# Patient Record
Sex: MALE | Race: OTHER | HISPANIC OR LATINO | URBAN - METROPOLITAN AREA
[De-identification: names, ages, dates, MRNs, and addresses within clinical notes are randomized per-mention and may not be internally consistent; named-entity substitution may affect disease eponyms.]

---

## 2017-05-03 ENCOUNTER — EMERGENCY (EMERGENCY)
Facility: HOSPITAL | Age: 57
LOS: 0 days | Discharge: HOME | End: 2017-05-03
Attending: EMERGENCY MEDICINE | Admitting: EMERGENCY MEDICINE
Payer: MEDICAID

## 2017-05-03 VITALS
OXYGEN SATURATION: 97 % | RESPIRATION RATE: 17 BRPM | HEART RATE: 101 BPM | WEIGHT: 218.92 LBS | SYSTOLIC BLOOD PRESSURE: 135 MMHG | DIASTOLIC BLOOD PRESSURE: 76 MMHG | TEMPERATURE: 99 F | HEIGHT: 69 IN

## 2017-05-03 VITALS — HEART RATE: 96 BPM | SYSTOLIC BLOOD PRESSURE: 149 MMHG | OXYGEN SATURATION: 95 % | DIASTOLIC BLOOD PRESSURE: 95 MMHG

## 2017-05-03 DIAGNOSIS — R07.9 CHEST PAIN, UNSPECIFIED: ICD-10-CM

## 2017-05-03 DIAGNOSIS — E11.9 TYPE 2 DIABETES MELLITUS WITHOUT COMPLICATIONS: ICD-10-CM

## 2017-05-03 DIAGNOSIS — I10 ESSENTIAL (PRIMARY) HYPERTENSION: ICD-10-CM

## 2017-05-03 DIAGNOSIS — Z98.1 ARTHRODESIS STATUS: Chronic | ICD-10-CM

## 2017-05-03 LAB
ALBUMIN SERPL ELPH-MCNC: 3.5 G/DL — SIGNIFICANT CHANGE UP (ref 3.3–5)
ALP SERPL-CCNC: 68 U/L — SIGNIFICANT CHANGE UP (ref 40–120)
ALT FLD-CCNC: 46 U/L — SIGNIFICANT CHANGE UP (ref 12–78)
ANION GAP SERPL CALC-SCNC: 10 MMOL/L — SIGNIFICANT CHANGE UP (ref 5–17)
AST SERPL-CCNC: 18 U/L — SIGNIFICANT CHANGE UP (ref 15–37)
BASOPHILS # BLD AUTO: 0.2 K/UL — SIGNIFICANT CHANGE UP (ref 0–0.2)
BASOPHILS NFR BLD AUTO: 1.4 % — SIGNIFICANT CHANGE UP (ref 0–2)
BILIRUB SERPL-MCNC: 0.4 MG/DL — SIGNIFICANT CHANGE UP (ref 0.2–1.2)
BUN SERPL-MCNC: 8 MG/DL — SIGNIFICANT CHANGE UP (ref 7–23)
CALCIUM SERPL-MCNC: 8.9 MG/DL — SIGNIFICANT CHANGE UP (ref 8.5–10.1)
CHLORIDE SERPL-SCNC: 102 MMOL/L — SIGNIFICANT CHANGE UP (ref 96–108)
CK SERPL-CCNC: 695 U/L — HIGH (ref 26–308)
CO2 SERPL-SCNC: 24 MMOL/L — SIGNIFICANT CHANGE UP (ref 22–31)
CREAT SERPL-MCNC: 0.97 MG/DL — SIGNIFICANT CHANGE UP (ref 0.5–1.3)
D DIMER BLD IA.RAPID-MCNC: <150 NG/ML DDU — SIGNIFICANT CHANGE UP
EOSINOPHIL # BLD AUTO: 0.2 K/UL — SIGNIFICANT CHANGE UP (ref 0–0.5)
EOSINOPHIL NFR BLD AUTO: 1.7 % — SIGNIFICANT CHANGE UP (ref 0–6)
GLUCOSE SERPL-MCNC: 297 MG/DL — HIGH (ref 70–99)
HCT VFR BLD CALC: 43.9 % — SIGNIFICANT CHANGE UP (ref 39–50)
HGB BLD-MCNC: 14.8 G/DL — SIGNIFICANT CHANGE UP (ref 13–17)
LYMPHOCYTES # BLD AUTO: 2.5 K/UL — SIGNIFICANT CHANGE UP (ref 1–3.3)
LYMPHOCYTES # BLD AUTO: 23.2 % — SIGNIFICANT CHANGE UP (ref 13–44)
MCHC RBC-ENTMCNC: 29.6 PG — SIGNIFICANT CHANGE UP (ref 27–34)
MCHC RBC-ENTMCNC: 33.6 GM/DL — SIGNIFICANT CHANGE UP (ref 32–36)
MCV RBC AUTO: 87.9 FL — SIGNIFICANT CHANGE UP (ref 80–100)
MONOCYTES # BLD AUTO: 0.9 K/UL — SIGNIFICANT CHANGE UP (ref 0–0.9)
MONOCYTES NFR BLD AUTO: 7.8 % — SIGNIFICANT CHANGE UP (ref 2–14)
NEUTROPHILS # BLD AUTO: 7.2 K/UL — SIGNIFICANT CHANGE UP (ref 1.8–7.4)
NEUTROPHILS NFR BLD AUTO: 65.9 % — SIGNIFICANT CHANGE UP (ref 43–77)
PLATELET # BLD AUTO: 260 K/UL — SIGNIFICANT CHANGE UP (ref 150–400)
POTASSIUM SERPL-MCNC: 3.7 MMOL/L — SIGNIFICANT CHANGE UP (ref 3.5–5.3)
POTASSIUM SERPL-SCNC: 3.7 MMOL/L — SIGNIFICANT CHANGE UP (ref 3.5–5.3)
PROT SERPL-MCNC: 7.6 GM/DL — SIGNIFICANT CHANGE UP (ref 6–8.3)
RBC # BLD: 5 M/UL — SIGNIFICANT CHANGE UP (ref 4.2–5.8)
RBC # FLD: 12.3 % — SIGNIFICANT CHANGE UP (ref 10.3–14.5)
SODIUM SERPL-SCNC: 136 MMOL/L — SIGNIFICANT CHANGE UP (ref 135–145)
TROPONIN I SERPL-MCNC: <0.015 NG/ML — SIGNIFICANT CHANGE UP (ref 0.01–0.04)
TROPONIN I SERPL-MCNC: <0.015 NG/ML — SIGNIFICANT CHANGE UP (ref 0.01–0.04)
WBC # BLD: 10.9 K/UL — HIGH (ref 3.8–10.5)
WBC # FLD AUTO: 10.9 K/UL — HIGH (ref 3.8–10.5)

## 2017-05-03 PROCEDURE — 93010 ELECTROCARDIOGRAM REPORT: CPT

## 2017-05-03 PROCEDURE — 99284 EMERGENCY DEPT VISIT MOD MDM: CPT

## 2017-05-03 PROCEDURE — 71020: CPT | Mod: 26

## 2017-05-03 RX ORDER — METFORMIN HYDROCHLORIDE 850 MG/1
1 TABLET ORAL
Qty: 60 | Refills: 0
Start: 2017-05-03 | End: 2017-06-02

## 2017-05-03 RX ORDER — SODIUM CHLORIDE 9 MG/ML
3 INJECTION INTRAMUSCULAR; INTRAVENOUS; SUBCUTANEOUS ONCE
Refills: 0 | Status: COMPLETED | OUTPATIENT
Start: 2017-05-03 | End: 2017-05-03

## 2017-05-03 RX ORDER — ASPIRIN/CALCIUM CARB/MAGNESIUM 324 MG
325 TABLET ORAL ONCE
Refills: 0 | Status: COMPLETED | OUTPATIENT
Start: 2017-05-03 | End: 2017-05-03

## 2017-05-03 RX ORDER — ACETAMINOPHEN 500 MG
650 TABLET ORAL ONCE
Refills: 0 | Status: COMPLETED | OUTPATIENT
Start: 2017-05-03 | End: 2017-05-03

## 2017-05-03 RX ORDER — METFORMIN HYDROCHLORIDE 850 MG/1
1000 TABLET ORAL ONCE
Refills: 0 | Status: COMPLETED | OUTPATIENT
Start: 2017-05-03 | End: 2017-05-03

## 2017-05-03 RX ADMIN — Medication 650 MILLIGRAM(S): at 08:18

## 2017-05-03 RX ADMIN — Medication 325 MILLIGRAM(S): at 05:27

## 2017-05-03 RX ADMIN — Medication 100 MILLIGRAM(S): at 05:27

## 2017-05-03 RX ADMIN — METFORMIN HYDROCHLORIDE 1000 MILLIGRAM(S): 850 TABLET ORAL at 10:14

## 2017-05-03 RX ADMIN — SODIUM CHLORIDE 3 MILLILITER(S): 9 INJECTION INTRAMUSCULAR; INTRAVENOUS; SUBCUTANEOUS at 05:27

## 2017-05-03 NOTE — ED PROVIDER NOTE - OBJECTIVE STATEMENT
55 y/o male with PMHx of HTN presents to the ED c/o CP rated as a 5/10. Also back pain and left leg pain. Pt states he was in retirement and had CP then and was seeing the retirement doctor. States he was prescribed medicine but was unable to pick it up from the pharmacy. Does not know the name. Current smoker.

## 2017-05-03 NOTE — ED PROVIDER NOTE - MUSCULOSKELETAL, MLM
Spine appears normal, range of motion is not limited, no muscle or joint tenderness. +reproducible tenderness to anterior chest wall, sternum, and left costochondral margin at rib number 3 and 4.

## 2017-05-03 NOTE — ED PROVIDER NOTE - NS ED MD SCRIBE ATTENDING SCRIBE SECTIONS
HISTORY OF PRESENT ILLNESS/REVIEW OF SYSTEMS/VITAL SIGNS( Pullset)/PHYSICAL EXAM/RESULTS/PROGRESS NOTE/DISPOSITION/PAST MEDICAL/SURGICAL/SOCIAL HISTORY

## 2017-07-29 ENCOUNTER — EMERGENCY (EMERGENCY)
Facility: HOSPITAL | Age: 57
LOS: 0 days | Discharge: HOME | End: 2017-07-29
Admitting: EMERGENCY MEDICINE

## 2017-07-29 VITALS — HEIGHT: 69 IN | WEIGHT: 210.1 LBS

## 2017-07-29 DIAGNOSIS — R69 ILLNESS, UNSPECIFIED: ICD-10-CM

## 2017-07-29 DIAGNOSIS — Z98.1 ARTHRODESIS STATUS: Chronic | ICD-10-CM

## 2017-07-29 NOTE — ED ADULT TRIAGE NOTE - TRIAGE CALLED NO RESPONSE
pt states he was waiting to be decon and left before seen by provider. pt did not inform staff./Patient called and no answer

## 2018-01-15 ENCOUNTER — EMERGENCY (EMERGENCY)
Facility: HOSPITAL | Age: 58
LOS: 0 days | Discharge: HOME | End: 2018-01-15
Attending: EMERGENCY MEDICINE | Admitting: EMERGENCY MEDICINE
Payer: MEDICAID

## 2018-01-15 VITALS
RESPIRATION RATE: 17 BRPM | SYSTOLIC BLOOD PRESSURE: 142 MMHG | OXYGEN SATURATION: 100 % | DIASTOLIC BLOOD PRESSURE: 79 MMHG | HEART RATE: 89 BPM | TEMPERATURE: 98 F

## 2018-01-15 VITALS
SYSTOLIC BLOOD PRESSURE: 151 MMHG | RESPIRATION RATE: 18 BRPM | DIASTOLIC BLOOD PRESSURE: 77 MMHG | WEIGHT: 169.98 LBS | TEMPERATURE: 98 F | OXYGEN SATURATION: 100 % | HEART RATE: 98 BPM | HEIGHT: 66 IN

## 2018-01-15 DIAGNOSIS — R05 COUGH: ICD-10-CM

## 2018-01-15 DIAGNOSIS — Z98.1 ARTHRODESIS STATUS: Chronic | ICD-10-CM

## 2018-01-15 DIAGNOSIS — M79.1 MYALGIA: ICD-10-CM

## 2018-01-15 LAB
ALBUMIN SERPL ELPH-MCNC: 3.1 G/DL — LOW (ref 3.3–5)
ALP SERPL-CCNC: 73 U/L — SIGNIFICANT CHANGE UP (ref 40–120)
ALT FLD-CCNC: 26 U/L — SIGNIFICANT CHANGE UP (ref 12–78)
ANION GAP SERPL CALC-SCNC: 7 MMOL/L — SIGNIFICANT CHANGE UP (ref 5–17)
APTT BLD: 29.2 SEC — SIGNIFICANT CHANGE UP (ref 27.5–37.4)
AST SERPL-CCNC: 9 U/L — LOW (ref 15–37)
BASOPHILS # BLD AUTO: 0.1 K/UL — SIGNIFICANT CHANGE UP (ref 0–0.2)
BASOPHILS NFR BLD AUTO: 0.8 % — SIGNIFICANT CHANGE UP (ref 0–2)
BILIRUB SERPL-MCNC: 0.2 MG/DL — SIGNIFICANT CHANGE UP (ref 0.2–1.2)
BUN SERPL-MCNC: 19 MG/DL — SIGNIFICANT CHANGE UP (ref 7–23)
CALCIUM SERPL-MCNC: 9.4 MG/DL — SIGNIFICANT CHANGE UP (ref 8.5–10.1)
CHLORIDE SERPL-SCNC: 98 MMOL/L — SIGNIFICANT CHANGE UP (ref 96–108)
CK SERPL-CCNC: 61 U/L — SIGNIFICANT CHANGE UP (ref 26–308)
CO2 SERPL-SCNC: 27 MMOL/L — SIGNIFICANT CHANGE UP (ref 22–31)
CREAT SERPL-MCNC: 0.94 MG/DL — SIGNIFICANT CHANGE UP (ref 0.5–1.3)
EOSINOPHIL # BLD AUTO: 0.2 K/UL — SIGNIFICANT CHANGE UP (ref 0–0.5)
EOSINOPHIL NFR BLD AUTO: 1.9 % — SIGNIFICANT CHANGE UP (ref 0–6)
GLUCOSE SERPL-MCNC: 381 MG/DL — HIGH (ref 70–99)
HCT VFR BLD CALC: 46.4 % — SIGNIFICANT CHANGE UP (ref 39–50)
HGB BLD-MCNC: 15.2 G/DL — SIGNIFICANT CHANGE UP (ref 13–17)
INR BLD: 0.94 RATIO — SIGNIFICANT CHANGE UP (ref 0.88–1.16)
LYMPHOCYTES # BLD AUTO: 2 K/UL — SIGNIFICANT CHANGE UP (ref 1–3.3)
LYMPHOCYTES # BLD AUTO: 22.9 % — SIGNIFICANT CHANGE UP (ref 13–44)
MCHC RBC-ENTMCNC: 28.6 PG — SIGNIFICANT CHANGE UP (ref 27–34)
MCHC RBC-ENTMCNC: 32.7 GM/DL — SIGNIFICANT CHANGE UP (ref 32–36)
MCV RBC AUTO: 87.5 FL — SIGNIFICANT CHANGE UP (ref 80–100)
MONOCYTES # BLD AUTO: 0.9 K/UL — SIGNIFICANT CHANGE UP (ref 0–0.9)
MONOCYTES NFR BLD AUTO: 9.8 % — SIGNIFICANT CHANGE UP (ref 2–14)
NEUTROPHILS # BLD AUTO: 5.6 K/UL — SIGNIFICANT CHANGE UP (ref 1.8–7.4)
NEUTROPHILS NFR BLD AUTO: 64.6 % — SIGNIFICANT CHANGE UP (ref 43–77)
PLATELET # BLD AUTO: 261 K/UL — SIGNIFICANT CHANGE UP (ref 150–400)
POTASSIUM SERPL-MCNC: 4.4 MMOL/L — SIGNIFICANT CHANGE UP (ref 3.5–5.3)
POTASSIUM SERPL-SCNC: 4.4 MMOL/L — SIGNIFICANT CHANGE UP (ref 3.5–5.3)
PROT SERPL-MCNC: 7.5 GM/DL — SIGNIFICANT CHANGE UP (ref 6–8.3)
PROTHROM AB SERPL-ACNC: 10.1 SEC — SIGNIFICANT CHANGE UP (ref 9.8–12.7)
RBC # BLD: 5.31 M/UL — SIGNIFICANT CHANGE UP (ref 4.2–5.8)
RBC # FLD: 12.9 % — SIGNIFICANT CHANGE UP (ref 10.3–14.5)
SODIUM SERPL-SCNC: 132 MMOL/L — LOW (ref 135–145)
TROPONIN I SERPL-MCNC: <0.015 NG/ML — SIGNIFICANT CHANGE UP (ref 0.01–0.04)
WBC # BLD: 8.7 K/UL — SIGNIFICANT CHANGE UP (ref 3.8–10.5)
WBC # FLD AUTO: 8.7 K/UL — SIGNIFICANT CHANGE UP (ref 3.8–10.5)

## 2018-01-15 PROCEDURE — 99284 EMERGENCY DEPT VISIT MOD MDM: CPT

## 2018-01-15 PROCEDURE — 71046 X-RAY EXAM CHEST 2 VIEWS: CPT | Mod: 26

## 2018-01-15 PROCEDURE — 93010 ELECTROCARDIOGRAM REPORT: CPT

## 2018-01-15 RX ORDER — IBUPROFEN 200 MG
1 TABLET ORAL
Qty: 40 | Refills: 0
Start: 2018-01-15 | End: 2018-01-24

## 2018-01-15 RX ORDER — KETOROLAC TROMETHAMINE 30 MG/ML
30 SYRINGE (ML) INJECTION ONCE
Refills: 0 | Status: DISCONTINUED | OUTPATIENT
Start: 2018-01-15 | End: 2018-01-15

## 2018-01-15 RX ORDER — SODIUM CHLORIDE 9 MG/ML
3 INJECTION INTRAMUSCULAR; INTRAVENOUS; SUBCUTANEOUS ONCE
Refills: 0 | Status: COMPLETED | OUTPATIENT
Start: 2018-01-15 | End: 2018-01-15

## 2018-01-15 RX ORDER — CYCLOBENZAPRINE HYDROCHLORIDE 10 MG/1
1 TABLET, FILM COATED ORAL
Qty: 15 | Refills: 0
Start: 2018-01-15 | End: 2018-01-19

## 2018-01-15 RX ORDER — INSULIN HUMAN 100 [IU]/ML
6 INJECTION, SOLUTION SUBCUTANEOUS ONCE
Refills: 0 | Status: COMPLETED | OUTPATIENT
Start: 2018-01-15 | End: 2018-01-15

## 2018-01-15 RX ORDER — SODIUM CHLORIDE 9 MG/ML
1000 INJECTION INTRAMUSCULAR; INTRAVENOUS; SUBCUTANEOUS ONCE
Refills: 0 | Status: COMPLETED | OUTPATIENT
Start: 2018-01-15 | End: 2018-01-15

## 2018-01-15 RX ADMIN — Medication 30 MILLIGRAM(S): at 01:47

## 2018-01-15 RX ADMIN — Medication 30 MILLIGRAM(S): at 04:27

## 2018-01-15 RX ADMIN — SODIUM CHLORIDE 1000 MILLILITER(S): 9 INJECTION INTRAMUSCULAR; INTRAVENOUS; SUBCUTANEOUS at 01:47

## 2018-01-15 RX ADMIN — INSULIN HUMAN 6 UNIT(S): 100 INJECTION, SOLUTION SUBCUTANEOUS at 03:02

## 2018-01-15 RX ADMIN — SODIUM CHLORIDE 3 MILLILITER(S): 9 INJECTION INTRAMUSCULAR; INTRAVENOUS; SUBCUTANEOUS at 01:49
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
## 2018-01-15 NOTE — ED ADULT NURSE NOTE - NS ED NURSE DC INFO COMPLEXITY
Simple: Patient demonstrates quick and easy understanding/Straightforward: Basic instructions, no meds, no home treatment/Verbalized Understanding

## 2018-01-15 NOTE — ED ADULT NURSE NOTE - OBJECTIVE STATEMENT
Pt states he has had cough and cold symptoms x 3 days.  Pt states yesterday he had diarrhea, n/v but none today.  States "I felt warm", but never took his temperature.

## 2018-01-15 NOTE — ED PROVIDER NOTE - PROGRESS NOTE DETAILS
glucose noted.  pt with h/o DM.  will treat and reeval pt resting comfortably.  will d/c home with f/u

## 2018-01-15 NOTE — ED PROVIDER NOTE - MEDICAL DECISION MAKING DETAILS
58 y/o male with cough and ocngestion.  will check labs, CXR and reeval 58 y/o male with cough and congestion.  will check labs, CXR and reeval

## 2018-01-24 ENCOUNTER — EMERGENCY (EMERGENCY)
Facility: HOSPITAL | Age: 58
LOS: 0 days | Discharge: HOME | End: 2018-01-24
Attending: EMERGENCY MEDICINE | Admitting: EMERGENCY MEDICINE
Payer: MEDICAID

## 2018-01-24 VITALS
TEMPERATURE: 99 F | HEART RATE: 84 BPM | RESPIRATION RATE: 20 BRPM | DIASTOLIC BLOOD PRESSURE: 77 MMHG | OXYGEN SATURATION: 98 % | SYSTOLIC BLOOD PRESSURE: 129 MMHG

## 2018-01-24 VITALS
TEMPERATURE: 99 F | HEIGHT: 66 IN | OXYGEN SATURATION: 99 % | RESPIRATION RATE: 18 BRPM | DIASTOLIC BLOOD PRESSURE: 93 MMHG | HEART RATE: 104 BPM | WEIGHT: 160.06 LBS | SYSTOLIC BLOOD PRESSURE: 159 MMHG

## 2018-01-24 DIAGNOSIS — R07.9 CHEST PAIN, UNSPECIFIED: ICD-10-CM

## 2018-01-24 DIAGNOSIS — Z98.1 ARTHRODESIS STATUS: Chronic | ICD-10-CM

## 2018-01-24 LAB
ALBUMIN SERPL ELPH-MCNC: 2.9 G/DL — LOW (ref 3.3–5)
ALP SERPL-CCNC: 93 U/L — SIGNIFICANT CHANGE UP (ref 40–120)
ALT FLD-CCNC: 22 U/L — SIGNIFICANT CHANGE UP (ref 12–78)
ANION GAP SERPL CALC-SCNC: 7 MMOL/L — SIGNIFICANT CHANGE UP (ref 5–17)
AST SERPL-CCNC: 10 U/L — LOW (ref 15–37)
BASOPHILS # BLD AUTO: 0.1 K/UL — SIGNIFICANT CHANGE UP (ref 0–0.2)
BASOPHILS NFR BLD AUTO: 0.7 % — SIGNIFICANT CHANGE UP (ref 0–2)
BILIRUB SERPL-MCNC: 0.3 MG/DL — SIGNIFICANT CHANGE UP (ref 0.2–1.2)
BUN SERPL-MCNC: 15 MG/DL — SIGNIFICANT CHANGE UP (ref 7–23)
CALCIUM SERPL-MCNC: 8.9 MG/DL — SIGNIFICANT CHANGE UP (ref 8.5–10.1)
CHLORIDE SERPL-SCNC: 97 MMOL/L — SIGNIFICANT CHANGE UP (ref 96–108)
CK SERPL-CCNC: 158 U/L — SIGNIFICANT CHANGE UP (ref 26–308)
CO2 SERPL-SCNC: 28 MMOL/L — SIGNIFICANT CHANGE UP (ref 22–31)
CREAT SERPL-MCNC: 0.95 MG/DL — SIGNIFICANT CHANGE UP (ref 0.5–1.3)
D DIMER BLD IA.RAPID-MCNC: <150 NG/ML DDU — SIGNIFICANT CHANGE UP
EOSINOPHIL # BLD AUTO: 0.1 K/UL — SIGNIFICANT CHANGE UP (ref 0–0.5)
EOSINOPHIL NFR BLD AUTO: 1.4 % — SIGNIFICANT CHANGE UP (ref 0–6)
GLUCOSE SERPL-MCNC: 447 MG/DL — HIGH (ref 70–99)
HCT VFR BLD CALC: 42.3 % — SIGNIFICANT CHANGE UP (ref 39–50)
HGB BLD-MCNC: 14 G/DL — SIGNIFICANT CHANGE UP (ref 13–17)
INR BLD: 0.95 RATIO — SIGNIFICANT CHANGE UP (ref 0.88–1.16)
LYMPHOCYTES # BLD AUTO: 1.4 K/UL — SIGNIFICANT CHANGE UP (ref 1–3.3)
LYMPHOCYTES # BLD AUTO: 13.7 % — SIGNIFICANT CHANGE UP (ref 13–44)
MCHC RBC-ENTMCNC: 29.1 PG — SIGNIFICANT CHANGE UP (ref 27–34)
MCHC RBC-ENTMCNC: 33 GM/DL — SIGNIFICANT CHANGE UP (ref 32–36)
MCV RBC AUTO: 88.2 FL — SIGNIFICANT CHANGE UP (ref 80–100)
MONOCYTES # BLD AUTO: 1 K/UL — HIGH (ref 0–0.9)
MONOCYTES NFR BLD AUTO: 9.9 % — SIGNIFICANT CHANGE UP (ref 2–14)
NEUTROPHILS # BLD AUTO: 7.5 K/UL — HIGH (ref 1.8–7.4)
NEUTROPHILS NFR BLD AUTO: 74.4 % — SIGNIFICANT CHANGE UP (ref 43–77)
PLATELET # BLD AUTO: 343 K/UL — SIGNIFICANT CHANGE UP (ref 150–400)
POTASSIUM SERPL-MCNC: 4.2 MMOL/L — SIGNIFICANT CHANGE UP (ref 3.5–5.3)
POTASSIUM SERPL-SCNC: 4.2 MMOL/L — SIGNIFICANT CHANGE UP (ref 3.5–5.3)
PROT SERPL-MCNC: 7.7 GM/DL — SIGNIFICANT CHANGE UP (ref 6–8.3)
PROTHROM AB SERPL-ACNC: 10.2 SEC — SIGNIFICANT CHANGE UP (ref 9.8–12.7)
RBC # BLD: 4.8 M/UL — SIGNIFICANT CHANGE UP (ref 4.2–5.8)
RBC # FLD: 12.4 % — SIGNIFICANT CHANGE UP (ref 10.3–14.5)
SODIUM SERPL-SCNC: 132 MMOL/L — LOW (ref 135–145)
TROPONIN I SERPL-MCNC: <0.015 NG/ML — SIGNIFICANT CHANGE UP (ref 0.01–0.04)
TROPONIN I SERPL-MCNC: <0.015 NG/ML — SIGNIFICANT CHANGE UP (ref 0.01–0.04)
WBC # BLD: 10.1 K/UL — SIGNIFICANT CHANGE UP (ref 3.8–10.5)
WBC # FLD AUTO: 10.1 K/UL — SIGNIFICANT CHANGE UP (ref 3.8–10.5)

## 2018-01-24 PROCEDURE — 99284 EMERGENCY DEPT VISIT MOD MDM: CPT

## 2018-01-24 PROCEDURE — 71045 X-RAY EXAM CHEST 1 VIEW: CPT | Mod: 26

## 2018-01-24 PROCEDURE — 93010 ELECTROCARDIOGRAM REPORT: CPT

## 2018-01-24 RX ORDER — METHOCARBAMOL 500 MG/1
2 TABLET, FILM COATED ORAL
Qty: 30 | Refills: 0
Start: 2018-01-24

## 2018-01-24 RX ORDER — IBUPROFEN 200 MG
1 TABLET ORAL
Qty: 20 | Refills: 0
Start: 2018-01-24

## 2018-01-24 RX ORDER — SODIUM CHLORIDE 9 MG/ML
1000 INJECTION INTRAMUSCULAR; INTRAVENOUS; SUBCUTANEOUS ONCE
Refills: 0 | Status: COMPLETED | OUTPATIENT
Start: 2018-01-24 | End: 2018-01-24

## 2018-01-24 RX ORDER — ASPIRIN/CALCIUM CARB/MAGNESIUM 324 MG
325 TABLET ORAL ONCE
Refills: 0 | Status: COMPLETED | OUTPATIENT
Start: 2018-01-24 | End: 2018-01-24

## 2018-01-24 RX ORDER — SODIUM CHLORIDE 9 MG/ML
3 INJECTION INTRAMUSCULAR; INTRAVENOUS; SUBCUTANEOUS ONCE
Refills: 0 | Status: COMPLETED | OUTPATIENT
Start: 2018-01-24 | End: 2018-01-24

## 2018-01-24 RX ORDER — ACETAMINOPHEN 500 MG
1000 TABLET ORAL ONCE
Refills: 0 | Status: COMPLETED | OUTPATIENT
Start: 2018-01-24 | End: 2018-01-24

## 2018-01-24 RX ORDER — KETOROLAC TROMETHAMINE 30 MG/ML
30 SYRINGE (ML) INJECTION ONCE
Refills: 0 | Status: DISCONTINUED | OUTPATIENT
Start: 2018-01-24 | End: 2018-01-24

## 2018-01-24 RX ADMIN — Medication 30 MILLIGRAM(S): at 05:00

## 2018-01-24 RX ADMIN — Medication 30 MILLIGRAM(S): at 04:40

## 2018-01-24 RX ADMIN — Medication 1000 MILLIGRAM(S): at 07:10

## 2018-01-24 RX ADMIN — SODIUM CHLORIDE 3 MILLILITER(S): 9 INJECTION INTRAMUSCULAR; INTRAVENOUS; SUBCUTANEOUS at 04:40

## 2018-01-24 RX ADMIN — SODIUM CHLORIDE 1000 MILLILITER(S): 9 INJECTION INTRAMUSCULAR; INTRAVENOUS; SUBCUTANEOUS at 04:40

## 2018-01-24 NOTE — ED ADULT TRIAGE NOTE - CHIEF COMPLAINT QUOTE
patient presents in ED BIBA for chest pain. patient states " I was walking outside and I started having 10/10 chest pain". patient also states he had blood in his stool two days ago. patient received 324 asa in the field.

## 2018-01-24 NOTE — ED PROVIDER NOTE - PROGRESS NOTE DETAILS
CP: received s/o pending 2nd troponin which is neg. Pt sleeping in room, comfortable. Will dc with otpt fu

## 2018-01-24 NOTE — ED ADULT NURSE NOTE - OBJECTIVE STATEMENT
Pt presented ED c/o chest pain. As per pt, pt's been experiencing chest pain for 1 1/2 years now which worsen today. In addition, pt's has body aches especially BLE which makes him difficult to ambulate. Pt also disclose having a dark stool this morning. Pt was here ED last week for similar symptoms. No other complains noted

## 2018-01-24 NOTE — ED ADULT NURSE NOTE - CHPI ED SYMPTOMS NEG
no dizziness/no fever/no nausea/no numbness/no tingling/no vomiting/no weakness/no chills/no decreased eating/drinking

## 2018-01-24 NOTE — ED PROVIDER NOTE - OBJECTIVE STATEMENT
58 yo male biba from home for chest pain with cmoist productive cough tonight 58 yo male biba from home for chest pain with cmoist productive cough tonight. Pt was here last week for the same,. Pt states he has had chest pain and body aches for the last year

## 2018-08-27 ENCOUNTER — EMERGENCY (EMERGENCY)
Facility: HOSPITAL | Age: 58
LOS: 0 days | Discharge: HOME | End: 2018-08-27
Attending: STUDENT IN AN ORGANIZED HEALTH CARE EDUCATION/TRAINING PROGRAM | Admitting: STUDENT IN AN ORGANIZED HEALTH CARE EDUCATION/TRAINING PROGRAM
Payer: MEDICAID

## 2018-08-27 VITALS
RESPIRATION RATE: 26 BRPM | SYSTOLIC BLOOD PRESSURE: 160 MMHG | DIASTOLIC BLOOD PRESSURE: 72 MMHG | OXYGEN SATURATION: 100 % | TEMPERATURE: 99 F | HEART RATE: 100 BPM

## 2018-08-27 VITALS
WEIGHT: 190.04 LBS | TEMPERATURE: 99 F | RESPIRATION RATE: 18 BRPM | HEART RATE: 103 BPM | HEIGHT: 69 IN | DIASTOLIC BLOOD PRESSURE: 78 MMHG | OXYGEN SATURATION: 99 % | SYSTOLIC BLOOD PRESSURE: 140 MMHG

## 2018-08-27 DIAGNOSIS — Z98.1 ARTHRODESIS STATUS: ICD-10-CM

## 2018-08-27 DIAGNOSIS — E11.9 TYPE 2 DIABETES MELLITUS WITHOUT COMPLICATIONS: ICD-10-CM
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
## 2018-08-27 DIAGNOSIS — I10 ESSENTIAL (PRIMARY) HYPERTENSION: ICD-10-CM

## 2018-08-27 DIAGNOSIS — G47.00 INSOMNIA, UNSPECIFIED: ICD-10-CM

## 2018-08-27 DIAGNOSIS — Z79.899 OTHER LONG TERM (CURRENT) DRUG THERAPY: ICD-10-CM
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
## 2018-08-27 DIAGNOSIS — S01.111A LACERATION WITHOUT FOREIGN BODY OF RIGHT EYELID AND PERIOCULAR AREA, INITIAL ENCOUNTER: ICD-10-CM

## 2018-08-27 DIAGNOSIS — Z98.1 ARTHRODESIS STATUS: Chronic | ICD-10-CM

## 2018-08-27 DIAGNOSIS — M54.2 CERVICALGIA: ICD-10-CM
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
## 2018-08-27 LAB
ALBUMIN SERPL ELPH-MCNC: 3 G/DL — LOW (ref 3.3–5)
ALP SERPL-CCNC: 70 U/L — SIGNIFICANT CHANGE UP (ref 40–120)
ALT FLD-CCNC: 27 U/L — SIGNIFICANT CHANGE UP (ref 12–78)
ANION GAP SERPL CALC-SCNC: 10 MMOL/L — SIGNIFICANT CHANGE UP (ref 5–17)
APTT BLD: 28.2 SEC — SIGNIFICANT CHANGE UP (ref 27.5–37.4)
AST SERPL-CCNC: 23 U/L — SIGNIFICANT CHANGE UP (ref 15–37)
BASOPHILS # BLD AUTO: 0.05 K/UL — SIGNIFICANT CHANGE UP (ref 0–0.2)
BASOPHILS NFR BLD AUTO: 0.4 % — SIGNIFICANT CHANGE UP (ref 0–2)
BILIRUB SERPL-MCNC: 0.2 MG/DL — SIGNIFICANT CHANGE UP (ref 0.2–1.2)
BLD GP AB SCN SERPL QL: SIGNIFICANT CHANGE UP
BUN SERPL-MCNC: 11 MG/DL — SIGNIFICANT CHANGE UP (ref 7–23)
CALCIUM SERPL-MCNC: 8.5 MG/DL — SIGNIFICANT CHANGE UP (ref 8.5–10.1)
CHLORIDE SERPL-SCNC: 102 MMOL/L — SIGNIFICANT CHANGE UP (ref 96–108)
CO2 SERPL-SCNC: 21 MMOL/L — LOW (ref 22–31)
CREAT SERPL-MCNC: 0.83 MG/DL — SIGNIFICANT CHANGE UP (ref 0.5–1.3)
EOSINOPHIL # BLD AUTO: 0.11 K/UL — SIGNIFICANT CHANGE UP (ref 0–0.5)
EOSINOPHIL NFR BLD AUTO: 0.9 % — SIGNIFICANT CHANGE UP (ref 0–6)
ETHANOL SERPL-MCNC: 30 MG/DL — HIGH (ref 0–10)
GLUCOSE SERPL-MCNC: 328 MG/DL — HIGH (ref 70–99)
HCT VFR BLD CALC: 37.8 % — LOW (ref 39–50)
HGB BLD-MCNC: 12.8 G/DL — LOW (ref 13–17)
IMM GRANULOCYTES NFR BLD AUTO: 1 % — SIGNIFICANT CHANGE UP (ref 0–1.5)
INR BLD: 0.98 RATIO — SIGNIFICANT CHANGE UP (ref 0.88–1.16)
LYMPHOCYTES # BLD AUTO: 1.5 K/UL — SIGNIFICANT CHANGE UP (ref 1–3.3)
LYMPHOCYTES # BLD AUTO: 12.2 % — LOW (ref 13–44)
MCHC RBC-ENTMCNC: 29.9 PG — SIGNIFICANT CHANGE UP (ref 27–34)
MCHC RBC-ENTMCNC: 33.9 GM/DL — SIGNIFICANT CHANGE UP (ref 32–36)
MCV RBC AUTO: 88.3 FL — SIGNIFICANT CHANGE UP (ref 80–100)
MONOCYTES # BLD AUTO: 0.69 K/UL — SIGNIFICANT CHANGE UP (ref 0–0.9)
MONOCYTES NFR BLD AUTO: 5.6 % — SIGNIFICANT CHANGE UP (ref 2–14)
NEUTROPHILS # BLD AUTO: 9.85 K/UL — HIGH (ref 1.8–7.4)
NEUTROPHILS NFR BLD AUTO: 79.9 % — HIGH (ref 43–77)
NRBC # BLD: 0 /100 WBCS — SIGNIFICANT CHANGE UP (ref 0–0)
PLATELET # BLD AUTO: 261 K/UL — SIGNIFICANT CHANGE UP (ref 150–400)
POTASSIUM SERPL-MCNC: 3.9 MMOL/L — SIGNIFICANT CHANGE UP (ref 3.5–5.3)
POTASSIUM SERPL-SCNC: 3.9 MMOL/L — SIGNIFICANT CHANGE UP (ref 3.5–5.3)
PROT SERPL-MCNC: 7 GM/DL — SIGNIFICANT CHANGE UP (ref 6–8.3)
PROTHROM AB SERPL-ACNC: 10.6 SEC — SIGNIFICANT CHANGE UP (ref 9.8–12.7)
RBC # BLD: 4.28 M/UL — SIGNIFICANT CHANGE UP (ref 4.2–5.8)
RBC # FLD: 13.1 % — SIGNIFICANT CHANGE UP (ref 10.3–14.5)
SODIUM SERPL-SCNC: 133 MMOL/L — LOW (ref 135–145)
TYPE + AB SCN PNL BLD: SIGNIFICANT CHANGE UP
WBC # BLD: 12.32 K/UL — HIGH (ref 3.8–10.5)
WBC # FLD AUTO: 12.32 K/UL — HIGH (ref 3.8–10.5)

## 2018-08-27 PROCEDURE — 71045 X-RAY EXAM CHEST 1 VIEW: CPT | Mod: 26

## 2018-08-27 PROCEDURE — 99284 EMERGENCY DEPT VISIT MOD MDM: CPT

## 2018-08-27 PROCEDURE — 12011 RPR F/E/E/N/L/M 2.5 CM/<: CPT | Mod: RT

## 2018-08-27 PROCEDURE — 93010 ELECTROCARDIOGRAM REPORT: CPT

## 2018-08-27 PROCEDURE — 72125 CT NECK SPINE W/O DYE: CPT | Mod: 26

## 2018-08-27 PROCEDURE — 70450 CT HEAD/BRAIN W/O DYE: CPT | Mod: 26

## 2018-08-27 RX ORDER — TETANUS TOXOID, REDUCED DIPHTHERIA TOXOID AND ACELLULAR PERTUSSIS VACCINE, ADSORBED 5; 2.5; 8; 8; 2.5 [IU]/.5ML; [IU]/.5ML; UG/.5ML; UG/.5ML; UG/.5ML
0.5 SUSPENSION INTRAMUSCULAR ONCE
Refills: 0 | Status: COMPLETED | OUTPATIENT
Start: 2018-08-27 | End: 2018-08-27

## 2018-08-27 RX ORDER — SODIUM CHLORIDE 9 MG/ML
1000 INJECTION INTRAMUSCULAR; INTRAVENOUS; SUBCUTANEOUS ONCE
Refills: 0 | Status: COMPLETED | OUTPATIENT
Start: 2018-08-27 | End: 2018-08-27

## 2018-08-27 RX ADMIN — SODIUM CHLORIDE 1000 MILLILITER(S): 9 INJECTION INTRAMUSCULAR; INTRAVENOUS; SUBCUTANEOUS at 13:17

## 2018-08-27 RX ADMIN — SODIUM CHLORIDE 1000 MILLILITER(S): 9 INJECTION INTRAMUSCULAR; INTRAVENOUS; SUBCUTANEOUS at 14:17

## 2018-08-27 RX ADMIN — TETANUS TOXOID, REDUCED DIPHTHERIA TOXOID AND ACELLULAR PERTUSSIS VACCINE, ADSORBED 0.5 MILLILITER(S): 5; 2.5; 8; 8; 2.5 SUSPENSION INTRAMUSCULAR at 13:17

## 2018-08-27 NOTE — ED ADULT NURSE NOTE - NSIMPLEMENTINTERV_GEN_ALL_ED
Implemented All Universal Safety Interventions:  [place] to call system. Call bell, personal items and telephone within reach. Instruct patient to call for assistance. Room bathroom lighting operational. Non-slip footwear when patient is off stretcher. Physically safe environment: no spills, clutter or unnecessary equipment. Stretcher in lowest position, wheels locked, appropriate side rails in place.

## 2018-08-27 NOTE — ED PROVIDER NOTE - ATTENDING CONTRIBUTION TO CARE
I, Steffanie Gonsales DO,  performed the initial face to face bedside interview with this patient regarding history of present illness, review of symptoms and relevant past medical, social and family history.  I completed an independent physical examination.  I was the initial provider who evaluated this patient. I have signed out the follow up of any pending tests (i.e. labs, radiological studies) to the resident.  I have communicated the patient’s plan of care and disposition with the resident.  The history, relevant review of systems, past medical and surgical history, medical decision making, and physical examination was documented by the scribe in my presence and I attest to the accuracy of the documentation.

## 2018-08-27 NOTE — SBIRT NOTE. - NSSBIRTSERVICES_GEN_A_ED_FT
Provided SBIRT services: Full screen positive. Brief Intervention Performed. Screening results were reviewed with the patient and patient was provided information about healthy guidelines and potential negative consequences associated with level of risk. Motivation and readiness to reduce or stop use was discussed and goals and activities to make changes were suggested/offered.  Audit Score: 8  DAST Score: 2

## 2018-08-27 NOTE — ED PROVIDER NOTE - PROGRESS NOTE DETAILS
Dru AGUILAR: Hx of DM; found to be hyperglycemic; repeat BS ordered; tylenol for pain; will repair lac; no evidence of DKA; instructed to f/u pmd in 1-2 days without fail; instructions for wound care given. Karl: Signed out pending Lac repair, s/p repair, stable for dc
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
## 2018-08-27 NOTE — ED PROCEDURE NOTE - ATTENDING CONTRIBUTION TO CARE
MD Barajas:  I was present for the critical portions of this procedure and provided direct attending supervision.

## 2018-12-01 ENCOUNTER — OUTPATIENT (OUTPATIENT)
Dept: OUTPATIENT SERVICES | Facility: HOSPITAL | Age: 58
LOS: 1 days | End: 2018-12-01
Payer: MEDICAID

## 2018-12-01 DIAGNOSIS — Z98.1 ARTHRODESIS STATUS: Chronic | ICD-10-CM

## 2018-12-12 ENCOUNTER — EMERGENCY (EMERGENCY)
Facility: HOSPITAL | Age: 58
LOS: 0 days | Discharge: HOME | End: 2018-12-12
Attending: EMERGENCY MEDICINE | Admitting: EMERGENCY MEDICINE
Payer: MEDICAID

## 2018-12-12 VITALS
WEIGHT: 179.9 LBS | TEMPERATURE: 98 F | SYSTOLIC BLOOD PRESSURE: 149 MMHG | DIASTOLIC BLOOD PRESSURE: 58 MMHG | OXYGEN SATURATION: 96 % | RESPIRATION RATE: 18 BRPM | HEART RATE: 88 BPM

## 2018-12-12 VITALS
OXYGEN SATURATION: 99 % | HEART RATE: 82 BPM | RESPIRATION RATE: 17 BRPM | TEMPERATURE: 98 F | DIASTOLIC BLOOD PRESSURE: 78 MMHG | SYSTOLIC BLOOD PRESSURE: 140 MMHG

## 2018-12-12 DIAGNOSIS — Z79.84 LONG TERM (CURRENT) USE OF ORAL HYPOGLYCEMIC DRUGS: ICD-10-CM

## 2018-12-12 DIAGNOSIS — I10 ESSENTIAL (PRIMARY) HYPERTENSION: ICD-10-CM

## 2018-12-12 DIAGNOSIS — F41.9 ANXIETY DISORDER, UNSPECIFIED: ICD-10-CM

## 2018-12-12 DIAGNOSIS — E11.65 TYPE 2 DIABETES MELLITUS WITH HYPERGLYCEMIA: ICD-10-CM

## 2018-12-12 DIAGNOSIS — Z98.1 ARTHRODESIS STATUS: Chronic | ICD-10-CM

## 2018-12-12 DIAGNOSIS — G47.00 INSOMNIA, UNSPECIFIED: ICD-10-CM

## 2018-12-12 DIAGNOSIS — R07.9 CHEST PAIN, UNSPECIFIED: ICD-10-CM

## 2018-12-12 DIAGNOSIS — Z79.899 OTHER LONG TERM (CURRENT) DRUG THERAPY: ICD-10-CM

## 2018-12-12 DIAGNOSIS — F17.210 NICOTINE DEPENDENCE, CIGARETTES, UNCOMPLICATED: ICD-10-CM

## 2018-12-12 DIAGNOSIS — R19.7 DIARRHEA, UNSPECIFIED: ICD-10-CM

## 2018-12-12 DIAGNOSIS — R11.10 VOMITING, UNSPECIFIED: ICD-10-CM

## 2018-12-12 LAB
ALBUMIN SERPL ELPH-MCNC: 2.6 G/DL — LOW (ref 3.3–5)
ALP SERPL-CCNC: 72 U/L — SIGNIFICANT CHANGE UP (ref 40–120)
ALT FLD-CCNC: 20 U/L — SIGNIFICANT CHANGE UP (ref 12–78)
ANION GAP SERPL CALC-SCNC: 8 MMOL/L — SIGNIFICANT CHANGE UP (ref 5–17)
APTT BLD: 29.5 SEC — SIGNIFICANT CHANGE UP (ref 27.5–36.3)
AST SERPL-CCNC: 9 U/L — LOW (ref 15–37)
BILIRUB SERPL-MCNC: 0.1 MG/DL — LOW (ref 0.2–1.2)
BUN SERPL-MCNC: 19 MG/DL — SIGNIFICANT CHANGE UP (ref 7–23)
CALCIUM SERPL-MCNC: 7.3 MG/DL — LOW (ref 8.5–10.1)
CHLORIDE SERPL-SCNC: 110 MMOL/L — HIGH (ref 96–108)
CO2 SERPL-SCNC: 26 MMOL/L — SIGNIFICANT CHANGE UP (ref 22–31)
CREAT SERPL-MCNC: 0.79 MG/DL — SIGNIFICANT CHANGE UP (ref 0.5–1.3)
GLUCOSE BLDC GLUCOMTR-MCNC: 422 MG/DL — HIGH (ref 70–99)
GLUCOSE SERPL-MCNC: 356 MG/DL — HIGH (ref 70–99)
HCT VFR BLD CALC: 39.6 % — SIGNIFICANT CHANGE UP (ref 39–50)
HGB BLD-MCNC: 13.1 G/DL — SIGNIFICANT CHANGE UP (ref 13–17)
INR BLD: 0.96 RATIO — SIGNIFICANT CHANGE UP (ref 0.88–1.16)
LIDOCAIN IGE QN: 109 U/L — SIGNIFICANT CHANGE UP (ref 73–393)
MAGNESIUM SERPL-MCNC: 1.6 MG/DL — SIGNIFICANT CHANGE UP (ref 1.6–2.6)
MCHC RBC-ENTMCNC: 30 PG — SIGNIFICANT CHANGE UP (ref 27–34)
MCHC RBC-ENTMCNC: 33.1 GM/DL — SIGNIFICANT CHANGE UP (ref 32–36)
MCV RBC AUTO: 90.6 FL — SIGNIFICANT CHANGE UP (ref 80–100)
NRBC # BLD: 0 /100 WBCS — SIGNIFICANT CHANGE UP (ref 0–0)
PLATELET # BLD AUTO: 251 K/UL — SIGNIFICANT CHANGE UP (ref 150–400)
POTASSIUM SERPL-MCNC: 3.8 MMOL/L — SIGNIFICANT CHANGE UP (ref 3.5–5.3)
POTASSIUM SERPL-SCNC: 3.8 MMOL/L — SIGNIFICANT CHANGE UP (ref 3.5–5.3)
PROT SERPL-MCNC: 5.7 GM/DL — LOW (ref 6–8.3)
PROTHROM AB SERPL-ACNC: 10.6 SEC — SIGNIFICANT CHANGE UP (ref 10–12.9)
RBC # BLD: 4.37 M/UL — SIGNIFICANT CHANGE UP (ref 4.2–5.8)
RBC # FLD: 13.9 % — SIGNIFICANT CHANGE UP (ref 10.3–14.5)
SODIUM SERPL-SCNC: 144 MMOL/L — SIGNIFICANT CHANGE UP (ref 135–145)
TROPONIN I SERPL-MCNC: <0.015 NG/ML — SIGNIFICANT CHANGE UP (ref 0.01–0.04)
TROPONIN I SERPL-MCNC: <0.015 NG/ML — SIGNIFICANT CHANGE UP (ref 0.01–0.04)
WBC # BLD: 7.54 K/UL — SIGNIFICANT CHANGE UP (ref 3.8–10.5)
WBC # FLD AUTO: 7.54 K/UL — SIGNIFICANT CHANGE UP (ref 3.8–10.5)

## 2018-12-12 PROCEDURE — 71045 X-RAY EXAM CHEST 1 VIEW: CPT | Mod: 26

## 2018-12-12 PROCEDURE — 99285 EMERGENCY DEPT VISIT HI MDM: CPT | Mod: 25

## 2018-12-12 PROCEDURE — 93010 ELECTROCARDIOGRAM REPORT: CPT

## 2018-12-12 PROCEDURE — 70450 CT HEAD/BRAIN W/O DYE: CPT | Mod: 26

## 2018-12-12 RX ORDER — INSULIN LISPRO 100/ML
2 VIAL (ML) SUBCUTANEOUS ONCE
Refills: 0 | Status: COMPLETED | OUTPATIENT
Start: 2018-12-12 | End: 2018-12-12

## 2018-12-12 RX ORDER — TRAMADOL HYDROCHLORIDE 50 MG/1
50 TABLET ORAL ONCE
Refills: 0 | Status: DISCONTINUED | OUTPATIENT
Start: 2018-12-12 | End: 2018-12-12

## 2018-12-12 RX ORDER — ACETAMINOPHEN 500 MG
975 TABLET ORAL ONCE
Refills: 0 | Status: COMPLETED | OUTPATIENT
Start: 2018-12-12 | End: 2018-12-12

## 2018-12-12 RX ADMIN — Medication 975 MILLIGRAM(S): at 12:08

## 2018-12-12 RX ADMIN — Medication 975 MILLIGRAM(S): at 11:38

## 2018-12-12 RX ADMIN — TRAMADOL HYDROCHLORIDE 50 MILLIGRAM(S): 50 TABLET ORAL at 13:50

## 2018-12-12 RX ADMIN — Medication 2 UNIT(S): at 13:55

## 2018-12-12 NOTE — ED PROVIDER NOTE - CONSTITUTIONAL, MLM
normal... Disheveled, awake, alert, oriented to person, place, time/situation and in no apparent distress.

## 2018-12-12 NOTE — ED PROVIDER NOTE - PROGRESS NOTE DETAILS
initial work up no acute findings including cxr, labs and head ct except elevated glucose without dka.. pt asking for food. given a sandwich and insulin for glucose 400.  pt walked to bathroom. await second troponin. MD ARIEL

## 2018-12-12 NOTE — ED PROVIDER NOTE - OBJECTIVE STATEMENT
59 y/o male with a PMHx of Anxiety, DM, HTN, Insomnia, s/p lumbar fusion presents to the ED BIBA c/o chest pain x3 days. Pt has been vomiting, has diarrhea, HA, left hand weakness and pain, and LLE weakness for three days as well. Smoker (4-5 cigarets a day). Pt takes Tylenol when he feels pain, but has not taken any today. No illicit drug use. No EtOH consumption.

## 2018-12-14 DIAGNOSIS — Z71.89 OTHER SPECIFIED COUNSELING: ICD-10-CM

## 2018-12-18 DIAGNOSIS — Z76.89 PERSONS ENCOUNTERING HEALTH SERVICES IN OTHER SPECIFIED CIRCUMSTANCES: ICD-10-CM

## 2019-01-01 NOTE — ED PROVIDER NOTE - GASTROINTESTINAL, MLM
Abdomen soft, non-tender, no guarding. [Follow-Up] : a follow-up visit for [Mother] : mother [FreeTextEntry3] : cyanotic episode

## 2019-02-09 ENCOUNTER — EMERGENCY (EMERGENCY)
Facility: HOSPITAL | Age: 59
LOS: 0 days | Discharge: HOME | End: 2019-02-09
Attending: EMERGENCY MEDICINE | Admitting: EMERGENCY MEDICINE
Payer: MEDICAID

## 2019-02-09 VITALS
DIASTOLIC BLOOD PRESSURE: 71 MMHG | HEART RATE: 96 BPM | SYSTOLIC BLOOD PRESSURE: 153 MMHG | HEIGHT: 69 IN | TEMPERATURE: 98 F | WEIGHT: 190.04 LBS | RESPIRATION RATE: 18 BRPM | OXYGEN SATURATION: 96 %
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
## 2019-02-09 DIAGNOSIS — Z98.1 ARTHRODESIS STATUS: Chronic | ICD-10-CM

## 2019-02-09 DIAGNOSIS — S61.210A LACERATION WITHOUT FOREIGN BODY OF RIGHT INDEX FINGER WITHOUT DAMAGE TO NAIL, INITIAL ENCOUNTER: ICD-10-CM
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
## 2019-02-09 PROCEDURE — 99283 EMERGENCY DEPT VISIT LOW MDM: CPT

## 2019-02-09 PROCEDURE — 73140 X-RAY EXAM OF FINGER(S): CPT | Mod: 26,RT

## 2019-02-09 RX ORDER — CEPHALEXIN 500 MG
500 CAPSULE ORAL ONCE
Refills: 0 | Status: COMPLETED | OUTPATIENT
Start: 2019-02-09 | End: 2019-02-09

## 2019-02-09 RX ORDER — OXYCODONE AND ACETAMINOPHEN 5; 325 MG/1; MG/1
1 TABLET ORAL ONCE
Refills: 0 | Status: DISCONTINUED | OUTPATIENT
Start: 2019-02-09 | End: 2019-02-09

## 2019-02-09 RX ORDER — AZTREONAM 2 G
1 VIAL (EA) INJECTION
Qty: 14 | Refills: 0
Start: 2019-02-09 | End: 2019-02-15

## 2019-02-09 RX ORDER — TETANUS TOXOID, REDUCED DIPHTHERIA TOXOID AND ACELLULAR PERTUSSIS VACCINE, ADSORBED 5; 2.5; 8; 8; 2.5 [IU]/.5ML; [IU]/.5ML; UG/.5ML; UG/.5ML; UG/.5ML
0.5 SUSPENSION INTRAMUSCULAR ONCE
Refills: 0 | Status: COMPLETED | OUTPATIENT
Start: 2019-02-09 | End: 2019-02-09

## 2019-02-09 RX ORDER — CEPHALEXIN 500 MG
1 CAPSULE ORAL
Qty: 28 | Refills: 0
Start: 2019-02-09 | End: 2019-02-15

## 2019-02-09 RX ADMIN — Medication 1 TABLET(S): at 18:30

## 2019-02-09 RX ADMIN — OXYCODONE AND ACETAMINOPHEN 1 TABLET(S): 5; 325 TABLET ORAL at 17:45

## 2019-02-09 RX ADMIN — TETANUS TOXOID, REDUCED DIPHTHERIA TOXOID AND ACELLULAR PERTUSSIS VACCINE, ADSORBED 0.5 MILLILITER(S): 5; 2.5; 8; 8; 2.5 SUSPENSION INTRAMUSCULAR at 17:54

## 2019-02-09 RX ADMIN — Medication 500 MILLIGRAM(S): at 18:30

## 2019-02-09 NOTE — ED STATDOCS - PROGRESS NOTE DETAILS
LORENZO Parker:   Patient has been seen, evaluated and orders have been written by the attending in intake. Patient is stable.  I will follow up the results of orders written and I will continue to evaluate/observe the patient.   Desiree Parker PA-C Pt.'s finger laceration repiared by Ortho.  Will get PO Bactrim and Keflex in ED.  DC home with PO abx and pain meds.  F/U with Dr. trinh in 1 week.  Desiree Parker PA-C

## 2019-02-09 NOTE — ED STATDOCS - OBJECTIVE STATEMENT
59 y/o male with a PMHx of HTN, DM, presents to the ED c/o laceration to right index finger s/p cutting chicken approximately 45 minutes ago. Moderate active bleeding in ED and tenderness.

## 2019-02-09 NOTE — ED STATDOCS - ATTENDING CONTRIBUTION TO CARE
I, Lincoln Lazo MD,  performed the initial face to face bedside interview with this patient regarding history of present illness, review of symptoms and relevant past medical, social and family history.  I completed an independent physical examination.  I was the initial provider who evaluated this patient. I have signed out the follow up of any pending tests (i.e. labs, radiological studies) to the ACP.  I have communicated the patient’s plan of care and disposition with the ACP.

## 2019-02-09 NOTE — ED STATDOCS - MUSCULOSKELETAL, MLM
range of motion is not limited. +TTP right index finger, neurovascularly intact distally weak flexion of finger.

## 2019-02-09 NOTE — ED ADULT NURSE NOTE - NSIMPLEMENTINTERV_GEN_ALL_ED
Implemented All Universal Safety Interventions:  [place] to call system. Call bell, personal items and telephone within reach. Instruct patient to call for assistance. Room bathroom lighting operational. Non-slip footwear when patient is off stretcher. Physically safe environment: no spills, clutter or unnecessary equipment. Stretcher in lowest position, wheels locked, appropriate side rails in place.

## 2019-02-09 NOTE — ED STATDOCS - NS_ ATTENDINGSCRIBEDETAILS _ED_A_ED_FT
I, Lincoln Lazo MD,  performed the initial face to face bedside interview with this patient regarding history of present illness, review of symptoms and relevant past medical, social and family history.  I completed an independent physical examination.    The history, relevant review of systems, past medical and surgical history, medical decision making, and physical examination was documented by the scribe in my presence and I attest to the accuracy of the documentation.

## 2019-02-09 NOTE — ED STATDOCS - CHPI ED SYMPTOM POS
+right index finger pain +right index finger +bleeding +laceration +right index finger pain +right index finger laceration +bleeding

## 2019-02-09 NOTE — CONSULT NOTE ADULT - SUBJECTIVE AND OBJECTIVE BOX
58y Male community ambulatory presents c/o Laceration to the 2nd digit of right hand over the volar aspect of the proximal phalanx. Patient was cutting chicken with a machete at home when he accidently cut his finger. Denies HS/LOC. Denies numbness/tingling. No other pain/injuries. Denies fevers/chills. The patient unaware of last tetanus shot. Received tetanus and ancef in ED.       HEALTH ISSUES - PROBLEM Dx:  diabetes      MEDICATIONS  (STANDING):  diphtheria/tetanus/pertussis (acellular) Vaccine (ADAcel) 0.5 milliLiter(s) IntraMuscular once  oxyCODONE    5 mG/acetaminophen 325 mG 1 Tablet(s) Oral Once    Allergies    No Known Allergies    Vital Signs Last 24 Hrs  T(C): 36.8 (02-09-19 @ 15:36), Max: 36.8 (02-09-19 @ 15:36)  T(F): 98.2 (02-09-19 @ 15:36), Max: 98.2 (02-09-19 @ 15:36)  HR: 96 (02-09-19 @ 15:36) (96 - 96)  BP: 153/71 (02-09-19 @ 15:36) (153/71 - 153/71)  BP(mean): --  RR: 18 (02-09-19 @ 15:36) (18 - 18)  SpO2: 96% (02-09-19 @ 15:36) (96% - 96%)      Imaging: x-ray does not show evidence of fx/dx    Physical Exam  Gen: NAD  RUE: Warm/well perfused, at 2nd digit (index finger), laceration noted to volar aspect of the proximal phalanx, 2 cm in length, controlled (venous) bleeding. Patient sensation intact to the digit, able to actively with flexion/extension digit, FDS/FDP intact.    able to discern  with 2 point discrimination test     Procedure: R hand was prepped and draped in sterile fashion, 10cc of 1% lidocaine was used to perform a metacarpal digit block of the R index finger. The laceration was cleaned and approximated using 5.0 nylon. The tourniquet was let down with subsequent brisk capillary refill at the tip of the digit. The laceration was dressed with xeroform, 4x4, iris and coban. The patient tolerated the procedure well. NVI post-procedure.       A/P: 58y Male with R index finger laceration at the volar/radial aspect over the proximal phalanx   Pain control  Ice/elevate as needed  Keep dressing clean and dry  Antibiotics per ER physician   All question answered  Follow up with Dr. Whittington as outpatient within 1 week, call office for appointment   Ortho stable for discharge

## 2019-05-01 PROCEDURE — G9005: CPT

## 2020-02-27 ENCOUNTER — INPATIENT (INPATIENT)
Facility: HOSPITAL | Age: 60
LOS: 1 days | Discharge: HOME | DRG: 204 | End: 2020-02-29
Attending: INTERNAL MEDICINE | Admitting: INTERNAL MEDICINE
Payer: MEDICAID

## 2020-02-27 VITALS
DIASTOLIC BLOOD PRESSURE: 56 MMHG | WEIGHT: 190.04 LBS | SYSTOLIC BLOOD PRESSURE: 116 MMHG | TEMPERATURE: 99 F | RESPIRATION RATE: 18 BRPM | OXYGEN SATURATION: 92 % | HEIGHT: 69 IN | HEART RATE: 93 BPM

## 2020-02-27 DIAGNOSIS — R55 SYNCOPE AND COLLAPSE: ICD-10-CM

## 2020-02-27 DIAGNOSIS — Z98.1 ARTHRODESIS STATUS: Chronic | ICD-10-CM

## 2020-02-27 DIAGNOSIS — E11.9 TYPE 2 DIABETES MELLITUS WITHOUT COMPLICATIONS: ICD-10-CM

## 2020-02-27 DIAGNOSIS — R07.89 OTHER CHEST PAIN: ICD-10-CM

## 2020-02-27 LAB
ACETONE SERPL-MCNC: NEGATIVE — SIGNIFICANT CHANGE UP
ADD ON TEST-SPECIMEN IN LAB: SIGNIFICANT CHANGE UP
ALBUMIN SERPL ELPH-MCNC: 2.9 G/DL — LOW (ref 3.3–5)
ALP SERPL-CCNC: 68 U/L — SIGNIFICANT CHANGE UP (ref 40–120)
ALT FLD-CCNC: 18 U/L — SIGNIFICANT CHANGE UP (ref 12–78)
ANION GAP SERPL CALC-SCNC: 3 MMOL/L — LOW (ref 5–17)
APPEARANCE UR: CLEAR — SIGNIFICANT CHANGE UP
APTT BLD: 30.3 SEC — SIGNIFICANT CHANGE UP (ref 27.5–36.3)
AST SERPL-CCNC: 10 U/L — LOW (ref 15–37)
BASOPHILS # BLD AUTO: 0.04 K/UL — SIGNIFICANT CHANGE UP (ref 0–0.2)
BASOPHILS NFR BLD AUTO: 0.3 % — SIGNIFICANT CHANGE UP (ref 0–2)
BILIRUB SERPL-MCNC: 0.4 MG/DL — SIGNIFICANT CHANGE UP (ref 0.2–1.2)
BILIRUB UR-MCNC: NEGATIVE — SIGNIFICANT CHANGE UP
BUN SERPL-MCNC: 12 MG/DL — SIGNIFICANT CHANGE UP (ref 7–23)
CALCIUM SERPL-MCNC: 8.5 MG/DL — SIGNIFICANT CHANGE UP (ref 8.5–10.1)
CHLORIDE SERPL-SCNC: 103 MMOL/L — SIGNIFICANT CHANGE UP (ref 96–108)
CO2 SERPL-SCNC: 29 MMOL/L — SIGNIFICANT CHANGE UP (ref 22–31)
COLOR SPEC: YELLOW — SIGNIFICANT CHANGE UP
CREAT SERPL-MCNC: 1.03 MG/DL — SIGNIFICANT CHANGE UP (ref 0.5–1.3)
DIFF PNL FLD: NEGATIVE — SIGNIFICANT CHANGE UP
EOSINOPHIL # BLD AUTO: 0.19 K/UL — SIGNIFICANT CHANGE UP (ref 0–0.5)
EOSINOPHIL NFR BLD AUTO: 1.6 % — SIGNIFICANT CHANGE UP (ref 0–6)
GLUCOSE SERPL-MCNC: 322 MG/DL — HIGH (ref 70–99)
GLUCOSE SERPL-MCNC: 401 MG/DL — HIGH (ref 70–99)
GLUCOSE UR QL: 1000 MG/DL
HCT VFR BLD CALC: 43.6 % — SIGNIFICANT CHANGE UP (ref 39–50)
HGB BLD-MCNC: 14.2 G/DL — SIGNIFICANT CHANGE UP (ref 13–17)
IMM GRANULOCYTES NFR BLD AUTO: 0.6 % — SIGNIFICANT CHANGE UP (ref 0–1.5)
INR BLD: 0.98 RATIO — SIGNIFICANT CHANGE UP (ref 0.88–1.16)
KETONES UR-MCNC: NEGATIVE — SIGNIFICANT CHANGE UP
LEUKOCYTE ESTERASE UR-ACNC: NEGATIVE — SIGNIFICANT CHANGE UP
LYMPHOCYTES # BLD AUTO: 1.93 K/UL — SIGNIFICANT CHANGE UP (ref 1–3.3)
LYMPHOCYTES # BLD AUTO: 16.4 % — SIGNIFICANT CHANGE UP (ref 13–44)
MCHC RBC-ENTMCNC: 28.9 PG — SIGNIFICANT CHANGE UP (ref 27–34)
MCHC RBC-ENTMCNC: 32.6 GM/DL — SIGNIFICANT CHANGE UP (ref 32–36)
MCV RBC AUTO: 88.6 FL — SIGNIFICANT CHANGE UP (ref 80–100)
MONOCYTES # BLD AUTO: 0.61 K/UL — SIGNIFICANT CHANGE UP (ref 0–0.9)
MONOCYTES NFR BLD AUTO: 5.2 % — SIGNIFICANT CHANGE UP (ref 2–14)
NEUTROPHILS # BLD AUTO: 8.91 K/UL — HIGH (ref 1.8–7.4)
NEUTROPHILS NFR BLD AUTO: 75.9 % — SIGNIFICANT CHANGE UP (ref 43–77)
NITRITE UR-MCNC: NEGATIVE — SIGNIFICANT CHANGE UP
PH UR: 7 — SIGNIFICANT CHANGE UP (ref 5–8)
PLATELET # BLD AUTO: 296 K/UL — SIGNIFICANT CHANGE UP (ref 150–400)
POTASSIUM SERPL-MCNC: 4.2 MMOL/L — SIGNIFICANT CHANGE UP (ref 3.5–5.3)
POTASSIUM SERPL-SCNC: 4.2 MMOL/L — SIGNIFICANT CHANGE UP (ref 3.5–5.3)
PROT SERPL-MCNC: 6.5 GM/DL — SIGNIFICANT CHANGE UP (ref 6–8.3)
PROT UR-MCNC: 15 MG/DL
PROTHROM AB SERPL-ACNC: 10.9 SEC — SIGNIFICANT CHANGE UP (ref 10–12.9)
RBC # BLD: 4.92 M/UL — SIGNIFICANT CHANGE UP (ref 4.2–5.8)
RBC # FLD: 13.2 % — SIGNIFICANT CHANGE UP (ref 10.3–14.5)
SODIUM SERPL-SCNC: 135 MMOL/L — SIGNIFICANT CHANGE UP (ref 135–145)
SP GR SPEC: 1 — LOW (ref 1.01–1.02)
TROPONIN I SERPL-MCNC: <0.015 NG/ML — SIGNIFICANT CHANGE UP (ref 0.01–0.04)
UROBILINOGEN FLD QL: NEGATIVE MG/DL — SIGNIFICANT CHANGE UP
WBC # BLD: 11.75 K/UL — HIGH (ref 3.8–10.5)
WBC # FLD AUTO: 11.75 K/UL — HIGH (ref 3.8–10.5)

## 2020-02-27 PROCEDURE — 93306 TTE W/DOPPLER COMPLETE: CPT

## 2020-02-27 PROCEDURE — 73502 X-RAY EXAM HIP UNI 2-3 VIEWS: CPT | Mod: 26,RT

## 2020-02-27 PROCEDURE — 84484 ASSAY OF TROPONIN QUANT: CPT

## 2020-02-27 PROCEDURE — 36415 COLL VENOUS BLD VENIPUNCTURE: CPT

## 2020-02-27 PROCEDURE — 99223 1ST HOSP IP/OBS HIGH 75: CPT

## 2020-02-27 PROCEDURE — 83036 HEMOGLOBIN GLYCOSYLATED A1C: CPT

## 2020-02-27 PROCEDURE — 93010 ELECTROCARDIOGRAM REPORT: CPT

## 2020-02-27 PROCEDURE — 93306 TTE W/DOPPLER COMPLETE: CPT | Mod: 26

## 2020-02-27 PROCEDURE — 72100 X-RAY EXAM L-S SPINE 2/3 VWS: CPT

## 2020-02-27 PROCEDURE — 72148 MRI LUMBAR SPINE W/O DYE: CPT

## 2020-02-27 PROCEDURE — G0378: CPT

## 2020-02-27 PROCEDURE — 82962 GLUCOSE BLOOD TEST: CPT

## 2020-02-27 PROCEDURE — 71045 X-RAY EXAM CHEST 1 VIEW: CPT | Mod: 26

## 2020-02-27 PROCEDURE — 70450 CT HEAD/BRAIN W/O DYE: CPT | Mod: 26

## 2020-02-27 PROCEDURE — 93017 CV STRESS TEST TRACING ONLY: CPT

## 2020-02-27 PROCEDURE — 73502 X-RAY EXAM HIP UNI 2-3 VIEWS: CPT | Mod: RT

## 2020-02-27 PROCEDURE — A9500: CPT

## 2020-02-27 PROCEDURE — 78452 HT MUSCLE IMAGE SPECT MULT: CPT

## 2020-02-27 PROCEDURE — 72100 X-RAY EXAM L-S SPINE 2/3 VWS: CPT | Mod: 26

## 2020-02-27 PROCEDURE — 81001 URINALYSIS AUTO W/SCOPE: CPT

## 2020-02-27 PROCEDURE — 82947 ASSAY GLUCOSE BLOOD QUANT: CPT

## 2020-02-27 PROCEDURE — 87086 URINE CULTURE/COLONY COUNT: CPT

## 2020-02-27 PROCEDURE — 86803 HEPATITIS C AB TEST: CPT

## 2020-02-27 RX ORDER — OXYCODONE HYDROCHLORIDE 5 MG/1
10 TABLET ORAL THREE TIMES A DAY
Refills: 0 | Status: DISCONTINUED | OUTPATIENT
Start: 2020-02-27 | End: 2020-02-29

## 2020-02-27 RX ORDER — DEXTROSE 50 % IN WATER 50 %
12.5 SYRINGE (ML) INTRAVENOUS ONCE
Refills: 0 | Status: DISCONTINUED | OUTPATIENT
Start: 2020-02-27 | End: 2020-02-29

## 2020-02-27 RX ORDER — GLUCAGON INJECTION, SOLUTION 0.5 MG/.1ML
1 INJECTION, SOLUTION SUBCUTANEOUS ONCE
Refills: 0 | Status: DISCONTINUED | OUTPATIENT
Start: 2020-02-27 | End: 2020-02-29

## 2020-02-27 RX ORDER — INSULIN GLARGINE 100 [IU]/ML
10 INJECTION, SOLUTION SUBCUTANEOUS AT BEDTIME
Refills: 0 | Status: DISCONTINUED | OUTPATIENT
Start: 2020-02-27 | End: 2020-02-28

## 2020-02-27 RX ORDER — DEXTROSE 50 % IN WATER 50 %
25 SYRINGE (ML) INTRAVENOUS ONCE
Refills: 0 | Status: DISCONTINUED | OUTPATIENT
Start: 2020-02-27 | End: 2020-02-29

## 2020-02-27 RX ORDER — ACETAMINOPHEN 500 MG
650 TABLET ORAL EVERY 6 HOURS
Refills: 0 | Status: DISCONTINUED | OUTPATIENT
Start: 2020-02-27 | End: 2020-02-29

## 2020-02-27 RX ORDER — TRAMADOL HYDROCHLORIDE 50 MG/1
25 TABLET ORAL ONCE
Refills: 0 | Status: DISCONTINUED | OUTPATIENT
Start: 2020-02-27 | End: 2020-02-27

## 2020-02-27 RX ORDER — LISINOPRIL 2.5 MG/1
5 TABLET ORAL DAILY
Refills: 0 | Status: DISCONTINUED | OUTPATIENT
Start: 2020-02-27 | End: 2020-02-29

## 2020-02-27 RX ORDER — INSULIN LISPRO 100/ML
VIAL (ML) SUBCUTANEOUS AT BEDTIME
Refills: 0 | Status: DISCONTINUED | OUTPATIENT
Start: 2020-02-27 | End: 2020-02-29

## 2020-02-27 RX ORDER — SODIUM CHLORIDE 9 MG/ML
1000 INJECTION, SOLUTION INTRAVENOUS
Refills: 0 | Status: DISCONTINUED | OUTPATIENT
Start: 2020-02-27 | End: 2020-02-29

## 2020-02-27 RX ORDER — INSULIN LISPRO 100/ML
VIAL (ML) SUBCUTANEOUS
Refills: 0 | Status: DISCONTINUED | OUTPATIENT
Start: 2020-02-27 | End: 2020-02-29

## 2020-02-27 RX ORDER — DEXTROSE 50 % IN WATER 50 %
15 SYRINGE (ML) INTRAVENOUS ONCE
Refills: 0 | Status: DISCONTINUED | OUTPATIENT
Start: 2020-02-27 | End: 2020-02-29

## 2020-02-27 RX ORDER — SODIUM CHLORIDE 9 MG/ML
1000 INJECTION INTRAMUSCULAR; INTRAVENOUS; SUBCUTANEOUS ONCE
Refills: 0 | Status: COMPLETED | OUTPATIENT
Start: 2020-02-27 | End: 2020-02-27

## 2020-02-27 RX ORDER — LANOLIN ALCOHOL/MO/W.PET/CERES
5 CREAM (GRAM) TOPICAL AT BEDTIME
Refills: 0 | Status: DISCONTINUED | OUTPATIENT
Start: 2020-02-27 | End: 2020-02-29

## 2020-02-27 RX ADMIN — Medication 650 MILLIGRAM(S): at 13:08

## 2020-02-27 RX ADMIN — Medication 5 MILLIGRAM(S): at 21:38

## 2020-02-27 RX ADMIN — INSULIN GLARGINE 10 UNIT(S): 100 INJECTION, SOLUTION SUBCUTANEOUS at 21:38

## 2020-02-27 RX ADMIN — Medication 650 MILLIGRAM(S): at 14:15

## 2020-02-27 RX ADMIN — TRAMADOL HYDROCHLORIDE 25 MILLIGRAM(S): 50 TABLET ORAL at 21:38

## 2020-02-27 RX ADMIN — OXYCODONE HYDROCHLORIDE 10 MILLIGRAM(S): 5 TABLET ORAL at 15:06

## 2020-02-27 RX ADMIN — SODIUM CHLORIDE 1000 MILLILITER(S): 9 INJECTION INTRAMUSCULAR; INTRAVENOUS; SUBCUTANEOUS at 09:56

## 2020-02-27 RX ADMIN — OXYCODONE HYDROCHLORIDE 10 MILLIGRAM(S): 5 TABLET ORAL at 14:32

## 2020-02-27 RX ADMIN — TRAMADOL HYDROCHLORIDE 25 MILLIGRAM(S): 50 TABLET ORAL at 22:30

## 2020-02-27 RX ADMIN — Medication 12: at 18:05
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
## 2020-02-27 NOTE — ED PROVIDER NOTE - SKIN, MLM
Skin normal color for race, warm, dry and intact. No evidence of rash. +local area of TTP and swelling to L occiput

## 2020-02-27 NOTE — PROVIDER CONTACT NOTE (OTHER) - SITUATION
Called Dr Quinn [phone], [phone] to advise of elevated Blood Sugar,  Was able to reach Dr Isrrael Salazar of blood sugar [phone] 12units given, ordered lab back up

## 2020-02-27 NOTE — ED ADULT NURSE NOTE - NSIMPLEMENTINTERV_GEN_ALL_ED
Implemented All Fall Risk Interventions:  [place] to call system. Call bell, personal items and telephone within reach. Instruct patient to call for assistance. Room bathroom lighting operational. Non-slip footwear when patient is off stretcher. Physically safe environment: no spills, clutter or unnecessary equipment. Stretcher in lowest position, wheels locked, appropriate side rails in place. Provide visual cue, wrist band, yellow gown, etc. Monitor gait and stability. Monitor for mental status changes and reorient to person, place, and time. Review medications for side effects contributing to fall risk. Reinforce activity limits and safety measures with patient and family.

## 2020-02-27 NOTE — ED ADULT NURSE NOTE - OBJECTIVE STATEMENT
Patient comes to ED for chest pain, head pain and elevated blood sugar. Patient reports taking insulin for diabetes but has not been taking for over 1 week because he does not have anymore. Patient reports recent falls, -LOC. Patient reports chest pain that started this morning. Patient denies any SOB. Patient was given 4 baby asa prior to arrival by EMS. Patient denies any other complaints.
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
## 2020-02-27 NOTE — CONSULT NOTE ADULT - PROBLEM SELECTOR RECOMMENDATION 9
Atypical symptoms and normal ECG but many risk factors for heart disease - recommend ischemia evaluation.

## 2020-02-27 NOTE — ED PROVIDER NOTE - PROGRESS NOTE DETAILS
CT neg. Trop #1 neg. Mild hyperglycemia w/o acetone/gap  Given hx of recurrent syncope -- will admit Tele.

## 2020-02-27 NOTE — ED ADULT TRIAGE NOTE - CHIEF COMPLAINT QUOTE
Pt BIBEMS for evaluation of high blood sugar, back pain, chest pain, head pain, hx of recent falls. EMS administered BABY ASA 81 mg x 4 PTA for c/o CP. Pt has no SOB and no obvious injury.

## 2020-02-28 LAB
CULTURE RESULTS: SIGNIFICANT CHANGE UP
HBA1C BLD-MCNC: 12.7 % — HIGH (ref 4–5.6)
HCV AB S/CO SERPL IA: 0.14 S/CO — SIGNIFICANT CHANGE UP (ref 0–0.99)
HCV AB SERPL-IMP: SIGNIFICANT CHANGE UP
SPECIMEN SOURCE: SIGNIFICANT CHANGE UP

## 2020-02-28 PROCEDURE — 99233 SBSQ HOSP IP/OBS HIGH 50: CPT

## 2020-02-28 PROCEDURE — 99232 SBSQ HOSP IP/OBS MODERATE 35: CPT

## 2020-02-28 PROCEDURE — 78452 HT MUSCLE IMAGE SPECT MULT: CPT | Mod: 26

## 2020-02-28 PROCEDURE — 72148 MRI LUMBAR SPINE W/O DYE: CPT | Mod: 26

## 2020-02-28 RX ORDER — INSULIN GLARGINE 100 [IU]/ML
20 INJECTION, SOLUTION SUBCUTANEOUS AT BEDTIME
Refills: 0 | Status: DISCONTINUED | OUTPATIENT
Start: 2020-02-28 | End: 2020-02-29

## 2020-02-28 RX ORDER — REGADENOSON 0.08 MG/ML
0.4 INJECTION, SOLUTION INTRAVENOUS ONCE
Refills: 0 | Status: COMPLETED | OUTPATIENT
Start: 2020-02-28 | End: 2020-02-28

## 2020-02-28 RX ADMIN — Medication 8: at 17:32

## 2020-02-28 RX ADMIN — LISINOPRIL 5 MILLIGRAM(S): 2.5 TABLET ORAL at 06:35

## 2020-02-28 RX ADMIN — OXYCODONE HYDROCHLORIDE 10 MILLIGRAM(S): 5 TABLET ORAL at 14:00

## 2020-02-28 RX ADMIN — OXYCODONE HYDROCHLORIDE 10 MILLIGRAM(S): 5 TABLET ORAL at 13:00

## 2020-02-28 RX ADMIN — INSULIN GLARGINE 20 UNIT(S): 100 INJECTION, SOLUTION SUBCUTANEOUS at 20:53

## 2020-02-28 RX ADMIN — Medication 2: at 20:53

## 2020-02-28 RX ADMIN — Medication 650 MILLIGRAM(S): at 21:17

## 2020-02-28 RX ADMIN — OXYCODONE HYDROCHLORIDE 10 MILLIGRAM(S): 5 TABLET ORAL at 06:43

## 2020-02-28 RX ADMIN — OXYCODONE HYDROCHLORIDE 10 MILLIGRAM(S): 5 TABLET ORAL at 18:51

## 2020-02-28 RX ADMIN — Medication 5 MILLIGRAM(S): at 20:45

## 2020-02-28 RX ADMIN — Medication 4: at 12:51

## 2020-02-28 RX ADMIN — REGADENOSON 0.4 MILLIGRAM(S): 0.08 INJECTION, SOLUTION INTRAVENOUS at 11:35

## 2020-02-28 RX ADMIN — Medication 650 MILLIGRAM(S): at 20:47

## 2020-02-28 RX ADMIN — Medication 8: at 06:44

## 2020-02-28 NOTE — PROGRESS NOTE ADULT - SUBJECTIVE AND OBJECTIVE BOX
59 year old man with a history of longstanding diabetes mellitus and HTN who presented to the ER this AM for the evaluation of high blood sugar, dizziness, chest pain and falls x 2 with LOC; on one occasion he hit his head . He describes several months of chest discomfort -- located in left anterior chest wall, no associated dyspnea; symptoms wax and wane -- sometimes worse with exertion but also with symptoms at rest.  This AM he felt weak and dizzy -- says he collapsed and fell to the ground.  He has no past history of heart disease.   Patient also describes RLE weakness, Rt hip pain and chronic back pain; due to RLE weakness he cannot walk well     2.28: co back pain radiating to RLE    REVIEW OF SYSTEMS:    CONSTITUTIONAL: No weakness, No fevers or chills  ENT: No ear ache, No sorethroat  NECK: No pain, No stiffness  RESPIRATORY: No cough, No wheezing, No hemoptysis; No dyspnea  CARDIOVASCULAR: No chest pain, No palpitations  GASTROINTESTINAL: No abd pain, No nausea, No vomiting, No hematemesis, No diarrhea or constipation. No melena, No hematochezia.  GENITOURINARY: No dysuria, No  hematuria  NEUROLOGICAL: No diplopia, No paresthesia, No motor dysfunction  MUSCULOSKELETAL: No arthralgia, No myalgia  SKIN: No rashes, or lesions   PSYCH: no anxiety, no suicidal ideation    All other review of systems is negative unless indicated above    Vital Signs Last 24 Hrs  T(C): 36.7 (27 Feb 2020 13:31), Max: 37 (27 Feb 2020 09:41)  T(F): 98 (27 Feb 2020 13:31), Max: 98.6 (27 Feb 2020 09:41)  HR: 89 (27 Feb 2020 13:31) (82 - 93)  BP: 149/72 (27 Feb 2020 13:31) (116/56 - 150/66)  BP(mean): 74 (27 Feb 2020 13:07) (74 - 89)  RR: 20 (27 Feb 2020 13:31) (17 - 20)  SpO2: 98% (27 Feb 2020 13:31) (92% - 100%)    PHYSICAL EXAM:    GENERAL: NAD, Well nourished  HEENT:  NC/AT, EOMI, PERRLA, No scleral icterus, Moist mucous membranes  NECK: Supple, No JVD  CNS:  Alert & Oriented X3, Motor Strength 5/5 B/L upper and lower extremities; DTRs 2+ intact   LUNG: Normal Breath sounds, Clear to auscultation bilaterally, No rales, No rhonchi, No wheezing  HEART: RRR; No murmurs, No rubs  ABDOMEN: +BS, ST/ND/NT  GENITOURINARY: Voiding, Bladder not distended  EXTREMITIES:  2+ Peripheral Pulses, No clubbing, No cyanosis, No tibial edema  MUSCULOSKELTAL: Rt hip decreased ROM and TTP  SKIN: no rashes  RECTAL: deferred, not indicated  BREAST: deferred                          14.2   11.75 )-----------( 296      ( 27 Feb 2020 09:52 )             43.6     02-27    135  |  103  |  12  ----------------------------<  401<H>  4.2   |  29  |  1.03    Ca    8.5      27 Feb 2020 09:52    TPro  6.5  /  Alb  2.9<L>  /  TBili  0.4  /  DBili  x   /  AST  10<L>  /  ALT  18  /  AlkPhos  68  02-27      MEDICATIONS  (STANDING):  lisinopril 5 milliGRAM(s) Oral daily    MEDICATIONS  (PRN):  acetaminophen   Tablet .. 650 milliGRAM(s) Oral every 6 hours PRN Mild Pain (1 - 3)  oxyCODONE    IR 10 milliGRAM(s) Oral three times a day PRN Moderate Pain (4 - 6)      all labs reviewed  all imaging reviewed      a/p:    1. Syncope:  unclear etiology:   Telemetry negative  Echo normal   Stress test normal  cardiology evaluation noted     2. Chest pain: atypical   r/o ACS  f/u Trop x 3  for ischemic evaluation per cardiology    3. Uncontrolled DM:  Increase  Lantus   Novolog SS    4. Rt hip pain:  Xray of Rt hip and pelvis noted     5. Back Pain:  abnormal lumbar spine xray  will check MRI spine

## 2020-02-28 NOTE — PROGRESS NOTE ADULT - SUBJECTIVE AND OBJECTIVE BOX
REASON FOR VISIT: Chest pain, syncope    HPI: Mr Edwards is a 59 year old man with a history of longstanding diabetes mellitus and HTN admitted on 2/27/2020 with atypical angina and syncope in the setting of poor glycemic control.    2/28/2020:  No new complaints; eager to return home.    MEDICATIONS  (STANDING):  insulin glargine Injectable (LANTUS) 10 Unit(s) SubCutaneous at bedtime  insulin lispro (HumaLOG) corrective regimen sliding scale   SubCutaneous three times a day before meals  insulin lispro (HumaLOG) corrective regimen sliding scale   SubCutaneous at bedtime  lisinopril 5 milliGRAM(s) Oral daily  melatonin 5 milliGRAM(s) Oral at bedtime    Vital Signs Last 24 Hrs  T(C): 36.4 (28 Feb 2020 06:21), Max: 36.8 (27 Feb 2020 21:19)  T(F): 97.6 (28 Feb 2020 06:21), Max: 98.2 (27 Feb 2020 21:19)  HR: 77 (28 Feb 2020 06:21) (77 - 103)  BP: 127/70 (28 Feb 2020 06:21) (119/58 - 149/72)  RR: 18 (28 Feb 2020 06:21) (18 - 20)  SpO2: 97% (28 Feb 2020 06:21) (96% - 98%)    PHYSICAL EXAM:  Constitutional: Seated at edge of bed  Pulmonary: Non-labored, breath sounds  Cardiovascular: Regular rate and rhythm  Psych:  Mood & affect appropriate    LABS:         CARDIAC MARKERS ( 27 Feb 2020 12:38 ) <0.015 ng/mL / x     / x     / x     / x      CARDIAC MARKERS ( 27 Feb 2020 09:52 ) <0.015 ng/mL / x     / x     / x     / x                          14.2   11.75 )-----------( 296      ( 27 Feb 2020 09:52 )             43.6     x   |  x   |  x   ----------------------------<  322<H>  x    |  x   |  x     Xray Chest 1 View- PORTABLE-Urgent (02.27.20 @ 10:52):  Heart is normal in size. The lung fields and pleural surfaces are unremarkable.    Transthoracic Echocardiogram (02.27.20 @ 13:48):   Mild mitral annular calcification is present. Fibrocalcific changes noted to the mitral valve leaflets with preserved leaflet excursion.   The aortic valve is trileaflet with thin pliable leaflets.   The tricuspid valve leaflets are thin and pliable; valve motion is normal. Trivial tricuspid valve regurgitation is present.   Normal appearing pulmonic valve structure and function.   The left ventricle is normal in size, wall thickness, wall motion and contractility. Estimated left ventricular ejection fraction is 55 %.   Normal appearing right ventricle structure and function.    Nuclear Stress Pharmacologic Multiple (02.28.20 @ 12:20): Normal SPECT Myocardial Perfusion Imaging.  No scan evidence of reversible or fixed perfusion defects. Normal left ventricular contractility with an ejection fraction of 60%    Tele: Sinus rhythm

## 2020-02-28 NOTE — PROGRESS NOTE ADULT - PROBLEM SELECTOR PLAN 1
Normal SPECT perfusion imaging & LV function; normal ECG; likely non-cardiac etiology of chest discomfort.

## 2020-02-29 ENCOUNTER — TRANSCRIPTION ENCOUNTER (OUTPATIENT)
Age: 60
End: 2020-02-29

## 2020-02-29 VITALS
RESPIRATION RATE: 16 BRPM | OXYGEN SATURATION: 100 % | TEMPERATURE: 98 F | SYSTOLIC BLOOD PRESSURE: 116 MMHG | HEART RATE: 81 BPM | DIASTOLIC BLOOD PRESSURE: 65 MMHG

## 2020-02-29 PROCEDURE — 99239 HOSP IP/OBS DSCHRG MGMT >30: CPT

## 2020-02-29 RX ORDER — OXYCODONE HYDROCHLORIDE 5 MG/1
1 TABLET ORAL
Qty: 21 | Refills: 0
Start: 2020-02-29

## 2020-02-29 RX ADMIN — OXYCODONE HYDROCHLORIDE 10 MILLIGRAM(S): 5 TABLET ORAL at 14:45

## 2020-02-29 RX ADMIN — Medication 4: at 11:53

## 2020-02-29 RX ADMIN — LISINOPRIL 5 MILLIGRAM(S): 2.5 TABLET ORAL at 06:35

## 2020-02-29 RX ADMIN — OXYCODONE HYDROCHLORIDE 10 MILLIGRAM(S): 5 TABLET ORAL at 14:01

## 2020-02-29 RX ADMIN — OXYCODONE HYDROCHLORIDE 10 MILLIGRAM(S): 5 TABLET ORAL at 06:35

## 2020-02-29 RX ADMIN — Medication 4: at 08:02

## 2020-02-29 RX ADMIN — OXYCODONE HYDROCHLORIDE 10 MILLIGRAM(S): 5 TABLET ORAL at 07:20

## 2020-02-29 NOTE — DISCHARGE NOTE PROVIDER - HOSPITAL COURSE
59 year old man with a history of longstanding diabetes mellitus and HTN who presented to the ER this AM for the evaluation of high blood sugar, dizziness, chest pain and falls x 2 with LOC; on one occasion he hit his head . He describes several months of chest discomfort -- located in left anterior chest wall, no associated dyspnea; symptoms wax and wane -- sometimes worse with exertion but also with symptoms at rest.  This AM he felt weak and dizzy -- says he collapsed and fell to the ground.  He has no past history of heart disease.     Patient also describes RLE weakness, Rt hip pain and chronic back pain; due to RLE weakness he cannot walk well         2.28: co back pain radiating to RLE    2.29: doing well        REVIEW OF SYSTEMS:        CONSTITUTIONAL: No weakness, No fevers or chills    ENT: No ear ache, No sorethroat    NECK: No pain, No stiffness    RESPIRATORY: No cough, No wheezing, No hemoptysis; No dyspnea    CARDIOVASCULAR: No chest pain, No palpitations    GASTROINTESTINAL: No abd pain, No nausea, No vomiting, No hematemesis, No diarrhea or constipation. No melena, No hematochezia.    GENITOURINARY: No dysuria, No  hematuria    NEUROLOGICAL: No diplopia, No paresthesia, No motor dysfunction    MUSCULOSKELETAL: No arthralgia, No myalgia    SKIN: No rashes, or lesions     PSYCH: no anxiety, no suicidal ideation        All other review of systems is negative unless indicated above        Vital Signs Last 24 Hrs    T(C): 36.7 (27 Feb 2020 13:31), Max: 37 (27 Feb 2020 09:41)    T(F): 98 (27 Feb 2020 13:31), Max: 98.6 (27 Feb 2020 09:41)    HR: 89 (27 Feb 2020 13:31) (82 - 93)    BP: 149/72 (27 Feb 2020 13:31) (116/56 - 150/66)    BP(mean): 74 (27 Feb 2020 13:07) (74 - 89)    RR: 20 (27 Feb 2020 13:31) (17 - 20)    SpO2: 98% (27 Feb 2020 13:31) (92% - 100%)        PHYSICAL EXAM:        GENERAL: NAD, Well nourished    HEENT:  NC/AT, EOMI, PERRLA, No scleral icterus, Moist mucous membranes    NECK: Supple, No JVD    CNS:  Alert & Oriented X3, Motor Strength 5/5 B/L upper and lower extremities; DTRs 2+ intact     LUNG: Normal Breath sounds, Clear to auscultation bilaterally, No rales, No rhonchi, No wheezing    HEART: RRR; No murmurs, No rubs    ABDOMEN: +BS, ST/ND/NT    GENITOURINARY: Voiding, Bladder not distended    EXTREMITIES:  2+ Peripheral Pulses, No clubbing, No cyanosis, No tibial edema    MUSCULOSKELTAL: Rt hip decreased ROM and TTP    SKIN: no rashes    RECTAL: deferred, not indicated    BREAST: deferred        a/p:        1. Syncope:    unclear etiology: likely vaso vagal     Telemetry negative    Echo normal     Stress test normal    cardiology evaluation noted         2. Chest pain: atypical     r/o ACS    f/u Trop x 3    for ischemic evaluation per cardiology        3. Uncontrolled DM:    Increase  Lantus as needed     Novolog SS        4. Rt hip pain:    Xray of Rt hip and pelvis noted         5. Back Pain:    MRI spine : no cord compression, will need outpt PT        dc home, time 39min

## 2020-02-29 NOTE — DISCHARGE NOTE PROVIDER - NSDCCPCAREPLAN_GEN_ALL_CORE_FT
PRINCIPAL DISCHARGE DIAGNOSIS  Diagnosis: Syncope  Assessment and Plan of Treatment: vaso vagal      SECONDARY DISCHARGE DIAGNOSES  Diagnosis: Hyperglycemia  Assessment and Plan of Treatment:

## 2020-02-29 NOTE — DISCHARGE NOTE PROVIDER - NSDCMRMEDTOKEN_GEN_ALL_CORE_FT
Ambien 10 mg oral tablet: 1 tab(s) orally once a day (at bedtime)  lisinopril 5 mg oral tablet: 1 tab(s) orally once a day  oxyCODONE 10 mg oral tablet: 1 tab(s) orally 3 times a day, As Needed -Moderate Pain (4 - 6) - for anxiety - for rash - for moderate pain MDD:3tb  Tresiba FlexTouch 200 units/mL subcutaneous solution: 20 unit(s) subcutaneous once a day

## 2020-02-29 NOTE — DISCHARGE NOTE NURSING/CASE MANAGEMENT/SOCIAL WORK - PATIENT PORTAL LINK FT
You can access the FollowMyHealth Patient Portal offered by [facility] by registering at the following website: [email or web address]. By joining [employer]’s FollowMyHealth portal, you will also be able to view your health information using other applications (apps) compatible with our system.

## 2020-03-04 DIAGNOSIS — Z79.2 LONG TERM (CURRENT) USE OF ANTIBIOTICS: ICD-10-CM

## 2020-03-04 DIAGNOSIS — G89.29 OTHER CHRONIC PAIN: ICD-10-CM

## 2020-03-04 DIAGNOSIS — M25.551 PAIN IN RIGHT HIP: ICD-10-CM

## 2020-03-04 DIAGNOSIS — M54.9 DORSALGIA, UNSPECIFIED: ICD-10-CM

## 2020-03-04 DIAGNOSIS — Z79.4 LONG TERM (CURRENT) USE OF INSULIN: ICD-10-CM

## 2020-03-04 DIAGNOSIS — Z79.891 LONG TERM (CURRENT) USE OF OPIATE ANALGESIC: ICD-10-CM

## 2020-03-04 DIAGNOSIS — I10 ESSENTIAL (PRIMARY) HYPERTENSION: ICD-10-CM

## 2020-03-04 DIAGNOSIS — E11.65 TYPE 2 DIABETES MELLITUS WITH HYPERGLYCEMIA: ICD-10-CM

## 2020-03-04 DIAGNOSIS — Z98.1 ARTHRODESIS STATUS: ICD-10-CM

## 2020-03-04 DIAGNOSIS — F41.9 ANXIETY DISORDER, UNSPECIFIED: ICD-10-CM

## 2020-03-04 DIAGNOSIS — R55 SYNCOPE AND COLLAPSE: ICD-10-CM

## 2020-03-04 DIAGNOSIS — R07.89 OTHER CHEST PAIN: ICD-10-CM

## 2020-03-04 DIAGNOSIS — G47.00 INSOMNIA, UNSPECIFIED: ICD-10-CM

## 2020-03-22 ENCOUNTER — EMERGENCY (EMERGENCY)
Facility: HOSPITAL | Age: 60
LOS: 0 days | Discharge: HOME | End: 2020-03-22
Attending: EMERGENCY MEDICINE
Payer: MEDICAID

## 2020-03-22 VITALS
OXYGEN SATURATION: 100 % | HEART RATE: 80 BPM | HEIGHT: 69 IN | DIASTOLIC BLOOD PRESSURE: 84 MMHG | SYSTOLIC BLOOD PRESSURE: 136 MMHG | TEMPERATURE: 98 F | RESPIRATION RATE: 16 BRPM | WEIGHT: 192.02 LBS

## 2020-03-22 DIAGNOSIS — M25.551 PAIN IN RIGHT HIP: ICD-10-CM

## 2020-03-22 DIAGNOSIS — I10 ESSENTIAL (PRIMARY) HYPERTENSION: ICD-10-CM

## 2020-03-22 DIAGNOSIS — R07.9 CHEST PAIN, UNSPECIFIED: ICD-10-CM

## 2020-03-22 DIAGNOSIS — Z79.899 OTHER LONG TERM (CURRENT) DRUG THERAPY: ICD-10-CM

## 2020-03-22 DIAGNOSIS — Z98.1 ARTHRODESIS STATUS: Chronic | ICD-10-CM

## 2020-03-22 DIAGNOSIS — Z87.828 PERSONAL HISTORY OF OTHER (HEALED) PHYSICAL INJURY AND TRAUMA: ICD-10-CM

## 2020-03-22 DIAGNOSIS — Z76.0 ENCOUNTER FOR ISSUE OF REPEAT PRESCRIPTION: ICD-10-CM

## 2020-03-22 DIAGNOSIS — E11.9 TYPE 2 DIABETES MELLITUS WITHOUT COMPLICATIONS: ICD-10-CM

## 2020-03-22 DIAGNOSIS — Z79.891 LONG TERM (CURRENT) USE OF OPIATE ANALGESIC: ICD-10-CM

## 2020-03-22 LAB
ALBUMIN SERPL ELPH-MCNC: 3.2 G/DL — LOW (ref 3.3–5)
ALP SERPL-CCNC: 76 U/L — SIGNIFICANT CHANGE UP (ref 40–120)
ALT FLD-CCNC: 19 U/L — SIGNIFICANT CHANGE UP (ref 12–78)
ANION GAP SERPL CALC-SCNC: 6 MMOL/L — SIGNIFICANT CHANGE UP (ref 5–17)
AST SERPL-CCNC: 10 U/L — LOW (ref 15–37)
BILIRUB SERPL-MCNC: 0.2 MG/DL — SIGNIFICANT CHANGE UP (ref 0.2–1.2)
BUN SERPL-MCNC: 14 MG/DL — SIGNIFICANT CHANGE UP (ref 7–23)
CALCIUM SERPL-MCNC: 9.2 MG/DL — SIGNIFICANT CHANGE UP (ref 8.5–10.1)
CHLORIDE SERPL-SCNC: 103 MMOL/L — SIGNIFICANT CHANGE UP (ref 96–108)
CO2 SERPL-SCNC: 27 MMOL/L — SIGNIFICANT CHANGE UP (ref 22–31)
CREAT SERPL-MCNC: 0.99 MG/DL — SIGNIFICANT CHANGE UP (ref 0.5–1.3)
GLUCOSE SERPL-MCNC: 315 MG/DL — HIGH (ref 70–99)
MAGNESIUM SERPL-MCNC: 2 MG/DL — SIGNIFICANT CHANGE UP (ref 1.6–2.6)
POTASSIUM SERPL-MCNC: 4.8 MMOL/L — SIGNIFICANT CHANGE UP (ref 3.5–5.3)
POTASSIUM SERPL-SCNC: 4.8 MMOL/L — SIGNIFICANT CHANGE UP (ref 3.5–5.3)
PROT SERPL-MCNC: 7.2 GM/DL — SIGNIFICANT CHANGE UP (ref 6–8.3)
SODIUM SERPL-SCNC: 136 MMOL/L — SIGNIFICANT CHANGE UP (ref 135–145)

## 2020-03-22 PROCEDURE — 36415 COLL VENOUS BLD VENIPUNCTURE: CPT

## 2020-03-22 PROCEDURE — 99283 EMERGENCY DEPT VISIT LOW MDM: CPT | Mod: 25

## 2020-03-22 PROCEDURE — 71045 X-RAY EXAM CHEST 1 VIEW: CPT

## 2020-03-22 PROCEDURE — 99285 EMERGENCY DEPT VISIT HI MDM: CPT

## 2020-03-22 PROCEDURE — 93010 ELECTROCARDIOGRAM REPORT: CPT

## 2020-03-22 PROCEDURE — 93005 ELECTROCARDIOGRAM TRACING: CPT

## 2020-03-22 PROCEDURE — 83735 ASSAY OF MAGNESIUM: CPT

## 2020-03-22 PROCEDURE — 80053 COMPREHEN METABOLIC PANEL: CPT

## 2020-03-22 PROCEDURE — 71045 X-RAY EXAM CHEST 1 VIEW: CPT | Mod: 26

## 2020-03-22 RX ORDER — OXYCODONE AND ACETAMINOPHEN 5; 325 MG/1; MG/1
1 TABLET ORAL ONCE
Refills: 0 | Status: DISCONTINUED | OUTPATIENT
Start: 2020-03-22 | End: 2020-03-22

## 2020-03-22 RX ADMIN — OXYCODONE AND ACETAMINOPHEN 1 TABLET(S): 5; 325 TABLET ORAL at 10:45

## 2020-03-22 NOTE — ED PROVIDER NOTE - PATIENT PORTAL LINK FT
You can access the FollowMyHealth Patient Portal offered by [facility] by registering at the following website: [email or web address]. By joining [employer]’s FollowMyHealth portal, you will also be able to view your health information using other applications (apps) compatible with our system.

## 2020-03-22 NOTE — ED PROVIDER NOTE - CLINICAL SUMMARY MEDICAL DECISION MAKING FREE TEXT BOX
Patient presents to the ED with the same CP that he has had for months, main complaint is that he ran out of oxycodone and has pain in his R hip. Patient has f/u appointment in x2 weeks. Will get EKG, pain medication, reassess.
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
## 2020-03-22 NOTE — ED PROVIDER NOTE - NSFOLLOWUPINSTRUCTIONS_ED_ALL_ED_FT
EnglishSpanish    Joint Pain     Joint pain can be caused by many things. It is likely to go away if you follow instructions from your doctor for taking care of yourself at home. Sometimes, you may need more treatment.  Follow these instructions at home:  Managing pain, stiffness, and swelling        If told, put ice on the painful area.  Put ice in a plastic bag.Place a towel between your skin and the bag.Leave the ice on for 20 minutes, 2–3 times a day.If told, put heat on the painful area. Do this as often as told by your doctor. Use the heat source that your doctor recommends, such as a moist heat pack or a heating pad.  Place a towel between your skin and the heat source.Leave the heat on for 20–30 minutes.Take off the heat if your skin gets bright red. This is especially important if you are unable to feel pain, heat, or cold. You may have a greater risk of getting burned.Move your fingers or toes below the painful joint often. This helps with stiffness and swelling.If possible, raise (elevate) the painful joint above the level of your heart while you are sitting or lying down. To do this, try putting a few pillows under the painful joint.Activity     Rest the painful joint for as long as told. Do not do things that cause pain or make your pain worse.Begin exercising or stretching the affected area, as told by your doctor. Ask your doctor what types of exercise are safe for you.If you have an elastic bandage, sling, or splint:     Wear the device as told by your doctor. Take it off only as told by your doctor.Loosen the device if your fingers or toes below the joint:  Tingle. Lose feeling (get numb). Get cold and blue.Keep the device clean. Ask your doctor if you should take off the device before bathing. You may need to cover it with a watertight covering when you take a bath or a shower.General instructions     Take over-the-counter and prescription medicines only as told by your doctor.Do not use any products that contain nicotine or tobacco. These include cigarettes and e-cigarettes. If you need help quitting, ask your doctor.Keep all follow-up visits as told by your doctor. This is important.Contact a doctor if:  You have pain that gets worse and does not get better with medicine.Your joint pain does not get better in 3 days.You have more bruising or swelling.You have a fever.You lose 10 lb (4.5 kg) or more without trying.Get help right away if:  You cannot move the joint.Your fingers or toes tingle, lose feeling, or get cold and blue.You have a fever along with a joint that is red, warm, and swollen.Summary  Joint pain can be caused by many things. It often goes away if you follow instructions from your doctor for taking care of yourself at home.Rest the painful joint for as long as told. Do not do things that cause pain or make your pain worse.Take over-the-counter and prescription medicines only as told by your doctor.This information is not intended to replace advice given to you by your health care provider. Make sure you discuss any questions you have with your health care provider.    Document Released: 12/06/2010 Document Revised: 10/03/2018 Document Reviewed: 10/03/2018  [facility] Interactive Patient Education © 2020 [employer]

## 2020-03-22 NOTE — ED PROVIDER NOTE - PROGRESS NOTE DETAILS
cxr, ekg no change from 2/27/20. pt with cp on going for more than 3 weeks,. no change. also w/ongoing right hip pain, no new truama. only wants refill of his oxycodone, as his ortho apptmt is not for antohre 2 weeks. MD ARIEL

## 2020-07-24 ENCOUNTER — EMERGENCY (EMERGENCY)
Facility: HOSPITAL | Age: 60
LOS: 1 days | Discharge: HOME | End: 2020-07-24
Attending: STUDENT IN AN ORGANIZED HEALTH CARE EDUCATION/TRAINING PROGRAM
Payer: COMMERCIAL

## 2020-07-24 VITALS
OXYGEN SATURATION: 98 % | RESPIRATION RATE: 16 BRPM | HEART RATE: 110 BPM | HEIGHT: 69 IN | TEMPERATURE: 99 F | DIASTOLIC BLOOD PRESSURE: 74 MMHG | SYSTOLIC BLOOD PRESSURE: 140 MMHG | WEIGHT: 184.97 LBS

## 2020-07-24 DIAGNOSIS — Z98.1 ARTHRODESIS STATUS: Chronic | ICD-10-CM

## 2020-07-24 LAB
ALBUMIN SERPL ELPH-MCNC: 3.4 G/DL — SIGNIFICANT CHANGE UP (ref 3.3–5.2)
ALP SERPL-CCNC: 61 U/L — SIGNIFICANT CHANGE UP (ref 40–120)
ALT FLD-CCNC: 51 U/L — HIGH
ANION GAP SERPL CALC-SCNC: 11 MMOL/L — SIGNIFICANT CHANGE UP (ref 5–17)
APTT BLD: 27.7 SEC — SIGNIFICANT CHANGE UP (ref 27.5–35.5)
AST SERPL-CCNC: 19 U/L — SIGNIFICANT CHANGE UP
BASOPHILS # BLD AUTO: 0 K/UL — SIGNIFICANT CHANGE UP (ref 0–0.2)
BASOPHILS NFR BLD AUTO: 0 % — SIGNIFICANT CHANGE UP (ref 0–2)
BILIRUB SERPL-MCNC: 0.3 MG/DL — LOW (ref 0.4–2)
BUN SERPL-MCNC: 16 MG/DL — SIGNIFICANT CHANGE UP (ref 8–20)
CALCIUM SERPL-MCNC: 9.1 MG/DL — SIGNIFICANT CHANGE UP (ref 8.6–10.2)
CHLORIDE SERPL-SCNC: 95 MMOL/L — LOW (ref 98–107)
CK SERPL-CCNC: 83 U/L — SIGNIFICANT CHANGE UP (ref 30–200)
CO2 SERPL-SCNC: 23 MMOL/L — SIGNIFICANT CHANGE UP (ref 22–29)
CREAT SERPL-MCNC: 0.77 MG/DL — SIGNIFICANT CHANGE UP (ref 0.5–1.3)
EOSINOPHIL # BLD AUTO: 0 K/UL — SIGNIFICANT CHANGE UP (ref 0–0.5)
EOSINOPHIL NFR BLD AUTO: 0 % — SIGNIFICANT CHANGE UP (ref 0–6)
GIANT PLATELETS BLD QL SMEAR: PRESENT — SIGNIFICANT CHANGE UP
GLUCOSE SERPL-MCNC: 368 MG/DL — HIGH (ref 70–99)
HCT VFR BLD CALC: 38.8 % — LOW (ref 39–50)
HGB BLD-MCNC: 13.1 G/DL — SIGNIFICANT CHANGE UP (ref 13–17)
INR BLD: 0.99 RATIO — SIGNIFICANT CHANGE UP (ref 0.88–1.16)
LYMPHOCYTES # BLD AUTO: 0.76 K/UL — LOW (ref 1–3.3)
LYMPHOCYTES # BLD AUTO: 7 % — LOW (ref 13–44)
MANUAL SMEAR VERIFICATION: SIGNIFICANT CHANGE UP
MCHC RBC-ENTMCNC: 30.1 PG — SIGNIFICANT CHANGE UP (ref 27–34)
MCHC RBC-ENTMCNC: 33.8 GM/DL — SIGNIFICANT CHANGE UP (ref 32–36)
MCV RBC AUTO: 89.2 FL — SIGNIFICANT CHANGE UP (ref 80–100)
MONOCYTES # BLD AUTO: 0.85 K/UL — SIGNIFICANT CHANGE UP (ref 0–0.9)
MONOCYTES NFR BLD AUTO: 7.8 % — SIGNIFICANT CHANGE UP (ref 2–14)
NEUTROPHILS # BLD AUTO: 9.27 K/UL — HIGH (ref 1.8–7.4)
NEUTROPHILS NFR BLD AUTO: 79.1 % — HIGH (ref 43–77)
NEUTS BAND # BLD: 6.1 % — SIGNIFICANT CHANGE UP (ref 0–8)
PLAT MORPH BLD: NORMAL — SIGNIFICANT CHANGE UP
PLATELET # BLD AUTO: 415 K/UL — HIGH (ref 150–400)
POTASSIUM SERPL-MCNC: 5 MMOL/L — SIGNIFICANT CHANGE UP (ref 3.5–5.3)
POTASSIUM SERPL-SCNC: 5 MMOL/L — SIGNIFICANT CHANGE UP (ref 3.5–5.3)
PROT SERPL-MCNC: 6 G/DL — LOW (ref 6.6–8.7)
PROTHROM AB SERPL-ACNC: 11.5 SEC — SIGNIFICANT CHANGE UP (ref 10.6–13.6)
RBC # BLD: 4.35 M/UL — SIGNIFICANT CHANGE UP (ref 4.2–5.8)
RBC # FLD: 13 % — SIGNIFICANT CHANGE UP (ref 10.3–14.5)
RBC BLD AUTO: NORMAL — SIGNIFICANT CHANGE UP
SMUDGE CELLS # BLD: PRESENT — SIGNIFICANT CHANGE UP
SODIUM SERPL-SCNC: 129 MMOL/L — LOW (ref 135–145)
TROPONIN T SERPL-MCNC: <0.01 NG/ML — SIGNIFICANT CHANGE UP (ref 0–0.06)
WBC # BLD: 10.88 K/UL — HIGH (ref 3.8–10.5)
WBC # FLD AUTO: 10.88 K/UL — HIGH (ref 3.8–10.5)

## 2020-07-24 PROCEDURE — 99285 EMERGENCY DEPT VISIT HI MDM: CPT

## 2020-07-24 PROCEDURE — 93010 ELECTROCARDIOGRAM REPORT: CPT

## 2020-07-24 RX ORDER — INSULIN HUMAN 100 [IU]/ML
4 INJECTION, SOLUTION SUBCUTANEOUS ONCE
Refills: 0 | Status: COMPLETED | OUTPATIENT
Start: 2020-07-24 | End: 2020-07-24

## 2020-07-24 RX ORDER — KETOROLAC TROMETHAMINE 30 MG/ML
30 SYRINGE (ML) INJECTION ONCE
Refills: 0 | Status: DISCONTINUED | OUTPATIENT
Start: 2020-07-24 | End: 2020-07-24

## 2020-07-24 RX ORDER — SODIUM CHLORIDE 9 MG/ML
3 INJECTION INTRAMUSCULAR; INTRAVENOUS; SUBCUTANEOUS ONCE
Refills: 0 | Status: COMPLETED | OUTPATIENT
Start: 2020-07-24 | End: 2020-07-24

## 2020-07-24 RX ORDER — SODIUM CHLORIDE 9 MG/ML
1000 INJECTION INTRAMUSCULAR; INTRAVENOUS; SUBCUTANEOUS ONCE
Refills: 0 | Status: COMPLETED | OUTPATIENT
Start: 2020-07-24 | End: 2020-07-24

## 2020-07-24 RX ORDER — METFORMIN HYDROCHLORIDE 850 MG/1
1000 TABLET ORAL ONCE
Refills: 0 | Status: COMPLETED | OUTPATIENT
Start: 2020-07-24 | End: 2020-07-24

## 2020-07-24 RX ADMIN — Medication 30 MILLIGRAM(S): at 23:21

## 2020-07-24 RX ADMIN — SODIUM CHLORIDE 3 MILLILITER(S): 9 INJECTION INTRAMUSCULAR; INTRAVENOUS; SUBCUTANEOUS at 23:11

## 2020-07-24 NOTE — ED PROVIDER NOTE - PROGRESS NOTE DETAILS
Pt still c/o L sided chest pain.  1st set Trop neg with non-specific EKG changes.  Will place on OBS for serial CE and repeat EKG and eval by SW and case management as pt is homeless and is unable to obtain his meds
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
## 2020-07-24 NOTE — ED ADULT TRIAGE NOTE - CHIEF COMPLAINT QUOTE
Pt complaining of chest pain, rib pain, pain worsening with inspiration, persistent cough worsening today. Pt recently in [place] x15 days for "PNA."

## 2020-07-24 NOTE — ED PROVIDER NOTE - CARE PLAN
Principal Discharge DX:	Chest pain  Secondary Diagnosis:	Uncontrolled diabetes mellitus  Secondary Diagnosis:	Homelessness

## 2020-07-25 VITALS
SYSTOLIC BLOOD PRESSURE: 119 MMHG | OXYGEN SATURATION: 96 % | HEART RATE: 85 BPM | DIASTOLIC BLOOD PRESSURE: 71 MMHG | TEMPERATURE: 99 F | RESPIRATION RATE: 18 BRPM

## 2020-07-25 LAB
GLUCOSE BLDC GLUCOMTR-MCNC: 293 MG/DL — HIGH (ref 70–99)
SARS-COV-2 RNA SPEC QL NAA+PROBE: SIGNIFICANT CHANGE UP
TROPONIN T SERPL-MCNC: <0.01 NG/ML — SIGNIFICANT CHANGE UP (ref 0–0.06)

## 2020-07-25 PROCEDURE — 36415 COLL VENOUS BLD VENIPUNCTURE: CPT

## 2020-07-25 PROCEDURE — 80053 COMPREHEN METABOLIC PANEL: CPT

## 2020-07-25 PROCEDURE — 93010 ELECTROCARDIOGRAM REPORT: CPT

## 2020-07-25 PROCEDURE — 85610 PROTHROMBIN TIME: CPT

## 2020-07-25 PROCEDURE — 96374 THER/PROPH/DIAG INJ IV PUSH: CPT

## 2020-07-25 PROCEDURE — 99284 EMERGENCY DEPT VISIT MOD MDM: CPT | Mod: 25

## 2020-07-25 PROCEDURE — 84484 ASSAY OF TROPONIN QUANT: CPT

## 2020-07-25 PROCEDURE — G0378: CPT

## 2020-07-25 PROCEDURE — 71045 X-RAY EXAM CHEST 1 VIEW: CPT

## 2020-07-25 PROCEDURE — 71045 X-RAY EXAM CHEST 1 VIEW: CPT | Mod: 26

## 2020-07-25 PROCEDURE — 82962 GLUCOSE BLOOD TEST: CPT

## 2020-07-25 PROCEDURE — U0003: CPT

## 2020-07-25 PROCEDURE — 82550 ASSAY OF CK (CPK): CPT

## 2020-07-25 PROCEDURE — 85730 THROMBOPLASTIN TIME PARTIAL: CPT

## 2020-07-25 PROCEDURE — 99236 HOSP IP/OBS SAME DATE HI 85: CPT

## 2020-07-25 PROCEDURE — 96375 TX/PRO/DX INJ NEW DRUG ADDON: CPT

## 2020-07-25 PROCEDURE — 93005 ELECTROCARDIOGRAM TRACING: CPT

## 2020-07-25 PROCEDURE — 85027 COMPLETE CBC AUTOMATED: CPT

## 2020-07-25 RX ORDER — INSULIN LISPRO 100/ML
VIAL (ML) SUBCUTANEOUS
Refills: 0 | Status: DISCONTINUED | OUTPATIENT
Start: 2020-07-25 | End: 2020-07-29

## 2020-07-25 RX ORDER — KETOROLAC TROMETHAMINE 30 MG/ML
15 SYRINGE (ML) INJECTION EVERY 6 HOURS
Refills: 0 | Status: DISCONTINUED | OUTPATIENT
Start: 2020-07-25 | End: 2020-07-25

## 2020-07-25 RX ORDER — OXYCODONE AND ACETAMINOPHEN 5; 325 MG/1; MG/1
1 TABLET ORAL ONCE
Refills: 0 | Status: DISCONTINUED | OUTPATIENT
Start: 2020-07-25 | End: 2020-07-25

## 2020-07-25 RX ORDER — METFORMIN HYDROCHLORIDE 850 MG/1
1 TABLET ORAL
Qty: 60 | Refills: 0
Start: 2020-07-25 | End: 2020-08-23

## 2020-07-25 RX ORDER — SODIUM CHLORIDE 9 MG/ML
1000 INJECTION, SOLUTION INTRAVENOUS
Refills: 0 | Status: DISCONTINUED | OUTPATIENT
Start: 2020-07-25 | End: 2020-07-29

## 2020-07-25 RX ORDER — DEXTROSE 50 % IN WATER 50 %
25 SYRINGE (ML) INTRAVENOUS ONCE
Refills: 0 | Status: DISCONTINUED | OUTPATIENT
Start: 2020-07-25 | End: 2020-07-29

## 2020-07-25 RX ORDER — INSULIN GLARGINE 100 [IU]/ML
25 INJECTION, SOLUTION SUBCUTANEOUS AT BEDTIME
Refills: 0 | Status: DISCONTINUED | OUTPATIENT
Start: 2020-07-25 | End: 2020-07-29

## 2020-07-25 RX ORDER — LISINOPRIL 2.5 MG/1
5 TABLET ORAL DAILY
Refills: 0 | Status: DISCONTINUED | OUTPATIENT
Start: 2020-07-25 | End: 2020-07-29

## 2020-07-25 RX ORDER — TRAMADOL HYDROCHLORIDE 50 MG/1
50 TABLET ORAL ONCE
Refills: 0 | Status: DISCONTINUED | OUTPATIENT
Start: 2020-07-25 | End: 2020-07-25

## 2020-07-25 RX ORDER — DEXTROSE 50 % IN WATER 50 %
12.5 SYRINGE (ML) INTRAVENOUS ONCE
Refills: 0 | Status: DISCONTINUED | OUTPATIENT
Start: 2020-07-25 | End: 2020-07-29

## 2020-07-25 RX ORDER — GLUCAGON INJECTION, SOLUTION 0.5 MG/.1ML
1 INJECTION, SOLUTION SUBCUTANEOUS ONCE
Refills: 0 | Status: DISCONTINUED | OUTPATIENT
Start: 2020-07-25 | End: 2020-07-29

## 2020-07-25 RX ORDER — DEXTROSE 50 % IN WATER 50 %
15 SYRINGE (ML) INTRAVENOUS ONCE
Refills: 0 | Status: DISCONTINUED | OUTPATIENT
Start: 2020-07-25 | End: 2020-07-29

## 2020-07-25 RX ORDER — METFORMIN HYDROCHLORIDE 850 MG/1
1000 TABLET ORAL DAILY
Refills: 0 | Status: DISCONTINUED | OUTPATIENT
Start: 2020-07-25 | End: 2020-07-29

## 2020-07-25 RX ADMIN — OXYCODONE AND ACETAMINOPHEN 1 TABLET(S): 5; 325 TABLET ORAL at 09:19

## 2020-07-25 RX ADMIN — TRAMADOL HYDROCHLORIDE 50 MILLIGRAM(S): 50 TABLET ORAL at 05:44

## 2020-07-25 RX ADMIN — METFORMIN HYDROCHLORIDE 1000 MILLIGRAM(S): 850 TABLET ORAL at 00:12

## 2020-07-25 RX ADMIN — SODIUM CHLORIDE 1000 MILLILITER(S): 9 INJECTION INTRAMUSCULAR; INTRAVENOUS; SUBCUTANEOUS at 00:12

## 2020-07-25 RX ADMIN — INSULIN HUMAN 4 UNIT(S): 100 INJECTION, SOLUTION SUBCUTANEOUS at 00:12

## 2020-07-25 RX ADMIN — OXYCODONE AND ACETAMINOPHEN 1 TABLET(S): 5; 325 TABLET ORAL at 10:33

## 2020-07-25 RX ADMIN — OXYCODONE AND ACETAMINOPHEN 1 TABLET(S): 5; 325 TABLET ORAL at 01:13

## 2020-07-25 RX ADMIN — LISINOPRIL 5 MILLIGRAM(S): 2.5 TABLET ORAL at 05:44

## 2020-07-25 RX ADMIN — Medication 6: at 08:30

## 2020-07-25 NOTE — ED ADULT NURSE NOTE - OBJECTIVE STATEMENT
pt resting in stretcher, NAD noted, respirations even and nonlabored. Pt presents with c/o chest pain, worsening with inspiration for "a few days." pt recently admitted to [facility] for 15 days with PNA. denies SOB, N/V/D.

## 2020-07-25 NOTE — ED ADULT NURSE REASSESSMENT NOTE - NS ED NURSE REASSESS COMMENT FT1
Pt alert and oriented. sleeping in stretcher, no signs of distress noted. Handoff report given to  Shabana Dunbar RN. pt's safety maintained. RN with no questions or concerns at this time
assumed pt care from previous Rn Melany Davis.  pt transported to CDU-10 for observation. pt  A&Ox4;  resting in stretcher, with continued 9/10 complaints of chest discomfort. denies any SOB. LORENZO Vaughan made aware and per PA okay to give PRN toradol. pt refused. stated " it doesn't work and I do not want it." PA made aware and per PA Clement will place medication order. B/L lungs clear, normal s1&s2 heard on ausculation. PIV patent to left AC. VSS and documented as per flow sheet. plan of care reviewed and pt verbalizes understanding. bed in lowest position, call bell within reach and safety maintained. monitoring ongoing for any changes.
pt refusing rpt trop. educated on importance of Blood work. continues to refuse. LORENZO Vaughan made aware, no new orders at this time.
Assumed care of the patient at 0730. Patient A&Ox4. Patient appears in nad, patient states Right anterior chest wall pain persists, 8/10, requesting pain meds, Toradol offered as per orders, patient refusing toradol, Caleb Perez notifies, awaiting orders for percoset. NSR on CM. VSS. Ambulatory with steady gait. Patient pending SW consult. Patient in understanding of plan of care. Patient with no further questions for the RN. Resting in comfort. Call bell within reach and encouraged to use when assistance needed. Will continue to monitor.

## 2020-07-25 NOTE — ED CDU PROVIDER DISPOSITION NOTE - NSFOLLOWUPINSTRUCTIONS_ED_ALL_ED_FT
Chest Pain    Chest pain can be caused by many different conditions which may or may not be dangerous. Causes include heartburn, lung infections, heart attack, blood clot in lungs, skin infections, strain or damage to muscle, cartilage, or bones, etc. In addition to a history and physical examination, an electrocardiogram (ECG) or other lab tests may have been performed to determine the cause of your chest pain. Follow up with your primary care provider or with a cardiologist as instructed.     SEEK IMMEDIATE MEDICAL CARE IF YOU HAVE ANY OF THE FOLLOWING SYMPTOMS: worsening chest pain, coughing up blood, unexplained back/neck/jaw pain, severe abdominal pain, dizziness or lightheadedness, fainting, shortness of breath, sweaty or clammy skin, vomiting, or racing heart beat. These symptoms may represent a serious problem that is an emergency. Do not wait to see if the symptoms will go away. Get medical help right away. Call 911 and do not drive yourself to the hospital.    Please follow up with your doctor within 48 hours  Please follow up with cardiology within 4 days.

## 2020-07-25 NOTE — ED ADULT NURSE NOTE - CAS ELECT INFOMATION PROVIDED
DC instructions provided to the patient. Patient in understanding of all dc instructions. Patient with no further questions for the RN regarding dc instructions. VSS. Ambulatory./DC instructions

## 2020-07-25 NOTE — ED ADULT NURSE REASSESSMENT NOTE - COMFORT CARE
ambulated to bathroom/repositioned/side rails up/plan of care explained/meal provided/wait time explained
plan of care explained

## 2020-07-25 NOTE — ED CDU PROVIDER DISPOSITION NOTE - ATTENDING CONTRIBUTION TO CARE
60 YOM homeless here with 2 months of chest pain. Troponin x 2 neg and EKG nonischemic. Seen by SW for discharge planning for shelter vs [facility] 60 YOM DM, HTN homeless here with 2 months of chest pain. Troponin x 2 neg and EKG nonischemic. Seen by SW for discharge planning for shelter vs [facility]
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
## 2020-07-25 NOTE — ED CDU PROVIDER DISPOSITION NOTE - NSFOLLOWUPCLINICS_GEN_ALL_ED_FT
[facility]  Cardiology  [address], [address]  [place] [address]  Phone: [phone]  Fax:     [place] [address]  Phone: [phone]  Fax:   Follow Up Time:

## 2020-07-25 NOTE — ED CDU PROVIDER DISPOSITION NOTE - CLINICAL COURSE
59 y/o male placed in observation c/o chest pain. PT reports h/o 2 months of chest pain. recently evaluated at [facility] for pneumonia, No infiltrates on x-ray on cxr. PT was evaluated by social work for placement and was given bus tickets and information for walk in shelters as [facility] is not accepting him at this time. Social work has cleared him for discharge from their standpoint. PT had trop x 2 negative. will d/c with outpatient follow up
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
## 2020-07-25 NOTE — ED CDU PROVIDER INITIAL DAY NOTE - PHYSICAL EXAMINATION
Gen: WD/WN, NAD  Head: NCAT  Eyes: EOMI, PERRL  Cardiac: RRR +S1S2.   Resp: Speaking in full sentences. No wheezes, rales or rhonchi.  Abd: +BS x 4. Soft, non-tender, non-distended. No guarding or rebound.  Back: Spine midline and non-tender. No CVAT.  MSK: FROM extremities x 4, no bony ttp  Skin: Warm, dry  Neuro: Awake, alert & oriented x 3. No focal deficits, ambulatory with steady gait

## 2020-07-25 NOTE — ED CDU PROVIDER INITIAL DAY NOTE - MEDICAL DECISION MAKING DETAILS
Pt with chest pain x many months, has had cardiac workup at [facility] recently. EKG non-ischemic, trop negative. Obs for serial trop/ekg and SW for housing
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
## 2020-11-21 NOTE — ED CDU PROVIDER DISPOSITION NOTE - PATIENT PORTAL LINK FT
You can access the FollowMyHealth Patient Portal offered by [facility] by registering at the following website: [email or web address]. By joining [employer]’s FollowMyHealth portal, you will also be able to view your health information using other applications (apps) compatible with our system.
Fall

## 2020-12-09 ENCOUNTER — EMERGENCY (EMERGENCY)
Facility: HOSPITAL | Age: 60
LOS: 1 days | End: 2020-12-09
Admitting: EMERGENCY MEDICINE
Payer: MEDICAID

## 2020-12-09 DIAGNOSIS — Z98.1 ARTHRODESIS STATUS: Chronic | ICD-10-CM

## 2020-12-09 PROCEDURE — 99284 EMERGENCY DEPT VISIT MOD MDM: CPT

## 2020-12-17 ENCOUNTER — INPATIENT (INPATIENT)
Facility: HOSPITAL | Age: 60
LOS: 3 days | Discharge: HOME | End: 2020-12-21
Payer: MEDICAID

## 2020-12-17 DIAGNOSIS — Z98.1 ARTHRODESIS STATUS: Chronic | ICD-10-CM

## 2020-12-17 PROCEDURE — 73552 X-RAY EXAM OF FEMUR 2/>: CPT | Mod: 26,RT

## 2020-12-17 PROCEDURE — 72125 CT NECK SPINE W/O DYE: CPT | Mod: 26

## 2020-12-17 PROCEDURE — 73590 X-RAY EXAM OF LOWER LEG: CPT | Mod: 26,RT

## 2020-12-17 PROCEDURE — 93010 ELECTROCARDIOGRAM REPORT: CPT

## 2020-12-17 PROCEDURE — 70450 CT HEAD/BRAIN W/O DYE: CPT | Mod: 26

## 2020-12-17 PROCEDURE — 71045 X-RAY EXAM CHEST 1 VIEW: CPT | Mod: 26

## 2020-12-17 PROCEDURE — 71250 CT THORAX DX C-: CPT | Mod: 26

## 2020-12-17 PROCEDURE — 99284 EMERGENCY DEPT VISIT MOD MDM: CPT

## 2020-12-17 PROCEDURE — 74176 CT ABD & PELVIS W/O CONTRAST: CPT | Mod: 26

## 2020-12-17 PROCEDURE — 72141 MRI NECK SPINE W/O DYE: CPT | Mod: 26

## 2020-12-17 PROCEDURE — 73502 X-RAY EXAM HIP UNI 2-3 VIEWS: CPT | Mod: 26,RT

## 2020-12-17 PROCEDURE — 73562 X-RAY EXAM OF KNEE 3: CPT | Mod: 26,RT

## 2021-01-12 ENCOUNTER — OUTPATIENT (OUTPATIENT)
Dept: OUTPATIENT SERVICES | Facility: HOSPITAL | Age: 61
LOS: 1 days | End: 2021-01-12
Payer: MEDICAID

## 2021-01-12 DIAGNOSIS — Z20.828 CONTACT WITH AND (SUSPECTED) EXPOSURE TO OTHER VIRAL COMMUNICABLE DISEASES: ICD-10-CM

## 2021-01-12 DIAGNOSIS — Z98.1 ARTHRODESIS STATUS: Chronic | ICD-10-CM

## 2021-01-12 LAB — SARS-COV-2 RNA SPEC QL NAA+PROBE: SIGNIFICANT CHANGE UP

## 2021-01-12 PROCEDURE — U0003: CPT

## 2021-01-12 PROCEDURE — C9803: CPT

## 2021-01-12 PROCEDURE — U0005: CPT

## 2021-01-13 DIAGNOSIS — Z20.828 CONTACT WITH AND (SUSPECTED) EXPOSURE TO OTHER VIRAL COMMUNICABLE DISEASES: ICD-10-CM

## 2021-01-17 ENCOUNTER — EMERGENCY (EMERGENCY)
Facility: HOSPITAL | Age: 61
LOS: 0 days | Discharge: HOME | End: 2021-01-17
Attending: EMERGENCY MEDICINE
Payer: MEDICAID

## 2021-01-17 VITALS
OXYGEN SATURATION: 100 % | TEMPERATURE: 98 F | RESPIRATION RATE: 20 BRPM | SYSTOLIC BLOOD PRESSURE: 142 MMHG | HEART RATE: 75 BPM | DIASTOLIC BLOOD PRESSURE: 82 MMHG

## 2021-01-17 VITALS — WEIGHT: 115.08 LBS

## 2021-01-17 DIAGNOSIS — E11.9 TYPE 2 DIABETES MELLITUS WITHOUT COMPLICATIONS: ICD-10-CM

## 2021-01-17 DIAGNOSIS — M54.9 DORSALGIA, UNSPECIFIED: ICD-10-CM
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
## 2021-01-17 DIAGNOSIS — G89.29 OTHER CHRONIC PAIN: ICD-10-CM

## 2021-01-17 DIAGNOSIS — Z98.1 ARTHRODESIS STATUS: Chronic | ICD-10-CM
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
## 2021-01-17 PROCEDURE — 99283 EMERGENCY DEPT VISIT LOW MDM: CPT

## 2021-01-17 RX ORDER — OXYCODONE AND ACETAMINOPHEN 5; 325 MG/1; MG/1
1 TABLET ORAL ONCE
Refills: 0 | Status: DISCONTINUED | OUTPATIENT
Start: 2021-01-17 | End: 2021-01-17

## 2021-01-17 RX ORDER — IBUPROFEN 200 MG
600 TABLET ORAL ONCE
Refills: 0 | Status: COMPLETED | OUTPATIENT
Start: 2021-01-17 | End: 2021-01-17

## 2021-01-17 RX ORDER — DIAZEPAM 5 MG
5 TABLET ORAL ONCE
Refills: 0 | Status: DISCONTINUED | OUTPATIENT
Start: 2021-01-17 | End: 2021-01-17

## 2021-01-17 RX ORDER — ACETAMINOPHEN 500 MG
650 TABLET ORAL ONCE
Refills: 0 | Status: COMPLETED | OUTPATIENT
Start: 2021-01-17 | End: 2021-01-17

## 2021-01-17 RX ADMIN — Medication 650 MILLIGRAM(S): at 10:11

## 2021-01-17 RX ADMIN — OXYCODONE AND ACETAMINOPHEN 1 TABLET(S): 5; 325 TABLET ORAL at 13:04

## 2021-01-17 RX ADMIN — Medication 5 MILLIGRAM(S): at 10:11

## 2021-01-17 RX ADMIN — Medication 600 MILLIGRAM(S): at 10:11

## 2021-01-17 NOTE — ED PROVIDER NOTE - CLINICAL SUMMARY MEDICAL DECISION MAKING FREE TEXT BOX
Pt with chronic back pain after fall x3 weeks ago. Pt ran out of oxycodone, requesting pain medication. Will trial Motrin, Tylenol, Valium, and refer to pain management. No acute injuries at this time.

## 2021-01-17 NOTE — ED PROVIDER NOTE - PATIENT PORTAL LINK FT
You can access the FollowMyHealth Patient Portal offered by [facility] by registering at the following website: [email or web address]. By joining [employer] [facility] [employer] [facility]’s FollowMyHealth portal, you will also be able to view your health information using other applications (apps) compatible with our system.

## 2021-01-17 NOTE — ED ADULT NURSE NOTE - OBJECTIVE STATEMENT
Patient states he was being treated  for back pain but had run out of meds and cannot find a pain management doctor so he came here

## 2021-01-17 NOTE — ED PROVIDER NOTE - NSFOLLOWUPINSTRUCTIONS_ED_ALL_ED_FT
Acute Low Back Pain    WHAT YOU NEED TO KNOW:    Acute low back pain is sudden discomfort in your lower back area that lasts for up to 6 weeks. The discomfort makes it difficult to tolerate activity.     DISCHARGE INSTRUCTIONS:    Return to the emergency department if:   •You have severe pain.      •You have sudden stiffness and heaviness on both buttocks down to both legs.      •You have numbness or weakness in one leg, or pain in both legs.      •You have numbness in your genital area or across your lower back.      •You cannot control your urine or bowel movements.      Contact your healthcare provider if:   •You have a fever.      •You have pain at night or when you rest.      •Your pain does not get better with treatment.      •You have pain that worsens when you cough or sneeze.      •You suddenly feel something pop or snap in your back.      •You have questions or concerns about your condition or care.      Medicines: You may need any of the following:   •NSAIDs help decrease swelling and pain. This medicine is available with or without a doctor's order. NSAIDs can cause stomach bleeding or kidney problems in certain people. If you take blood thinner medicine, always ask your healthcare provider if NSAIDs are safe for you. Always read the medicine label and follow directions.      •Acetaminophen decreases pain and fever. It is available without a doctor's order. Ask how much to take and how often to take it. Follow directions. Read the labels of all other medicines you are using to see if they also contain acetaminophen, or ask your doctor or pharmacist. Acetaminophen can cause liver damage if not taken correctly. Do not use more than 4 grams (4,000 milligrams) total of acetaminophen in one day.       •Muscle relaxers decrease pain by relaxing the muscles in your lower spine.      •Prescription pain medicine may be given. Ask your healthcare provider how to take this medicine safely. Some prescription pain medicines contain acetaminophen. Do not take other medicines that contain acetaminophen without talking to your healthcare provider. Too much acetaminophen may cause liver damage. Prescription pain medicine may cause constipation. Ask your healthcare provider how to prevent or treat constipation.       Self-care:   •Stay active as much as you can without causing more pain. Bed rest could make your back pain worse. Start with some light exercises, such as walking. Avoid heavy lifting until your pain is gone. Ask for more information about the activities or exercises that are right for you.       •Apply ice on your back for 15 to 20 minutes every hour or as directed. Use an ice pack, or put crushed ice in a plastic bag. Cover it with a towel before you apply it to your skin. Ice helps prevent tissue damage and decreases swelling and pain.       •Apply heat on your back for 20 to 30 minutes every 2 hours for as many days as directed. Heat helps decrease pain and muscle spasms. Alternate heat and ice.      Prevent acute low back pain:   •Use proper body mechanics. ?Bend at the hips and knees when you  objects. Do not bend from the waist. Use your leg muscles as you lift the load. Do not use your back. Keep the object close to your chest as you lift it. Try not to twist or lift anything above your waist.      ?Change your position often when you stand for long periods of time. Rest one foot on a small box or footrest, and then switch to the other foot often.      ?Try not to sit for long periods of time. When you do, sit in a straight-backed chair with your feet flat on the floor. Never reach, pull, or push while you are sitting.      •Do exercises that strengthen your back muscles. Warm up before you exercise. Ask your healthcare provider the best exercises for you.      •Maintain a healthy weight. Ask your healthcare provider how much you should weigh. Ask him or her to help you create a weight loss plan if you are overweight.      Follow up with your healthcare provider as directed: Return for a follow-up visit if you still have pain after 1 to 3 weeks of treatment. You may need to visit an orthopedist if your back pain lasts longer than 12 weeks. Write down your questions so you remember to ask them during your visits.       © Copyright [employer] 2021           back to top                          © Copyright [employer] 2021

## 2021-01-17 NOTE — ED ADULT TRIAGE NOTE - CHIEF COMPLAINT QUOTE
pt c/o neck back and leg pain for over 3 weeks. pt states he was recently admitted for the pain and ran out of his pain medication. pt deneis fall trauma fever

## 2021-01-17 NOTE — ED PROVIDER NOTE - OBJECTIVE STATEMENT
59 y/o male with PMHx of DM, chrnoc back pain presents to the ED c/o back pain, leg pain x3 weeks. Pt fell down a step at that time, slipped hitting his head and aggravating his back pain. Pt was admitted for the pain and has run out of his pain medication, oxycodone. Last took oxycodone x8-9 days ago. Has not been taking Advil, as he states it does not help him. Denies recent trauma, fever.

## 2021-01-17 NOTE — ED PROVIDER NOTE - MUSCULOSKELETAL, MLM
Spine appears normal, range of motion is not limited +right straight leg, TTP over right gluteus lito

## 2021-01-18 ENCOUNTER — EMERGENCY (EMERGENCY)
Facility: HOSPITAL | Age: 61
LOS: 0 days | Discharge: HOME | End: 2021-01-18
Attending: EMERGENCY MEDICINE
Payer: MEDICAID

## 2021-01-18 VITALS
HEIGHT: 69 IN | HEART RATE: 83 BPM | WEIGHT: 195.11 LBS | SYSTOLIC BLOOD PRESSURE: 138 MMHG | TEMPERATURE: 98 F | OXYGEN SATURATION: 97 % | DIASTOLIC BLOOD PRESSURE: 80 MMHG | RESPIRATION RATE: 19 BRPM
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
## 2021-01-18 DIAGNOSIS — E11.9 TYPE 2 DIABETES MELLITUS WITHOUT COMPLICATIONS: ICD-10-CM

## 2021-01-18 DIAGNOSIS — Z98.1 ARTHRODESIS STATUS: Chronic | ICD-10-CM

## 2021-01-18 DIAGNOSIS — M54.9 DORSALGIA, UNSPECIFIED: ICD-10-CM
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
## 2021-01-18 DIAGNOSIS — M54.5 LOW BACK PAIN: ICD-10-CM

## 2021-01-18 DIAGNOSIS — G89.29 OTHER CHRONIC PAIN: ICD-10-CM

## 2021-01-18 PROCEDURE — 99283 EMERGENCY DEPT VISIT LOW MDM: CPT

## 2021-01-18 RX ORDER — DIAZEPAM 5 MG
1 TABLET ORAL
Qty: 3 | Refills: 0
Start: 2021-01-18 | End: 2021-01-18

## 2021-01-18 RX ORDER — ACETAMINOPHEN 500 MG
975 TABLET ORAL ONCE
Refills: 0 | Status: COMPLETED | OUTPATIENT
Start: 2021-01-18 | End: 2021-01-18

## 2021-01-18 RX ORDER — DIAZEPAM 5 MG
5 TABLET ORAL ONCE
Refills: 0 | Status: DISCONTINUED | OUTPATIENT
Start: 2021-01-18 | End: 2021-01-18

## 2021-01-18 RX ORDER — IBUPROFEN 200 MG
600 TABLET ORAL ONCE
Refills: 0 | Status: COMPLETED | OUTPATIENT
Start: 2021-01-18 | End: 2021-01-18

## 2021-01-18 RX ADMIN — Medication 325 MILLIGRAM(S): at 11:04

## 2021-01-18 RX ADMIN — Medication 600 MILLIGRAM(S): at 11:03

## 2021-01-18 RX ADMIN — Medication 5 MILLIGRAM(S): at 11:03

## 2021-01-18 NOTE — ED STATDOCS - ATTENDING CONTRIBUTION TO CARE
Name band;
I personally saw the patient with the PA, and completed the key components of the history and physical exam. I then discussed the management plan with the PA.

## 2021-01-18 NOTE — ED ADULT TRIAGE NOTE - CHIEF COMPLAINT QUOTE
Pt c/o back pain, b/l leg pain, right arm pain, neck pain s/p fall 12/2020. As per EMS pt here recently for similar symptoms, pt from group home. Pt is A&O x3 pt able to stand able to ambulate to wheelchair.

## 2021-01-18 NOTE — ED STATDOCS - CARE PROVIDER_API CALL
Lance Murray)  Family Medicine  [address] [place] [address]  Phone: [phone]  Fax: [phone]  Follow Up Time:     Rashel Edmonds ()  Orthopaedic Surgery  [address] [facility] [address] [facility], [address] [place] [address]  Phone: [phone]  Fax: [phone]  Follow Up Time:

## 2021-01-18 NOTE — ED STATDOCS - PATIENT PORTAL LINK FT
You can access the FollowMyHealth Patient Portal offered by [facility] by registering at the following website: [email or web address]. By joining [employer]’s FollowMyHealth portal, you will also be able to view your health information using other applications (apps) compatible with our system.

## 2021-01-18 NOTE — ED ADULT NURSE NOTE - NS_ED_NURSE_TEACHING_TOPIC_ED_A_ED
How Severe Is Your Skin Lesion?: moderate Has Your Skin Lesion Been Treated?: not been treated Is This A New Presentation, Or A Follow-Up?: Skin Lesion Orthopedic

## 2021-01-18 NOTE — ED STATDOCS - PROGRESS NOTE DETAILS
60 yr. old male PMH: Type 2 Diabetes, CBP BIBA presents to ED with low back radiating to leg for 3 weeks after falling down steps in December. Requesting pain medication. Reports he ran out of his oxycodone. No incontinence of urine or stool. No caudal anesthesia. Seen and examined by attending in intake. Plan: Valium and Rx. Valium F/U with spine specialist. Will F/U with results and re evaluate. Amberlyngshelly NP

## 2021-01-18 NOTE — ED STATDOCS - CLINICAL SUMMARY MEDICAL DECISION MAKING FREE TEXT BOX
Pt with chronic back pain, requesting oxycodone. Seen in ED by me yesterday, no acute complaints at this time. Will give Tylenol, Motrin, and Valium.

## 2021-01-18 NOTE — ED STATDOCS - OBJECTIVE STATEMENT
61 y/o M with PMHx of DM and chronic back pain presents to the ED BIBEMS c/o back pain, leg pain x3 weeks. Pt fell down a step at that time, slipped hitting his head and aggravating his back pain. Pt was admitted for the pain and has run out of his pain medication, oxycodone. Was seen in ED on 1/17 for the same complaint, was d/c to f/u pain management. No fever. NKDA.

## 2021-01-18 NOTE — ED ADULT NURSE NOTE - OBJECTIVE STATEMENT
pt came to ED for pain medication for back pain. sts he ran out of his medication. denies any other pain or complaints at this time. ambulates steadily in ED.

## 2021-01-18 NOTE — ED STATDOCS - NSFOLLOWUPINSTRUCTIONS_ED_ALL_ED_FT
Back Pain    WHAT YOU NEED TO KNOW:    Back pain is common. It can be caused by many conditions, such as arthritis or the breakdown of spinal discs. Your risk for back pain is increased by injuries, lack of activity, or repeated bending and twisting. You may feel sore or stiff on one or both sides of your back. The pain may spread to your buttocks or thighs.    DISCHARGE INSTRUCTIONS:    Return to the emergency department if:     You have pain, numbness, or weakness in one or both legs.      Your pain becomes so severe that you cannot walk.      You cannot control your urine or bowel movements.      You have severe back pain with chest pain.      You have severe back pain, nausea, and vomiting.      You have severe back pain that spreads to your side or genital area.    Contact your healthcare provider if:     You have back pain that does not get better with rest and pain medicine.      You have a fever.      You have pain that worsens when you are on your back or when you rest.      You have pain that worsens when you cough or sneeze.      You lose weight without trying.      You have questions or concerns about your condition or care.    Medicines:     NSAIDs help decrease swelling and pain. This medicine is available with or without a doctor's order. NSAIDs can cause stomach bleeding or kidney problems in certain people. If you take blood thinner medicine, always ask your healthcare provider if NSAIDs are safe for you. Always read the medicine label and follow directions.      Acetaminophen decreases pain and fever. It is available without a doctor's order. Ask how much to take and how often to take it. Follow directions. Read the labels of all other medicines you are using to see if they also contain acetaminophen, or ask your doctor or pharmacist. Acetaminophen can cause liver damage if not taken correctly. Do not use more than 4 grams (4,000 milligrams) total of acetaminophen in one day.       Muscle relaxers help decrease muscle spasms and back pain.      Prescription pain medicine may be given. Ask your healthcare provider how to take this medicine safely. Some prescription pain medicines contain acetaminophen. Do not take other medicines that contain acetaminophen without talking to your healthcare provider. Too much acetaminophen may cause liver damage. Prescription pain medicine may cause constipation. Ask your healthcare provider how to prevent or treat constipation.       Take your medicine as directed. Contact your healthcare provider if you think your medicine is not helping or if you have side effects. Tell him or her if you are allergic to any medicine. Keep a list of the medicines, vitamins, and herbs you take. Include the amounts, and when and why you take them. Bring the list or the pill bottles to follow-up visits. Carry your medicine list with you in case of an emergency.    How to manage your back pain:     Apply ice on your back for 15 to 20 minutes every hour or as directed. Use an ice pack, or put crushed ice in a plastic bag. Cover it with a towel before you apply it to your skin. Ice helps prevent tissue damage and decreases pain.      Apply heat on your back for 20 to 30 minutes every 2 hours for as many days as directed. Heat helps decrease pain and muscle spasms.      Stay active as much as you can without causing more pain. Bed rest could make your back pain worse. Avoid heavy lifting until your pain is gone.      Go to physical therapy as directed. A physical therapist can teach you exercises to help improve movement and strength, and to decrease pain.    Follow up with your healthcare provider in 2 weeks, or as directed: Write down your questions so you remember to ask them during your visits.    Rest. No lifting or physical exertion.  Take Ibuprofen 600mg. PO every 6 hrs. for pain.  Valium as prescribed.  Follow up with PMD and Spine Specialist.

## 2021-02-05 ENCOUNTER — EMERGENCY (EMERGENCY)
Facility: HOSPITAL | Age: 61
LOS: 0 days | Discharge: HOME | End: 2021-02-05
Attending: EMERGENCY MEDICINE
Payer: MEDICAID

## 2021-02-05 VITALS
TEMPERATURE: 98 F | SYSTOLIC BLOOD PRESSURE: 137 MMHG | RESPIRATION RATE: 18 BRPM | OXYGEN SATURATION: 100 % | DIASTOLIC BLOOD PRESSURE: 89 MMHG | HEART RATE: 80 BPM

## 2021-02-05 VITALS — HEIGHT: 69 IN | WEIGHT: 164.91 LBS

## 2021-02-05 DIAGNOSIS — G89.29 OTHER CHRONIC PAIN: ICD-10-CM

## 2021-02-05 DIAGNOSIS — M54.5 LOW BACK PAIN: ICD-10-CM

## 2021-02-05 DIAGNOSIS — I10 ESSENTIAL (PRIMARY) HYPERTENSION: ICD-10-CM

## 2021-02-05 DIAGNOSIS — M25.561 PAIN IN RIGHT KNEE: ICD-10-CM

## 2021-02-05 DIAGNOSIS — Z98.1 ARTHRODESIS STATUS: Chronic | ICD-10-CM
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
## 2021-02-05 DIAGNOSIS — R51.9 HEADACHE, UNSPECIFIED: ICD-10-CM

## 2021-02-05 DIAGNOSIS — M25.551 PAIN IN RIGHT HIP: ICD-10-CM

## 2021-02-05 DIAGNOSIS — E11.9 TYPE 2 DIABETES MELLITUS WITHOUT COMPLICATIONS: ICD-10-CM
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
## 2021-02-05 DIAGNOSIS — F41.9 ANXIETY DISORDER, UNSPECIFIED: ICD-10-CM

## 2021-02-05 PROCEDURE — 96372 THER/PROPH/DIAG INJ SC/IM: CPT

## 2021-02-05 PROCEDURE — 99283 EMERGENCY DEPT VISIT LOW MDM: CPT

## 2021-02-05 RX ORDER — CYCLOBENZAPRINE HYDROCHLORIDE 10 MG/1
1 TABLET, FILM COATED ORAL
Qty: 15 | Refills: 0
Start: 2021-02-05 | End: 2021-02-09

## 2021-02-05 RX ORDER — LIDOCAINE 4 G/100G
1 CREAM TOPICAL ONCE
Refills: 0 | Status: COMPLETED | OUTPATIENT
Start: 2021-02-05 | End: 2021-02-05

## 2021-02-05 RX ORDER — IBUPROFEN 200 MG
400 TABLET ORAL ONCE
Refills: 0 | Status: DISCONTINUED | OUTPATIENT
Start: 2021-02-05 | End: 2021-02-05

## 2021-02-05 RX ORDER — CYCLOBENZAPRINE HYDROCHLORIDE 10 MG/1
10 TABLET, FILM COATED ORAL ONCE
Refills: 0 | Status: COMPLETED | OUTPATIENT
Start: 2021-02-05 | End: 2021-02-05

## 2021-02-05 RX ORDER — KETOROLAC TROMETHAMINE 30 MG/ML
30 SYRINGE (ML) INJECTION ONCE
Refills: 0 | Status: DISCONTINUED | OUTPATIENT
Start: 2021-02-05 | End: 2021-02-05

## 2021-02-05 RX ADMIN — Medication 30 MILLIGRAM(S): at 11:52

## 2021-02-05 RX ADMIN — LIDOCAINE 1 PATCH: 4 CREAM TOPICAL at 11:53

## 2021-02-05 RX ADMIN — CYCLOBENZAPRINE HYDROCHLORIDE 10 MILLIGRAM(S): 10 TABLET, FILM COATED ORAL at 11:52

## 2021-02-05 NOTE — ED STATDOCS - CROS ED MUSC ALL NEG
Called to inform patient that advised by Marjorie:   When she starts to take caduet, to stop the atorvastatin medication.     FH  
- - -

## 2021-02-05 NOTE — ED STATDOCS - PATIENT PORTAL LINK FT
You can access the FollowMyHealth Patient Portal offered by [facility] by registering at the following website: [email or web address]. By joining [employer]’s FollowMyHealth portal, you will also be able to view your health information using other applications (apps) compatible with our system.

## 2021-02-05 NOTE — ED STATDOCS - ADDITIONAL NOTES AND INSTRUCTIONS:
Please allow the patient to sleep on a cot aas opposed to the floor to help with the diffuse pain that he has.

## 2021-02-05 NOTE — ED STATDOCS - CARE PLAN
Principal Discharge DX:	Chronic bilateral low back pain, unspecified whether sciatica present  Secondary Diagnosis:	Pain in other joint

## 2021-02-05 NOTE — ED ADULT TRIAGE NOTE - CHIEF COMPLAINT QUOTE
pt a/ox3, reports leg and head pain. pt reports going to clinic, pt states "I was not given any pain medications".

## 2021-02-05 NOTE — ED ADULT NURSE NOTE - COVID-19 RESULT DATE/TIME
White blood cell count was elevated with the last laboratory testing  This seems to have been sent to GI  Discussed with him that this may be related to his chronic sinus symptoms but is quite elevated  He also has elevation of his hemoglobin which may be related to the smoking  If this remains elevated will likely refer to Hematology for further investigation  12-Jan-2021 19:22

## 2021-02-05 NOTE — ED STATDOCS - CLINICAL SUMMARY MEDICAL DECISION MAKING FREE TEXT BOX
61 y/o male  male s/p slip and fall in December 2020 with extensive trauma workup at that time including MR cpsine negative for acute traumatic injury ambulatory c/o persistent low back pain, HA, right knee pain. Pt self ambulatory. Pt has clinic appointment next week. Neuro exam normal. Prior imaging reviewed, no acute traumatic pathology. Plan: PO Motrin. No indication for narcotics. Pt to follow up with [facility].
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
## 2021-02-05 NOTE — ED STATDOCS - MUSCULOSKELETAL, MLM
No clinical evidence of facial injury. Normocephalic atraumatic. Diffuse tenderness midline and b/l trapezius muscle. Diffuse lumbar tenderness midline and paralumbar muscles. No gross step off deformity. Neck supple. Chest wall nontender. Pelvis stable nontender. Right hip tenderness lateral aspect. Good SLR. Right knee tenderness. No deformity or effusion.

## 2021-02-05 NOTE — ED STATDOCS - ENMT, MLM
Nasal mucosa clear.  Mouth with moist mucosa  Throat has no vesicles, no oropharyngeal exudates and uvula is midline.
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
## 2021-02-05 NOTE — ED STATDOCS - PROGRESS NOTE DETAILS
Per pt's request asked to speak with his wife. 59 yo male presents with chronic HA, neck pain, back pain, R hip and R knee pain s/p fall 1 month ago. Pt was seen in the ER at [facility] and had Xr, CTs, and MRI which was unremarkable for fractures. -Deion Robbins PA-C Per pt's request asked to speak with his wife. Wife states that pt resides at [facility] and sleeps on the floor and has increasing pain in the neck, back and joints because of his sleeping arrangement. Pt also has been taking percocet that he received from the hospital after the fall. Wife states that he has a f/u appointment with [facility] on tuesday but needs a note for TLC to sleep on a cot as opposed to the floor and something to help control the pain. Wife also states that he has f/u appointment with spine and neuro. Wife's states main reason for coming was for a note and pain control.  Advised wife (who explained to the pt) that we cannot prescribe narcotics for his chronic pain but he should be taking tylenol (which she bought for him) and we could prescribe a muscle relaxer which he could take for the meantime before he sees his doctor. Also mentioned that I could write a note to request him sleeping on a cot due to his pain but does not mean that TLC will adhere to the note. Wife aware and agrees with plan. Will also try to set up a ortho referral from here for his chronic R hip and knee pain (since he only has a spine and neuro appointment). Aliyah went to see pt for f/u appointment. Spoke with wife over the phone who state they have their own f/u and refused our ortho f/u. -Deion Robbins PA-C

## 2021-02-05 NOTE — ED ADULT NURSE NOTE - OBJECTIVE STATEMENT
pt presents to the ED c/o lower leg pain. Pt states in December he slipped on ice and fell. Pt was seen in another ED at that time and had full workup. Pt c/o persistent HA, lower back pain, right knee pain. Pt self ambulatory with cane.

## 2021-11-01 ENCOUNTER — OUTPATIENT (OUTPATIENT)
Dept: OUTPATIENT SERVICES | Facility: HOSPITAL | Age: 61
LOS: 1 days | End: 2021-11-01
Payer: MEDICAID

## 2021-11-01 DIAGNOSIS — Z98.1 ARTHRODESIS STATUS: Chronic | ICD-10-CM

## 2021-11-22 ENCOUNTER — EMERGENCY (EMERGENCY)
Facility: HOSPITAL | Age: 61
LOS: 0 days | Discharge: HOME | End: 2021-11-22
Attending: EMERGENCY MEDICINE
Payer: MEDICAID

## 2021-11-22 VITALS
OXYGEN SATURATION: 100 % | RESPIRATION RATE: 16 BRPM | WEIGHT: 134.92 LBS | HEART RATE: 85 BPM | HEIGHT: 69 IN | SYSTOLIC BLOOD PRESSURE: 136 MMHG | DIASTOLIC BLOOD PRESSURE: 76 MMHG | TEMPERATURE: 98 F

## 2021-11-22 VITALS
OXYGEN SATURATION: 99 % | DIASTOLIC BLOOD PRESSURE: 81 MMHG | HEART RATE: 88 BPM | RESPIRATION RATE: 16 BRPM | SYSTOLIC BLOOD PRESSURE: 112 MMHG | TEMPERATURE: 99 F

## 2021-11-22 DIAGNOSIS — E11.9 TYPE 2 DIABETES MELLITUS WITHOUT COMPLICATIONS: ICD-10-CM

## 2021-11-22 DIAGNOSIS — R53.1 WEAKNESS: ICD-10-CM

## 2021-11-22 DIAGNOSIS — Z21 ASYMPTOMATIC HUMAN IMMUNODEFICIENCY VIRUS [HIV] INFECTION STATUS: ICD-10-CM

## 2021-11-22 DIAGNOSIS — Z79.84 LONG TERM (CURRENT) USE OF ORAL HYPOGLYCEMIC DRUGS: ICD-10-CM

## 2021-11-22 DIAGNOSIS — R07.89 OTHER CHEST PAIN: ICD-10-CM

## 2021-11-22 DIAGNOSIS — R05.1 ACUTE COUGH: ICD-10-CM

## 2021-11-22 DIAGNOSIS — Z20.822 CONTACT WITH AND (SUSPECTED) EXPOSURE TO COVID-19: ICD-10-CM

## 2021-11-22 DIAGNOSIS — Z98.1 ARTHRODESIS STATUS: Chronic | ICD-10-CM

## 2021-11-22 DIAGNOSIS — N39.0 URINARY TRACT INFECTION, SITE NOT SPECIFIED: ICD-10-CM

## 2021-11-22 DIAGNOSIS — M79.18 MYALGIA, OTHER SITE: ICD-10-CM

## 2021-11-22 LAB
ALBUMIN SERPL ELPH-MCNC: 3.1 G/DL — LOW (ref 3.3–5)
ALP SERPL-CCNC: 85 U/L — SIGNIFICANT CHANGE UP (ref 40–120)
ALT FLD-CCNC: 15 U/L — SIGNIFICANT CHANGE UP (ref 12–78)
ANION GAP SERPL CALC-SCNC: 6 MMOL/L — SIGNIFICANT CHANGE UP (ref 5–17)
APPEARANCE UR: ABNORMAL
AST SERPL-CCNC: 10 U/L — LOW (ref 15–37)
BASE EXCESS BLDV CALC-SCNC: 3.1 MMOL/L — SIGNIFICANT CHANGE UP
BASOPHILS # BLD AUTO: 0.06 K/UL — SIGNIFICANT CHANGE UP (ref 0–0.2)
BASOPHILS NFR BLD AUTO: 0.4 % — SIGNIFICANT CHANGE UP (ref 0–2)
BILIRUB SERPL-MCNC: 0.8 MG/DL — SIGNIFICANT CHANGE UP (ref 0.2–1.2)
BILIRUB UR-MCNC: NEGATIVE — SIGNIFICANT CHANGE UP
BUN SERPL-MCNC: 15 MG/DL — SIGNIFICANT CHANGE UP (ref 7–23)
CALCIUM SERPL-MCNC: 9.3 MG/DL — SIGNIFICANT CHANGE UP (ref 8.5–10.1)
CHLORIDE SERPL-SCNC: 97 MMOL/L — SIGNIFICANT CHANGE UP (ref 96–108)
CO2 BLDV-SCNC: 32 MMOL/L — HIGH (ref 22–26)
CO2 SERPL-SCNC: 28 MMOL/L — SIGNIFICANT CHANGE UP (ref 22–31)
COLOR SPEC: YELLOW — SIGNIFICANT CHANGE UP
CREAT SERPL-MCNC: 1.34 MG/DL — HIGH (ref 0.5–1.3)
DIFF PNL FLD: ABNORMAL
EOSINOPHIL # BLD AUTO: 0.02 K/UL — SIGNIFICANT CHANGE UP (ref 0–0.5)
EOSINOPHIL NFR BLD AUTO: 0.1 % — SIGNIFICANT CHANGE UP (ref 0–6)
GAS PNL BLDV: SIGNIFICANT CHANGE UP
GLUCOSE SERPL-MCNC: 302 MG/DL — HIGH (ref 70–99)
GLUCOSE UR QL: 1000 MG/DL
HCO3 BLDV-SCNC: 30 MMOL/L — HIGH (ref 22–29)
HCT VFR BLD CALC: 42.7 % — SIGNIFICANT CHANGE UP (ref 39–50)
HGB BLD-MCNC: 14.1 G/DL — SIGNIFICANT CHANGE UP (ref 13–17)
HIV 1 & 2 AB SERPL IA.RAPID: REACTIVE
IMM GRANULOCYTES NFR BLD AUTO: 0.7 % — SIGNIFICANT CHANGE UP (ref 0–1.5)
KETONES UR-MCNC: ABNORMAL
LACTATE SERPL-SCNC: 1.4 MMOL/L — SIGNIFICANT CHANGE UP (ref 0.7–2)
LEUKOCYTE ESTERASE UR-ACNC: ABNORMAL
LYMPHOCYTES # BLD AUTO: 1.01 K/UL — SIGNIFICANT CHANGE UP (ref 1–3.3)
LYMPHOCYTES # BLD AUTO: 7.4 % — LOW (ref 13–44)
MAGNESIUM SERPL-MCNC: 2.3 MG/DL — SIGNIFICANT CHANGE UP (ref 1.6–2.6)
MCHC RBC-ENTMCNC: 28.8 PG — SIGNIFICANT CHANGE UP (ref 27–34)
MCHC RBC-ENTMCNC: 33 GM/DL — SIGNIFICANT CHANGE UP (ref 32–36)
MCV RBC AUTO: 87.3 FL — SIGNIFICANT CHANGE UP (ref 80–100)
MONOCYTES # BLD AUTO: 0.96 K/UL — HIGH (ref 0–0.9)
MONOCYTES NFR BLD AUTO: 7.1 % — SIGNIFICANT CHANGE UP (ref 2–14)
NEUTROPHILS # BLD AUTO: 11.46 K/UL — HIGH (ref 1.8–7.4)
NEUTROPHILS NFR BLD AUTO: 84.3 % — HIGH (ref 43–77)
NITRITE UR-MCNC: POSITIVE
NT-PROBNP SERPL-SCNC: 138 PG/ML — HIGH (ref 0–125)
PCO2 BLDV: 56 MMHG — HIGH (ref 42–55)
PH BLDV: 7.34 — SIGNIFICANT CHANGE UP (ref 7.32–7.43)
PH UR: 5 — SIGNIFICANT CHANGE UP (ref 5–8)
PLATELET # BLD AUTO: 249 K/UL — SIGNIFICANT CHANGE UP (ref 150–400)
PO2 BLDV: 32 MMHG — SIGNIFICANT CHANGE UP
POTASSIUM SERPL-MCNC: 4 MMOL/L — SIGNIFICANT CHANGE UP (ref 3.5–5.3)
POTASSIUM SERPL-SCNC: 4 MMOL/L — SIGNIFICANT CHANGE UP (ref 3.5–5.3)
PROT SERPL-MCNC: 8.2 GM/DL — SIGNIFICANT CHANGE UP (ref 6–8.3)
PROT UR-MCNC: 500 MG/DL
RAPID RVP RESULT: SIGNIFICANT CHANGE UP
RBC # BLD: 4.89 M/UL — SIGNIFICANT CHANGE UP (ref 4.2–5.8)
RBC # FLD: 13.2 % — SIGNIFICANT CHANGE UP (ref 10.3–14.5)
SAO2 % BLDV: 57.8 % — SIGNIFICANT CHANGE UP
SARS-COV-2 RNA SPEC QL NAA+PROBE: SIGNIFICANT CHANGE UP
SODIUM SERPL-SCNC: 131 MMOL/L — LOW (ref 135–145)
SP GR SPEC: 1.02 — SIGNIFICANT CHANGE UP (ref 1.01–1.02)
TROPONIN I, HIGH SENSITIVITY RESULT: 11.79 NG/L — SIGNIFICANT CHANGE UP
UROBILINOGEN FLD QL: 1 MG/DL
WBC # BLD: 13.61 K/UL — HIGH (ref 3.8–10.5)
WBC # FLD AUTO: 13.61 K/UL — HIGH (ref 3.8–10.5)

## 2021-11-22 PROCEDURE — 81001 URINALYSIS AUTO W/SCOPE: CPT

## 2021-11-22 PROCEDURE — 93010 ELECTROCARDIOGRAM REPORT: CPT

## 2021-11-22 PROCEDURE — 82009 KETONE BODYS QUAL: CPT

## 2021-11-22 PROCEDURE — 87389 HIV-1 AG W/HIV-1&-2 AB AG IA: CPT

## 2021-11-22 PROCEDURE — 82803 BLOOD GASES ANY COMBINATION: CPT

## 2021-11-22 PROCEDURE — 87040 BLOOD CULTURE FOR BACTERIA: CPT

## 2021-11-22 PROCEDURE — 36415 COLL VENOUS BLD VENIPUNCTURE: CPT

## 2021-11-22 PROCEDURE — 99285 EMERGENCY DEPT VISIT HI MDM: CPT

## 2021-11-22 PROCEDURE — 86689 HTLV/HIV CONFIRMJ ANTIBODY: CPT

## 2021-11-22 PROCEDURE — 86703 HIV-1/HIV-2 1 RESULT ANTBDY: CPT

## 2021-11-22 PROCEDURE — 96374 THER/PROPH/DIAG INJ IV PUSH: CPT

## 2021-11-22 PROCEDURE — 0225U NFCT DS DNA&RNA 21 SARSCOV2: CPT

## 2021-11-22 PROCEDURE — 82962 GLUCOSE BLOOD TEST: CPT

## 2021-11-22 PROCEDURE — 84484 ASSAY OF TROPONIN QUANT: CPT

## 2021-11-22 PROCEDURE — 87150 DNA/RNA AMPLIFIED PROBE: CPT

## 2021-11-22 PROCEDURE — 87086 URINE CULTURE/COLONY COUNT: CPT

## 2021-11-22 PROCEDURE — 83735 ASSAY OF MAGNESIUM: CPT

## 2021-11-22 PROCEDURE — 85025 COMPLETE CBC W/AUTO DIFF WBC: CPT

## 2021-11-22 PROCEDURE — 83880 ASSAY OF NATRIURETIC PEPTIDE: CPT

## 2021-11-22 PROCEDURE — 83605 ASSAY OF LACTIC ACID: CPT

## 2021-11-22 PROCEDURE — 80053 COMPREHEN METABOLIC PANEL: CPT

## 2021-11-22 PROCEDURE — 71046 X-RAY EXAM CHEST 2 VIEWS: CPT | Mod: 26

## 2021-11-22 PROCEDURE — 99284 EMERGENCY DEPT VISIT MOD MDM: CPT | Mod: 25

## 2021-11-22 PROCEDURE — 87077 CULTURE AEROBIC IDENTIFY: CPT

## 2021-11-22 PROCEDURE — 93005 ELECTROCARDIOGRAM TRACING: CPT

## 2021-11-22 PROCEDURE — 71046 X-RAY EXAM CHEST 2 VIEWS: CPT

## 2021-11-22 RX ORDER — CEFTRIAXONE 500 MG/1
1000 INJECTION, POWDER, FOR SOLUTION INTRAMUSCULAR; INTRAVENOUS ONCE
Refills: 0 | Status: COMPLETED | OUTPATIENT
Start: 2021-11-22 | End: 2021-11-22

## 2021-11-22 RX ORDER — CEFTRIAXONE 500 MG/1
1000 INJECTION, POWDER, FOR SOLUTION INTRAMUSCULAR; INTRAVENOUS ONCE
Refills: 0 | Status: DISCONTINUED | OUTPATIENT
Start: 2021-11-22 | End: 2021-11-22

## 2021-11-22 RX ORDER — CEPHALEXIN 500 MG
1 CAPSULE ORAL
Qty: 14 | Refills: 0
Start: 2021-11-22 | End: 2021-11-28

## 2021-11-22 RX ORDER — OXYCODONE AND ACETAMINOPHEN 5; 325 MG/1; MG/1
1 TABLET ORAL ONCE
Refills: 0 | Status: DISCONTINUED | OUTPATIENT
Start: 2021-11-22 | End: 2021-11-22

## 2021-11-22 RX ORDER — CIPROFLOXACIN LACTATE 400MG/40ML
1 VIAL (ML) INTRAVENOUS
Qty: 20 | Refills: 0
Start: 2021-11-22 | End: 2021-12-01

## 2021-11-22 RX ORDER — SODIUM CHLORIDE 9 MG/ML
1000 INJECTION INTRAMUSCULAR; INTRAVENOUS; SUBCUTANEOUS ONCE
Refills: 0 | Status: COMPLETED | OUTPATIENT
Start: 2021-11-22 | End: 2021-11-22

## 2021-11-22 RX ORDER — METFORMIN HYDROCHLORIDE 850 MG/1
1 TABLET ORAL
Qty: 30 | Refills: 0
Start: 2021-11-22 | End: 2021-12-06

## 2021-11-22 RX ORDER — OXYCODONE HYDROCHLORIDE 5 MG/1
5 TABLET ORAL ONCE
Refills: 0 | Status: DISCONTINUED | OUTPATIENT
Start: 2021-11-22 | End: 2021-11-22

## 2021-11-22 RX ORDER — IBUPROFEN 200 MG
400 TABLET ORAL ONCE
Refills: 0 | Status: COMPLETED | OUTPATIENT
Start: 2021-11-22 | End: 2021-11-22

## 2021-11-22 RX ADMIN — CEFTRIAXONE 1000 MILLIGRAM(S): 500 INJECTION, POWDER, FOR SOLUTION INTRAMUSCULAR; INTRAVENOUS at 13:00

## 2021-11-22 RX ADMIN — OXYCODONE HYDROCHLORIDE 5 MILLIGRAM(S): 5 TABLET ORAL at 12:59

## 2021-11-22 RX ADMIN — SODIUM CHLORIDE 1000 MILLILITER(S): 9 INJECTION INTRAMUSCULAR; INTRAVENOUS; SUBCUTANEOUS at 08:28

## 2021-11-22 RX ADMIN — OXYCODONE AND ACETAMINOPHEN 1 TABLET(S): 5; 325 TABLET ORAL at 09:42

## 2021-11-22 NOTE — ED PROVIDER NOTE - CLINICAL SUMMARY MEDICAL DECISION MAKING FREE TEXT BOX
pt with fevers, chills, myalgias, chest pain, no access to metformin. benign exam here. VSS,. will check for sepsis,/infection, acs r/o, IVF, check for dka mor hss. if neg workup and pt improved will dc

## 2021-11-22 NOTE — ED PROVIDER NOTE - NSFOLLOWUPINSTRUCTIONS_ED_ALL_ED_FT
You need to follow up with the [facility] within the next week for your positive HIV test to determine further management.     Urinary Tract Infection, Adult  ImageA urinary tract infection (UTI) is an infection of any part of the urinary tract, which includes the kidneys, ureters, bladder, and urethra. These organs make, store, and get rid of urine in the body. UTI can be a bladder infection (cystitis) or kidney infection (pyelonephritis).    What are the causes?  This infection may be caused by fungi, viruses, or bacteria. Bacteria are the most common cause of UTIs. This condition can also be caused by repeated incomplete emptying of the bladder during urination.    What increases the risk?  This condition is more likely to develop if:    You ignore your need to urinate or hold urine for long periods of time.  You do not empty your bladder completely during urination.  You wipe back to front after urinating or having a bowel movement, if you are female.  You are uncircumcised, if you are male.  You are constipated.  You have a urinary catheter that stays in place (indwelling).  You have a weak defense (immune) system.  You have a medical condition that affects your bowels, kidneys, or bladder.  You have diabetes.  You take antibiotic medicines frequently or for long periods of time, and the antibiotics no longer work well against certain types of infections (antibiotic resistance).  You take medicines that irritate your urinary tract.  You are exposed to chemicals that irritate your urinary tract.  You are female.    What are the signs or symptoms?  Symptoms of this condition include:    Fever.  Frequent urination or passing small amounts of urine frequently.  Needing to urinate urgently.  Pain or burning with urination.  Urine that smells bad or unusual.  Cloudy urine.  Pain in the lower abdomen or back.  Trouble urinating.  Blood in the urine.  Vomiting or being less hungry than normal.  Diarrhea or abdominal pain.  Vaginal discharge, if you are female.    How is this diagnosed?  This condition is diagnosed with a medical history and physical exam. You will also need to provide a urine sample to test your urine. Other tests may be done, including:    Blood tests.  Sexually transmitted disease (STD) testing.    If you have had more than one UTI, a cystoscopy or imaging studies may be done to determine the cause of the infections.    How is this treated?  Treatment for this condition often includes a combination of two or more of the following:    Antibiotic medicine.  Other medicines to treat less common causes of UTI.  Over-the-counter medicines to treat pain.  Drinking enough water to stay hydrated.    Follow these instructions at home:  Take over-the-counter and prescription medicines only as told by your health care provider.  If you were prescribed an antibiotic, take it as told by your health care provider. Do not stop taking the antibiotic even if you start to feel better.  Avoid alcohol, caffeine, tea, and carbonated beverages. They can irritate your bladder.  Drink enough fluid to keep your urine clear or pale yellow.  Keep all follow-up visits as told by your health care provider. This is important.  ImageMake sure to:    Empty your bladder often and completely. Do not hold urine for long periods of time.  Empty your bladder before and after sex.  Wipe from front to back after a bowel movement if you are female. Use each tissue one time when you wipe.    Contact a health care provider if:  You have back pain.  You have a fever.  You feel nauseous or vomit.  Your symptoms do not get better after 3 days.  Your symptoms go away and then return.  Get help right away if:  You have severe back pain or lower abdominal pain.  You are vomiting and cannot keep down any medicines or water.  This information is not intended to replace advice given to you by your health care provider. Make sure you discuss any questions you have with your health care provider.
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
## 2021-11-22 NOTE — ED ADULT TRIAGE NOTE - CHIEF COMPLAINT QUOTE
EMS was called for fever and chills, patient took Tylenol at 3am. EMS also states patient is living in a trailer with no heat, has not had access to his Metformin for weeks,  at triage VS stable, no distress noted, denies any complaints.

## 2021-11-22 NOTE — ED PROVIDER NOTE - NSFOLLOWUPCLINICS_GEN_ALL_ED_FT
[facility]  HIV/AIDS Research & Treatment  [address] [place] [address]  Phone: [phone]  Fax:

## 2021-11-22 NOTE — ED PROVIDER NOTE - PROGRESS NOTE DETAILS
AJM: + HIV screening test. pt made aware. he was unaware of diagnosis. spoke with ID dr longoria who reccs dc with [facility] follow up as they have HIV specialist available. AJM: pt feeling improved.  UA. abx given here and rx sent. also refilled metformin for pt. explained the importance of [facility] follow up for hiv test. pt understands importance. All results discussed with the patient, and a copy of results has been provided. Patient is comfortable with dc plan for home. Opportunity for questions was provided and all questions have been addressed. Patient understands when to return to ED if symptoms worsen.

## 2021-11-22 NOTE — ED PROVIDER NOTE - PATIENT PORTAL LINK FT
You can access the FollowMyHealth Patient Portal offered by [facility] by registering at the following website: [email or web address]. By joining [employer]’s FollowMyHealth portal, you will also be able to view your health information using other applications (apps) compatible with our system.

## 2021-11-22 NOTE — ED ADULT NURSE NOTE - OBJECTIVE STATEMENT
PT to ED for c/o fever, weakness, generalized pain, and cough. PT reports elevated Blood sugar and non compliance with medication due to running out. PT a&ox4. Denies chest pain and SOB at this time. VSS. 02 sat 96% on room air.

## 2021-11-22 NOTE — ED PROVIDER NOTE - PSYCHIATRIC, MLM
Alert and oriented to person, place, time/situation. normal mood and affect. no apparent risk to self or others.
[email or web address]

## 2021-11-22 NOTE — ED ADULT TRIAGE NOTE - WEIGHT IN KG
----- Message from Shaylee Ko sent at 10/1/2021  2:01 PM EDT -----  Regarding: Dr. Winston Martins first and last name: Self       Reason for call: Please send the prescription to [facility]      Callback required yes/no and why: Yes please send prescription for Gilenya 0.5 mg cap [[id number]      Best contact number(s): [phone]      Details to clarify the request: Please send the prescription for Gilenya 0.5 mg cap [[id number] to [facility]. Thank you      Shaylee Ko 61.2

## 2021-11-23 ENCOUNTER — EMERGENCY (EMERGENCY)
Facility: HOSPITAL | Age: 61
LOS: 0 days | Discharge: LEFT BEFORE DISCHARGE | End: 2021-11-23
Attending: EMERGENCY MEDICINE
Payer: MEDICAID

## 2021-11-23 VITALS
HEART RATE: 108 BPM | SYSTOLIC BLOOD PRESSURE: 152 MMHG | RESPIRATION RATE: 18 BRPM | DIASTOLIC BLOOD PRESSURE: 74 MMHG | OXYGEN SATURATION: 95 % | TEMPERATURE: 101 F

## 2021-11-23 VITALS — HEIGHT: 69 IN | WEIGHT: 179.9 LBS

## 2021-11-23 DIAGNOSIS — R05.9 COUGH, UNSPECIFIED: ICD-10-CM

## 2021-11-23 DIAGNOSIS — R51.9 HEADACHE, UNSPECIFIED: ICD-10-CM

## 2021-11-23 DIAGNOSIS — M79.10 MYALGIA, UNSPECIFIED SITE: ICD-10-CM

## 2021-11-23 DIAGNOSIS — Z98.1 ARTHRODESIS STATUS: Chronic | ICD-10-CM

## 2021-11-23 DIAGNOSIS — R07.9 CHEST PAIN, UNSPECIFIED: ICD-10-CM

## 2021-11-23 DIAGNOSIS — R10.9 UNSPECIFIED ABDOMINAL PAIN: ICD-10-CM

## 2021-11-23 LAB
-  COAGULASE NEGATIVE STAPHYLOCOCCUS, METHICILLIN RESISTANT: SIGNIFICANT CHANGE UP
GRAM STN FLD: SIGNIFICANT CHANGE UP
METHOD TYPE: SIGNIFICANT CHANGE UP
SPECIMEN SOURCE: SIGNIFICANT CHANGE UP

## 2021-11-23 PROCEDURE — 99283 EMERGENCY DEPT VISIT LOW MDM: CPT

## 2021-11-23 PROCEDURE — 99284 EMERGENCY DEPT VISIT MOD MDM: CPT | Mod: 25

## 2021-11-23 PROCEDURE — 93010 ELECTROCARDIOGRAM REPORT: CPT

## 2021-11-23 RX ORDER — SODIUM CHLORIDE 9 MG/ML
1000 INJECTION INTRAMUSCULAR; INTRAVENOUS; SUBCUTANEOUS ONCE
Refills: 0 | Status: DISCONTINUED | OUTPATIENT
Start: 2021-11-23 | End: 2021-11-23

## 2021-11-23 NOTE — ED POST DISCHARGE NOTE - RESULT SUMMARY
Positive blood cultures ?contaminate.  I spoke with his emergency contact and she states he has no phone and she will try to reach him and have him come back to hospital.  Gonzalo MAURO

## 2021-11-23 NOTE — ED POST DISCHARGE NOTE - DETAILS
and referral to [facility] was given. form filled out to best of ability and family told of need for patient to call ED. ID to continue to follow. Roger Michel M.D., Attending Physician 11/24/21 - approached by infectious disease to fill out HIV+ form for patient for state reporting. Most information unable to complete given I did not see the patient and info not present on chart review. I tried calling patient and number not valid. I called family and told them pt needs to call the ED however they state pt doesn't have a phone and they don't live with him. On chart review pt was told of + HIV results at time of visit , ID was made aware at that time (see next line)

## 2021-11-23 NOTE — ED ADULT TRIAGE NOTE - CHIEF COMPLAINT QUOTE
Pt arrives to ED complaining of weakness, sent in by his MD for abnormal labs. Pt does not know which labs were abdnormal. Hx Dm, HTN.

## 2021-11-23 NOTE — ED STATDOCS - ENMT, MLM
Nasal mucosa clear.  Mouth with normal mucosa  Throat has no vesicles, no oropharyngeal exudates and uvula is midline. Air patent. Face mask under nose.

## 2021-11-23 NOTE — ED STATDOCS - PROGRESS NOTE DETAILS
Daniel VELASQUEZ for Dr. Wallace   Pt seen by attending only. Pt eloped before ACP could see him.  I Tia Heath attest that this documentation has been prepared under the direction and in the presence of Dr. Wallace
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
## 2021-11-23 NOTE — ED STATDOCS - CLINICAL SUMMARY MEDICAL DECISION MAKING FREE TEXT BOX
Pt presents with 5 day hx of body aches, abd pain, cough. Pt had labs yesterday showing signs of infection and told to come to ED. Called home and left message for family to return call. Plan: labs, XR, CT. Pt presents with 5 day hx of body aches, abd pain, cough. Pt had labs yesterday showing signs of infection and told to come to ED. Called home and left message for family to return call. Plan: labs, XR, CT.    ex-wife return call and pt return to ED because of call for + blood culture.   + blood culture likely contaminate. Pt presents with 5 day hx of body aches, abd pain, cough. Pt had labs yesterday showing signs of infection and told to come to ED. Called home and left message for family to return call. Plan: labs, XR, CT.    ex-wife return call and pt return to ED because of call for + blood culture.
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
## 2021-11-23 NOTE — ED STATDOCS - ATTENDING CONTRIBUTION TO CARE
I, Esteban Wallace MD,  performed the initial face to face bedside interview with this patient regarding history of present illness, review of symptoms and relevant past medical, social and family history.  I completed an independent physical examination.  I was the initial provider who evaluated this patient. I have signed out the follow up of any pending tests (i.e. labs, radiological studies) to the ACP.  I have communicated the patient’s plan of care and disposition with the ACP.  The history, relevant review of systems, past medical and surgical history, medical decision making, and physical examination was documented by the scribe in my presence and I attest to the accuracy of the documentation.

## 2021-11-23 NOTE — ED STATDOCS - CONSTITUTIONAL, MLM
normal... well appearing and in no apparent distress. well appearing and in no apparent distress.  poor historian

## 2021-11-23 NOTE — ED STATDOCS - GASTROINTESTINAL, MLM
abdomen soft, non-tender, and non-distended. Bowel sounds present. abdomen soft, and non-distended. Bowel sounds present. TTP LLQ

## 2021-11-23 NOTE — ED ADULT NURSE NOTE - NSICDXFAMILYHX_GEN_ALL_CORE_FT
FAMILY HISTORY:  Mother  Still living? Unknown  Family history of coronary artery disease in mother, Age at diagnosis: Age Unknown

## 2021-11-24 LAB
CULTURE RESULTS: SIGNIFICANT CHANGE UP
ORGANISM # SPEC MICROSCOPIC CNT: SIGNIFICANT CHANGE UP
ORGANISM # SPEC MICROSCOPIC CNT: SIGNIFICANT CHANGE UP
SPECIMEN SOURCE: SIGNIFICANT CHANGE UP

## 2021-11-25 LAB
CULTURE RESULTS: SIGNIFICANT CHANGE UP
SPECIMEN SOURCE: SIGNIFICANT CHANGE UP

## 2021-11-26 ENCOUNTER — INPATIENT (INPATIENT)
Facility: HOSPITAL | Age: 61
LOS: 4 days | Discharge: HOME | DRG: 892 | End: 2021-12-01
Attending: INTERNAL MEDICINE | Admitting: FAMILY MEDICINE
Payer: MEDICAID

## 2021-11-26 VITALS — HEIGHT: 69 IN

## 2021-11-26 DIAGNOSIS — M51.37 OTHER INTERVERTEBRAL DISC DEGENERATION, LUMBOSACRAL REGION: ICD-10-CM

## 2021-11-26 DIAGNOSIS — N39.0 URINARY TRACT INFECTION, SITE NOT SPECIFIED: ICD-10-CM

## 2021-11-26 DIAGNOSIS — R78.81 BACTEREMIA: ICD-10-CM

## 2021-11-26 DIAGNOSIS — F41.9 ANXIETY DISORDER, UNSPECIFIED: ICD-10-CM

## 2021-11-26 DIAGNOSIS — Z56.0 UNEMPLOYMENT, UNSPECIFIED: ICD-10-CM

## 2021-11-26 DIAGNOSIS — Z79.4 LONG TERM (CURRENT) USE OF INSULIN: ICD-10-CM

## 2021-11-26 DIAGNOSIS — J15.6 PNEUMONIA DUE TO OTHER GRAM-NEGATIVE BACTERIA: ICD-10-CM

## 2021-11-26 DIAGNOSIS — F10.10 ALCOHOL ABUSE, UNCOMPLICATED: ICD-10-CM

## 2021-11-26 DIAGNOSIS — Z98.1 ARTHRODESIS STATUS: ICD-10-CM

## 2021-11-26 DIAGNOSIS — E11.65 TYPE 2 DIABETES MELLITUS WITH HYPERGLYCEMIA: ICD-10-CM

## 2021-11-26 DIAGNOSIS — B20 HUMAN IMMUNODEFICIENCY VIRUS [HIV] DISEASE: ICD-10-CM

## 2021-11-26 DIAGNOSIS — Z91.14 PATIENT'S OTHER NONCOMPLIANCE WITH MEDICATION REGIMEN: ICD-10-CM

## 2021-11-26 DIAGNOSIS — G47.00 INSOMNIA, UNSPECIFIED: ICD-10-CM

## 2021-11-26 DIAGNOSIS — Z98.1 ARTHRODESIS STATUS: Chronic | ICD-10-CM

## 2021-11-26 DIAGNOSIS — I10 ESSENTIAL (PRIMARY) HYPERTENSION: ICD-10-CM

## 2021-11-26 DIAGNOSIS — G89.29 OTHER CHRONIC PAIN: ICD-10-CM

## 2021-11-26 DIAGNOSIS — A48.1 LEGIONNAIRES' DISEASE: ICD-10-CM

## 2021-11-26 DIAGNOSIS — E43 UNSPECIFIED SEVERE PROTEIN-CALORIE MALNUTRITION: ICD-10-CM

## 2021-11-26 DIAGNOSIS — F17.210 NICOTINE DEPENDENCE, CIGARETTES, UNCOMPLICATED: ICD-10-CM

## 2021-11-26 DIAGNOSIS — E11.40 TYPE 2 DIABETES MELLITUS WITH DIABETIC NEUROPATHY, UNSPECIFIED: ICD-10-CM

## 2021-11-26 LAB
ALBUMIN SERPL ELPH-MCNC: 2 G/DL — LOW (ref 3.3–5)
ALP SERPL-CCNC: 80 U/L — SIGNIFICANT CHANGE UP (ref 40–120)
ALT FLD-CCNC: 49 U/L — SIGNIFICANT CHANGE UP (ref 12–78)
ANION GAP SERPL CALC-SCNC: 6 MMOL/L — SIGNIFICANT CHANGE UP (ref 5–17)
APPEARANCE UR: CLEAR — SIGNIFICANT CHANGE UP
APTT BLD: 30 SEC — SIGNIFICANT CHANGE UP (ref 27.5–35.5)
AST SERPL-CCNC: 40 U/L — HIGH (ref 15–37)
BASOPHILS # BLD AUTO: 0.03 K/UL — SIGNIFICANT CHANGE UP (ref 0–0.2)
BASOPHILS NFR BLD AUTO: 0.5 % — SIGNIFICANT CHANGE UP (ref 0–2)
BILIRUB SERPL-MCNC: 0.3 MG/DL — SIGNIFICANT CHANGE UP (ref 0.2–1.2)
BILIRUB UR-MCNC: NEGATIVE — SIGNIFICANT CHANGE UP
BUN SERPL-MCNC: 17 MG/DL — SIGNIFICANT CHANGE UP (ref 7–23)
CALCIUM SERPL-MCNC: 8.2 MG/DL — LOW (ref 8.5–10.1)
CHLORIDE SERPL-SCNC: 96 MMOL/L — SIGNIFICANT CHANGE UP (ref 96–108)
CO2 SERPL-SCNC: 29 MMOL/L — SIGNIFICANT CHANGE UP (ref 22–31)
COLOR SPEC: YELLOW — SIGNIFICANT CHANGE UP
CREAT SERPL-MCNC: 1.17 MG/DL — SIGNIFICANT CHANGE UP (ref 0.5–1.3)
DIFF PNL FLD: ABNORMAL
EOSINOPHIL # BLD AUTO: 0.13 K/UL — SIGNIFICANT CHANGE UP (ref 0–0.5)
EOSINOPHIL NFR BLD AUTO: 2.3 % — SIGNIFICANT CHANGE UP (ref 0–6)
GLUCOSE SERPL-MCNC: 422 MG/DL — HIGH (ref 70–99)
GLUCOSE UR QL: 1000 MG/DL
HCT VFR BLD CALC: 34.3 % — LOW (ref 39–50)
HGB BLD-MCNC: 11.8 G/DL — LOW (ref 13–17)
IMM GRANULOCYTES NFR BLD AUTO: 1.4 % — SIGNIFICANT CHANGE UP (ref 0–1.5)
INR BLD: 1 RATIO — SIGNIFICANT CHANGE UP (ref 0.88–1.16)
KETONES UR-MCNC: ABNORMAL
LACTATE SERPL-SCNC: 1.8 MMOL/L — SIGNIFICANT CHANGE UP (ref 0.7–2)
LEUKOCYTE ESTERASE UR-ACNC: NEGATIVE — SIGNIFICANT CHANGE UP
LYMPHOCYTES # BLD AUTO: 1.35 K/UL — SIGNIFICANT CHANGE UP (ref 1–3.3)
LYMPHOCYTES # BLD AUTO: 23.5 % — SIGNIFICANT CHANGE UP (ref 13–44)
MCHC RBC-ENTMCNC: 29.1 PG — SIGNIFICANT CHANGE UP (ref 27–34)
MCHC RBC-ENTMCNC: 34.4 GM/DL — SIGNIFICANT CHANGE UP (ref 32–36)
MCV RBC AUTO: 84.5 FL — SIGNIFICANT CHANGE UP (ref 80–100)
MONOCYTES # BLD AUTO: 0.79 K/UL — SIGNIFICANT CHANGE UP (ref 0–0.9)
MONOCYTES NFR BLD AUTO: 13.8 % — SIGNIFICANT CHANGE UP (ref 2–14)
NEUTROPHILS # BLD AUTO: 3.36 K/UL — SIGNIFICANT CHANGE UP (ref 1.8–7.4)
NEUTROPHILS NFR BLD AUTO: 58.5 % — SIGNIFICANT CHANGE UP (ref 43–77)
NITRITE UR-MCNC: NEGATIVE — SIGNIFICANT CHANGE UP
PH UR: 6 — SIGNIFICANT CHANGE UP (ref 5–8)
PLATELET # BLD AUTO: 263 K/UL — SIGNIFICANT CHANGE UP (ref 150–400)
POTASSIUM SERPL-MCNC: 3.5 MMOL/L — SIGNIFICANT CHANGE UP (ref 3.5–5.3)
POTASSIUM SERPL-SCNC: 3.5 MMOL/L — SIGNIFICANT CHANGE UP (ref 3.5–5.3)
PROT SERPL-MCNC: 6.6 GM/DL — SIGNIFICANT CHANGE UP (ref 6–8.3)
PROT UR-MCNC: 100 MG/DL
PROTHROM AB SERPL-ACNC: 11.7 SEC — SIGNIFICANT CHANGE UP (ref 10.6–13.6)
RBC # BLD: 4.06 M/UL — LOW (ref 4.2–5.8)
RBC # FLD: 13.1 % — SIGNIFICANT CHANGE UP (ref 10.3–14.5)
SODIUM SERPL-SCNC: 131 MMOL/L — LOW (ref 135–145)
SP GR SPEC: 1.01 — SIGNIFICANT CHANGE UP (ref 1.01–1.02)
UROBILINOGEN FLD QL: 4 MG/DL
WBC # BLD: 5.74 K/UL — SIGNIFICANT CHANGE UP (ref 3.8–10.5)
WBC # FLD AUTO: 5.74 K/UL — SIGNIFICANT CHANGE UP (ref 3.8–10.5)

## 2021-11-26 PROCEDURE — 93010 ELECTROCARDIOGRAM REPORT: CPT

## 2021-11-26 PROCEDURE — 86780 TREPONEMA PALLIDUM: CPT

## 2021-11-26 PROCEDURE — 84100 ASSAY OF PHOSPHORUS: CPT

## 2021-11-26 PROCEDURE — 93005 ELECTROCARDIOGRAM TRACING: CPT

## 2021-11-26 PROCEDURE — 87591 N.GONORRHOEAE DNA AMP PROB: CPT

## 2021-11-26 PROCEDURE — 83036 HEMOGLOBIN GLYCOSYLATED A1C: CPT

## 2021-11-26 PROCEDURE — 71045 X-RAY EXAM CHEST 1 VIEW: CPT | Mod: 26

## 2021-11-26 PROCEDURE — 82803 BLOOD GASES ANY COMBINATION: CPT

## 2021-11-26 PROCEDURE — 86359 T CELLS TOTAL COUNT: CPT

## 2021-11-26 PROCEDURE — 82009 KETONE BODYS QUAL: CPT

## 2021-11-26 PROCEDURE — 80048 BASIC METABOLIC PNL TOTAL CA: CPT

## 2021-11-26 PROCEDURE — 80076 HEPATIC FUNCTION PANEL: CPT

## 2021-11-26 PROCEDURE — 87086 URINE CULTURE/COLONY COUNT: CPT

## 2021-11-26 PROCEDURE — 36415 COLL VENOUS BLD VENIPUNCTURE: CPT

## 2021-11-26 PROCEDURE — 83735 ASSAY OF MAGNESIUM: CPT

## 2021-11-26 PROCEDURE — 86769 SARS-COV-2 COVID-19 ANTIBODY: CPT

## 2021-11-26 PROCEDURE — 87536 HIV-1 QUANT&REVRSE TRNSCRPJ: CPT

## 2021-11-26 PROCEDURE — 84145 PROCALCITONIN (PCT): CPT

## 2021-11-26 PROCEDURE — 86704 HEP B CORE ANTIBODY TOTAL: CPT

## 2021-11-26 PROCEDURE — 99285 EMERGENCY DEPT VISIT HI MDM: CPT

## 2021-11-26 PROCEDURE — 87186 SC STD MICRODIL/AGAR DIL: CPT

## 2021-11-26 PROCEDURE — 82962 GLUCOSE BLOOD TEST: CPT

## 2021-11-26 PROCEDURE — 84443 ASSAY THYROID STIM HORMONE: CPT

## 2021-11-26 PROCEDURE — 85027 COMPLETE CBC AUTOMATED: CPT

## 2021-11-26 PROCEDURE — 81001 URINALYSIS AUTO W/SCOPE: CPT

## 2021-11-26 PROCEDURE — 71250 CT THORAX DX C-: CPT

## 2021-11-26 PROCEDURE — 82010 KETONE BODYS QUAN: CPT

## 2021-11-26 PROCEDURE — 83605 ASSAY OF LACTIC ACID: CPT

## 2021-11-26 PROCEDURE — 86360 T CELL ABSOLUTE COUNT/RATIO: CPT

## 2021-11-26 PROCEDURE — 87491 CHLMYD TRACH DNA AMP PROBE: CPT

## 2021-11-26 PROCEDURE — 87340 HEPATITIS B SURFACE AG IA: CPT

## 2021-11-26 PROCEDURE — 87449 NOS EACH ORGANISM AG IA: CPT

## 2021-11-26 PROCEDURE — 86803 HEPATITIS C AB TEST: CPT

## 2021-11-26 PROCEDURE — 86706 HEP B SURFACE ANTIBODY: CPT

## 2021-11-26 PROCEDURE — 87899 AGENT NOS ASSAY W/OPTIC: CPT

## 2021-11-26 RX ORDER — ACETAMINOPHEN 500 MG
650 TABLET ORAL ONCE
Refills: 0 | Status: COMPLETED | OUTPATIENT
Start: 2021-11-26 | End: 2021-11-26

## 2021-11-26 RX ORDER — INSULIN HUMAN 100 [IU]/ML
5 INJECTION, SOLUTION SUBCUTANEOUS ONCE
Refills: 0 | Status: COMPLETED | OUTPATIENT
Start: 2021-11-26 | End: 2021-11-26

## 2021-11-26 RX ORDER — CEFTRIAXONE 500 MG/1
1000 INJECTION, POWDER, FOR SOLUTION INTRAMUSCULAR; INTRAVENOUS ONCE
Refills: 0 | Status: DISCONTINUED | OUTPATIENT
Start: 2021-11-26 | End: 2021-11-26

## 2021-11-26 RX ORDER — CEFTRIAXONE 500 MG/1
1000 INJECTION, POWDER, FOR SOLUTION INTRAMUSCULAR; INTRAVENOUS ONCE
Refills: 0 | Status: COMPLETED | OUTPATIENT
Start: 2021-11-26 | End: 2021-11-26

## 2021-11-26 RX ORDER — DIPHENHYDRAMINE HCL 50 MG
50 CAPSULE ORAL ONCE
Refills: 0 | Status: COMPLETED | OUTPATIENT
Start: 2021-11-26 | End: 2021-11-26

## 2021-11-26 RX ADMIN — Medication 650 MILLIGRAM(S): at 22:51

## 2021-11-26 RX ADMIN — INSULIN HUMAN 5 UNIT(S): 100 INJECTION, SOLUTION SUBCUTANEOUS at 23:36

## 2021-11-26 RX ADMIN — CEFTRIAXONE 1000 MILLIGRAM(S): 500 INJECTION, POWDER, FOR SOLUTION INTRAMUSCULAR; INTRAVENOUS at 23:36

## 2021-11-26 RX ADMIN — Medication 50 MILLIGRAM(S): at 23:36

## 2021-11-26 SDOH — ECONOMIC STABILITY - INCOME SECURITY: UNEMPLOYMENT, UNSPECIFIED: Z56.0
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
## 2021-11-26 NOTE — ED PROVIDER NOTE - CHIEF COMPLAINT

## 2021-11-26 NOTE — ED PROVIDER NOTE - CARE PLAN
1 Principal Discharge DX:	Positive blood culture  Secondary Diagnosis:	Acute UTI  Secondary Diagnosis:	HIV infection

## 2021-11-26 NOTE — ED PROVIDER NOTE - CLINICAL SUMMARY MEDICAL DECISION MAKING FREE TEXT BOX
62 y/o male with newly dx HIV+ presents to the ED for +blood cultures. Recently seen in hospital found to have UTI. Has not been taking abx. Will do septic workup, abx and admit.

## 2021-11-26 NOTE — ED PROVIDER NOTE - NS_ ATTENDINGSCRIBEDETAILS _ED_A_ED_FT
I, Roger Michel MD,  performed the initial face to face bedside interview with this patient regarding history of present illness, review of symptoms and relevant past medical, social and family history.  I completed an independent physical examination.  I was the initial provider who evaluated this patient.  The history, relevant review of systems, past medical and surgical history, medical decision making, and physical examination was documented by the scribe in my presence and I attest to the accuracy of the documentation.

## 2021-11-26 NOTE — ED ADULT TRIAGE NOTE - CHIEF COMPLAINT QUOTE
called back to ED for positive blood culture, per visitor with patient, she was instructed by female in charge of emergency department to meet them on arrival. called back to ED for positive blood cultures.  **patient has multiple charts, instructed by MD to reference other chart via MRN to look up lab results.

## 2021-11-26 NOTE — ED ADULT NURSE NOTE - CHIEF COMPLAINT QUOTE
called back to ED for positive blood cultures.  **patient has multiple charts, instructed by MD to reference other chart via MRN to look up lab results.

## 2021-11-26 NOTE — ED ADULT NURSE NOTE - NSIMPLEMENTINTERV_GEN_ALL_ED
Implemented All Universal Safety Interventions:  [place] to call system. Call bell, personal items and telephone within reach. Instruct patient to call for assistance. Room bathroom lighting operational. Non-slip footwear when patient is off stretcher. Physically safe environment: no spills, clutter or unnecessary equipment. Stretcher in lowest position, wheels locked, appropriate side rails in place.

## 2021-11-26 NOTE — ED PROVIDER NOTE - PHYSICAL EXAMINATION
Constitutional: NAD AAOx3  Eyes: PERRLA EOMI  Head: Normocephalic atraumatic  Mouth: MMM  Cardiac: regular rate   Resp: Lungs CTAB  GI: Abd s/nt/nd  Neuro: CN2-12 intact  Skin: No rashes Constitutional: NAD AAOx3  Eyes: PERRLA EOMI  Head: Normocephalic atraumatic  Mouth: MMM  Cardiac: regular rate   Resp: Lungs CTAB  GI: Abd s/nd. +suprapubic ttp  Neuro: CN2-12 intact  Skin: No rashes Constitutional: NAD AAOx3  Eyes: PERRLA EOMI  Head: Normocephalic atraumatic  Mouth: MMM  Cardiac: regular rate   Resp: Lungs CTAB  GI: Abd s/nd. +suprapubic ttp no cvat  Neuro: CN2-12 intact  Skin: No rashes

## 2021-11-26 NOTE — ED ADULT NURSE NOTE - OBJECTIVE STATEMENT
Pt. is a 61YOM called back to ED for positive blood cultures. Pt. endorses body aches. Denies fevers. pt. HIV+

## 2021-11-26 NOTE — ED PROVIDER NOTE - NS ED ROS FT
Constitutional: No chills, +fever.  Eyes: No visual changes  HEENT: No throat pain  CV: No chest pain  Resp: No SOB no cough  GI: No abd pain, nausea or vomiting  : No dysuria  MSK: +body aches  Skin: No rash  Neuro: No headache Constitutional: No chills, +fever.  Eyes: No visual changes  HEENT: No throat pain  CV: No chest pain  Resp: No SOB no cough  GI: No abd pain, nausea or vomiting  : No dysuria + urinary frequency  MSK: +body aches  Skin: No rash  Neuro: No headache

## 2021-11-26 NOTE — ED PROVIDER NOTE - PROGRESS NOTE DETAILS
pt endorsed to Dr. Sage for + blood cultures/UTI in newly dx HIV pt. Roger Michel M.D., Attending Physician

## 2021-11-27 LAB
A1C WITH ESTIMATED AVERAGE GLUCOSE RESULT: 11 % — HIGH (ref 4–5.6)
ACETONE SERPL-MCNC: NEGATIVE — SIGNIFICANT CHANGE UP
ALBUMIN SERPL ELPH-MCNC: 1.7 G/DL — LOW (ref 3.3–5)
ALP SERPL-CCNC: 58 U/L — SIGNIFICANT CHANGE UP (ref 40–120)
ALT FLD-CCNC: 42 U/L — SIGNIFICANT CHANGE UP (ref 12–78)
ANION GAP SERPL CALC-SCNC: 4 MMOL/L — LOW (ref 5–17)
AST SERPL-CCNC: 27 U/L — SIGNIFICANT CHANGE UP (ref 15–37)
B-OH-BUTYR SERPL-SCNC: 0.1 MMOL/L — SIGNIFICANT CHANGE UP
BASE EXCESS BLDV CALC-SCNC: 4.4 MMOL/L — SIGNIFICANT CHANGE UP
BILIRUB DIRECT SERPL-MCNC: <0.1 MG/DL — SIGNIFICANT CHANGE UP (ref 0–0.3)
BILIRUB INDIRECT FLD-MCNC: >0.1 MG/DL — LOW (ref 0.2–1)
BILIRUB SERPL-MCNC: 0.2 MG/DL — SIGNIFICANT CHANGE UP (ref 0.2–1.2)
BUN SERPL-MCNC: 10 MG/DL — SIGNIFICANT CHANGE UP (ref 7–23)
CALCIUM SERPL-MCNC: 8 MG/DL — LOW (ref 8.5–10.1)
CHLORIDE SERPL-SCNC: 106 MMOL/L — SIGNIFICANT CHANGE UP (ref 96–108)
CO2 BLDV-SCNC: 32 MMOL/L — HIGH (ref 22–26)
CO2 SERPL-SCNC: 30 MMOL/L — SIGNIFICANT CHANGE UP (ref 22–31)
COVID-19 NUCLEOCAPSID GAM AB INTERP: NEGATIVE — SIGNIFICANT CHANGE UP
COVID-19 NUCLEOCAPSID TOTAL GAM ANTIBODY RESULT: 0.74 INDEX — SIGNIFICANT CHANGE UP
COVID-19 SPIKE DOMAIN AB INTERP: POSITIVE
COVID-19 SPIKE DOMAIN ANTIBODY RESULT: >250 U/ML — HIGH
CREAT SERPL-MCNC: 0.82 MG/DL — SIGNIFICANT CHANGE UP (ref 0.5–1.3)
CULTURE RESULTS: SIGNIFICANT CHANGE UP
ESTIMATED AVERAGE GLUCOSE: 269 MG/DL — HIGH (ref 68–114)
GLUCOSE SERPL-MCNC: 194 MG/DL — HIGH (ref 70–99)
HBV CORE AB SER-ACNC: SIGNIFICANT CHANGE UP
HBV SURFACE AB SER-ACNC: REACTIVE
HBV SURFACE AG SER-ACNC: SIGNIFICANT CHANGE UP
HCO3 BLDV-SCNC: 31 MMOL/L — HIGH (ref 22–29)
HCT VFR BLD CALC: 31.5 % — LOW (ref 39–50)
HCV AB S/CO SERPL IA: 0.13 S/CO — SIGNIFICANT CHANGE UP (ref 0–0.99)
HCV AB SERPL-IMP: SIGNIFICANT CHANGE UP
HGB BLD-MCNC: 10.7 G/DL — LOW (ref 13–17)
LACTATE SERPL-SCNC: 1.4 MMOL/L — SIGNIFICANT CHANGE UP (ref 0.7–2)
MCHC RBC-ENTMCNC: 29.2 PG — SIGNIFICANT CHANGE UP (ref 27–34)
MCHC RBC-ENTMCNC: 34 GM/DL — SIGNIFICANT CHANGE UP (ref 32–36)
MCV RBC AUTO: 86.1 FL — SIGNIFICANT CHANGE UP (ref 80–100)
PCO2 BLDV: 52 MMHG — SIGNIFICANT CHANGE UP (ref 42–55)
PH BLDV: 7.38 — SIGNIFICANT CHANGE UP (ref 7.32–7.43)
PLATELET # BLD AUTO: 265 K/UL — SIGNIFICANT CHANGE UP (ref 150–400)
PO2 BLDV: 47 MMHG — SIGNIFICANT CHANGE UP
POTASSIUM SERPL-MCNC: 3.9 MMOL/L — SIGNIFICANT CHANGE UP (ref 3.5–5.3)
POTASSIUM SERPL-SCNC: 3.9 MMOL/L — SIGNIFICANT CHANGE UP (ref 3.5–5.3)
PROCALCITONIN SERPL-MCNC: 0.29 NG/ML — HIGH (ref 0.02–0.1)
PROT SERPL-MCNC: 5.9 GM/DL — LOW (ref 6–8.3)
RBC # BLD: 3.66 M/UL — LOW (ref 4.2–5.8)
RBC # FLD: 13.3 % — SIGNIFICANT CHANGE UP (ref 10.3–14.5)
SAO2 % BLDV: 81.1 % — SIGNIFICANT CHANGE UP
SARS-COV-2 IGG+IGM SERPL QL IA: 0.74 INDEX — SIGNIFICANT CHANGE UP
SARS-COV-2 IGG+IGM SERPL QL IA: >250 U/ML — HIGH
SARS-COV-2 IGG+IGM SERPL QL IA: NEGATIVE — SIGNIFICANT CHANGE UP
SARS-COV-2 IGG+IGM SERPL QL IA: POSITIVE
SARS-COV-2 RNA SPEC QL NAA+PROBE: SIGNIFICANT CHANGE UP
SODIUM SERPL-SCNC: 140 MMOL/L — SIGNIFICANT CHANGE UP (ref 135–145)
SPECIMEN SOURCE: SIGNIFICANT CHANGE UP
T PALLIDUM AB TITR SER: NEGATIVE — SIGNIFICANT CHANGE UP
TSH SERPL-MCNC: 0.98 UU/ML — SIGNIFICANT CHANGE UP (ref 0.34–4.82)
WBC # BLD: 4.84 K/UL — SIGNIFICANT CHANGE UP (ref 3.8–10.5)
WBC # FLD AUTO: 4.84 K/UL — SIGNIFICANT CHANGE UP (ref 3.8–10.5)

## 2021-11-27 PROCEDURE — 93010 ELECTROCARDIOGRAM REPORT: CPT

## 2021-11-27 PROCEDURE — 99223 1ST HOSP IP/OBS HIGH 75: CPT

## 2021-11-27 RX ORDER — GLUCAGON INJECTION, SOLUTION 0.5 MG/.1ML
1 INJECTION, SOLUTION SUBCUTANEOUS ONCE
Refills: 0 | Status: DISCONTINUED | OUTPATIENT
Start: 2021-11-27 | End: 2021-11-27

## 2021-11-27 RX ORDER — DEXTROSE 50 % IN WATER 50 %
15 SYRINGE (ML) INTRAVENOUS ONCE
Refills: 0 | Status: DISCONTINUED | OUTPATIENT
Start: 2021-11-27 | End: 2021-11-28

## 2021-11-27 RX ORDER — INSULIN LISPRO 100/ML
VIAL (ML) SUBCUTANEOUS EVERY 6 HOURS
Refills: 0 | Status: DISCONTINUED | OUTPATIENT
Start: 2021-11-27 | End: 2021-11-27

## 2021-11-27 RX ORDER — LISINOPRIL 2.5 MG/1
5 TABLET ORAL DAILY
Refills: 0 | Status: DISCONTINUED | OUTPATIENT
Start: 2021-11-27 | End: 2021-12-01

## 2021-11-27 RX ORDER — INSULIN LISPRO 100/ML
8 VIAL (ML) SUBCUTANEOUS ONCE
Refills: 0 | Status: COMPLETED | OUTPATIENT
Start: 2021-11-27 | End: 2021-11-27

## 2021-11-27 RX ORDER — DEXTROSE 50 % IN WATER 50 %
15 SYRINGE (ML) INTRAVENOUS ONCE
Refills: 0 | Status: DISCONTINUED | OUTPATIENT
Start: 2021-11-27 | End: 2021-11-27

## 2021-11-27 RX ORDER — NICOTINE POLACRILEX 2 MG
1 GUM BUCCAL DAILY
Refills: 0 | Status: DISCONTINUED | OUTPATIENT
Start: 2021-11-27 | End: 2021-12-01

## 2021-11-27 RX ORDER — GLUCAGON INJECTION, SOLUTION 0.5 MG/.1ML
1 INJECTION, SOLUTION SUBCUTANEOUS ONCE
Refills: 0 | Status: DISCONTINUED | OUTPATIENT
Start: 2021-11-27 | End: 2021-11-28

## 2021-11-27 RX ORDER — OXYCODONE HYDROCHLORIDE 5 MG/1
5 TABLET ORAL EVERY 4 HOURS
Refills: 0 | Status: DISCONTINUED | OUTPATIENT
Start: 2021-11-27 | End: 2021-12-01

## 2021-11-27 RX ORDER — SODIUM CHLORIDE 9 MG/ML
1500 INJECTION INTRAMUSCULAR; INTRAVENOUS; SUBCUTANEOUS ONCE
Refills: 0 | Status: COMPLETED | OUTPATIENT
Start: 2021-11-27 | End: 2021-11-27

## 2021-11-27 RX ORDER — SODIUM CHLORIDE 9 MG/ML
1000 INJECTION, SOLUTION INTRAVENOUS
Refills: 0 | Status: DISCONTINUED | OUTPATIENT
Start: 2021-11-27 | End: 2021-11-27

## 2021-11-27 RX ORDER — INSULIN LISPRO 100/ML
VIAL (ML) SUBCUTANEOUS AT BEDTIME
Refills: 0 | Status: DISCONTINUED | OUTPATIENT
Start: 2021-11-27 | End: 2021-11-28

## 2021-11-27 RX ORDER — DEXTROSE 50 % IN WATER 50 %
25 SYRINGE (ML) INTRAVENOUS ONCE
Refills: 0 | Status: DISCONTINUED | OUTPATIENT
Start: 2021-11-27 | End: 2021-11-28

## 2021-11-27 RX ORDER — DEXTROSE 50 % IN WATER 50 %
12.5 SYRINGE (ML) INTRAVENOUS ONCE
Refills: 0 | Status: DISCONTINUED | OUTPATIENT
Start: 2021-11-27 | End: 2021-11-28

## 2021-11-27 RX ORDER — DEXTROSE 50 % IN WATER 50 %
25 SYRINGE (ML) INTRAVENOUS ONCE
Refills: 0 | Status: DISCONTINUED | OUTPATIENT
Start: 2021-11-27 | End: 2021-11-27

## 2021-11-27 RX ORDER — SODIUM CHLORIDE 9 MG/ML
1000 INJECTION, SOLUTION INTRAVENOUS
Refills: 0 | Status: DISCONTINUED | OUTPATIENT
Start: 2021-11-27 | End: 2021-11-28

## 2021-11-27 RX ORDER — ENOXAPARIN SODIUM 100 MG/ML
40 INJECTION SUBCUTANEOUS DAILY
Refills: 0 | Status: DISCONTINUED | OUTPATIENT
Start: 2021-11-27 | End: 2021-12-01

## 2021-11-27 RX ORDER — POTASSIUM PHOSPHATE, MONOBASIC POTASSIUM PHOSPHATE, DIBASIC 236; 224 MG/ML; MG/ML
15 INJECTION, SOLUTION INTRAVENOUS ONCE
Refills: 0 | Status: COMPLETED | OUTPATIENT
Start: 2021-11-27 | End: 2021-11-27

## 2021-11-27 RX ORDER — INSULIN LISPRO 100/ML
VIAL (ML) SUBCUTANEOUS
Refills: 0 | Status: DISCONTINUED | OUTPATIENT
Start: 2021-11-27 | End: 2021-11-28

## 2021-11-27 RX ORDER — LANOLIN ALCOHOL/MO/W.PET/CERES
5 CREAM (GRAM) TOPICAL AT BEDTIME
Refills: 0 | Status: DISCONTINUED | OUTPATIENT
Start: 2021-11-27 | End: 2021-12-01

## 2021-11-27 RX ORDER — INSULIN GLARGINE 100 [IU]/ML
10 INJECTION, SOLUTION SUBCUTANEOUS ONCE
Refills: 0 | Status: COMPLETED | OUTPATIENT
Start: 2021-11-27 | End: 2021-11-27

## 2021-11-27 RX ORDER — DEXTROSE 50 % IN WATER 50 %
12.5 SYRINGE (ML) INTRAVENOUS ONCE
Refills: 0 | Status: DISCONTINUED | OUTPATIENT
Start: 2021-11-27 | End: 2021-11-27

## 2021-11-27 RX ORDER — INSULIN GLARGINE 100 [IU]/ML
10 INJECTION, SOLUTION SUBCUTANEOUS AT BEDTIME
Refills: 0 | Status: DISCONTINUED | OUTPATIENT
Start: 2021-11-28 | End: 2021-11-28

## 2021-11-27 RX ORDER — ACETAMINOPHEN 500 MG
650 TABLET ORAL EVERY 6 HOURS
Refills: 0 | Status: DISCONTINUED | OUTPATIENT
Start: 2021-11-27 | End: 2021-12-01

## 2021-11-27 RX ORDER — INSULIN GLARGINE 100 [IU]/ML
10 INJECTION, SOLUTION SUBCUTANEOUS AT BEDTIME
Refills: 0 | Status: DISCONTINUED | OUTPATIENT
Start: 2021-11-27 | End: 2021-11-27

## 2021-11-27 RX ADMIN — Medication 1 PATCH: at 19:00

## 2021-11-27 RX ADMIN — Medication 5 MILLIGRAM(S): at 21:21

## 2021-11-27 RX ADMIN — POTASSIUM PHOSPHATE, MONOBASIC POTASSIUM PHOSPHATE, DIBASIC 62.5 MILLIMOLE(S): 236; 224 INJECTION, SOLUTION INTRAVENOUS at 16:17

## 2021-11-27 RX ADMIN — OXYCODONE HYDROCHLORIDE 5 MILLIGRAM(S): 5 TABLET ORAL at 21:21

## 2021-11-27 RX ADMIN — Medication 2: at 16:57

## 2021-11-27 RX ADMIN — OXYCODONE HYDROCHLORIDE 5 MILLIGRAM(S): 5 TABLET ORAL at 12:43

## 2021-11-27 RX ADMIN — Medication 8 UNIT(S): at 08:50

## 2021-11-27 RX ADMIN — INSULIN GLARGINE 10 UNIT(S): 100 INJECTION, SOLUTION SUBCUTANEOUS at 12:40

## 2021-11-27 RX ADMIN — SODIUM CHLORIDE 1500 MILLILITER(S): 9 INJECTION INTRAMUSCULAR; INTRAVENOUS; SUBCUTANEOUS at 08:18

## 2021-11-27 RX ADMIN — Medication 2: at 12:40

## 2021-11-27 RX ADMIN — ENOXAPARIN SODIUM 40 MILLIGRAM(S): 100 INJECTION SUBCUTANEOUS at 12:42

## 2021-11-27 RX ADMIN — LISINOPRIL 5 MILLIGRAM(S): 2.5 TABLET ORAL at 12:41

## 2021-11-27 RX ADMIN — OXYCODONE HYDROCHLORIDE 5 MILLIGRAM(S): 5 TABLET ORAL at 11:30

## 2021-11-27 RX ADMIN — OXYCODONE HYDROCHLORIDE 5 MILLIGRAM(S): 5 TABLET ORAL at 18:02

## 2021-11-27 RX ADMIN — Medication 1 PATCH: at 16:17
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
## 2021-11-27 NOTE — H&P ADULT - REASON FOR ADMISSION
Positive blood culture Uncontrolled DM, abnormal blood test Uncontrolled DM, + HIV test from 11/22 and + blood culture from 11/22 (coag negative Staph)

## 2021-11-27 NOTE — H&P ADULT - NSHPPHYSICALEXAM_GEN_ALL_CORE
Vital Signs Last 24 Hrs  T(F): 97 (27 Nov 2021 04:16), Max: 98.3 (27 Nov 2021 03:40)  HR: 73 (27 Nov 2021 04:16) (73 - 85)  BP: 156/88 (27 Nov 2021 04:16) (115/71 - 156/88)  RR: 18 (27 Nov 2021 04:16) (17 - 18)  SpO2: 98% (27 Nov 2021 04:16) (95% - 98%)    Gen:  HEENT:  Neck:  Chest:  CVS:  Abd:  Ext:  Skin:  Neuro:  Mood: Vital Signs Last 24 Hrs  T(F): 97 (27 Nov 2021 04:16), Max: 98.3 (27 Nov 2021 03:40)  HR: 73 (27 Nov 2021 04:16) (73 - 85)  BP: 156/88 (27 Nov 2021 04:16) (115/71 - 156/88)  RR: 18 (27 Nov 2021 04:16) (17 - 18)  SpO2: 98% (27 Nov 2021 04:16) (95% - 98%)    Gen: Comfortable appearing  HEENT: NCAT PERRL EOMI clear oropharynx  Neck: Supple, no JVD, no thyromegaly  Chest: CTA B/L, normal resp effort at rest  CVS: S1 S2 normal, RRR  Abd: +BS, soft ND NT  Ext: No edema or calf tenderness  Skin: Intact  Neuro: A+Ox3, CN intact, no focal deficits, no asterixis, no tremulousness, no clonus  Mood: Calm

## 2021-11-27 NOTE — PATIENT PROFILE ADULT - LAST TOBACCO USE (DD-MM-[id number])
IV  placed for lab draw access, aseptic  technique performed and maintained. Patient tolerated procedure well. Tubes drawn in rainbow order: red, green, purple.     IV was left in placed for use in infusion as patient has apt.    Karen Lui RN  [facility]        27-Nov-2021
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
## 2021-11-27 NOTE — H&P ADULT - NSHPSOCIALHISTORY_GEN_ALL_CORE
. Estranged from his wife. Lives alone in [place]. Unemployed, on SSI. Smokes tobacco, 1ppd. No drug use. Occasional alcohol use.

## 2021-11-27 NOTE — H&P ADULT - NSICDXPASTMEDICALHX_GEN_ALL_CORE_FT
PAST MEDICAL HISTORY:  Anxiety     Chronic back pain     Diabetes     Head trauma     Hypertension     Insomnia
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
## 2021-11-27 NOTE — H&P ADULT - NSHPLABSRESULTS_GEN_ALL_CORE
LABS:  ------    CBC 11/26                         11.8   5.74  )-----------( 263                    34.3     BMP 11/26    131  |  96  |  17  ---------------------< 422  3.5   |  29  |  1.17    Ca 8.2    LFTs 11/26    TPro  6.6  /  Alb 2.0  /  TBili  0.3  /  DBili  x   /  AST  40  /  ALT  49  /  AlkPhos  80    Coags 11/26    PT: 11.7 sec;   INR: 1.00 ratio;   PTT:30.0 sec    Lactate, Blood: 1.8 mmol/L (11/26)    Urinalysis (11/26)  Gluc: x / Ketone: Trace  / Bili: Negative / Urobili: 4 mg/dL   Blood: x / Protein: 100 mg/dL / Nitrite: Negative   Leuk Esterase: Negative / RBC: 3-5 /HPF / WBC 0-2   Sq Epi: x / Non Sq Epi: Few / Bacteria: Moderate      MICRO:  ---------  Blood culture 11/26: In process  Urine culture 11/26: In process  COVID19 PCR 11/26: Neg      IMAGING:  ------------  CXR obtained 11/26, report pending, per my read, well-exposed, slightly rotated, some lower lung field haziness and increased hilar markings, clear costophrenic angles, normal cardiac silhouette      CARDIAC TESTING:  ----------------------  EKG requested

## 2021-11-28 LAB
ANION GAP SERPL CALC-SCNC: 5 MMOL/L — SIGNIFICANT CHANGE UP (ref 5–17)
BUN SERPL-MCNC: 10 MG/DL — SIGNIFICANT CHANGE UP (ref 7–23)
CALCIUM SERPL-MCNC: 8.6 MG/DL — SIGNIFICANT CHANGE UP (ref 8.5–10.1)
CHLORIDE SERPL-SCNC: 102 MMOL/L — SIGNIFICANT CHANGE UP (ref 96–108)
CO2 SERPL-SCNC: 28 MMOL/L — SIGNIFICANT CHANGE UP (ref 22–31)
CREAT SERPL-MCNC: 0.79 MG/DL — SIGNIFICANT CHANGE UP (ref 0.5–1.3)
GLUCOSE SERPL-MCNC: 265 MG/DL — HIGH (ref 70–99)
MAGNESIUM SERPL-MCNC: 1.9 MG/DL — SIGNIFICANT CHANGE UP (ref 1.6–2.6)
PHOSPHATE SERPL-MCNC: 2.5 MG/DL — SIGNIFICANT CHANGE UP (ref 2.5–4.5)
POTASSIUM SERPL-MCNC: 4.1 MMOL/L — SIGNIFICANT CHANGE UP (ref 3.5–5.3)
POTASSIUM SERPL-SCNC: 4.1 MMOL/L — SIGNIFICANT CHANGE UP (ref 3.5–5.3)
SODIUM SERPL-SCNC: 135 MMOL/L — SIGNIFICANT CHANGE UP (ref 135–145)

## 2021-11-28 PROCEDURE — 99232 SBSQ HOSP IP/OBS MODERATE 35: CPT

## 2021-11-28 RX ORDER — DEXTROSE 50 % IN WATER 50 %
15 SYRINGE (ML) INTRAVENOUS ONCE
Refills: 0 | Status: DISCONTINUED | OUTPATIENT
Start: 2021-11-28 | End: 2021-12-01

## 2021-11-28 RX ORDER — INSULIN LISPRO 100/ML
VIAL (ML) SUBCUTANEOUS AT BEDTIME
Refills: 0 | Status: DISCONTINUED | OUTPATIENT
Start: 2021-11-28 | End: 2021-12-01

## 2021-11-28 RX ORDER — DEXTROSE 50 % IN WATER 50 %
25 SYRINGE (ML) INTRAVENOUS ONCE
Refills: 0 | Status: DISCONTINUED | OUTPATIENT
Start: 2021-11-28 | End: 2021-12-01

## 2021-11-28 RX ORDER — GLUCAGON INJECTION, SOLUTION 0.5 MG/.1ML
1 INJECTION, SOLUTION SUBCUTANEOUS ONCE
Refills: 0 | Status: DISCONTINUED | OUTPATIENT
Start: 2021-11-28 | End: 2021-12-01

## 2021-11-28 RX ORDER — AZITHROMYCIN 500 MG/1
500 TABLET, FILM COATED ORAL DAILY
Refills: 0 | Status: COMPLETED | OUTPATIENT
Start: 2021-11-28 | End: 2021-12-01

## 2021-11-28 RX ORDER — KETOROLAC TROMETHAMINE 30 MG/ML
15 SYRINGE (ML) INJECTION ONCE
Refills: 0 | Status: DISCONTINUED | OUTPATIENT
Start: 2021-11-28 | End: 2021-11-28

## 2021-11-28 RX ORDER — CEFTRIAXONE 500 MG/1
1000 INJECTION, POWDER, FOR SOLUTION INTRAMUSCULAR; INTRAVENOUS EVERY 24 HOURS
Refills: 0 | Status: DISCONTINUED | OUTPATIENT
Start: 2021-11-28 | End: 2021-11-28

## 2021-11-28 RX ORDER — CEFTRIAXONE 500 MG/1
1000 INJECTION, POWDER, FOR SOLUTION INTRAMUSCULAR; INTRAVENOUS EVERY 24 HOURS
Refills: 0 | Status: DISCONTINUED | OUTPATIENT
Start: 2021-11-28 | End: 2021-11-29

## 2021-11-28 RX ORDER — DEXTROSE 50 % IN WATER 50 %
12.5 SYRINGE (ML) INTRAVENOUS ONCE
Refills: 0 | Status: DISCONTINUED | OUTPATIENT
Start: 2021-11-28 | End: 2021-12-01

## 2021-11-28 RX ORDER — INSULIN LISPRO 100/ML
VIAL (ML) SUBCUTANEOUS
Refills: 0 | Status: DISCONTINUED | OUTPATIENT
Start: 2021-11-28 | End: 2021-12-01

## 2021-11-28 RX ORDER — SODIUM CHLORIDE 9 MG/ML
1000 INJECTION, SOLUTION INTRAVENOUS
Refills: 0 | Status: DISCONTINUED | OUTPATIENT
Start: 2021-11-28 | End: 2021-12-01

## 2021-11-28 RX ORDER — GABAPENTIN 400 MG/1
200 CAPSULE ORAL DAILY
Refills: 0 | Status: DISCONTINUED | OUTPATIENT
Start: 2021-11-28 | End: 2021-12-01

## 2021-11-28 RX ORDER — INSULIN GLARGINE 100 [IU]/ML
12 INJECTION, SOLUTION SUBCUTANEOUS AT BEDTIME
Refills: 0 | Status: DISCONTINUED | OUTPATIENT
Start: 2021-11-28 | End: 2021-11-29

## 2021-11-28 RX ORDER — INSULIN LISPRO 100/ML
3 VIAL (ML) SUBCUTANEOUS
Refills: 0 | Status: DISCONTINUED | OUTPATIENT
Start: 2021-11-28 | End: 2021-12-01

## 2021-11-28 RX ORDER — KETOROLAC TROMETHAMINE 30 MG/ML
30 SYRINGE (ML) INJECTION ONCE
Refills: 0 | Status: DISCONTINUED | OUTPATIENT
Start: 2021-11-28 | End: 2021-11-28

## 2021-11-28 RX ADMIN — Medication 4: at 12:55

## 2021-11-28 RX ADMIN — Medication 4: at 08:42

## 2021-11-28 RX ADMIN — Medication 5 MILLIGRAM(S): at 20:58

## 2021-11-28 RX ADMIN — Medication 3 UNIT(S): at 12:55

## 2021-11-28 RX ADMIN — GABAPENTIN 200 MILLIGRAM(S): 400 CAPSULE ORAL at 10:33

## 2021-11-28 RX ADMIN — CEFTRIAXONE 1000 MILLIGRAM(S): 500 INJECTION, POWDER, FOR SOLUTION INTRAMUSCULAR; INTRAVENOUS at 18:18

## 2021-11-28 RX ADMIN — INSULIN GLARGINE 12 UNIT(S): 100 INJECTION, SOLUTION SUBCUTANEOUS at 20:58

## 2021-11-28 RX ADMIN — LISINOPRIL 5 MILLIGRAM(S): 2.5 TABLET ORAL at 09:40

## 2021-11-28 RX ADMIN — OXYCODONE HYDROCHLORIDE 5 MILLIGRAM(S): 5 TABLET ORAL at 16:07

## 2021-11-28 RX ADMIN — Medication 30 MILLIGRAM(S): at 18:18

## 2021-11-28 RX ADMIN — Medication 1 PATCH: at 10:00

## 2021-11-28 RX ADMIN — Medication 0: at 20:58

## 2021-11-28 RX ADMIN — Medication 1 PATCH: at 05:53

## 2021-11-28 RX ADMIN — Medication 15 MILLIGRAM(S): at 10:33

## 2021-11-28 RX ADMIN — Medication 3 UNIT(S): at 17:42

## 2021-11-28 RX ADMIN — OXYCODONE HYDROCHLORIDE 5 MILLIGRAM(S): 5 TABLET ORAL at 03:53

## 2021-11-28 RX ADMIN — ENOXAPARIN SODIUM 40 MILLIGRAM(S): 100 INJECTION SUBCUTANEOUS at 09:40

## 2021-11-28 RX ADMIN — Medication 5 MILLIGRAM(S): at 21:02

## 2021-11-28 RX ADMIN — AZITHROMYCIN 500 MILLIGRAM(S): 500 TABLET, FILM COATED ORAL at 20:58

## 2021-11-28 RX ADMIN — OXYCODONE HYDROCHLORIDE 5 MILLIGRAM(S): 5 TABLET ORAL at 20:59

## 2021-11-28 RX ADMIN — Medication 1 PATCH: at 09:41

## 2021-11-28 NOTE — PROGRESS NOTE ADULT - ASSESSMENT
61 year old man with DM, HTN, hx of head trauma, hx of lumbar fusion, chronic back pain, anxiety, insomnia, and recently diagnosed HIV, presented to ED after being informed of positive blood culture. Patient was in the ED 11/23 with complaints of fever and body aches, hadnt been taking medications in two months, discharged home from ED. Called back due to abnormal blood work. Upon return this AM he reported polyuria, polydipsia, wt loss, malaise. Diminished sensation in hands and feet, trouble with vision. Also rare dry cough. No other complaints. No chest pain or dyspnea. No fevers now. No rigors. No abdominal pain, nausea, vomiting, diarrhea, constipation. No hematuria or dysuria. No rash. No other complaints. In ED, patient afebrile, generally well appearing but a bit unkempt, thin. Labs notable for hyperglycemia, urine ketones, no anion gap. UA slightly abnormal with mod bacteria but N-, LE- very few WBCs, given empiric dose of ceftriaxone and IVFs and insulin for the hyperglycemia and admitted to Medicine.       Subjective fever, body aches, malaise, possible community acquired pneumonia, possible UTI  Currently afebrile. Could be related to his new diagnosis of HIV. Alternatively or in addition, could be genitourinary source given slightly abnormal UA (mod bacteria but otherwise no significant hematuria, pyuria, N-. LE-). Less likely true bacteremia (Bld Cx 11/22 + for S. hominis, rpt Cx this admission no growth to date). Community acquired pneumonia is also possible, especially given his elevated procalcitonin level and findings of R perihilar infiltrate. Started on Levaquin 750mg po daily today to cover above and below diaphragm. Uncontrolled DM and other unmanaged medical issues may also be contributing,.    - F/u in process rpt blood culture, urine culture  - Continue to manage underlying issues    DM, uncontrolled, with hyperglycemia, present upon admission  Had been on Tresiba and metformin though had not been taking medications for the past couple of months. Last A1c was 2/2020 (12.7). Will need to be repeated. Glucose 331 on fingerstick, was 400s on chemistry panel last night. Placed NPO. Given IVF bolus of 1.5L with NS and insulin lispro per moderate scale, with subsequent improvement in glucose to 230.   - Continue IVFs  - Begin weight based basal/prandial insulin regimen  - Check BMP to calc anion gap, check for serum ketones, VBG for pH  - Check A1c  - Check glucose TID AC and HS    Pseudo Hyponatremia  Corrected for hyperglycemia, glucose is 136.   - Correct patient's hyperglycemia and trend Na    Recently diagnosed HIV  - Check CD4 count and VL  - Consulted ID as mentioned above  - For completeness, check viral hepatitis panel, syphilis screen  - Needs to have appointment with [facility] and Dr. Pedro Bruner for continued care and start of HAART    HTN  Had been on lisinopril, not taking meds in past 2 months  - Restart lisinopril  - Check urine protein/Cr ratio    Chronic back pain, hx of lumbar fusion  Reviewed MRI from 2/2020. Severe lumbar DJD L3-L4 through L5-S1, no acute findings then.   - Pain control as needed    Tobacco use disorder  Smokes 1 PPD cigarettes, agreeable for nicotine patch, ordered.  - Continue nicotine patch  - Encouraged cessation of tobacco use      Diet: DM diet  DVT px: LMWH  Code status: Full  Emergency contact: Nelli (spouse) [phone]  Dispo: Anticipate DC to home when clinically improved and inpatient workup is complete   61 year old man with DM, HTN, hx of head trauma, hx of lumbar fusion, chronic back pain, anxiety, insomnia, and recently diagnosed HIV, presented to ED after being informed of positive blood culture. Patient was in the ED 11/23 with complaints of fever and body aches, hadnt been taking medications in two months, discharged home from ED. Called back due to abnormal blood work. Upon return this AM he reported polyuria, polydipsia, wt loss, malaise. Diminished sensation in hands and feet, trouble with vision. Also rare dry cough. No other complaints. No chest pain or dyspnea. No fevers now. No rigors. No abdominal pain, nausea, vomiting, diarrhea, constipation. No hematuria or dysuria. No rash. No other complaints. In ED, patient afebrile, generally well appearing but a bit unkempt, thin. Labs notable for hyperglycemia, urine ketones, no anion gap. UA slightly abnormal with mod bacteria but N-, LE- very few WBCs, given empiric dose of ceftriaxone and IVFs and insulin for the hyperglycemia and admitted to Medicine.       Subjective fever, body aches, malaise, possible community acquired pneumonia, possible UTI  Currently afebrile. Could be related to his new diagnosis of HIV. Alternatively or in addition, could be genitourinary source given slightly abnormal UA (mod bacteria but otherwise no significant hematuria, pyuria, N-. LE-). Less likely true bacteremia (Bld Cx 11/22 + for S. hominis, rpt Cx this admission no growth to date). Community acquired pneumonia is also possible, especially given his rare cough, elevated procalcitonin level and findings of R perihilar infiltrate on CXR. Started on ceftriaxone and azithromycin today. Uncontrolled DM and other unmanaged medical issues may also be contributing,.    - F/u in process rpt blood culture, urine culture  - Requested urine Legionella and S.pneumoniae Ag  - Requested GC/Chlamydia NAAT  - Ceftriaxone and azithromycin, day 1 today  - Continue to manage underlying issues    DM, uncontrolled, with hyperglycemia, present upon admission  Had been on Tresiba and metformin though had not been taking medications for the past couple of months. Last A1c was 2/2020 (12.7). Rpt A1c here 11.0. Glucose 400s on chemistry panel on admission, serum ketone negative, no anion gap. Glycemic control since improved with IVFs and insulin regimen.   - Continue Lantus and Lispro, increased Lantus to 12 units qHS, adjusted Lispro to 3 units with meals plus correctional scale  - Check glucose TID AC and HS  - DM education  - Outpatient Ophthalmology and Podiatry evaluations advised    Pseudo Hyponatremia  Corrected for hyperglycemia, Na was WNL.  - Continue to monitor    Recently diagnosed HIV  Partner aware. CD4 and VL in process. HBV HCV neg. T.pallidum Ab neg. GC/Chlamydia testing requested. Patient advised to follow up outpatient with HIV specialist. Appreciate input from ID.   - F/u in process CD4 count and VL  - Needs to have appointment with Bren[facility] and Dr. Pedro Bruner for continued care and start of HAART    HTN  Had been on lisinopril, not taking meds in past 2 months. Restarted lisinopril with good effect  - Continue lisinopril, monitor BP    Chronic back pain, hx of lumbar fusion  Reviewed MRI from 2/2020. Severe lumbar DJD L3-L4 through L5-S1, no acute findings then.   - Pain control as needed, patient given Toradol today with partial improvement    Suspected DM neuropathy  Involving hands and feet B/L. Started on Gabapentin.  - Titrate up gabapentin as tolerated, assess for improvement  - Outpatient Podiatry evaluation    Tobacco use disorder  Smokes 1 PPD cigarettes, agreeable for nicotine patch, ordered.  - Continue nicotine patch  - Encouraged cessation of tobacco use      Diet: DM diet  DVT px: LMWH  Code status: Full  Emergency contact: Nelli (ex-spouse) [phone]  Dispo: Anticipate DC to shelter when clinically improved and inpatient workup is complete, CM aware.    61 year old man with DM, HTN, hx of head trauma, hx of lumbar fusion, chronic back pain, anxiety, insomnia, presented to ED 11/26 after being called to return due to findings of positive blood culture and positive HIV test from earlier ED visit 11/23 when he had complaints of fever and body aches, hadn't been taking medications in two months, and was discharged home from ED after a brief workup. Upon his return 11/26, he reported rare dry cough, malaise, body aches, polyuria, polydipsia, wt loss. Diminished sensation in hands and feet, and also blurry vision. No chest pain or dyspnea. No fevers now. No rigors. No abdominal pain, nausea, vomiting, diarrhea, constipation. No hematuria or dysuria. No rash. No other complaints. In ED, patient afebrile, generally well appearing but a bit unkempt, thin. Labs notable for hyperglycemia, urine ketones, no anion gap. UA slightly abnormal with mod bacteria but N-, LE- very few WBCs, given empiric dose of ceftriaxone, IVFs and insulin for the hyperglycemia and admitted to Medicine.       Subjective fever, body aches, malaise, possible community acquired pneumonia, possible UTI, present upon admission  Currently afebrile. Sx could be related to his new diagnosis of HIV. Alternatively or in addition, could be genitourinary source given slightly abnormal UA (mod bacteria but otherwise no significant hematuria, pyuria, N-. LE-). Less likely true bacteremia (Bld Cx 11/22 + for S. hominis, rpt Cx this admission no growth to date). Community acquired pneumonia is also possible, especially given his rare cough, elevated procalcitonin level and findings of R perihilar infiltrate on CXR. Continued on ceftriaxone and added azithromycin today. Uncontrolled DM and other unmanaged medical issues may also be contributing.    - F/u in process blood culture, urine culture  - Requested urine Legionella and S.pneumoniae Ag  - Requested GC/Chlamydia NAAT  - Continue ceftriaxone and azithromycin    DM, uncontrolled, with hyperglycemia, present upon admission  Had been on Tresiba and metformin though had not been taking medications for the past couple of months. Last A1c was 2/2020 (12.7). Rpt A1c here 11.0. Glucose 400s on chemistry panel on admission, serum ketone negative, no anion gap. Glycemic control since improved with IVFs and insulin regimen.   - Continue Lantus and Lispro, increased Lantus to 12 units qHS, adjusted Lispro to 3 units with meals plus correctional scale  - Check glucose TID AC and HS  - DM education  - Outpatient Ophthalmology and Podiatry evaluations advised    Pseudo Hyponatremia  Corrected for hyperglycemia, Na was WNL.  - Continue to monitor    Recently diagnosed HIV  Partner aware. CD4 and VL in process. HBV HCV neg. T.pallidum Ab neg. GC/Chlamydia testing requested. Patient advised to follow up outpatient with HIV specialist. Appreciate input from ID.   - F/u in process CD4 count and VL  - F/u GC/Chlamydia NAAT  - Needs to have appointment with [facility] and Dr. Pedro Bruner for continued care and start of HAART    HTN  Had been on lisinopril, not taking meds in past 2 months. Restarted lisinopril with good effect  - Continue lisinopril, monitor BP    Chronic back pain, hx of lumbar fusion  Reviewed MRI from 2/2020. Severe lumbar DJD L3-L4 through L5-S1, no acute findings then.   - Pain control as needed, patient given Toradol today with partial improvement    Suspected DM neuropathy  Involving hands and feet B/L. Started on Gabapentin.  - Titrate up gabapentin as tolerated, assess for improvement  - Outpatient Podiatry evaluation    Tobacco use disorder  Smokes 1 PPD cigarettes, agreeable for nicotine patch, ordered.  - Continue nicotine patch  - Encouraged cessation of tobacco use      Diet: DM diet  DVT px: LMWH  Code status: Full  Emergency contact: Nelli (ex-spouse) [phone]  Dispo: He is estranged from his wife, lives in what he describes as a dilapidated, frigid trailer. He is requesting to be discharged to shelter when medically-cleared. Covering CM aware and will notify 2SW CM/SW team.

## 2021-11-28 NOTE — PROGRESS NOTE ADULT - SUBJECTIVE AND OBJECTIVE BOX
Chief Complaint: Subjective fever, body aches, malaise, generalized weakness, rare dry cough, polyuria, polydipsia, wt loss, informed he had (+) HIV test from 11/22 and (+) blood culture test (coag negative Staph sp.)    Interval History: No acute events overnight. Afebrile. Patient reports feeling somewhat improved with less polyuria and polydipsia, also less malaise. Still with some pains affecting hands and feet that appear neuropathic in character, also some mild discomfort in his back, partially-improved after a dose of Toradol today. Glycemic control has improved compared to admission but still not optimized as of this AM. Insulin regimen further adjusted. CXR reviewed, appears to have a R perihilar infiltrate and procalcitonin is elevated, could have a community acquired pneumonia, especially given is rare cough, new HIV+ status, and uncontrolled DM. No fever, chest pain or dyspnea but does have malaise and had a subjective fever prior to admission as mentioned above. Started on ceftriaxone and azithromycin, day 1 today.  Lastly, patient expressed wishes to be discharged to a shelter from here once medically-cleared. He is estranged from his wife, lives in what he describes as a dilapidated, frigid trailer that is unsuitable. Covering CM aware and will notify 2SW CM/SW team.     ROS: Multi-system review is comprehensively negative x 10 systems except as above    Physical Exam:  T(F): 97.4 (28 Nov 2021 15:22), Max: 97.4 (28 Nov 2021 15:22)  HR: 70 (28 Nov 2021 15:22) (68 - 70)  BP: 125/66 (28 Nov 2021 15:22) (111/58 - 125/66)  RR: 18 (28 Nov 2021 15:22) (18 - 18)  SpO2: 97% (28 Nov 2021 15:22) (96% - 97%) on room air    Bit unkept, comfortable-appearing   NCAT PERRL EOMI moist oral mucosa  Neck Supple No JVD  Chest CTA B/L  CVS: S1 S2 normal RRR  Abd: Soft NT ND +BS  Ext : No edema or calf tenderness  Skin: Intact  Neuro: A+OX3, CN intact, strength full and symmetric, sensation slightly diminished in feet B/L  Mood: Calm and pleasant    Labs:                       10.7   4.84  )-----------( 265                  31.5     135  |  102  |  10  ----------------------<  265  4.1   |   28   |  0.79    Ca 8.6        Phos 2.5       Mg 1.9    TPro  5.9  /  Alb  1.7  /  TBili  0.2  /  DBili  <0.1  /  AST  27  /  ALT  42  /  AlkPhos  58      PT: 11.7 sec;   INR: 1.00 ratio;    PTT:30.0 sec    Procalcitonin elevated, 0.29  Lactate WNL  Serum ketone (-) B-OH-Butyrate (-)  TSH WNL  A1c 11.0    CD4 in process  HIV VL in process  T.pallidum Ab (-)  HBsAb (-) sAg (-) cAb (-)  HCV Ab (-)    Urinalysis  11/26  Gluc: x / Ketone: Trace  / Bili: Negative / Urobili: 4 mg/dL   Blood: x / Protein: 100 mg/dL / Nitrite: Negative   Leuk Esterase: Negative / RBC: 3-5 /HPF / WBC 0-2   Sq Epi: x / Non Sq Epi: Few / Bacteria: Moderate    Micro:  GC/Chlamydia NAAT requested  Urine Legionella Ag requested  Urine S.pneumoniae Ag requested  Urine culture 11/26: In process  Blood culture 11/26: Neg  COVID19 PCR 11/26: Neg    Imaging:  CXR 11/26: Cardiomediastinal silhouette is normal and the jose angel are not enlarged. The trachea is midline. There is right perihilar infiltrate. There is no pleural effusion. The osseous structures demonstrate no acute pathology.    Meds:  MEDICATIONS  (STANDING):  enoxaparin Injectable 40 milliGRAM(s) SubCutaneous daily  gabapentin 200 milliGRAM(s) Oral daily  glucagon  Injectable 1 milliGRAM(s) IntraMuscular once  insulin glargine Injectable (LANTUS) 12 Unit(s) SubCutaneous at bedtime  insulin lispro (ADMELOG) corrective regimen sliding scale   SubCutaneous three times a day before meals  insulin lispro (ADMELOG) corrective regimen sliding scale   SubCutaneous at bedtime  insulin lispro Injectable (ADMELOG) 3 Unit(s) SubCutaneous three times a day before meals  lisinopril 5 milliGRAM(s) Oral daily  nicotine - 21 mG/24Hr(s) Patch 1 patch Transdermal daily    MEDICATIONS  (PRN):  acetaminophen     Tablet .. 650 milliGRAM(s) Oral every 6 hours PRN Temp greater or equal to 38C (100.4F), Mild Pain (1 - 3), Moderate Pain (4 - 6)  melatonin 5 milliGRAM(s) Oral at bedtime PRN Sleep  oxyCODONE    IR 5 milliGRAM(s) Oral every 4 hours PRN Severe Pain (7 - 10)      Chief Complaint: Subjective fever, body aches, malaise, generalized weakness, rare dry cough, polyuria, polydipsia, wt loss, informed he had (+) HIV test from 11/22 and (+) blood culture test (coag negative Staph sp.)    Interval History: No acute events overnight. Afebrile. Patient reports feeling somewhat improved with less polyuria and polydipsia, also less malaise. Still with some pains affecting hands and feet that appear neuropathic in character, also some mild discomfort in his back, partially-improved after a dose of Toradol today. Glycemic control has improved compared to admission but still not optimized as of this AM. Insulin regimen further adjusted. CXR reviewed, appears to have a R perihilar infiltrate and procalcitonin is elevated, could have a community acquired pneumonia, especially given is rare cough, new HIV+ status, and uncontrolled DM. No fever, chest pain or dyspnea but does have malaise and had a subjective fever and dry cough prior to admission as mentioned above. Started on ceftriaxone and azithromycin, day 1 today.  Lastly, patient expressed wishes to be discharged to a shelter from here once medically-cleared. He is estranged from his wife, lives in what he describes as a dilapidated, frigid trailer that is unsuitable. Covering CM aware and will notify 2SW CM/SW team.     ROS: Multi-system review is comprehensively negative x 10 systems except as above    Physical Exam:  T(F): 97.4 (28 Nov 2021 15:22), Max: 97.4 (28 Nov 2021 15:22)  HR: 70 (28 Nov 2021 15:22) (68 - 70)  BP: 125/66 (28 Nov 2021 15:22) (111/58 - 125/66)  RR: 18 (28 Nov 2021 15:22) (18 - 18)  SpO2: 97% (28 Nov 2021 15:22) (96% - 97%) on room air    Bit unkept, comfortable-appearing   NCAT PERRL EOMI moist oral mucosa  Neck Supple No JVD  Chest CTA B/L  CVS: S1 S2 normal RRR  Abd: Soft NT ND +BS  Ext : No edema or calf tenderness  Skin: Intact  Neuro: A+OX3, CN intact, strength full and symmetric, sensation slightly diminished in feet B/L  Mood: Calm and pleasant    Labs:                       10.7   4.84  )-----------( 265                  31.5     135  |  102  |  10  ----------------------<  265  4.1   |   28   |  0.79    Ca 8.6        Phos 2.5       Mg 1.9    TPro  5.9  /  Alb  1.7  /  TBili  0.2  /  DBili  <0.1  /  AST  27  /  ALT  42  /  AlkPhos  58      PT: 11.7 sec;   INR: 1.00 ratio;    PTT:30.0 sec    Procalcitonin elevated, 0.29  Lactate WNL  Serum ketone (-) B-OH-Butyrate (-)  TSH WNL  A1c 11.0    CD4 in process  HIV VL in process  T.pallidum Ab (-)  HBsAb (-) sAg (-) cAb (-)  HCV Ab (-)    Urinalysis  11/26  Gluc: x / Ketone: Trace  / Bili: Negative / Urobili: 4 mg/dL   Blood: x / Protein: 100 mg/dL / Nitrite: Negative   Leuk Esterase: Negative / RBC: 3-5 /HPF / WBC 0-2   Sq Epi: x / Non Sq Epi: Few / Bacteria: Moderate    Micro:  GC/Chlamydia NAAT requested  Urine Legionella Ag requested  Urine S.pneumoniae Ag requested  Urine culture 11/26: In process  Blood culture 11/26: Neg  COVID19 PCR 11/26: Neg    Imaging:  CXR 11/26: Cardiomediastinal silhouette is normal and the jose angel are not enlarged. The trachea is midline. There is right perihilar infiltrate. There is no pleural effusion. The osseous structures demonstrate no acute pathology.    Meds:  MEDICATIONS  (STANDING):  azithromycin   Tablet 500 milliGRAM(s) Oral daily  cefTRIAXone   IVPB 1000 milliGRAM(s) IV Intermittent every 24 hours  enoxaparin Injectable 40 milliGRAM(s) SubCutaneous daily  gabapentin 200 milliGRAM(s) Oral daily  glucagon  Injectable 1 milliGRAM(s) IntraMuscular once  insulin glargine Injectable (LANTUS) 12 Unit(s) SubCutaneous at bedtime  insulin lispro (ADMELOG) corrective regimen sliding scale   SubCutaneous three times a day before meals  insulin lispro (ADMELOG) corrective regimen sliding scale   SubCutaneous at bedtime  insulin lispro Injectable (ADMELOG) 3 Unit(s) SubCutaneous three times a day before meals  ketorolac   Injectable 30 milliGRAM(s) IV Push once  lisinopril 5 milliGRAM(s) Oral daily  nicotine - 21 mG/24Hr(s) Patch 1 patch Transdermal daily    MEDICATIONS  (PRN):  acetaminophen     Tablet .. 650 milliGRAM(s) Oral every 6 hours PRN Temp greater or equal to 38C (100.4F), Mild Pain (1 - 3), Moderate Pain (4 - 6)  melatonin 5 milliGRAM(s) Oral at bedtime PRN Sleep  oxyCODONE    IR 5 milliGRAM(s) Oral every 4 hours PRN Severe Pain (7 - 10)      Chief Complaint: Advised to present to hospital after being called for (+) HIV test from 11/22 and (+) blood culture test (coag negative Staph sp.), also had complaints of subjective fever, body aches, malaise, generalized weakness, rare dry cough, polyuria, polydipsia, wt loss    Interval History: No acute events overnight. Afebrile. Patient reports feeling somewhat improved with less polyuria and polydipsia, also less malaise. Still with some pains affecting hands and feet that appear neuropathic in character, also some mild discomfort in his back, partially-improved after Toradol today. Glycemic control has improved compared to admission but still not optimized as of this AM. Insulin regimen further adjusted. CXR reviewed and patient appears to have a R perihilar infiltrate and procalcitonin is elevated, could have a community acquired pneumonia, especially given his rare cough, subjective fever and malaise upon admission, and in light of his new HIV+ status and uncontrolled DM. On ceftriaxone and azithromycin.      ROS: Multi-system review is comprehensively negative x 10 systems except as above    Physical Exam:  T(F): 97.4 (28 Nov 2021 15:22), Max: 97.4 (28 Nov 2021 15:22)  HR: 70 (28 Nov 2021 15:22) (68 - 70)  BP: 125/66 (28 Nov 2021 15:22) (111/58 - 125/66)  RR: 18 (28 Nov 2021 15:22) (18 - 18)  SpO2: 97% (28 Nov 2021 15:22) (96% - 97%) on room air    Bit unkept, comfortable-appearing   NCAT PERRL EOMI moist oral mucosa  Neck Supple No JVD  Chest CTA B/L  CVS: S1 S2 normal RRR  Abd: Soft NT ND +BS  Ext : No edema or calf tenderness  Skin: Intact  Neuro: A+OX3, CN intact, strength full and symmetric, sensation slightly diminished in feet B/L  Mood: Calm and pleasant    Labs:                       10.7   4.84  )-----------( 265                  31.5     135  |  102  |  10  ----------------------<  265  4.1   |   28   |  0.79    Ca 8.6        Phos 2.5       Mg 1.9    TPro  5.9  /  Alb  1.7  /  TBili  0.2  /  DBili  <0.1  /  AST  27  /  ALT  42  /  AlkPhos  58      PT: 11.7 sec;   INR: 1.00 ratio;    PTT:30.0 sec    Procalcitonin elevated, 0.29  Lactate WNL  Serum ketone (-) B-OH-Butyrate (-)  TSH WNL  A1c 11.0    CD4 in process  HIV VL in process  T.pallidum Ab (-)  HBsAb (-) sAg (-) cAb (-)  HCV Ab (-)    Urinalysis  11/26  Gluc: x / Ketone: Trace  / Bili: Negative / Urobili: 4 mg/dL   Blood: x / Protein: 100 mg/dL / Nitrite: Negative   Leuk Esterase: Negative / RBC: 3-5 /HPF / WBC 0-2   Sq Epi: x / Non Sq Epi: Few / Bacteria: Moderate    Micro:  GC/Chlamydia NAAT requested  Urine Legionella Ag requested  Urine S.pneumoniae Ag requested  Urine culture 11/26: In process  Blood culture 11/26: Neg  COVID19 PCR 11/26: Neg    Imaging:  CXR 11/26: Cardiomediastinal silhouette is normal and the jose angel are not enlarged. The trachea is midline. There is right perihilar infiltrate. There is no pleural effusion. The osseous structures demonstrate no acute pathology.    Meds:  MEDICATIONS  (STANDING):  azithromycin   Tablet 500 milliGRAM(s) Oral daily  cefTRIAXone   IVPB 1000 milliGRAM(s) IV Intermittent every 24 hours  enoxaparin Injectable 40 milliGRAM(s) SubCutaneous daily  gabapentin 200 milliGRAM(s) Oral daily  glucagon  Injectable 1 milliGRAM(s) IntraMuscular once  insulin glargine Injectable (LANTUS) 12 Unit(s) SubCutaneous at bedtime  insulin lispro (ADMELOG) corrective regimen sliding scale   SubCutaneous three times a day before meals  insulin lispro (ADMELOG) corrective regimen sliding scale   SubCutaneous at bedtime  insulin lispro Injectable (ADMELOG) 3 Unit(s) SubCutaneous three times a day before meals  ketorolac   Injectable 30 milliGRAM(s) IV Push once  lisinopril 5 milliGRAM(s) Oral daily  nicotine - 21 mG/24Hr(s) Patch 1 patch Transdermal daily    MEDICATIONS  (PRN):  acetaminophen     Tablet .. 650 milliGRAM(s) Oral every 6 hours PRN Temp greater or equal to 38C (100.4F), Mild Pain (1 - 3), Moderate Pain (4 - 6)  melatonin 5 milliGRAM(s) Oral at bedtime PRN Sleep  oxyCODONE    IR 5 milliGRAM(s) Oral every 4 hours PRN Severe Pain (7 - 10)

## 2021-11-29 LAB
4/8 RATIO: 1.48 RATIO — SIGNIFICANT CHANGE UP (ref 0.9–3.6)
ABS CD8: 233 /UL — SIGNIFICANT CHANGE UP (ref 142–740)
ANION GAP SERPL CALC-SCNC: 4 MMOL/L — LOW (ref 5–17)
BUN SERPL-MCNC: 15 MG/DL — SIGNIFICANT CHANGE UP (ref 7–23)
C TRACH RRNA SPEC QL NAA+PROBE: SIGNIFICANT CHANGE UP
CALCIUM SERPL-MCNC: 8.7 MG/DL — SIGNIFICANT CHANGE UP (ref 8.5–10.1)
CD3 BLASTS SPEC-ACNC: 605 /UL — LOW (ref 672–1870)
CD3 BLASTS SPEC-ACNC: 65 % — SIGNIFICANT CHANGE UP (ref 59–83)
CD4 %: 37 % — SIGNIFICANT CHANGE UP (ref 30–62)
CD8 %: 25 % — SIGNIFICANT CHANGE UP (ref 12–36)
CHLORIDE SERPL-SCNC: 105 MMOL/L — SIGNIFICANT CHANGE UP (ref 96–108)
CO2 SERPL-SCNC: 28 MMOL/L — SIGNIFICANT CHANGE UP (ref 22–31)
CREAT SERPL-MCNC: 0.76 MG/DL — SIGNIFICANT CHANGE UP (ref 0.5–1.3)
GLUCOSE SERPL-MCNC: 228 MG/DL — HIGH (ref 70–99)
HIV-1 VIRAL LOAD RESULT: ABNORMAL
HIV1 RNA # SERPL NAA+PROBE: SIGNIFICANT CHANGE UP
HIV1 RNA SER-IMP: SIGNIFICANT CHANGE UP
HIV1 RNA SERPL NAA+PROBE-ACNC: ABNORMAL
HIV1 RNA SERPL NAA+PROBE-LOG#: 4.99 — SIGNIFICANT CHANGE UP
LEGIONELLA AG UR QL: POSITIVE
N GONORRHOEA RRNA SPEC QL NAA+PROBE: SIGNIFICANT CHANGE UP
POTASSIUM SERPL-MCNC: 4.4 MMOL/L — SIGNIFICANT CHANGE UP (ref 3.5–5.3)
POTASSIUM SERPL-SCNC: 4.4 MMOL/L — SIGNIFICANT CHANGE UP (ref 3.5–5.3)
S PNEUM AG UR QL: NEGATIVE — SIGNIFICANT CHANGE UP
SODIUM SERPL-SCNC: 137 MMOL/L — SIGNIFICANT CHANGE UP (ref 135–145)
SPECIMEN SOURCE: SIGNIFICANT CHANGE UP
T-CELL CD4 SUBSET PNL BLD: 343 /UL — LOW (ref 489–1457)

## 2021-11-29 PROCEDURE — 99233 SBSQ HOSP IP/OBS HIGH 50: CPT

## 2021-11-29 PROCEDURE — 71250 CT THORAX DX C-: CPT | Mod: 26

## 2021-11-29 RX ORDER — PIPERACILLIN AND TAZOBACTAM 4; .5 G/20ML; G/20ML
3.38 INJECTION, POWDER, LYOPHILIZED, FOR SOLUTION INTRAVENOUS ONCE
Refills: 0 | Status: COMPLETED | OUTPATIENT
Start: 2021-11-29 | End: 2021-11-29

## 2021-11-29 RX ORDER — PIPERACILLIN AND TAZOBACTAM 4; .5 G/20ML; G/20ML
3.38 INJECTION, POWDER, LYOPHILIZED, FOR SOLUTION INTRAVENOUS EVERY 8 HOURS
Refills: 0 | Status: DISCONTINUED | OUTPATIENT
Start: 2021-11-29 | End: 2021-12-01

## 2021-11-29 RX ORDER — INSULIN GLARGINE 100 [IU]/ML
15 INJECTION, SOLUTION SUBCUTANEOUS AT BEDTIME
Refills: 0 | Status: DISCONTINUED | OUTPATIENT
Start: 2021-11-29 | End: 2021-12-01

## 2021-11-29 RX ADMIN — Medication 4: at 08:32

## 2021-11-29 RX ADMIN — Medication 1 PATCH: at 09:13

## 2021-11-29 RX ADMIN — Medication 5 MILLIGRAM(S): at 21:22

## 2021-11-29 RX ADMIN — Medication 1 PATCH: at 18:41

## 2021-11-29 RX ADMIN — Medication 3 UNIT(S): at 08:32

## 2021-11-29 RX ADMIN — GABAPENTIN 200 MILLIGRAM(S): 400 CAPSULE ORAL at 09:13

## 2021-11-29 RX ADMIN — Medication 650 MILLIGRAM(S): at 18:24

## 2021-11-29 RX ADMIN — INSULIN GLARGINE 15 UNIT(S): 100 INJECTION, SOLUTION SUBCUTANEOUS at 21:21

## 2021-11-29 RX ADMIN — AZITHROMYCIN 500 MILLIGRAM(S): 500 TABLET, FILM COATED ORAL at 09:13

## 2021-11-29 RX ADMIN — Medication 6: at 12:26

## 2021-11-29 RX ADMIN — Medication 3 UNIT(S): at 17:04

## 2021-11-29 RX ADMIN — LISINOPRIL 5 MILLIGRAM(S): 2.5 TABLET ORAL at 09:13

## 2021-11-29 RX ADMIN — ENOXAPARIN SODIUM 40 MILLIGRAM(S): 100 INJECTION SUBCUTANEOUS at 09:13

## 2021-11-29 RX ADMIN — Medication 3 UNIT(S): at 12:25

## 2021-11-29 RX ADMIN — OXYCODONE HYDROCHLORIDE 5 MILLIGRAM(S): 5 TABLET ORAL at 18:23

## 2021-11-29 RX ADMIN — PIPERACILLIN AND TAZOBACTAM 200 GRAM(S): 4; .5 INJECTION, POWDER, LYOPHILIZED, FOR SOLUTION INTRAVENOUS at 17:00

## 2021-11-29 RX ADMIN — Medication 1 PATCH: at 07:00

## 2021-11-29 NOTE — PROGRESS NOTE ADULT - SUBJECTIVE AND OBJECTIVE BOX
Cheif complaints and Diagnosis: CAP, right hilar  / UTI    Subjective: no complaints      REVIEW OF SYSTEMS:    CONSTITUTIONAL: No weakness, fevers or chills  EYES/ENT: No visual changes;  No vertigo or throat pain   NECK: No pain or stiffness  RESPIRATORY: No cough, wheezing, hemoptysis; No shortness of breath  CARDIOVASCULAR: No chest pain or palpitations  GASTROINTESTINAL: No abdominal or epigastric pain. No nausea, vomiting, or hematemesis; No diarrhea or constipation. No melena or hematochezia.  GENITOURINARY: No dysuria, frequency or hematuria  NEUROLOGICAL: No numbness or weakness  SKIN: No itching, burning, rashes, or lesions   All other review of systems is negative unless indicated above      Vital Signs Last 24 Hrs  T(C): 36.4 (29 Nov 2021 08:00), Max: 36.4 (29 Nov 2021 08:00)  T(F): 97.6 (29 Nov 2021 08:00), Max: 97.6 (29 Nov 2021 08:00)  HR: 67 (29 Nov 2021 08:00) (67 - 67)  BP: 127/70 (29 Nov 2021 08:00) (127/70 - 127/70)  BP(mean): --  RR: 18 (29 Nov 2021 08:00) (18 - 18)  SpO2: 97% (29 Nov 2021 08:00) (97% - 97%)    HEENT:   pupils equal and reactive, EOMI, no oropharyngeal lesions, erythema, exudates, oral thrush    NECK:   supple, no carotid bruits, no palpable lymph nodes, no thyromegaly    CV:  +S1, +S2, regular, no murmurs or rubs    RESP:   lungs clear to auscultation bilaterally, no wheezing, rales, rhonchi, good air entry bilaterally    BREAST:  not examined    GI:  abdomen soft, non-tender, non-distended, normal BS, no bruits, no abdominal masses, no palpable masses    RECTAL:  not examined    :  not examined    MSK:   normal muscle tone, no atrophy, no rigidity, no contractions    EXT:   no clubbing, no cyanosis, no edema, no calf pain, swelling or erythema    VASCULAR:  pulses equal and symmetric in the upper and lower extremities    NEURO:  AAOX3, no focal neurological deficits, follows all commands, able to move extremities spontaneously    SKIN:  no ulcers, lesions or rashes    MEDICATIONS  (STANDING):  azithromycin   Tablet 500 milliGRAM(s) Oral daily  cefTRIAXone Injectable. 1000 milliGRAM(s) IV Push every 24 hours  dextrose 40% Gel 15 Gram(s) Oral once  dextrose 5%. 1000 milliLiter(s) (50 mL/Hr) IV Continuous <Continuous>  dextrose 5%. 1000 milliLiter(s) (100 mL/Hr) IV Continuous <Continuous>  dextrose 50% Injectable 25 Gram(s) IV Push once  dextrose 50% Injectable 12.5 Gram(s) IV Push once  dextrose 50% Injectable 25 Gram(s) IV Push once  enoxaparin Injectable 40 milliGRAM(s) SubCutaneous daily  gabapentin 200 milliGRAM(s) Oral daily  glucagon  Injectable 1 milliGRAM(s) IntraMuscular once  insulin glargine Injectable (LANTUS) 12 Unit(s) SubCutaneous at bedtime  insulin lispro (ADMELOG) corrective regimen sliding scale   SubCutaneous three times a day before meals  insulin lispro (ADMELOG) corrective regimen sliding scale   SubCutaneous at bedtime  insulin lispro Injectable (ADMELOG) 3 Unit(s) SubCutaneous three times a day before meals  lisinopril 5 milliGRAM(s) Oral daily  nicotine - 21 mG/24Hr(s) Patch 1 patch Transdermal daily    MEDICATIONS  (PRN):  acetaminophen     Tablet .. 650 milliGRAM(s) Oral every 6 hours PRN Temp greater or equal to 38C (100.4F), Mild Pain (1 - 3), Moderate Pain (4 - 6)  melatonin 5 milliGRAM(s) Oral at bedtime PRN Sleep  oxyCODONE    IR 5 milliGRAM(s) Oral every 4 hours PRN Severe Pain (7 - 10)      29 Nov 2021 08:44    137    |  105    |  15     ----------------------------<  228    4.4     |  28     |  0.76     Ca    8.7        29 Nov 2021 08:44  Phos  2.5       28 Nov 2021 09:06  Mg     1.9       28 Nov 2021 09:06              Assessment and Plan:     61 year old man with DM, HTN, hx of head trauma, hx of lumbar fusion, chronic back pain, anxiety, insomnia, presented to ED 11/26 after being called to return due to findings of positive blood culture and positive HIV test from earlier ED visit 11/23 when he had complaints of fever and body aches, hadn't been taking medications in two months, and was discharged home from ED after a brief workup. Upon his return 11/26, he reported rare dry cough, malaise, body aches, polyuria, polydipsia, wt loss. Diminished sensation in hands and feet, and also blurry vision. No chest pain or dyspnea. No fevers now. No rigors. No abdominal pain, nausea, vomiting, diarrhea, constipation. No hematuria or dysuria. No rash. No other complaints. In ED, patient afebrile, generally well appearing but a bit unkempt, thin. Labs notable for hyperglycemia, urine ketones, no anion gap. UA slightly abnormal with mod bacteria but N-, LE- very few WBCs, given empiric dose of ceftriaxone, IVFs and insulin for the hyperglycemia and admitted to Medicine.       PNA / UTI   -urine cultures growing gram positive organism  -chest xray consistent with right hilar pna  -s/p iv ceftriaxone  -change abx to Zosyn to cover urine and pna    DM, uncontrolled, with hyperglycemia, present upon admission  -lantus 15 units qhs  -c/w sliding scale  -hba1c is 11;   -patient was prescribed Insulin by outpatient provider; d/w patient, he says he was not injecting himself.  Unlikley this patient will inject insulin . For dc , oral therapy will be best.     Pseudo Hyponatremia  Corrected for hyperglycemia, Na was WNL.  - Continue to monitor    Recently diagnosed HIV  Partner aware. CD4 and VL in process. HBV HCV neg. T.pallidum Ab neg. GC/Chlamydia testing requested. Patient advised to follow up outpatient with HIV specialist. Appreciate input from ID.   - CD4 > 200 ; viral load 100K   - F/u GC/Chlamydia >> negative  - Needs to have appointment with [facility] and Dr. Pedro Bruner for continued care and start of HAART    HTN  Had been on lisinopril, not taking meds in past 2 months. Restarted lisinopril with good effect  - Continue lisinopril, monitor BP    Chronic back pain, hx of lumbar fusion  Reviewed MRI from 2/2020. Severe lumbar DJD L3-L4 through L5-S1, no acute findings then.   - Pain control as needed, patient given Toradol today with partial improvement    Suspected DM neuropathy  Involving hands and feet B/L. Started on Gabapentin.  - Titrate up gabapentin as tolerated, assess for improvement  - Outpatient Podiatry evaluation    Tobacco use disorder  Smokes 1 PPD cigarettes, agreeable for nicotine patch, ordered.  - Continue nicotine patch  - Encouraged cessation of tobacco use      Diet: DM diet  DVT px: LMWH  Code status: Full  Emergency contact: Nelli (ex-spouse) [phone]  Dispo: He is estranged from his wife, lives in what he describes as a dilapidated, frigid trailer. He is requesting to be discharged to shelter when medically-cleared. Covering CM aware and will notify 2SW CM/SW team.

## 2021-11-30 PROCEDURE — 99232 SBSQ HOSP IP/OBS MODERATE 35: CPT

## 2021-11-30 RX ORDER — OXYCODONE HYDROCHLORIDE 5 MG/1
5 TABLET ORAL ONCE
Refills: 0 | Status: DISCONTINUED | OUTPATIENT
Start: 2021-11-30 | End: 2021-11-30

## 2021-11-30 RX ORDER — ZOLPIDEM TARTRATE 10 MG/1
5 TABLET ORAL AT BEDTIME
Refills: 0 | Status: DISCONTINUED | OUTPATIENT
Start: 2021-11-30 | End: 2021-12-01

## 2021-11-30 RX ADMIN — OXYCODONE HYDROCHLORIDE 5 MILLIGRAM(S): 5 TABLET ORAL at 21:15

## 2021-11-30 RX ADMIN — OXYCODONE HYDROCHLORIDE 5 MILLIGRAM(S): 5 TABLET ORAL at 09:58

## 2021-11-30 RX ADMIN — OXYCODONE HYDROCHLORIDE 5 MILLIGRAM(S): 5 TABLET ORAL at 15:08

## 2021-11-30 RX ADMIN — OXYCODONE HYDROCHLORIDE 5 MILLIGRAM(S): 5 TABLET ORAL at 10:30

## 2021-11-30 RX ADMIN — ZOLPIDEM TARTRATE 5 MILLIGRAM(S): 10 TABLET ORAL at 21:15

## 2021-11-30 RX ADMIN — OXYCODONE HYDROCHLORIDE 5 MILLIGRAM(S): 5 TABLET ORAL at 15:07

## 2021-11-30 RX ADMIN — PIPERACILLIN AND TAZOBACTAM 25 GRAM(S): 4; .5 INJECTION, POWDER, LYOPHILIZED, FOR SOLUTION INTRAVENOUS at 13:06

## 2021-11-30 RX ADMIN — Medication 1 PATCH: at 18:57

## 2021-11-30 RX ADMIN — Medication 3 UNIT(S): at 07:49

## 2021-11-30 RX ADMIN — OXYCODONE HYDROCHLORIDE 5 MILLIGRAM(S): 5 TABLET ORAL at 21:45

## 2021-11-30 RX ADMIN — Medication 1 PATCH: at 06:53

## 2021-11-30 RX ADMIN — Medication 3 UNIT(S): at 12:18

## 2021-11-30 RX ADMIN — Medication 3 UNIT(S): at 16:45

## 2021-11-30 RX ADMIN — PIPERACILLIN AND TAZOBACTAM 25 GRAM(S): 4; .5 INJECTION, POWDER, LYOPHILIZED, FOR SOLUTION INTRAVENOUS at 21:15

## 2021-11-30 RX ADMIN — AZITHROMYCIN 500 MILLIGRAM(S): 500 TABLET, FILM COATED ORAL at 09:07

## 2021-11-30 RX ADMIN — LISINOPRIL 5 MILLIGRAM(S): 2.5 TABLET ORAL at 09:07

## 2021-11-30 RX ADMIN — ENOXAPARIN SODIUM 40 MILLIGRAM(S): 100 INJECTION SUBCUTANEOUS at 09:07

## 2021-11-30 RX ADMIN — INSULIN GLARGINE 15 UNIT(S): 100 INJECTION, SOLUTION SUBCUTANEOUS at 21:14

## 2021-11-30 RX ADMIN — Medication 1 PATCH: at 09:10

## 2021-11-30 RX ADMIN — OXYCODONE HYDROCHLORIDE 5 MILLIGRAM(S): 5 TABLET ORAL at 14:38

## 2021-11-30 RX ADMIN — Medication 2: at 07:50

## 2021-11-30 RX ADMIN — Medication 4: at 12:18

## 2021-11-30 RX ADMIN — Medication 6: at 16:45

## 2021-11-30 RX ADMIN — OXYCODONE HYDROCHLORIDE 5 MILLIGRAM(S): 5 TABLET ORAL at 14:18

## 2021-11-30 RX ADMIN — Medication 1 PATCH: at 09:31

## 2021-11-30 RX ADMIN — GABAPENTIN 200 MILLIGRAM(S): 400 CAPSULE ORAL at 09:07

## 2021-11-30 RX ADMIN — PIPERACILLIN AND TAZOBACTAM 25 GRAM(S): 4; .5 INJECTION, POWDER, LYOPHILIZED, FOR SOLUTION INTRAVENOUS at 05:24

## 2021-11-30 NOTE — DIETITIAN INITIAL EVALUATION ADULT. - ETIOLOGY
r/t decreased ability to meet estimated increased needs 2/2 HIV due to difficulty procuring food/ medications

## 2021-11-30 NOTE — DIETITIAN INITIAL EVALUATION ADULT. - ADD RECOMMEND
1) Change diet to easy to chew 2/2 chewing difficulties, 2) Add ensure enlive TID to optimize PO intake, 3) MVI w/ minerals daily to ensure 100% RDA met, 4) Recommend pt to f/u w/ outpatient RD for nutrition counseling, 5) Monitor bowel movements, if no BM for >3 days, consider implementing bowel regimen, 6) Continue f/u w/ social work for food procurement issues. RD will continue to monitor PO intake, labs, hydration, and wt prn.

## 2021-11-30 NOTE — DIETITIAN INITIAL EVALUATION ADULT. - ORAL INTAKE PTA/DIET HISTORY
Pt states he lives in motor home, alone, cooks and shops for self but has difficulty accessing food. Has limited income. At one point did not have health insurance and was not taking DM meds (insulin and metformin). Now has medicaid and began taking medications again. Has some difficulty chewing 2/2 edentulous. Used to drink Ensure regularly but has since stopped 2/2 health insurance issues. Reports eating small meals.

## 2021-11-30 NOTE — DIETITIAN INITIAL EVALUATION ADULT. - NSPROEDALEARNPREF_GEN_A_NUR
[place] reading - given ADA Plate Method, "What's my A1C", and "Leaving the Hospital" by [facility]/verbal instruction/written material

## 2021-11-30 NOTE — PROGRESS NOTE ADULT - NUTRITIONAL ASSESSMENT
This patient has been assessed with a concern for Malnutrition and has been determined to have a diagnosis/diagnoses of Severe protein-calorie malnutrition.    This patient is being managed with:   Diet Consistent Carbohydrate/No Snacks-  Entered: Nov 27 2021 10:27AM    The following pending diet order is being considered for treatment of Severe protein-calorie malnutrition:  Diet Easy to Chew-  Consistent Carbohydrate {Evening Snack} (CSTCHOSN)  Entered: Nov 30 2021 11:52AM

## 2021-11-30 NOTE — DIETITIAN INITIAL EVALUATION ADULT. - OTHER INFO
61 year old man with DM, HTN, hx of head trauma, hx of lumbar fusion, chronic back pain, anxiety, insomnia, and recently diagnosed HIV (informed he had + HIV test from 11/22 and + blood culture test), presented to ED after being informed of positive blood culture. Patient was in the ED 11/23 with complaints of fever and body aches, hasn't been taking medications in two months, discharged home from ED. Called back due to abnormal blood work. Upon return this AM he reported polyuria, polydipsia, wt loss, malaise. Diminished sensation in hands and feet, trouble with vision.    Consult for A1C of 11% on 11/27; current POCT variable - pt has difficulty maintaining consistent PO intake and procuring medications for DM leading to hyperglycemia. Reports fair to poor appetite and decreased PO intake x 2 weeks 2/2 "feeling sick." # - RD took bed scale wt on 11/20/21 at 176#; unintentional wt loss of 14#/ 7.4% x 2 weeks (severe and clinically significant). Observed w/ severe muscle/ fat wasting meeting criteria for PCM. Pt states he test his BG levels 3x/day and was ranging 300-400 mg/dL when not able to take DM meds - now taking meds more regularly and ranges 100- 160 mg/dL. Social work is following w/ pt. Recommend to switch to soft (easy to chew) diet with ensure enlive TID - pt receptive. RD observed pt's breakfast tray of only 1 sl toast. Pt was given nutrition education for DM - needs reinforcement. See other recommendations below.

## 2021-11-30 NOTE — DIETITIAN INITIAL EVALUATION ADULT. - MALNUTRITION
Pt meets criteria for severe protein-calorie malnutrition in context of social/ environmental circumstances Pt meets criteria for severe protein-calorie malnutrition in context of social/ environmental circumstances r/t decreased ability to meet estimated increased needs 2/2 HIV due to difficulty procuring food/ medications AEB severe muscle/ fat wasting, 7.6% wt loss x 2 weeks, <50% energy intake x 2 weeks

## 2021-11-30 NOTE — DIETITIAN INITIAL EVALUATION ADULT. - PERTINENT LABORATORY DATA
11-29    137  |  105  |  15  ----------------------------<  228<H>  4.4   |  28  |  0.76    Ca    8.7      29 Nov 2021 08:44    POCT Blood Glucose.: 182 mg/dL (30 Nov 2021 07:46)  POCT Blood Glucose.: 195 mg/dL (29 Nov 2021 20:56)  POCT Blood Glucose.: 125 mg/dL (29 Nov 2021 16:57)  POCT Blood Glucose.: 252 mg/dL (29 Nov 2021 12:24)

## 2021-11-30 NOTE — DIETITIAN INITIAL EVALUATION ADULT. - PERTINENT MEDS FT
MEDICATIONS  (STANDING):  azithromycin   Tablet 500 milliGRAM(s) Oral daily  dextrose 40% Gel 15 Gram(s) Oral once  dextrose 5%. 1000 milliLiter(s) (50 mL/Hr) IV Continuous <Continuous>  dextrose 5%. 1000 milliLiter(s) (100 mL/Hr) IV Continuous <Continuous>  dextrose 50% Injectable 25 Gram(s) IV Push once  dextrose 50% Injectable 12.5 Gram(s) IV Push once  dextrose 50% Injectable 25 Gram(s) IV Push once  enoxaparin Injectable 40 milliGRAM(s) SubCutaneous daily  gabapentin 200 milliGRAM(s) Oral daily  glucagon  Injectable 1 milliGRAM(s) IntraMuscular once  insulin glargine Injectable (LANTUS) 15 Unit(s) SubCutaneous at bedtime  insulin lispro (ADMELOG) corrective regimen sliding scale   SubCutaneous three times a day before meals  insulin lispro (ADMELOG) corrective regimen sliding scale   SubCutaneous at bedtime  insulin lispro Injectable (ADMELOG) 3 Unit(s) SubCutaneous three times a day before meals  lisinopril 5 milliGRAM(s) Oral daily  nicotine - 21 mG/24Hr(s) Patch 1 patch Transdermal daily  piperacillin/tazobactam IVPB.. 3.375 Gram(s) IV Intermittent every 8 hours    MEDICATIONS  (PRN):  acetaminophen     Tablet .. 650 milliGRAM(s) Oral every 6 hours PRN Temp greater or equal to 38C (100.4F), Mild Pain (1 - 3), Moderate Pain (4 - 6)  melatonin 5 milliGRAM(s) Oral at bedtime PRN Sleep  oxyCODONE    IR 5 milliGRAM(s) Oral every 4 hours PRN Severe Pain (7 - 10)

## 2021-11-30 NOTE — PROGRESS NOTE ADULT - SUBJECTIVE AND OBJECTIVE BOX
Cheif complaints and Diagnosis: PNA/ UTI/ newly dx HIV    Subjective: no complaints      REVIEW OF SYSTEMS:    CONSTITUTIONAL: No weakness, fevers or chills  EYES/ENT: No visual changes;  No vertigo or throat pain   NECK: No pain or stiffness  RESPIRATORY: No cough, wheezing, hemoptysis; No shortness of breath  CARDIOVASCULAR: No chest pain or palpitations  GASTROINTESTINAL: No abdominal or epigastric pain. No nausea, vomiting, or hematemesis; No diarrhea or constipation. No melena or hematochezia.  GENITOURINARY: No dysuria, frequency or hematuria  NEUROLOGICAL: No numbness or weakness  SKIN: No itching, burning, rashes, or lesions   All other review of systems is negative unless indicated above      Vital Signs Last 24 Hrs  T(C): 36.1 (30 Nov 2021 08:33), Max: 36.4 (29 Nov 2021 15:00)  T(F): 97 (30 Nov 2021 08:33), Max: 97.6 (29 Nov 2021 15:00)  HR: 63 (30 Nov 2021 08:33) (63 - 71)  BP: 111/65 (30 Nov 2021 08:33) (111/65 - 125/64)  BP(mean): --  RR: 18 (30 Nov 2021 08:33) (18 - 18)  SpO2: 98% (30 Nov 2021 08:33) (94% - 98%)    HEENT:   pupils equal and reactive, EOMI, no oropharyngeal lesions, erythema, exudates, oral thrush    NECK:   supple, no carotid bruits, no palpable lymph nodes, no thyromegaly    CV:  +S1, +S2, regular, no murmurs or rubs    RESP:   lungs clear to auscultation bilaterally, no wheezing, rales, rhonchi, good air entry bilaterally    BREAST:  not examined    GI:  abdomen soft, non-tender, non-distended, normal BS, no bruits, no abdominal masses, no palpable masses    RECTAL:  not examined    :  not examined    MSK:   normal muscle tone, no atrophy, no rigidity, no contractions    EXT:   no clubbing, no cyanosis, no edema, no calf pain, swelling or erythema    VASCULAR:  pulses equal and symmetric in the upper and lower extremities    NEURO:  AAOX3, no focal neurological deficits, follows all commands, able to move extremities spontaneously    SKIN:  no ulcers, lesions or rashes    MEDICATIONS  (STANDING):  azithromycin   Tablet 500 milliGRAM(s) Oral daily  dextrose 40% Gel 15 Gram(s) Oral once  dextrose 5%. 1000 milliLiter(s) (50 mL/Hr) IV Continuous <Continuous>  dextrose 5%. 1000 milliLiter(s) (100 mL/Hr) IV Continuous <Continuous>  dextrose 50% Injectable 25 Gram(s) IV Push once  dextrose 50% Injectable 12.5 Gram(s) IV Push once  dextrose 50% Injectable 25 Gram(s) IV Push once  enoxaparin Injectable 40 milliGRAM(s) SubCutaneous daily  gabapentin 200 milliGRAM(s) Oral daily  glucagon  Injectable 1 milliGRAM(s) IntraMuscular once  insulin glargine Injectable (LANTUS) 15 Unit(s) SubCutaneous at bedtime  insulin lispro (ADMELOG) corrective regimen sliding scale   SubCutaneous three times a day before meals  insulin lispro (ADMELOG) corrective regimen sliding scale   SubCutaneous at bedtime  insulin lispro Injectable (ADMELOG) 3 Unit(s) SubCutaneous three times a day before meals  lisinopril 5 milliGRAM(s) Oral daily  nicotine - 21 mG/24Hr(s) Patch 1 patch Transdermal daily  piperacillin/tazobactam IVPB.. 3.375 Gram(s) IV Intermittent every 8 hours    MEDICATIONS  (PRN):  acetaminophen     Tablet .. 650 milliGRAM(s) Oral every 6 hours PRN Temp greater or equal to 38C (100.4F), Mild Pain (1 - 3), Moderate Pain (4 - 6)  melatonin 5 milliGRAM(s) Oral at bedtime PRN Sleep  oxyCODONE    IR 5 milliGRAM(s) Oral every 4 hours PRN Severe Pain (7 - 10)      29 Nov 2021 08:44    137    |  105    |  15     ----------------------------<  228    4.4     |  28     |  0.76     Ca    8.7        29 Nov 2021 08:44                  Assessment and Plan:     61 year old man with DM, HTN, hx of head trauma, hx of lumbar fusion, chronic back pain, anxiety, insomnia, presented to ED 11/26 after being called to return due to findings of positive blood culture and positive HIV test from earlier ED visit 11/23 when he had complaints of fever and body aches, hadn't been taking medications in two months, and was discharged home from ED after a brief workup. Upon his return 11/26, he reported rare dry cough, malaise, body aches, polyuria, polydipsia, wt loss. Diminished sensation in hands and feet, and also blurry vision. No chest pain or dyspnea. No fevers now. No rigors. No abdominal pain, nausea, vomiting, diarrhea, constipation. No hematuria or dysuria. No rash. No other complaints. In ED, patient afebrile, generally well appearing but a bit unkempt, thin. Labs notable for hyperglycemia, urine ketones, no anion gap. UA slightly abnormal with mod bacteria but N-, LE- very few WBCs, given empiric dose of ceftriaxone, IVFs and insulin for the hyperglycemia and admitted to Medicine.       PNA gram negative / UTI   -urine cultures growing gram positive organism; official pending  -Ct chest RLL PNA; Legionella positive; c/w Azithromycin  -s/p iv ceftriaxone  -change abx to Zosyn to cover urine and pna    DM, uncontrolled, with hyperglycemia, present upon admission  -lantus 15 units qhs  -c/w sliding scale  -hba1c is 11;   -patient was prescribed Insulin by outpatient provider; d/w patient, he says he was not injecting himself.  Unlikley this patient will inject insulin . For dc , oral therapy will be best.     Pseudo Hyponatremia  Corrected for hyperglycemia, Na was WNL.  - Continue to monitor    Recently diagnosed HIV  Partner aware. CD4 and VL in process. HBV HCV neg. T.pallidum Ab neg. GC/Chlamydia testing requested. Patient advised to follow up outpatient with HIV specialist. Appreciate input from ID.   - CD4 > 200 ; viral load 100K   - F/u GC/Chlamydia >> negative  - Needs to have appointment with [facility] and Dr. Pedro Bruner for continued care and start of HAART    HTN  Had been on lisinopril, not taking meds in past 2 months. Restarted lisinopril with good effect  - Continue lisinopril, monitor BP    Chronic back pain, hx of lumbar fusion  Reviewed MRI from 2/2020. Severe lumbar DJD L3-L4 through L5-S1, no acute findings then.   - Pain control as needed, patient given Toradol today with partial improvement    Suspected DM neuropathy  Involving hands and feet B/L. Started on Gabapentin.  - Titrate up gabapentin as tolerated, assess for improvement  - Outpatient Podiatry evaluation    Tobacco use disorder  Smokes 1 PPD cigarettes, agreeable for nicotine patch, ordered.  - Continue nicotine patch  - Encouraged cessation of tobacco use      Diet: DM diet  DVT px: LMWH  Code status: Full  Emergency contact: Nelli (ex-spouse) [phone]  Dispo: He is estranged from his wife, lives in what he describes as a dilapidated, frigid trailer. He is requesting to be discharged to shelter when medically-cleared. Covering CM aware and will notify 2SW CM/SW team.

## 2021-12-01 ENCOUNTER — TRANSCRIPTION ENCOUNTER (OUTPATIENT)
Age: 61
End: 2021-12-01

## 2021-12-01 VITALS
RESPIRATION RATE: 18 BRPM | DIASTOLIC BLOOD PRESSURE: 57 MMHG | HEART RATE: 71 BPM | OXYGEN SATURATION: 97 % | SYSTOLIC BLOOD PRESSURE: 111 MMHG | TEMPERATURE: 97 F

## 2021-12-01 PROCEDURE — 99239 HOSP IP/OBS DSCHRG MGMT >30: CPT

## 2021-12-01 PROCEDURE — G9005: CPT

## 2021-12-01 RX ORDER — GLYBURIDE-METFORMIN HYDROCHLORIDE 1.25; 25 MG/1; MG/1
1 TABLET ORAL
Qty: 60 | Refills: 1
Start: 2021-12-01 | End: 2022-01-29

## 2021-12-01 RX ORDER — LISINOPRIL 2.5 MG/1
1 TABLET ORAL
Qty: 30 | Refills: 0
Start: 2021-12-01 | End: 2021-12-30

## 2021-12-01 RX ORDER — LANOLIN ALCOHOL/MO/W.PET/CERES
1 CREAM (GRAM) TOPICAL
Qty: 0 | Refills: 0 | DISCHARGE
Start: 2021-12-01

## 2021-12-01 RX ORDER — OXYCODONE HYDROCHLORIDE 5 MG/1
1 TABLET ORAL
Qty: 3 | Refills: 0
Start: 2021-12-01 | End: 2021-12-03

## 2021-12-01 RX ORDER — AZITHROMYCIN 500 MG/1
1 TABLET, FILM COATED ORAL
Qty: 10 | Refills: 0
Start: 2021-12-01 | End: 2021-12-10

## 2021-12-01 RX ORDER — AZITHROMYCIN 500 MG/1
500 TABLET, FILM COATED ORAL DAILY
Refills: 0 | Status: DISCONTINUED | OUTPATIENT
Start: 2021-12-01 | End: 2021-12-01

## 2021-12-01 RX ORDER — AZITHROMYCIN 500 MG/1
1 TABLET, FILM COATED ORAL
Qty: 3 | Refills: 0
Start: 2021-12-01 | End: 2021-12-03

## 2021-12-01 RX ADMIN — Medication 4: at 08:05

## 2021-12-01 RX ADMIN — ENOXAPARIN SODIUM 40 MILLIGRAM(S): 100 INJECTION SUBCUTANEOUS at 09:15

## 2021-12-01 RX ADMIN — Medication 1 PATCH: at 09:15

## 2021-12-01 RX ADMIN — Medication 1 PATCH: at 07:30

## 2021-12-01 RX ADMIN — AZITHROMYCIN 500 MILLIGRAM(S): 500 TABLET, FILM COATED ORAL at 09:15

## 2021-12-01 RX ADMIN — LISINOPRIL 5 MILLIGRAM(S): 2.5 TABLET ORAL at 09:15

## 2021-12-01 RX ADMIN — OXYCODONE HYDROCHLORIDE 5 MILLIGRAM(S): 5 TABLET ORAL at 13:33

## 2021-12-01 RX ADMIN — OXYCODONE HYDROCHLORIDE 5 MILLIGRAM(S): 5 TABLET ORAL at 06:42

## 2021-12-01 RX ADMIN — Medication 3 UNIT(S): at 12:21

## 2021-12-01 RX ADMIN — Medication 6: at 12:21

## 2021-12-01 RX ADMIN — GABAPENTIN 200 MILLIGRAM(S): 400 CAPSULE ORAL at 09:15

## 2021-12-01 RX ADMIN — Medication 3 UNIT(S): at 08:05

## 2021-12-01 RX ADMIN — PIPERACILLIN AND TAZOBACTAM 25 GRAM(S): 4; .5 INJECTION, POWDER, LYOPHILIZED, FOR SOLUTION INTRAVENOUS at 05:43

## 2021-12-01 RX ADMIN — Medication 1 PATCH: at 09:16

## 2021-12-01 RX ADMIN — OXYCODONE HYDROCHLORIDE 5 MILLIGRAM(S): 5 TABLET ORAL at 07:30

## 2021-12-01 NOTE — DISCHARGE NOTE NURSING/CASE MANAGEMENT/SOCIAL WORK - NSDCPEFALRISK_GEN_ALL_CORE
For information on Fall & Injury Prevention, visit: [email or web address] OR  [email or web address] OR  https://www.cdc.gov/steadi/patient.html

## 2021-12-01 NOTE — DISCHARGE NOTE PROVIDER - NSDCCPCAREPLAN_GEN_ALL_CORE_FT
PRINCIPAL DISCHARGE DIAGNOSIS  Diagnosis: ETOH abuse  Assessment and Plan of Treatment:   *stop all alchohol intake  *seek outpatient group therapy      SECONDARY DISCHARGE DIAGNOSES  Diagnosis: Acute UTI  Assessment and Plan of Treatment:   *Enterococcus growing in urine.   *continue with oral Augmentin for 5 more days    Diagnosis: HIV infection  Assessment and Plan of Treatment:   *follow up outpatient at [facility] for apointment to start your medication treatment for HIV.    Diagnosis: Gram-negative pneumonia  Assessment and Plan of Treatment:   *continue with Azithromycin for 3 more days  *continue with oral Augmentin for 5 more days.   *Follow up outpatient at [facility] in one week.

## 2021-12-01 NOTE — DISCHARGE NOTE NURSING/CASE MANAGEMENT/SOCIAL WORK - NSDCFUADDAPPT_GEN_ALL_CORE_FT
[facility] -[address] [place] [address]  December 16th, 2021  Dr. Murray - 10:20a

## 2021-12-01 NOTE — DISCHARGE NOTE NURSING/CASE MANAGEMENT/SOCIAL WORK - PATIENT PORTAL LINK FT
You can access the FollowMyHealth Patient Portal offered by [facility] by registering at the following website: [email or web address]. By joining [facility]’s FollowMyHealth portal, you will also be able to view your health information using other applications (apps) compatible with our system.

## 2021-12-01 NOTE — DISCHARGE NOTE PROVIDER - HOSPITAL COURSE
Vital Signs Last 24 Hrs  T(C): 36.2 (01 Dec 2021 08:05), Max: 36.4 (30 Nov 2021 16:18)  T(F): 97.2 (01 Dec 2021 08:05), Max: 97.6 (30 Nov 2021 16:18)  HR: 71 (01 Dec 2021 08:05) (71 - 75)  BP: 111/57 (01 Dec 2021 08:05) (110/54 - 111/57)  BP(mean): --  RR: 18 (01 Dec 2021 08:05) (18 - 18)  SpO2: 97% (01 Dec 2021 08:05) (96% - 97%)    HEENT:   pupils equal and reactive, EOMI, no oropharyngeal lesions, erythema, exudates, oral thrush    NECK:   supple, no carotid bruits, no palpable lymph nodes, no thyromegaly    CV:  +S1, +S2, regular, no murmurs or rubs    RESP:   lungs clear to auscultation bilaterally, no wheezing, rales, rhonchi, good air entry bilaterally    BREAST:  not examined    GI:  abdomen soft, non-tender, non-distended, normal BS, no bruits, no abdominal masses, no palpable masses    RECTAL:  not examined    :  not examined    MSK:   normal muscle tone, no atrophy, no rigidity, no contractions    EXT:   no clubbing, no cyanosis, no edema, no calf pain, swelling or erythema    VASCULAR:  pulses equal and symmetric in the upper and lower extremities    NEURO:  AAOX3, no focal neurological deficits, follows all commands, able to move extremities spontaneously    SKIN:  no ulcers, lesions or rashes          Hospital Course:     61 year old man with DM, HTN, hx of head trauma, hx of lumbar fusion, chronic back pain, anxiety, insomnia, active smoker 1PPD, presented to ED 11/26 after being called to return due to findings of positive blood culture and positive HIV test from earlier ED visit 11/23 when he had complaints of fever and body aches, hadn't been taking medications in two months, and was discharged home from ED after a brief workup.     PNA gram negative / UTI   -urine cultures growing Enterococcus  -Ct chest RLL PNA; Legionella positive; c/w Azithromycin  -s/p iv ceftriaxone  -change abx to Zosyn to cover urine and pna  -for discharge , will send on oral Augmentin and zithromax    DM, uncontrolled, with hyperglycemia, present upon admission  -hba1c is 11;   -patient non compliant, was prescribed Insulin by outpatient provider; d/w patient, he says he was not injecting himself, never picked up insulin. Patient is unkempt and unorganized. says he can not see properly.   Matiley this patient will inject insulin . For dc , oral therapy will be best.       Recently diagnosed HIV  Partner aware. CD4 and VL in process. HBV HCV neg. T.pallidum Ab neg. GC/Chlamydia testing requested. Patient advised to follow up outpatient with HIV specialist. Appreciate input from ID.   - CD4 > 200 ; viral load 100K   - F/u GC/Chlamydia >> negative  - Needs to have appointment with Brenden and Dr. Pedro Bruner for continued care and start of HAART        Chronic back pain, hx of lumbar fusion  Reviewed MRI from 2/2020. Severe lumbar DJD L3-L4 through L5-S1, no acute findings then.   - Pain control as needed; patient asking for percocet. will send 3 tabs.    no
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
## 2021-12-01 NOTE — PROGRESS NOTE ADULT - REASON FOR ADMISSION
Uncontrolled DM, + HIV test from 11/22 and + blood culture from 11/22 (coag negative Staph)
Uncontrolled DM, (+) HIV test from 11/22 and (+) blood culture from 11/22 (coag negative Staph)
Uncontrolled DM, + HIV test from 11/22 and + blood culture from 11/22 (coag negative Staph)
Uncontrolled DM, + HIV test from 11/22 and + blood culture from 11/22 (coag negative Staph)

## 2021-12-01 NOTE — DISCHARGE NOTE NURSING/CASE MANAGEMENT/SOCIAL WORK - NSDCVIVACCINE_GEN_ALL_CORE_FT
Tdap; 27-Aug-2018 13:17; Pamela Albarran (CATY); [employer]; [id number]; IntraMuscular; Deltoid Left.; 0.5 milliLiter(s); VIS (VIS Published: 09-May-2013, VIS Presented: 27-Aug-2018);   Tdap; 09-Feb-2019 17:54; Abby Marion); [employer]aste[employer]; [id number] (Exp. Date: 02-Nov-2020); IntraMuscular; Deltoid Left.; 0.5 milliLiter(s); VIS (VIS Published: 09-May-2013, VIS Presented: 09-Feb-2019);

## 2021-12-01 NOTE — PROGRESS NOTE ADULT - SUBJECTIVE AND OBJECTIVE BOX
Date of service: 12-01-21 @ 14:40    Patient lying in bed; no complaints; found to have Legionella in urine antigen test        ROS: unable to obtain secondary to patient medical condition     MEDICATIONS  (STANDING):  azithromycin   Tablet 500 milliGRAM(s) Oral daily  dextrose 40% Gel 15 Gram(s) Oral once  dextrose 5%. 1000 milliLiter(s) (100 mL/Hr) IV Continuous <Continuous>  dextrose 5%. 1000 milliLiter(s) (50 mL/Hr) IV Continuous <Continuous>  dextrose 50% Injectable 25 Gram(s) IV Push once  dextrose 50% Injectable 12.5 Gram(s) IV Push once  dextrose 50% Injectable 25 Gram(s) IV Push once  enoxaparin Injectable 40 milliGRAM(s) SubCutaneous daily  gabapentin 200 milliGRAM(s) Oral daily  glucagon  Injectable 1 milliGRAM(s) IntraMuscular once  insulin glargine Injectable (LANTUS) 15 Unit(s) SubCutaneous at bedtime  insulin lispro (ADMELOG) corrective regimen sliding scale   SubCutaneous three times a day before meals  insulin lispro (ADMELOG) corrective regimen sliding scale   SubCutaneous at bedtime  insulin lispro Injectable (ADMELOG) 3 Unit(s) SubCutaneous three times a day before meals  lisinopril 5 milliGRAM(s) Oral daily  nicotine - 21 mG/24Hr(s) Patch 1 patch Transdermal daily  piperacillin/tazobactam IVPB.. 3.375 Gram(s) IV Intermittent every 8 hours  zolpidem 5 milliGRAM(s) Oral at bedtime    MEDICATIONS  (PRN):  acetaminophen     Tablet .. 650 milliGRAM(s) Oral every 6 hours PRN Temp greater or equal to 38C (100.4F), Mild Pain (1 - 3), Moderate Pain (4 - 6)  melatonin 5 milliGRAM(s) Oral at bedtime PRN Sleep  oxyCODONE    IR 5 milliGRAM(s) Oral every 4 hours PRN Severe Pain (7 - 10)      Vital Signs Last 24 Hrs  T(C): 36.2 (01 Dec 2021 08:05), Max: 36.4 (30 Nov 2021 16:18)  T(F): 97.2 (01 Dec 2021 08:05), Max: 97.6 (30 Nov 2021 16:18)  HR: 71 (01 Dec 2021 08:05) (71 - 75)  BP: 111/57 (01 Dec 2021 08:05) (110/54 - 111/57)  BP(mean): --  RR: 18 (01 Dec 2021 08:05) (18 - 18)  SpO2: 97% (01 Dec 2021 08:05) (96% - 97%)        Physical Exam:            Constitutional: frail looking  HEENT: NC/AT, EOMI, PERRLA, conjunctivae clear; ears and nose atraumatic; pharynx clear; poor dentition  Neck: supple; thyroid not palpable  Back: no tenderness  Respiratory: respiratory effort normal; clear to auscultation  Cardiovascular: S1S2 regular, no murmurs  Abdomen: soft, not tender, not distended, positive BS; no liver or spleen organomegaly  Genitourinary: no suprapubic tenderness  Musculoskeletal: no muscle tenderness, no joint swelling or tenderness  Neurological/ Psychiatric: AxOx3, judgement and insight normal;  moving all extremities  Skin: no rashes; no palpable lesions    Labs: all available labs reviewed                          Labs:  Legionella pneumophila Antigen, Urine (11.28.21 @ 17:00)    Legionella Antigen, Urine: Positive: Positive Testing method: Immunochromatographic Assay.  Presumptive detection of L. pneumophila serogroup 1 antigen in urine,  suggesting recent or current infection. Order “Culture –Legionella” as  recommended to confirm infection.  Negative Testing method: Immunochromatographic Assay.  L. pneumophila serogroup 1 antigen in urine NOT detected, suggesting NO  recent or current infection. Infection due to Legionella cannot be ruled  out: other serogroups and species may cause disease, antigen may not be  present in urine in early infection, or the level of antigens in urine  may be below the detection limit of the test.  Order “Culture  –Legionella” is recommended for uncommon cases of suspected Legionella  pneumonia due to organisms other thanL. pneumophila serogroup 1.            T Cell Subset (11.27.21 @ 11:52)    CD3 %: 65: Reference ranges for pediatric population (0-18 years old) are from  Rashid Wahl, et al. "Lymphocyte subsets in healthy children from  birth through 18 years of age: the Pediatric AIDS Clinical Trials Group   study. "Journal of Allergy and Clinical Immunology 112.5 (2003):  973-980.  Reference range for adult (18-65 years old) and geriatric (65 years old  and above) populations are developed at [place], [place]. %    CD4 %: 37 %    CD8 %: 25 %    4/8 Ratio: 1.48 RATIO    ABS CD3: 605: Results are based on a sample tested 30 hours post-collection and  partially compromised due to the presence of greater than 20% non-viable  leukocytes. /uL    ABS CD4: 343 /uL    ABS CD8: 233 /uL           Cultures:       Culture - Urine (collected 11-26-21 @ 23:42)  Source: Clean Catch None  Final Report (11-30-21 @ 14:12):    >100,000 CFU/ml Enterococcus faecalis  Organism: Enterococcus faecalis (11-30-21 @ 14:12)  Organism: Enterococcus faecalis (11-30-21 @ 14:12)      -  Ampicillin: S <=2 Predicts results to ampicillin/sulbactam, amoxacillin-clavulanate and  piperacillin-tazobactam.      -  Ciprofloxacin: S <=1      -  Levofloxacin: S <=1      -  Nitrofurantoin: S <=32 Should not be used to treat pyelonephritis.      -  Tetra/Doxy: R >8      -  Vancomycin: S 4      Method Type: RACHEL    Culture - Blood (collected 11-26-21 @ 23:00)  Source: .Blood None  Preliminary Report (11-28-21 @ 04:00):    No growth to date.    Culture - Blood (collected 11-26-21 @ 21:54)  Source: .Blood None  Preliminary Report (11-28-21 @ 04:00):    No growth to date.                    Blood culture from 11/23--- coag neg staph  HIV rapid test- reactive    Radiology: all available radiological tests reviewed    Advanced directives addressed: full resuscitation

## 2021-12-01 NOTE — DISCHARGE NOTE PROVIDER - NSDCMRMEDTOKEN_GEN_ALL_CORE_FT
amoxicillin-clavulanate 875 mg-125 mg oral tablet: 1 tab(s) orally 2 times a day   azithromycin 500 mg oral tablet: 1 tab(s) orally once a day   glyburide-metformin 2.5 mg-500 mg oral tablet: 1 tab(s) orally 2 times a day   lisinopril 5 mg oral tablet: 1 tab(s) orally once a day  melatonin 5 mg oral tablet: 1 tab(s) orally once a day (at bedtime), As needed, Sleep  oxyCODONE 5 mg oral tablet: 1 tab(s) orally once a day MDD:1 tab

## 2021-12-01 NOTE — DISCHARGE NOTE PROVIDER - DETAILS OF MALNUTRITION DIAGNOSIS/DIAGNOSES
This patient has been assessed with a concern for Malnutrition and was treated during this hospitalization for the following Nutrition diagnosis/diagnoses:     -  11/30/2021: Severe protein-calorie malnutrition

## 2021-12-02 LAB
CULTURE RESULTS: SIGNIFICANT CHANGE UP
CULTURE RESULTS: SIGNIFICANT CHANGE UP
SPECIMEN SOURCE: SIGNIFICANT CHANGE UP
SPECIMEN SOURCE: SIGNIFICANT CHANGE UP

## 2021-12-03 DIAGNOSIS — Z71.89 OTHER SPECIFIED COUNSELING: ICD-10-CM

## 2021-12-09 ENCOUNTER — EMERGENCY (EMERGENCY)
Facility: HOSPITAL | Age: 61
LOS: 0 days | Discharge: HOME | End: 2021-12-09
Attending: STUDENT IN AN ORGANIZED HEALTH CARE EDUCATION/TRAINING PROGRAM
Payer: MEDICAID

## 2021-12-09 VITALS
SYSTOLIC BLOOD PRESSURE: 126 MMHG | DIASTOLIC BLOOD PRESSURE: 67 MMHG | TEMPERATURE: 98 F | HEART RATE: 88 BPM | HEIGHT: 69 IN | OXYGEN SATURATION: 98 % | RESPIRATION RATE: 18 BRPM | WEIGHT: 149.91 LBS

## 2021-12-09 VITALS
HEART RATE: 99 BPM | TEMPERATURE: 98 F | SYSTOLIC BLOOD PRESSURE: 141 MMHG | RESPIRATION RATE: 18 BRPM | OXYGEN SATURATION: 96 % | DIASTOLIC BLOOD PRESSURE: 72 MMHG

## 2021-12-09 DIAGNOSIS — Z20.822 CONTACT WITH AND (SUSPECTED) EXPOSURE TO COVID-19: ICD-10-CM

## 2021-12-09 DIAGNOSIS — Z21 ASYMPTOMATIC HUMAN IMMUNODEFICIENCY VIRUS [HIV] INFECTION STATUS: ICD-10-CM

## 2021-12-09 DIAGNOSIS — M79.10 MYALGIA, UNSPECIFIED SITE: ICD-10-CM

## 2021-12-09 DIAGNOSIS — I10 ESSENTIAL (PRIMARY) HYPERTENSION: ICD-10-CM

## 2021-12-09 DIAGNOSIS — E11.65 TYPE 2 DIABETES MELLITUS WITH HYPERGLYCEMIA: ICD-10-CM

## 2021-12-09 DIAGNOSIS — R42 DIZZINESS AND GIDDINESS: ICD-10-CM

## 2021-12-09 DIAGNOSIS — Z98.1 ARTHRODESIS STATUS: Chronic | ICD-10-CM

## 2021-12-09 DIAGNOSIS — R53.1 WEAKNESS: ICD-10-CM

## 2021-12-09 LAB
ACETONE SERPL-MCNC: NEGATIVE — SIGNIFICANT CHANGE UP
ALBUMIN SERPL ELPH-MCNC: 2.8 G/DL — LOW (ref 3.3–5)
ALP SERPL-CCNC: 68 U/L — SIGNIFICANT CHANGE UP (ref 40–120)
ALT FLD-CCNC: 24 U/L — SIGNIFICANT CHANGE UP (ref 12–78)
ANION GAP SERPL CALC-SCNC: 4 MMOL/L — LOW (ref 5–17)
AST SERPL-CCNC: 8 U/L — LOW (ref 15–37)
BASE EXCESS BLDV CALC-SCNC: 1.7 MMOL/L — SIGNIFICANT CHANGE UP
BASOPHILS # BLD AUTO: 0.03 K/UL — SIGNIFICANT CHANGE UP (ref 0–0.2)
BASOPHILS NFR BLD AUTO: 0.6 % — SIGNIFICANT CHANGE UP (ref 0–2)
BILIRUB SERPL-MCNC: 0.3 MG/DL — SIGNIFICANT CHANGE UP (ref 0.2–1.2)
BUN SERPL-MCNC: 14 MG/DL — SIGNIFICANT CHANGE UP (ref 7–23)
CALCIUM SERPL-MCNC: 8.9 MG/DL — SIGNIFICANT CHANGE UP (ref 8.5–10.1)
CHLORIDE SERPL-SCNC: 102 MMOL/L — SIGNIFICANT CHANGE UP (ref 96–108)
CK SERPL-CCNC: 79 U/L — SIGNIFICANT CHANGE UP (ref 26–308)
CO2 BLDV-SCNC: 30 MMOL/L — HIGH (ref 22–26)
CO2 SERPL-SCNC: 31 MMOL/L — SIGNIFICANT CHANGE UP (ref 22–31)
CREAT SERPL-MCNC: 0.93 MG/DL — SIGNIFICANT CHANGE UP (ref 0.5–1.3)
EOSINOPHIL # BLD AUTO: 0.04 K/UL — SIGNIFICANT CHANGE UP (ref 0–0.5)
EOSINOPHIL NFR BLD AUTO: 0.8 % — SIGNIFICANT CHANGE UP (ref 0–6)
GLUCOSE SERPL-MCNC: 323 MG/DL — HIGH (ref 70–99)
HCO3 BLDV-SCNC: 28 MMOL/L — SIGNIFICANT CHANGE UP (ref 22–29)
HCT VFR BLD CALC: 40.6 % — SIGNIFICANT CHANGE UP (ref 39–50)
HGB BLD-MCNC: 13 G/DL — SIGNIFICANT CHANGE UP (ref 13–17)
IMM GRANULOCYTES NFR BLD AUTO: 0.4 % — SIGNIFICANT CHANGE UP (ref 0–1.5)
LYMPHOCYTES # BLD AUTO: 1.33 K/UL — SIGNIFICANT CHANGE UP (ref 1–3.3)
LYMPHOCYTES # BLD AUTO: 25 % — SIGNIFICANT CHANGE UP (ref 13–44)
MCHC RBC-ENTMCNC: 28.7 PG — SIGNIFICANT CHANGE UP (ref 27–34)
MCHC RBC-ENTMCNC: 32 GM/DL — SIGNIFICANT CHANGE UP (ref 32–36)
MCV RBC AUTO: 89.6 FL — SIGNIFICANT CHANGE UP (ref 80–100)
MONOCYTES # BLD AUTO: 0.5 K/UL — SIGNIFICANT CHANGE UP (ref 0–0.9)
MONOCYTES NFR BLD AUTO: 9.4 % — SIGNIFICANT CHANGE UP (ref 2–14)
NEUTROPHILS # BLD AUTO: 3.39 K/UL — SIGNIFICANT CHANGE UP (ref 1.8–7.4)
NEUTROPHILS NFR BLD AUTO: 63.8 % — SIGNIFICANT CHANGE UP (ref 43–77)
PCO2 BLDV: 51 MMHG — SIGNIFICANT CHANGE UP (ref 42–55)
PH BLDV: 7.35 — SIGNIFICANT CHANGE UP (ref 7.32–7.43)
PLATELET # BLD AUTO: 390 K/UL — SIGNIFICANT CHANGE UP (ref 150–400)
PO2 BLDV: 105 MMHG — SIGNIFICANT CHANGE UP
POTASSIUM SERPL-MCNC: 4.3 MMOL/L — SIGNIFICANT CHANGE UP (ref 3.5–5.3)
POTASSIUM SERPL-SCNC: 4.3 MMOL/L — SIGNIFICANT CHANGE UP (ref 3.5–5.3)
PROT SERPL-MCNC: 7.3 GM/DL — SIGNIFICANT CHANGE UP (ref 6–8.3)
RBC # BLD: 4.53 M/UL — SIGNIFICANT CHANGE UP (ref 4.2–5.8)
RBC # FLD: 14.1 % — SIGNIFICANT CHANGE UP (ref 10.3–14.5)
SAO2 % BLDV: 98.4 % — SIGNIFICANT CHANGE UP
SARS-COV-2 RNA SPEC QL NAA+PROBE: SIGNIFICANT CHANGE UP
SODIUM SERPL-SCNC: 137 MMOL/L — SIGNIFICANT CHANGE UP (ref 135–145)
TROPONIN I, HIGH SENSITIVITY RESULT: 6.17 NG/L — SIGNIFICANT CHANGE UP
WBC # BLD: 5.31 K/UL — SIGNIFICANT CHANGE UP (ref 3.8–10.5)
WBC # FLD AUTO: 5.31 K/UL — SIGNIFICANT CHANGE UP (ref 3.8–10.5)

## 2021-12-09 PROCEDURE — 99284 EMERGENCY DEPT VISIT MOD MDM: CPT | Mod: 25

## 2021-12-09 PROCEDURE — 82550 ASSAY OF CK (CPK): CPT

## 2021-12-09 PROCEDURE — 82962 GLUCOSE BLOOD TEST: CPT

## 2021-12-09 PROCEDURE — 71045 X-RAY EXAM CHEST 1 VIEW: CPT | Mod: 26

## 2021-12-09 PROCEDURE — 85025 COMPLETE CBC W/AUTO DIFF WBC: CPT

## 2021-12-09 PROCEDURE — 36415 COLL VENOUS BLD VENIPUNCTURE: CPT

## 2021-12-09 PROCEDURE — 84484 ASSAY OF TROPONIN QUANT: CPT

## 2021-12-09 PROCEDURE — 93005 ELECTROCARDIOGRAM TRACING: CPT

## 2021-12-09 PROCEDURE — 82009 KETONE BODYS QUAL: CPT

## 2021-12-09 PROCEDURE — 71045 X-RAY EXAM CHEST 1 VIEW: CPT

## 2021-12-09 PROCEDURE — 80053 COMPREHEN METABOLIC PANEL: CPT

## 2021-12-09 PROCEDURE — 93010 ELECTROCARDIOGRAM REPORT: CPT

## 2021-12-09 PROCEDURE — U0005: CPT

## 2021-12-09 PROCEDURE — 99285 EMERGENCY DEPT VISIT HI MDM: CPT

## 2021-12-09 PROCEDURE — 82803 BLOOD GASES ANY COMBINATION: CPT

## 2021-12-09 PROCEDURE — U0003: CPT

## 2021-12-09 RX ORDER — LISINOPRIL 2.5 MG/1
1 TABLET ORAL
Qty: 30 | Refills: 0
Start: 2021-12-09 | End: 2022-01-07

## 2021-12-09 RX ORDER — SODIUM CHLORIDE 9 MG/ML
1000 INJECTION INTRAMUSCULAR; INTRAVENOUS; SUBCUTANEOUS ONCE
Refills: 0 | Status: COMPLETED | OUTPATIENT
Start: 2021-12-09 | End: 2021-12-09

## 2021-12-09 RX ORDER — AZITHROMYCIN 500 MG/1
1 TABLET, FILM COATED ORAL
Qty: 10 | Refills: 0
Start: 2021-12-09 | End: 2021-12-18

## 2021-12-09 RX ORDER — GLYBURIDE-METFORMIN HYDROCHLORIDE 1.25; 25 MG/1; MG/1
1 TABLET ORAL
Qty: 60 | Refills: 1
Start: 2021-12-09 | End: 2022-02-06

## 2021-12-09 RX ORDER — OXYCODONE HYDROCHLORIDE 5 MG/1
1 TABLET ORAL
Qty: 3 | Refills: 0
Start: 2021-12-09 | End: 2021-12-11

## 2021-12-09 RX ORDER — ACETAMINOPHEN 500 MG
650 TABLET ORAL ONCE
Refills: 0 | Status: COMPLETED | OUTPATIENT
Start: 2021-12-09 | End: 2021-12-09

## 2021-12-09 RX ORDER — INSULIN HUMAN 100 [IU]/ML
10 INJECTION, SOLUTION SUBCUTANEOUS ONCE
Refills: 0 | Status: COMPLETED | OUTPATIENT
Start: 2021-12-09 | End: 2021-12-09

## 2021-12-09 RX ORDER — LANOLIN ALCOHOL/MO/W.PET/CERES
1 CREAM (GRAM) TOPICAL
Qty: 60 | Refills: 0
Start: 2021-12-09

## 2021-12-09 RX ADMIN — SODIUM CHLORIDE 1000 MILLILITER(S): 9 INJECTION INTRAMUSCULAR; INTRAVENOUS; SUBCUTANEOUS at 14:06

## 2021-12-09 RX ADMIN — INSULIN HUMAN 10 UNIT(S): 100 INJECTION, SOLUTION SUBCUTANEOUS at 15:39

## 2021-12-09 RX ADMIN — SODIUM CHLORIDE 1000 MILLILITER(S): 9 INJECTION INTRAMUSCULAR; INTRAVENOUS; SUBCUTANEOUS at 12:28

## 2021-12-09 NOTE — ED PROVIDER NOTE - CLINICAL SUMMARY MEDICAL DECISION MAKING FREE TEXT BOX
62 y/o M with myalgia, hx of diabetes not compliant with medication regiment. Plan: EKG, labs, CXR, urine, reeval 60 y/o M with hx of diabetes not compliant with medication regimen, generalized fatigue; no fever or chills; no cough; no abdominal pain; Plan: EKG, labs, CXR, urine, reeval

## 2021-12-09 NOTE — ED PROVIDER NOTE - OBJECTIVE STATEMENT
60 y/o M with a PMHx of DM and HIV presents to the ED BIBA c/o general body pain x1 day. Pt reports having lightheadedness, weakness, and had vomited last night. Pt has not been taking his medications for 8 days because he ran out. 62 y/o M with a PMHx of DM and HIV presents to the ED BIBA c/o general body aches x1 day. Pt reports having lightheadedness, weakness, and had vomited last night. Pt has not been taking his medications for 8 days because he ran out.

## 2021-12-09 NOTE — ED PROVIDER NOTE - PATIENT PORTAL LINK FT
You can access the FollowMyHealth Patient Portal offered by [facility] by registering at the following website: [email or web address]. By joining [facility]’s FollowMyHealth portal, you will also be able to view your health information using other applications (apps) compatible with our system.

## 2021-12-09 NOTE — ED ADULT NURSE NOTE - OBJECTIVE STATEMENT
Pt comes to ED with c/o general body pain x1 day. Pt reports having lightheadedness, weakness, and had vomited last night. Pt has not been taking his medications for 8 days because he ran out.
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
## 2021-12-09 NOTE — ED PROVIDER NOTE - PROGRESS NOTE DETAILS
Dru AGUILAR: Patient adamantly wanting to eat; BGMs with concern for uncontrolled hyperglycemia; not DKA; will give insulin; SW consulted to help patient with outpatient medication management and outpatient f/u. Dru AGUILAR: Patient adamantly wanting to eat and did not provide urine sample; BGMs with concern for uncontrolled hyperglycemia; not DKA; will give insulin; SW consulted to help patient with outpatient medication management and outpatient f/u. took signout from Dr Gonsales pending SW dispo.  resent Rx's to [facility] and cancelled previously sent Rx's at [facility].  pt awaiting TLC pickup.  MD Katie

## 2021-12-09 NOTE — ED PROVIDER NOTE - PRINCIPAL DIAGNOSIS
May 31, 2019     Patient: Avtar Blank   YOB: 1982   Date of Visit: 5/31/2019       To Whom it May Concern:    Avtar Blank was seen in my clinic on 5/31/2019 at 2:00 pm.    Avtar may return to work on 6/10/19.     Sincerely,         Rmaesh Murcia MD    Medical information is confidential and cannot be disclosed without the written consent of the patient or his representative.       Hyperglycemia

## 2022-01-06 NOTE — DISCHARGE NOTE PROVIDER - NSDCDCMDCOMP_GEN_ALL_CORE
This document is complete and the patient is ready for discharge. Pt lives in a ranch style home with his wife - no steps to enter. Pt was independent in all functional mobility PTA.

## 2022-04-20 NOTE — ED ADULT NURSE NOTE - PRO INTERPRETER NEED 2
Call placed to patient and discussed lab results and Dr. Valentine's recommendations. Patient verbalized understanding. Denies further questions or concerns at this time.    English

## 2022-06-28 RX ORDER — METFORMIN HYDROCHLORIDE 850 MG/1
1 TABLET ORAL
Qty: 0 | Refills: 0 | DISCHARGE

## 2022-06-28 RX ORDER — LISINOPRIL 2.5 MG/1
1 TABLET ORAL
Qty: 0 | Refills: 0 | DISCHARGE

## 2022-06-28 RX ORDER — INSULIN DEGLUDEC 100 U/ML
20 INJECTION, SOLUTION SUBCUTANEOUS
Qty: 0 | Refills: 0 | DISCHARGE

## 2022-06-28 RX ORDER — ZOLPIDEM TARTRATE 10 MG/1
1 TABLET ORAL
Qty: 0 | Refills: 0 | DISCHARGE

## 2022-06-28 RX ORDER — INSULIN DEGLUDEC 100 U/ML
10 INJECTION, SOLUTION SUBCUTANEOUS
Qty: 0 | Refills: 0 | DISCHARGE

## 2022-10-20 ENCOUNTER — INPATIENT (INPATIENT)
Facility: HOSPITAL | Age: 62
LOS: 3 days | Discharge: HOME | DRG: 552 | End: 2022-10-24
Attending: STUDENT IN AN ORGANIZED HEALTH CARE EDUCATION/TRAINING PROGRAM | Admitting: STUDENT IN AN ORGANIZED HEALTH CARE EDUCATION/TRAINING PROGRAM
Payer: MEDICAID

## 2022-10-20 VITALS
WEIGHT: 149.91 LBS | DIASTOLIC BLOOD PRESSURE: 79 MMHG | HEIGHT: 69 IN | SYSTOLIC BLOOD PRESSURE: 162 MMHG | HEART RATE: 85 BPM | TEMPERATURE: 98 F | OXYGEN SATURATION: 98 % | RESPIRATION RATE: 18 BRPM

## 2022-10-20 DIAGNOSIS — Z98.1 ARTHRODESIS STATUS: Chronic | ICD-10-CM

## 2022-10-20 LAB
ALBUMIN SERPL ELPH-MCNC: 3.5 G/DL — SIGNIFICANT CHANGE UP (ref 3.3–5.2)
ALP SERPL-CCNC: 56 U/L — SIGNIFICANT CHANGE UP (ref 40–120)
ALT FLD-CCNC: 14 U/L — SIGNIFICANT CHANGE UP
ANION GAP SERPL CALC-SCNC: 9 MMOL/L — SIGNIFICANT CHANGE UP (ref 5–17)
APTT BLD: 32.1 SEC — SIGNIFICANT CHANGE UP (ref 27.5–35.5)
AST SERPL-CCNC: 17 U/L — SIGNIFICANT CHANGE UP
BASE EXCESS BLDV CALC-SCNC: 5.9 MMOL/L — HIGH (ref -2–3)
BASOPHILS # BLD AUTO: 0.03 K/UL — SIGNIFICANT CHANGE UP (ref 0–0.2)
BASOPHILS NFR BLD AUTO: 0.5 % — SIGNIFICANT CHANGE UP (ref 0–2)
BILIRUB SERPL-MCNC: <0.2 MG/DL — LOW (ref 0.4–2)
BUN SERPL-MCNC: 18.4 MG/DL — SIGNIFICANT CHANGE UP (ref 8–20)
CA-I SERPL-SCNC: 1.21 MMOL/L — SIGNIFICANT CHANGE UP (ref 1.15–1.33)
CALCIUM SERPL-MCNC: 9.6 MG/DL — SIGNIFICANT CHANGE UP (ref 8.4–10.5)
CHLORIDE BLDV-SCNC: 100 MMOL/L — SIGNIFICANT CHANGE UP (ref 98–107)
CHLORIDE SERPL-SCNC: 99 MMOL/L — SIGNIFICANT CHANGE UP (ref 98–107)
CK SERPL-CCNC: 131 U/L — SIGNIFICANT CHANGE UP (ref 30–200)
CO2 SERPL-SCNC: 29 MMOL/L — SIGNIFICANT CHANGE UP (ref 22–29)
CREAT SERPL-MCNC: 0.85 MG/DL — SIGNIFICANT CHANGE UP (ref 0.5–1.3)
EGFR: 99 ML/MIN/1.73M2 — SIGNIFICANT CHANGE UP
EOSINOPHIL # BLD AUTO: 0.1 K/UL — SIGNIFICANT CHANGE UP (ref 0–0.5)
EOSINOPHIL NFR BLD AUTO: 1.5 % — SIGNIFICANT CHANGE UP (ref 0–6)
GAS PNL BLDV: 134 MMOL/L — LOW (ref 136–145)
GAS PNL BLDV: SIGNIFICANT CHANGE UP
GLUCOSE BLDV-MCNC: 278 MG/DL — HIGH (ref 70–99)
GLUCOSE SERPL-MCNC: 278 MG/DL — HIGH (ref 70–99)
HCO3 BLDV-SCNC: 31 MMOL/L — HIGH (ref 22–29)
HCT VFR BLD CALC: 38.1 % — LOW (ref 39–50)
HCT VFR BLDA CALC: 37 % — SIGNIFICANT CHANGE UP
HGB BLD CALC-MCNC: 12.4 G/DL — LOW (ref 12.6–17.4)
HGB BLD-MCNC: 12.7 G/DL — LOW (ref 13–17)
IMM GRANULOCYTES NFR BLD AUTO: 0.3 % — SIGNIFICANT CHANGE UP (ref 0–0.9)
INR BLD: 0.99 RATIO — SIGNIFICANT CHANGE UP (ref 0.88–1.16)
LACTATE BLDV-MCNC: 1.4 MMOL/L — SIGNIFICANT CHANGE UP (ref 0.5–2)
LYMPHOCYTES # BLD AUTO: 1.4 K/UL — SIGNIFICANT CHANGE UP (ref 1–3.3)
LYMPHOCYTES # BLD AUTO: 21.4 % — SIGNIFICANT CHANGE UP (ref 13–44)
MAGNESIUM SERPL-MCNC: 1.6 MG/DL — SIGNIFICANT CHANGE UP (ref 1.6–2.6)
MCHC RBC-ENTMCNC: 29.3 PG — SIGNIFICANT CHANGE UP (ref 27–34)
MCHC RBC-ENTMCNC: 33.3 GM/DL — SIGNIFICANT CHANGE UP (ref 32–36)
MCV RBC AUTO: 88 FL — SIGNIFICANT CHANGE UP (ref 80–100)
MONOCYTES # BLD AUTO: 0.51 K/UL — SIGNIFICANT CHANGE UP (ref 0–0.9)
MONOCYTES NFR BLD AUTO: 7.8 % — SIGNIFICANT CHANGE UP (ref 2–14)
NEUTROPHILS # BLD AUTO: 4.47 K/UL — SIGNIFICANT CHANGE UP (ref 1.8–7.4)
NEUTROPHILS NFR BLD AUTO: 68.5 % — SIGNIFICANT CHANGE UP (ref 43–77)
NT-PROBNP SERPL-SCNC: 47 PG/ML — SIGNIFICANT CHANGE UP (ref 0–300)
PCO2 BLDV: 51 MMHG — SIGNIFICANT CHANGE UP (ref 42–55)
PH BLDV: 7.39 — SIGNIFICANT CHANGE UP (ref 7.32–7.43)
PLATELET # BLD AUTO: 261 K/UL — SIGNIFICANT CHANGE UP (ref 150–400)
PO2 BLDV: 49 MMHG — HIGH (ref 25–45)
POTASSIUM BLDV-SCNC: 3.9 MMOL/L — SIGNIFICANT CHANGE UP (ref 3.5–5.1)
POTASSIUM SERPL-MCNC: 4.8 MMOL/L — SIGNIFICANT CHANGE UP (ref 3.5–5.3)
POTASSIUM SERPL-SCNC: 4.8 MMOL/L — SIGNIFICANT CHANGE UP (ref 3.5–5.3)
PROT SERPL-MCNC: 7.1 G/DL — SIGNIFICANT CHANGE UP (ref 6.6–8.7)
PROTHROM AB SERPL-ACNC: 11.5 SEC — SIGNIFICANT CHANGE UP (ref 10.5–13.4)
RBC # BLD: 4.33 M/UL — SIGNIFICANT CHANGE UP (ref 4.2–5.8)
RBC # FLD: 13.2 % — SIGNIFICANT CHANGE UP (ref 10.3–14.5)
SAO2 % BLDV: 83.3 % — SIGNIFICANT CHANGE UP
SODIUM SERPL-SCNC: 137 MMOL/L — SIGNIFICANT CHANGE UP (ref 135–145)
TROPONIN T SERPL-MCNC: <0.01 NG/ML — SIGNIFICANT CHANGE UP (ref 0–0.06)
TSH SERPL-MCNC: 1.81 UIU/ML — SIGNIFICANT CHANGE UP (ref 0.27–4.2)
WBC # BLD: 6.53 K/UL — SIGNIFICANT CHANGE UP (ref 3.8–10.5)
WBC # FLD AUTO: 6.53 K/UL — SIGNIFICANT CHANGE UP (ref 3.8–10.5)

## 2022-10-20 PROCEDURE — 71045 X-RAY EXAM CHEST 1 VIEW: CPT | Mod: 26

## 2022-10-20 RX ORDER — SODIUM CHLORIDE 9 MG/ML
1000 INJECTION INTRAMUSCULAR; INTRAVENOUS; SUBCUTANEOUS ONCE
Refills: 0 | Status: COMPLETED | OUTPATIENT
Start: 2022-10-20 | End: 2022-10-20

## 2022-10-20 RX ORDER — ACETAMINOPHEN 500 MG
975 TABLET ORAL ONCE
Refills: 0 | Status: COMPLETED | OUTPATIENT
Start: 2022-10-20 | End: 2022-10-20

## 2022-10-20 RX ADMIN — SODIUM CHLORIDE 1000 MILLILITER(S): 9 INJECTION INTRAMUSCULAR; INTRAVENOUS; SUBCUTANEOUS at 22:29

## 2022-10-20 RX ADMIN — Medication 975 MILLIGRAM(S): at 22:29
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
## 2022-10-20 NOTE — ED ADULT TRIAGE NOTE - HEIGHT IN FEET
Patient is a 32 year old female with medical history of HTN, Renal disorder, polycystic kidney disease who was transferred from [facility] with diffuse SAH.  Etiology likely aneurysmal.  Recommend cerebral angiogram    Antithrombotics for secondary stroke prevention: none SAH     Statins for secondary stroke prevention and hyperlipidemia, if present: SAH     Aggressive risk factor modification: polycytsic kidney disease, HTN      Rehab efforts: Occupational Therapy, PT/OT/SLP to evaluate and treat when appropriate     Diagnostics ordered/pending: cerebral angiogram     VTE prophylaxis: SCDs    BP parameters: SAH: Unsecured aneurysm, SBP <140       5

## 2022-10-20 NOTE — ED PROVIDER NOTE - PROGRESS NOTE DETAILS
Pal: On reassessment, pt with unsteady gait upon attempts to ambulate. Pt not complaining of lightheadedness or near syncope. Will admit.
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
## 2022-10-20 NOTE — ED PROVIDER NOTE - CARE PLAN
1 Principal Discharge DX:	Unsteady gait   Principal Discharge DX:	Unsteady gait  Secondary Diagnosis:	Syncope

## 2022-10-20 NOTE — ED PROVIDER NOTE - NS ED ROS FT
Constitutional: no fever, no chills  Eyes: (+) vision changes  ENT: no nasal congestion, no sore throat  CV: (+) chest pain  Resp: (+) cough, no shortness of breath  GI: no abdominal pain, no vomiting, no diarrhea  : no dysuria  MSK: no joint pain, (+) back pain  Skin: no rash  Neuro: (+) headache, (+) dizziness, no focal weakness, no paresthesias, Constitutional: no fever, no chills  Eyes: (+) vision changes  ENT: no nasal congestion, no sore throat  CV: (+) chest pain  Resp: (+) cough, no shortness of breath  GI: no abdominal pain, no vomiting, no diarrhea  : no dysuria  MSK: no joint pain, (+) chronic back pain, +body aches  Skin: no rash  Neuro: (+) headache, (+) dizziness, no focal weakness, no paresthesias,

## 2022-10-20 NOTE — ED PROVIDER NOTE - PHYSICAL EXAMINATION
Constitutional: Awake, alert, in no acute distress  Eyes: no scleral icterus  HENT: normocephalic, atraumatic, moist oral mucosa  Neck: supple  CV: RRR, no murmur, 2+ distal pulses in all extremities, (+)mild anterior chest wall tenderness   Pulm: non-labored respirations, (+)decreased breath sounds to the bases with mild crackles.  Abdomen: soft, non-tender, non-distended, no CVAT  Extremities: no edema, no deformity  Skin: no rash, no jaundice  Neuro: AAOx3, moving all extremities equally CN 2-12 intact, No tremors. No fasciculations. No nystagmus. Normal finger to nose b/l. No dysmetria. Constitutional: Awake, alert, in no acute distress  Eyes: no scleral icterus, PERRL, EOMI  HENT: normocephalic, atraumatic, moist oral mucosa  Neck: supple  CV: RRR, no murmur, 2+ distal pulses in all extremities, (+)mild anterior chest wall tenderness   Pulm: non-labored respirations, (+)decreased breath sounds to the bases with mild crackles.  Abdomen: soft, non-tender, non-distended, no CVAT  Extremities: no edema, no deformity  Skin: no rash, no jaundice  Neuro: AAOx3, moving all extremities equally CN 2-12 intact, No tremors. No fasciculations. No nystagmus. No dysmetria. Constitutional: Awake, alert, in no acute distress  Eyes: no scleral icterus, PERRL, EOMI, visual fields intact  HENT: normocephalic, atraumatic, moist oral mucosa  Neck: supple  CV: RRR, no murmur, 2+ distal pulses in all extremities, (+)mild anterior chest wall tenderness   Pulm: non-labored respirations, (+)decreased breath sounds to the bases with mild crackles.  Abdomen: soft, non-tender, non-distended, no CVAT  Extremities: no edema, no deformity  Skin: no rash, no jaundice  Neuro: AAOx3, moving all extremities equally CN 2-12 intact, No tremors. No fasciculations. No nystagmus. No dysmetria. +Unsteady gait.

## 2022-10-21 DIAGNOSIS — R52 PAIN, UNSPECIFIED: ICD-10-CM

## 2022-10-21 DIAGNOSIS — R26.81 UNSTEADINESS ON FEET: ICD-10-CM

## 2022-10-21 DIAGNOSIS — E11.65 TYPE 2 DIABETES MELLITUS WITH HYPERGLYCEMIA: ICD-10-CM

## 2022-10-21 DIAGNOSIS — I10 ESSENTIAL (PRIMARY) HYPERTENSION: ICD-10-CM

## 2022-10-21 DIAGNOSIS — R07.9 CHEST PAIN, UNSPECIFIED: ICD-10-CM

## 2022-10-21 DIAGNOSIS — B20 HUMAN IMMUNODEFICIENCY VIRUS [HIV] DISEASE: ICD-10-CM

## 2022-10-21 DIAGNOSIS — R42 DIZZINESS AND GIDDINESS: ICD-10-CM

## 2022-10-21 PROBLEM — M54.9 DORSALGIA, UNSPECIFIED: Chronic | Status: ACTIVE | Noted: 2021-01-17

## 2022-10-21 PROBLEM — E11.9 TYPE 2 DIABETES MELLITUS WITHOUT COMPLICATIONS: Chronic | Status: ACTIVE | Noted: 2021-11-22

## 2022-10-21 LAB
4/8 RATIO: 1.22 RATIO — SIGNIFICANT CHANGE UP (ref 0.9–3.6)
ABS CD8: 451 /UL — SIGNIFICANT CHANGE UP (ref 142–740)
CD3 BLASTS SPEC-ACNC: 1027 /UL — SIGNIFICANT CHANGE UP (ref 672–1870)
CD3 BLASTS SPEC-ACNC: 70 % — SIGNIFICANT CHANGE UP (ref 59–83)
CD4 %: 38 % — SIGNIFICANT CHANGE UP (ref 30–62)
CD8 %: 31 % — SIGNIFICANT CHANGE UP (ref 12–36)
GLUCOSE BLDC GLUCOMTR-MCNC: 304 MG/DL — HIGH (ref 70–99)
HIV-1 VIRAL LOAD RESULT: ABNORMAL
HIV1 RNA # SERPL NAA+PROBE: SIGNIFICANT CHANGE UP
HIV1 RNA SER-IMP: SIGNIFICANT CHANGE UP
HIV1 RNA SERPL NAA+PROBE-ACNC: ABNORMAL
HIV1 RNA SERPL NAA+PROBE-LOG#: 4.74 — SIGNIFICANT CHANGE UP
RAPID RVP RESULT: SIGNIFICANT CHANGE UP
SARS-COV-2 RNA SPEC QL NAA+PROBE: SIGNIFICANT CHANGE UP
T-CELL CD4 SUBSET PNL BLD: 551 /UL — SIGNIFICANT CHANGE UP (ref 489–1457)

## 2022-10-21 PROCEDURE — 95819 EEG AWAKE AND ASLEEP: CPT | Mod: 26

## 2022-10-21 PROCEDURE — 71275 CT ANGIOGRAPHY CHEST: CPT | Mod: 26

## 2022-10-21 PROCEDURE — 72131 CT LUMBAR SPINE W/O DYE: CPT | Mod: 26

## 2022-10-21 PROCEDURE — 99223 1ST HOSP IP/OBS HIGH 75: CPT

## 2022-10-21 PROCEDURE — G1004: CPT

## 2022-10-21 PROCEDURE — 72125 CT NECK SPINE W/O DYE: CPT | Mod: 26

## 2022-10-21 PROCEDURE — 70450 CT HEAD/BRAIN W/O DYE: CPT | Mod: 26,ME

## 2022-10-21 RX ORDER — DEXTROSE 50 % IN WATER 50 %
25 SYRINGE (ML) INTRAVENOUS ONCE
Refills: 0 | Status: DISCONTINUED | OUTPATIENT
Start: 2022-10-21 | End: 2022-10-24

## 2022-10-21 RX ORDER — OXYCODONE HYDROCHLORIDE 5 MG/1
5 TABLET ORAL EVERY 6 HOURS
Refills: 0 | Status: DISCONTINUED | OUTPATIENT
Start: 2022-10-21 | End: 2022-10-24

## 2022-10-21 RX ORDER — OXYCODONE HYDROCHLORIDE 5 MG/1
5 TABLET ORAL ONCE
Refills: 0 | Status: DISCONTINUED | OUTPATIENT
Start: 2022-10-21 | End: 2022-10-21

## 2022-10-21 RX ORDER — OXYCODONE HYDROCHLORIDE 5 MG/1
10 TABLET ORAL EVERY 8 HOURS
Refills: 0 | Status: DISCONTINUED | OUTPATIENT
Start: 2022-10-21 | End: 2022-10-24

## 2022-10-21 RX ORDER — SODIUM CHLORIDE 9 MG/ML
1000 INJECTION, SOLUTION INTRAVENOUS
Refills: 0 | Status: DISCONTINUED | OUTPATIENT
Start: 2022-10-21 | End: 2022-10-24

## 2022-10-21 RX ORDER — ACETAMINOPHEN 500 MG
975 TABLET ORAL ONCE
Refills: 0 | Status: COMPLETED | OUTPATIENT
Start: 2022-10-21 | End: 2022-10-21

## 2022-10-21 RX ORDER — ACETAMINOPHEN 500 MG
650 TABLET ORAL EVERY 6 HOURS
Refills: 0 | Status: DISCONTINUED | OUTPATIENT
Start: 2022-10-21 | End: 2022-10-24

## 2022-10-21 RX ORDER — INSULIN LISPRO 100/ML
VIAL (ML) SUBCUTANEOUS
Refills: 0 | Status: DISCONTINUED | OUTPATIENT
Start: 2022-10-21 | End: 2022-10-24

## 2022-10-21 RX ORDER — DEXTROSE 50 % IN WATER 50 %
15 SYRINGE (ML) INTRAVENOUS ONCE
Refills: 0 | Status: DISCONTINUED | OUTPATIENT
Start: 2022-10-21 | End: 2022-10-24

## 2022-10-21 RX ORDER — INSULIN LISPRO 100/ML
VIAL (ML) SUBCUTANEOUS AT BEDTIME
Refills: 0 | Status: DISCONTINUED | OUTPATIENT
Start: 2022-10-21 | End: 2022-10-24

## 2022-10-21 RX ORDER — ONDANSETRON 8 MG/1
4 TABLET, FILM COATED ORAL EVERY 6 HOURS
Refills: 0 | Status: DISCONTINUED | OUTPATIENT
Start: 2022-10-21 | End: 2022-10-24

## 2022-10-21 RX ORDER — LIDOCAINE 4 G/100G
1 CREAM TOPICAL ONCE
Refills: 0 | Status: COMPLETED | OUTPATIENT
Start: 2022-10-21 | End: 2022-10-21

## 2022-10-21 RX ORDER — DEXTROSE 50 % IN WATER 50 %
12.5 SYRINGE (ML) INTRAVENOUS ONCE
Refills: 0 | Status: DISCONTINUED | OUTPATIENT
Start: 2022-10-21 | End: 2022-10-24

## 2022-10-21 RX ORDER — KETOROLAC TROMETHAMINE 30 MG/ML
15 SYRINGE (ML) INJECTION ONCE
Refills: 0 | Status: DISCONTINUED | OUTPATIENT
Start: 2022-10-21 | End: 2022-10-22

## 2022-10-21 RX ORDER — METHOCARBAMOL 500 MG/1
1500 TABLET, FILM COATED ORAL ONCE
Refills: 0 | Status: COMPLETED | OUTPATIENT
Start: 2022-10-21 | End: 2022-10-21

## 2022-10-21 RX ORDER — GABAPENTIN 400 MG/1
100 CAPSULE ORAL THREE TIMES A DAY
Refills: 0 | Status: DISCONTINUED | OUTPATIENT
Start: 2022-10-21 | End: 2022-10-24

## 2022-10-21 RX ORDER — GLUCAGON INJECTION, SOLUTION 0.5 MG/.1ML
1 INJECTION, SOLUTION SUBCUTANEOUS ONCE
Refills: 0 | Status: DISCONTINUED | OUTPATIENT
Start: 2022-10-21 | End: 2022-10-24

## 2022-10-21 RX ORDER — PANTOPRAZOLE SODIUM 20 MG/1
40 TABLET, DELAYED RELEASE ORAL
Refills: 0 | Status: DISCONTINUED | OUTPATIENT
Start: 2022-10-21 | End: 2022-10-24

## 2022-10-21 RX ADMIN — OXYCODONE HYDROCHLORIDE 10 MILLIGRAM(S): 5 TABLET ORAL at 16:13

## 2022-10-21 RX ADMIN — METHOCARBAMOL 1500 MILLIGRAM(S): 500 TABLET, FILM COATED ORAL at 00:20

## 2022-10-21 RX ADMIN — LIDOCAINE 1 PATCH: 4 CREAM TOPICAL at 00:19

## 2022-10-21 RX ADMIN — Medication 4: at 15:27

## 2022-10-21 RX ADMIN — GABAPENTIN 100 MILLIGRAM(S): 400 CAPSULE ORAL at 16:12

## 2022-10-21 RX ADMIN — OXYCODONE HYDROCHLORIDE 5 MILLIGRAM(S): 5 TABLET ORAL at 02:10

## 2022-10-21 NOTE — H&P ADULT - ASSESSMENT
62 year old male with PMH HTN, T2DM, HIV and CLBP, noncompliant with medications or follow presents with complaint of not feeling well. He states he cant walk straight and lost his balance and has been falling down; multiple times yesterday. Unclear if LOC. He also complains of loss of vision, again chronic but unclear duration. Chest pain bilateral chest, upper abdomen, comes and goes and described as burning then pressure. Denies any palpitations Dyspnea occasional with dry cough. Denies any fever or chills. Vomited  and complains of diffuse abdominal pain. Denies any melena or hematochezia.    Dizziness, recurrent falls. Visual impairment.    Concern for posterior circulation CVA  CT Head without any acute finding  - Admit to stroke unit  - Neurological check  - monitor for arrythmia  - ASA, Statin  - FLP, A1c in am  - TTE, MRI  - PT, OT, SLP  - Neurology consult    Chest pain. EKG without ischemic changes. CE negative. Doubtful if ischemia.  Could be related to gastritis/Esophagitis  - Monitor  - CE, Troponin  - EKG  - CTA chest given vague history  - Protonix, Antacids    T2DM with hyperglycemia  - BGM with SSI  - A1c in am    HTN  - Low dose ACEI    HIV. CD4 551  - Check Viral Load    Pain  - Add  Gabapentin  - Acetaminophen, Low dose Oxycodone    VTE Prophylaxis  - VCD

## 2022-10-21 NOTE — CONSULT NOTE ADULT - NS ATTEND AMEND GEN_ALL_CORE FT
Seen and examined with the PA  Plan discussed with PA and I agree with as written above with modifications as below    Patient with reported ataxic gait, has signs/symptom consistent with neuropathy, may be a sensory ataxia  exam inconsistent at times    await stroke work up for further recs.    will follow with you    Deion Muñoz MD PhD   [id number]
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
## 2022-10-21 NOTE — H&P ADULT - HISTORY OF PRESENT ILLNESS
Patient poor historian and keep on switching between symptoms and timelines; inconsistent    62 year old male with PMH HTN, T2DM, HIV and CLBP, noncompliant with medications or follow presents with complaint of not feeling well. He states he cant walk straight and lost his balance and has been falling down; multiple times yesterday. Unclear if LOC. He also complains of loss of vision, again chronic but unclear duration. Chest pain bilateral chest, upper abdomen, comes and goes and described as burning then pressure. Denies any palpitations Dyspnea occasional with dry cough. Denies any fever or chills. Vomited  and complains of diffuse abdominal pain. Denies any melena or hematochezia Patient poor historian and keep on switching between symptoms and timelines; inconsistent    62 year old male with PMH HTN, T2DM, HIV and CLBP, noncompliant with medications or follow presents with complaint of not feeling well. He states he cant walk straight and lost his balance and has been falling down; multiple times yesterday. Unclear if LOC. He also complains of loss of vision, again chronic but unclear duration. Chest pain bilateral chest, upper abdomen, comes and goes and described as burning then pressure. Denies any palpitations Dyspnea occasional with dry cough. Denies any fever or chills. Vomited  and complains of diffuse abdominal pain. Denies any melena or hematochezia.

## 2022-10-21 NOTE — CONSULT NOTE ADULT - ASSESSMENT
ASSESSMENT: 62 year old male with PMH HTN, T2DM, HIV and CLBP, reported non adherent with medications or follow presented with complaint of not feeling well. He noted he couldn't walk straight and lost his balance and has been falling down; multiple times 10/20/22.  He has had feeling of imbalance for the past month with associated headache at this time.   CTH without acute infarct or hemorrhage.     1. Rule out stroke   2. Rule out spinal cord injury   3. ? fall- scalp edema   4. r/o seizure     -MRI brain, MRA Head/Neck  -MR C/T/L spine   -EEG   -ASA 81mg daily if no medical contraindication   -titrate statin to LDL < 70  -Lipids/A1C pending   -PT/OT eval  -dysphagia screen pending, if fails SLP eval and maintain NPO   -TTE, cardiac monitoring   - age and risk appropriate malignancy screenings   -r/o infection   -DVT ppx when cleared medically   -r/o infectious process     DISPOSITION: Rehab or home depending on PT eval once stable and workup is complete      CORE MEASURES:        Admission NIHSS: 7     TPA: [] YES [x] NO      LDL/HDL: p     Depression Screen: p     Statin Therapy: as noted      Dysphagia Screen: [] PASS [] FAIL     Smoking [] YES [] NO      Afib [] YES [x] NO     Stroke Education [x] YES [] NO    ASSESSMENT: 62 year old male with PMH HTN, T2DM, HIV and CLBP, reported non adherent with medications or follow presented with complaint of not feeling well. He noted he couldn't walk straight and lost his balance and has been falling down; multiple times 10/20/22.  He has had feeling of imbalance for the past month with associated headache at this time.   CTH without acute infarct or hemorrhage.     1. Rule out stroke   2. Rule out spinal cord injury   3. ? fall- scalp edema   4. r/o seizure     -MRI brain, MRA Head/Neck  -CT C/T/L spine   -EEG   -ASA 81mg daily if no medical contraindication   -titrate statin to LDL < 70  -Lipids/A1C pending   -PT/OT eval  -dysphagia screen pending, if fails SLP eval and maintain NPO   -TTE, cardiac monitoring   - age and risk appropriate malignancy screenings   -r/o infection   -DVT ppx when cleared medically   -r/o infectious process     DISPOSITION: Rehab or home depending on PT eval once stable and workup is complete      CORE MEASURES:        Admission NIHSS: 7     TPA: [] YES [x] NO      LDL/HDL: p     Depression Screen: p     Statin Therapy: as noted      Dysphagia Screen: [] PASS [] FAIL     Smoking [] YES [] NO      Afib [] YES [x] NO     Stroke Education [x] YES [] NO

## 2022-10-21 NOTE — CONSULT NOTE ADULT - SUBJECTIVE AND OBJECTIVE BOX
preliminary note, offiical recommendations pending attending review/signature.                                [facility]                                     [facility] [place]                                 Toni Marquez, & Chon                                  [address]                                        [place], [place], [address]                                             [phone]       HPI:  62 year old male with PMH HTN, T2DM, HIV and CLBP, reported non adherent with medications or follow presented with complaint of not feeling well. He noted he couldn't walk straight and lost his balance and has been falling down; multiple times 10/20/22. Unclear if LOC. He also complains of loss of vision, again chronic but unclear duration. Notes he feels drunk. This has been going on per patient report for about a month.  Associated headache as well.     Stroke risk factors: HTN HIVDM    PAST MEDICAL & SURGICAL HISTORY:  Hypertension  Diabetes  Head trauma  Insomnia  Anxiety  Chronic back pain  DM (diabetes mellitus)  HIV infection    S/[place]  in [place]    MEDICATIONS  (STANDING):  dextrose 5%. 1000 milliLiter(s) (50 mL/Hr) IV Continuous <Continuous>  dextrose 5%. 1000 milliLiter(s) (100 mL/Hr) IV Continuous <Continuous>  dextrose 50% Injectable 25 Gram(s) IV Push once  dextrose 50% Injectable 12.5 Gram(s) IV Push once  dextrose 50% Injectable 25 Gram(s) IV Push once  gabapentin 100 milliGRAM(s) Oral three times a day  glucagon  Injectable 1 milliGRAM(s) IntraMuscular once  insulin lispro (ADMELOG) corrective regimen sliding scale   SubCutaneous three times a day before meals  insulin lispro (ADMELOG) corrective regimen sliding scale   SubCutaneous at bedtime  ketorolac   Injectable 15 milliGRAM(s) IV Push once  pantoprazole    Tablet 40 milliGRAM(s) Oral before breakfast    MEDICATIONS  (PRN):  acetaminophen     Tablet .. 650 milliGRAM(s) Oral every 6 hours PRN Mild Pain (1 - 3)  aluminum hydroxide/magnesium hydroxide/simethicone Suspension 30 milliLiter(s) Oral every 4 hours PRN Dyspepsia  dextrose Oral Gel 15 Gram(s) Oral once PRN Blood Glucose LESS THAN 70 milliGRAM(s)/deciliter  ondansetron Injectable 4 milliGRAM(s) IV Push every 6 hours PRN Nausea and/or Vomiting  oxyCODONE    IR 5 milliGRAM(s) Oral every 6 hours PRN Moderate Pain (4 - 6)  oxyCODONE    IR 10 milliGRAM(s) Oral every 8 hours PRN Severe Pain (7 - 10)      Allergies    No Known Allergies    Intolerances        SOCIAL HISTORY:  no tob,   no alcohol   no drugs    FAMILY HISTORY:  Family history of coronary artery disease in mother (Mother)          ROS: 14 point ROS negative other than what is present in HPI or below    Vital Signs Last 24 Hrs  T(C): 36.6 (21 Oct 2022 07:43), Max: 36.7 (20 Oct 2022 21:20)  T(F): 97.9 (21 Oct 2022 07:43), Max: 98.1 (20 Oct 2022 21:20)  HR: 91 (21 Oct 2022 11:05) (85 - 91)  BP: 150/86 (21 Oct 2022 11:05) (146/82 - 162/79)  BP(mean): --  RR: 20 (21 Oct 2022 11:05) (18 - 20)  SpO2: 98% (21 Oct 2022 11:05) (98% - 98%)    Parameters below as of 21 Oct 2022 11:05  Patient On (Oxygen Delivery Method): room air          General: NAD, sleeping in bed     Mental status: sleeping, awakens to voice, oriented to age hospital and self, disoriented to time. The patient is able to name objects, follow commands, repeat sentences.    Cranial nerves: Pupils equal and react symmetrically to light. There is no visual field deficit to confrontation (reports can not see- able to count finger). Extraocular motion is full with no nystagmus. There is no ptosis. Facial sensation is intact. Facial musculature is symmetric. Palate elevates symmetrically. Tongue is midline.    Motor: There is normal bulk and tone.  Upper extremities move against gravity, trace movement in b/l LE     Sensation: variable repots- unable to determine     Cerebellar: There is no dysmetria on finger to nose testing.    Gait : deferred    [facility] SS:  DATE: 10/21/22   TIME: 1230  1A: Level of consciousness (0-3):   1B: Questions (0-2):  1  1C: Commands (0-2):   2: Gaze (0-2):   3: Visual fields (0-3):   4: Facial palsy (0-3):   MOTOR:  5A: Left arm motor drift (0-4):   5B: Right arm motor drift (0-4):   6A: Left leg motor drift (0-4): 3  6B: Right leg motor drift (0-4): 3  7: Limb ataxia (0-2):   SENSORY:  8: Sensation (0-2):   SPEECH:  9: Language (0-3):   10: Dysarthria (0-2):   EXTINCTION:  11: Extinction/inattention (0-2):     TOTAL SCORE: 7    prehospital mRS= pending       LABS:                         12.7   6.53  )-----------( 261      ( 20 Oct 2022 21:34 )             38.1       10-20    137  |  99  |  18.4  ----------------------------<  278<H>  4.8   |  29.0  |  0.85    Ca    9.6      20 Oct 2022 21:34  Mg     1.6     10-20    TPro  7.1  /  Alb  3.5  /  TBili  <0.2<L>  /  DBili  x   /  AST  17  /  ALT  14  /  AlkPhos  56  10-20      PT/INR - ( 20 Oct 2022 21:34 )   PT: 11.5 sec;   INR: 0.99 ratio         PTT - ( 20 Oct 2022 21:34 )  PTT:32.1 sec    Lipid panel: pending     HgbA1c: pending       RADIOLOGY & ADDITIONAL STUDIES (independently reviewed unless otherwise noted):  CT head 10/21/22 : No acute intracranial hemorrhage, mass effect, or CT evidence of a large   vascular territory infarct.  Parietal scalp soft tissue  swelling or edema.                                     [facility]                                     Neurology at [place]                                 Toni Marquez, & Chon                                  [address]                                        [place], [place], [address]                                             [phone]       HPI:  62 year old male with PMH HTN, T2DM, HIV and CLBP, reported non adherent with medications or follow presented with complaint of not feeling well. He noted he couldn't walk straight and lost his balance and has been falling down; multiple times 10/20/22. Unclear if LOC. He also complains of loss of vision, again chronic but unclear duration. Notes he feels drunk. This has been going on per patient report for about a month.  Associated headache as well.     Stroke risk factors: HTN HIVDM    PAST MEDICAL & SURGICAL HISTORY:  Hypertension  Diabetes  Head trauma  Insomnia  Anxiety  Chronic back pain  DM (diabetes mellitus)  HIV infection    S/P lumbar fusion  in [place] 25 years ago    MEDICATIONS  (STANDING):  dextrose 5%. 1000 milliLiter(s) (50 mL/Hr) IV Continuous <Continuous>  dextrose 5%. 1000 milliLiter(s) (100 mL/Hr) IV Continuous <Continuous>  dextrose 50% Injectable 25 Gram(s) IV Push once  dextrose 50% Injectable 12.5 Gram(s) IV Push once  dextrose 50% Injectable 25 Gram(s) IV Push once  gabapentin 100 milliGRAM(s) Oral three times a day  glucagon  Injectable 1 milliGRAM(s) IntraMuscular once  insulin lispro (ADMELOG) corrective regimen sliding scale   SubCutaneous three times a day before meals  insulin lispro (ADMELOG) corrective regimen sliding scale   SubCutaneous at bedtime  ketorolac   Injectable 15 milliGRAM(s) IV Push once  pantoprazole    Tablet 40 milliGRAM(s) Oral before breakfast    MEDICATIONS  (PRN):  acetaminophen     Tablet .. 650 milliGRAM(s) Oral every 6 hours PRN Mild Pain (1 - 3)  aluminum hydroxide/magnesium hydroxide/simethicone Suspension 30 milliLiter(s) Oral every 4 hours PRN Dyspepsia  dextrose Oral Gel 15 Gram(s) Oral once PRN Blood Glucose LESS THAN 70 milliGRAM(s)/deciliter  ondansetron Injectable 4 milliGRAM(s) IV Push every 6 hours PRN Nausea and/or Vomiting  oxyCODONE    IR 5 milliGRAM(s) Oral every 6 hours PRN Moderate Pain (4 - 6)  oxyCODONE    IR 10 milliGRAM(s) Oral every 8 hours PRN Severe Pain (7 - 10)      Allergies    No Known Allergies    Intolerances        SOCIAL HISTORY:  no tob,   no alcohol   no drugs    FAMILY HISTORY:  Family history of coronary artery disease in mother (Mother)    no stroke in either parent      ROS: 14 point ROS negative other than what is present in HPI or below    Vital Signs Last 24 Hrs  T(C): 36.6 (21 Oct 2022 07:43), Max: 36.7 (20 Oct 2022 21:20)  T(F): 97.9 (21 Oct 2022 07:43), Max: 98.1 (20 Oct 2022 21:20)  HR: 91 (21 Oct 2022 11:05) (85 - 91)  BP: 150/86 (21 Oct 2022 11:05) (146/82 - 162/79)  BP(mean): --  RR: 20 (21 Oct 2022 11:05) (18 - 20)  SpO2: 98% (21 Oct 2022 11:05) (98% - 98%)    Parameters below as of 21 Oct 2022 11:05  Patient On (Oxygen Delivery Method): room air          General: NAD, sleeping in bed     Mental status: sleeping, awakens to voice, oriented to age hospital and self, disoriented to time. The patient is able to name objects, follow commands, repeat sentences.    Cranial nerves: Pupils equal and react symmetrically to light. There is no visual field deficit to confrontation (reports can not see- able to count finger). Extraocular motion is full with no nystagmus. There is no ptosis. Facial sensation is intact. Facial musculature is symmetric. Palate elevates symmetrically. Tongue is midline.    Motor: There is normal bulk and tone.  Upper extremities move against gravity, trace movement in b/l LE  (+) straight leg raise b/l to passive movement    Sensation: variable repots- unable to determine     Cerebellar: There is no dysmetria on finger to nose testing.    Gait : deferred    [facility] SS:  DATE: 10/21/22   TIME: 1230  1A: Level of consciousness (0-3): 0  1B: Questions (0-2):  1  1C: Commands (0-2): 0  2: Gaze (0-2): 0  3: Visual fields (0-3): 0  4: Facial palsy (0-3): 0  MOTOR:  5A: Left arm motor drift (0-4): 0  5B: Right arm motor drift (0-4): 0  6A: Left leg motor drift (0-4): 3  6B: Right leg motor drift (0-4): 3  7: Limb ataxia (0-2): 0  SENSORY:  8: Sensation (0-2): 0  SPEECH:  9: Language (0-3): 0  10: Dysarthria (0-2): 0  EXTINCTION:  11: Extinction/inattention (0-2): 0    TOTAL SCORE: 7 (?chronicity of b/l leg weakness)    prehospital mRS= pending       LABS:                         12.7   6.53  )-----------( 261      ( 20 Oct 2022 21:34 )             38.1       10-20    137  |  99  |  18.4  ----------------------------<  278<H>  4.8   |  29.0  |  0.85    Ca    9.6      20 Oct 2022 21:34  Mg     1.6     10-20    TPro  7.1  /  Alb  3.5  /  TBili  <0.2<L>  /  DBili  x   /  AST  17  /  ALT  14  /  AlkPhos  56  10-20      PT/INR - ( 20 Oct 2022 21:34 )   PT: 11.5 sec;   INR: 0.99 ratio         PTT - ( 20 Oct 2022 21:34 )  PTT:32.1 sec    Lipid panel: pending     HgbA1c: pending       RADIOLOGY & ADDITIONAL STUDIES (independently reviewed unless otherwise noted):  CT head 10/21/22 : No acute stroke, mass or blood  Parietal scalp soft tissue  swelling or edema.

## 2022-10-21 NOTE — ED ADULT NURSE REASSESSMENT NOTE - NS ED NURSE REASSESS COMMENT FT1
patient refused blood work that md ordered without giving him first IV pain medication like Morphine or Dilaudid, MD made aware.
pt care assumed at 0730, no apparent distress noted at this time, charting as noted. Pt received A&Ox3 resting in bed comfortably at this time. pt asking for food, RN provided a snack and called the diet office to provide pt a meal tray. pt is NSR on cardiac monitor. pt awaiting consult from neuro.
pt handed off to cdu CATY OLSEN in stable condition. pt in no apparent distress noted at this time. vital signs stable. pt transferred to holding room in stable condition with telebox in place.
pt c/o chest pain at this time and is requesting pain medication. RN called MD smith and MD states he will come down and assess pt. pt remains NSR on cardiac monitor and diet office brought meal tray down to the pt.

## 2022-10-21 NOTE — H&P ADULT - NSHPSOCIALHISTORY_GEN_ALL_CORE
Lives alone in trailer.  Smokes 1-2 cigarettes/day, Denies any alcohol or drug use.  Currently on disability - used to be  before

## 2022-10-21 NOTE — PATIENT PROFILE ADULT - FUNCTIONAL ASSESSMENT - BASIC MOBILITY 5.
Patient Name:  Jose Gilbert  MR#:  [id number]  : 1949      Patient Education Summary For 2018    During the visit on , Jose Gilbert received patient-specific education and/or education materials from Noemí Wild regarding the following topic(s):    Date 2018   Time 8:02 AM     Smoking Cessation Counseling Patient Declined Counseling   General       Pt Education Occurred Today? Yes     Simulation Yes     Initial Treatment Yes   Site-Specific Instructions       Fatigue Yes     Hydration Yes     Skin Care. Yes     Urinary Changes Yes     Bowel Changes Yes     Incontinence Yes     Nutrition.. Yes     Pain.. Yes     Proctitis Yes   Individual Taught       Patient. Yes   Teaching Method       Explanation. Yes     Printed Material. Yes   Ability to Learn       Motivated. Yes   Barriers to Learning       None Evident. Yes   Outcome       Acceptable Level Knwldge/Perf Yes        Authenticated by Noemí Wild on 2018 at 8:36 AM    
4 = No assist / stand by assistance

## 2022-10-21 NOTE — PATIENT PROFILE ADULT - FALL HARM RISK - UNIVERSAL INTERVENTIONS
Bed in lowest position, wheels locked, appropriate side rails in place/Call bell, personal items and telephone in reach/Instruct patient to call for assistance before getting out of bed or chair/Non-slip footwear when patient is out of bed/[place] to call system/Physically safe environment - no spills, clutter or unnecessary equipment/Purposeful Proactive Rounding/Room/bathroom lighting operational, light cord in reach

## 2022-10-21 NOTE — EEG REPORT - NS EEG TEXT BOX
RAFAEL GORDON [id number]     Study Date: 		10-21-22  Duration in hours:  x    --------------------------------------------------------------------------------------------------  History:  CC/ HPI Patient is a 62y old  Male who presents with a chief complaint of Dizziness, fall, chest pain - r/o CVA (21 Oct 2022 13:27)    MEDICATIONS  (STANDING):  gabapentin 100 milliGRAM(s) Oral three times a day  glucagon  Injectable 1 milliGRAM(s) IntraMuscular once  insulin lispro (ADMELOG) corrective regimen sliding scale   SubCutaneous three times a day before meals  insulin lispro (ADMELOG) corrective regimen sliding scale   SubCutaneous at bedtime  ketorolac   Injectable 15 milliGRAM(s) IV Push once  pantoprazole    Tablet 40 milliGRAM(s) Oral before breakfast    --------------------------------------------------------------------------------------------------  Study Interpretation:    [Abbreviation Key:  PDR=alpha rhythm/posterior dominant rhythm. A-P=anterior posterior.  Amplitude: ‘very low’:<20; ‘low’:20-49; ‘medium’:; ‘high’:>150uV.  Persistence for periodic/rhythmic patterns (% of epoch) ‘rare’:<1%; ‘occasional’:1-10%; ‘frequent’:10-50%; ‘abundant’:50-90%; ‘continuous’:>90%.  Persistence for sporadic discharges: ‘rare’:<1/hr; ‘occasional’:1/min-1/hr; ‘frequent’:>1/min; ‘abundant’:>1/10 sec.  RPP=rhythmic and periodic patterns; GRDA=generalized rhythmic delta activity; FIRDA=frontal intermittent GRDA; LRDA=lateralized rhythmic delta activity; TIRDA=temporal intermittent rhythmic delta activity;  LPD=PLED=lateralized periodic discharges; GPD=generalized periodic discharges; BIPDs =bilateral independent periodic discharges; Mf=multifocal; SIRPDs=stimulus induced rhythmic, periodic, or ictal appearing discharges; BIRDs=brief potentially ictal rhythmic discharges >4 Hz, lasting .5-10s; PFA (paroxysmal bursts >13 Hz or =8 Hz <10s).  Modifiers: +F=with fast component; +S=with spike component; +R=with rhythmic component.  S-B=burst suppression pattern.  Max=maximal. N1-drowsy; N2-stage II sleep; N3-slow wave sleep. SSS/BETS=small sharp spikes/benign epileptiform transients of sleep. HV=hyperventilation; PS=photic stimulation]    Daily EEG Visual Analysis  FINDINGS:      Background:  Continuity: continuous  Symmetry: symmetric  PDR: 8.5 Hz activity, with amplitude to 50 uV, that attenuated to eye opening.    Reactivity: present  Voltage: normal (between 20-150uV)  Anterior Posterior Gradient: present  Other background findings: none  Breach: absent    Background Slowing:  Generalized slowing: diffuse irregular delta activity.  Focal slowing: none was present.    State Changes:   -Drowsiness noted with increased slowing, attenuation of fast activity, vertex transients.  -N2 sleep transients were not recorded.    Sporadic Epileptiform Discharges:    None    Rhythmic and Periodic Patterns (RPPs):  None     Electrographic and Electroclinical seizures:  None    Other Clinical Events:  None    Activation Procedures:   -Hyperventilation was not performed.    -Photic stimulation was performed and did not elicit any abnormalities.       Artifacts:  Intermittent myogenic and movement artifacts were noted.    ECG:  The heart rate on single channel ECG was predominantly between 80-90BPM.    EEG Classification / Summary:  Abnormal EEG study  Mild generalized background slowing    -----------------------------------------------------------------------------------------------------    Clinical Impression:  Mild diffuse/multifocal cerebral dysfunction, not specific as to etiology.  There were no epileptiform abnormalities recorded.      -------------------------------------------------------------------------------------------------------  [facility] EEG Reading Room Ph#: [phone]  Epilepsy Answering Service after 5PM and before 8:30AM: Ph#: [phone]    Vic Alston M.D.   of Neurology, [facility] Epilepsy [place]

## 2022-10-21 NOTE — H&P ADULT - NSHPPHYSICALEXAM_GEN_ALL_CORE
OBJECTIVE:  Vital Signs Last 24 Hrs  T(C): 36.6 (21 Oct 2022 07:43), Max: 36.7 (20 Oct 2022 21:20)  T(F): 97.9 (21 Oct 2022 07:43), Max: 98.1 (20 Oct 2022 21:20)  HR: 91 (21 Oct 2022 11:05) (85 - 91)  BP: 150/86 (21 Oct 2022 11:05) (146/82 - 162/79)  BP(mean): --  RR: 20 (21 Oct 2022 11:05) (18 - 20)  SpO2: 98% (21 Oct 2022 11:05) (98% - 98%)    Parameters below as of 21 Oct 2022 11:05  Patient On (Oxygen Delivery Method): room air        PHYSICAL EXAMINATION  General: Middle aged, disheveled and unkempt  HEENT:  Vision limited to outlines. Poor dentition. No oral lesion  NECK:  Supple  CVS: regular rate and rhythm S1 S2; some reproducible tenderness  RESP:  CTAB  GI:  Soft with epigastric tenderness. BS+  : No suprapubic tenderness  MSK:  Limited ROM bilateral LE due to back pain  CNS:  AAOx3. No facial asymmetry. Vision limited to outlines . Fluent speech. UE strength 5/5. LE limited due to pain. Abnormal finger nose. Unable to test gait  INTEG:  warm dry  PSYCH:  fair mood, cooperative

## 2022-10-22 LAB
A1C WITH ESTIMATED AVERAGE GLUCOSE RESULT: 10.2 % — HIGH (ref 4–5.6)
ESTIMATED AVERAGE GLUCOSE: 246 MG/DL — HIGH (ref 68–114)
GLUCOSE BLDC GLUCOMTR-MCNC: 232 MG/DL — HIGH (ref 70–99)
GLUCOSE BLDC GLUCOMTR-MCNC: 247 MG/DL — HIGH (ref 70–99)
GLUCOSE BLDC GLUCOMTR-MCNC: 249 MG/DL — HIGH (ref 70–99)
GLUCOSE BLDC GLUCOMTR-MCNC: 272 MG/DL — HIGH (ref 70–99)
GLUCOSE BLDC GLUCOMTR-MCNC: 273 MG/DL — HIGH (ref 70–99)

## 2022-10-22 PROCEDURE — 99223 1ST HOSP IP/OBS HIGH 75: CPT

## 2022-10-22 PROCEDURE — 99233 SBSQ HOSP IP/OBS HIGH 50: CPT

## 2022-10-22 PROCEDURE — 99232 SBSQ HOSP IP/OBS MODERATE 35: CPT

## 2022-10-22 RX ORDER — INSULIN GLARGINE 100 [IU]/ML
6 INJECTION, SOLUTION SUBCUTANEOUS AT BEDTIME
Refills: 0 | Status: DISCONTINUED | OUTPATIENT
Start: 2022-10-22 | End: 2022-10-23

## 2022-10-22 RX ORDER — ATORVASTATIN CALCIUM 80 MG/1
40 TABLET, FILM COATED ORAL AT BEDTIME
Refills: 0 | Status: DISCONTINUED | OUTPATIENT
Start: 2022-10-22 | End: 2022-10-24

## 2022-10-22 RX ORDER — ASPIRIN/CALCIUM CARB/MAGNESIUM 324 MG
81 TABLET ORAL DAILY
Refills: 0 | Status: DISCONTINUED | OUTPATIENT
Start: 2022-10-22 | End: 2022-10-24

## 2022-10-22 RX ORDER — ENOXAPARIN SODIUM 100 MG/ML
40 INJECTION SUBCUTANEOUS EVERY 24 HOURS
Refills: 0 | Status: DISCONTINUED | OUTPATIENT
Start: 2022-10-22 | End: 2022-10-24

## 2022-10-22 RX ADMIN — GABAPENTIN 100 MILLIGRAM(S): 400 CAPSULE ORAL at 21:22

## 2022-10-22 RX ADMIN — OXYCODONE HYDROCHLORIDE 5 MILLIGRAM(S): 5 TABLET ORAL at 22:20

## 2022-10-22 RX ADMIN — ATORVASTATIN CALCIUM 40 MILLIGRAM(S): 80 TABLET, FILM COATED ORAL at 21:22

## 2022-10-22 RX ADMIN — OXYCODONE HYDROCHLORIDE 10 MILLIGRAM(S): 5 TABLET ORAL at 16:29

## 2022-10-22 RX ADMIN — PANTOPRAZOLE SODIUM 40 MILLIGRAM(S): 20 TABLET, DELAYED RELEASE ORAL at 08:25

## 2022-10-22 RX ADMIN — OXYCODONE HYDROCHLORIDE 5 MILLIGRAM(S): 5 TABLET ORAL at 21:23

## 2022-10-22 RX ADMIN — GABAPENTIN 100 MILLIGRAM(S): 400 CAPSULE ORAL at 00:49

## 2022-10-22 RX ADMIN — INSULIN GLARGINE 6 UNIT(S): 100 INJECTION, SOLUTION SUBCUTANEOUS at 21:21

## 2022-10-22 RX ADMIN — Medication 2: at 17:20

## 2022-10-22 RX ADMIN — Medication 81 MILLIGRAM(S): at 12:20

## 2022-10-22 RX ADMIN — Medication 2: at 08:26

## 2022-10-22 RX ADMIN — Medication 15 MILLIGRAM(S): at 05:51

## 2022-10-22 RX ADMIN — OXYCODONE HYDROCHLORIDE 10 MILLIGRAM(S): 5 TABLET ORAL at 00:49

## 2022-10-22 RX ADMIN — GABAPENTIN 100 MILLIGRAM(S): 400 CAPSULE ORAL at 14:41

## 2022-10-22 RX ADMIN — GABAPENTIN 100 MILLIGRAM(S): 400 CAPSULE ORAL at 05:51

## 2022-10-22 RX ADMIN — OXYCODONE HYDROCHLORIDE 10 MILLIGRAM(S): 5 TABLET ORAL at 08:26

## 2022-10-22 RX ADMIN — Medication 3: at 12:14

## 2022-10-22 RX ADMIN — Medication 1: at 21:22

## 2022-10-22 RX ADMIN — ENOXAPARIN SODIUM 40 MILLIGRAM(S): 100 INJECTION SUBCUTANEOUS at 14:40

## 2022-10-22 NOTE — PROGRESS NOTE ADULT - ASSESSMENT
62 year old male with PMH HTN, T2DM, HIV and CLBP, noncompliant with medications or follow presents with complaint of not feeling well. He states he cant walk straight and lost his balance and has been falling down; multiple times yesterday. Unclear if LOC. He also complains of loss of vision, again chronic but unclear duration. Chest pain bilateral chest, upper abdomen, comes and goes and described as burning then pressure. Denies any palpitations Dyspnea occasional with dry cough. Denies any fever or chills. Vomited  and complains of diffuse abdominal pain. Denies any melena or hematochezia.    #Dizziness, recurrent falls. Visual impairment.    Concern for posterior circulation CVA  CT Head without any acute finding  Admit to stroke unit  Neuro checks  monitor for arrythmia  ASA, Statin  A1C of 10.4  Lipid panel ordered for AM  TTE, MRI, MRA pending  PT, OT, SLP  Neurology recs appreciated    #Chest pain  EKG without ischemic changes  CE negative  Doubtful if ischemia.  Could be related to gastritis/Esophagitis  Monitor  CE, Troponin  EKG  CTA chest without PE  Protonix, Antacids    #T2DM with hyperglycemia  BGM with SSI  Lantus  A1c 10.4   Endocrine consulted  Diabetes education    #HTN  Low dose ACEI    #HIV  CD4 551  VL 54,461  Will need to follow up with ID outpatient for iniation of Antiviral meds    #Pain  Add Gabapentin  Acetaminophen, Low dose Oxycodone    VTE Prophylaxis Lovenox    Dispo: Pending MRI/MRA/TTE   62 year old male with PMH HTN, T2DM, HIV and CLBP, noncompliant with medications or follow presents with complaint of not feeling well. He states he cant walk straight and lost his balance and has been falling down; multiple times yesterday. Unclear if LOC. He also complains of loss of vision, again chronic but unclear duration. Chest pain bilateral chest, upper abdomen, comes and goes and described as burning then pressure. Denies any palpitations Dyspnea occasional with dry cough. Denies any fever or chills. Vomited  and complains of diffuse abdominal pain. Denies any melena or hematochezia.    #Dizziness, recurrent falls. Visual impairment.    Concern for posterior circulation CVA  CT Head without any acute finding  Admit to stroke unit  Neuro checks  monitor for arrythmia  ASA, Statin  A1C of 10.4  Lipid panel ordered for AM  TTE, MRI, MRA pending  PT, OT, SLP  Neurology recs appreciated  EEG without epileptiform activity     #Chest pain  EKG without ischemic changes  CE negative  Doubtful if ischemia.  Could be related to gastritis/Esophagitis  Monitor  CE, Troponin  EKG  CTA chest without PE  Protonix, Antacids    #T2DM with hyperglycemia  BGM with SSI  Lantus  A1c 10.4   Endocrine consulted  Diabetes education    #HTN  Low dose ACEI    #HIV  CD4 551  VL 54,461  Will need to follow up with ID outpatient for iniation of Antiviral meds    #Pain  Add Gabapentin  Acetaminophen, Low dose Oxycodone    VTE Prophylaxis Lovenox    Dispo: Pending MRI/MRA/TTE

## 2022-10-22 NOTE — PROGRESS NOTE ADULT - ASSESSMENT
The patient is a 62y Male with gait difficulty and low back pain.    Gait difficulty  Awaiting brain MRI to rule out stroke.   Check lipids.  Mobilize with physical therapy.     Low back pain   Consider pain management evaluation.    Diabetes Mellitus   A1C 10.2  Plan per medicine.

## 2022-10-22 NOTE — CONSULT NOTE ADULT - SUBJECTIVE AND OBJECTIVE BOX
HPI:  Patient poor historian and keep on switching between symptoms and timelines; inconsistent    62 year old male with PMH HTN, T2DM, HIV  noncompliant with medications or follow presents with complaint of not feeling well. He states he cant walk straight and lost his balance and has been falling down; multiple times yesterday. Unclear if LOC. He also complains of loss of vision, again chronic but unclear duration.     PAST MEDICAL & SURGICAL HISTORY:  Hypertension      Diabetes x 25 years states he was taking MFN 1000 mg bid  and regualr insulin once in a while       Head trauma      Insomnia      Anxiety      Chronic back pain      DM (diabetes mellitus)      HIV infection      S/P lumbar fusion  in [place]          FAMILY HISTORY:  Family history of coronary artery disease in mother (Mother)  Mom DM         SOCIAL HISTORY: smokes 2 cigarettes per day     REVIEW OF SYSTEMS:    Constitutional: No fever, no chills, no change in weight.    Eyes: No eye swelling,no  blurry vision, no redness, no loss of vision.    Neck: No neck pain, no change in voice.    Lungs: No shortness of breath, no wheezing, no cough    CV: No chest pain, no palpitations, no pain with walking.    GI: No nausea, no vomiting, no constipation, no diarrhea, no abdominal pain    : No urinary frequency, no blood in urine, no urinary burning, no difficulty voiding.    Musculoskeletal: low back pain     Skin: No rash, no infections.    Neurologic: No headaches, no weakness, no burning or pain in feet, no tremor.    Endocrine: No heat intolerance, no cold intolerance, no increased sweating, no shakiness between meals.    Psych: No depression, no anxiety, no trouble concentrating    MEDICATIONS  (STANDING):  aspirin  chewable 81 milliGRAM(s) Oral daily  atorvastatin 40 milliGRAM(s) Oral at bedtime  dextrose 5%. 1000 milliLiter(s) (50 mL/Hr) IV Continuous <Continuous>  dextrose 5%. 1000 milliLiter(s) (100 mL/Hr) IV Continuous <Continuous>  dextrose 50% Injectable 25 Gram(s) IV Push once  dextrose 50% Injectable 12.5 Gram(s) IV Push once  dextrose 50% Injectable 25 Gram(s) IV Push once  enoxaparin Injectable 40 milliGRAM(s) SubCutaneous every 24 hours  gabapentin 100 milliGRAM(s) Oral three times a day  glucagon  Injectable 1 milliGRAM(s) IntraMuscular once  insulin glargine Injectable (LANTUS) 6 Unit(s) SubCutaneous at bedtime  insulin lispro (ADMELOG) corrective regimen sliding scale   SubCutaneous three times a day before meals  insulin lispro (ADMELOG) corrective regimen sliding scale   SubCutaneous at bedtime  pantoprazole    Tablet 40 milliGRAM(s) Oral before breakfast    MEDICATIONS  (PRN):  acetaminophen     Tablet .. 650 milliGRAM(s) Oral every 6 hours PRN Mild Pain (1 - 3)  aluminum hydroxide/magnesium hydroxide/simethicone Suspension 30 milliLiter(s) Oral every 4 hours PRN Dyspepsia  dextrose Oral Gel 15 Gram(s) Oral once PRN Blood Glucose LESS THAN 70 milliGRAM(s)/deciliter  ondansetron Injectable 4 milliGRAM(s) IV Push every 6 hours PRN Nausea and/or Vomiting  oxyCODONE    IR 5 milliGRAM(s) Oral every 6 hours PRN Moderate Pain (4 - 6)  oxyCODONE    IR 10 milliGRAM(s) Oral every 8 hours PRN Severe Pain (7 - 10)      Allergies    No Known Allergies    Intolerances          CAPILLARY BLOOD GLUCOSE      POCT Blood Glucose.: 272 mg/dL (22 Oct 2022 21:20)      PHYSICAL EXAM:    Vital Signs Last 24 Hrs  T(C): 36.8 (22 Oct 2022 20:00), Max: 36.9 (22 Oct 2022 07:00)  T(F): 98.2 (22 Oct 2022 20:00), Max: 98.4 (22 Oct 2022 07:00)  HR: 86 (22 Oct 2022 20:00) (74 - 86)  BP: 127/60 (22 Oct 2022 20:00) (113/56 - 165/77)  BP(mean): 75 (22 Oct 2022 20:00) (65 - 93)  RR: 20 (22 Oct 2022 20:00) (9 - 20)  SpO2: 99% (22 Oct 2022 20:00) (94% - 99%)    Parameters below as of 22 Oct 2022 20:00  Patient On (Oxygen Delivery Method): room air        General appearance: Well developed, well nourished.    Eyes: Pupils equal and reactive to light. EOM full. No exophthalmos.    Neck: Trachea midline. No thyroid enlargement.    Lungs: Normal respiratory excursion. Lungs clear.    CV: Regular cardiac rhythm. No murmur. Carotid and pedal pulses intact.        Musculoskeletal: No cyanosis, clubbing, or edema. No pedal lesions.    Skin: Warm and moist. very dry skin on feet     Neuro: Cranial nerves intact. Normal motor and sensory function. DTR's normal.    Psych: Normal affect, good judgement.            LABS:                        12.7   6.53  )-----------( 261      ( 20 Oct 2022 21:34 )             38.1     10-20    137  |  99  |  18.4  ----------------------------<  278<H>  4.8   |  29.0  |  0.85    Ca    9.6      20 Oct 2022 21:34  Mg     1.6     10-20    TPro  7.1  /  Alb  3.5  /  TBili  <0.2<L>  /  DBili  x   /  AST  17  /  ALT  14  /  AlkPhos  56  10-20      LIVER FUNCTIONS - ( 20 Oct 2022 21:34 )  Alb: 3.5 g/dL / Pro: 7.1 g/dL / ALK PHOS: 56 U/L / ALT: 14 U/L / AST: 17 U/L / GGT: x                 CAPILLARY BLOOD GLUCOSE  CAPILLARY BLOOD GLUCOSE      POCT Blood Glucose.: 272 mg/dL (22 Oct 2022 21:20)  POCT Blood Glucose.: 232 mg/dL (22 Oct 2022 17:19)  POCT Blood Glucose.: 273 mg/dL (22 Oct 2022 12:13)  POCT Blood Glucose.: 247 mg/dL (22 Oct 2022 08:20)  POCT Blood Glucose.: 249 mg/dL (22 Oct 2022 00:53)  A1C with Estimated Average Glucose (10.22.22 @ 02:41)   A1C with Estimated Average Glucose Result: 10.2 %   Estimated Average Glucose: 246 mg/dL     RADIOLOGY & ADDITIONAL STUDIES:

## 2022-10-22 NOTE — CONSULT NOTE ADULT - ASSESSMENT
Pt admitted with falls- concernfor posterior circulation CVA    T2DM  uncontrolled-  ContLnatus and Admelog  DM educaiotn   portern eval      ?CVA- neuro w/u in progress  HICV  +  needs ID followup

## 2022-10-23 LAB
ALBUMIN SERPL ELPH-MCNC: 2.8 G/DL — LOW (ref 3.3–5.2)
ALP SERPL-CCNC: 77 U/L — SIGNIFICANT CHANGE UP (ref 40–120)
ALT FLD-CCNC: 107 U/L — HIGH
ANION GAP SERPL CALC-SCNC: 9 MMOL/L — SIGNIFICANT CHANGE UP (ref 5–17)
AST SERPL-CCNC: 53 U/L — HIGH
BASOPHILS # BLD AUTO: 0.02 K/UL — SIGNIFICANT CHANGE UP (ref 0–0.2)
BASOPHILS NFR BLD AUTO: 0.5 % — SIGNIFICANT CHANGE UP (ref 0–2)
BILIRUB SERPL-MCNC: <0.2 MG/DL — LOW (ref 0.4–2)
BUN SERPL-MCNC: 23.1 MG/DL — HIGH (ref 8–20)
CALCIUM SERPL-MCNC: 8.4 MG/DL — SIGNIFICANT CHANGE UP (ref 8.4–10.5)
CHLORIDE SERPL-SCNC: 100 MMOL/L — SIGNIFICANT CHANGE UP (ref 98–107)
CHOLEST SERPL-MCNC: 133 MG/DL — SIGNIFICANT CHANGE UP
CO2 SERPL-SCNC: 25 MMOL/L — SIGNIFICANT CHANGE UP (ref 22–29)
CREAT SERPL-MCNC: 0.95 MG/DL — SIGNIFICANT CHANGE UP (ref 0.5–1.3)
EGFR: 91 ML/MIN/1.73M2 — SIGNIFICANT CHANGE UP
EOSINOPHIL # BLD AUTO: 0.12 K/UL — SIGNIFICANT CHANGE UP (ref 0–0.5)
EOSINOPHIL NFR BLD AUTO: 2.7 % — SIGNIFICANT CHANGE UP (ref 0–6)
GLUCOSE BLDC GLUCOMTR-MCNC: 224 MG/DL — HIGH (ref 70–99)
GLUCOSE BLDC GLUCOMTR-MCNC: 233 MG/DL — HIGH (ref 70–99)
GLUCOSE BLDC GLUCOMTR-MCNC: 238 MG/DL — HIGH (ref 70–99)
GLUCOSE BLDC GLUCOMTR-MCNC: 240 MG/DL — HIGH (ref 70–99)
GLUCOSE SERPL-MCNC: 270 MG/DL — HIGH (ref 70–99)
HCT VFR BLD CALC: 37.5 % — LOW (ref 39–50)
HDLC SERPL-MCNC: 37 MG/DL — LOW
HGB BLD-MCNC: 12.6 G/DL — LOW (ref 13–17)
IMM GRANULOCYTES NFR BLD AUTO: 1.1 % — HIGH (ref 0–0.9)
LIPID PNL WITH DIRECT LDL SERPL: 77 MG/DL — SIGNIFICANT CHANGE UP
LYMPHOCYTES # BLD AUTO: 1.12 K/UL — SIGNIFICANT CHANGE UP (ref 1–3.3)
LYMPHOCYTES # BLD AUTO: 25.6 % — SIGNIFICANT CHANGE UP (ref 13–44)
MCHC RBC-ENTMCNC: 29.1 PG — SIGNIFICANT CHANGE UP (ref 27–34)
MCHC RBC-ENTMCNC: 33.6 GM/DL — SIGNIFICANT CHANGE UP (ref 32–36)
MCV RBC AUTO: 86.6 FL — SIGNIFICANT CHANGE UP (ref 80–100)
MONOCYTES # BLD AUTO: 0.4 K/UL — SIGNIFICANT CHANGE UP (ref 0–0.9)
MONOCYTES NFR BLD AUTO: 9.1 % — SIGNIFICANT CHANGE UP (ref 2–14)
NEUTROPHILS # BLD AUTO: 2.67 K/UL — SIGNIFICANT CHANGE UP (ref 1.8–7.4)
NEUTROPHILS NFR BLD AUTO: 61 % — SIGNIFICANT CHANGE UP (ref 43–77)
NON HDL CHOLESTEROL: 96 MG/DL — SIGNIFICANT CHANGE UP
PLATELET # BLD AUTO: 246 K/UL — SIGNIFICANT CHANGE UP (ref 150–400)
POTASSIUM SERPL-MCNC: 4.4 MMOL/L — SIGNIFICANT CHANGE UP (ref 3.5–5.3)
POTASSIUM SERPL-SCNC: 4.4 MMOL/L — SIGNIFICANT CHANGE UP (ref 3.5–5.3)
PROT SERPL-MCNC: 6.2 G/DL — LOW (ref 6.6–8.7)
RBC # BLD: 4.33 M/UL — SIGNIFICANT CHANGE UP (ref 4.2–5.8)
RBC # FLD: 13.1 % — SIGNIFICANT CHANGE UP (ref 10.3–14.5)
SODIUM SERPL-SCNC: 134 MMOL/L — LOW (ref 135–145)
TRIGL SERPL-MCNC: 93 MG/DL — SIGNIFICANT CHANGE UP
TROPONIN T SERPL-MCNC: <0.01 NG/ML — SIGNIFICANT CHANGE UP (ref 0–0.06)
WBC # BLD: 4.38 K/UL — SIGNIFICANT CHANGE UP (ref 3.8–10.5)
WBC # FLD AUTO: 4.38 K/UL — SIGNIFICANT CHANGE UP (ref 3.8–10.5)

## 2022-10-23 PROCEDURE — 99233 SBSQ HOSP IP/OBS HIGH 50: CPT

## 2022-10-23 PROCEDURE — 70551 MRI BRAIN STEM W/O DYE: CPT | Mod: 26

## 2022-10-23 PROCEDURE — 70544 MR ANGIOGRAPHY HEAD W/O DYE: CPT | Mod: 26,59

## 2022-10-23 PROCEDURE — 70547 MR ANGIOGRAPHY NECK W/O DYE: CPT | Mod: 26

## 2022-10-23 RX ORDER — TRAZODONE HCL 50 MG
50 TABLET ORAL AT BEDTIME
Refills: 0 | Status: DISCONTINUED | OUTPATIENT
Start: 2022-10-23 | End: 2022-10-24

## 2022-10-23 RX ORDER — INSULIN GLARGINE 100 [IU]/ML
8 INJECTION, SOLUTION SUBCUTANEOUS AT BEDTIME
Refills: 0 | Status: DISCONTINUED | OUTPATIENT
Start: 2022-10-23 | End: 2022-10-24

## 2022-10-23 RX ADMIN — Medication 650 MILLIGRAM(S): at 03:16

## 2022-10-23 RX ADMIN — Medication 2: at 09:01

## 2022-10-23 RX ADMIN — OXYCODONE HYDROCHLORIDE 10 MILLIGRAM(S): 5 TABLET ORAL at 02:47

## 2022-10-23 RX ADMIN — Medication 2: at 13:33

## 2022-10-23 RX ADMIN — GABAPENTIN 100 MILLIGRAM(S): 400 CAPSULE ORAL at 05:13

## 2022-10-23 RX ADMIN — Medication 650 MILLIGRAM(S): at 04:15

## 2022-10-23 RX ADMIN — Medication 50 MILLIGRAM(S): at 21:12

## 2022-10-23 RX ADMIN — OXYCODONE HYDROCHLORIDE 10 MILLIGRAM(S): 5 TABLET ORAL at 01:53

## 2022-10-23 RX ADMIN — Medication 2: at 17:04

## 2022-10-23 RX ADMIN — OXYCODONE HYDROCHLORIDE 10 MILLIGRAM(S): 5 TABLET ORAL at 11:43

## 2022-10-23 RX ADMIN — PANTOPRAZOLE SODIUM 40 MILLIGRAM(S): 20 TABLET, DELAYED RELEASE ORAL at 05:13

## 2022-10-23 RX ADMIN — GABAPENTIN 100 MILLIGRAM(S): 400 CAPSULE ORAL at 14:06

## 2022-10-23 RX ADMIN — ENOXAPARIN SODIUM 40 MILLIGRAM(S): 100 INJECTION SUBCUTANEOUS at 14:05

## 2022-10-23 RX ADMIN — INSULIN GLARGINE 8 UNIT(S): 100 INJECTION, SOLUTION SUBCUTANEOUS at 21:51

## 2022-10-23 RX ADMIN — GABAPENTIN 100 MILLIGRAM(S): 400 CAPSULE ORAL at 21:12

## 2022-10-23 RX ADMIN — ONDANSETRON 4 MILLIGRAM(S): 8 TABLET, FILM COATED ORAL at 03:16

## 2022-10-23 RX ADMIN — ATORVASTATIN CALCIUM 40 MILLIGRAM(S): 80 TABLET, FILM COATED ORAL at 21:12

## 2022-10-23 RX ADMIN — Medication 81 MILLIGRAM(S): at 11:41

## 2022-10-23 NOTE — PROGRESS NOTE ADULT - ASSESSMENT
T2DM   Inc Lantus to 8 untis   cont admelog scale for now     Chest pain - no cardiac cause found

## 2022-10-23 NOTE — PROGRESS NOTE ADULT - ASSESSMENT
62 year old male with PMH HTN, T2DM, HIV and CLBP, noncompliant with medications or follow presents with complaint of not feeling well. He states he cant walk straight and lost his balance and has been falling down; multiple times yesterday. Unclear if LOC. He also complains of loss of vision, again chronic but unclear duration. Chest pain bilateral chest, upper abdomen, comes and goes and described as burning then pressure. Denies any palpitations Dyspnea occasional with dry cough. Denies any fever or chills. Vomited  and complains of diffuse abdominal pain. Denies any melena or hematochezia.    #Dizziness, recurrent falls. Visual impairment.    Concern for posterior circulation CVA  CT Head without any acute finding  Admit to stroke unit  Neuro checks  monitor for arrythmia  ASA, Statin  A1C of 10.4  TTE, MRI, MRA pending  PT, OT, SLP  Neurology recs appreciated  EEG without epileptiform activity     #Chest pain  EKG without ischemic changes  CE negative  Doubtful if ischemia.  Could be related to gastritis/Esophagitis  Monitor  CE, Troponin neg  EKG  CTA chest without PE  Protonix, Antacids    #T2DM with hyperglycemia  BGM with SSI  Lantus  A1c 10.4   Endocrine consulted  Diabetes education    #HTN  Low dose ACEI    #HIV  CD4 551  VL 54,461  Will need to follow up with ID outpatient for iniation of Antiviral meds    #Pain  Gabapentin  Acetaminophen, Low dose Oxycodone    #Insomnia - add trazadone     VTE Prophylaxis Lovenox    Dispo: Pending MRI/MRA/TTE

## 2022-10-23 NOTE — PROGRESS NOTE ADULT - ASSESSMENT
62y Male with gait difficulty and low back pain.    Gait difficulty  MRI negative for stroke.    Mobilize with physical therapy.     Low back pain   Consider pain management evaluation.    Diabetes Mellitus   A1C 10.2  Plan per medicine.     Discharge planning.   Pending PT recommendations.     No further specific neurologic recommendations. Will be available as needed.     Case discussed with Dr Noyola.

## 2022-10-24 ENCOUNTER — TRANSCRIPTION ENCOUNTER (OUTPATIENT)
Age: 62
End: 2022-10-24

## 2022-10-24 VITALS
SYSTOLIC BLOOD PRESSURE: 128 MMHG | OXYGEN SATURATION: 97 % | DIASTOLIC BLOOD PRESSURE: 71 MMHG | RESPIRATION RATE: 17 BRPM | TEMPERATURE: 99 F | HEART RATE: 92 BPM

## 2022-10-24 LAB
ANION GAP SERPL CALC-SCNC: 10 MMOL/L — SIGNIFICANT CHANGE UP (ref 5–17)
BASOPHILS # BLD AUTO: 0.03 K/UL — SIGNIFICANT CHANGE UP (ref 0–0.2)
BASOPHILS NFR BLD AUTO: 0.4 % — SIGNIFICANT CHANGE UP (ref 0–2)
BUN SERPL-MCNC: 21.6 MG/DL — HIGH (ref 8–20)
CALCIUM SERPL-MCNC: 9.3 MG/DL — SIGNIFICANT CHANGE UP (ref 8.4–10.5)
CHLORIDE SERPL-SCNC: 101 MMOL/L — SIGNIFICANT CHANGE UP (ref 98–107)
CO2 SERPL-SCNC: 25 MMOL/L — SIGNIFICANT CHANGE UP (ref 22–29)
CREAT SERPL-MCNC: 0.92 MG/DL — SIGNIFICANT CHANGE UP (ref 0.5–1.3)
EGFR: 95 ML/MIN/1.73M2 — SIGNIFICANT CHANGE UP
EOSINOPHIL # BLD AUTO: 0.13 K/UL — SIGNIFICANT CHANGE UP (ref 0–0.5)
EOSINOPHIL NFR BLD AUTO: 1.9 % — SIGNIFICANT CHANGE UP (ref 0–6)
GLUCOSE BLDC GLUCOMTR-MCNC: 194 MG/DL — HIGH (ref 70–99)
GLUCOSE SERPL-MCNC: 193 MG/DL — HIGH (ref 70–99)
HCT VFR BLD CALC: 39 % — SIGNIFICANT CHANGE UP (ref 39–50)
HGB BLD-MCNC: 13 G/DL — SIGNIFICANT CHANGE UP (ref 13–17)
IMM GRANULOCYTES NFR BLD AUTO: 1.2 % — HIGH (ref 0–0.9)
LYMPHOCYTES # BLD AUTO: 1.16 K/UL — SIGNIFICANT CHANGE UP (ref 1–3.3)
LYMPHOCYTES # BLD AUTO: 17 % — SIGNIFICANT CHANGE UP (ref 13–44)
MAGNESIUM SERPL-MCNC: 1.8 MG/DL — SIGNIFICANT CHANGE UP (ref 1.6–2.6)
MCHC RBC-ENTMCNC: 29.2 PG — SIGNIFICANT CHANGE UP (ref 27–34)
MCHC RBC-ENTMCNC: 33.3 GM/DL — SIGNIFICANT CHANGE UP (ref 32–36)
MCV RBC AUTO: 87.6 FL — SIGNIFICANT CHANGE UP (ref 80–100)
MONOCYTES # BLD AUTO: 0.51 K/UL — SIGNIFICANT CHANGE UP (ref 0–0.9)
MONOCYTES NFR BLD AUTO: 7.5 % — SIGNIFICANT CHANGE UP (ref 2–14)
NEUTROPHILS # BLD AUTO: 4.93 K/UL — SIGNIFICANT CHANGE UP (ref 1.8–7.4)
NEUTROPHILS NFR BLD AUTO: 72 % — SIGNIFICANT CHANGE UP (ref 43–77)
PHOSPHATE SERPL-MCNC: 3.7 MG/DL — SIGNIFICANT CHANGE UP (ref 2.4–4.7)
PLATELET # BLD AUTO: 253 K/UL — SIGNIFICANT CHANGE UP (ref 150–400)
POTASSIUM SERPL-MCNC: 4.4 MMOL/L — SIGNIFICANT CHANGE UP (ref 3.5–5.3)
POTASSIUM SERPL-SCNC: 4.4 MMOL/L — SIGNIFICANT CHANGE UP (ref 3.5–5.3)
RBC # BLD: 4.45 M/UL — SIGNIFICANT CHANGE UP (ref 4.2–5.8)
RBC # FLD: 13.2 % — SIGNIFICANT CHANGE UP (ref 10.3–14.5)
SODIUM SERPL-SCNC: 136 MMOL/L — SIGNIFICANT CHANGE UP (ref 135–145)
WBC # BLD: 6.84 K/UL — SIGNIFICANT CHANGE UP (ref 3.8–10.5)
WBC # FLD AUTO: 6.84 K/UL — SIGNIFICANT CHANGE UP (ref 3.8–10.5)

## 2022-10-24 PROCEDURE — 99239 HOSP IP/OBS DSCHRG MGMT >30: CPT

## 2022-10-24 PROCEDURE — 86360 T CELL ABSOLUTE COUNT/RATIO: CPT

## 2022-10-24 PROCEDURE — 83605 ASSAY OF LACTIC ACID: CPT

## 2022-10-24 PROCEDURE — 85730 THROMBOPLASTIN TIME PARTIAL: CPT

## 2022-10-24 PROCEDURE — 86359 T CELLS TOTAL COUNT: CPT

## 2022-10-24 PROCEDURE — 72125 CT NECK SPINE W/O DYE: CPT

## 2022-10-24 PROCEDURE — 82435 ASSAY OF BLOOD CHLORIDE: CPT

## 2022-10-24 PROCEDURE — 70544 MR ANGIOGRAPHY HEAD W/O DYE: CPT

## 2022-10-24 PROCEDURE — G1004: CPT

## 2022-10-24 PROCEDURE — 84484 ASSAY OF TROPONIN QUANT: CPT

## 2022-10-24 PROCEDURE — 80061 LIPID PANEL: CPT

## 2022-10-24 PROCEDURE — 71275 CT ANGIOGRAPHY CHEST: CPT

## 2022-10-24 PROCEDURE — 70551 MRI BRAIN STEM W/O DYE: CPT

## 2022-10-24 PROCEDURE — 85025 COMPLETE CBC W/AUTO DIFF WBC: CPT

## 2022-10-24 PROCEDURE — 95819 EEG AWAKE AND ASLEEP: CPT

## 2022-10-24 PROCEDURE — 83036 HEMOGLOBIN GLYCOSYLATED A1C: CPT

## 2022-10-24 PROCEDURE — 85018 HEMOGLOBIN: CPT

## 2022-10-24 PROCEDURE — 0225U NFCT DS DNA&RNA 21 SARSCOV2: CPT

## 2022-10-24 PROCEDURE — 82962 GLUCOSE BLOOD TEST: CPT

## 2022-10-24 PROCEDURE — 82947 ASSAY GLUCOSE BLOOD QUANT: CPT

## 2022-10-24 PROCEDURE — 80053 COMPREHEN METABOLIC PANEL: CPT

## 2022-10-24 PROCEDURE — 71045 X-RAY EXAM CHEST 1 VIEW: CPT

## 2022-10-24 PROCEDURE — 83880 ASSAY OF NATRIURETIC PEPTIDE: CPT

## 2022-10-24 PROCEDURE — 82803 BLOOD GASES ANY COMBINATION: CPT

## 2022-10-24 PROCEDURE — 82550 ASSAY OF CK (CPK): CPT

## 2022-10-24 PROCEDURE — 72131 CT LUMBAR SPINE W/O DYE: CPT

## 2022-10-24 PROCEDURE — 84132 ASSAY OF SERUM POTASSIUM: CPT

## 2022-10-24 PROCEDURE — 99285 EMERGENCY DEPT VISIT HI MDM: CPT

## 2022-10-24 PROCEDURE — 80048 BASIC METABOLIC PNL TOTAL CA: CPT

## 2022-10-24 PROCEDURE — 36415 COLL VENOUS BLD VENIPUNCTURE: CPT

## 2022-10-24 PROCEDURE — 70547 MR ANGIOGRAPHY NECK W/O DYE: CPT

## 2022-10-24 PROCEDURE — 83735 ASSAY OF MAGNESIUM: CPT

## 2022-10-24 PROCEDURE — 84295 ASSAY OF SERUM SODIUM: CPT

## 2022-10-24 PROCEDURE — 87536 HIV-1 QUANT&REVRSE TRNSCRPJ: CPT

## 2022-10-24 PROCEDURE — 82330 ASSAY OF CALCIUM: CPT

## 2022-10-24 PROCEDURE — 85014 HEMATOCRIT: CPT

## 2022-10-24 PROCEDURE — 84443 ASSAY THYROID STIM HORMONE: CPT

## 2022-10-24 PROCEDURE — 70450 CT HEAD/BRAIN W/O DYE: CPT | Mod: ME

## 2022-10-24 PROCEDURE — 85610 PROTHROMBIN TIME: CPT

## 2022-10-24 PROCEDURE — 84100 ASSAY OF PHOSPHORUS: CPT

## 2022-10-24 RX ORDER — SENNA PLUS 8.6 MG/1
2 TABLET ORAL AT BEDTIME
Refills: 0 | Status: DISCONTINUED | OUTPATIENT
Start: 2022-10-24 | End: 2022-10-24

## 2022-10-24 RX ORDER — ASPIRIN/CALCIUM CARB/MAGNESIUM 324 MG
1 TABLET ORAL
Qty: 30 | Refills: 0
Start: 2022-10-24 | End: 2022-11-22

## 2022-10-24 RX ORDER — GABAPENTIN 400 MG/1
1 CAPSULE ORAL
Qty: 90 | Refills: 0
Start: 2022-10-24 | End: 2022-11-22

## 2022-10-24 RX ORDER — ATORVASTATIN CALCIUM 80 MG/1
1 TABLET, FILM COATED ORAL
Qty: 30 | Refills: 0
Start: 2022-10-24 | End: 2022-11-22

## 2022-10-24 RX ORDER — POLYETHYLENE GLYCOL 3350 17 G/17G
17 POWDER, FOR SOLUTION ORAL DAILY
Refills: 0 | Status: DISCONTINUED | OUTPATIENT
Start: 2022-10-24 | End: 2022-10-24

## 2022-10-24 RX ORDER — INSULIN GLARGINE 100 [IU]/ML
8 INJECTION, SOLUTION SUBCUTANEOUS
Qty: 240 | Refills: 0
Start: 2022-10-24 | End: 2022-11-22

## 2022-10-24 RX ADMIN — OXYCODONE HYDROCHLORIDE 10 MILLIGRAM(S): 5 TABLET ORAL at 09:30

## 2022-10-24 RX ADMIN — Medication 5 MILLIGRAM(S): at 05:01

## 2022-10-24 RX ADMIN — GABAPENTIN 100 MILLIGRAM(S): 400 CAPSULE ORAL at 05:01

## 2022-10-24 RX ADMIN — PANTOPRAZOLE SODIUM 40 MILLIGRAM(S): 20 TABLET, DELAYED RELEASE ORAL at 05:01

## 2022-10-24 RX ADMIN — Medication 1: at 08:17

## 2022-10-24 RX ADMIN — OXYCODONE HYDROCHLORIDE 10 MILLIGRAM(S): 5 TABLET ORAL at 09:00

## 2022-10-24 NOTE — PHYSICAL THERAPY INITIAL EVALUATION ADULT - DIAGNOSIS, PT EVAL
Pt is independent with functional mobility, therefore, does not require acute PT services at this time.

## 2022-10-24 NOTE — DISCHARGE NOTE PROVIDER - NSDCCPCAREPLAN_GEN_ALL_CORE_FT
PRINCIPAL DISCHARGE DIAGNOSIS  Diagnosis: Unsteady gait  Assessment and Plan of Treatment: Likely secondary to canal stenosis. Patient to follow up with PCP outpatient.      SECONDARY DISCHARGE DIAGNOSES  Diagnosis: HIV disease  Assessment and Plan of Treatment: Please follow up with your PCP/ID doctor to start HIV meds.    Diagnosis: Vision problem  Assessment and Plan of Treatment: Please follow up [facility] opthamology outpatient.

## 2022-10-24 NOTE — DISCHARGE NOTE PROVIDER - PROVIDER TOKENS
FREE:[LAST:[PCP],PHONE:[(   )    -],FAX:[(   )    -],FOLLOWUP:[1 week]],FREE:[LAST:[Infectious disease],PHONE:[(   )    -],FAX:[(   )    -],FOLLOWUP:[1-3 days]],PROVIDER:[TOKEN:[[id number]],FOLLOWUP:[1-3 days]]

## 2022-10-24 NOTE — DISCHARGE NOTE NURSING/CASE MANAGEMENT/SOCIAL WORK - NSDCVIVACCINE_GEN_ALL_CORE_FT
Tdap; 27-Aug-2018 13:17; Pamela Albarran (CATY); [employer]; [id number]; IntraMuscular; Deltoid Left.; 0.5 milliLiter(s); VIS (VIS Published: 09-May-2013, VIS Presented: 27-Aug-2018);   Tdap; 09-Feb-2019 17:54; Abby Marion); [employer]; [id number] (Exp. Date: 02-Nov-2020); IntraMuscular; Deltoid Left.; 0.5 milliLiter(s); VIS (VIS Published: 09-May-2013, VIS Presented: 09-Feb-2019);

## 2022-10-24 NOTE — DISCHARGE NOTE PROVIDER - ATTENDING DISCHARGE PHYSICAL EXAMINATION:
Constitutional: NAD, Resting  ENT: Supple, No JVD  Lungs: CTA B/L, Non-labored breathing  Cardio: RRR, S1/S2, No murmur  Abdomen: Soft, Nontender, Nondistended; Bowel sounds present  Extremities: No calf tenderness, No pitting edema  Musculoskeletal:   No clubbing or cyanosis of digits; no joint swelling or tenderness to palpation  Psych: Calm, cooperative affect appropriate  Neuro: Awake and alert, oriented x4, can move all extremities, UE is 5/5, LE is limited secondary to pain but able to move with passive extension  Skin: No rashes; no palpable lesions

## 2022-10-24 NOTE — DISCHARGE NOTE PROVIDER - CARE PROVIDERS DIRECT ADDRESSES
,DirectAddress_Unknown,DirectAddress_Unknown,[email or web address]

## 2022-10-24 NOTE — DISCHARGE NOTE PROVIDER - CARE PROVIDER_API CALL
PCP,   Phone: (   )    -  Fax: (   )    -  Follow Up Time: 1 week    Infectious disease,   Phone: (   )    -  Fax: (   )    -  Follow Up Time: 1-3 days    Garth Gaines)  Infectious Disease; Internal Medicine  [address] [place] [address]  Phone: [phone]  Fax: [phone]  Follow Up Time: 1-3 days
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
## 2022-10-24 NOTE — DISCHARGE NOTE PROVIDER - NSDCMRMEDTOKEN_GEN_ALL_CORE_FT
alcohol wipes: 1 application  4 times a day   aspirin 81 mg oral tablet, chewable: 1 tab(s) orally once a day  atorvastatin 40 mg oral tablet: 1 tab(s) orally once a day (at bedtime)  Basaglar KwikPen 100 units/mL subcutaneous solution: 8 unit(s) subcutaneous once a day (at bedtime)  gabapentin 100 mg oral capsule: 1 cap(s) orally 3 times a day  glucometer: 1   glucose test strips: 1 application  4 times a day   insulin needles: 1 application  once a day (at bedtime)   lancets: 1 application  4 times a day

## 2022-10-24 NOTE — DISCHARGE NOTE PROVIDER - HOSPITAL COURSE
62 year old male with PMH HTN, T2DM, HIV and CLBP, noncompliant with medications or follow presents with complaint of not feeling well. He states he cant walk straight and lost his balance and has been falling down; multiple times yesterday. Unclear if LOC. He also complains of loss of vision, again chronic but unclear duration. Chest pain bilateral chest, upper abdomen, comes and goes and described as burning then pressure. Denies any palpitations Dyspnea occasional with dry cough. Denies any fever or chills. Vomited  and complains of diffuse abdominal pain. Denies any melena or hematochezia. Patient was admitted for concern for posterior circulation CVA. CT head was without any acute findings. CTA chest without PE/Pna. CT c-spine showed degenerative changes. There was mild canal stenosis at c5-6 with moderate to severe neural narrowing at c6-7. CT lumbar spine showed further degenerative changes and mild to moderate spinal canal stenosis noted. MRI brain, MRA head and neck without acute findings. Patient was seen by PT who states that patient is independent. Patient was noted to have chest pain but was likely related to gastritis/esophagitis. Resolved. A1C was noted to be 10.4. Patient states that he takes insulin at home but had stopped. Medically stable for DC home.

## 2022-10-24 NOTE — DISCHARGE NOTE NURSING/CASE MANAGEMENT/SOCIAL WORK - NSDCPEFALRISK_GEN_ALL_CORE
For information on Fall & Injury Prevention, visit: [email or web address] OR  [email or web address] OR  https://www.cdc.gov/steadi/patient.html

## 2022-10-24 NOTE — DISCHARGE NOTE NURSING/CASE MANAGEMENT/SOCIAL WORK - PATIENT PORTAL LINK FT
You can access the FollowMyHealth Patient Portal offered by [facility] by registering at the following website: [email or web address]. By joining [email or web address]s FollowMyHealth portal, you will also be able to view your health information using other applications (apps) compatible with our system.

## 2022-10-24 NOTE — PROGRESS NOTE ADULT - REASON FOR ADMISSION
Dizziness, fall, chest pain - r/o CVA

## 2022-10-24 NOTE — PROGRESS NOTE ADULT - ASSESSMENT
62 year old male with PMH HTN, T2DM, HIV and CLBP, noncompliant with medications or follow presents with complaint of not feeling well. He states he cant walk straight and lost his balance and has been falling down; multiple times yesterday. Unclear if LOC. He also complains of loss of vision, again chronic but unclear duration. Chest pain bilateral chest, upper abdomen, comes and goes and described as burning then pressure. Denies any palpitations Dyspnea occasional with dry cough. Denies any fever or chills. Vomited  and complains of diffuse abdominal pain. Denies any melena or hematochezia.    #Dizziness, recurrent falls. Visual impairment.    Concern for posterior circulation CVA  CT Head without any acute finding  MRI brain with out acute findings  monitor for arrythmia  ASA, Statin  A1C of 10.4  PT, OT pending  Neurology recs appreciated  EEG without epileptiform activity     #Chest pain  EKG without ischemic changes  CE negative  Doubtful if ischemia.  Could be related to gastritis/Esophagitis  Monitor  CE, Troponin neg  EKG  CTA chest without PE  Protonix, Antacids    #T2DM with hyperglycemia  BGM with SSI  Lantus  A1c 10.4   Endocrine recs appreciated  Diabetes education    #HTN  Low dose ACEI    #HIV  CD4 551  VL 54,461  Will need to follow up with ID outpatient for iniation of Antiviral meds    #Pain  Gabapentin  Acetaminophen, Low dose Oxycodone    #Insomnia - add trazadone     VTE Prophylaxis Lovenox    Dispo: Pending PT eval  DC plan for likely home    Spoke to patients wife who reports that patient lives in a trailer and states can not go home today.

## 2022-10-27 NOTE — ED ADULT NURSE NOTE - CAS ELECT INFOMATION PROVIDED
Pt's study should not have ended until 11/12. Called [facility] to inquire on what is available. Per their staff it was an urgent notification for new onset of atrial fibrillation,  which occurred 10/26 at 2125. Per their clinical team as of 0930 today she was showing back in SR.     Routing to cardiology clinic, please have report confirmed by cardiologist and uploaded to pt's chart for our team to advise further. Routing to stroke KAYLA team for awareness as well.      Jacklyn Ruano BS, RN, SCRN  RN Stroke Neurology Care Coordinator  [facility] Neuroscience Service Line     DC instructions

## 2023-02-28 NOTE — ED ADULT TRIAGE NOTE - AS HEIGHT TYPE
SUBJECTIVE:     Jared Giordano is a 58 year old male, who is here today for medical management of his type 2 diabetes mellitus with diabetic polyneuropathy without long-term use of insulin, hypertension, and complaint that his neuropathy is no better on consistent use of Lyrica.    With regards to his diabetes mellitus type 2 the patient does not check his sugars on a routine basis.  He denies any polyuria polydipsia.  He is on no medication at the current time.      With regards to his hypertension this has been resistant in the past.  He is on a clonidine patch.  The patient states he did not take his medication today because he had a rush to the clinic.      With regards to his neuropathy the patient was referred by his previous primary care provider to Neurology about a year ago but never did go.  The patient is having persistent pain in his lower extremities despite the use of Lyrica.  Patient was called by Neurology but he never called them back to set up an appointment.      Otherwise the patient has no other specific complaints          REVIEW OF SYSTEMS:  Constitutional: Denies generalized fatigue, fever, sweats or chills  Respiratory: Denies difficulty breathing, shortness of breath, hemoptysis, or wheezing  Cardiovascular:  Denies chest pain, pressure, or palpitations  Neurologic:  Denies headache, dizziness, syncopal episodes    ALLERGIES:  ALLERGIES:   Allergen Reactions   • Lisinopril RASH         MEDICATIONS:   Outpatient Medications Marked as Taking for the 2/28/23 encounter (Office Visit) with Yinka Daniels MD   Medication Sig Dispense Refill   • zolpidem (AMBIEN) 10 MG tablet TAKE 1 TABLET BY MOUTH NIGHTLY AS NEEDED FOR SLEEP. 30 tablet 0   • pregabalin (LYRICA) 100 MG capsule TAKE 1 CAPSULE BY MOUTH IN THE MORNING AND 1 CAPSULE IN THE EVENING. 60 capsule 0   • atorvastatin (LIPITOR) 40 MG tablet Take 1 tablet by mouth daily. 90 tablet 3   • cloNIDine (CATAPRES-TTS 1) 0.1 MG/24HR Place 1 patch onto  the skin 1 day a week. Box includes patch and non-medicated adhesive cover. Apply adhesive cover if patch begins to lift. 12 patch 3   • losartan (COZAAR) 100 MG tablet Take 1 tablet by mouth daily. 90 tablet 3   • metoPROLOL succinate (TOPROL-XL) 25 MG 24 hr tablet Take 1 tablet by mouth daily. 90 tablet 3   • nystatin (MYCOSTATIN) 497670 UNIT/GM cream Apply 1 application topically 2 times daily. To rash 30 g 1   • DISPENSE Med Name: Shayan Naturally - Healthy Nerves&feet     • ibuprofen (MOTRIN) 800 MG tablet      • Barberry-Oreg Grape-Goldenseal (BERBERINE COMPLEX PO) Take 1 tablet by mouth daily.     • NON FORMULARY Take 1 each by mouth daily. Indications: Diabazole     • Naproxen Sodium (ALEVE) 220 MG capsule Take 220 mg by mouth 2 times daily as needed (Low back pain). Take with food or milk.     • MILK THISTLE PO Take 1 tablet by mouth daily. Indications: Nutritional Support            HISTORIES  Past Medical History:   Diagnosis Date   • RUPESH (acute kidney injury) (CMS/[facility]) 7/29/2016   • Allergy    • Anxiety disorder     no current problems   • Back pain     herniated disk L3-4; s/p surgery   • Drug withdrawal syndrome (CMS/[facility]) 7/29/2016   • Elevated LFTs    • History of alcohol abuse    • Hypercholesterolemia with hypertriglyceridemia    • Hypertension    • Impaired fasting blood sugar    • Insomnia    • Numbness of foot    • Obesity     resolved in June 2014 and now overweight       OBJECTIVE:  Constitutional:  Well developed, well nourished in no apparent distress, nontoxic appearance   Vitals:    Visit Vitals  BP (!) 162/90   Pulse 68   Resp 16   Ht 5' 9\" (1.753 m)   Wt 97.4 kg (214 lb 11.7 oz)   SpO2 97%   BMI 31.71 kg/m²    Body mass index is 31.71 kg/m².  Respiratory:  Lungs clear to auscultation. Normal aeration.  Normal respiratory effort.    Cardiovascular:  Regular rate and rhythm.  Normal S1 and S2 without murmurs, clicks, gallops.  Lower extremity pedal pulses 2+.  No edema.   Psychiatric:  Alert  and oriented x 3.  Speech and behavior appropriate.     Diabetic Foot Exam Documentation     Abnormal Bilateral Foot Exam:  Right: Skin integrity is normal - no erythema, blisters, callosities, or ulcers . Dorsalis pedis and posterial tibial pulses are absent. Pressure sensation using the [place] is absent on the first and third metatarsal and first toe.    Left: Skin integrity is normal - no erythema, blisters, callosities, or ulcers . Dorsalis pedis and posterial tibial pulses are present. Pressure sensation using the [place] is absent on the fifth toe.       2/16/2023 A1c  7.0      Assessment and plan:      1. Type 2 diabetes mellitus with diabetic polyneuropathy, without long-term current use of insulin (CMS/[facility])    2. Essential hypertension    3. Primary insomnia    4. Neuropathy      Type 2 diabetes mellitus with diabetic polyneuropathy without long-term use of insulin  A1c not ideally controlled.  We are going to place him on metformin 500 mg a day.    Hypertension  Not ideally controlled today but he did not take his medication.  He is having no symptoms.    Neuropathy  Symptoms are severe despite taking Lyrica.  The patient was referred off to Neurology by his previous primary care provider and I am going to reinstitute that referral.  I think we need their opinion on his neuropathy.    Plan:  Metformin  mg p.o. q.day  Continue other medications the same  Nurse blood pressure check in 1-2 weeks after the patient has taking his medication on that day  Return to clinic in 3 months with an A1c before appointment  Diet and exercise  Patient declines all immunizations that he is due and also colon cancer screening    Orders Placed This Encounter   • SERVICE TO NEUROLOGY   • metFORMIN (GLUCOPHAGE-XR) 500 MG 24 hr tablet       No follow-ups on file.     stated

## 2023-03-07 NOTE — ED PROVIDER NOTE - CROS ED MUSC ALL NEG
Head, normocephalic, atraumatic, Face, Face within normal limits, Ears, External ears within normal limits, Nose/Nasopharynx, External nose normal appearance, nares patent, no nasal discharge, Mouth and Throat, Oral cavity appearance normal, Lips, Appearance normal - - -

## 2023-03-14 ENCOUNTER — INPATIENT (INPATIENT)
Facility: HOSPITAL | Age: 63
LOS: 2 days | Discharge: LEFT BEFORE DISCHARGE | DRG: 710 | End: 2023-03-17
Attending: INTERNAL MEDICINE | Admitting: INTERNAL MEDICINE
Payer: MEDICAID

## 2023-03-14 VITALS
OXYGEN SATURATION: 94 % | WEIGHT: 190.04 LBS | DIASTOLIC BLOOD PRESSURE: 69 MMHG | TEMPERATURE: 100 F | RESPIRATION RATE: 18 BRPM | SYSTOLIC BLOOD PRESSURE: 155 MMHG | HEIGHT: 69 IN | HEART RATE: 99 BPM

## 2023-03-14 DIAGNOSIS — M79.671 PAIN IN RIGHT FOOT: ICD-10-CM

## 2023-03-14 DIAGNOSIS — Z98.1 ARTHRODESIS STATUS: Chronic | ICD-10-CM

## 2023-03-14 PROBLEM — B20 HUMAN IMMUNODEFICIENCY VIRUS [HIV] DISEASE: Chronic | Status: ACTIVE | Noted: 2022-10-21

## 2023-03-14 LAB
4/8 RATIO: 0.67 RATIO — LOW (ref 0.9–3.6)
ABS CD8: 361 CELLS/UL — SIGNIFICANT CHANGE UP (ref 142–740)
ALBUMIN SERPL ELPH-MCNC: 2.1 G/DL — LOW (ref 3.3–5)
ALP SERPL-CCNC: 68 U/L — SIGNIFICANT CHANGE UP (ref 40–120)
ALT FLD-CCNC: 12 U/L — SIGNIFICANT CHANGE UP (ref 12–78)
ANION GAP SERPL CALC-SCNC: 6 MMOL/L — SIGNIFICANT CHANGE UP (ref 5–17)
AST SERPL-CCNC: 12 U/L — LOW (ref 15–37)
BASOPHILS # BLD AUTO: 0.02 K/UL — SIGNIFICANT CHANGE UP (ref 0–0.2)
BASOPHILS NFR BLD AUTO: 0.2 % — SIGNIFICANT CHANGE UP (ref 0–2)
BILIRUB SERPL-MCNC: 0.4 MG/DL — SIGNIFICANT CHANGE UP (ref 0.2–1.2)
BUN SERPL-MCNC: 17 MG/DL — SIGNIFICANT CHANGE UP (ref 7–23)
CALCIUM SERPL-MCNC: 9.1 MG/DL — SIGNIFICANT CHANGE UP (ref 8.5–10.1)
CD16+CD56+ CELLS NFR BLD: 15 % — SIGNIFICANT CHANGE UP (ref 5–23)
CD16+CD56+ CELLS NFR SPEC: 144 CELLS/UL — SIGNIFICANT CHANGE UP (ref 71–410)
CD19 BLASTS SPEC-ACNC: 208 CELLS/UL — SIGNIFICANT CHANGE UP (ref 84–469)
CD19 BLASTS SPEC-ACNC: 21 % — SIGNIFICANT CHANGE UP (ref 6–24)
CD3 BLASTS SPEC-ACNC: 616 CELLS/UL — LOW (ref 672–1870)
CD3 BLASTS SPEC-ACNC: 63 % — SIGNIFICANT CHANGE UP (ref 59–83)
CD4 %: 25 % — LOW (ref 30–62)
CD8 %: 38 % — HIGH (ref 12–36)
CHLORIDE SERPL-SCNC: 99 MMOL/L — SIGNIFICANT CHANGE UP (ref 96–108)
CO2 SERPL-SCNC: 28 MMOL/L — SIGNIFICANT CHANGE UP (ref 22–31)
CREAT SERPL-MCNC: 1.13 MG/DL — SIGNIFICANT CHANGE UP (ref 0.5–1.3)
CRP SERPL-MCNC: 172 MG/L — HIGH
EGFR: 74 ML/MIN/1.73M2 — SIGNIFICANT CHANGE UP
EOSINOPHIL # BLD AUTO: 0.01 K/UL — SIGNIFICANT CHANGE UP (ref 0–0.5)
EOSINOPHIL NFR BLD AUTO: 0.1 % — SIGNIFICANT CHANGE UP (ref 0–6)
ERYTHROCYTE [SEDIMENTATION RATE] IN BLOOD: 104 MM/HR — HIGH (ref 0–20)
FLUAV AG NPH QL: SIGNIFICANT CHANGE UP
FLUBV AG NPH QL: SIGNIFICANT CHANGE UP
GLUCOSE SERPL-MCNC: 291 MG/DL — HIGH (ref 70–99)
HCT VFR BLD CALC: 36 % — LOW (ref 39–50)
HGB BLD-MCNC: 11.9 G/DL — LOW (ref 13–17)
IMM GRANULOCYTES NFR BLD AUTO: 0.4 % — SIGNIFICANT CHANGE UP (ref 0–0.9)
LYMPHOCYTES # BLD AUTO: 1.06 K/UL — SIGNIFICANT CHANGE UP (ref 1–3.3)
LYMPHOCYTES # BLD AUTO: 9.4 % — LOW (ref 13–44)
MCHC RBC-ENTMCNC: 28.3 PG — SIGNIFICANT CHANGE UP (ref 27–34)
MCHC RBC-ENTMCNC: 33.1 GM/DL — SIGNIFICANT CHANGE UP (ref 32–36)
MCV RBC AUTO: 85.7 FL — SIGNIFICANT CHANGE UP (ref 80–100)
MONOCYTES # BLD AUTO: 0.94 K/UL — HIGH (ref 0–0.9)
MONOCYTES NFR BLD AUTO: 8.3 % — SIGNIFICANT CHANGE UP (ref 2–14)
NEUTROPHILS # BLD AUTO: 9.18 K/UL — HIGH (ref 1.8–7.4)
NEUTROPHILS NFR BLD AUTO: 81.6 % — HIGH (ref 43–77)
PLATELET # BLD AUTO: 272 K/UL — SIGNIFICANT CHANGE UP (ref 150–400)
POTASSIUM SERPL-MCNC: 4.3 MMOL/L — SIGNIFICANT CHANGE UP (ref 3.5–5.3)
POTASSIUM SERPL-SCNC: 4.3 MMOL/L — SIGNIFICANT CHANGE UP (ref 3.5–5.3)
PROT SERPL-MCNC: 8 GM/DL — SIGNIFICANT CHANGE UP (ref 6–8.3)
RBC # BLD: 4.2 M/UL — SIGNIFICANT CHANGE UP (ref 4.2–5.8)
RBC # FLD: 13.2 % — SIGNIFICANT CHANGE UP (ref 10.3–14.5)
RSV RNA NPH QL NAA+NON-PROBE: SIGNIFICANT CHANGE UP
SARS-COV-2 RNA SPEC QL NAA+PROBE: SIGNIFICANT CHANGE UP
SODIUM SERPL-SCNC: 133 MMOL/L — LOW (ref 135–145)
T-CELL CD4 SUBSET PNL BLD: 241 CELLS/UL — LOW (ref 489–1457)
WBC # BLD: 11.26 K/UL — HIGH (ref 3.8–10.5)
WBC # FLD AUTO: 11.26 K/UL — HIGH (ref 3.8–10.5)

## 2023-03-14 PROCEDURE — 87077 CULTURE AEROBIC IDENTIFY: CPT

## 2023-03-14 PROCEDURE — 80048 BASIC METABOLIC PNL TOTAL CA: CPT

## 2023-03-14 PROCEDURE — 86850 RBC ANTIBODY SCREEN: CPT

## 2023-03-14 PROCEDURE — 86901 BLOOD TYPING SEROLOGIC RH(D): CPT

## 2023-03-14 PROCEDURE — 80061 LIPID PANEL: CPT

## 2023-03-14 PROCEDURE — 80202 ASSAY OF VANCOMYCIN: CPT

## 2023-03-14 PROCEDURE — 85027 COMPLETE CBC AUTOMATED: CPT

## 2023-03-14 PROCEDURE — 85730 THROMBOPLASTIN TIME PARTIAL: CPT

## 2023-03-14 PROCEDURE — 86360 T CELL ABSOLUTE COUNT/RATIO: CPT

## 2023-03-14 PROCEDURE — 85610 PROTHROMBIN TIME: CPT

## 2023-03-14 PROCEDURE — 85025 COMPLETE CBC W/AUTO DIFF WBC: CPT

## 2023-03-14 PROCEDURE — 86359 T CELLS TOTAL COUNT: CPT

## 2023-03-14 PROCEDURE — 86900 BLOOD TYPING SEROLOGIC ABO: CPT

## 2023-03-14 PROCEDURE — 87186 SC STD MICRODIL/AGAR DIL: CPT

## 2023-03-14 PROCEDURE — 80053 COMPREHEN METABOLIC PANEL: CPT

## 2023-03-14 PROCEDURE — 36415 COLL VENOUS BLD VENIPUNCTURE: CPT

## 2023-03-14 PROCEDURE — 83036 HEMOGLOBIN GLYCOSYLATED A1C: CPT

## 2023-03-14 PROCEDURE — 73718 MRI LOWER EXTREMITY W/O DYE: CPT | Mod: RT

## 2023-03-14 PROCEDURE — 99285 EMERGENCY DEPT VISIT HI MDM: CPT

## 2023-03-14 PROCEDURE — 82962 GLUCOSE BLOOD TEST: CPT

## 2023-03-14 PROCEDURE — 87070 CULTURE OTHR SPECIMN AEROBIC: CPT

## 2023-03-14 PROCEDURE — 87536 HIV-1 QUANT&REVRSE TRNSCRPJ: CPT

## 2023-03-14 PROCEDURE — 73630 X-RAY EXAM OF FOOT: CPT | Mod: 26,RT

## 2023-03-14 PROCEDURE — 71046 X-RAY EXAM CHEST 2 VIEWS: CPT | Mod: 26

## 2023-03-14 RX ORDER — LIDOCAINE 4 G/100G
1 CREAM TOPICAL ONCE
Refills: 0 | Status: COMPLETED | OUTPATIENT
Start: 2023-03-14 | End: 2023-03-14

## 2023-03-14 RX ORDER — KETOROLAC TROMETHAMINE 30 MG/ML
15 SYRINGE (ML) INJECTION ONCE
Refills: 0 | Status: DISCONTINUED | OUTPATIENT
Start: 2023-03-14 | End: 2023-03-14

## 2023-03-14 RX ORDER — PIPERACILLIN AND TAZOBACTAM 4; .5 G/20ML; G/20ML
3.38 INJECTION, POWDER, LYOPHILIZED, FOR SOLUTION INTRAVENOUS ONCE
Refills: 0 | Status: COMPLETED | OUTPATIENT
Start: 2023-03-14 | End: 2023-03-14

## 2023-03-14 RX ORDER — GLUCAGON INJECTION, SOLUTION 0.5 MG/.1ML
1 INJECTION, SOLUTION SUBCUTANEOUS ONCE
Refills: 0 | Status: DISCONTINUED | OUTPATIENT
Start: 2023-03-14 | End: 2023-03-17

## 2023-03-14 RX ORDER — LANOLIN ALCOHOL/MO/W.PET/CERES
3 CREAM (GRAM) TOPICAL AT BEDTIME
Refills: 0 | Status: DISCONTINUED | OUTPATIENT
Start: 2023-03-14 | End: 2023-03-17

## 2023-03-14 RX ORDER — PIPERACILLIN AND TAZOBACTAM 4; .5 G/20ML; G/20ML
3.38 INJECTION, POWDER, LYOPHILIZED, FOR SOLUTION INTRAVENOUS EVERY 8 HOURS
Refills: 0 | Status: DISCONTINUED | OUTPATIENT
Start: 2023-03-14 | End: 2023-03-17

## 2023-03-14 RX ORDER — DEXTROSE 50 % IN WATER 50 %
12.5 SYRINGE (ML) INTRAVENOUS ONCE
Refills: 0 | Status: DISCONTINUED | OUTPATIENT
Start: 2023-03-14 | End: 2023-03-17

## 2023-03-14 RX ORDER — ACETAMINOPHEN 500 MG
650 TABLET ORAL EVERY 6 HOURS
Refills: 0 | Status: DISCONTINUED | OUTPATIENT
Start: 2023-03-14 | End: 2023-03-17

## 2023-03-14 RX ORDER — SODIUM CHLORIDE 9 MG/ML
1000 INJECTION, SOLUTION INTRAVENOUS
Refills: 0 | Status: DISCONTINUED | OUTPATIENT
Start: 2023-03-14 | End: 2023-03-17

## 2023-03-14 RX ORDER — TRAMADOL HYDROCHLORIDE 50 MG/1
50 TABLET ORAL EVERY 8 HOURS
Refills: 0 | Status: DISCONTINUED | OUTPATIENT
Start: 2023-03-14 | End: 2023-03-16

## 2023-03-14 RX ORDER — HEPARIN SODIUM 5000 [USP'U]/ML
5000 INJECTION INTRAVENOUS; SUBCUTANEOUS EVERY 8 HOURS
Refills: 0 | Status: DISCONTINUED | OUTPATIENT
Start: 2023-03-14 | End: 2023-03-17

## 2023-03-14 RX ORDER — DEXTROSE 50 % IN WATER 50 %
25 SYRINGE (ML) INTRAVENOUS ONCE
Refills: 0 | Status: DISCONTINUED | OUTPATIENT
Start: 2023-03-14 | End: 2023-03-17

## 2023-03-14 RX ORDER — DEXTROSE 50 % IN WATER 50 %
15 SYRINGE (ML) INTRAVENOUS ONCE
Refills: 0 | Status: DISCONTINUED | OUTPATIENT
Start: 2023-03-14 | End: 2023-03-17

## 2023-03-14 RX ORDER — VANCOMYCIN HCL 1 G
1000 VIAL (EA) INTRAVENOUS ONCE
Refills: 0 | Status: COMPLETED | OUTPATIENT
Start: 2023-03-14 | End: 2023-03-14

## 2023-03-14 RX ORDER — MORPHINE SULFATE 50 MG/1
4 CAPSULE, EXTENDED RELEASE ORAL ONCE
Refills: 0 | Status: DISCONTINUED | OUTPATIENT
Start: 2023-03-14 | End: 2023-03-14

## 2023-03-14 RX ORDER — INSULIN LISPRO 100/ML
VIAL (ML) SUBCUTANEOUS
Refills: 0 | Status: DISCONTINUED | OUTPATIENT
Start: 2023-03-14 | End: 2023-03-17

## 2023-03-14 RX ORDER — SODIUM CHLORIDE 9 MG/ML
1000 INJECTION INTRAMUSCULAR; INTRAVENOUS; SUBCUTANEOUS
Refills: 0 | Status: DISCONTINUED | OUTPATIENT
Start: 2023-03-14 | End: 2023-03-17

## 2023-03-14 RX ORDER — ONDANSETRON 8 MG/1
4 TABLET, FILM COATED ORAL EVERY 8 HOURS
Refills: 0 | Status: DISCONTINUED | OUTPATIENT
Start: 2023-03-14 | End: 2023-03-17

## 2023-03-14 RX ORDER — ACETAMINOPHEN 500 MG
975 TABLET ORAL ONCE
Refills: 0 | Status: COMPLETED | OUTPATIENT
Start: 2023-03-14 | End: 2023-03-14

## 2023-03-14 RX ADMIN — Medication 650 MILLIGRAM(S): at 21:11

## 2023-03-14 RX ADMIN — MORPHINE SULFATE 4 MILLIGRAM(S): 50 CAPSULE, EXTENDED RELEASE ORAL at 13:03

## 2023-03-14 RX ADMIN — HEPARIN SODIUM 5000 UNIT(S): 5000 INJECTION INTRAVENOUS; SUBCUTANEOUS at 23:04

## 2023-03-14 RX ADMIN — TRAMADOL HYDROCHLORIDE 50 MILLIGRAM(S): 50 TABLET ORAL at 23:03

## 2023-03-14 RX ADMIN — MORPHINE SULFATE 4 MILLIGRAM(S): 50 CAPSULE, EXTENDED RELEASE ORAL at 17:59

## 2023-03-14 RX ADMIN — PIPERACILLIN AND TAZOBACTAM 25 GRAM(S): 4; .5 INJECTION, POWDER, LYOPHILIZED, FOR SOLUTION INTRAVENOUS at 23:40

## 2023-03-14 RX ADMIN — Medication 650 MILLIGRAM(S): at 22:00

## 2023-03-14 RX ADMIN — MORPHINE SULFATE 4 MILLIGRAM(S): 50 CAPSULE, EXTENDED RELEASE ORAL at 20:00

## 2023-03-14 RX ADMIN — LIDOCAINE 1 PATCH: 4 CREAM TOPICAL at 13:03

## 2023-03-14 RX ADMIN — PIPERACILLIN AND TAZOBACTAM 200 GRAM(S): 4; .5 INJECTION, POWDER, LYOPHILIZED, FOR SOLUTION INTRAVENOUS at 17:44

## 2023-03-14 RX ADMIN — Medication 975 MILLIGRAM(S): at 13:02

## 2023-03-14 RX ADMIN — Medication 15 MILLIGRAM(S): at 13:03

## 2023-03-14 RX ADMIN — Medication 250 MILLIGRAM(S): at 18:00

## 2023-03-14 RX ADMIN — Medication 3 MILLIGRAM(S): at 23:03

## 2023-03-14 RX ADMIN — SODIUM CHLORIDE 100 MILLILITER(S): 9 INJECTION INTRAMUSCULAR; INTRAVENOUS; SUBCUTANEOUS at 23:55

## 2023-03-14 NOTE — H&P ADULT - NSHPPHYSICALEXAM_GEN_ALL_CORE
Physical Exam  General: no acute distress  HEENT: PERRLA  Skin: +R FOOT WRAPPED IN BANDAGE WITH BOOT, DRESSING PARTIALLY DRY  Neuro: aaox3, cranial nerves 1-12 wnl, no focal deficits   Neck: no masses or deformities  Cardiac: positive S1 and S2, regular rate and rhythm, no gallops/rubs/murmurs  Pulmonary: normal work of breathing, lungs clear to auscultation bilaterally  Abdomen: soft, nondistended, nontender  Extremities: no significant edema or varicosities, 2+ palpable pulses bilaterally  MSK: normal gait  Psychiatry: appropriate affect

## 2023-03-14 NOTE — ED STATDOCS - ATTENDING CONTRIBUTION TO CARE
I, Esteban Wallace MD, personally saw the patient with the resident, and completed the key components of the history and physical exam. I then discussed the management plan with the resident.

## 2023-03-14 NOTE — H&P ADULT - ASSESSMENT
HPI:  63 yo HM w/ PMHx HTN, NIDDM, chronic back pain, anxiety, HIV presents to the ER complaining of R foot pain. Patient states that for the past 2 weeks he has had worsening R foot pain with a worsening wound near the toes. He denies having any fevers, chills, n/v. He denied any trauma to the foot. Of note patient ran out of all of his medications approx 20 days ago.     Surg hx: S/P lumbar fusion in [place].   Social hx: currently tobacco user, no drug or alcohol use  Fam hx: mother CAD (14 Mar 2023 20:21)      PAST MEDICAL & SURGICAL HISTORY:  Hypertension      Diabetes      Head trauma      Insomnia      Anxiety      Chronic back pain      DM (diabetes mellitus)      HIV infection      S/P lumbar fusion  in [place]      MEDICATIONS  (STANDING):  dextrose 5%. 1000 milliLiter(s) (50 mL/Hr) IV Continuous <Continuous>  dextrose 5%. 1000 milliLiter(s) (100 mL/Hr) IV Continuous <Continuous>  dextrose 50% Injectable 25 Gram(s) IV Push once  dextrose 50% Injectable 12.5 Gram(s) IV Push once  dextrose 50% Injectable 25 Gram(s) IV Push once  glucagon  Injectable 1 milliGRAM(s) IntraMuscular once  heparin   Injectable 5000 Unit(s) SubCutaneous every 8 hours  insulin lispro (ADMELOG) corrective regimen sliding scale   SubCutaneous three times a day before meals  sodium chloride 0.9%. 1000 milliLiter(s) (100 mL/Hr) IV Continuous <Continuous>    MEDICATIONS  (PRN):  acetaminophen     Tablet .. 650 milliGRAM(s) Oral every 6 hours PRN Temp greater or equal to 38C (100.4F), Mild Pain (1 - 3)  aluminum hydroxide/magnesium hydroxide/simethicone Suspension 30 milliLiter(s) Oral every 4 hours PRN Dyspepsia  dextrose Oral Gel 15 Gram(s) Oral once PRN Blood Glucose LESS THAN 70 milliGRAM(s)/deciliter  melatonin 3 milliGRAM(s) Oral at bedtime PRN Insomnia  ondansetron Injectable 4 milliGRAM(s) IV Push every 8 hours PRN Nausea and/or Vomiting      Allergies    No Known Allergies    Intolerances        SOCIAL HISTORY:    FAMILY HISTORY:  Family history of coronary artery disease in mother (Mother)        Vital Signs Last 24 Hrs  T(C): 37.9 (14 Mar 2023 19:15), Max: 37.9 (14 Mar 2023 10:32)  T(F): 100.2 (14 Mar 2023 19:15), Max: 100.2 (14 Mar 2023 10:32)  HR: 91 (14 Mar 2023 19:15) (87 - 99)  BP: 160/74 (14 Mar 2023 19:15) (113/58 - 160/74)  BP(mean): 95 (14 Mar 2023 19:15) (71 - 95)  RR: 18 (14 Mar 2023 19:15) (18 - 18)  SpO2: 94% (14 Mar 2023 19:15) (94% - 97%)    Parameters below as of 14 Mar 2023 19:15  Patient On (Oxygen Delivery Method): room air        LABS:                        11.9   11.26 )-----------( 272      ( 14 Mar 2023 12:49 )             36.0     03-14    133<L>  |  99  |  17  ----------------------------<  291<H>  4.3   |  28  |  1.13    Ca    9.1      14 Mar 2023 12:49    TPro  8.0  /  Alb  2.1<L>  /  TBili  0.4  /  DBili  x   /  AST  12<L>  /  ALT  12  /  AlkPhos  68  03-14          RADIOLOGY & ADDITIONAL STUDIES:    Plan:    1. ID  -likely OM of R foot without sepsis, s/p incision and drainage performed in the ED to the level of muscle and dressed with boot by podiatry. Xray unable to r/o OM, plan for MRI R foot noncon in the AM, podiatry to cont to follow, ID consult for abx, s/p single dose of vanc/zosyn. IVF NS 1L. Tramadol prn for pain  -hx of HIV infection, patient not on any medications, will obtain CD4 count and viral load. Appreciate ID recs on starting antivirals inpatient or follow up to initiate outpatient. No signs of opportunistic infection.     2. Cardio  -HTN; uncontrolled however patient is currently in pain. Will monitor vitals and consider starting antiHTN if indicated. Low sodium diet    3. Endo  -hx of DM, last known A1C 10.2 10/2022. Patient presenting with hyperglycemia noncompliant with medications. Cover with ISS, obtain repeat a1c, pending results will DC patient either on weight based insulin or metformin. Stress importance of compliance.     dvt prophylaxis with subq heparin  diabetic diet  dispo: social work consult for possible placement

## 2023-03-14 NOTE — CONSULT NOTE ADULT - ASSESSMENT
Assesment: 63 y/o male see in the ED for the following   -Abscess to right forefoot plantarly, possible OM to right foot  - Cellulitis/erythema to the right foot  -Diabetes Mellitus type 2   -Pain in Right Foot   -Difficulty with ambulation      Plan:   Chart reviewed and Patient evaluated; discussed treatment and plan with Dr. Roger Chavez  Discussed diagnosis and treatment with patient  Incision and draiange performed in the ED to the level of muscle  Wound flush with normal saline  Obtained wound culture to be sent to Pathology of right foot  Flushed copiously w/ saline and betadine   Applied betadine soaked gauze with dry sterile dressing  X-rays reviewed : results noted above  Continue with IV antibiotics As Per ID  Reccomend MRI and admission  Discussed importance of daily foot examinations and proper shoe gear and to importance of lower Fasting Blood Glucose levels.   Patient demonstrated verbal understanding of all interventions and tolerated interventions well without any complications.   Podiatry will follow while in house.

## 2023-03-14 NOTE — PHARMACOTHERAPY INTERVENTION NOTE - COMMENTS
Medication reconciliation completed.  Reviewed Medication list and confirmed med allergies with patient; confirmed with Dr. Roche MedHx.  Pt confirms that he is not taking any medication at home.

## 2023-03-14 NOTE — ED STATDOCS - OBJECTIVE STATEMENT
63 y/o male with PMHx of HTN, DM, chronic back pain, anxiety, HIV infection presents to the ED c/o right leg and foot pain x9 days, now with fever. Denies any recent trauma or injury. Pt able to ambulate with pain. Pt states he doesn't take any medications, states he does not have a PCP, has not been to the [facility]. Pt states he thinks he might have stepped on a piece of glass, but his vision is so bad he can not see if he stepped on something.

## 2023-03-14 NOTE — ED STATDOCS - CONSIDERATION OF ADMISSION OBSERVATION
cellulitis of right foot.  possible osteomyelitis.  plan admit for IV abx. Consideration of Admission/Observation

## 2023-03-14 NOTE — ED STATDOCS - CARE PLAN
1 Principal Discharge DX:	Foot pain, right   Principal Discharge DX:	Cellulitis  Secondary Diagnosis:	Osteomyelitis

## 2023-03-14 NOTE — ED STATDOCS - MUSCULOSKELETAL, MLM
range of motion is not limited and there is no muscle tenderness. non-TTP spine, lower extremity with redness of the right foot and ecchymotic area at the middle of pad, middle foot, skin in-tact. +pain to palpation foot non-TTP spine, lower extremity with redness of the right foot and ecchymotic area at the middle of pad plantar surface,. +pain to palpation foot

## 2023-03-14 NOTE — ED STATDOCS - CLINICAL SUMMARY MEDICAL DECISION MAKING FREE TEXT BOX
Pt presents to the ED for increasing RLE pain, pt has been off his medications for some time. Plan: check x-rays, labs

## 2023-03-14 NOTE — ED ADULT NURSE REASSESSMENT NOTE - NS ED NURSE REASSESS COMMENT FT1
Report given to Janet BYRNE on 5E. Temp 102.1 noted. Tylenol given as per md order. Will cont to monitor for safety and comfort.

## 2023-03-14 NOTE — H&P ADULT - NSHPREVIEWOFSYSTEMS_GEN_ALL_CORE
CONSTITUTIONAL: no weakness, fevers, chills  EYES/ENT: No change in vision or throat pain   NECK: No pain or stiffness  RESPIRATORY: no cough or sob  CARDIOVASCULAR: no chest pain, no palpitations  GASTROINTESTINAL: no abdominal pain. No nausea, vomiting, or hematemesis; no diarrhea or constipation. No melena or hematochezia.  GENITOURINARY: No dysuria, no frequency, no hematuria.   NEUROLOGICAL: no weakness or loss of sensation, no dizziness  SKIN: No itching, rashes  MSK: +R FOOT PAIN

## 2023-03-14 NOTE — ED ADULT NURSE NOTE - OBJECTIVE STATEMENT
63 y/o male presents to the ED c/o right leg and foot pain x9 days, now with fever. pmhx DM. Pt states he thinks he might have stepped on a piece of glass, but not sure because he can not see well. Right foot appears red/swollen, and hot to touch

## 2023-03-14 NOTE — H&P ADULT - HISTORY OF PRESENT ILLNESS
63 yo HM w/ PMHx HTN, NIDDM, chronic back pain, anxiety, HIV presents to the ER complaining of R foot pain. Patient states that for the past 2 weeks he has had worsening R foot pain with a worsening wound near the toes. He denies having any fevers, chills, n/v. He denied any trauma to the foot. Of note patient ran out of all of his medications approx 20 days ago.     Surg hx: S/P lumbar fusion in [place].   Social hx: currently tobacco user, no drug or alcohol use  Fam hx: mother CAD

## 2023-03-14 NOTE — PATIENT PROFILE ADULT - FUNCTIONAL ASSESSMENT - BASIC MOBILITY 6.
3-calculated by average/Not able to assess (calculate score using [facility] averaging method)  3-calculated by average/Not able to assess (calculate score using [facility] averaging method)

## 2023-03-15 LAB
4/8 RATIO: 0.66 RATIO — LOW (ref 0.9–3.6)
A1C WITH ESTIMATED AVERAGE GLUCOSE RESULT: 11.5 % — HIGH (ref 4–5.6)
ABS CD8: 402 CELLS/UL — SIGNIFICANT CHANGE UP (ref 142–740)
ALBUMIN SERPL ELPH-MCNC: 1.8 G/DL — LOW (ref 3.3–5)
ALP SERPL-CCNC: 138 U/L — HIGH (ref 40–120)
ALT FLD-CCNC: 75 U/L — SIGNIFICANT CHANGE UP (ref 12–78)
ANION GAP SERPL CALC-SCNC: 3 MMOL/L — LOW (ref 5–17)
APTT BLD: 32.3 SEC — SIGNIFICANT CHANGE UP (ref 27.5–35.5)
AST SERPL-CCNC: 54 U/L — HIGH (ref 15–37)
BASOPHILS # BLD AUTO: 0.02 K/UL — SIGNIFICANT CHANGE UP (ref 0–0.2)
BASOPHILS NFR BLD AUTO: 0.2 % — SIGNIFICANT CHANGE UP (ref 0–2)
BILIRUB SERPL-MCNC: 0.3 MG/DL — SIGNIFICANT CHANGE UP (ref 0.2–1.2)
BUN SERPL-MCNC: 21 MG/DL — SIGNIFICANT CHANGE UP (ref 7–23)
CALCIUM SERPL-MCNC: 8.7 MG/DL — SIGNIFICANT CHANGE UP (ref 8.5–10.1)
CD3 BLASTS SPEC-ACNC: 62 % — SIGNIFICANT CHANGE UP (ref 59–83)
CD3 BLASTS SPEC-ACNC: 698 CELLS/UL — SIGNIFICANT CHANGE UP (ref 672–1870)
CD4 %: 23 % — LOW (ref 30–62)
CD8 %: 35 % — SIGNIFICANT CHANGE UP (ref 12–36)
CHLORIDE SERPL-SCNC: 103 MMOL/L — SIGNIFICANT CHANGE UP (ref 96–108)
CHOLEST SERPL-MCNC: 130 MG/DL — SIGNIFICANT CHANGE UP
CO2 SERPL-SCNC: 29 MMOL/L — SIGNIFICANT CHANGE UP (ref 22–31)
CREAT SERPL-MCNC: 1.17 MG/DL — SIGNIFICANT CHANGE UP (ref 0.5–1.3)
EGFR: 71 ML/MIN/1.73M2 — SIGNIFICANT CHANGE UP
EOSINOPHIL # BLD AUTO: 0.09 K/UL — SIGNIFICANT CHANGE UP (ref 0–0.5)
EOSINOPHIL NFR BLD AUTO: 0.7 % — SIGNIFICANT CHANGE UP (ref 0–6)
ESTIMATED AVERAGE GLUCOSE: 283 MG/DL — HIGH (ref 68–114)
GLUCOSE BLDC GLUCOMTR-MCNC: 118 MG/DL — HIGH (ref 70–99)
GLUCOSE BLDC GLUCOMTR-MCNC: 165 MG/DL — HIGH (ref 70–99)
GLUCOSE BLDC GLUCOMTR-MCNC: 285 MG/DL — HIGH (ref 70–99)
GLUCOSE BLDC GLUCOMTR-MCNC: 317 MG/DL — HIGH (ref 70–99)
GLUCOSE BLDC GLUCOMTR-MCNC: 373 MG/DL — HIGH (ref 70–99)
GLUCOSE SERPL-MCNC: 93 MG/DL — SIGNIFICANT CHANGE UP (ref 70–99)
HCT VFR BLD CALC: 34.2 % — LOW (ref 39–50)
HDLC SERPL-MCNC: 35 MG/DL — LOW
HGB BLD-MCNC: 11.4 G/DL — LOW (ref 13–17)
IMM GRANULOCYTES NFR BLD AUTO: 0.4 % — SIGNIFICANT CHANGE UP (ref 0–0.9)
INR BLD: 1.1 RATIO — SIGNIFICANT CHANGE UP (ref 0.88–1.16)
LIPID PNL WITH DIRECT LDL SERPL: 79 MG/DL — SIGNIFICANT CHANGE UP
LYMPHOCYTES # BLD AUTO: 1.27 K/UL — SIGNIFICANT CHANGE UP (ref 1–3.3)
LYMPHOCYTES # BLD AUTO: 10.3 % — LOW (ref 13–44)
MCHC RBC-ENTMCNC: 28.2 PG — SIGNIFICANT CHANGE UP (ref 27–34)
MCHC RBC-ENTMCNC: 33.3 GM/DL — SIGNIFICANT CHANGE UP (ref 32–36)
MCV RBC AUTO: 84.7 FL — SIGNIFICANT CHANGE UP (ref 80–100)
MONOCYTES # BLD AUTO: 0.98 K/UL — HIGH (ref 0–0.9)
MONOCYTES NFR BLD AUTO: 7.9 % — SIGNIFICANT CHANGE UP (ref 2–14)
NEUTROPHILS # BLD AUTO: 9.96 K/UL — HIGH (ref 1.8–7.4)
NEUTROPHILS NFR BLD AUTO: 80.5 % — HIGH (ref 43–77)
NON HDL CHOLESTEROL: 96 MG/DL — SIGNIFICANT CHANGE UP
PLATELET # BLD AUTO: 284 K/UL — SIGNIFICANT CHANGE UP (ref 150–400)
POTASSIUM SERPL-MCNC: 4.1 MMOL/L — SIGNIFICANT CHANGE UP (ref 3.5–5.3)
POTASSIUM SERPL-SCNC: 4.1 MMOL/L — SIGNIFICANT CHANGE UP (ref 3.5–5.3)
PROT SERPL-MCNC: 7.4 GM/DL — SIGNIFICANT CHANGE UP (ref 6–8.3)
PROTHROM AB SERPL-ACNC: 12.8 SEC — SIGNIFICANT CHANGE UP (ref 10.5–13.4)
RBC # BLD: 4.04 M/UL — LOW (ref 4.2–5.8)
RBC # FLD: 13 % — SIGNIFICANT CHANGE UP (ref 10.3–14.5)
SODIUM SERPL-SCNC: 135 MMOL/L — SIGNIFICANT CHANGE UP (ref 135–145)
T-CELL CD4 SUBSET PNL BLD: 265 CELLS/UL — LOW (ref 489–1457)
TRIGL SERPL-MCNC: 85 MG/DL — SIGNIFICANT CHANGE UP
WBC # BLD: 12.37 K/UL — HIGH (ref 3.8–10.5)
WBC # FLD AUTO: 12.37 K/UL — HIGH (ref 3.8–10.5)

## 2023-03-15 PROCEDURE — 99222 1ST HOSP IP/OBS MODERATE 55: CPT

## 2023-03-15 PROCEDURE — 12345: CPT | Mod: NC

## 2023-03-15 RX ORDER — INSULIN GLARGINE 100 [IU]/ML
20 INJECTION, SOLUTION SUBCUTANEOUS AT BEDTIME
Refills: 0 | Status: DISCONTINUED | OUTPATIENT
Start: 2023-03-15 | End: 2023-03-17

## 2023-03-15 RX ORDER — INSULIN LISPRO 100/ML
8 VIAL (ML) SUBCUTANEOUS ONCE
Refills: 0 | Status: COMPLETED | OUTPATIENT
Start: 2023-03-15 | End: 2023-03-15

## 2023-03-15 RX ORDER — VANCOMYCIN HCL 1 G
1250 VIAL (EA) INTRAVENOUS EVERY 12 HOURS
Refills: 0 | Status: DISCONTINUED | OUTPATIENT
Start: 2023-03-15 | End: 2023-03-17

## 2023-03-15 RX ADMIN — INSULIN GLARGINE 20 UNIT(S): 100 INJECTION, SOLUTION SUBCUTANEOUS at 22:21

## 2023-03-15 RX ADMIN — TRAMADOL HYDROCHLORIDE 50 MILLIGRAM(S): 50 TABLET ORAL at 00:02

## 2023-03-15 RX ADMIN — HEPARIN SODIUM 5000 UNIT(S): 5000 INJECTION INTRAVENOUS; SUBCUTANEOUS at 14:31

## 2023-03-15 RX ADMIN — Medication 2: at 12:00

## 2023-03-15 RX ADMIN — HEPARIN SODIUM 5000 UNIT(S): 5000 INJECTION INTRAVENOUS; SUBCUTANEOUS at 22:21

## 2023-03-15 RX ADMIN — TRAMADOL HYDROCHLORIDE 50 MILLIGRAM(S): 50 TABLET ORAL at 20:47

## 2023-03-15 RX ADMIN — Medication 166.67 MILLIGRAM(S): at 20:48

## 2023-03-15 RX ADMIN — LIDOCAINE 1 PATCH: 4 CREAM TOPICAL at 01:52

## 2023-03-15 RX ADMIN — PIPERACILLIN AND TAZOBACTAM 25 GRAM(S): 4; .5 INJECTION, POWDER, LYOPHILIZED, FOR SOLUTION INTRAVENOUS at 20:47

## 2023-03-15 RX ADMIN — Medication 650 MILLIGRAM(S): at 16:25

## 2023-03-15 RX ADMIN — PIPERACILLIN AND TAZOBACTAM 25 GRAM(S): 4; .5 INJECTION, POWDER, LYOPHILIZED, FOR SOLUTION INTRAVENOUS at 05:48

## 2023-03-15 RX ADMIN — Medication 6: at 16:40

## 2023-03-15 RX ADMIN — TRAMADOL HYDROCHLORIDE 50 MILLIGRAM(S): 50 TABLET ORAL at 09:16

## 2023-03-15 RX ADMIN — LIDOCAINE 1 PATCH: 4 CREAM TOPICAL at 00:01

## 2023-03-15 RX ADMIN — HEPARIN SODIUM 5000 UNIT(S): 5000 INJECTION INTRAVENOUS; SUBCUTANEOUS at 05:48

## 2023-03-15 RX ADMIN — TRAMADOL HYDROCHLORIDE 50 MILLIGRAM(S): 50 TABLET ORAL at 21:00

## 2023-03-15 RX ADMIN — Medication 166.67 MILLIGRAM(S): at 12:28

## 2023-03-15 RX ADMIN — PIPERACILLIN AND TAZOBACTAM 25 GRAM(S): 4; .5 INJECTION, POWDER, LYOPHILIZED, FOR SOLUTION INTRAVENOUS at 14:31

## 2023-03-15 RX ADMIN — Medication 3 MILLIGRAM(S): at 20:47

## 2023-03-15 RX ADMIN — Medication 8 UNIT(S): at 05:48

## 2023-03-15 NOTE — CONSULT NOTE ADULT - ASSESSMENT
63 yo HM w/ PMHx HTN, NIDDM, chronic back pain, anxiety, HIV presents to the ER complaining of R foot pain. Patient states that for the past 2 weeks he has had worsening R foot pain with a worsening wound near the toes. He denies having any fevers, chills, n/v. He denied any trauma to the foot. Of note patient ran out of all of his medications approx 20 days ago. Pt is noncompliant with HIV medications and does not have follow up. Here eval by podiatry, given IV vancomycin/zosyn for foot infection.     1. R foot cellulitis/abscess/possible OM. HIV  - podiatry eval appreciated  - s/p bedside I & D f/u wound cx, blood cx  - agree with vancomycin [id number] check trough prior to 4th dose  - on zosyn [id number]   - f/u MRI  - monitor temps  - tolerating abx well so far; no side effects noted  - reason for abx use and side effects reviewed with patient  - pt noncompliant with HIV meds - hold therapy for now  - fu cbc  - supportive care    2. other issues - care per medicine  63 yo HM w/ PMHx HTN, NIDDM, chronic back pain, anxiety, HIV presents to the ER complaining of R foot pain. Patient states that for the past 2 weeks he has had worsening R foot pain with a worsening wound near the toes. He denies having any fevers, chills, n/v. He denied any trauma to the foot. Of note patient ran out of all of his medications approx 20 days ago. Pt is noncompliant with HIV medications and does not have follow up. Here eval by podiatry, given IV vancomycin/zosyn for foot infection.     1. R foot cellulitis/abscess/possible OM. HIV  - podiatry eval appreciated  - s/p bedside I & D f/u wound cx, blood cx  - agree with vancomycin [id number] check trough prior to 4th dose  - on zosyn [id number]   - f/u MRI  - monitor temps  - tolerating abx well so far; no side effects noted  - reason for abx use and side effects reviewed with patient  - pt noncompliant with HIV meds - hold therapy for now  - cd4 ct > 200 f/u viral load - does not require pcp/mac prophylaxis   - fu cbc  - supportive care    2. other issues - care per medicine

## 2023-03-15 NOTE — CONSULT NOTE ADULT - SUBJECTIVE AND OBJECTIVE BOX
HPI:  63 yo HM w/ PMHx HTN, NIDDM, chronic back pain, anxiety, HIV presents to the ER complaining of R foot pain. Patient states that for the past 2 weeks he has had worsening R foot pain with a worsening wound near the toes. He denies having any fevers, chills, n/v. He denied any trauma to the foot. Of note patient ran out of all of his medications approx 20 days ago. Pt is noncompliant with HIV medications and does not have follow up. Here eval by podiatry, given IV vancomycin/zosyn for foot infection.     Surg hx: S/P lumbar fusion in [place].   PMH: as above    Meds: per reconciliation sheet, noted below  MEDICATIONS  (STANDING):  dextrose 5%. 1000 milliLiter(s) (50 mL/Hr) IV Continuous <Continuous>  dextrose 5%. 1000 milliLiter(s) (100 mL/Hr) IV Continuous <Continuous>  dextrose 50% Injectable 25 Gram(s) IV Push once  dextrose 50% Injectable 12.5 Gram(s) IV Push once  dextrose 50% Injectable 25 Gram(s) IV Push once  glucagon  Injectable 1 milliGRAM(s) IntraMuscular once  heparin   Injectable 5000 Unit(s) SubCutaneous every 8 hours  insulin glargine Injectable (LANTUS) 20 Unit(s) SubCutaneous at bedtime  insulin lispro (ADMELOG) corrective regimen sliding scale   SubCutaneous three times a day before meals  piperacillin/tazobactam IVPB.. 3.375 Gram(s) IV Intermittent every 8 hours  sodium chloride 0.9%. 1000 milliLiter(s) (100 mL/Hr) IV Continuous <Continuous>    Allergies    No Known Allergies    Intolerances      Social: + smoking, no alcohol, no illegal drugs; no recent travel, no exposure to TB  FAMILY HISTORY:  Family history of coronary artery disease in mother (Mother)       no history of premature cardiovascular disease in first degree relatives    ROS: the patient denies fever, no chills, no HA, no dizziness, no sore throat, no blurry vision, no CP, no palpitations, no SOB, no cough, no abdominal pain, no diarrhea, no N/V, no dysuria, no leg pain, no claudication, no rash, no joint aches, no rectal pain or bleeding, no night sweats    All other systems reviewed and are negative    Vital Signs Last 24 Hrs  T(C): 37.4 (15 Mar 2023 07:59), Max: 38.9 (14 Mar 2023 21:12)  T(F): 99.3 (15 Mar 2023 07:59), Max: 102.1 (14 Mar 2023 21:12)  HR: 92 (15 Mar 2023 07:59) (87 - 100)  BP: 155/77 (15 Mar 2023 07:59) (113/58 - 160/74)  BP(mean): 95 (14 Mar 2023 19:15) (71 - 95)  RR: 18 (15 Mar 2023 07:59) (17 - 18)  SpO2: 96% (15 Mar 2023 07:59) (94% - 97%)    Parameters below as of 15 Mar 2023 07:59  Patient On (Oxygen Delivery Method): room air      PE:  Constitutional: NAD  HEENT: NC/AT, EOMI, PERRLA, conjunctivae clear; ears and nose atraumatic; pharynx benign  Neck: supple; thyroid not palpable  Back: no tenderness  Respiratory: respiratory effort normal; clear to auscultation  Cardiovascular: S1S2 regular, no murmurs  Abdomen: soft, not tender, not distended, positive BS; liver and spleen WNL  Genitourinary: no suprapubic tenderness  Lymphatic: no LN palpable  Musculoskeletal: no muscle tenderness, no joint swelling or tenderness  Extremities: no pedal edema R foot plantar fluctuance, tenderness, erythema  Skin: no rashes; no palpable lesions    Labs: all available labs reviewed                        11.4   12.37 )-----------( 284      ( 15 Mar 2023 08:26 )             34.2     03-15    135  |  103  |  21  ----------------------------<  93  4.1   |  29  |  1.17    Ca    8.7      15 Mar 2023 08:26    TPro  7.4  /  Alb  1.8<L>  /  TBili  0.3  /  DBili  x   /  AST  54<H>  /  ALT  75  /  AlkPhos  138<H>  03-15     LIVER FUNCTIONS - ( 15 Mar 2023 08:26 )  Alb: 1.8 g/dL / Pro: 7.4 gm/dL / ALK PHOS: 138 U/L / ALT: 75 U/L / AST: 54 U/L / GGT: x                 Radiology: all available radiological tests reviewed    Advanced directives addressed: full resuscitation

## 2023-03-16 LAB
ANION GAP SERPL CALC-SCNC: 2 MMOL/L — LOW (ref 5–17)
BUN SERPL-MCNC: 13 MG/DL — SIGNIFICANT CHANGE UP (ref 7–23)
CALCIUM SERPL-MCNC: 8.6 MG/DL — SIGNIFICANT CHANGE UP (ref 8.5–10.1)
CHLORIDE SERPL-SCNC: 101 MMOL/L — SIGNIFICANT CHANGE UP (ref 96–108)
CO2 SERPL-SCNC: 28 MMOL/L — SIGNIFICANT CHANGE UP (ref 22–31)
CREAT SERPL-MCNC: 1.04 MG/DL — SIGNIFICANT CHANGE UP (ref 0.5–1.3)
EGFR: 82 ML/MIN/1.73M2 — SIGNIFICANT CHANGE UP
GLUCOSE BLDC GLUCOMTR-MCNC: 137 MG/DL — HIGH (ref 70–99)
GLUCOSE BLDC GLUCOMTR-MCNC: 191 MG/DL — HIGH (ref 70–99)
GLUCOSE BLDC GLUCOMTR-MCNC: 213 MG/DL — HIGH (ref 70–99)
GLUCOSE BLDC GLUCOMTR-MCNC: 231 MG/DL — HIGH (ref 70–99)
GLUCOSE SERPL-MCNC: 156 MG/DL — HIGH (ref 70–99)
HCT VFR BLD CALC: 31.9 % — LOW (ref 39–50)
HGB BLD-MCNC: 10.7 G/DL — LOW (ref 13–17)
HIV-1 VIRAL LOAD RESULT: ABNORMAL
HIV1 RNA # SERPL NAA+PROBE: SIGNIFICANT CHANGE UP
HIV1 RNA SER-IMP: SIGNIFICANT CHANGE UP
HIV1 RNA SERPL NAA+PROBE-ACNC: ABNORMAL
HIV1 RNA SERPL NAA+PROBE-LOG#: 5.09 — SIGNIFICANT CHANGE UP
MCHC RBC-ENTMCNC: 28.2 PG — SIGNIFICANT CHANGE UP (ref 27–34)
MCHC RBC-ENTMCNC: 33.5 GM/DL — SIGNIFICANT CHANGE UP (ref 32–36)
MCV RBC AUTO: 84.2 FL — SIGNIFICANT CHANGE UP (ref 80–100)
PLATELET # BLD AUTO: 276 K/UL — SIGNIFICANT CHANGE UP (ref 150–400)
POTASSIUM SERPL-MCNC: 3.9 MMOL/L — SIGNIFICANT CHANGE UP (ref 3.5–5.3)
POTASSIUM SERPL-SCNC: 3.9 MMOL/L — SIGNIFICANT CHANGE UP (ref 3.5–5.3)
RBC # BLD: 3.79 M/UL — LOW (ref 4.2–5.8)
RBC # FLD: 13.1 % — SIGNIFICANT CHANGE UP (ref 10.3–14.5)
SODIUM SERPL-SCNC: 131 MMOL/L — LOW (ref 135–145)
VANCOMYCIN TROUGH SERPL-MCNC: 7.5 UG/ML — LOW (ref 10–20)
WBC # BLD: 11.93 K/UL — HIGH (ref 3.8–10.5)
WBC # FLD AUTO: 11.93 K/UL — HIGH (ref 3.8–10.5)

## 2023-03-16 PROCEDURE — 99232 SBSQ HOSP IP/OBS MODERATE 35: CPT

## 2023-03-16 RX ORDER — OXYCODONE HYDROCHLORIDE 5 MG/1
5 TABLET ORAL ONCE
Refills: 0 | Status: DISCONTINUED | OUTPATIENT
Start: 2023-03-16 | End: 2023-03-16

## 2023-03-16 RX ADMIN — Medication 2: at 16:48

## 2023-03-16 RX ADMIN — INSULIN GLARGINE 20 UNIT(S): 100 INJECTION, SOLUTION SUBCUTANEOUS at 20:28

## 2023-03-16 RX ADMIN — PIPERACILLIN AND TAZOBACTAM 25 GRAM(S): 4; .5 INJECTION, POWDER, LYOPHILIZED, FOR SOLUTION INTRAVENOUS at 22:05

## 2023-03-16 RX ADMIN — PIPERACILLIN AND TAZOBACTAM 25 GRAM(S): 4; .5 INJECTION, POWDER, LYOPHILIZED, FOR SOLUTION INTRAVENOUS at 05:36

## 2023-03-16 RX ADMIN — Medication 4: at 12:01

## 2023-03-16 RX ADMIN — HEPARIN SODIUM 5000 UNIT(S): 5000 INJECTION INTRAVENOUS; SUBCUTANEOUS at 05:38

## 2023-03-16 RX ADMIN — HEPARIN SODIUM 5000 UNIT(S): 5000 INJECTION INTRAVENOUS; SUBCUTANEOUS at 20:22

## 2023-03-16 RX ADMIN — Medication 166.67 MILLIGRAM(S): at 12:03

## 2023-03-16 RX ADMIN — PIPERACILLIN AND TAZOBACTAM 25 GRAM(S): 4; .5 INJECTION, POWDER, LYOPHILIZED, FOR SOLUTION INTRAVENOUS at 16:49

## 2023-03-17 VITALS
OXYGEN SATURATION: 97 % | TEMPERATURE: 99 F | RESPIRATION RATE: 18 BRPM | HEART RATE: 85 BPM | DIASTOLIC BLOOD PRESSURE: 57 MMHG | SYSTOLIC BLOOD PRESSURE: 131 MMHG

## 2023-03-17 LAB
-  AMPICILLIN/SULBACTAM: SIGNIFICANT CHANGE UP
-  CEFAZOLIN: SIGNIFICANT CHANGE UP
-  CLINDAMYCIN: SIGNIFICANT CHANGE UP
-  ERYTHROMYCIN: SIGNIFICANT CHANGE UP
-  GENTAMICIN: SIGNIFICANT CHANGE UP
-  OXACILLIN: SIGNIFICANT CHANGE UP
-  PENICILLIN: SIGNIFICANT CHANGE UP
-  RIFAMPIN: SIGNIFICANT CHANGE UP
-  TETRACYCLINE: SIGNIFICANT CHANGE UP
-  TRIMETHOPRIM/SULFAMETHOXAZOLE: SIGNIFICANT CHANGE UP
-  VANCOMYCIN: SIGNIFICANT CHANGE UP
GLUCOSE BLDC GLUCOMTR-MCNC: 132 MG/DL — HIGH (ref 70–99)
METHOD TYPE: SIGNIFICANT CHANGE UP

## 2023-03-17 PROCEDURE — 73718 MRI LOWER EXTREMITY W/O DYE: CPT | Mod: 26,RT

## 2023-03-17 RX ADMIN — PIPERACILLIN AND TAZOBACTAM 25 GRAM(S): 4; .5 INJECTION, POWDER, LYOPHILIZED, FOR SOLUTION INTRAVENOUS at 05:47

## 2023-03-17 RX ADMIN — Medication 166.67 MILLIGRAM(S): at 09:38

## 2023-03-17 RX ADMIN — HEPARIN SODIUM 5000 UNIT(S): 5000 INJECTION INTRAVENOUS; SUBCUTANEOUS at 05:47

## 2023-03-17 NOTE — PROGRESS NOTE ADULT - SUBJECTIVE AND OBJECTIVE BOX
Patient is a 62y old  Male who presents with a chief complaint of     SUBJECTIVE:   HPI:  63 yo HM w/ PMHx HTN, NIDDM, chronic back pain, anxiety, HIV presents to the ER complaining of R foot pain. Patient states that for the past 2 weeks he has had worsening R foot pain with a worsening wound near the toes. He denies having any fevers, chills, n/v. He denied any trauma to the foot. Of note patient ran out of all of his medications approx 20 days ago.     Surg hx: S/P lumbar fusion in [place].   Social hx: currently tobacco user, no drug or alcohol use  Fam hx: mother CAD (14 Mar 2023 20:21)      3/15: sitting up in bed, awaiting MRI. no pain or discomfort at this time. pain well controlled at present.   3/16: states pain increased in foot unable to sleep or lay still due to pain. still pending MRI   3/17: sitting up in bed, states he is about to get kicked out of his hotel room and needs to leave otherwise they will throw out his belongs. does not care about results of mri or needing IV abx . requesting rx for abx and states he will follow up again on Monday     REVIEW OF SYSTEMS:    CONSTITUTIONAL: No weakness, fevers or chills  EYES/ENT: No visual changes;  No vertigo or throat pain   NECK: No pain or stiffness  RESPIRATORY: No cough, wheezing, hemoptysis; No shortness of breath  CARDIOVASCULAR: No chest pain or palpitations  GASTROINTESTINAL: No abdominal or epigastric pain. No nausea, vomiting, or hematemesis; No diarrhea or constipation. No melena or hematochezia.  GENITOURINARY: No dysuria, frequency or hematuria  NEUROLOGICAL: No numbness or weakness  SKIN: No itching, burning, rashes, or lesions   MSK: right foot pain - throbbing.   All other review of systems is negative unless indicated above        Vital Signs Last 24 Hrs  T(C): 37.4 (17 Mar 2023 08:16), Max: 37.4 (17 Mar 2023 08:16)  T(F): 99.3 (17 Mar 2023 08:16), Max: 99.3 (17 Mar 2023 08:16)  HR: 85 (17 Mar 2023 08:16) (85 - 94)  BP: 131/57 (17 Mar 2023 08:16) (125/55 - 137/70)  BP(mean): --  RR: 18 (17 Mar 2023 08:16) (17 - 18)  SpO2: 97% (17 Mar 2023 08:16) (97% - 98%)    Parameters below as of 17 Mar 2023 08:16  Patient On (Oxygen Delivery Method): room air        PHYSICAL EXAM:    Constitutional: NAD, awake and alert, thin, cachetic appearing   HEENT: PERR, EOMI, Normal Hearing, MMM  Neck: Soft and supple, No LAD, No JVD  Respiratory: Breath sounds are clear bilaterally, No wheezing, rales or rhonchi  Cardiovascular: S1 and S2, regular rate and rhythm, no Murmurs, gallops or rubs  Gastrointestinal: Bowel Sounds present, soft, nontender, nondistended, no guarding, no rebound  Extremities: No peripheral edema  Vascular: 2+ peripheral pulses  Neurological: A/O x 3, no focal deficits  Musculoskeletal: 5/5 strength b/l upper and lower extremities  Skin: No rashes, erythema to right foot with deep wound to forefoot.       MEDICATIONS:  MEDICATIONS  (STANDING):  dextrose 5%. 1000 milliLiter(s) (50 mL/Hr) IV Continuous <Continuous>  dextrose 5%. 1000 milliLiter(s) (100 mL/Hr) IV Continuous <Continuous>  dextrose 50% Injectable 25 Gram(s) IV Push once  dextrose 50% Injectable 12.5 Gram(s) IV Push once  dextrose 50% Injectable 25 Gram(s) IV Push once  glucagon  Injectable 1 milliGRAM(s) IntraMuscular once  heparin   Injectable 5000 Unit(s) SubCutaneous every 8 hours  insulin glargine Injectable (LANTUS) 20 Unit(s) SubCutaneous at bedtime  insulin lispro (ADMELOG) corrective regimen sliding scale   SubCutaneous three times a day before meals  piperacillin/tazobactam IVPB.. 3.375 Gram(s) IV Intermittent every 8 hours  sodium chloride 0.9%. 1000 milliLiter(s) (100 mL/Hr) IV Continuous <Continuous>  vancomycin  IVPB 1250 milliGRAM(s) IV Intermittent every 12 hours      LABS: All Labs Reviewed:                              10.7   11.93 )-----------( 276      ( 16 Mar 2023 07:58 )             31.9     03-16    131<L>  |  101  |  13  ----------------------------<  156<H>  3.9   |  28  |  1.04    Ca    8.6      16 Mar 2023 07:58    TPro  7.4  /  Alb  1.8<L>  /  TBili  0.3  /  DBili  x   /  AST  54<H>  /  ALT  75  /  AlkPhos  138<H>  03-15      LIVER FUNCTIONS - ( 15 Mar 2023 08:26 )  Alb: 1.8 g/dL / Pro: 7.4 gm/dL / ALK PHOS: 138 U/L / ALT: 75 U/L / AST: 54 U/L / GGT: x           PT/INR - ( 15 Mar 2023 08:26 )   PT: 12.8 sec;   INR: 1.10 ratio  PTT - ( 15 Mar 2023 08:26 )  PTT:32.3 sec        RADIOLOGY/EKG:    < from: Xray Foot AP + Lateral + Oblique, Right (03.14.23 @ 12:40) >  IMPRESSION:    Cortical irregularity at the first MTP medially, osteomyelitis cannot be   excluded    Soft tissue irregularity at the distal first 3 digits. If there is   continued clinical concern follow-up MRI can be ordered    --- End of Report ---      ABBIE YATES DO; Attending Radiologist  This document has been electronically signed. Mar 14 2023  1:06PM    < end of copied text >        < from: MR Foot No Cont, Right (03.17.23 @ 10:45) >  IMPRESSION:  1.  Osseous edema within the second, third and fourth proximal to mid   phalanges without loss of normal T1 fatty marrow. Phlegmonous changes and   soft tissue wound at the plantar aspect of the second through third   phalanges. These findings are likely secondary to vascular etiology   versus early osteomyelitis given adjacent phlegmonous changes. Clinically   correlate with physical exam and wound depth.  2.  Moderate first metatarsophalangeal joint osteoarthritis with edema   spanning the joint and osseous erosions/cystic changes along the medial   aspect. These findings are secondary to osteomyelitis versus moderate to   severe osteoarthritis with subchondral cystic changes    --- End of Report ---    MARGARITA GUARDADO DO; Attending Radiologist  This document has been electronically signed. Mar 17 2023 11:22AM    < end of copied text >      
Date of service: 3/15/23  Chief Complaint : 63 y/o male with PMHx of HTN, DM, chronic back pain, anxiety, HIV infection presents to the ED c/o right leg and foot pain x9 days, now with fever. Denies any recent trauma or injury. Pt able to ambulate with pain. Pt states he doesn't take any medications, states he does not have a PCP, has not been to the [facility] [place]. Pt states he thinks he might have stepped on a piece of glass, but says he took it out the same day, at the area of the right forefoot where he now feels pain. Patient says that he has chills. Patient says that he is type 2 DM but has feeling to his feet, no neuropathy. Patient denies any other pedal complaints at this time.   3/15/23: Pt seen by podiatry team w/ attending present. Patient denies any additional pedal complaints, and says that his foot feels a lot better today, after the incision and drainage was performed in the ED yesterday. Patient said yesterday no one could touch his foot because of the pain, but today you can touch it. Very pleasant and excited to see podiatry team.     PMH: Hypertension    Diabetes    Back ache    Head trauma    Insomnia    Anxiety    DM (diabetes mellitus)    Chronic back pain    DM (diabetes mellitus)    HIV infection      PSH:S/P lumbar fusion    No significant past surgical history        Allergies:No Known Allergies      Vitals  Vital Signs Last 24 Hrs  T(C): 37.4 (15 Mar 2023 07:59), Max: 38.9 (14 Mar 2023 21:12)  T(F): 99.3 (15 Mar 2023 07:59), Max: 102.1 (14 Mar 2023 21:12)  HR: 92 (15 Mar 2023 07:59) (87 - 100)  BP: 155/77 (15 Mar 2023 07:59) (113/58 - 160/74)  BP(mean): 95 (14 Mar 2023 19:15) (71 - 95)  RR: 18 (15 Mar 2023 07:59) (17 - 18)  SpO2: 96% (15 Mar 2023 07:59) (94% - 97%)    Parameters below as of 15 Mar 2023 07:59  Patient On (Oxygen Delivery Method): room air    MEDICATIONS  (STANDING):  dextrose 5%. 1000 milliLiter(s) (50 mL/Hr) IV Continuous <Continuous>  dextrose 5%. 1000 milliLiter(s) (100 mL/Hr) IV Continuous <Continuous>  dextrose 50% Injectable 25 Gram(s) IV Push once  dextrose 50% Injectable 12.5 Gram(s) IV Push once  dextrose 50% Injectable 25 Gram(s) IV Push once  glucagon  Injectable 1 milliGRAM(s) IntraMuscular once  heparin   Injectable 5000 Unit(s) SubCutaneous every 8 hours  insulin glargine Injectable (LANTUS) 20 Unit(s) SubCutaneous at bedtime  insulin lispro (ADMELOG) corrective regimen sliding scale   SubCutaneous three times a day before meals  piperacillin/tazobactam IVPB.. 3.375 Gram(s) IV Intermittent every 8 hours  sodium chloride 0.9%. 1000 milliLiter(s) (100 mL/Hr) IV Continuous <Continuous>  vancomycin  IVPB 1250 milliGRAM(s) IV Intermittent every 12 hours    MEDICATIONS  (PRN):  acetaminophen     Tablet .. 650 milliGRAM(s) Oral every 6 hours PRN Temp greater or equal to 38C (100.4F), Mild Pain (1 - 3)  aluminum hydroxide/magnesium hydroxide/simethicone Suspension 30 milliLiter(s) Oral every 4 hours PRN Dyspepsia  dextrose Oral Gel 15 Gram(s) Oral once PRN Blood Glucose LESS THAN 70 milliGRAM(s)/deciliter  melatonin 3 milliGRAM(s) Oral at bedtime PRN Insomnia  ondansetron Injectable 4 milliGRAM(s) IV Push every 8 hours PRN Nausea and/or Vomiting  traMADol 50 milliGRAM(s) Oral every 8 hours PRN Moderate Pain (4 - 6)        Physical Exam:   Constitutiona: NAD, alert;  Derm:  Skin warm, dry and supple bilateral.    Erythema noted to the right foot marked noted to be receeding  Full thickness wound noted to the right forefoot plantarly, probe to soft tissue, no malodor, no pus on drainage, no tracking  Vascular: Dorsalis Pedis and Posterior Tibial pulses 2/4.  Capillary re-fill time less then 3 seconds digits 1-5 bilateral.    Neuro: Protective sensation intact to the level of the digits bilateral.  MSK: Muscle strength 5/5 in all major muscle groups of Right lower extremity. 5/5 in all muscle groups of LLE;  .        Labs:                          11.4   12.37 )-----------( 284      ( 15 Mar 2023 08:26 )             34.2     03-15    135  |  103  |  21  ----------------------------<  93  4.1   |  29  |  1.17    Ca    8.7      15 Mar 2023 08:26    TPro  7.4  /  Alb  1.8<L>  /  TBili  0.3  /  DBili  x   /  AST  54<H>  /  ALT  75  /  AlkPhos  138<H>  03-15                 Rad/EKG  < from: Xray Foot AP + Lateral + Oblique, Right (03.14.23 @ 12:40) >  IMPRESSION:    Cortical irregularity at the first MTP medially, osteomyelitis cannot be   excluded    Soft tissue irregularity at the distal first 3 digits. If there is   continued clinical concern follow-up MRI can be ordered    --- End of Report ---    < end of copied text >       
Date of service: 03-16-23 @ 11:53    pt seen and examined  febrile yest to 102  has foot pain    ROS: denies dizziness, no HA, no SOB or cough, no abdominal pain, no diarrhea or constipation; no dysuria, no urinary frequency, no legs pain, no rashes    MEDICATIONS  (STANDING):  dextrose 5%. 1000 milliLiter(s) (50 mL/Hr) IV Continuous <Continuous>  dextrose 5%. 1000 milliLiter(s) (100 mL/Hr) IV Continuous <Continuous>  dextrose 50% Injectable 25 Gram(s) IV Push once  dextrose 50% Injectable 12.5 Gram(s) IV Push once  dextrose 50% Injectable 25 Gram(s) IV Push once  glucagon  Injectable 1 milliGRAM(s) IntraMuscular once  heparin   Injectable 5000 Unit(s) SubCutaneous every 8 hours  insulin glargine Injectable (LANTUS) 20 Unit(s) SubCutaneous at bedtime  insulin lispro (ADMELOG) corrective regimen sliding scale   SubCutaneous three times a day before meals  piperacillin/tazobactam IVPB.. 3.375 Gram(s) IV Intermittent every 8 hours  sodium chloride 0.9%. 1000 milliLiter(s) (100 mL/Hr) IV Continuous <Continuous>  vancomycin  IVPB 1250 milliGRAM(s) IV Intermittent every 12 hours      Vital Signs Last 24 Hrs  T(C): 36.7 (16 Mar 2023 08:22), Max: 39.3 (15 Mar 2023 15:56)  T(F): 98 (16 Mar 2023 08:22), Max: 102.8 (15 Mar 2023 15:56)  HR: 97 (16 Mar 2023 08:22) (89 - 97)  BP: 123/60 (16 Mar 2023 08:22) (123/60 - 156/71)  BP(mean): --  RR: 18 (16 Mar 2023 08:22) (17 - 18)  SpO2: 97% (16 Mar 2023 08:22) (94% - 98%)    Parameters below as of 16 Mar 2023 08:22  Patient On (Oxygen Delivery Method): room air    PE:  Constitutional: NAD  HEENT: NC/AT, EOMI, PERRLA, conjunctivae clear; ears and nose atraumatic; pharynx benign  Neck: supple; thyroid not palpable  Back: no tenderness  Respiratory: respiratory effort normal; clear to auscultation  Cardiovascular: S1S2 regular, no murmurs  Abdomen: soft, not tender, not distended, positive BS; liver and spleen WNL  Genitourinary: no suprapubic tenderness  Lymphatic: no LN palpable  Musculoskeletal: no muscle tenderness, no joint swelling or tenderness  Extremities: no pedal edema R foot plantar fluctuance, tenderness, erythema  Skin: no rashes; no palpable lesions    Labs: all available labs reviewed                        10.7   11.93 )-----------( 276      ( 16 Mar 2023 07:58 )             31.9     03-16    131<L>  |  101  |  13  ----------------------------<  156<H>  3.9   |  28  |  1.04    Ca    8.6      16 Mar 2023 07:58    TPro  7.4  /  Alb  1.8<L>  /  TBili  0.3  /  DBili  x   /  AST  54<H>  /  ALT  75  /  AlkPhos  138<H>  03-15           LIVER FUNCTIONS - ( 15 Mar 2023 08:26 )  Alb: 1.8 g/dL / Pro: 7.4 gm/dL / ALK PHOS: 138 U/L / ALT: 75 U/L / AST: 54 U/L / GGT: x             Culture - Abscess with Gram Stain (03.14.23 @ 15:00)   Specimen Source: .Abscess Leg - Right   Culture Results:   Moderate Streptococcus agalactiae (Group B)   Streptococcus agalactiae (Group B) isolated   Group B streptococci are susceptible to ampicillin,   penicillin and cefazolin, but may be resistant to   erythromycin and clindamycin.   Recommendations for intrapartum prophylaxis for Group B   streptococci are penicillin or ampicillin.   Culture - Blood (03.14.23 @ 12:49)   Specimen Source: .Blood None   Culture Results:   No growth to date.   Culture - Blood (03.14.23 @ 12:49)   Specimen Source: .Blood None   Culture Results:   No growth to date.     Radiology: all available radiological tests reviewed    Advanced directives addressed: full resuscitation
Date of service: 03-17-23 @ 12:16    pt seen and examined  fevers are down  sitting up in bed  no complaints    ROS: denies dizziness, no HA, no SOB or cough, no abdominal pain, no diarrhea or constipation; no dysuria, no urinary frequency, no legs pain, no rashes    MEDICATIONS  (STANDING):  dextrose 5%. 1000 milliLiter(s) (50 mL/Hr) IV Continuous <Continuous>  dextrose 5%. 1000 milliLiter(s) (100 mL/Hr) IV Continuous <Continuous>  dextrose 50% Injectable 25 Gram(s) IV Push once  dextrose 50% Injectable 12.5 Gram(s) IV Push once  dextrose 50% Injectable 25 Gram(s) IV Push once  glucagon  Injectable 1 milliGRAM(s) IntraMuscular once  heparin   Injectable 5000 Unit(s) SubCutaneous every 8 hours  insulin glargine Injectable (LANTUS) 20 Unit(s) SubCutaneous at bedtime  insulin lispro (ADMELOG) corrective regimen sliding scale   SubCutaneous three times a day before meals  piperacillin/tazobactam IVPB.. 3.375 Gram(s) IV Intermittent every 8 hours  sodium chloride 0.9%. 1000 milliLiter(s) (100 mL/Hr) IV Continuous <Continuous>  vancomycin  IVPB 1250 milliGRAM(s) IV Intermittent every 12 hours    Vital Signs Last 24 Hrs  T(C): 37.4 (17 Mar 2023 08:16), Max: 37.4 (17 Mar 2023 08:16)  T(F): 99.3 (17 Mar 2023 08:16), Max: 99.3 (17 Mar 2023 08:16)  HR: 85 (17 Mar 2023 08:16) (85 - 94)  BP: 131/57 (17 Mar 2023 08:16) (125/55 - 137/70)  BP(mean): --  RR: 18 (17 Mar 2023 08:16) (17 - 18)  SpO2: 97% (17 Mar 2023 08:16) (97% - 98%)    Parameters below as of 17 Mar 2023 08:16  Patient On (Oxygen Delivery Method): room air    PE:  Constitutional: NAD  HEENT: NC/AT, EOMI, PERRLA, conjunctivae clear; ears and nose atraumatic; pharynx benign  Neck: supple; thyroid not palpable  Back: no tenderness  Respiratory: respiratory effort normal; clear to auscultation  Cardiovascular: S1S2 regular, no murmurs  Abdomen: soft, not tender, not distended, positive BS; liver and spleen WNL  Genitourinary: no suprapubic tenderness  Lymphatic: no LN palpable  Musculoskeletal: no muscle tenderness, no joint swelling or tenderness  Extremities: no pedal edema R foot plantar fluctuance, tenderness, erythema  Skin: no rashes; no palpable lesions    Labs: all available labs reviewed                                   10.7   11.93 )-----------( 276      ( 16 Mar 2023 07:58 )             31.9     03-16    131<L>  |  101  |  13  ----------------------------<  156<H>  3.9   |  28  |  1.04    Ca    8.6      16 Mar 2023 07:58         Vancomycin Level, Trough: 7.5 ug/mL (03-16 @ 11:31)             LIVER FUNCTIONS - ( 15 Mar 2023 08:26 )  Alb: 1.8 g/dL / Pro: 7.4 gm/dL / ALK PHOS: 138 U/L / ALT: 75 U/L / AST: 54 U/L / GGT: x             Culture - Abscess with Gram Stain (03.14.23 @ 15:00)   Specimen Source: .Abscess Leg - Right   Culture Results:   Few Staphylococcus aureus   Moderate Streptococcus agalactiae (Group B)   Streptococcus agalactiae (Group B) isolated   Group B streptococci are susceptible to ampicillin,   penicillin and cefazolin, but may be resistant to   erythromycin and clindamycin.   Recommendations for intrapartum prophylaxis for Group B   streptococci are penicillin or ampicillin.   Specimen Source: .Blood None   Culture Results:   No growth to date.   Culture - Blood (03.14.23 @ 12:49)   Specimen Source: .Blood None   Culture Results:   No growth to date.     Radiology: all available radiological tests reviewed    Advanced directives addressed: full resuscitation
Patient is a 62y old  Male who presents with a chief complaint of     SUBJECTIVE:   HPI:  63 yo HM w/ PMHx HTN, NIDDM, chronic back pain, anxiety, HIV presents to the ER complaining of R foot pain. Patient states that for the past 2 weeks he has had worsening R foot pain with a worsening wound near the toes. He denies having any fevers, chills, n/v. He denied any trauma to the foot. Of note patient ran out of all of his medications approx 20 days ago.     Surg hx: S/P lumbar fusion in [place].   Social hx: currently tobacco user, no drug or alcohol use  Fam hx: mother CAD (14 Mar 2023 20:21)      3/15: sitting up in bed, awaiting MRI. no pain or discomfort at this time. pain well controlled at present.   3/16: states pain increased in foot unable to sleep or lay still due to pain. still pending MRI     REVIEW OF SYSTEMS:    CONSTITUTIONAL: No weakness, fevers or chills  EYES/ENT: No visual changes;  No vertigo or throat pain   NECK: No pain or stiffness  RESPIRATORY: No cough, wheezing, hemoptysis; No shortness of breath  CARDIOVASCULAR: No chest pain or palpitations  GASTROINTESTINAL: No abdominal or epigastric pain. No nausea, vomiting, or hematemesis; No diarrhea or constipation. No melena or hematochezia.  GENITOURINARY: No dysuria, frequency or hematuria  NEUROLOGICAL: No numbness or weakness  SKIN: No itching, burning, rashes, or lesions   MSK: right foot pain - throbbing.   All other review of systems is negative unless indicated above        Vital Signs Last 24 Hrs  T(C): 36.7 (16 Mar 2023 08:22), Max: 39.3 (15 Mar 2023 15:56)  T(F): 98 (16 Mar 2023 08:22), Max: 102.8 (15 Mar 2023 15:56)  HR: 97 (16 Mar 2023 08:22) (89 - 97)  BP: 123/60 (16 Mar 2023 08:22) (123/60 - 156/71)  BP(mean): --  RR: 18 (16 Mar 2023 08:22) (17 - 18)  SpO2: 97% (16 Mar 2023 08:22) (94% - 98%) room air          I&O's Summary    14 Mar 2023 07:01  -  15 Mar 2023 07:00  --------------------------------------------------------  IN: 0 mL / OUT: 300 mL / NET: -300 mL        CAPILLARY BLOOD GLUCOSE        PHYSICAL EXAM:    Constitutional: NAD, awake and alert, thin, cachetic appearing   HEENT: PERR, EOMI, Normal Hearing, MMM  Neck: Soft and supple, No LAD, No JVD  Respiratory: Breath sounds are clear bilaterally, No wheezing, rales or rhonchi  Cardiovascular: S1 and S2, regular rate and rhythm, no Murmurs, gallops or rubs  Gastrointestinal: Bowel Sounds present, soft, nontender, nondistended, no guarding, no rebound  Extremities: No peripheral edema  Vascular: 2+ peripheral pulses  Neurological: A/O x 3, no focal deficits  Musculoskeletal: 5/5 strength b/l upper and lower extremities  Skin: No rashes, erythema to right foot with deep wound to forefoot.       MEDICATIONS:  MEDICATIONS  (STANDING):  dextrose 5%. 1000 milliLiter(s) (50 mL/Hr) IV Continuous <Continuous>  dextrose 5%. 1000 milliLiter(s) (100 mL/Hr) IV Continuous <Continuous>  dextrose 50% Injectable 25 Gram(s) IV Push once  dextrose 50% Injectable 12.5 Gram(s) IV Push once  dextrose 50% Injectable 25 Gram(s) IV Push once  glucagon  Injectable 1 milliGRAM(s) IntraMuscular once  heparin   Injectable 5000 Unit(s) SubCutaneous every 8 hours  insulin glargine Injectable (LANTUS) 20 Unit(s) SubCutaneous at bedtime  insulin lispro (ADMELOG) corrective regimen sliding scale   SubCutaneous three times a day before meals  piperacillin/tazobactam IVPB.. 3.375 Gram(s) IV Intermittent every 8 hours  sodium chloride 0.9%. 1000 milliLiter(s) (100 mL/Hr) IV Continuous <Continuous>  vancomycin  IVPB 1250 milliGRAM(s) IV Intermittent every 12 hours      LABS: All Labs Reviewed:                              10.7   11.93 )-----------( 276      ( 16 Mar 2023 07:58 )             31.9     03-16    131<L>  |  101  |  13  ----------------------------<  156<H>  3.9   |  28  |  1.04    Ca    8.6      16 Mar 2023 07:58    TPro  7.4  /  Alb  1.8<L>  /  TBili  0.3  /  DBili  x   /  AST  54<H>  /  ALT  75  /  AlkPhos  138<H>  03-15      LIVER FUNCTIONS - ( 15 Mar 2023 08:26 )  Alb: 1.8 g/dL / Pro: 7.4 gm/dL / ALK PHOS: 138 U/L / ALT: 75 U/L / AST: 54 U/L / GGT: x           PT/INR - ( 15 Mar 2023 08:26 )   PT: 12.8 sec;   INR: 1.10 ratio  PTT - ( 15 Mar 2023 08:26 )  PTT:32.3 sec        RADIOLOGY/EKG:    < from: Xray Foot AP + Lateral + Oblique, Right (03.14.23 @ 12:40) >  IMPRESSION:    Cortical irregularity at the first MTP medially, osteomyelitis cannot be   excluded    Soft tissue irregularity at the distal first 3 digits. If there is   continued clinical concern follow-up MRI can be ordered    --- End of Report ---      ABBIE YATES DO; Attending Radiologist  This document has been electronically signed. Mar 14 2023  1:06PM    < end of copied text >        
Patient is a 62y old  Male who presents with a chief complaint of     SUBJECTIVE:   HPI:  63 yo HM w/ PMHx HTN, NIDDM, chronic back pain, anxiety, HIV presents to the ER complaining of R foot pain. Patient states that for the past 2 weeks he has had worsening R foot pain with a worsening wound near the toes. He denies having any fevers, chills, n/v. He denied any trauma to the foot. Of note patient ran out of all of his medications approx 20 days ago.     Surg hx: S/P lumbar fusion in [place].   Social hx: currently tobacco user, no drug or alcohol use  Fam hx: mother CAD (14 Mar 2023 20:21)      3/15: sitting up in bed, awaiting MRI. no pain or discomfort at this time. pain well controlled at present.       REVIEW OF SYSTEMS:    CONSTITUTIONAL: No weakness, fevers or chills  EYES/ENT: No visual changes;  No vertigo or throat pain   NECK: No pain or stiffness  RESPIRATORY: No cough, wheezing, hemoptysis; No shortness of breath  CARDIOVASCULAR: No chest pain or palpitations  GASTROINTESTINAL: No abdominal or epigastric pain. No nausea, vomiting, or hematemesis; No diarrhea or constipation. No melena or hematochezia.  GENITOURINARY: No dysuria, frequency or hematuria  NEUROLOGICAL: No numbness or weakness  SKIN: No itching, burning, rashes, or lesions   All other review of systems is negative unless indicated above        Vital Signs Last 24 Hrs  T(C): 37.4 (15 Mar 2023 07:59), Max: 38.9 (14 Mar 2023 21:12)  T(F): 99.3 (15 Mar 2023 07:59), Max: 102.1 (14 Mar 2023 21:12)  HR: 92 (15 Mar 2023 07:59) (87 - 100)  BP: 155/77 (15 Mar 2023 07:59) (113/58 - 160/74)  BP(mean): 95 (14 Mar 2023 19:15) (71 - 95)  RR: 18 (15 Mar 2023 07:59) (17 - 18)  SpO2: 96% (15 Mar 2023 07:59) (94% - 97%)    Parameters below as of 15 Mar 2023 07:59  Patient On (Oxygen Delivery Method): room air        I&O's Summary    14 Mar 2023 07:01  -  15 Mar 2023 07:00  --------------------------------------------------------  IN: 0 mL / OUT: 300 mL / NET: -300 mL        CAPILLARY BLOOD GLUCOSE      POCT Blood Glucose.: 165 mg/dL (15 Mar 2023 11:26)  POCT Blood Glucose.: 118 mg/dL (15 Mar 2023 07:57)  POCT Blood Glucose.: 373 mg/dL (15 Mar 2023 02:28)      PHYSICAL EXAM:    Constitutional: NAD, awake and alert, thin, cachetic appearing   HEENT: PERR, EOMI, Normal Hearing, MMM  Neck: Soft and supple, No LAD, No JVD  Respiratory: Breath sounds are clear bilaterally, No wheezing, rales or rhonchi  Cardiovascular: S1 and S2, regular rate and rhythm, no Murmurs, gallops or rubs  Gastrointestinal: Bowel Sounds present, soft, nontender, nondistended, no guarding, no rebound  Extremities: No peripheral edema  Vascular: 2+ peripheral pulses  Neurological: A/O x 3, no focal deficits  Musculoskeletal: 5/5 strength b/l upper and lower extremities  Skin: No rashes      MEDICATIONS:  MEDICATIONS  (STANDING):  dextrose 5%. 1000 milliLiter(s) (50 mL/Hr) IV Continuous <Continuous>  dextrose 5%. 1000 milliLiter(s) (100 mL/Hr) IV Continuous <Continuous>  dextrose 50% Injectable 25 Gram(s) IV Push once  dextrose 50% Injectable 12.5 Gram(s) IV Push once  dextrose 50% Injectable 25 Gram(s) IV Push once  glucagon  Injectable 1 milliGRAM(s) IntraMuscular once  heparin   Injectable 5000 Unit(s) SubCutaneous every 8 hours  insulin glargine Injectable (LANTUS) 20 Unit(s) SubCutaneous at bedtime  insulin lispro (ADMELOG) corrective regimen sliding scale   SubCutaneous three times a day before meals  piperacillin/tazobactam IVPB.. 3.375 Gram(s) IV Intermittent every 8 hours  sodium chloride 0.9%. 1000 milliLiter(s) (100 mL/Hr) IV Continuous <Continuous>  vancomycin  IVPB 1250 milliGRAM(s) IV Intermittent every 12 hours      LABS: All Labs Reviewed:                        11.4   12.37 )-----------( 284      ( 15 Mar 2023 08:26 )             34.2     03-15    135  |  103  |  21  ----------------------------<  93  4.1   |  29  |  1.17    Ca    8.7      15 Mar 2023 08:26    TPro  7.4  /  Alb  1.8<L>  /  TBili  0.3  /  DBili  x   /  AST  54<H>  /  ALT  75  /  AlkPhos  138<H>  03-15    PT/INR - ( 15 Mar 2023 08:26 )   PT: 12.8 sec;   INR: 1.10 ratio  PTT - ( 15 Mar 2023 08:26 )  PTT:32.3 sec      RADIOLOGY/EKG:    < from: Xray Foot AP + Lateral + Oblique, Right (03.14.23 @ 12:40) >  IMPRESSION:    Cortical irregularity at the first MTP medially, osteomyelitis cannot be   excluded    Soft tissue irregularity at the distal first 3 digits. If there is   continued clinical concern follow-up MRI can be ordered    --- End of Report ---      ABBIE YATES DO; Attending Radiologist  This document has been electronically signed. Mar 14 2023  1:06PM    < end of copied text >

## 2023-03-17 NOTE — PROGRESS NOTE ADULT - ASSESSMENT
61 yo HM w/ PMHx HTN, NIDDM, chronic back pain, anxiety, HIV presents to the ER complaining of R foot pain.    # likely OM of R foot   # sepsis present on arrival and resolved   - s/p incision and drainage performed in the ED to the level of muscle and dressed in boot by podiatry.   - Xray unable to r/o OM, plan for MRI R foot  - podiatry and ID consulted   - s/p vanco / zosyn ---- cont w/ vanco / zosyn per ID   - analgesics prn   - elevated esr and crp   - oral analgesia PRN (pt reports [place] oxycodone 10mg [place] streets for past 23 years)     # hx of hiv   - not on medications out pt   - cd4 count low   - ID evaled - no cd4 ct > 200 f/u viral load - does not require pcp/mac prophylaxis     # HTN  - likely due to pain   - monitor vitals and consider starting antiHTN if indicated.  - Low sodium diet    # DM 2   - last known A1C 10.2 10/2022.   - noncompliant with medications  - ISS, repeat a1c    VTE ppx -  subq heparin  diabetic diet 
61 yo HM w/ PMHx HTN, NIDDM, chronic back pain, anxiety, HIV presents to the ER complaining of R foot pain. Patient states that for the past 2 weeks he has had worsening R foot pain with a worsening wound near the toes. He denies having any fevers, chills, n/v. He denied any trauma to the foot. Of note patient ran out of all of his medications approx 20 days ago. Pt is noncompliant with HIV medications and does not have follow up. Here eval by podiatry, given IV vancomycin/zosyn for foot infection.     1. Fever. R foot cellulitis/abscess/possible OM. HIV  - podiatry eval appreciated  - s/p bedside I & D f/u wound cx - growing GBS/STAU, blood cx no growth   - on vancomycin [id number] trough noted - repeat in evening, keep current dose#3  - on zosyn [id number] #3-4  - continue with antibiotic coverage   - f/u MRI - Probable R foot 1 toe OM  - tolerating abx well so far; no side effects noted  - reason for abx use and side effects reviewed with patient  - pt noncompliant with HIV meds - hold therapy for now  - cd4 ct > 200 f/u viral load - does not require pcp/mac prophylaxis   - supportive care    2. other issues - care per medicine 
63 yo HM w/ PMHx HTN, NIDDM, chronic back pain, anxiety, HIV presents to the ER complaining of R foot pain.    # likely OM of R foot   # sepsis present on arrival and resolved   - s/p incision and drainage performed in the ED to the level of muscle and dressed in boot by podiatry.   - Xray unable to r/o OM, plan for MRI R foot  - podiatry and ID consulted   - s/p vanco / zosyn ---- cont w/ vanco / zosyn per ID   - analgesics prn   - elevated esr and crp     # hx of hiv   - not on medications out pt   - cd4 count low   - ID evaled - no cd4 ct > 200 f/u viral load - does not require pcp/mac prophylaxis     # HTN  - likely due to pain   - monitor vitals and consider starting antiHTN if indicated.  - Low sodium diet    # DM 2   - last known A1C 10.2 10/2022.   - noncompliant with medications  - ISS, repeat a1c    VTE ppx -  subq heparin  diabetic diet 
63 yo HM w/ PMHx HTN, NIDDM, chronic back pain, anxiety, HIV presents to the ER complaining of R foot pain. Patient states that for the past 2 weeks he has had worsening R foot pain with a worsening wound near the toes. He denies having any fevers, chills, n/v. He denied any trauma to the foot. Of note patient ran out of all of his medications approx 20 days ago. Pt is noncompliant with HIV medications and does not have follow up. Here eval by podiatry, given IV vancomycin/zosyn for foot infection.     1. Fever. R foot cellulitis/abscess/possible OM. HIV  - podiatry eval appreciated  - s/p bedside I & D f/u wound cx - growing GBS, blood cx no growth   - on vancomycin [id number] check trough prior to 4th dose #2   - on zosyn [id number] #2-3  - continue with antibiotic coverage   - f/u MRI  - tolerating abx well so far; no side effects noted  - reason for abx use and side effects reviewed with patient  - pt noncompliant with HIV meds - hold therapy for now  - cd4 ct > 200 f/u viral load - does not require pcp/mac prophylaxis   - supportive care    2. other issues - care per medicine 
63 yo HM w/ PMHx HTN, NIDDM, chronic back pain, anxiety, HIV presents to the ER complaining of R foot pain.    # likely OM of R foot   # sepsis present on arrival and resolved   - MRI with early osteomyelitis vs vascular etiology   - s/p incision and drainage performed in the ED to the level of muscle and dressed in boot by podiatry.   - Xray unable to r/o OM, plan for MRI R foot  - podiatry and ID consulted   - s/p vanco / zosyn ---- cont w/ vanco / zosyn per ID ---------------------- rx for doxycycline 100mg BID for 14 days   - analgesics prn   - elevated esr and crp   - oral analgesia PRN (pt reports [place] oxycodone [place]mg on streets for past 23 years)     # hx of hiv   - not on medications out pt   - cd4 count low   - ID evaled - no cd4 ct > 200 f/u viral load - does not require pcp/mac prophylaxis     # HTN  - likely due to pain   - monitor vitals and consider starting antiHTN if indicated.  - Low sodium diet    # DM 2   - last known A1C 10.2 10/2022.   - noncompliant with medications  - ISS, repeat a1c    VTE ppx -  subq heparin  diabetic diet       pt requesting to leave AMA   Patient wishes to leave against medical advice and has the mental capacity to make this decision. All risks were explained, as well as, the benefits to remaining admitted until medical team can properly discharge. The attending physician was made aware. 
Assesment: 63 y/o male see in the ED for the following   -right full thickness plantar wound, possible OM to right foot  - Cellulitis/erythema to the right foot  -Diabetes Mellitus type 2   -Pain in Right Foot   -Difficulty with ambulation      Plan:   Chart reviewed and Patient evaluated; discussed treatment and plan with Dr. Roger Davis   Discussed diagnosis and treatment with patient  Incision and draiange performed in the ED to the level of muscle  Wound flush with normal saline  Obtained wound culture to be sent to Pathology of right foot  Flushed copiously w/ saline and betadine   Applied betadine soaked gauze with dry sterile dressing  X-rays reviewed : results noted above  Continue with IV antibiotics As Per ID  Awaiting MRI results  Discussed importance of daily foot examinations and proper shoe gear and to importance of lower Fasting Blood Glucose levels.   Patient demonstrated verbal understanding of all interventions and tolerated interventions well without any complications.   Podiatry will follow while in house.

## 2023-03-20 ENCOUNTER — INPATIENT (INPATIENT)
Facility: HOSPITAL | Age: 63
LOS: 17 days | Discharge: NURSING, REHAB OR LONG-TERM CARE | DRG: 344 | End: 2023-04-07
Attending: FAMILY MEDICINE | Admitting: INTERNAL MEDICINE
Payer: MEDICAID

## 2023-03-20 VITALS — HEIGHT: 69 IN | WEIGHT: 195.11 LBS

## 2023-03-20 DIAGNOSIS — L08.9 LOCAL INFECTION OF THE SKIN AND SUBCUTANEOUS TISSUE, UNSPECIFIED: ICD-10-CM

## 2023-03-20 DIAGNOSIS — Z98.1 ARTHRODESIS STATUS: Chronic | ICD-10-CM

## 2023-03-20 LAB
ALBUMIN SERPL ELPH-MCNC: 1.8 G/DL — LOW (ref 3.3–5)
ALP SERPL-CCNC: 138 U/L — HIGH (ref 40–120)
ALT FLD-CCNC: 47 U/L — SIGNIFICANT CHANGE UP (ref 12–78)
ANION GAP SERPL CALC-SCNC: 3 MMOL/L — LOW (ref 5–17)
AST SERPL-CCNC: 25 U/L — SIGNIFICANT CHANGE UP (ref 15–37)
BASOPHILS # BLD AUTO: 0.03 K/UL — SIGNIFICANT CHANGE UP (ref 0–0.2)
BASOPHILS NFR BLD AUTO: 0.4 % — SIGNIFICANT CHANGE UP (ref 0–2)
BILIRUB SERPL-MCNC: 0.2 MG/DL — SIGNIFICANT CHANGE UP (ref 0.2–1.2)
BUN SERPL-MCNC: 19 MG/DL — SIGNIFICANT CHANGE UP (ref 7–23)
CALCIUM SERPL-MCNC: 9.1 MG/DL — SIGNIFICANT CHANGE UP (ref 8.5–10.1)
CHLORIDE SERPL-SCNC: 102 MMOL/L — SIGNIFICANT CHANGE UP (ref 96–108)
CO2 SERPL-SCNC: 28 MMOL/L — SIGNIFICANT CHANGE UP (ref 22–31)
CREAT SERPL-MCNC: 1.22 MG/DL — SIGNIFICANT CHANGE UP (ref 0.5–1.3)
EGFR: 67 ML/MIN/1.73M2 — SIGNIFICANT CHANGE UP
EOSINOPHIL # BLD AUTO: 0.14 K/UL — SIGNIFICANT CHANGE UP (ref 0–0.5)
EOSINOPHIL NFR BLD AUTO: 1.9 % — SIGNIFICANT CHANGE UP (ref 0–6)
FLUAV AG NPH QL: SIGNIFICANT CHANGE UP
FLUBV AG NPH QL: SIGNIFICANT CHANGE UP
GLUCOSE BLDC GLUCOMTR-MCNC: 174 MG/DL — HIGH (ref 70–99)
GLUCOSE BLDC GLUCOMTR-MCNC: 328 MG/DL — HIGH (ref 70–99)
GLUCOSE BLDC GLUCOMTR-MCNC: 527 MG/DL — CRITICAL HIGH (ref 70–99)
GLUCOSE SERPL-MCNC: 509 MG/DL — CRITICAL HIGH (ref 70–99)
HCT VFR BLD CALC: 36.1 % — LOW (ref 39–50)
HGB BLD-MCNC: 11.5 G/DL — LOW (ref 13–17)
IMM GRANULOCYTES NFR BLD AUTO: 1.5 % — HIGH (ref 0–0.9)
LACTATE SERPL-SCNC: 1.9 MMOL/L — SIGNIFICANT CHANGE UP (ref 0.7–2)
LYMPHOCYTES # BLD AUTO: 1.03 K/UL — SIGNIFICANT CHANGE UP (ref 1–3.3)
LYMPHOCYTES # BLD AUTO: 13.9 % — SIGNIFICANT CHANGE UP (ref 13–44)
MCHC RBC-ENTMCNC: 27.8 PG — SIGNIFICANT CHANGE UP (ref 27–34)
MCHC RBC-ENTMCNC: 31.9 GM/DL — LOW (ref 32–36)
MCV RBC AUTO: 87.4 FL — SIGNIFICANT CHANGE UP (ref 80–100)
MONOCYTES # BLD AUTO: 0.48 K/UL — SIGNIFICANT CHANGE UP (ref 0–0.9)
MONOCYTES NFR BLD AUTO: 6.5 % — SIGNIFICANT CHANGE UP (ref 2–14)
NEUTROPHILS # BLD AUTO: 5.6 K/UL — SIGNIFICANT CHANGE UP (ref 1.8–7.4)
NEUTROPHILS NFR BLD AUTO: 75.8 % — SIGNIFICANT CHANGE UP (ref 43–77)
PLATELET # BLD AUTO: 460 K/UL — HIGH (ref 150–400)
POTASSIUM SERPL-MCNC: 4.7 MMOL/L — SIGNIFICANT CHANGE UP (ref 3.5–5.3)
POTASSIUM SERPL-SCNC: 4.7 MMOL/L — SIGNIFICANT CHANGE UP (ref 3.5–5.3)
PROT SERPL-MCNC: 8.1 GM/DL — SIGNIFICANT CHANGE UP (ref 6–8.3)
RBC # BLD: 4.13 M/UL — LOW (ref 4.2–5.8)
RBC # FLD: 13.4 % — SIGNIFICANT CHANGE UP (ref 10.3–14.5)
RSV RNA NPH QL NAA+NON-PROBE: SIGNIFICANT CHANGE UP
SARS-COV-2 RNA SPEC QL NAA+PROBE: SIGNIFICANT CHANGE UP
SODIUM SERPL-SCNC: 133 MMOL/L — LOW (ref 135–145)
WBC # BLD: 7.39 K/UL — SIGNIFICANT CHANGE UP (ref 3.8–10.5)
WBC # FLD AUTO: 7.39 K/UL — SIGNIFICANT CHANGE UP (ref 3.8–10.5)

## 2023-03-20 PROCEDURE — 97162 PT EVAL MOD COMPLEX 30 MIN: CPT | Mod: GP

## 2023-03-20 PROCEDURE — 82962 GLUCOSE BLOOD TEST: CPT

## 2023-03-20 PROCEDURE — 84484 ASSAY OF TROPONIN QUANT: CPT

## 2023-03-20 PROCEDURE — 73630 X-RAY EXAM OF FOOT: CPT | Mod: 26,RT

## 2023-03-20 PROCEDURE — 97116 GAIT TRAINING THERAPY: CPT | Mod: GP

## 2023-03-20 PROCEDURE — C1751: CPT

## 2023-03-20 PROCEDURE — 85025 COMPLETE CBC W/AUTO DIFF WBC: CPT

## 2023-03-20 PROCEDURE — 80053 COMPREHEN METABOLIC PANEL: CPT

## 2023-03-20 PROCEDURE — 73630 X-RAY EXAM OF FOOT: CPT | Mod: RT

## 2023-03-20 PROCEDURE — 36415 COLL VENOUS BLD VENIPUNCTURE: CPT

## 2023-03-20 PROCEDURE — 83735 ASSAY OF MAGNESIUM: CPT

## 2023-03-20 PROCEDURE — 80202 ASSAY OF VANCOMYCIN: CPT

## 2023-03-20 PROCEDURE — 84100 ASSAY OF PHOSPHORUS: CPT

## 2023-03-20 PROCEDURE — 99285 EMERGENCY DEPT VISIT HI MDM: CPT

## 2023-03-20 PROCEDURE — 71045 X-RAY EXAM CHEST 1 VIEW: CPT | Mod: 26

## 2023-03-20 PROCEDURE — 87635 SARS-COV-2 COVID-19 AMP PRB: CPT

## 2023-03-20 PROCEDURE — 71045 X-RAY EXAM CHEST 1 VIEW: CPT

## 2023-03-20 PROCEDURE — 93005 ELECTROCARDIOGRAM TRACING: CPT

## 2023-03-20 PROCEDURE — 85027 COMPLETE CBC AUTOMATED: CPT

## 2023-03-20 PROCEDURE — 80048 BASIC METABOLIC PNL TOTAL CA: CPT

## 2023-03-20 PROCEDURE — 99223 1ST HOSP IP/OBS HIGH 75: CPT | Mod: GC

## 2023-03-20 RX ORDER — PIPERACILLIN AND TAZOBACTAM 4; .5 G/20ML; G/20ML
3.38 INJECTION, POWDER, LYOPHILIZED, FOR SOLUTION INTRAVENOUS ONCE
Refills: 0 | Status: DISCONTINUED | OUTPATIENT
Start: 2023-03-20 | End: 2023-03-20

## 2023-03-20 RX ORDER — INSULIN GLARGINE 100 [IU]/ML
20 INJECTION, SOLUTION SUBCUTANEOUS AT BEDTIME
Refills: 0 | Status: DISCONTINUED | OUTPATIENT
Start: 2023-03-20 | End: 2023-03-24

## 2023-03-20 RX ORDER — INSULIN HUMAN 100 [IU]/ML
8 INJECTION, SOLUTION SUBCUTANEOUS ONCE
Refills: 0 | Status: COMPLETED | OUTPATIENT
Start: 2023-03-20 | End: 2023-03-20

## 2023-03-20 RX ORDER — DEXTROSE 50 % IN WATER 50 %
12.5 SYRINGE (ML) INTRAVENOUS ONCE
Refills: 0 | Status: DISCONTINUED | OUTPATIENT
Start: 2023-03-20 | End: 2023-04-07

## 2023-03-20 RX ORDER — GLUCAGON INJECTION, SOLUTION 0.5 MG/.1ML
1 INJECTION, SOLUTION SUBCUTANEOUS ONCE
Refills: 0 | Status: DISCONTINUED | OUTPATIENT
Start: 2023-03-20 | End: 2023-04-07

## 2023-03-20 RX ORDER — INSULIN LISPRO 100/ML
VIAL (ML) SUBCUTANEOUS AT BEDTIME
Refills: 0 | Status: DISCONTINUED | OUTPATIENT
Start: 2023-03-20 | End: 2023-04-07

## 2023-03-20 RX ORDER — PIPERACILLIN AND TAZOBACTAM 4; .5 G/20ML; G/20ML
3.38 INJECTION, POWDER, LYOPHILIZED, FOR SOLUTION INTRAVENOUS ONCE
Refills: 0 | Status: COMPLETED | OUTPATIENT
Start: 2023-03-20 | End: 2023-03-20

## 2023-03-20 RX ORDER — INSULIN LISPRO 100/ML
VIAL (ML) SUBCUTANEOUS
Refills: 0 | Status: DISCONTINUED | OUTPATIENT
Start: 2023-03-20 | End: 2023-04-07

## 2023-03-20 RX ORDER — ONDANSETRON 8 MG/1
4 TABLET, FILM COATED ORAL ONCE
Refills: 0 | Status: COMPLETED | OUTPATIENT
Start: 2023-03-20 | End: 2023-03-20

## 2023-03-20 RX ORDER — PIPERACILLIN AND TAZOBACTAM 4; .5 G/20ML; G/20ML
3.38 INJECTION, POWDER, LYOPHILIZED, FOR SOLUTION INTRAVENOUS ONCE
Refills: 0 | Status: COMPLETED | OUTPATIENT
Start: 2023-03-21 | End: 2023-03-21

## 2023-03-20 RX ORDER — PIPERACILLIN AND TAZOBACTAM 4; .5 G/20ML; G/20ML
3.38 INJECTION, POWDER, LYOPHILIZED, FOR SOLUTION INTRAVENOUS EVERY 8 HOURS
Refills: 0 | Status: DISCONTINUED | OUTPATIENT
Start: 2023-03-21 | End: 2023-03-22

## 2023-03-20 RX ORDER — DEXTROSE 50 % IN WATER 50 %
25 SYRINGE (ML) INTRAVENOUS ONCE
Refills: 0 | Status: DISCONTINUED | OUTPATIENT
Start: 2023-03-20 | End: 2023-04-07

## 2023-03-20 RX ORDER — SODIUM CHLORIDE 9 MG/ML
1000 INJECTION, SOLUTION INTRAVENOUS
Refills: 0 | Status: DISCONTINUED | OUTPATIENT
Start: 2023-03-20 | End: 2023-04-07

## 2023-03-20 RX ORDER — MORPHINE SULFATE 50 MG/1
4 CAPSULE, EXTENDED RELEASE ORAL ONCE
Refills: 0 | Status: DISCONTINUED | OUTPATIENT
Start: 2023-03-20 | End: 2023-03-20

## 2023-03-20 RX ORDER — SODIUM CHLORIDE 9 MG/ML
1000 INJECTION INTRAMUSCULAR; INTRAVENOUS; SUBCUTANEOUS ONCE
Refills: 0 | Status: COMPLETED | OUTPATIENT
Start: 2023-03-20 | End: 2023-03-20

## 2023-03-20 RX ORDER — INSULIN GLARGINE 100 [IU]/ML
20 INJECTION, SOLUTION SUBCUTANEOUS ONCE
Refills: 0 | Status: COMPLETED | OUTPATIENT
Start: 2023-03-20 | End: 2023-03-20

## 2023-03-20 RX ORDER — DEXTROSE 50 % IN WATER 50 %
15 SYRINGE (ML) INTRAVENOUS ONCE
Refills: 0 | Status: DISCONTINUED | OUTPATIENT
Start: 2023-03-20 | End: 2023-04-07

## 2023-03-20 RX ORDER — VANCOMYCIN HCL 1 G
1250 VIAL (EA) INTRAVENOUS ONCE
Refills: 0 | Status: COMPLETED | OUTPATIENT
Start: 2023-03-20 | End: 2023-03-20

## 2023-03-20 RX ORDER — HEPARIN SODIUM 5000 [USP'U]/ML
5000 INJECTION INTRAVENOUS; SUBCUTANEOUS EVERY 8 HOURS
Refills: 0 | Status: DISCONTINUED | OUTPATIENT
Start: 2023-03-20 | End: 2023-04-07

## 2023-03-20 RX ORDER — HYDRALAZINE HCL 50 MG
5 TABLET ORAL ONCE
Refills: 0 | Status: DISCONTINUED | OUTPATIENT
Start: 2023-03-20 | End: 2023-04-07

## 2023-03-20 RX ORDER — ONDANSETRON 8 MG/1
4 TABLET, FILM COATED ORAL EVERY 8 HOURS
Refills: 0 | Status: DISCONTINUED | OUTPATIENT
Start: 2023-03-20 | End: 2023-04-07

## 2023-03-20 RX ORDER — INSULIN LISPRO 100/ML
4 VIAL (ML) SUBCUTANEOUS ONCE
Refills: 0 | Status: COMPLETED | OUTPATIENT
Start: 2023-03-20 | End: 2023-03-20

## 2023-03-20 RX ORDER — ACETAMINOPHEN 500 MG
650 TABLET ORAL EVERY 6 HOURS
Refills: 0 | Status: DISCONTINUED | OUTPATIENT
Start: 2023-03-20 | End: 2023-04-07

## 2023-03-20 RX ORDER — LANOLIN ALCOHOL/MO/W.PET/CERES
3 CREAM (GRAM) TOPICAL AT BEDTIME
Refills: 0 | Status: DISCONTINUED | OUTPATIENT
Start: 2023-03-20 | End: 2023-03-26

## 2023-03-20 RX ORDER — VANCOMYCIN HCL 1 G
1250 VIAL (EA) INTRAVENOUS EVERY 12 HOURS
Refills: 0 | Status: DISCONTINUED | OUTPATIENT
Start: 2023-03-20 | End: 2023-04-07

## 2023-03-20 RX ADMIN — PIPERACILLIN AND TAZOBACTAM 200 GRAM(S): 4; .5 INJECTION, POWDER, LYOPHILIZED, FOR SOLUTION INTRAVENOUS at 15:53

## 2023-03-20 RX ADMIN — Medication 166.67 MILLIGRAM(S): at 16:27

## 2023-03-20 RX ADMIN — SODIUM CHLORIDE 1000 MILLILITER(S): 9 INJECTION INTRAMUSCULAR; INTRAVENOUS; SUBCUTANEOUS at 18:39

## 2023-03-20 RX ADMIN — SODIUM CHLORIDE 1000 MILLILITER(S): 9 INJECTION INTRAMUSCULAR; INTRAVENOUS; SUBCUTANEOUS at 18:25

## 2023-03-20 RX ADMIN — INSULIN GLARGINE 20 UNIT(S): 100 INJECTION, SOLUTION SUBCUTANEOUS at 18:38

## 2023-03-20 RX ADMIN — PIPERACILLIN AND TAZOBACTAM 3.38 GRAM(S): 4; .5 INJECTION, POWDER, LYOPHILIZED, FOR SOLUTION INTRAVENOUS at 16:27

## 2023-03-20 RX ADMIN — MORPHINE SULFATE 4 MILLIGRAM(S): 50 CAPSULE, EXTENDED RELEASE ORAL at 15:48

## 2023-03-20 RX ADMIN — Medication 4 UNIT(S): at 18:38

## 2023-03-20 RX ADMIN — INSULIN GLARGINE 20 UNIT(S): 100 INJECTION, SOLUTION SUBCUTANEOUS at 23:40

## 2023-03-20 RX ADMIN — MORPHINE SULFATE 4 MILLIGRAM(S): 50 CAPSULE, EXTENDED RELEASE ORAL at 16:05

## 2023-03-20 RX ADMIN — INSULIN HUMAN 8 UNIT(S): 100 INJECTION, SOLUTION SUBCUTANEOUS at 16:59

## 2023-03-20 RX ADMIN — SODIUM CHLORIDE 1000 MILLILITER(S): 9 INJECTION INTRAMUSCULAR; INTRAVENOUS; SUBCUTANEOUS at 16:05

## 2023-03-20 RX ADMIN — MORPHINE SULFATE 4 MILLIGRAM(S): 50 CAPSULE, EXTENDED RELEASE ORAL at 18:37

## 2023-03-20 RX ADMIN — Medication 3 MILLIGRAM(S): at 23:42

## 2023-03-20 RX ADMIN — ONDANSETRON 4 MILLIGRAM(S): 8 TABLET, FILM COATED ORAL at 15:48

## 2023-03-20 NOTE — H&P ADULT - TIME BILLING
I spent a total of 80 minutes on the date of this encounter coordinating the patient's care. This includes reviewing prior documentation, results and imaging in addition to completing a history and physical examination on the patient. Further tests, medications, and procedures have been ordered as indicated. Laboratory results and the plan of care were communicated to the patient. Supporting documentation was completed and added to the patient's chart.

## 2023-03-20 NOTE — ED STATDOCS - SKIN, MLM
skin normal color for race, warm, dry and intact. +R foot in bandages, swollen, erythematous with drainage

## 2023-03-20 NOTE — ED ADULT NURSE NOTE - NSIMPLEMENTINTERV_GEN_ALL_ED
Implemented All Fall Risk Interventions:  [place] to call system. Call bell, personal items and telephone within reach. Instruct patient to call for assistance. Room bathroom lighting operational. Non-slip footwear when patient is off stretcher. Physically safe environment: no spills, clutter or unnecessary equipment. Stretcher in lowest position, wheels locked, appropriate side rails in place. Provide visual cue, wrist band, yellow gown, etc. Monitor gait and stability. Monitor for mental status changes and reorient to person, place, and time. Review medications for side effects contributing to fall risk. Reinforce activity limits and safety measures with patient and family.

## 2023-03-20 NOTE — ED STATDOCS - ATTENDING APP SHARED VISIT CONTRIBUTION OF CARE
I, Chayito Fine MD,  performed the initial face to face bedside interview with this patient regarding history of present illness, review of symptoms and relevant past medical, social and family history.  I completed an independent physical examination.  I was the initial provider who evaluated this patient.   I personally saw the patient and performed a substantive portion of the visit including all aspects of the medical decision making.  I have signed out the follow up of any pending tests (i.e. labs, radiological studies) to the MAINOR.  I have communicated the patient’s plan of care and disposition with the MAINOR.  The history, relevant review of systems, past medical and surgical history, medical decision making, and physical examination was documented by the scribe in my presence and I attest to the accuracy of the documentation.

## 2023-03-20 NOTE — H&P ADULT - HISTORY OF PRESENT ILLNESS
63 yo male with PMHx of HTN, chronic back pain, anxiety, HIV presents today with  61 yo male with PMHx of HTN, chronic back pain, anxiety, HIV presents today to continue treatment for his R foot.  Patient was recently admitted on 3/14 and left AMA on 3/17.  Patient mentions he neeeded to leave AMA, because he was staying at a hotel and they were going to throw all his thins away.  Patient was not taking any medications after leaving AMA and does not follow up with any physicians as outpatient, but will like to start following with one.   Patient states his right foot pain has been worsening for the past 3 weeks, when he was inpatient the pain improved, but after stopping taking everything the pain return to 10/10.  Patient denies any fever, chills, nausea, vomiting, chest pain, trauma to the foot.    In the ED /92, HR 81, Temp 99, RR 18 and SpO2 99% on RA.    Labs: Hb 11.5, WBC wnl,  Glucose 527, Na 133, Albumin 1.8, Lactate 1.9,   Patient was seen by podiatry and he was given Zosyn, Vancomycin, 2L IVF, 20UI Lantus, 4UI Admelog and 8UI humulin 63 yo male with PMHx of HTN, chronic back pain, anxiety, HIV presents today to continue treatment for his R foot.  Patient was recently admitted on 3/14 and left AMA on 3/17.  Patient mentions he neeeded to leave AMA, because he was staying at a hotel and they were going to throw all his things away.  Patient was not taking any medications after leaving AMA and does not follow up with any physicians as outpatient, but will like to start following with one.   Patient states his right foot pain has been worsening for the past 3 weeks, when he was inpatient the pain improved, but after stopping taking everything the pain return to 10/10.  Patient denies any fever, chills, nausea, vomiting, chest pain, trauma to the foot.    In the ED /92, HR 81, Temp 99, RR 18 and SpO2 99% on RA.    Labs: Hb 11.5, WBC wnl,  Glucose 527, Na 133, Albumin 1.8, Lactate 1.9,   Patient was seen by podiatry and he was given Zosyn, Vancomycin, 2L IVF, 20UI Lantus, 4UI Admelog and 8UI humulin 61 yo male with PMHx of HTN, chronic back pain, anxiety, DM, HIV presents today to continue treatment for his R foot.  Patient was recently admitted on 3/14 and left AMA on 3/17.  Patient mentions he neeeded to leave AMA, because he was staying at a hotel and they were going to throw all his things away.  Patient was not taking any medications after leaving AMA and does not follow up with any physicians as outpatient, but will like to start following with one.   Patient states his right foot pain has been worsening for the past 3 weeks, when he was inpatient the pain improved, but after stopping taking everything the pain return to 10/10.  Patient denies any fever, chills, nausea, vomiting, chest pain, trauma to the foot.    In the ED /92, HR 81, Temp 99, RR 18 and SpO2 99% on RA.    Labs: Hb 11.5, WBC wnl,  Glucose 527, Na 133, Albumin 1.8, Lactate 1.9,   Patient was seen by podiatry and he was given Zosyn, Vancomycin, 2L IVF, 20UI Lantus, 4UI Admelog and 8UI humulin

## 2023-03-20 NOTE — CONSULT NOTE ADULT - ATTENDING COMMENTS
Pt seen and examined. Clinical history and relevant imaging and clinical photos reviewed. Agree with above. No major orthopaedic intervention at this time. All questions answered.

## 2023-03-20 NOTE — CONSULT NOTE ADULT - ASSESSMENT
A: 61 y/o male seen for the following   -Right full thickness plantar wound, possible OM  -Cellulitis/erythema to the right forefoot  -Diabetes Mellitus type 2   -Pain in Right Foot   -Difficulty with ambulation      Plan:   Chart reviewed and Patient evaluated   Discussed diagnosis and treatment with patient. Discussed importance of daily foot examinations, proper shoe gear, and tight glycemic control   Wound flush with normal saline  Flushed copiously w/ saline and betadine mix   Applied betadine soaked gauze with dry sterile dressing  X-rays reviewed : on wet read showing unchanged findings from last admission.   Continue with IV antibiotics As Per ED/Med  MRI results from 3/17/23 noted above  Patient demonstrated verbal understanding of all interventions and tolerated interventions well without any complications.   Podiatry will follow while in house.    Case D/W Dr. Mccracken

## 2023-03-20 NOTE — ED STATDOCS - NSICDXPASTMEDICALHX_GEN_ALL_CORE_FT
PAST MEDICAL HISTORY:  Anxiety     Chronic back pain     Diabetes     DM (diabetes mellitus)     Head trauma     HIV infection     Hypertension     Insomnia       Bilateral cataracts

## 2023-03-20 NOTE — H&P ADULT - NSHPSOCIALHISTORY_GEN_ALL_CORE
"Date: 2020 11:32:10  Clinician: Julius Dow  Clinician NPI: [id number]  Patient: Bailee Dixon  Patient : 1960  Patient Address: [address] [place] [address]  Patient Phone: [phone]  Visit Protocol: URI  Patient Summary:  Bailee is a 60 year old ( : 1960 ) female who initiated a [facility] Visit for cold, sinus infection, or influenza. When asked the question \"Please sign me up to receive news, health information and promotions from [facility].\", Bailee responded \"No\".    Bailee states her symptoms started gradually 10-13 days ago.   Her symptoms consist of a cough, nasal congestion, a headache, rhinitis, wheezing, facial pain or pressure, myalgia, chills, malaise, and a sore throat. She is experiencing mild difficulty breathing with activities but can speak normally in full sentences. Bailee also feels feverish.   Symptom details     Nasal secretions: The color of her mucus is yellow.    Cough: Bailee coughs a few times an hour and her cough is more bothersome at night. Phlegm comes into her throat when she coughs. She believes her cough is caused by post-nasal drip. The color of the phlegm is yellow.     Sore throat: Bailee reports having mild throat pain (1-3 on a 10 point pain scale), does not have exudate on her tonsils, and can swallow liquids. The lymph nodes in her neck are not enlarged. A rash has not appeared on the skin since the sore throat started.     Temperature: Her current temperature is 99.9 degrees Fahrenheit.     Wheezing: Bailee has not ever been diagnosed with asthma. Additional wheezing details as reported by the patient (free text): No labor or I start wheezing       Facial pain or pressure: The facial pain or pressure feels worse when bending over or leaning forward.     Headache: She states the headache is mild (1-3 on a 10 point pain scale).      Bailee denies having ear pain, anosmia, vomiting, nausea, enlarged lymph nodes, teeth pain, ageusia, " and diarrhea. She also denies double sickening (worsening symptoms after initial improvement), taking antibiotic medication in the past month, and having recent facial or sinus surgery in the past 60 days.   Precipitating events  Within the past week, Bailee has not been exposed to someone with strep throat. She has not recently been exposed to someone with influenza. Bailee has not been in close contact with any high risk individuals.   Pertinent COVID-19 (Coronavirus) information  In the past 14 days, Bailee has not worked in a congregate living setting.   She does not work or volunteer as healthcare worker or a  and does not work or volunteer in a healthcare facility.   Bailee also has not lived in a congregate living setting in the past 14 days. She does not live with a healthcare worker.   Bailee has not had a close contact with a laboratory-confirmed COVID-19 patient within 14 days of symptom onset.   Since December 2019, Bailee and has had upper respiratory infection (URI) or influenza-like illness. Has not been diagnosed with lab-confirmed COVID-19 test      Date(s) of previous URI or influenza-like illness (free-text): Every year around this time     Symptoms Bailee experienced during previous URI or influenza-like illness as reported by the patient (free-text): Cough, shortness of breath        Pertinent medical history  Bailee had 1 sinus infection within the past year.   Bailee typically gets a yeast infection when she takes antibiotics. She has used fluconazole (Diflucan) to treat previous yeast infections. 2 doses of fluconazole (Diflucan) has typically been needed for symptoms to resolve in the past.  Bailee does not need a return to work/school note.   Weight: 108 lbs   Bailee does not smoke or use smokeless tobacco.   Weight: 108 lbs    MEDICATIONS: Mucinex oral, ALLERGIES: Levaquin  Clinician Response:  Dear Bailee,   Your symptoms show that you may have coronavirus (COVID-19). This  "illness can cause fever, cough and trouble breathing. Many people get a mild case and get better on their own. Some people can get very sick.  What should I do?  We would like to test you for this virus.   1. Please call [phone] to schedule your visit. Explain that you were referred by [facility] to have a COVID-19 test. Be ready to share your [facility] visit ID number.  The following will serve as your written order for this COVID Test, ordered by me, for the indication of suspected COVID [Z20.828]: The test will be ordered in [employer], our electronic health record, after you are scheduled. It will show as ordered and authorized by Yonny Zambrano MD.  Order: COVID-19 (Coronavirus) PCR for SYMPTOMATIC testing from [facility].      2. When it's time for your COVID test:  Stay at least 6 feet away from others. (If someone will drive you to your test, stay in the backseat, as far away from the  as you can.)   Cover your mouth and nose with a mask, tissue or washcloth.  Go straight to the testing site. Don't make any stops on the way there or back.      3.Starting now: Stay home and away from others (self-isolate) until:   You've had no fever---and no medicine that reduces fever---for one full day (24 hours). And...   Your other symptoms have gotten better. For example, your cough or breathing has improved. And...   At least 10 days have passed since your symptoms started.       During this time, don't leave the house except for testing or medical care.   Stay in your own room, even for meals. Use your own bathroom if you can.   Stay away from others in your home. No hugging, kissing or shaking hands. No visitors.  Don't go to work, school or anywhere else.    Clean \"high touch\" surfaces often (doorknobs, counters, handles, etc.). Use a household cleaning spray or wipes. You'll find a full list of  on the EPA website: www.epa.gov/pesticide-registration/list-n-disinfectants-use-against-sars-cov-2.   Cover your mouth and " nose with a mask, tissue or washcloth to avoid spreading germs.  Wash your hands and face often. Use soap and water.  Caregivers in these groups are at risk for severe illness due to COVID-19:  o People 65 years and older  o People who live in a nursing home or long-term care facility  o People with chronic disease (lung, heart, cancer, diabetes, kidney, liver, immunologic)  o People who have a weakened immune system, including those who:   Are in cancer treatment  Take medicine that weakens the immune system, such as corticosteroids  Had a bone marrow or organ transplant  Have an immune deficiency  Have poorly controlled HIV or AIDS  Are obese (body mass index of 40 or higher)  Smoke regularly   o Caregivers should wear gloves while washing dishes, handling laundry and cleaning bedrooms and bathrooms.  o Use caution when washing and drying laundry: Don't shake dirty laundry, and use the warmest water setting that you can.  o For more tips, go to www.cdc.gov/coronavirus/2019-ncov/downloads/10Things.pdf.    4.Sign up for This Week In. We know it's scary to hear that you might have COVID-19. We want to track your symptoms to make sure you're okay over the next 2 weeks. Please look for an email from This Week In---this is a free, online program that we'll use to keep in touch. To sign up, follow the link in the email. Learn more at [email or web address]  How can I take care of myself?   Get lots of rest. Drink extra fluids (unless a doctor has told you not to).   Take Tylenol (acetaminophen) for fever or pain. If you have liver or kidney problems, ask your family doctor if it's okay to take Tylenol.   Adults can take either:    650 mg (two 325 mg pills) every 4 to 6 hours, or...   1,000 mg (two 500 mg pills) every 8 hours as needed.    Note: Don't take more than 3,000 mg in one day. Acetaminophen is found in many medicines (both prescribed and over-the-counter medicines). Read all labels to be sure you  don't take too much.   For children, check the Tylenol bottle for the right dose. The dose is based on the child's age or weight.    If you have other health problems (like cancer, heart failure, an organ transplant or severe kidney disease): Call your specialty clinic if you don't feel better in the next 2 days.       Know when to call 911. Emergency warning signs include:    Trouble breathing or shortness of breath Pain or pressure in the chest that doesn't go away Feeling confused like you haven't felt before, or not being able to wake up Bluish-colored lips or face.  Where can I get more information?   [facility] -- About COVID-19: [email or web address]   CDC -- What to Do If You're Sick: www.cdc.gov/coronavirus/2019-ncov/about/steps-when-sick.html   [facility] -- Ending Home Isolation: www.cdc.gov/coronavirus/2019-ncov/hcp/disposition-in-home-patients.html   [facility] -- Caring for Someone: www.cdc.gov/coronavirus/2019-ncov/if-you-are-sick/care-for-someone.html   [facility] -- Interim Guidance for Hospital Discharge to Home: www.health.[facility].mn.us/diseases/coronavirus/hcp/hospdischarge.pdf   [facility] clinical trials (COVID-19 research studies): clinicalaffairs.[facility]/n-clinical-trials    Below are the COVID-19 hotlines at the [facility] ([facility]). Interpreters are available.    For health questions: Call [phone] or 1-828.357.4641 (7 a.m. to 7 p.m.) For questions about schools and childcare: Call 323-460-1998 or 1-454.126.3667 (7 a.m. to 7 p.m.)    Diagnosis: Acute upper respiratory infection, unspecified  Diagnosis ICD: J06.9  Additional Clinician Notes:   If your symptoms are not improving, or your symptoms are worsening, please come to one of our urgent care locations for further evaluation.    Current smoker, no Etoh, no illicit drugs.

## 2023-03-20 NOTE — ED STATDOCS - PROGRESS NOTE DETAILS
61 y/o male PMHx HTN, DM, previous HIV infection, head trauma, chronic back pain s/p lumbar fusion, anxiety, cataracts and is unable to see presents to the ED after left AMA from the hospital on Friday after a 4 day stay for right foot infection, currently c/o R foot pain with erythema and purulent drainage since 3/17. Pt is supposed to be taking metformin and/or insulin as per his ex wife but is not currently taking any medications. Pt currently undomiciled. Pt. is a 62 year old male Hx HTN, DM, HIV infection (non compliant with medications, chronic back pain, anxiety, visual deficiency presents with right foot wound.  Pt. non compliant with medications.  Pt. undomiciles. Pt. is a 62 year old male Hx HTN, DM, HIV infection (non compliant with medications, chronic back pain, anxiety, visual deficiency presents with right foot wound.  Pt. non compliant with medications.  Pt. undomiciled.  Pt. signed out AMA two days ago.  Returns today for worsening pain.  Pt. with drainage from right foot.  no PMD.  Tara Sánchez PA-C I spoke with podiatry resident and they are aware of patient in ED.  Will admit to medicine.  Tara Sánchez PA-C Glucose 504, insulin ordered.  IVF ordered.  Tara Sánchez PA-C PLEASE CONTACT EX WIFE TO DISCUSS HIS CARE.  HE IS UNABLE TO SEE AND CAN NOT TAKE CARE OF HIMSELF DUE TO INABILITY TO WALK  [phone]  RAH.

## 2023-03-20 NOTE — ED STATDOCS - CARE PLAN
1 Principal Discharge DX:	Foot infection  Secondary Diagnosis:	Uncontrolled diabetes mellitus with hyperglycemia

## 2023-03-20 NOTE — H&P ADULT - NSHPPHYSICALEXAM_GEN_ALL_CORE
Vital Signs Last 24 Hrs  T(C): 37.2 (20 Mar 2023 19:44), Max: 37.2 (20 Mar 2023 19:44)  T(F): 99 (20 Mar 2023 19:44), Max: 99 (20 Mar 2023 19:44)  HR: 81 (20 Mar 2023 19:44) (81 - 81)  BP: 154/92 (20 Mar 2023 19:44) (154/67 - 154/92)  BP(mean): 109 (20 Mar 2023 19:44) (93 - 109)  RR: 18 (20 Mar 2023 19:44) (17 - 18)  SpO2: 99% (20 Mar 2023 19:44) (98% - 99%)    Parameters below as of 20 Mar 2023 19:44  Patient On (Oxygen Delivery Method): room air

## 2023-03-20 NOTE — ED STATDOCS - OBJECTIVE STATEMENT
61 y/o male PMHx HTN, DM, previous HIV infection, head trauma, chronic back pain s/p lumbar fusion, anxiety, cataracts and is unable to see presents to the ED after left AMA from the hospital on Friday after a 4 day stay for right foot infection, currently c/o R foot pain with erythema and purulent drainage since 3/17. Pt is supposed to be taking metformin and/or insulin as per his ex wife but is not currently taking any medications. Pt currently undomiciled.

## 2023-03-20 NOTE — ED STATDOCS - NS ED ATTENDING STATEMENT MOD
This was a shared visit with the MAINOR. I reviewed and verified the documentation and independently performed the documented:

## 2023-03-20 NOTE — H&P ADULT - MUSCULOSKELETAL
normal/ROM intact/normal gait/strength 5/5 bilateral upper extremities/strength 5/5 bilateral lower extremities normal/ROM intact/no calf tenderness/normal gait/strength 5/5 bilateral upper extremities/strength 5/5 bilateral lower extremities

## 2023-03-20 NOTE — ED ADULT TRIAGE NOTE - CHIEF COMPLAINT QUOTE
Pt comes to the ED after being discharged from the hospital on Friday after a 4 day stay for right foot infection. Pt with hx diabetes, supposed to be taking metformin and/or insulin as per his ex wife.  Pt not taking any medicaitons. Pt currently undomiciled.  Family states that since Saturday left foot has been "leaking blood and pus". Pt with cataracts and unable to see.

## 2023-03-20 NOTE — H&P ADULT - ASSESSMENT
61 yo male with PMHx of HTN, chronic back pain, anxiety, HIV presents today to continue treatment for his R foot.  Patient was recently admitted on 3/14 and left AMA on 3/17.  Patient mentions he neeeded to leave AMA, because he was staying at a hotel and they were going to throw all his things away.    #Right foot cellulitis/erythema w/ full thickness wound concering for early OM  - Admit to Med/Surg  - Recently admitted from 3/14-3/17 and left AMA  - MRI from previous admission:  - Xray foot, pending official read.  wet read unchanged from previous admission.  - s/p Vancomycin andZosyn in the ED.  - Will continue Zosyn and Vancomycin  - Analgesics PRN  - Elevated ESR, CRP on previous admission  - f/u ID recommendations. Dr. Courtney  - Podiatry recommendations appreciated    #T2DM  - BS on admission  - A1c on 3/15 11.5  - Agreeable to follow up with a physician as outpatient        # hx of hiv   - not on medications out pt   - cd4 count low   - ID evaled - no cd4 ct > 200 f/u viral load - does not require pcp/mac prophylaxis     # HTN  - likely due to pain   - monitor vitals and consider starting antiHTN if indicated.  - Low sodium diet    # DM 2   - last known A1C 10.2 10/2022.   - noncompliant with medications  - ISS, repeat a1c    VTE ppx -  subq heparin  diabetic diet    61 yo male with PMHx of HTN, chronic back pain, anxiety, HIV presents today to continue treatment for his R foot.  Patient was recently admitted on 3/14 and left AMA on 3/17.  Patient mentions he neeeded to leave AMA, because he was staying at a hotel and they were going to throw all his things away.    #Right foot cellulitis/erythema w/ full thickness wound concering for early OM  - Admit to Med/Surg  - Recently admitted from 3/14-3/17 and left AMA  - MRI from previous admission:  - Xray foot, pending official read.  wet read unchanged from previous admission.  - s/p Vancomycin andZosyn in the ED.  - Will continue Zosyn and Vancomycin  - Analgesics PRN  - Elevated ESR, CRP on previous admission  - f/u ID recommendations. Dr. Courtney  - Podiatry recommendations appreciated    #T2DM  - BS on admission  - A1c on 3/15 11.5  - s/p 20UI Lantus, 4UIAdmelog and 8UI regular insulin in the ED  - Will continue previous regimen with Lantus 20UI qhs  - ISS  - Agreeable to follow up with a physician as outpatient    #HTN  - BP on admission 154/92  - Continue monitoring  - Hydralazine PRN for SBP >160  - Low NA diet    #Hx of HIV  - not on medicaitons  - Last CD4 count 265  - Does not require PCP/MAC ppx at this time  - f/u ID recommendations    #DVT ppx  - Improved VTE score: 1  - Heparin Subq    *Case discussed with Dr. Dias 61 yo male with PMHx of HTN, chronic back pain, anxiety, HIV presents today to continue treatment for his R foot.  Patient was recently admitted on 3/14 and left AMA on 3/17.  Patient mentions he neeeded to leave AMA, because he was staying at a hotel and they were going to throw all his things away.    #Right foot cellulitis/erythema w/ full thickness wound concering for early OM  - Admit to Med/Surg  - Recently admitted from 3/14-3/17 and left AMA  - MRI from previous admission:  - Xray foot, pending official read.  wet read unchanged from previous admission.  - s/p Vancomycin andZosyn in the ED.  - Will continue Zosyn and Vancomycin  - Analgesics PRN  - Elevated ESR, CRP on previous admission  - f/u ID recommendations. Dr. Courtney  - Podiatry recommendations appreciated    #T2DM  - BS on admission  - A1c on 3/15 11.5  - s/p 20UI Lantus, 4UIAdmelog and 8UI regular insulin in the ED  - Will continue previous regimen with Lantus 20UI qhs  - ISS  - Agreeable to follow up with a physician as outpatient    #HTN  - BP on admission 154/92  - Continue monitoring  - Hydralazine PRN for SBP >160  - Low NA diet    #Hx of HIV  - not on medicaitons  - Last CD4 count 265  - Does not require PCP/MAC ppx at this time  - f/u ID recommendations    #Hypoalbuminemia  - Albumin 1.8  - f/u Nutrition recommendations    #Anemia  - Hb 11.5 (baseline 10-11)  - Continue to rtrend  - Will need complete anemia workup as outpatient    #DVT ppx  - Improved VTE score: 1  - Heparin Subq    *Case discussed with Dr. Dias 61 yo male with PMHx of HTN, chronic back pain, anxiety, HIV presents today to continue treatment for his R foot.  Patient was recently admitted on 3/14 and left AMA on 3/17.  Patient mentions he neeeded to leave AMA, because he was staying at a hotel and they were going to throw all his things away.    #Right foot cellulitis/erythema w/ full thickness wound concering for early OM  - Admit to Med/Surg  - Recently admitted from 3/14-3/17 and left AMA  - MRI from previous admission:  - Xray foot, pending official read.  wet read unchanged from previous admission.  - s/p Vancomycin and Zosyn in the ED.  - Will continue Zosyn and Vancomycin  - Analgesics PRN  - Elevated ESR, CRP on previous admission  - f/u ID recommendations. Dr. Courtney  - Podiatry recommendations appreciated    #T2DM  - BS on admission  - A1c on 3/15 11.5  - s/p 20UI Lantus, 4UIAdmelog and 8UI regular insulin in the ED  - Will continue previous regimen with Lantus 20UI qhs  - ISS  - Agreeable to follow up with a physician as outpatient    #HTN  - BP on admission 154/92  - Continue monitoring  - Hydralazine PRN for SBP >160  - Low NA diet    #Hx of HIV  - not on medicaitons  - Last CD4 count 265  - Does not require PCP/MAC ppx at this time  - f/u ID recommendations    #Hypoalbuminemia  - Albumin 1.8  - f/u Nutrition recommendations    #Anemia  - Hb 11.5 (baseline 10-11)  - Continue to rtrend  - Will need complete anemia workup as outpatient    #DVT ppx  - Improved VTE score: 1  - Heparin Subq    *Case discussed with Dr. Dias

## 2023-03-20 NOTE — H&P ADULT - SKIN COMMENTS
+R foot sterile dressing recently changed. +R foot sterile dressing recently changed.by podiatry tonight

## 2023-03-20 NOTE — H&P ADULT - NSHPREVIEWOFSYSTEMS_GEN_ALL_CORE
CONSTITUTIONAL: No weakness, fevers or chills  EYES/ENT: Decrease visiual acuity, only sees lights;  No vertigo or throat pain   NECK: No pain or stiffness  RESPIRATORY: No cough, wheezing, hemoptysis; No shortness of breath  CARDIOVASCULAR: No chest pain or palpitations  GASTROINTESTINAL: No abdominal or epigastric pain. No nausea, vomiting, or hematemesis; No diarrhea or constipation. No melena or hematochezia.  GENITOURINARY: No dysuria, frequency or hematuria  NEUROLOGICAL: No numbness or weakness  SKIN: No itching, rashes  EXTREMITIES: Severe 10/10 R foot pain 2/2 skin lesion CONSTITUTIONAL: No weakness, fevers or chills  EYES/ENT: Decrease visual acuity, only sees lights;  No vertigo or throat pain   NECK: No pain or stiffness  RESPIRATORY: No cough, wheezing, hemoptysis; No shortness of breath  CARDIOVASCULAR: No chest pain or palpitations  GASTROINTESTINAL: No abdominal or epigastric pain. No nausea, vomiting, or hematemesis; No diarrhea or constipation. No melena or hematochezia.  GENITOURINARY: No dysuria, frequency or hematuria  NEUROLOGICAL: No numbness or weakness  SKIN: No itching, rashes draining lesion to foot  EXTREMITIES: Severe 10/10 R foot pain 2/2 skin lesion

## 2023-03-21 DIAGNOSIS — A41.9 SEPSIS, UNSPECIFIED ORGANISM: ICD-10-CM

## 2023-03-21 DIAGNOSIS — Z91.14 PATIENT'S OTHER NONCOMPLIANCE WITH MEDICATION REGIMEN: ICD-10-CM

## 2023-03-21 DIAGNOSIS — M54.9 DORSALGIA, UNSPECIFIED: ICD-10-CM

## 2023-03-21 DIAGNOSIS — G89.29 OTHER CHRONIC PAIN: ICD-10-CM

## 2023-03-21 DIAGNOSIS — I10 ESSENTIAL (PRIMARY) HYPERTENSION: ICD-10-CM

## 2023-03-21 DIAGNOSIS — E11.65 TYPE 2 DIABETES MELLITUS WITH HYPERGLYCEMIA: ICD-10-CM

## 2023-03-21 DIAGNOSIS — Z91.199 PATIENT'S NONCOMPLIANCE WITH OTHER MEDICAL TREATMENT AND REGIMEN DUE TO UNSPECIFIED REASON: ICD-10-CM

## 2023-03-21 DIAGNOSIS — E11.69 TYPE 2 DIABETES MELLITUS WITH OTHER SPECIFIED COMPLICATION: ICD-10-CM

## 2023-03-21 DIAGNOSIS — Z98.1 ARTHRODESIS STATUS: ICD-10-CM

## 2023-03-21 DIAGNOSIS — L03.115 CELLULITIS OF RIGHT LOWER LIMB: ICD-10-CM

## 2023-03-21 DIAGNOSIS — M86.171 OTHER ACUTE OSTEOMYELITIS, RIGHT ANKLE AND FOOT: ICD-10-CM

## 2023-03-21 DIAGNOSIS — B20 HUMAN IMMUNODEFICIENCY VIRUS [HIV] DISEASE: ICD-10-CM

## 2023-03-21 DIAGNOSIS — L02.611 CUTANEOUS ABSCESS OF RIGHT FOOT: ICD-10-CM

## 2023-03-21 DIAGNOSIS — F17.290 NICOTINE DEPENDENCE, OTHER TOBACCO PRODUCT, UNCOMPLICATED: ICD-10-CM

## 2023-03-21 DIAGNOSIS — Z79.4 LONG TERM (CURRENT) USE OF INSULIN: ICD-10-CM

## 2023-03-21 DIAGNOSIS — F41.9 ANXIETY DISORDER, UNSPECIFIED: ICD-10-CM

## 2023-03-21 DIAGNOSIS — Z53.29 PROCEDURE AND TREATMENT NOT CARRIED OUT BECAUSE OF PATIENT'S DECISION FOR OTHER REASONS: ICD-10-CM

## 2023-03-21 DIAGNOSIS — Z79.82 LONG TERM (CURRENT) USE OF ASPIRIN: ICD-10-CM

## 2023-03-21 LAB
GLUCOSE BLDC GLUCOMTR-MCNC: 129 MG/DL — HIGH (ref 70–99)
GLUCOSE BLDC GLUCOMTR-MCNC: 204 MG/DL — HIGH (ref 70–99)
GLUCOSE BLDC GLUCOMTR-MCNC: 243 MG/DL — HIGH (ref 70–99)
GLUCOSE BLDC GLUCOMTR-MCNC: 316 MG/DL — HIGH (ref 70–99)

## 2023-03-21 PROCEDURE — 99232 SBSQ HOSP IP/OBS MODERATE 35: CPT | Mod: GC

## 2023-03-21 PROCEDURE — 99253 IP/OBS CNSLTJ NEW/EST LOW 45: CPT

## 2023-03-21 RX ORDER — KETOROLAC TROMETHAMINE 30 MG/ML
30 SYRINGE (ML) INJECTION ONCE
Refills: 0 | Status: DISCONTINUED | OUTPATIENT
Start: 2023-03-21 | End: 2023-03-21

## 2023-03-21 RX ADMIN — PIPERACILLIN AND TAZOBACTAM 25 GRAM(S): 4; .5 INJECTION, POWDER, LYOPHILIZED, FOR SOLUTION INTRAVENOUS at 02:27

## 2023-03-21 RX ADMIN — HEPARIN SODIUM 5000 UNIT(S): 5000 INJECTION INTRAVENOUS; SUBCUTANEOUS at 14:31

## 2023-03-21 RX ADMIN — Medication 166.67 MILLIGRAM(S): at 12:51

## 2023-03-21 RX ADMIN — Medication 8: at 17:49

## 2023-03-21 RX ADMIN — Medication 3 MILLIGRAM(S): at 23:19

## 2023-03-21 RX ADMIN — HEPARIN SODIUM 5000 UNIT(S): 5000 INJECTION INTRAVENOUS; SUBCUTANEOUS at 06:10

## 2023-03-21 RX ADMIN — Medication 650 MILLIGRAM(S): at 21:10

## 2023-03-21 RX ADMIN — PIPERACILLIN AND TAZOBACTAM 25 GRAM(S): 4; .5 INJECTION, POWDER, LYOPHILIZED, FOR SOLUTION INTRAVENOUS at 14:30

## 2023-03-21 RX ADMIN — HEPARIN SODIUM 5000 UNIT(S): 5000 INJECTION INTRAVENOUS; SUBCUTANEOUS at 23:20

## 2023-03-21 RX ADMIN — Medication 30 MILLIGRAM(S): at 02:27

## 2023-03-21 RX ADMIN — Medication 0: at 23:20

## 2023-03-21 RX ADMIN — INSULIN GLARGINE 20 UNIT(S): 100 INJECTION, SOLUTION SUBCUTANEOUS at 23:20

## 2023-03-21 RX ADMIN — Medication 30 MILLIGRAM(S): at 03:15

## 2023-03-21 NOTE — DIETITIAN INITIAL EVALUATION ADULT - PERTINENT MEDS FT
MEDICATIONS  (STANDING):  dextrose 5%. 1000 milliLiter(s) (50 mL/Hr) IV Continuous <Continuous>  dextrose 5%. 1000 milliLiter(s) (100 mL/Hr) IV Continuous <Continuous>  dextrose 50% Injectable 25 Gram(s) IV Push once  dextrose 50% Injectable 12.5 Gram(s) IV Push once  dextrose 50% Injectable 25 Gram(s) IV Push once  glucagon  Injectable 1 milliGRAM(s) IntraMuscular once  heparin   Injectable 5000 Unit(s) SubCutaneous every 8 hours  insulin glargine Injectable (LANTUS) 20 Unit(s) SubCutaneous at bedtime  insulin lispro (ADMELOG) corrective regimen sliding scale   SubCutaneous three times a day before meals  insulin lispro (ADMELOG) corrective regimen sliding scale   SubCutaneous at bedtime  piperacillin/tazobactam IVPB.- 3.375 Gram(s) IV Intermittent once  piperacillin/tazobactam IVPB.. 3.375 Gram(s) IV Intermittent every 8 hours  vancomycin  IVPB 1250 milliGRAM(s) IV Intermittent every 12 hours    MEDICATIONS  (PRN):  acetaminophen     Tablet .. 650 milliGRAM(s) Oral every 6 hours PRN Temp greater or equal to 38C (100.4F), Mild Pain (1 - 3)  aluminum hydroxide/magnesium hydroxide/simethicone Suspension 30 milliLiter(s) Oral every 4 hours PRN Dyspepsia  dextrose Oral Gel 15 Gram(s) Oral once PRN Blood Glucose LESS THAN 70 milliGRAM(s)/deciliter  hydrALAZINE Injectable 5 milliGRAM(s) IV Push once PRN SBP >160  melatonin 3 milliGRAM(s) Oral at bedtime PRN Insomnia  ondansetron Injectable 4 milliGRAM(s) IV Push every 8 hours PRN Nausea and/or Vomiting

## 2023-03-21 NOTE — DIETITIAN INITIAL EVALUATION ADULT - NS FNS DIET ORDER
Diet, Consistent Carbohydrate/No Snacks:   DASH/TLC {Sodium & Cholesterol Restricted} (DASH) (03-20-23 @ 20:38)

## 2023-03-21 NOTE — DIETITIAN INITIAL EVALUATION ADULT - ADD RECOMMEND
1) C/w current PO diet (DASH/consistent CHO)  2) Add Premier protein shake BID to optimize nutritional needs (provides 160 kcal, 30 g protein/ shake)   3) Encourage protein-rich foods, maximize food preferences   4) Monitor bowel movements, if no BM for >3 days, consider implementing bowel regimen.   5) MVI w/ minerals daily to ensure 100% RDA met   6) Consider adding thiamine 100 mg daily 2/2 poor PO intake/ malnutrition   7) Obtain weekly wt to track/trend changes  8) F/u with outpatient endocrinologist and RD for further education/management for T2DM  9) SW following case for safe d/c planning and outpatient resources (consult appreciated). Pt does not qualify for [facility] program d/t homeless  RD will continue to monitor PO intake, labs, hydration, and wt prn.

## 2023-03-21 NOTE — PROGRESS NOTE ADULT - ASSESSMENT
A: 63 y/o male seen for the following   -Right full thickness plantar wound, possible OM  -Cellulitis/erythema to the right forefoot, improving  -Diabetes Mellitus type 2   -Pain in Right Foot   -Difficulty with ambulation      Plan:   Chart reviewed and Patient evaluated   Discussed diagnosis and treatment with patient. Discussed importance of daily foot examinations, proper shoe gear, and tight glycemic control   Wound flush with normal saline  Flushed copiously w/ saline and betadine mix   Applied betadine soaked gauze with dry sterile dressing  X-rays reviewed : on wet read showing unchanged findings from last admission.   Continue with IV antibiotics As Per ED/Med  MRI results from 3/17/23 noted above  On evaluation today, wound showing no collection, no pus, no purulence with improved erythema.   No acute podiatric intervention at this time. Pt will benefit from local wound care at this time. Recommend pt to follow up at the [facility] [place] in 1 week after stable for discharge.   Patient demonstrated verbal understanding of all interventions and tolerated interventions well without any complications.   Podiatry will follow while in house.    Case D/W Dr. Mccracken

## 2023-03-21 NOTE — CONSULT NOTE ADULT - ASSESSMENT
61 yo male with PMHx of HTN, chronic back pain, anxiety, DM, HIV presents 3/20 to continue treatment for his R foot.  Patient was recently admitted on 3/14 and left AMA on 3/17.  Patient mentions he neeeded to leave AMA, because he was staying at a hotel and they were going to throw all his things away.  Patient was not taking any medications after leaving AMA and does not follow up with any physicians as outpatient, but will like to start following with one.  Patient states his right foot pain has been worsening for the past 3 weeks, when he was inpatient the pain improved, but after stopping taking everything the pain return to 10/10. Patient denies any fever, chills, nausea, vomiting, chest pain, trauma to the foot. In the ED /92, HR 81, Temp 99, RR 18 and SpO2 99% on RA.  Labs: Hb 11.5, WBC wnl,  Glucose 527, Na 133, Albumin 1.8, Lactate 1.9, Patient was seen by podiatry and he was given Zosyn, Vancomycin, 2L IVF, 20UI Lantus, 4UI Admelog and 8UI humulin. Was given vancomycin/zosyn.     1. R foot cellulitis/OM. HIV.  - imaging reviewed  - agree with vancomycin [id number] check trough prior to 4th dose  - on zosyn [id number]  - f/u cultures  - noncompliant with hiv meds tcells/viral load reviewed  - will have to f/u with i.d outpt for hiv  - monitor temps  - tolerating abx well so far; no side effects noted  - reason for abx use and side effects reviewed with patient  - supportive care  - fu cbc    2. other issues - care per medicine

## 2023-03-21 NOTE — DIETITIAN NUTRITION RISK NOTIFICATION - TREATMENT: THE FOLLOWING DIET HAS BEEN RECOMMENDED
Diet, Consistent Carbohydrate/No Snacks:   DASH/TLC {Sodium & Cholesterol Restricted} (DASH) (03-20-23 @ 20:38) [Active]

## 2023-03-21 NOTE — PATIENT PROFILE ADULT - PACKS YRS CALCULATION
"3

## 2023-03-21 NOTE — DIETITIAN INITIAL EVALUATION ADULT - PERTINENT LABORATORY DATA
03-20    133<L>  |  102  |  19  ----------------------------<  509<HH>  4.7   |  28  |  1.22    Ca    9.1      20 Mar 2023 14:54    TPro  8.1  /  Alb  1.8<L>  /  TBili  0.2  /  DBili  x   /  AST  25  /  ALT  47  /  AlkPhos  138<H>  03-20  POCT Blood Glucose.: 243 mg/dL (03-21-23 @ 13:06)  A1C with Estimated Average Glucose Result: 11.5 % (03-15-23 @ 08:26)  A1C with Estimated Average Glucose Result: 10.2 % (10-22-22 @ 02:41)

## 2023-03-21 NOTE — DIETITIAN INITIAL EVALUATION ADULT - NSFNSGIIOFT_GEN_A_CORE
I&O's Detail    20 Mar 2023 07:01  -  21 Mar 2023 07:00  --------------------------------------------------------  IN:  Total IN: 0 mL    OUT:    Voided (mL): 200 mL  Total OUT: 200 mL    Total NET: -200 mL

## 2023-03-21 NOTE — CONSULT NOTE ADULT - SUBJECTIVE AND OBJECTIVE BOX
Date of Consult: 3/20/23  HPI: 61 y/o male PMHx HTN, DM, previous HIV infection, head trauma, chronic back pain s/p lumbar fusion, anxiety, cataracts and is unable to see presents to the ED after left AMA from the hospital on Friday after a 4 day stay for right foot infection, currently c/o R foot pain with erythema and purulent drainage since 3/17. Pt is supposed to be taking metformin and/or insulin as per his ex wife but is not currently taking any medications.    PMH: Hypertension    Diabetes    Back ache    Head trauma    Insomnia    Anxiety    DM (diabetes mellitus)    Chronic back pain    DM (diabetes mellitus)    HIV infection      PSH:S/P lumbar fusion    No significant past surgical history        Allergies:No Known Allergies      Labs:                          11.5   7.39  )-----------( 460      ( 20 Mar 2023 14:54 )             36.1     WBC Trend  7.39 Date (03-20 @ 14:54)  11.93<H> Date (03-16 @ 07:58)  12.37<H> Date (03-15 @ 08:26)  11.26<H> Date (03-14 @ 12:49)      Chem  03-20    133<L>  |  102  |  19  ----------------------------<  509<HH>  4.7   |  28  |  1.22    Ca    9.1      20 Mar 2023 14:54    TPro  8.1  /  Alb  1.8<L>  /  TBili  0.2  /  DBili  x   /  AST  25  /  ALT  47  /  AlkPhos  138<H>  03-20          T(F): 99 (03-20-23 @ 19:44), Max: 99 (03-20-23 @ 19:44)  HR: 81 (03-20-23 @ 19:44) (81 - 81)  BP: 154/92 (03-20-23 @ 19:44) (154/67 - 154/92)  RR: 18 (03-20-23 @ 19:44) (17 - 18)  SpO2: 99% (03-20-23 @ 19:44) (98% - 99%)  Wt(kg): --    REVIEW OF SYSTEMS:  All other review of systems is negative unless indicated above    Physical Exam:   Constitutional: NAD, alert;  Derm:  Skin warm, dry and supple bilateral.    Diffuse edema and erythema noted to the right foot forefoot  Full thickness wound noted to the right plantar forefoot, probe to soft tissue, no PTB, no malodor, no pus/purulence drainage, no fluctuance, no crepitus.   Vascular: Dorsalis Pedis and Posterior Tibial pulses 2/4.  Capillary re-fill time less then 3 seconds digits 1-5 bilateral.    Neuro: Protective sensation intact to the level of the digits bilateral.  MSK: Muscle strength 5/5 in all major muscle groups of Right lower extremity. 5/5 in all muscle groups of LLE;       < from: Xray Foot AP + Lateral + Oblique, Right (03.14.23 @ 12:40) >  INTERPRETATION:  Clinical history: 62-year-old male, infection, distal   foot pain, stepped on piece of glass.    Three views of the right foot without comparison demonstrate mild   degenerative change with no fracture or dislocation.    Cortical irregularity at the medial aspect of the first MTP,   osteomyelitis cannot be excluded    Soft tissue irregularity at the tip of the first 3 digits suspicious for   cellulitis.    Vascular calcifications are noted on the lateral view.    IMPRESSION:    Cortical irregularity at the first MTP medially, osteomyelitis cannot be   excluded    Soft tissue irregularity at the distal first 3 digits. If there is   continued clinical concern follow-up MRI can be ordered    < end of copied text >  < from: MR Foot No Cont, Right (03.17.23 @ 10:45) >  FINDINGS:    Osseous edema within the second, third and fourth proximal tomid   phalanges without loss of normal T1 fatty marrow. Phlegmonous changes and   soft tissue wound at the plantar aspect of the second through third   phalanges. Moderate first metatarsophalangeal joint osteoarthritis with   edema spanning the jointand osseous erosions versus a possible cystic   changes along the medial aspect.    No intermetatarsal neuroma. No intermetatarsal bursitis. Fatty atrophy of   the muscles of the forefoot.    IMPRESSION:  1.  Osseous edema within the second, third and fourth proximal to mid   phalanges without loss of normal T1 fatty marrow. Phlegmonous changes and   soft tissue wound at the plantar aspect of the second through third   phalanges. These findings are likely secondary to vascular etiology   versus early osteomyelitis given adjacent phlegmonous changes. Clinically   correlate with physical exam and wound depth.  2.  Moderate first metatarsophalangeal joint osteoarthritis with edema   spanning the joint and osseous erosions/cystic changes along the medial   aspect. These findings are secondary to osteomyelitis versus moderate to   severe osteoarthritis with subchondral cystic changes    < end of copied text >  
Patient is a 62y old  Male who presents with a chief complaint of R foot cellulitis (20 Mar 2023 19:44)    HPI:  63 yo male with PMHx of HTN, chronic back pain, anxiety, DM, HIV presents 3/20 to continue treatment for his R foot.  Patient was recently admitted on 3/14 and left AMA on 3/17.  Patient mentions he neeeded to leave AMA, because he was staying at a hotel and they were going to throw all his things away.  Patient was not taking any medications after leaving AMA and does not follow up with any physicians as outpatient, but will like to start following with one.  Patient states his right foot pain has been worsening for the past 3 weeks, when he was inpatient the pain improved, but after stopping taking everything the pain return to 10/10. Patient denies any fever, chills, nausea, vomiting, chest pain, trauma to the foot. In the ED /92, HR 81, Temp 99, RR 18 and SpO2 99% on RA.  Labs: Hb 11.5, WBC wnl,  Glucose 527, Na 133, Albumin 1.8, Lactate 1.9, Patient was seen by podiatry and he was given Zosyn, Vancomycin, 2L IVF, 20UI Lantus, 4UI Admelog and 8UI humulin. Was given vancomycin/zosyn.     PMH: as above  PSH: as above  Meds: per reconciliation sheet, noted below  MEDICATIONS  (STANDING):  dextrose 5%. 1000 milliLiter(s) (50 mL/Hr) IV Continuous <Continuous>  dextrose 5%. 1000 milliLiter(s) (100 mL/Hr) IV Continuous <Continuous>  dextrose 50% Injectable 25 Gram(s) IV Push once  dextrose 50% Injectable 12.5 Gram(s) IV Push once  dextrose 50% Injectable 25 Gram(s) IV Push once  glucagon  Injectable 1 milliGRAM(s) IntraMuscular once  heparin   Injectable 5000 Unit(s) SubCutaneous every 8 hours  insulin glargine Injectable (LANTUS) 20 Unit(s) SubCutaneous at bedtime  insulin lispro (ADMELOG) corrective regimen sliding scale   SubCutaneous three times a day before meals  insulin lispro (ADMELOG) corrective regimen sliding scale   SubCutaneous at bedtime  piperacillin/tazobactam IVPB.- 3.375 Gram(s) IV Intermittent once  piperacillin/tazobactam IVPB.. 3.375 Gram(s) IV Intermittent every 8 hours  vancomycin  IVPB 1250 milliGRAM(s) IV Intermittent every 12 hours      Allergies    No Known Allergies    Intolerances      Social: no smoking, no alcohol, no illegal drugs; no recent travel, no exposure to TB  FAMILY HISTORY:  Family history of coronary artery disease in mother (Mother)       no history of premature cardiovascular disease in first degree relatives    ROS: the patient denies fever, no chills, no HA, no dizziness, no sore throat, no blurry vision, no CP, no palpitations, no SOB, no cough, no abdominal pain, no diarrhea, no N/V, no dysuria, no leg pain, no claudication, no rash, no joint aches, no rectal pain or bleeding, no night sweats    All other systems reviewed and are negative    Vital Signs Last 24 Hrs  T(C): 36.4 (21 Mar 2023 08:45), Max: 37.2 (20 Mar 2023 19:44)  T(F): 97.5 (21 Mar 2023 08:45), Max: 99 (20 Mar 2023 19:44)  HR: 69 (21 Mar 2023 08:45) (69 - 88)  BP: 134/68 (21 Mar 2023 08:45) (122/65 - 154/92)  BP(mean): 109 (20 Mar 2023 19:44) (93 - 109)  RR: 18 (21 Mar 2023 08:45) (17 - 18)  SpO2: 100% (21 Mar 2023 08:45) (98% - 100%)    Parameters below as of 21 Mar 2023 08:45  Patient On (Oxygen Delivery Method): room air      Daily Height in cm: 175.26 (20 Mar 2023 12:04)    Daily     PE:  Constitutional: frail looking  HEENT: NC/AT, EOMI, PERRLA, conjunctivae clear; ears and nose atraumatic; pharynx benign  Neck: supple; thyroid not palpable  Back: no tenderness  Respiratory: respiratory effort normal; clear to auscultation  Cardiovascular: S1S2 regular, no murmurs  Abdomen: soft, not tender, not distended, positive BS; liver and spleen WNL  Genitourinary: no suprapubic tenderness  Lymphatic: no LN palpable  Musculoskeletal: no muscle tenderness, no joint swelling or tenderness  Extremities: no pedal edema R foot with dressing in place   Neurological/ Psychiatric: AxOx3, Judgement and insight normal;  moving all extremities  Skin: no rashes; no palpable lesions    Labs: all available labs reviewed                        11.5   7.39  )-----------( 460      ( 20 Mar 2023 14:54 )             36.1     03-20    133<L>  |  102  |  19  ----------------------------<  509<HH>  4.7   |  28  |  1.22    Ca    9.1      20 Mar 2023 14:54    TPro  8.1  /  Alb  1.8<L>  /  TBili  0.2  /  DBili  x   /  AST  25  /  ALT  47  /  AlkPhos  138<H>  03-20     LIVER FUNCTIONS - ( 20 Mar 2023 14:54 )  Alb: 1.8 g/dL / Pro: 8.1 gm/dL / ALK PHOS: 138 U/L / ALT: 47 U/L / AST: 25 U/L / GGT: x                 Radiology: all available radiological tests reviewed  < from: Xray Chest 1 View- PORTABLE-Urgent (Xray Chest 1 View- PORTABLE-Urgent .) (03.20.23 @ 16:38) >  ACC: [id number] EXAM:  XR CHEST PORTABLE URGENT 1V   ORDERED BY: ANGEL BOLAND     ACC: [id number] EXAM:  XR FOOT COMP MIN 3 VIEWS RT   ORDERED BY: NOÉ JOHNSTON     PROCEDURE DATE:  03/20/2023          INTERPRETATION:  Right foot and chest. Patient has a plantar wound and is   scheduled for admission.    Right foot. 3 views.    Erosion around the inner aspect of the first MTP joint is again noted.    Appearance is rather similar to March 14.    Mild first IP degeneration is seen.    There is increased swelling around the second and third toes.    AP chest on March 2023 at 4:26 PM.    Heart size is within normal limits.    There is a 1 cm density now evident in the left mid lower lung. This is   due to some March 14 and may represent a small infiltrate.    IMPRESSION: Small density left lung now seen possibly infiltrate related.    Persistent bony findings around the first MTP joint. There is increased   swelling around the second and third toes.        ACC: [id number] EXAM:  MR FOOT RT   ORDERED BY: PRADEEP LYNCH     PROCEDURE DATE:  03/17/2023          INTERPRETATION:  Exam Type: MR FOOT RIGHT  Exam Date and Time: 3/17/2023 10:45 AM  Indication: Concern for osteomyelitis  Comparison: Right foot radiograph on 3/14/2023    TECHNIQUE:  Multiplanar multisequence MRI of the right foot was performedusing a   standard non-contrast protocol. Limited secondary to patient motion.    FINDINGS:    Osseous edema within the second, third and fourth proximal tomid   phalanges without loss of normal T1 fatty marrow. Phlegmonous changes and   soft tissue wound at the plantar aspect of the second through third   phalanges. Moderate first metatarsophalangeal joint osteoarthritis with   edema spanning the jointand osseous erosions versus a possible cystic   changes along the medial aspect.    No intermetatarsal neuroma. No intermetatarsal bursitis. Fatty atrophy of   the muscles of the forefoot.    IMPRESSION:  1.  Osseous edema within the second, third and fourth proximal to mid   phalanges without loss of normal T1 fatty marrow. Phlegmonous changes and   soft tissue wound at the plantar aspect of the second through third   phalanges. These findings are likely secondary to vascular etiology   versus early osteomyelitis given adjacent phlegmonous changes. Clinically   correlate with physical exam and wound depth.  2.  Moderate first metatarsophalangeal joint osteoarthritis with edema   spanning the joint and osseous erosions/cystic changes along the medial   aspect. These findings are secondary to osteomyelitis versus moderate to   severe osteoarthritis with subchondral cystic changes    Advanced directives addressed: full resuscitation

## 2023-03-21 NOTE — DIETITIAN INITIAL EVALUATION ADULT - ORAL INTAKE PTA/DIET HISTORY
Pt is Salvadorean speaking and understands some english, staff dietitian present at bed side to translate for RD. Pt is homeless, reports that his [employer] places him in hotel rooms. Consumes 2-3 meals per day, depending on what food is available for him (meeting <50% ENN)

## 2023-03-21 NOTE — PROGRESS NOTE ADULT - SUBJECTIVE AND OBJECTIVE BOX
Date of Service: 3/21/23  HPI: 61 y/o male PMHx HTN, DM, previous HIV infection, head trauma, chronic back pain s/p lumbar fusion, anxiety, cataracts and is unable to see presents to the ED after left AMA from the hospital on Friday after a 4 day stay for right foot infection, currently c/o R foot pain with erythema and purulent drainage since 3/17. Pt is supposed to be taking metformin and/or insulin as per his ex wife but is not currently taking any medications.    3/21/23: Pt seen by podiatry team. Pt resting comfortably at bedside, NAD, pleasant. Pt stated he refused labs collection this morning because he was in a "bad mood".     PMH: Hypertension    Diabetes    Back ache    Head trauma    Insomnia    Anxiety    DM (diabetes mellitus)    Chronic back pain    DM (diabetes mellitus)    HIV infection      PSH:S/P lumbar fusion    No significant past surgical history        Allergies:No Known Allergies      Labs:                          11.5   7.39  )-----------( 460      ( 20 Mar 2023 14:54 )             36.1     03-20    133<L>  |  102  |  19  ----------------------------<  509<HH>  4.7   |  28  |  1.22    Ca    9.1      20 Mar 2023 14:54    TPro  8.1  /  Alb  1.8<L>  /  TBili  0.2  /  DBili  x   /  AST  25  /  ALT  47  /  AlkPhos  138<H>  03-20    Vital Signs Last 24 Hrs  T(C): 36.4 (21 Mar 2023 08:45), Max: 37.2 (20 Mar 2023 19:44)  T(F): 97.5 (21 Mar 2023 08:45), Max: 99 (20 Mar 2023 19:44)  HR: 69 (21 Mar 2023 08:45) (69 - 88)  BP: 134/68 (21 Mar 2023 08:45) (122/65 - 154/92)  BP(mean): 109 (20 Mar 2023 19:44) (109 - 109)  RR: 18 (21 Mar 2023 08:45) (18 - 18)  SpO2: 100% (21 Mar 2023 08:45) (98% - 100%)    Parameters below as of 21 Mar 2023 08:45  Patient On (Oxygen Delivery Method): room air      REVIEW OF SYSTEMS:  All other review of systems is negative unless indicated above    Physical Exam:   Constitutional: NAD, alert;  Derm:  Skin warm, dry and supple bilateral.    Diffuse edema and erythema noted to the right foot forefoot  Full thickness wound noted to the right plantar forefoot, probe to soft tissue, tracking dorsally, no PTB, no malodor, no pus/purulence drainage, no fluctuance, no crepitus.   Vascular: Dorsalis Pedis and Posterior Tibial pulses 2/4.  Capillary re-fill time less then 3 seconds digits 1-5 bilateral.    Neuro: Protective sensation intact to the level of the digits bilateral.  MSK: Muscle strength 5/5 in all major muscle groups of Right lower extremity. 5/5 in all muscle groups of LLE;       < from: Xray Foot AP + Lateral + Oblique, Right (03.14.23 @ 12:40) >  INTERPRETATION:  Clinical history: 62-year-old male, infection, distal   foot pain, stepped on piece of glass.    Three views of the right foot without comparison demonstrate mild   degenerative change with no fracture or dislocation.    Cortical irregularity at the medial aspect of the first MTP,   osteomyelitis cannot be excluded    Soft tissue irregularity at the tip of the first 3 digits suspicious for   cellulitis.    Vascular calcifications are noted on the lateral view.    IMPRESSION:    Cortical irregularity at the first MTP medially, osteomyelitis cannot be   excluded    Soft tissue irregularity at the distal first 3 digits. If there is   continued clinical concern follow-up MRI can be ordered    < end of copied text >  < from: MR Foot No Cont, Right (03.17.23 @ 10:45) >  FINDINGS:    Osseous edema within the second, third and fourth proximal tomid   phalanges without loss of normal T1 fatty marrow. Phlegmonous changes and   soft tissue wound at the plantar aspect of the second through third   phalanges. Moderate first metatarsophalangeal joint osteoarthritis with   edema spanning the jointand osseous erosions versus a possible cystic   changes along the medial aspect.    No intermetatarsal neuroma. No intermetatarsal bursitis. Fatty atrophy of   the muscles of the forefoot.    IMPRESSION:  1.  Osseous edema within the second, third and fourth proximal to mid   phalanges without loss of normal T1 fatty marrow. Phlegmonous changes and   soft tissue wound at the plantar aspect of the second through third   phalanges. These findings are likely secondary to vascular etiology   versus early osteomyelitis given adjacent phlegmonous changes. Clinically   correlate with physical exam and wound depth.  2.  Moderate first metatarsophalangeal joint osteoarthritis with edema   spanning the joint and osseous erosions/cystic changes along the medial   aspect. These findings are secondary to osteomyelitis versus moderate to   severe osteoarthritis with subchondral cystic changes    < end of copied text >   PRE-OP DIAGNOSIS:  Endometrial polyp 31-May-2019 15:44:42  Александр Kim

## 2023-03-21 NOTE — DIETITIAN INITIAL EVALUATION ADULT - PERSON TAUGHT/METHOD
CC:  Pregnancy  Patient has no complaints today.  Reports good fetal movement.  Growth ultrasound reviewed with her and is reassuring.  Discussed date of scheduled  with tubal and discussed preoperative instructions with patient.  A/P:  Supervision of normal pregnancy at 31 weeks  --Followup in 2 weeks   verbal instruction/written material/patient instructed

## 2023-03-21 NOTE — DIETITIAN INITIAL EVALUATION ADULT - OTHER INFO
61 yo male with PMH of HTN, chronic back pain, anxiety, DM, HIV presents today to continue treatment for his R foot.  Patient was recently admitted on 3/14 and left AMA on 3/17. Patient mentions he needed to leave AMA, because he was staying at a hotel and they were going to throw all his things away.  Patient states his right foot pain has been worsening for the past 3 weeks, when he was inpatient the pain improved, but after stopping taking everything the pain return to 10/10.     Pt known to RD service. Upon RD visit, consumed 0% breakfast 2/2 dropped breakfast tray - needs assistance w/ meals as he reported to RD he is blind. UBW stated at 190#, stable wt x 3 months however ?accuracy. Unable to obtain bed scale wt 2/2 scale not working; current wt appears inaccurate. As per EMR, past wt from previous admission (Oct 2022) doc'd at 150# - appears accurate, will use to calculate ENN. POCTs since admission: 129, 174, 328, 527, A1c 11.5% - poor glycemic control. Does not f/u w/ doctor outpatient, however wants to start doing so. Takes metformin for his T2DM and does not regularly check his BG. Staff dietitian provided DM education, focusing on the diabetic plate method as he is visually impaired and does not have means to adequately shop/cook for himself - SW following case. Appeared thin w/ NFPE revealing moderate to severe muscle/fat wasting. Will trial Premier protein shake BID to optimize nutritional needs (provides 160 kcal, 30 g protein/ shake). See recommendations below.

## 2023-03-21 NOTE — PATIENT PROFILE ADULT - FALL HARM RISK - DEVICES
Spoke to Terrie. Advised they call event monitor company to get replacement monitor, phone number provided. She verbalized understanding.    She states pt had colonoscopy last week. She states they didn't find any bleeding, so GI doctor told patient to resume plavix and xarelto. Pt no longer taking aspirin. She states patient is having camera capsule test with GI doctor next week.    Walker

## 2023-03-21 NOTE — DIETITIAN INITIAL EVALUATION ADULT - MALNUTRITION
Pt meets criteria for severe protein-calorie malnutrition in context of social or environmental circumstances

## 2023-03-21 NOTE — DIETITIAN INITIAL EVALUATION ADULT - NUTRITIONGOAL OUTCOME1
Pt will consume >/= 80% of all meals and supplements and adhere to education received from staff dietitian

## 2023-03-21 NOTE — PROGRESS NOTE ADULT - SUBJECTIVE AND OBJECTIVE BOX
61 yo male with PMHx of HTN, chronic back pain, anxiety, DM, HIV presents today to continue treatment for his R foot.  Patient was recently admitted on 3/14 and left AMA on 3/17.  Patient mentions he neeeded to leave AMA, because he was staying at a hotel and they were going to throw all his things away.  Patient was not taking any medications after leaving AMA and does not follow up with any physicians as outpatient, but will like to start following with one.   Patient states his right foot pain has been worsening for the past 3 weeks, when he was inpatient the pain improved, but after stopping taking everything the pain return to 10/10.  Patient denies any fever, chills, nausea, vomiting, chest pain, trauma to the foot.    In the ED /92, HR 81, Temp 99, RR 18 and SpO2 99% on RA.    Labs: Hb 11.5, WBC wnl,  Glucose 527, Na 133, Albumin 1.8, Lactate 1.9,   Patient was seen by podiatry and he was given Zosyn, Vancomycin, 2L IVF, 20UI Lantus, 4UI Admelog and 8UI humulin    subjective: Patient seen and examined by medicine team. Patient states that he feels better but is willing to finish full course of treatment. h/o AMA.     REVIEW OF SYSTEMS:  CONSTITUTIONAL: No weakness, fevers or chills  EYES/ENT: No visual changes;  No vertigo or throat pain   NECK: No pain or stiffness  RESPIRATORY: No cough, wheezing, hemoptysis; No shortness of breath  CARDIOVASCULAR: No chest pain or palpitations  GASTROINTESTINAL: No abdominal or epigastric pain. No nausea, vomiting, or hematemesis; No diarrhea or constipation. No melena or hematochezia.  GENITOURINARY: No dysuria, frequency or hematuria  NEUROLOGICAL: No numbness or weakness  SKIN: No itching, burning, rashes, or lesions   All other review of systems is negative unless indicated above    PHYSICAL EXAM:  Vital Signs Last 24 Hrs  T(C): 36.4 (21 Mar 2023 08:45), Max: 37.2 (20 Mar 2023 19:44)  T(F): 97.5 (21 Mar 2023 08:45), Max: 99 (20 Mar 2023 19:44)  HR: 69 (21 Mar 2023 08:45) (69 - 88)  BP: 134/68 (21 Mar 2023 08:45) (122/65 - 154/92)  BP(mean): 109 (20 Mar 2023 19:44) (93 - 109)  RR: 18 (21 Mar 2023 08:45) (17 - 18)  SpO2: 100% (21 Mar 2023 08:45) (98% - 100%)    Parameters below as of 21 Mar 2023 08:45  Patient On (Oxygen Delivery Method): room air      Constitutional: Pt lying in bed, awake and alert, NAD  HEENT: EOMI, normal hearing, moist mucous membranes  Neck: Soft and supple, no JVD  Respiratory: CTABL, No wheezing, rales or rhonchi  Cardiovascular: S1S2+, RRR, no M/G/R  Gastrointestinal: BS+, soft, NT/ND, no guarding, no rebound  Extremities: no pedal edema R foot with dressing in place   Vascular: 2+ peripheral pulses  Neurological: AAOx3, no focal deficits  Musculoskeletal: 5/5 strength b/l upper and lower extremities  Skin: No rashes    MEDICATIONS:  MEDICATIONS  (STANDING):  dextrose 5%. 1000 milliLiter(s) (50 mL/Hr) IV Continuous <Continuous>  dextrose 5%. 1000 milliLiter(s) (100 mL/Hr) IV Continuous <Continuous>  dextrose 50% Injectable 25 Gram(s) IV Push once  dextrose 50% Injectable 12.5 Gram(s) IV Push once  dextrose 50% Injectable 25 Gram(s) IV Push once  glucagon  Injectable 1 milliGRAM(s) IntraMuscular once  heparin   Injectable 5000 Unit(s) SubCutaneous every 8 hours  insulin glargine Injectable (LANTUS) 20 Unit(s) SubCutaneous at bedtime  insulin lispro (ADMELOG) corrective regimen sliding scale   SubCutaneous three times a day before meals  insulin lispro (ADMELOG) corrective regimen sliding scale   SubCutaneous at bedtime  piperacillin/tazobactam IVPB.- 3.375 Gram(s) IV Intermittent once  piperacillin/tazobactam IVPB.. 3.375 Gram(s) IV Intermittent every 8 hours  vancomycin  IVPB 1250 milliGRAM(s) IV Intermittent every 12 hours      LABS: All Labs Reviewed:                        11.5   7.39  )-----------( 460      ( 20 Mar 2023 14:54 )             36.1     03-20    133<L>  |  102  |  19  ----------------------------<  509<HH>  4.7   |  28  |  1.22    Ca    9.1      20 Mar 2023 14:54    TPro  8.1  /  Alb  1.8<L>  /  TBili  0.2  /  DBili  x   /  AST  25  /  ALT  47  /  AlkPhos  138<H>  03-20          Blood Culture:   I&O's Summary    20 Mar 2023 07:01  -  21 Mar 2023 07:00  --------------------------------------------------------  IN: 0 mL / OUT: 200 mL / NET: -200 mL      CAPILLARY BLOOD GLUCOSE      POCT Blood Glucose.: 129 mg/dL (21 Mar 2023 08:45)  POCT Blood Glucose.: 174 mg/dL (20 Mar 2023 23:40)  POCT Blood Glucose.: 328 mg/dL (20 Mar 2023 19:31)  POCT Blood Glucose.: 527 mg/dL (20 Mar 2023 17:58)      RADIOLOGY/EKG:

## 2023-03-21 NOTE — PROGRESS NOTE ADULT - ASSESSMENT
#Cellulitis vs early osteomyelitis R foot of diabetic pt  1. continue zosyn, vanco  2. reconsult ID: recs appreciated. continue vanco.   3. podiatry consult read and appreciated  4. neuropathic agents for analgesia      #DM II uncontrolled 500s  Hb A1c 11.5 on 03-15, 3/21/22   1. s/p NS 2000 cc in ED  2. Lantus 20 units q HS  3. BGM  4. ISS  5. check TSH      #HIV  has been noncompliant  recent evaluation by ID no PCP/MAC prophylaxis  1. no ART  2. ID recs appreciated: will need to folow up outpatient with ID      #HTN  1. continue monitoring  2. low Na diet  3. hydralazine prn      #Impaired visual acuity  will need assistance w insulin    he cannot see to safely measure and address insulin usage      #Homelessness  pt was at transient hotel  does not live w family  1. social work      #VTE  sq hep.   d/w Dr. Campbell

## 2023-03-22 LAB
ANION GAP SERPL CALC-SCNC: 6 MMOL/L — SIGNIFICANT CHANGE UP (ref 5–17)
BASOPHILS # BLD AUTO: 0 K/UL — SIGNIFICANT CHANGE UP (ref 0–0.2)
BASOPHILS NFR BLD AUTO: 0 % — SIGNIFICANT CHANGE UP (ref 0–2)
BUN SERPL-MCNC: 23 MG/DL — SIGNIFICANT CHANGE UP (ref 7–23)
CALCIUM SERPL-MCNC: 8.4 MG/DL — LOW (ref 8.5–10.1)
CHLORIDE SERPL-SCNC: 112 MMOL/L — HIGH (ref 96–108)
CO2 SERPL-SCNC: 24 MMOL/L — SIGNIFICANT CHANGE UP (ref 22–31)
CREAT SERPL-MCNC: 0.85 MG/DL — SIGNIFICANT CHANGE UP (ref 0.5–1.3)
EGFR: 99 ML/MIN/1.73M2 — SIGNIFICANT CHANGE UP
EOSINOPHIL # BLD AUTO: 0.08 K/UL — SIGNIFICANT CHANGE UP (ref 0–0.5)
EOSINOPHIL NFR BLD AUTO: 1 % — SIGNIFICANT CHANGE UP (ref 0–6)
GLUCOSE BLDC GLUCOMTR-MCNC: 132 MG/DL — HIGH (ref 70–99)
GLUCOSE BLDC GLUCOMTR-MCNC: 187 MG/DL — HIGH (ref 70–99)
GLUCOSE BLDC GLUCOMTR-MCNC: 287 MG/DL — HIGH (ref 70–99)
GLUCOSE BLDC GLUCOMTR-MCNC: 87 MG/DL — SIGNIFICANT CHANGE UP (ref 70–99)
GLUCOSE SERPL-MCNC: 156 MG/DL — HIGH (ref 70–99)
HCT VFR BLD CALC: 32.1 % — LOW (ref 39–50)
HGB BLD-MCNC: 10.2 G/DL — LOW (ref 13–17)
LYMPHOCYTES # BLD AUTO: 1.62 K/UL — SIGNIFICANT CHANGE UP (ref 1–3.3)
LYMPHOCYTES # BLD AUTO: 21 % — SIGNIFICANT CHANGE UP (ref 13–44)
MCHC RBC-ENTMCNC: 27.9 PG — SIGNIFICANT CHANGE UP (ref 27–34)
MCHC RBC-ENTMCNC: 31.8 GM/DL — LOW (ref 32–36)
MCV RBC AUTO: 87.9 FL — SIGNIFICANT CHANGE UP (ref 80–100)
MONOCYTES # BLD AUTO: 0.69 K/UL — SIGNIFICANT CHANGE UP (ref 0–0.9)
MONOCYTES NFR BLD AUTO: 9 % — SIGNIFICANT CHANGE UP (ref 2–14)
NEUTROPHILS # BLD AUTO: 5.31 K/UL — SIGNIFICANT CHANGE UP (ref 1.8–7.4)
NEUTROPHILS NFR BLD AUTO: 65 % — SIGNIFICANT CHANGE UP (ref 43–77)
NRBC # BLD: SIGNIFICANT CHANGE UP /100 WBCS (ref 0–0)
PLATELET # BLD AUTO: 474 K/UL — HIGH (ref 150–400)
POTASSIUM SERPL-MCNC: 3.9 MMOL/L — SIGNIFICANT CHANGE UP (ref 3.5–5.3)
POTASSIUM SERPL-SCNC: 3.9 MMOL/L — SIGNIFICANT CHANGE UP (ref 3.5–5.3)
RBC # BLD: 3.65 M/UL — LOW (ref 4.2–5.8)
RBC # FLD: 13.3 % — SIGNIFICANT CHANGE UP (ref 10.3–14.5)
SODIUM SERPL-SCNC: 142 MMOL/L — SIGNIFICANT CHANGE UP (ref 135–145)
WBC # BLD: 7.7 K/UL — SIGNIFICANT CHANGE UP (ref 3.8–10.5)
WBC # FLD AUTO: 7.7 K/UL — SIGNIFICANT CHANGE UP (ref 3.8–10.5)

## 2023-03-22 PROCEDURE — 99233 SBSQ HOSP IP/OBS HIGH 50: CPT | Mod: GC

## 2023-03-22 RX ORDER — CEFEPIME 1 G/1
2000 INJECTION, POWDER, FOR SOLUTION INTRAMUSCULAR; INTRAVENOUS EVERY 12 HOURS
Refills: 0 | Status: DISCONTINUED | OUTPATIENT
Start: 2023-03-22 | End: 2023-04-07

## 2023-03-22 RX ADMIN — INSULIN GLARGINE 20 UNIT(S): 100 INJECTION, SOLUTION SUBCUTANEOUS at 21:20

## 2023-03-22 RX ADMIN — Medication 3 MILLIGRAM(S): at 21:20

## 2023-03-22 RX ADMIN — Medication 650 MILLIGRAM(S): at 21:50

## 2023-03-22 RX ADMIN — HEPARIN SODIUM 5000 UNIT(S): 5000 INJECTION INTRAVENOUS; SUBCUTANEOUS at 15:09

## 2023-03-22 RX ADMIN — HEPARIN SODIUM 5000 UNIT(S): 5000 INJECTION INTRAVENOUS; SUBCUTANEOUS at 05:24

## 2023-03-22 RX ADMIN — Medication 166.67 MILLIGRAM(S): at 10:25

## 2023-03-22 RX ADMIN — Medication 650 MILLIGRAM(S): at 21:20

## 2023-03-22 RX ADMIN — Medication 6: at 12:21

## 2023-03-22 RX ADMIN — PIPERACILLIN AND TAZOBACTAM 25 GRAM(S): 4; .5 INJECTION, POWDER, LYOPHILIZED, FOR SOLUTION INTRAVENOUS at 05:24

## 2023-03-22 RX ADMIN — Medication 166.67 MILLIGRAM(S): at 21:48

## 2023-03-22 RX ADMIN — CEFEPIME 100 MILLIGRAM(S): 1 INJECTION, POWDER, FOR SOLUTION INTRAMUSCULAR; INTRAVENOUS at 21:20

## 2023-03-22 NOTE — PROGRESS NOTE ADULT - SUBJECTIVE AND OBJECTIVE BOX
Pt has been seen and examined with FP resident, resident supervised agree with a/p       Patient is a 62y old  Male who presents with a chief complaint of R foot cellulitis (22 Mar 2023 10:19)      HPI:  61 yo male with PMHx of HTN, chronic back pain, anxiety, DM, HIV presents today to continue treatment for his R foot.  )      PHYSICAL EXAM:  Vital Signs Last 24 Hrs  T(C): 36.4 (22 Mar 2023 08:35), Max: 36.6 (21 Mar 2023 14:09)  T(F): 97.6 (22 Mar 2023 08:35), Max: 97.9 (21 Mar 2023 14:09)  HR: 65 (22 Mar 2023 08:35) (65 - 77)  BP: 123/54 (22 Mar 2023 08:35) (123/54 - 147/75)  BP(mean): --  RR: 18 (22 Mar 2023 08:35) (17 - 19)  SpO2: 99% (22 Mar 2023 08:35) (98% - 100%)    Parameters below as of 22 Mar 2023 08:35  Patient On (Oxygen Delivery Method): room air      -rs-aeeb, cta  -cvs-s1s2 normal   -p/a-soft,bs+      A/P    #ct abx, supportive care     #DVT pr

## 2023-03-22 NOTE — PROGRESS NOTE ADULT - ASSESSMENT
A: 63 y/o male seen for the following   -Right full thickness plantar wound, possible OM  -Cellulitis/erythema to the right forefoot, improving  -Diabetes Mellitus type 2   -Pain in Right Foot   -Difficulty with ambulation      Plan:   Chart reviewed and Patient evaluated   Discussed diagnosis and treatment with patient. Discussed importance of daily foot examinations, proper shoe gear, and tight glycemic control   Wound flush with normal saline  Flushed copiously w/ saline and betadine mix   Applied betadine soaked gauze with dry sterile dressing  X-rays reviewed : on wet read showing unchanged findings from last admission.   Continue with IV antibiotics As Per ED/Med  MRI results from 3/17/23 noted above  On evaluation today, wound showing no collection, no pus, no purulence with improved erythema.   No acute podiatric intervention at this time. Pt will benefit from local wound care at this time. Recommend pt to follow up at the [facility] in 1 week after stable for discharge.   Patient demonstrated verbal understanding of all interventions and tolerated interventions well without any complications.   Podiatry will follow while in house.    Case D/W Dr. Mccracken

## 2023-03-22 NOTE — PROGRESS NOTE ADULT - SUBJECTIVE AND OBJECTIVE BOX
63 yo male with PMHx of HTN, chronic back pain, anxiety, DM, HIV presents today to continue treatment for his R foot.  Patient was recently admitted on 3/14 and left AMA on 3/17.  Patient mentions he neeeded to leave AMA, because he was staying at a hotel and they were going to throw all his things away.  Patient was not taking any medications after leaving AMA and does not follow up with any physicians as outpatient, but will like to start following with one.   Patient states his right foot pain has been worsening for the past 3 weeks, when he was inpatient the pain improved, but after stopping taking everything the pain return to 10/10.  Patient denies any fever, chills, nausea, vomiting, chest pain, trauma to the foot.    In the ED /92, HR 81, Temp 99, RR 18 and SpO2 99% on RA.    Labs: Hb 11.5, WBC wnl,  Glucose 527, Na 133, Albumin 1.8, Lactate 1.9,   Patient was seen by podiatry and he was given Zosyn, Vancomycin, 2L IVF, 20UI Lantus, 4UI Admelog and 8UI humulin    subjective: Patient seen and examined by medicine team. Patient states that he feels better. Plan for picc line eventually.     REVIEW OF SYSTEMS:  CONSTITUTIONAL: No weakness, fevers or chills  EYES/ENT: No visual changes;  No vertigo or throat pain   NECK: No pain or stiffness  RESPIRATORY: No cough, wheezing, hemoptysis; No shortness of breath  CARDIOVASCULAR: No chest pain or palpitations  GASTROINTESTINAL: No abdominal or epigastric pain. No nausea, vomiting, or hematemesis; No diarrhea or constipation. No melena or hematochezia.  GENITOURINARY: No dysuria, frequency or hematuria  NEUROLOGICAL: No numbness or weakness  SKIN: No itching, burning, rashes, or lesions   All other review of systems is negative unless indicated above    PHYSICAL EXAM:  Vital Signs Last 24 Hrs  T(C): 36.6 (22 Mar 2023 15:28), Max: 36.6 (22 Mar 2023 15:28)  T(F): 97.8 (22 Mar 2023 15:28), Max: 97.8 (22 Mar 2023 15:28)  HR: 64 (22 Mar 2023 15:28) (64 - 76)  BP: 152/74 (22 Mar 2023 15:28) (123/54 - 152/74)  BP(mean): --  RR: 18 (22 Mar 2023 15:28) (18 - 19)  SpO2: 98% (22 Mar 2023 15:28) (98% - 99%)    Parameters below as of 22 Mar 2023 15:28  Patient On (Oxygen Delivery Method): room air          Constitutional: Pt lying in bed, awake and alert, NAD  HEENT: EOMI, normal hearing, moist mucous membranes  Neck: Soft and supple, no JVD  Respiratory: CTABL, No wheezing, rales or rhonchi  Cardiovascular: S1S2+, RRR, no M/G/R  Gastrointestinal: BS+, soft, NT/ND, no guarding, no rebound  Extremities: no pedal edema R foot with dressing in place   Vascular: 2+ peripheral pulses  Neurological: AAOx3, no focal deficits  Musculoskeletal: 5/5 strength b/l upper and lower extremities  Skin: No rashes    MEDICATIONS:  MEDICATIONS  (STANDING):  dextrose 5%. 1000 milliLiter(s) (50 mL/Hr) IV Continuous <Continuous>  dextrose 5%. 1000 milliLiter(s) (100 mL/Hr) IV Continuous <Continuous>  dextrose 50% Injectable 25 Gram(s) IV Push once  dextrose 50% Injectable 12.5 Gram(s) IV Push once  dextrose 50% Injectable 25 Gram(s) IV Push once  glucagon  Injectable 1 milliGRAM(s) IntraMuscular once  heparin   Injectable 5000 Unit(s) SubCutaneous every 8 hours  insulin glargine Injectable (LANTUS) 20 Unit(s) SubCutaneous at bedtime  insulin lispro (ADMELOG) corrective regimen sliding scale   SubCutaneous three times a day before meals  insulin lispro (ADMELOG) corrective regimen sliding scale   SubCutaneous at bedtime  piperacillin/tazobactam IVPB.- 3.375 Gram(s) IV Intermittent once  piperacillin/tazobactam IVPB.. 3.375 Gram(s) IV Intermittent every 8 hours  vancomycin  IVPB 1250 milliGRAM(s) IV Intermittent every 12 hours      LABS: All Labs Reviewed:                LABS:  cret                        10.2   7.70  )-----------( 474      ( 22 Mar 2023 05:17 )             32.1     03-22    142  |  112<H>  |  23  ----------------------------<  156<H>  3.9   |  24  |  0.85    Ca    8.4<L>      22 Mar 2023 05:17                    Blood Culture:   I&O's Summary    20 Mar 2023 07:01  -  21 Mar 2023 07:00  --------------------------------------------------------  IN: 0 mL / OUT: 200 mL / NET: -200 mL      CAPILLARY BLOOD GLUCOSE      POCT Blood Glucose.: 129 mg/dL (21 Mar 2023 08:45)  POCT Blood Glucose.: 174 mg/dL (20 Mar 2023 23:40)  POCT Blood Glucose.: 328 mg/dL (20 Mar 2023 19:31)  POCT Blood Glucose.: 527 mg/dL (20 Mar 2023 17:58)      RADIOLOGY/EKG:

## 2023-03-22 NOTE — PROGRESS NOTE ADULT - ASSESSMENT
#Cellulitis vs early osteomyelitis R foot of diabetic pt  1. continue zosyn, vanco  2. reconsult ID: recs appreciated. continue vanco.   3. podiatry consult read and appreciated  4. neuropathic agents for analgesia      #DM II uncontrolled 500s  Hb A1c 11.5 on 03-15, 3/21/22   1. s/p NS 2000 cc in ED  2. Lantus 20 units q HS  3. BGM  4. ISS  5. check TSH      #HIV  has been noncompliant  recent evaluation by ID no PCP/MAC prophylaxis  1. no ART  2. ID recs appreciated: will need to folow up outpatient with ID      #HTN  1. continue monitoring  2. low Na diet  3. hydralazine prn      #Impaired visual acuity  will need assistance w insulin    he cannot see to safely measure and address insulin usage      #Homelessness  pt was at transient hotel  does not live w family  1. social work      #VTE  sq hep.     #Dispo planning  - will need PIC line set up once stable.  d/w Dr. Campbell

## 2023-03-22 NOTE — PROGRESS NOTE ADULT - SUBJECTIVE AND OBJECTIVE BOX
Date of Service: 3/22/23  HPI: 61 y/o male PMHx HTN, DM, previous HIV infection, head trauma, chronic back pain s/p lumbar fusion, anxiety, cataracts and is unable to see presents to the ED after left AMA from the hospital on Friday after a 4 day stay for right foot infection, currently c/o R foot pain with erythema and purulent drainage since 3/17. Pt is supposed to be taking metformin and/or insulin as per his ex wife but is not currently taking any medications.    3/22/23: Pt seen by podiatry team. Pt resting comfortably at bedside, NAD, pleasant.     PMH: Hypertension    Diabetes    Back ache    Head trauma    Insomnia    Anxiety    DM (diabetes mellitus)    Chronic back pain    DM (diabetes mellitus)    HIV infection      PSH:S/P lumbar fusion    No significant past surgical history        Allergies:No Known Allergies      Labs:                          10.2   7.70  )-----------( 474      ( 22 Mar 2023 05:17 )             32.1     03-22    142  |  112<H>  |  23  ----------------------------<  156<H>  3.9   |  24  |  0.85    Ca    8.4<L>      22 Mar 2023 05:17    TPro  8.1  /  Alb  1.8<L>  /  TBili  0.2  /  DBili  x   /  AST  25  /  ALT  47  /  AlkPhos  138<H>  03-20    Vital Signs Last 24 Hrs  T(C): 36.4 (22 Mar 2023 08:35), Max: 36.6 (21 Mar 2023 14:09)  T(F): 97.6 (22 Mar 2023 08:35), Max: 97.9 (21 Mar 2023 14:09)  HR: 65 (22 Mar 2023 08:35) (65 - 77)  BP: 123/54 (22 Mar 2023 08:35) (123/54 - 147/75)  BP(mean): --  RR: 18 (22 Mar 2023 08:35) (17 - 19)  SpO2: 99% (22 Mar 2023 08:35) (98% - 100%)    Parameters below as of 22 Mar 2023 08:35  Patient On (Oxygen Delivery Method): room air        REVIEW OF SYSTEMS:  All other review of systems is negative unless indicated above    Physical Exam:   Constitutional: NAD, alert;  Derm:  Skin warm, dry and supple bilateral.    Diffuse edema and erythema noted to the right foot forefoot  Full thickness wound noted to the right plantar forefoot, probe to soft tissue, tracking dorsally, no PTB, no malodor, no pus/purulence drainage, no fluctuance, no crepitus.   Vascular: Dorsalis Pedis and Posterior Tibial pulses 2/4.  Capillary re-fill time less then 3 seconds digits 1-5 bilateral.    Neuro: Protective sensation intact to the level of the digits bilateral.  MSK: Muscle strength 5/5 in all major muscle groups of Right lower extremity. 5/5 in all muscle groups of LLE;       < from: Xray Foot AP + Lateral + Oblique, Right (03.14.23 @ 12:40) >  INTERPRETATION:  Clinical history: 62-year-old male, infection, distal   foot pain, stepped on piece of glass.    Three views of the right foot without comparison demonstrate mild   degenerative change with no fracture or dislocation.    Cortical irregularity at the medial aspect of the first MTP,   osteomyelitis cannot be excluded    Soft tissue irregularity at the tip of the first 3 digits suspicious for   cellulitis.    Vascular calcifications are noted on the lateral view.    IMPRESSION:    Cortical irregularity at the first MTP medially, osteomyelitis cannot be   excluded    Soft tissue irregularity at the distal first 3 digits. If there is   continued clinical concern follow-up MRI can be ordered    < end of copied text >  < from: MR Foot No Cont, Right (03.17.23 @ 10:45) >  FINDINGS:    Osseous edema within the second, third and fourth proximal tomid   phalanges without loss of normal T1 fatty marrow. Phlegmonous changes and   soft tissue wound at the plantar aspect of the second through third   phalanges. Moderate first metatarsophalangeal joint osteoarthritis with   edema spanning the jointand osseous erosions versus a possible cystic   changes along the medial aspect.    No intermetatarsal neuroma. No intermetatarsal bursitis. Fatty atrophy of   the muscles of the forefoot.    IMPRESSION:  1.  Osseous edema within the second, third and fourth proximal to mid   phalanges without loss of normal T1 fatty marrow. Phlegmonous changes and   soft tissue wound at the plantar aspect of the second through third   phalanges. These findings are likely secondary to vascular etiology   versus early osteomyelitis given adjacent phlegmonous changes. Clinically   correlate with physical exam and wound depth.  2.  Moderate first metatarsophalangeal joint osteoarthritis with edema   spanning the joint and osseous erosions/cystic changes along the medial   aspect. These findings are secondary to osteomyelitis versus moderate to   severe osteoarthritis with subchondral cystic changes    < end of copied text >

## 2023-03-22 NOTE — PROGRESS NOTE ADULT - ASSESSMENT
63 yo male with PMHx of HTN, chronic back pain, anxiety, DM, HIV presents 3/20 to continue treatment for his R foot.  Patient was recently admitted on 3/14 and left AMA on 3/17.  Patient mentions he neeeded to leave AMA, because he was staying at a hotel and they were going to throw all his things away.  Patient was not taking any medications after leaving AMA and does not follow up with any physicians as outpatient, but will like to start following with one.  Patient states his right foot pain has been worsening for the past 3 weeks, when he was inpatient the pain improved, but after stopping taking everything the pain return to 10/10. Patient denies any fever, chills, nausea, vomiting, chest pain, trauma to the foot. In the ED /92, HR 81, Temp 99, RR 18 and SpO2 99% on RA.  Labs: Hb 11.5, WBC wnl,  Glucose 527, Na 133, Albumin 1.8, Lactate 1.9, Patient was seen by podiatry and he was given Zosyn, Vancomycin, 2L IVF, 20UI Lantus, 4UI Admelog and 8UI humulin. Was given vancomycin/zosyn.     1. R foot cellulitis/OM. HIV.  - imaging reviewed  - on vancomycin [id number] check trough prior to 4th dose #2  - on zosyn [id number] #2, change to cefepime [id number]  - f/u cultures - blood cx no growth  - noncompliant with hiv meds tcells/viral load reviewed  - will have to f/u with i.d outpt for hiv  - monitor temps  - tolerating abx well so far; no side effects noted  - reason for abx use and side effects reviewed with patient  - plan for PICC line 6 weeks IV vancomycin [id number]/cefepime [id number] until 5/2 with weekly cbc, cmp, esr, crp, vancomycin troughs  - supportive care  - fu cbc    2. other issues - care per medicine

## 2023-03-22 NOTE — PROGRESS NOTE ADULT - SUBJECTIVE AND OBJECTIVE BOX
Date of service: 03-22-23 @ 14:20    pt seen and examined  feels better  no complaints  has some foot pain    ROS: no fever or chills; denies dizziness, no HA, no SOB or cough, no abdominal pain, no diarrhea or constipation; no dysuria, no urinary frequency, no legs pain, no rashes    MEDICATIONS  (STANDING):  cefepime   IVPB 2000 milliGRAM(s) IV Intermittent every 12 hours  dextrose 5%. 1000 milliLiter(s) (100 mL/Hr) IV Continuous <Continuous>  dextrose 5%. 1000 milliLiter(s) (50 mL/Hr) IV Continuous <Continuous>  dextrose 50% Injectable 25 Gram(s) IV Push once  dextrose 50% Injectable 12.5 Gram(s) IV Push once  dextrose 50% Injectable 25 Gram(s) IV Push once  glucagon  Injectable 1 milliGRAM(s) IntraMuscular once  heparin   Injectable 5000 Unit(s) SubCutaneous every 8 hours  insulin glargine Injectable (LANTUS) 20 Unit(s) SubCutaneous at bedtime  insulin lispro (ADMELOG) corrective regimen sliding scale   SubCutaneous three times a day before meals  insulin lispro (ADMELOG) corrective regimen sliding scale   SubCutaneous at bedtime  vancomycin  IVPB 1250 milliGRAM(s) IV Intermittent every 12 hours      Vital Signs Last 24 Hrs  T(C): 36.4 (22 Mar 2023 08:35), Max: 36.5 (21 Mar 2023 23:06)  T(F): 97.6 (22 Mar 2023 08:35), Max: 97.7 (21 Mar 2023 23:06)  HR: 65 (22 Mar 2023 08:35) (65 - 76)  BP: 123/54 (22 Mar 2023 08:35) (123/54 - 137/78)  BP(mean): --  RR: 18 (22 Mar 2023 08:35) (18 - 19)  SpO2: 99% (22 Mar 2023 08:35) (98% - 99%)    Parameters below as of 22 Mar 2023 08:35  Patient On (Oxygen Delivery Method): room air      PE:  Constitutional: NAD   HEENT: NC/AT, EOMI, PERRLA, conjunctivae clear; ears and nose atraumatic; pharynx benign  Neck: supple; thyroid not palpable  Back: no tenderness  Respiratory: respiratory effort normal; clear to auscultation  Cardiovascular: S1S2 regular, no murmurs  Abdomen: soft, not tender, not distended, positive BS; liver and spleen WNL  Genitourinary: no suprapubic tenderness  Lymphatic: no LN palpable  Musculoskeletal: no muscle tenderness, no joint swelling or tenderness  Extremities: no pedal edema R foot with dressing in place   Neurological/ Psychiatric: AxOx3, Judgement and insight normal;  moving all extremities  Skin: no rashes; no palpable lesions    Labs: all available labs reviewed                                         10.2   7.70  )-----------( 474      ( 22 Mar 2023 05:17 )             32.1     03-22    142  |  112<H>  |  23  ----------------------------<  156<H>  3.9   |  24  |  0.85    Ca    8.4<L>      22 Mar 2023 05:17    TPro  8.1  /  Alb  1.8<L>  /  TBili  0.2  /  DBili  x   /  AST  25  /  ALT  47  /  AlkPhos  138<H>  03-20    Culture - Blood (03.20.23 @ 14:54)   Specimen Source: .Blood Blood   Culture Results:   No growth to date. Culture - Blood (03.20.23 @ 14:54)   Specimen Source: .Blood Blood   Culture Results:   No growth to date.          Radiology: all available radiological tests reviewed  < from: Xray Chest 1 View- PORTABLE-Urgent (Xray Chest 1 View- PORTABLE-Urgent .) (03.20.23 @ 16:38) >  ACC: [id number] EXAM:  XR CHEST PORTABLE URGENT 1V   ORDERED BY: ANGEL BOLAND     ACC: [id number] EXAM:  XR FOOT COMP MIN 3 VIEWS RT   ORDERED BY: NOÉ JOHNSTON     PROCEDURE DATE:  03/20/2023          INTERPRETATION:  Right foot and chest. Patient has a plantar wound and is   scheduled for admission.    Right foot. 3 views.    Erosion around the inner aspect of the first MTP joint is again noted.    Appearance is rather similar to March 14.    Mild first IP degeneration is seen.    There is increased swelling around the second and third toes.    AP chest on March 2023 at 4:26 PM.    Heart size is within normal limits.    There is a 1 cm density now evident in the left mid lower lung. This is   due to some March 14 and may represent a small infiltrate.    IMPRESSION: Small density left lung now seen possibly infiltrate related.    Persistent bony findings around the first MTP joint. There is increased   swelling around the second and third toes.        ACC: [id number] EXAM:  MR FOOT RT   ORDERED BY: PRADEEP LYNCH     PROCEDURE DATE:  03/17/2023          INTERPRETATION:  Exam Type: MR FOOT RIGHT  Exam Date and Time: 3/17/2023 10:45 AM  Indication: Concern for osteomyelitis  Comparison: Right foot radiograph on 3/14/2023    TECHNIQUE:  Multiplanar multisequence MRI of the right foot was performedusing a   standard non-contrast protocol. Limited secondary to patient motion.    FINDINGS:    Osseous edema within the second, third and fourth proximal tomid   phalanges without loss of normal T1 fatty marrow. Phlegmonous changes and   soft tissue wound at the plantar aspect of the second through third   phalanges. Moderate first metatarsophalangeal joint osteoarthritis with   edema spanning the jointand osseous erosions versus a possible cystic   changes along the medial aspect.    No intermetatarsal neuroma. No intermetatarsal bursitis. Fatty atrophy of   the muscles of the forefoot.    IMPRESSION:  1.  Osseous edema within the second, third and fourth proximal to mid   phalanges without loss of normal T1 fatty marrow. Phlegmonous changes and   soft tissue wound at the plantar aspect of the second through third   phalanges. These findings are likely secondary to vascular etiology   versus early osteomyelitis given adjacent phlegmonous changes. Clinically   correlate with physical exam and wound depth.  2.  Moderate first metatarsophalangeal joint osteoarthritis with edema   spanning the joint and osseous erosions/cystic changes along the medial   aspect. These findings are secondary to osteomyelitis versus moderate to   severe osteoarthritis with subchondral cystic changes    Advanced directives addressed: full resuscitation

## 2023-03-23 LAB
GLUCOSE BLDC GLUCOMTR-MCNC: 166 MG/DL — HIGH (ref 70–99)
GLUCOSE BLDC GLUCOMTR-MCNC: 201 MG/DL — HIGH (ref 70–99)
GLUCOSE BLDC GLUCOMTR-MCNC: 318 MG/DL — HIGH (ref 70–99)
GLUCOSE BLDC GLUCOMTR-MCNC: 320 MG/DL — HIGH (ref 70–99)

## 2023-03-23 PROCEDURE — 99232 SBSQ HOSP IP/OBS MODERATE 35: CPT | Mod: GC

## 2023-03-23 RX ORDER — SODIUM CHLORIDE 9 MG/ML
1 INJECTION INTRAMUSCULAR; INTRAVENOUS; SUBCUTANEOUS
Refills: 0 | Status: DISCONTINUED | OUTPATIENT
Start: 2023-03-23 | End: 2023-03-24

## 2023-03-23 RX ORDER — IBUPROFEN 200 MG
400 TABLET ORAL EVERY 12 HOURS
Refills: 0 | Status: COMPLETED | OUTPATIENT
Start: 2023-03-23 | End: 2023-03-24

## 2023-03-23 RX ADMIN — CEFEPIME 100 MILLIGRAM(S): 1 INJECTION, POWDER, FOR SOLUTION INTRAMUSCULAR; INTRAVENOUS at 21:57

## 2023-03-23 RX ADMIN — Medication 166.67 MILLIGRAM(S): at 13:30

## 2023-03-23 RX ADMIN — Medication 400 MILLIGRAM(S): at 21:53

## 2023-03-23 RX ADMIN — Medication 2: at 08:40

## 2023-03-23 RX ADMIN — SODIUM CHLORIDE 1 GRAM(S): 9 INJECTION INTRAMUSCULAR; INTRAVENOUS; SUBCUTANEOUS at 21:53

## 2023-03-23 RX ADMIN — Medication 400 MILLIGRAM(S): at 17:07

## 2023-03-23 RX ADMIN — Medication 166.67 MILLIGRAM(S): at 23:39

## 2023-03-23 RX ADMIN — Medication 4: at 21:54

## 2023-03-23 RX ADMIN — Medication 3 MILLIGRAM(S): at 21:58

## 2023-03-23 RX ADMIN — Medication 400 MILLIGRAM(S): at 17:37

## 2023-03-23 RX ADMIN — Medication 4: at 17:09

## 2023-03-23 RX ADMIN — CEFEPIME 100 MILLIGRAM(S): 1 INJECTION, POWDER, FOR SOLUTION INTRAMUSCULAR; INTRAVENOUS at 11:24

## 2023-03-23 RX ADMIN — INSULIN GLARGINE 20 UNIT(S): 100 INJECTION, SOLUTION SUBCUTANEOUS at 21:53

## 2023-03-23 RX ADMIN — HEPARIN SODIUM 5000 UNIT(S): 5000 INJECTION INTRAVENOUS; SUBCUTANEOUS at 21:55

## 2023-03-23 RX ADMIN — Medication 8: at 11:23

## 2023-03-23 RX ADMIN — Medication 650 MILLIGRAM(S): at 05:34

## 2023-03-23 NOTE — PHYSICAL THERAPY INITIAL EVALUATION ADULT - ORIENTATION, REHAB EVAL
Patient not understanding his diabetic needs.  Called down to [facility] asking for toast and hot chocolate and not understanding that it is too many carbs for him. "My sugar is 114, it's too low"./oriented to person, place, time and situation

## 2023-03-23 NOTE — PROGRESS NOTE ADULT - SUBJECTIVE AND OBJECTIVE BOX
Date of Service: 3/23/23  HPI: 63 y/o male PMHx HTN, DM, previous HIV infection, head trauma, chronic back pain s/p lumbar fusion, anxiety, cataracts and is unable to see presents to the ED after left AMA from the hospital on Friday after a 4 day stay for right foot infection, currently c/o R foot pain with erythema and purulent drainage since 3/17. Pt is supposed to be taking metformin and/or insulin as per his ex wife but is not currently taking any medications.    3/23/23: Pt seen by podiatry team. Pt resting comfortably at bedside, NAD, pleasant.     PMH: Hypertension    Diabetes    Back ache    Head trauma    Insomnia    Anxiety    DM (diabetes mellitus)    Chronic back pain    DM (diabetes mellitus)    HIV infection      PSH:S/P lumbar fusion    No significant past surgical history        Allergies:No Known Allergies      Labs:                                     10.2   7.70  )-----------( 474      ( 22 Mar 2023 05:17 )             32.1     03-22    142  |  112<H>  |  23  ----------------------------<  156<H>  3.9   |  24  |  0.85    Ca    8.4<L>      22 Mar 2023 05:17      Vital Signs Last 24 Hrs  T(C): 36.5 (23 Mar 2023 07:43), Max: 36.6 (22 Mar 2023 15:28)  T(F): 97.7 (23 Mar 2023 07:43), Max: 97.8 (22 Mar 2023 15:28)  HR: 69 (23 Mar 2023 07:43) (64 - 69)  BP: 127/70 (23 Mar 2023 07:43) (127/70 - 152/74)  BP(mean): --  RR: 17 (23 Mar 2023 07:43) (17 - 18)  SpO2: 99% (23 Mar 2023 07:43) (98% - 99%)    Parameters below as of 23 Mar 2023 07:43  Patient On (Oxygen Delivery Method): room air          REVIEW OF SYSTEMS:  All other review of systems is negative unless indicated above    Physical Exam:   Constitutional: NAD, alert;  Derm:  Skin warm, dry and supple bilateral.    Diffuse edema and erythema noted to the right foot forefoot  Full thickness wound noted to the right plantar forefoot, probe to soft tissue, tracking dorsally, no PTB, no malodor, no pus/purulence drainage, no fluctuance, no crepitus.   Vascular: Dorsalis Pedis and Posterior Tibial pulses 2/4.  Capillary re-fill time less then 3 seconds digits 1-5 bilateral.    Neuro: Protective sensation intact to the level of the digits bilateral.  MSK: Muscle strength 5/5 in all major muscle groups of Right lower extremity. 5/5 in all muscle groups of LLE;       < from: Xray Foot AP + Lateral + Oblique, Right (03.14.23 @ 12:40) >  INTERPRETATION:  Clinical history: 62-year-old male, infection, distal   foot pain, stepped on piece of glass.    Three views of the right foot without comparison demonstrate mild   degenerative change with no fracture or dislocation.    Cortical irregularity at the medial aspect of the first MTP,   osteomyelitis cannot be excluded    Soft tissue irregularity at the tip of the first 3 digits suspicious for   cellulitis.    Vascular calcifications are noted on the lateral view.    IMPRESSION:    Cortical irregularity at the first MTP medially, osteomyelitis cannot be   excluded    Soft tissue irregularity at the distal first 3 digits. If there is   continued clinical concern follow-up MRI can be ordered    < end of copied text >  < from: MR Foot No Cont, Right (03.17.23 @ 10:45) >  FINDINGS:    Osseous edema within the second, third and fourth proximal tomid   phalanges without loss of normal T1 fatty marrow. Phlegmonous changes and   soft tissue wound at the plantar aspect of the second through third   phalanges. Moderate first metatarsophalangeal joint osteoarthritis with   edema spanning the jointand osseous erosions versus a possible cystic   changes along the medial aspect.    No intermetatarsal neuroma. No intermetatarsal bursitis. Fatty atrophy of   the muscles of the forefoot.    IMPRESSION:  1.  Osseous edema within the second, third and fourth proximal to mid   phalanges without loss of normal T1 fatty marrow. Phlegmonous changes and   soft tissue wound at the plantar aspect of the second through third   phalanges. These findings are likely secondary to vascular etiology   versus early osteomyelitis given adjacent phlegmonous changes. Clinically   correlate with physical exam and wound depth.  2.  Moderate first metatarsophalangeal joint osteoarthritis with edema   spanning the joint and osseous erosions/cystic changes along the medial   aspect. These findings are secondary to osteomyelitis versus moderate to   severe osteoarthritis with subchondral cystic changes    < end of copied text >

## 2023-03-23 NOTE — PROGRESS NOTE ADULT - ASSESSMENT
A: 63 y/o male seen for the following   -Right full thickness plantar wound, possible OM  -Cellulitis/erythema to the right forefoot, improving  -Diabetes Mellitus type 2   -Pain in Right Foot   -Difficulty with ambulation      Plan:   Chart reviewed and Patient evaluated   Discussed diagnosis and treatment with patient. Discussed importance of daily foot examinations, proper shoe gear, and tight glycemic control   Wound flush with normal saline  Flushed copiously w/ saline and betadine mix   Applied betadine soaked gauze with dry sterile dressing  X-rays reviewed : on wet read showing unchanged findings from last admission.   Continue with IV antibiotics As Per ED/Med  MRI results from 3/17/23 noted above  On evaluation today, wound showing no collection, no pus, no purulence with improved erythema.   No acute podiatric intervention at this time. Pt will benefit from local wound care at this time. Recommend pt to follow up at the [facility] in 1 week after stable for discharge.   Patient demonstrated verbal understanding of all interventions and tolerated interventions well without any complications.   Podiatry will follow while in house.    Case D/W Dr. Mccracken

## 2023-03-23 NOTE — PHYSICAL THERAPY INITIAL EVALUATION ADULT - PATIENT PROFILE REVIEW, REHAB EVAL
PMHx HTN, DM, previous HIV infection, head trauma, chronic back pain s/p lumbar fusion, anxiety, cataracts/yes

## 2023-03-23 NOTE — PROGRESS NOTE ADULT - ASSESSMENT
61 yo male with PMHx of HTN, chronic back pain, anxiety, DM, HIV presents today to continue treatment for his R foot.     #Cellulitis vs early osteomyelitis R foot of diabetic pt  -Stable   1. continue zosyn, vanco for 6 weeks   2. reconsult ID: recs appreciated. continue vanco.   3. podiatry consult read and appreciated  4. neuropathic agents for analgesia  5.Probable dc tomorrow     #DM II   Hb A1c 11.5 on 03-15  2. Lantus 20 units q HS  3. BGM  4. ISS  5. check TSH    #HIV  has been noncompliant  recent evaluation by ID no PCP/MAC prophylaxis  1. no ART  2. ID recs appreciated: will need to folow up outpatient with ID    #HTN  1. continue monitoring  2. low Na diet  3. hydralazine prn    #Impaired visual acuity  will need assistance w insulin    he cannot see to safely measure and address insulin usage    #Homelessness  pt was at transient hotel  does not live w family  1. social work    #VTE  sq hep.     #Dispo planning  -  Will be discharged tomorrow   -PICC line tomorrow     d/w Dr. Salcedo

## 2023-03-23 NOTE — PROGRESS NOTE ADULT - SUBJECTIVE AND OBJECTIVE BOX
63 yo male with PMHx of HTN, chronic back pain, anxiety, DM, HIV presents today to continue treatment for his R foot.  Patient was recently admitted on 3/14 and left AMA on 3/17.  Patient mentions he neeeded to leave AMA, because he was staying at a hotel and they were going to throw all his things away.  Patient was not taking any medications after leaving AMA and does not follow up with any physicians as outpatient, but will like to start following with one.   Patient states his right foot pain has been worsening for the past 3 weeks, when he was inpatient the pain improved, but after stopping taking everything the pain return to 10/10.  Patient denies any fever, chills, nausea, vomiting, chest pain, trauma to the foot.    In the ED /92, HR 81, Temp 99, RR 18 and SpO2 99% on RA.    Labs: Hb 11.5, WBC wnl,  Glucose 527, Na 133, Albumin 1.8, Lactate 1.9,   Patient was seen by podiatry and he was given Zosyn, Vancomycin, 2L IVF, 20UI Lantus, 4UI Admelog and 8UI humulin    subjective: Patient seen and examined by medicine team. Patient states that he feels better. Plan for picc line tomorrow.     REVIEW OF SYSTEMS:  CONSTITUTIONAL: No weakness, fevers or chills  EYES/ENT: No visual changes;  No vertigo or throat pain   NECK: No pain or stiffness  RESPIRATORY: No cough, wheezing, hemoptysis; No shortness of breath  CARDIOVASCULAR: No chest pain or palpitations  GASTROINTESTINAL: No abdominal or epigastric pain. No nausea, vomiting, or hematemesis; No diarrhea or constipation. No melena or hematochezia.  GENITOURINARY: No dysuria, frequency or hematuria  NEUROLOGICAL: No numbness or weakness  SKIN: No itching, burning, rashes, or lesions   All other review of systems is negative unless indicated above    PHYSICAL EXAM:  Vital Signs Last 24 Hrs  T(C): 36.7 (23 Mar 2023 15:05), Max: 36.7 (23 Mar 2023 15:05)  T(F): 98 (23 Mar 2023 15:05), Max: 98 (23 Mar 2023 15:05)  HR: 85 (23 Mar 2023 15:05) (69 - 85)  BP: 120/72 (23 Mar 2023 15:05) (120/72 - 148/66)  BP(mean): --  RR: 18 (23 Mar 2023 15:05) (17 - 18)  SpO2: 99% (23 Mar 2023 15:05) (98% - 99%)    Parameters below as of 23 Mar 2023 15:05  Patient On (Oxygen Delivery Method): room air            Constitutional: Pt lying in bed, awake and alert, NAD  HEENT: EOMI, normal hearing, moist mucous membranes  Neck: Soft and supple, no JVD  Respiratory: CTABL, No wheezing, rales or rhonchi  Cardiovascular: S1S2+, RRR, no M/G/R  Gastrointestinal: BS+, soft, NT/ND, no guarding, no rebound  Extremities: no pedal edema R foot with dressing in place   Vascular: 2+ peripheral pulses  Neurological: AAOx3, no focal deficits  Musculoskeletal: 5/5 strength b/l upper and lower extremities  Skin: No rashes    MEDICATIONS:  MEDICATIONS  (STANDING):  dextrose 5%. 1000 milliLiter(s) (50 mL/Hr) IV Continuous <Continuous>  dextrose 5%. 1000 milliLiter(s) (100 mL/Hr) IV Continuous <Continuous>  dextrose 50% Injectable 25 Gram(s) IV Push once  dextrose 50% Injectable 12.5 Gram(s) IV Push once  dextrose 50% Injectable 25 Gram(s) IV Push once  glucagon  Injectable 1 milliGRAM(s) IntraMuscular once  heparin   Injectable 5000 Unit(s) SubCutaneous every 8 hours  insulin glargine Injectable (LANTUS) 20 Unit(s) SubCutaneous at bedtime  insulin lispro (ADMELOG) corrective regimen sliding scale   SubCutaneous three times a day before meals  insulin lispro (ADMELOG) corrective regimen sliding scale   SubCutaneous at bedtime  piperacillin/tazobactam IVPB.- 3.375 Gram(s) IV Intermittent once  piperacillin/tazobactam IVPB.. 3.375 Gram(s) IV Intermittent every 8 hours  vancomycin  IVPB 1250 milliGRAM(s) IV Intermittent every 12 hours      LABS: All Labs Reviewed:                LABS:  cret                                          10.2   7.70  )-----------( 474      ( 22 Mar 2023 05:17 )             32.1     03-22    142  |  112<H>  |  23  ----------------------------<  156<H>  3.9   |  24  |  0.85    Ca    8.4<L>      22 Mar 2023 05:17          Blood Culture:   I&O's Summary    20 Mar 2023 07:01  -  21 Mar 2023 07:00  --------------------------------------------------------  IN: 0 mL / OUT: 200 mL / NET: -200 mL      CAPILLARY BLOOD GLUCOSE      POCT Blood Glucose.: 129 mg/dL (21 Mar 2023 08:45)  POCT Blood Glucose.: 174 mg/dL (20 Mar 2023 23:40)  POCT Blood Glucose.: 328 mg/dL (20 Mar 2023 19:31)  POCT Blood Glucose.: 527 mg/dL (20 Mar 2023 17:58)      RADIOLOGY/EKG:

## 2023-03-23 NOTE — PHYSICAL THERAPY INITIAL EVALUATION ADULT - PERTINENT HX OF CURRENT PROBLEM, REHAB EVAL
63 yo M admitted from the ED after left AMA from the hospital on Friday after a 4 day stay for right foot infection, currently c/o R foot pain with erythema and purulent drainage since 3/17. Pt is supposed to be taking metformin and/or insulin as per his ex wife but is not currently taking any medications. MRI foot: Osseous edema within the second, third and fourth proximal to mid phalanges without loss of normal T1 fatty marrow. Phlegmonous changes and soft tissue wound at the plantar aspect of the second through third phalanges. These findings are likely secondary to vascular etiology versus early osteomyelitis given adjacent phlegmonous changes. Moderate first metatarsophalangeal joint osteoarthritis with edema spanning the joint and osseous erosions/cystic changes along the medial aspect. These findings are secondary to osteomyelitis versus moderate to severe osteoarthritis with subchondral cystic changes.

## 2023-03-23 NOTE — PROGRESS NOTE ADULT - ATTENDING COMMENTS
Patient seen at bedside, no complaints. Continues treatment for early OM, will need 6 weeks BX via PICC line. Awaiting placement.

## 2023-03-24 LAB
ALBUMIN SERPL ELPH-MCNC: 1.6 G/DL — LOW (ref 3.3–5)
ALP SERPL-CCNC: 160 U/L — HIGH (ref 40–120)
ALT FLD-CCNC: 63 U/L — SIGNIFICANT CHANGE UP (ref 12–78)
ANION GAP SERPL CALC-SCNC: 4 MMOL/L — LOW (ref 5–17)
AST SERPL-CCNC: 33 U/L — SIGNIFICANT CHANGE UP (ref 15–37)
BILIRUB SERPL-MCNC: 0.1 MG/DL — LOW (ref 0.2–1.2)
BUN SERPL-MCNC: 30 MG/DL — HIGH (ref 7–23)
CALCIUM SERPL-MCNC: 8.7 MG/DL — SIGNIFICANT CHANGE UP (ref 8.5–10.1)
CHLORIDE SERPL-SCNC: 109 MMOL/L — HIGH (ref 96–108)
CO2 SERPL-SCNC: 24 MMOL/L — SIGNIFICANT CHANGE UP (ref 22–31)
CREAT SERPL-MCNC: 0.92 MG/DL — SIGNIFICANT CHANGE UP (ref 0.5–1.3)
EGFR: 95 ML/MIN/1.73M2 — SIGNIFICANT CHANGE UP
GLUCOSE BLDC GLUCOMTR-MCNC: 154 MG/DL — HIGH (ref 70–99)
GLUCOSE BLDC GLUCOMTR-MCNC: 214 MG/DL — HIGH (ref 70–99)
GLUCOSE BLDC GLUCOMTR-MCNC: 228 MG/DL — HIGH (ref 70–99)
GLUCOSE BLDC GLUCOMTR-MCNC: 263 MG/DL — HIGH (ref 70–99)
GLUCOSE SERPL-MCNC: 275 MG/DL — HIGH (ref 70–99)
HCT VFR BLD CALC: 34.1 % — LOW (ref 39–50)
HGB BLD-MCNC: 10.8 G/DL — LOW (ref 13–17)
MCHC RBC-ENTMCNC: 27.7 PG — SIGNIFICANT CHANGE UP (ref 27–34)
MCHC RBC-ENTMCNC: 31.7 GM/DL — LOW (ref 32–36)
MCV RBC AUTO: 87.4 FL — SIGNIFICANT CHANGE UP (ref 80–100)
PLATELET # BLD AUTO: 509 K/UL — HIGH (ref 150–400)
POTASSIUM SERPL-MCNC: 4.5 MMOL/L — SIGNIFICANT CHANGE UP (ref 3.5–5.3)
POTASSIUM SERPL-SCNC: 4.5 MMOL/L — SIGNIFICANT CHANGE UP (ref 3.5–5.3)
PROT SERPL-MCNC: 6.6 GM/DL — SIGNIFICANT CHANGE UP (ref 6–8.3)
RBC # BLD: 3.9 M/UL — LOW (ref 4.2–5.8)
RBC # FLD: 13.7 % — SIGNIFICANT CHANGE UP (ref 10.3–14.5)
SODIUM SERPL-SCNC: 137 MMOL/L — SIGNIFICANT CHANGE UP (ref 135–145)
VANCOMYCIN TROUGH SERPL-MCNC: 14.2 UG/ML — SIGNIFICANT CHANGE UP (ref 10–20)
WBC # BLD: 7.15 K/UL — SIGNIFICANT CHANGE UP (ref 3.8–10.5)
WBC # FLD AUTO: 7.15 K/UL — SIGNIFICANT CHANGE UP (ref 3.8–10.5)

## 2023-03-24 PROCEDURE — 99232 SBSQ HOSP IP/OBS MODERATE 35: CPT | Mod: GC

## 2023-03-24 RX ORDER — INSULIN LISPRO 100/ML
3 VIAL (ML) SUBCUTANEOUS
Refills: 0 | Status: DISCONTINUED | OUTPATIENT
Start: 2023-03-24 | End: 2023-04-07

## 2023-03-24 RX ORDER — INSULIN GLARGINE 100 [IU]/ML
22 INJECTION, SOLUTION SUBCUTANEOUS AT BEDTIME
Refills: 0 | Status: DISCONTINUED | OUTPATIENT
Start: 2023-03-24 | End: 2023-03-25

## 2023-03-24 RX ADMIN — HEPARIN SODIUM 5000 UNIT(S): 5000 INJECTION INTRAVENOUS; SUBCUTANEOUS at 20:52

## 2023-03-24 RX ADMIN — Medication 166.67 MILLIGRAM(S): at 21:46

## 2023-03-24 RX ADMIN — Medication 166.67 MILLIGRAM(S): at 12:20

## 2023-03-24 RX ADMIN — CEFEPIME 100 MILLIGRAM(S): 1 INJECTION, POWDER, FOR SOLUTION INTRAMUSCULAR; INTRAVENOUS at 20:52

## 2023-03-24 RX ADMIN — Medication 3 MILLIGRAM(S): at 20:53

## 2023-03-24 RX ADMIN — Medication 4: at 16:59

## 2023-03-24 RX ADMIN — HEPARIN SODIUM 5000 UNIT(S): 5000 INJECTION INTRAVENOUS; SUBCUTANEOUS at 05:37

## 2023-03-24 RX ADMIN — SODIUM CHLORIDE 1 GRAM(S): 9 INJECTION INTRAMUSCULAR; INTRAVENOUS; SUBCUTANEOUS at 05:36

## 2023-03-24 RX ADMIN — CEFEPIME 100 MILLIGRAM(S): 1 INJECTION, POWDER, FOR SOLUTION INTRAMUSCULAR; INTRAVENOUS at 10:28

## 2023-03-24 RX ADMIN — INSULIN GLARGINE 22 UNIT(S): 100 INJECTION, SOLUTION SUBCUTANEOUS at 20:52

## 2023-03-24 NOTE — PROGRESS NOTE ADULT - SUBJECTIVE AND OBJECTIVE BOX
Date of Service: 3/24/23  HPI: 61 y/o male PMHx HTN, DM, previous HIV infection, head trauma, chronic back pain s/p lumbar fusion, anxiety, cataracts and is unable to see presents to the ED after left AMA from the hospital on Friday after a 4 day stay for right foot infection, currently c/o R foot pain with erythema and purulent drainage since 3/17. Pt is supposed to be taking metformin and/or insulin as per his ex wife but is not currently taking any medications.    3/24/23: Pt seen by podiatry team. Pt resting comfortably at bedside, NAD, pleasant.     PMH: Hypertension    Diabetes    Back ache    Head trauma    Insomnia    Anxiety    DM (diabetes mellitus)    Chronic back pain    DM (diabetes mellitus)    HIV infection      PSH:S/P lumbar fusion    No significant past surgical history        Allergies:No Known Allergies      Labs:                        10.8   7.15  )-----------( 509      ( 24 Mar 2023 10:14 )             34.1     03-24    137  |  109<H>  |  30<H>  ----------------------------<  275<H>  4.5   |  24  |  0.92    Ca    8.7      24 Mar 2023 10:14    TPro  6.6  /  Alb  1.6<L>  /  TBili  0.1<L>  /  DBili  x   /  AST  33  /  ALT  63  /  AlkPhos  160<H>  03-24                  Vital Signs Last 24 Hrs  T(C): 36.4 (24 Mar 2023 09:24), Max: 37.4 (23 Mar 2023 23:29)  T(F): 97.5 (24 Mar 2023 09:24), Max: 99.3 (23 Mar 2023 23:29)  HR: 69 (24 Mar 2023 09:24) (69 - 85)  BP: 138/87 (24 Mar 2023 09:24) (120/72 - 140/69)  BP(mean): --  RR: 18 (24 Mar 2023 09:24) (18 - 18)  SpO2: 100% (24 Mar 2023 09:24) (98% - 100%)    Parameters below as of 24 Mar 2023 09:24  Patient On (Oxygen Delivery Method): room air         Physical Exam:   Constitutional: NAD, alert;  Derm:  Skin warm, dry and supple bilateral.    Diffuse edema and erythema noted to the right foot forefoot  Full thickness wound noted to the right plantar forefoot, probe to soft tissue, tracking dorsally, no PTB, no malodor, no pus/purulence drainage, no fluctuance, no crepitus.   Vascular: Dorsalis Pedis and Posterior Tibial pulses 2/4.  Capillary re-fill time less then 3 seconds digits 1-5 bilateral.    Neuro: Protective sensation intact to the level of the digits bilateral.  MSK: Muscle strength 5/5 in all major muscle groups of Right lower extremity. 5/5 in all muscle groups of LLE;       < from: Xray Foot AP + Lateral + Oblique, Right (03.14.23 @ 12:40) >  INTERPRETATION:  Clinical history: 62-year-old male, infection, distal   foot pain, stepped on piece of glass.    Three views of the right foot without comparison demonstrate mild   degenerative change with no fracture or dislocation.    Cortical irregularity at the medial aspect of the first MTP,   osteomyelitis cannot be excluded    Soft tissue irregularity at the tip of the first 3 digits suspicious for   cellulitis.    Vascular calcifications are noted on the lateral view.    IMPRESSION:    Cortical irregularity at the first MTP medially, osteomyelitis cannot be   excluded    Soft tissue irregularity at the distal first 3 digits. If there is   continued clinical concern follow-up MRI can be ordered    < end of copied text >  < from: MR Foot No Cont, Right (03.17.23 @ 10:45) >  FINDINGS:    Osseous edema within the second, third and fourth proximal tomid   phalanges without loss of normal T1 fatty marrow. Phlegmonous changes and   soft tissue wound at the plantar aspect of the second through third   phalanges. Moderate first metatarsophalangeal joint osteoarthritis with   edema spanning the jointand osseous erosions versus a possible cystic   changes along the medial aspect.    No intermetatarsal neuroma. No intermetatarsal bursitis. Fatty atrophy of   the muscles of the forefoot.    IMPRESSION:  1.  Osseous edema within the second, third and fourth proximal to mid   phalanges without loss of normal T1 fatty marrow. Phlegmonous changes and   soft tissue wound at the plantar aspect of the second through third   phalanges. These findings are likely secondary to vascular etiology   versus early osteomyelitis given adjacent phlegmonous changes. Clinically   correlate with physical exam and wound depth.  2.  Moderate first metatarsophalangeal joint osteoarthritis with edema   spanning the joint and osseous erosions/cystic changes along the medial   aspect. These findings are secondary to osteomyelitis versus moderate to   severe osteoarthritis with subchondral cystic changes    < end of copied text >

## 2023-03-24 NOTE — PROGRESS NOTE ADULT - ASSESSMENT
Problem: Mobility Impaired (Adult and Pediatric)  Goal: *Acute Goals and Plan of Care (Insert Text)  Physical Therapy Goals  Initiated 12/27/2017    1. Patient will move from supine to sit and sit to supine  in bed with modified independence within 4 days. 2. Patient will perform sit to stand with modified independence within 4 days. 3. Patient will ambulate with modified independence for 150 feet with the least restrictive device within 4 days. 4. Patient will ascend/descend 4 stairs with 1 handrail(s) with modified independence within 4 days. 5. Patient will verbalize and demonstrate understanding of Anterior Hip precautions per protocol within 4 days. 6. Patient will perform Anterior Hip home exercise program per protocol with modified independence within 4 days. physical Therapy TREATMENT  Patient: Delia Bartlett (67 y.o. male)  Date: 12/28/2017  Diagnosis: LEFT HIP PAIN  Primary osteoarthritis of left hip Primary osteoarthritis of left hip  Procedure(s) (LRB):  MAKOPLASTY LEFT TOTAL HIP ARTHROPLASTY DIRECT ANTERIOR (Left) 1 Day Post-Op  Precautions: WBAT, DNI, Total hip    ASSESSMENT:  Patient with hold per nursing earlier due to pain, patient reports improved pain. Notable overnight events, patient unable to void and required straight cath. Patient received sitting at edge of bed with nursing tech present attempting to void. Patient vitals were obtained in sitting (unabel to obtain supine due to already in sitting upon my arrival- not performed by nursing tech). Patient with prolonged sitting and unable to void- blood pressure taken and lower than initial.  Patient started to become diaphoretic again, unable to tolerate sitting or attempts at standing. Returned to supine. Vitals below. Patient is not truly orthostatic but lower blood pressure and voiding issues limits his progress.   MD notes states he can discharge today but due to his continued medical complexity and unable to attempt mobility he 63 yo male with PMHx of HTN, chronic back pain, anxiety, DM, HIV presents today to continue treatment for his R foot.     #Cellulitis vs early osteomyelitis R foot of diabetic pt  -Stable   1. continue zosyn, vanco for 6 weeks   2. reconsult ID: recs appreciated. continue vanco.   3. podiatry consult read and appreciated  4. neuropathic agents for analgesia  5.Probable dc tomorrow     #DM II   Hb A1c 11.5 on 03-15  2. Lantus 20 units q HS  3. BGM  4. ISS  5. check TSH    #HIV  has been noncompliant  recent evaluation by ID no PCP/MAC prophylaxis  1. no ART  2. ID recs appreciated: will need to folow up outpatient with ID    #HTN  1. continue monitoring  2. low Na diet  3. hydralazine prn    #Impaired visual acuity  will need assistance w insulin    he cannot see to safely measure and address insulin usage    #Homelessness  pt was at transient hotel  does not live w family  1. social work    #VTE  sq hep.     #Dispo planning  -  Will be discharged tomorrow   -PICC line tomorrow     d/w Dr. Salcedo would benefit from additional days of therapy. .    Progression toward goals:  []    Improving appropriately and progressing toward goals  [x]    Improving slowly and progressing toward goals  []    Not making progress toward goals and plan of care will be adjusted     PLAN:  Patient continues to benefit from skilled intervention to address the above impairments. Continue treatment per established plan of care. Discharge Recommendations:  Home Health  Further Equipment Recommendations for Discharge: Owns RW, patient requesting crutches     SUBJECTIVE:   Patient stated i can't I feel bad again, I gotta lay down.     OBJECTIVE DATA SUMMARY:   Critical Behavior:  Neurologic State: Alert  Orientation Level: Oriented X4         Vitals  Vitals:    12/28/17 0740 12/28/17 1029 12/28/17 1036 12/28/17 1040   BP: 133/64 126/70 111/60 115/61   BP 1 Location: Left arm Left arm  Left arm   BP Patient Position: At rest Sitting  Supine   Pulse: 72 80 79 65   Resp: 18      Temp: 98.1 °F (36.7 °C)      SpO2: 98%      Weight:       Height:           Functional Mobility Training:  Bed Mobility:     Supine to Sit: Minimum assistance  Sit to Supine: Minimum assistance           Transfers:  Sit to Stand:  (unable)          Balance:     Ambulation/Gait Training:      unable- due to diphoresis                                                  Stairs:            Neuro Re-Education:    Therapeutic Exercises:     Pain:  Pain Scale 1: Numeric (0 - 10)  Pain Intensity 1: 6 (pre PT)  Pain Location 1: Hip  Pain Orientation 1: Anterior  Pain Description 1: Aching  Pain Intervention(s) 1: Medication (see MAR)  Activity Tolerance:   Fair- limited by diaphoresis  Please refer to the flowsheet for vital signs taken during this treatment.   After treatment:   []    Patient left in no apparent distress sitting up in chair  [x]    Patient left in no apparent distress in bed  [x]    Call bell left within reach  [x]    Nursing notified  [x]    Caregiver present  [x]    Bed alarm activated    COMMUNICATION/COLLABORATION:   The patients plan of care was discussed with: Registered Nurse    Natalia Hall, PT, DPT   Time Calculation: 19 mins 61 yo male with PMHx of HTN, chronic back pain, anxiety, DM, HIV presents today to continue treatment for his R foot.     #Cellulitis vs early osteomyelitis R foot of diabetic pt  -Stable   1. continue zosyn, vanco for 6 weeks   2. reconsult ID: recs appreciated. continue vanco.   3. podiatry consult read and appreciated  4. neuropathic agents for analgesia  5.Probable dc tomorrow     #DM II   Hb A1c 11.5 on 03-15  2. Lantus 20 units q HS  3. BGM  4. ISS  5. check TSH    #HIV  has been noncompliant  recent evaluation by ID no PCP/MAC prophylaxis  1. no ART  2. ID recs appreciated: will need to folow up outpatient with ID    #HTN  -Stable   1. continue monitoring  2. low Na diet  3. hydralazine prn    #Impaired visual acuity  will need assistance w insulin    he cannot see to safely measure and address insulin usage    #Homelessness  pt was at transient hotel  does not live w family  1. social work    #VTE  sq hep.     #Dispo planning  - Pt is stable for discharge. Pending placement.    d/w Dr. Salcedo

## 2023-03-24 NOTE — PROGRESS NOTE ADULT - ATTENDING COMMENTS
Patient seen at bedside, no complaints. Continues treatment for early OM, will need 6 weeks BX via PICC line. Awaiting placement. Patient seen at bedside, no complaints. Continues treatment for early OM, will need 6 weeks BX via PICC line. Awaiting placement.  Insulin adjsuted, Lantus increased to 22u qhs and added premeal insulin, if AM glucose lower, may d/c pre-breakfast and keep before lunch and dinner.

## 2023-03-24 NOTE — PROGRESS NOTE ADULT - SUBJECTIVE AND OBJECTIVE BOX
63 yo male with PMHx of HTN, chronic back pain, anxiety, DM, HIV presents today to continue treatment for his R foot.  Patient was recently admitted on 3/14 and left AMA on 3/17.  Patient mentions he neeeded to leave AMA, because he was staying at a hotel and they were going to throw all his things away.  Patient was not taking any medications after leaving AMA and does not follow up with any physicians as outpatient, but will like to start following with one.   Patient states his right foot pain has been worsening for the past 3 weeks, when he was inpatient the pain improved, but after stopping taking everything the pain return to 10/10.  Patient denies any fever, chills, nausea, vomiting, chest pain, trauma to the foot.    In the ED /92, HR 81, Temp 99, RR 18 and SpO2 99% on RA.    Labs: Hb 11.5, WBC wnl,  Glucose 527, Na 133, Albumin 1.8, Lactate 1.9,   Patient was seen by podiatry and he was given Zosyn, Vancomycin, 2L IVF, 20UI Lantus, 4UI Admelog and 8UI humulin    subjective: Patient seen and examined by medicine team. Patient states that he feels better. Plan for picc line on the say of discharge     Exam-  Vital Signs Last 24 Hrs  T(C): 36.4 (24 Mar 2023 09:24), Max: 37.4 (23 Mar 2023 23:29)  T(F): 97.5 (24 Mar 2023 09:24), Max: 99.3 (23 Mar 2023 23:29)  HR: 69 (24 Mar 2023 09:24) (69 - 79)  BP: 138/87 (24 Mar 2023 09:24) (138/87 - 140/69)  BP(mean): --  RR: 18 (24 Mar 2023 09:24) (18 - 18)  SpO2: 100% (24 Mar 2023 09:24) (98% - 100%)    Parameters below as of 24 Mar 2023 09:24  Patient On (Oxygen Delivery Method): room air    Constitutional: Pt lying in bed, awake and alert, NAD  HEENT: EOMI, normal hearing, moist mucous membranes  Neck: Soft and supple, no JVD  Respiratory: CTABL, No wheezing, rales or rhonchi  Cardiovascular: S1S2+, RRR, no M/G/R  Gastrointestinal: BS+, soft, NT/ND, no guarding, no rebound  Extremities: no pedal edema R foot with dressing in place   Vascular: 2+ peripheral pulses  Neurological: AAOx3, no focal deficits  Musculoskeletal: 5/5 strength b/l upper and lower extremities  Skin: Diffuse edema and erythema noted to the right foot forefoot  Full thickness wound noted to the right plantar forefoot, probe to soft tissue, tracking dorsally, no PTB, no malodor, no pus/purulence drainage, no fluctuance, no crepitus.       MEDICATIONS:  MEDICATIONS  (STANDING):  dextrose 5%. 1000 milliLiter(s) (50 mL/Hr) IV Continuous <Continuous>  dextrose 5%. 1000 milliLiter(s) (100 mL/Hr) IV Continuous <Continuous>  dextrose 50% Injectable 25 Gram(s) IV Push once  dextrose 50% Injectable 12.5 Gram(s) IV Push once  dextrose 50% Injectable 25 Gram(s) IV Push once  glucagon  Injectable 1 milliGRAM(s) IntraMuscular once  heparin   Injectable 5000 Unit(s) SubCutaneous every 8 hours  insulin glargine Injectable (LANTUS) 20 Unit(s) SubCutaneous at bedtime  insulin lispro (ADMELOG) corrective regimen sliding scale   SubCutaneous three times a day before meals  insulin lispro (ADMELOG) corrective regimen sliding scale   SubCutaneous at bedtime  piperacillin/tazobactam IVPB.- 3.375 Gram(s) IV Intermittent once  piperacillin/tazobactam IVPB.. 3.375 Gram(s) IV Intermittent every 8 hours  vancomycin  IVPB 1250 milliGRAM(s) IV Intermittent every 12 hours      LABS: All Labs Reviewed:                LABS:  cret                                          10.2   7.70  )-----------( 474      ( 22 Mar 2023 05:17 )             32.1     03-22    142  |  112<H>  |  23  ----------------------------<  156<H>  3.9   |  24  |  0.85    Ca    8.4<L>      22 Mar 2023 05:17          Blood Culture:   I&O's Summary    20 Mar 2023 07:01  -  21 Mar 2023 07:00  --------------------------------------------------------  IN: 0 mL / OUT: 200 mL / NET: -200 mL      CAPILLARY BLOOD GLUCOSE      POCT Blood Glucose.: 129 mg/dL (21 Mar 2023 08:45)  POCT Blood Glucose.: 174 mg/dL (20 Mar 2023 23:40)  POCT Blood Glucose.: 328 mg/dL (20 Mar 2023 19:31)  POCT Blood Glucose.: 527 mg/dL (20 Mar 2023 17:58)      RADIOLOGY/EKG: 61 yo male with PMHx of HTN, chronic back pain, anxiety, DM, HIV presents today to continue treatment for his R foot.  Patient was recently admitted on 3/14 and left AMA on 3/17.  Patient mentions he neeeded to leave AMA, because he was staying at a hotel and they were going to throw all his things away.  Patient was not taking any medications after leaving AMA and does not follow up with any physicians as outpatient, but will like to start following with one.   Patient states his right foot pain has been worsening for the past 3 weeks, when he was inpatient the pain improved, but after stopping taking everything the pain return to 10/10.  Patient denies any fever, chills, nausea, vomiting, chest pain, trauma to the foot.    In the ED /92, HR 81, Temp 99, RR 18 and SpO2 99% on RA.    Labs: Hb 11.5, WBC wnl,  Glucose 527, Na 133, Albumin 1.8, Lactate 1.9,   Patient was seen by podiatry and he was given Zosyn, Vancomycin, 2L IVF, 20UI Lantus, 4UI Admelog and 8UI humulin    subjective: Patient seen and examined by medicine team. Patient states that he feels better. Plan for picc line on the say of discharge     Exam-  Vital Signs Last 24 Hrs  T(C): 36.4 (24 Mar 2023 09:24), Max: 37.4 (23 Mar 2023 23:29)  T(F): 97.5 (24 Mar 2023 09:24), Max: 99.3 (23 Mar 2023 23:29)  HR: 69 (24 Mar 2023 09:24) (69 - 79)  BP: 138/87 (24 Mar 2023 09:24) (138/87 - 140/69)  BP(mean): --  RR: 18 (24 Mar 2023 09:24) (18 - 18)  SpO2: 100% (24 Mar 2023 09:24) (98% - 100%)    Parameters below as of 24 Mar 2023 09:24  Patient On (Oxygen Delivery Method): room air    Constitutional: Pt lying in bed, awake and alert, NAD  HEENT: EOMI, normal hearing, moist mucous membranes  Neck: Soft and supple, no JVD  Respiratory: CTABL, No wheezing, rales or rhonchi  Cardiovascular: S1S2+, RRR, no M/G/R  Gastrointestinal: BS+, soft, NT/ND, no guarding, no rebound  Extremities: no pedal edema R foot with dressing in place   Vascular: 2+ peripheral pulses  Neurological: AAOx3, no focal deficits  Musculoskeletal: 5/5 strength b/l upper and lower extremities  Skin: Diffuse edema and erythema noted to the right foot forefoot  Full thickness wound noted to the right plantar forefoot, probe to soft tissue, tracking dorsally, no PTB, no malodor, no pus/purulence drainage, no fluctuance, no crepitus.      LABS:                           10.8   7.15  )-----------( 509      ( 24 Mar 2023 10:14 )             34.1     03-24    137  |  109<H>  |  30<H>  ----------------------------<  275<H>  4.5   |  24  |  0.92    Ca    8.7      24 Mar 2023 10:14    TPro  6.6  /  Alb  1.6<L>  /  TBili  0.1<L>  /  DBili  x   /  AST  33  /  ALT  63  /  AlkPhos  160<H>  03-24    Blood Culture:   I&O's Summary    20 Mar 2023 07:01  -  21 Mar 2023 07:00  --------------------------------------------------------  IN: 0 mL / OUT: 200 mL / NET: -200 mL      CAPILLARY BLOOD GLUCOSE      POCT Blood Glucose.: 129 mg/dL (21 Mar 2023 08:45)  POCT Blood Glucose.: 174 mg/dL (20 Mar 2023 23:40)  POCT Blood Glucose.: 328 mg/dL (20 Mar 2023 19:31)  POCT Blood Glucose.: 527 mg/dL (20 Mar 2023 17:58)    MEDICATIONS  (STANDING):  cefepime   IVPB 2000 milliGRAM(s) IV Intermittent every 12 hours  dextrose 5%. 1000 milliLiter(s) (100 mL/Hr) IV Continuous <Continuous>  dextrose 5%. 1000 milliLiter(s) (50 mL/Hr) IV Continuous <Continuous>  dextrose 50% Injectable 25 Gram(s) IV Push once  dextrose 50% Injectable 12.5 Gram(s) IV Push once  dextrose 50% Injectable 25 Gram(s) IV Push once  glucagon  Injectable 1 milliGRAM(s) IntraMuscular once  heparin   Injectable 5000 Unit(s) SubCutaneous every 8 hours  insulin glargine Injectable (LANTUS) 20 Unit(s) SubCutaneous at bedtime  insulin lispro (ADMELOG) corrective regimen sliding scale   SubCutaneous three times a day before meals  insulin lispro (ADMELOG) corrective regimen sliding scale   SubCutaneous at bedtime  vancomycin  IVPB 1250 milliGRAM(s) IV Intermittent every 12 hours    MEDICATIONS  (PRN):  acetaminophen     Tablet .. 650 milliGRAM(s) Oral every 6 hours PRN Temp greater or equal to 38C (100.4F), Mild Pain (1 - 3)  aluminum hydroxide/magnesium hydroxide/simethicone Suspension 30 milliLiter(s) Oral every 4 hours PRN Dyspepsia  dextrose Oral Gel 15 Gram(s) Oral once PRN Blood Glucose LESS THAN 70 milliGRAM(s)/deciliter  hydrALAZINE Injectable 5 milliGRAM(s) IV Push once PRN SBP >160  melatonin 3 milliGRAM(s) Oral at bedtime PRN Insomnia  ondansetron Injectable 4 milliGRAM(s) IV Push every 8 hours PRN Nausea and/or Vomiting  oxycodone    5 mG/acetaminophen 325 mG 1 Tablet(s) Oral every 6 hours PRN Severe Pain (7 - 10)    RADIOLOGY/EKG: 63 yo male with PMHx of HTN, chronic back pain, anxiety, DM, HIV presents today to continue treatment for his R foot.  Patient was recently admitted on 3/14 and left AMA on 3/17.  Patient mentions he neeeded to leave AMA, because he was staying at a hotel and they were going to throw all his things away.  Patient was not taking any medications after leaving AMA and does not follow up with any physicians as outpatient, but will like to start following with one.   Patient states his right foot pain has been worsening for the past 3 weeks, when he was inpatient the pain improved, but after stopping taking everything the pain return to 10/10.  Patient denies any fever, chills, nausea, vomiting, chest pain, trauma to the foot.    In the ED /92, HR 81, Temp 99, RR 18 and SpO2 99% on RA.    Labs: Hb 11.5, WBC wnl,  Glucose 527, Na 133, Albumin 1.8, Lactate 1.9,   Patient was seen by podiatry and he was given Zosyn, Vancomycin, 2L IVF, 20UI Lantus, 4UI Admelog and 8UI humulin    subjective: Patient seen and examined by medicine team. Patient states that he feels better. Plan for picc line on the say of discharge     Exam-  Vital Signs Last 24 Hrs  T(C): 36.4 (24 Mar 2023 09:24), Max: 37.4 (23 Mar 2023 23:29)  T(F): 97.5 (24 Mar 2023 09:24), Max: 99.3 (23 Mar 2023 23:29)  HR: 69 (24 Mar 2023 09:24) (69 - 79)  BP: 138/87 (24 Mar 2023 09:24) (138/87 - 140/69)  RR: 18 (24 Mar 2023 09:24) (18 - 18)  SpO2: 100% (24 Mar 2023 09:24) (98% - 100%)    Parameters below as of 24 Mar 2023 09:24  Patient On (Oxygen Delivery Method): room air    Constitutional: Pt lying in bed, awake and alert, NAD  HEENT: normal hearing, moist mucous membranes  Neck: Soft and supple, no JVD  Respiratory: CTABL, No wheezing, rales or rhonchi  Cardiovascular: S1S2+, RRR, no M/G/R  Gastrointestinal: BS+, soft, NT/ND, no guarding, no rebound  Extremities: no pedal edema R foot with dressing in place   Vascular: 2+ peripheral pulses  Neurological: AAOx3, no focal deficits  Musculoskeletal: 5/5 strength b/l upper and lower extremities  Skin: Diffuse edema and erythema noted to the right foot forefoot  Full thickness wound noted to the right plantar forefoot, probe to soft tissue, tracking dorsally, no PTB, no malodor, no pus/purulence drainage, no fluctuance, no crepitus.      LABS:                           10.8   7.15  )-----------( 509      ( 24 Mar 2023 10:14 )             34.1     03-24    137  |  109<H>  |  30<H>  ----------------------------<  275<H>  4.5   |  24  |  0.92    Ca    8.7      24 Mar 2023 10:14    TPro  6.6  /  Alb  1.6<L>  /  TBili  0.1<L>  /  DBili  x   /  AST  33  /  ALT  63  /  AlkPhos  160<H>  03-24    Blood Culture:   I&O's Summary    20 Mar 2023 07:01  -  21 Mar 2023 07:00  --------------------------------------------------------  IN: 0 mL / OUT: 200 mL / NET: -200 mL      CAPILLARY BLOOD GLUCOSE      POCT Blood Glucose.: 129 mg/dL (21 Mar 2023 08:45)  POCT Blood Glucose.: 174 mg/dL (20 Mar 2023 23:40)  POCT Blood Glucose.: 328 mg/dL (20 Mar 2023 19:31)  POCT Blood Glucose.: 527 mg/dL (20 Mar 2023 17:58)    MEDICATIONS  (STANDING):  cefepime   IVPB 2000 milliGRAM(s) IV Intermittent every 12 hours  dextrose 5%. 1000 milliLiter(s) (100 mL/Hr) IV Continuous <Continuous>  dextrose 5%. 1000 milliLiter(s) (50 mL/Hr) IV Continuous <Continuous>  dextrose 50% Injectable 25 Gram(s) IV Push once  dextrose 50% Injectable 12.5 Gram(s) IV Push once  dextrose 50% Injectable 25 Gram(s) IV Push once  glucagon  Injectable 1 milliGRAM(s) IntraMuscular once  heparin   Injectable 5000 Unit(s) SubCutaneous every 8 hours  insulin glargine Injectable (LANTUS) 20 Unit(s) SubCutaneous at bedtime  insulin lispro (ADMELOG) corrective regimen sliding scale   SubCutaneous three times a day before meals  insulin lispro (ADMELOG) corrective regimen sliding scale   SubCutaneous at bedtime  vancomycin  IVPB 1250 milliGRAM(s) IV Intermittent every 12 hours    MEDICATIONS  (PRN):  acetaminophen     Tablet .. 650 milliGRAM(s) Oral every 6 hours PRN Temp greater or equal to 38C (100.4F), Mild Pain (1 - 3)  aluminum hydroxide/magnesium hydroxide/simethicone Suspension 30 milliLiter(s) Oral every 4 hours PRN Dyspepsia  dextrose Oral Gel 15 Gram(s) Oral once PRN Blood Glucose LESS THAN 70 milliGRAM(s)/deciliter  hydrALAZINE Injectable 5 milliGRAM(s) IV Push once PRN SBP >160  melatonin 3 milliGRAM(s) Oral at bedtime PRN Insomnia  ondansetron Injectable 4 milliGRAM(s) IV Push every 8 hours PRN Nausea and/or Vomiting  oxycodone    5 mG/acetaminophen 325 mG 1 Tablet(s) Oral every 6 hours PRN Severe Pain (7 - 10)    RADIOLOGY/EKG:

## 2023-03-24 NOTE — PROGRESS NOTE ADULT - ASSESSMENT
Assesment: 63 y/o male seen for the following   -Right full thickness plantar wound, possible OM  -Cellulitis/erythema to the right forefoot, improving  -Diabetes Mellitus type 2   -Pain in Right Foot   -Difficulty with ambulation      Plan:   Chart reviewed and Patient evaluated   Discussed diagnosis and treatment with patient. Discussed importance of daily foot examinations, proper shoe gear, and tight glycemic control   Wound flush with normal saline  Flushed copiously w/ saline and betadine mix   Applied betadine soaked gauze with dry sterile dressing  X-rays reviewed : on wet read showing unchanged findings from last admission.   Continue with IV antibiotics As Per ED/Med  MRI results from 3/17/23 noted above  On evaluation today, wound showing no collection, no pus, no purulence with improved erythema.   No acute podiatric intervention at this time. Pt will benefit from local wound care at this time. Recommend pt to follow up at the [facility] in 1 week after stable for discharge.   Patient demonstrated verbal understanding of all interventions and tolerated interventions well without any complications.   Podiatry signing off w/ nursing orders     Wound care instructions to be applied daily  - Carefully remove right foot dressings  - Apply betadine soaked gauze to right forefoot wound  - Wrap w/ kerlix and tape. Apply ace as needed

## 2023-03-25 LAB
CULTURE RESULTS: SIGNIFICANT CHANGE UP
CULTURE RESULTS: SIGNIFICANT CHANGE UP
GLUCOSE BLDC GLUCOMTR-MCNC: 235 MG/DL — HIGH (ref 70–99)
GLUCOSE BLDC GLUCOMTR-MCNC: 270 MG/DL — HIGH (ref 70–99)
GLUCOSE BLDC GLUCOMTR-MCNC: 309 MG/DL — HIGH (ref 70–99)
GLUCOSE BLDC GLUCOMTR-MCNC: 336 MG/DL — HIGH (ref 70–99)
SPECIMEN SOURCE: SIGNIFICANT CHANGE UP
SPECIMEN SOURCE: SIGNIFICANT CHANGE UP

## 2023-03-25 PROCEDURE — 99232 SBSQ HOSP IP/OBS MODERATE 35: CPT | Mod: GC

## 2023-03-25 RX ORDER — METFORMIN HYDROCHLORIDE 850 MG/1
500 TABLET ORAL
Refills: 0 | Status: DISCONTINUED | OUTPATIENT
Start: 2023-03-25 | End: 2023-03-31

## 2023-03-25 RX ORDER — INSULIN GLARGINE 100 [IU]/ML
26 INJECTION, SOLUTION SUBCUTANEOUS AT BEDTIME
Refills: 0 | Status: DISCONTINUED | OUTPATIENT
Start: 2023-03-25 | End: 2023-04-07

## 2023-03-25 RX ADMIN — HEPARIN SODIUM 5000 UNIT(S): 5000 INJECTION INTRAVENOUS; SUBCUTANEOUS at 05:51

## 2023-03-25 RX ADMIN — Medication 8: at 08:55

## 2023-03-25 RX ADMIN — CEFEPIME 100 MILLIGRAM(S): 1 INJECTION, POWDER, FOR SOLUTION INTRAMUSCULAR; INTRAVENOUS at 22:09

## 2023-03-25 RX ADMIN — Medication 3 UNIT(S): at 12:58

## 2023-03-25 RX ADMIN — HEPARIN SODIUM 5000 UNIT(S): 5000 INJECTION INTRAVENOUS; SUBCUTANEOUS at 22:21

## 2023-03-25 RX ADMIN — HEPARIN SODIUM 5000 UNIT(S): 5000 INJECTION INTRAVENOUS; SUBCUTANEOUS at 14:10

## 2023-03-25 RX ADMIN — INSULIN GLARGINE 26 UNIT(S): 100 INJECTION, SOLUTION SUBCUTANEOUS at 22:21

## 2023-03-25 RX ADMIN — Medication 166.67 MILLIGRAM(S): at 11:20

## 2023-03-25 RX ADMIN — Medication 3 UNIT(S): at 17:47

## 2023-03-25 RX ADMIN — CEFEPIME 100 MILLIGRAM(S): 1 INJECTION, POWDER, FOR SOLUTION INTRAMUSCULAR; INTRAVENOUS at 10:34

## 2023-03-25 RX ADMIN — Medication 8: at 12:58

## 2023-03-25 RX ADMIN — METFORMIN HYDROCHLORIDE 500 MILLIGRAM(S): 850 TABLET ORAL at 22:21

## 2023-03-25 RX ADMIN — Medication 166.67 MILLIGRAM(S): at 22:19

## 2023-03-25 RX ADMIN — Medication 6: at 17:47

## 2023-03-25 NOTE — PROGRESS NOTE ADULT - ATTENDING COMMENTS
Patient seen at bedside with [facility] residents today, no complaints. Continues treatment for early OM, will need 6 weeks BX via PICC line. Awaiting placement.     Patient has been refusing pre-meal insulin as ordered. Patient educated on importance of adequate blood sugar control. Will increase bedtime Lantus and add metformin --> I do not anticipate need for contrast imaging for now, pt is currently awaiting discharge to Rehab.

## 2023-03-25 NOTE — PROGRESS NOTE ADULT - SUBJECTIVE AND OBJECTIVE BOX
63 yo male with PMHx of HTN, chronic back pain, anxiety, DM, HIV presents today to continue treatment for his R foot.  Patient was recently admitted on 3/14 and left AMA on 3/17.  Patient mentions he neeeded to leave AMA, because he was staying at a hotel and they were going to throw all his things away.  Patient was not taking any medications after leaving AMA and does not follow up with any physicians as outpatient, but will like to start following with one.   Patient states his right foot pain has been worsening for the past 3 weeks, when he was inpatient the pain improved, but after stopping taking everything the pain return to 10/10.  Patient denies any fever, chills, nausea, vomiting, chest pain, trauma to the foot.    In the ED /92, HR 81, Temp 99, RR 18 and SpO2 99% on RA.    Labs: Hb 11.5, WBC wnl,  Glucose 527, Na 133, Albumin 1.8, Lactate 1.9,   Patient was seen by podiatry and he was given Zosyn, Vancomycin, 2L IVF, 20UI Lantus, 4UI Admelog and 8UI humulin    subjective: Patient seen and examined by medicine team. Patient states that he feels better. Pt refusing insulin, Plan for picc line on the say of discharge     Vital Signs Last 24 Hrs  T(C): 36.6 (25 Mar 2023 14:29), Max: 36.6 (25 Mar 2023 14:29)  T(F): 97.9 (25 Mar 2023 14:29), Max: 97.9 (25 Mar 2023 14:29)  HR: 86 (25 Mar 2023 14:29) (86 - 86)  BP: 126/69 (25 Mar 2023 14:29) (126/69 - 126/69)  BP(mean): --  RR: 17 (25 Mar 2023 14:29) (17 - 17)  SpO2: 99% (25 Mar 2023 14:29) (99% - 99%)    Parameters below as of 25 Mar 2023 14:29  Patient On (Oxygen Delivery Method): room air    Constitutional: Pt lying in bed, awake and alert, NAD  HEENT: normal hearing, moist mucous membranes  Neck: Soft and supple, no JVD  Respiratory: CTABL, No wheezing, rales or rhonchi  Cardiovascular: S1S2+, RRR, no M/G/R  Gastrointestinal: BS+, soft, NT/ND, no guarding, no rebound  Extremities: no pedal edema R foot with dressing in place   Vascular: 2+ peripheral pulses  Neurological: AAOx3, no focal deficits  Musculoskeletal: 5/5 strength b/l upper and lower extremities  Skin: Diffuse edema and erythema noted to the right foot forefoot  Full thickness wound noted to the right plantar forefoot, probe to soft tissue, tracking dorsally, no PTB, no malodor, no pus/purulence drainage, no fluctuance, no crepitus.      LABS:                      10.8   7.15  )-----------( 509      ( 24 Mar 2023 10:14 )             34.1     03-24    137  |  109<H>  |  30<H>  ----------------------------<  275<H>  4.5   |  24  |  0.92    Ca    8.7      24 Mar 2023 10:14    TPro  6.6  /  Alb  1.6<L>  /  TBili  0.1<L>  /  DBili  x   /  AST  33  /  ALT  63  /  AlkPhos  160<H>  03-24                           Blood Culture:   I&O's Summary    20 Mar 2023 07:01  -  21 Mar 2023 07:00  --------------------------------------------------------  IN: 0 mL / OUT: 200 mL / NET: -200 mL      CAPILLARY BLOOD GLUCOSE      POCT Blood Glucose.: 129 mg/dL (21 Mar 2023 08:45)  POCT Blood Glucose.: 174 mg/dL (20 Mar 2023 23:40)  POCT Blood Glucose.: 328 mg/dL (20 Mar 2023 19:31)  POCT Blood Glucose.: 527 mg/dL (20 Mar 2023 17:58)    MEDICATIONS  (STANDING):  cefepime   IVPB 2000 milliGRAM(s) IV Intermittent every 12 hours  dextrose 5%. 1000 milliLiter(s) (50 mL/Hr) IV Continuous <Continuous>  dextrose 5%. 1000 milliLiter(s) (100 mL/Hr) IV Continuous <Continuous>  dextrose 50% Injectable 25 Gram(s) IV Push once  dextrose 50% Injectable 12.5 Gram(s) IV Push once  dextrose 50% Injectable 25 Gram(s) IV Push once  glucagon  Injectable 1 milliGRAM(s) IntraMuscular once  heparin   Injectable 5000 Unit(s) SubCutaneous every 8 hours  insulin glargine Injectable (LANTUS) 26 Unit(s) SubCutaneous at bedtime  insulin lispro (ADMELOG) corrective regimen sliding scale   SubCutaneous three times a day before meals  insulin lispro (ADMELOG) corrective regimen sliding scale   SubCutaneous at bedtime  insulin lispro Injectable (ADMELOG) 3 Unit(s) SubCutaneous three times a day before meals  metFORMIN 500 milliGRAM(s) Oral two times a day  vancomycin  IVPB 1250 milliGRAM(s) IV Intermittent every 12 hours    MEDICATIONS  (PRN):  acetaminophen     Tablet .. 650 milliGRAM(s) Oral every 6 hours PRN Temp greater or equal to 38C (100.4F), Mild Pain (1 - 3)  aluminum hydroxide/magnesium hydroxide/simethicone Suspension 30 milliLiter(s) Oral every 4 hours PRN Dyspepsia  dextrose Oral Gel 15 Gram(s) Oral once PRN Blood Glucose LESS THAN 70 milliGRAM(s)/deciliter  hydrALAZINE Injectable 5 milliGRAM(s) IV Push once PRN SBP >160  melatonin 3 milliGRAM(s) Oral at bedtime PRN Insomnia  ondansetron Injectable 4 milliGRAM(s) IV Push every 8 hours PRN Nausea and/or Vomiting  oxycodone    5 mG/acetaminophen 325 mG 1 Tablet(s) Oral every 6 hours PRN Severe Pain (7 - 10)    RADIOLOGY/EKG: 63 yo male with PMHx of HTN, chronic back pain, anxiety, DM, HIV presents today to continue treatment for his R foot.  Patient was recently admitted on 3/14 and left AMA on 3/17.  Patient mentions he neeeded to leave AMA, because he was staying at a hotel and they were going to throw all his things away.  Patient was not taking any medications after leaving AMA and does not follow up with any physicians as outpatient, but will like to start following with one.   Patient states his right foot pain has been worsening for the past 3 weeks, when he was inpatient the pain improved, but after stopping taking everything the pain return to 10/10.  Patient denies any fever, chills, nausea, vomiting, chest pain, trauma to the foot.    In the ED /92, HR 81, Temp 99, RR 18 and SpO2 99% on RA.    Labs: Hb 11.5, WBC wnl,  Glucose 527, Na 133, Albumin 1.8, Lactate 1.9,   Patient was seen by podiatry and he was given Zosyn, Vancomycin, 2L IVF, 20UI Lantus, 4UI Admelog and 8UI humulin    subjective: Patient seen and examined by medicine team. Patient states that he feels better. Pt refusing pre-meal insulin    Vital Signs Last 24 Hrs  T(C): 36.6 (25 Mar 2023 14:29), Max: 36.6 (25 Mar 2023 14:29)  T(F): 97.9 (25 Mar 2023 14:29), Max: 97.9 (25 Mar 2023 14:29)  HR: 86 (25 Mar 2023 14:29) (86 - 86)  BP: 126/69 (25 Mar 2023 14:29) (126/69 - 126/69)  BP(mean): --  RR: 17 (25 Mar 2023 14:29) (17 - 17)  SpO2: 99% (25 Mar 2023 14:29) (99% - 99%)    Parameters below as of 25 Mar 2023 14:29  Patient On (Oxygen Delivery Method): room air    Constitutional: Pt lying in bed, awake and alert, NAD  HEENT: normal hearing, moist mucous membranes  Neck: Soft and supple, no JVD  Respiratory: CTABL, No wheezing, rales or rhonchi  Cardiovascular: S1S2+, RRR, no M/G/R  Gastrointestinal: BS+, soft, NT/ND, no guarding, no rebound  Extremities: no pedal edema R foot with dressing in place   Vascular: 2+ peripheral pulses  Neurological: AAOx3, no focal deficits  Musculoskeletal: 5/5 strength b/l upper and lower extremities  Skin: Diffuse edema and erythema noted to the right foot forefoot  Full thickness wound noted to the right plantar forefoot, probe to soft tissue, tracking dorsally, no PTB, no malodor, no pus/purulence drainage, no fluctuance, no crepitus.      LABS:                      10.8   7.15  )-----------( 509      ( 24 Mar 2023 10:14 )             34.1     03-24    137  |  109<H>  |  30<H>  ----------------------------<  275<H>  4.5   |  24  |  0.92    Ca    8.7      24 Mar 2023 10:14    TPro  6.6  /  Alb  1.6<L>  /  TBili  0.1<L>  /  DBili  x   /  AST  33  /  ALT  63  /  AlkPhos  160<H>  03-24                           Blood Culture:   I&O's Summary    20 Mar 2023 07:01  -  21 Mar 2023 07:00  --------------------------------------------------------  IN: 0 mL / OUT: 200 mL / NET: -200 mL      CAPILLARY BLOOD GLUCOSE      POCT Blood Glucose.: 129 mg/dL (21 Mar 2023 08:45)  POCT Blood Glucose.: 174 mg/dL (20 Mar 2023 23:40)  POCT Blood Glucose.: 328 mg/dL (20 Mar 2023 19:31)  POCT Blood Glucose.: 527 mg/dL (20 Mar 2023 17:58)    MEDICATIONS  (STANDING):  cefepime   IVPB 2000 milliGRAM(s) IV Intermittent every 12 hours  dextrose 5%. 1000 milliLiter(s) (50 mL/Hr) IV Continuous <Continuous>  dextrose 5%. 1000 milliLiter(s) (100 mL/Hr) IV Continuous <Continuous>  dextrose 50% Injectable 25 Gram(s) IV Push once  dextrose 50% Injectable 12.5 Gram(s) IV Push once  dextrose 50% Injectable 25 Gram(s) IV Push once  glucagon  Injectable 1 milliGRAM(s) IntraMuscular once  heparin   Injectable 5000 Unit(s) SubCutaneous every 8 hours  insulin glargine Injectable (LANTUS) 26 Unit(s) SubCutaneous at bedtime  insulin lispro (ADMELOG) corrective regimen sliding scale   SubCutaneous three times a day before meals  insulin lispro (ADMELOG) corrective regimen sliding scale   SubCutaneous at bedtime  insulin lispro Injectable (ADMELOG) 3 Unit(s) SubCutaneous three times a day before meals  metFORMIN 500 milliGRAM(s) Oral two times a day  vancomycin  IVPB 1250 milliGRAM(s) IV Intermittent every 12 hours    MEDICATIONS  (PRN):  acetaminophen     Tablet .. 650 milliGRAM(s) Oral every 6 hours PRN Temp greater or equal to 38C (100.4F), Mild Pain (1 - 3)  aluminum hydroxide/magnesium hydroxide/simethicone Suspension 30 milliLiter(s) Oral every 4 hours PRN Dyspepsia  dextrose Oral Gel 15 Gram(s) Oral once PRN Blood Glucose LESS THAN 70 milliGRAM(s)/deciliter  hydrALAZINE Injectable 5 milliGRAM(s) IV Push once PRN SBP >160  melatonin 3 milliGRAM(s) Oral at bedtime PRN Insomnia  ondansetron Injectable 4 milliGRAM(s) IV Push every 8 hours PRN Nausea and/or Vomiting  oxycodone    5 mG/acetaminophen 325 mG 1 Tablet(s) Oral every 6 hours PRN Severe Pain (7 - 10)    RADIOLOGY/EKG: 61 yo male with PMHx of HTN, chronic back pain, anxiety, DM, HIV presents today to continue treatment for his R foot.  Patient was recently admitted on 3/14 and left AMA on 3/17.  Patient mentions he neeeded to leave AMA, because he was staying at a hotel and they were going to throw all his things away.  Patient was not taking any medications after leaving AMA and does not follow up with any physicians as outpatient, but will like to start following with one.   Patient states his right foot pain has been worsening for the past 3 weeks, when he was inpatient the pain improved, but after stopping taking everything the pain return to 10/10.  Patient denies any fever, chills, nausea, vomiting, chest pain, trauma to the foot.    In the ED /92, HR 81, Temp 99, RR 18 and SpO2 99% on RA.    Labs: Hb 11.5, WBC wnl,  Glucose 527, Na 133, Albumin 1.8, Lactate 1.9,   Patient was seen by podiatry and he was given Zosyn, Vancomycin, 2L IVF, 20UI Lantus, 4UI Admelog and 8UI humulin    subjective: Patient seen and examined by medicine team. Patient states that he feels better. Pt refusing pre-meal insulin    Vital Signs Last 24 Hrs  T(C): 36.6 (25 Mar 2023 14:29), Max: 36.6 (25 Mar 2023 14:29)  T(F): 97.9 (25 Mar 2023 14:29), Max: 97.9 (25 Mar 2023 14:29)  HR: 86 (25 Mar 2023 14:29) (86 - 86)  BP: 126/69 (25 Mar 2023 14:29) (126/69 - 126/69)  RR: 17 (25 Mar 2023 14:29) (17 - 17)  SpO2: 99% (25 Mar 2023 14:29) (99% - 99%)    Parameters below as of 25 Mar 2023 14:29  Patient On (Oxygen Delivery Method): room air    Constitutional: Pt lying in bed, awake and alert, NAD  HEENT: normal hearing, moist mucous membranes  Neck: Soft and supple, no JVD  Respiratory: CTABL, No wheezing, rales or rhonchi  Cardiovascular: S1S2+, RRR, no M/G/R  Gastrointestinal: BS+, soft, NT/ND, no guarding, no rebound  Extremities: no pedal edema R foot with dressing in place   Vascular: 2+ peripheral pulses  Neurological: AAOx3, no focal deficits  Musculoskeletal: 5/5 strength b/l upper and lower extremities  Skin: Diffuse edema and erythema noted to the right foot forefoot  Full thickness wound noted to the right plantar forefoot, probe to soft tissue, tracking dorsally, no PTB, no malodor, no pus/purulence drainage, no fluctuance, no crepitus.      LABS:                      10.8   7.15  )-----------( 509      ( 24 Mar 2023 10:14 )             34.1     03-24    137  |  109<H>  |  30<H>  ----------------------------<  275<H>  4.5   |  24  |  0.92    Ca    8.7      24 Mar 2023 10:14    TPro  6.6  /  Alb  1.6<L>  /  TBili  0.1<L>  /  DBili  x   /  AST  33  /  ALT  63  /  AlkPhos  160<H>  03-24                           Blood Culture:   I&O's Summary    20 Mar 2023 07:01  -  21 Mar 2023 07:00  --------------------------------------------------------  IN: 0 mL / OUT: 200 mL / NET: -200 mL      CAPILLARY BLOOD GLUCOSE      POCT Blood Glucose.: 129 mg/dL (21 Mar 2023 08:45)  POCT Blood Glucose.: 174 mg/dL (20 Mar 2023 23:40)  POCT Blood Glucose.: 328 mg/dL (20 Mar 2023 19:31)  POCT Blood Glucose.: 527 mg/dL (20 Mar 2023 17:58)    MEDICATIONS  (STANDING):  cefepime   IVPB 2000 milliGRAM(s) IV Intermittent every 12 hours  dextrose 5%. 1000 milliLiter(s) (50 mL/Hr) IV Continuous <Continuous>  dextrose 5%. 1000 milliLiter(s) (100 mL/Hr) IV Continuous <Continuous>  dextrose 50% Injectable 25 Gram(s) IV Push once  dextrose 50% Injectable 12.5 Gram(s) IV Push once  dextrose 50% Injectable 25 Gram(s) IV Push once  glucagon  Injectable 1 milliGRAM(s) IntraMuscular once  heparin   Injectable 5000 Unit(s) SubCutaneous every 8 hours  insulin glargine Injectable (LANTUS) 26 Unit(s) SubCutaneous at bedtime  insulin lispro (ADMELOG) corrective regimen sliding scale   SubCutaneous three times a day before meals  insulin lispro (ADMELOG) corrective regimen sliding scale   SubCutaneous at bedtime  insulin lispro Injectable (ADMELOG) 3 Unit(s) SubCutaneous three times a day before meals  metFORMIN 500 milliGRAM(s) Oral two times a day  vancomycin  IVPB 1250 milliGRAM(s) IV Intermittent every 12 hours    MEDICATIONS  (PRN):  acetaminophen     Tablet .. 650 milliGRAM(s) Oral every 6 hours PRN Temp greater or equal to 38C (100.4F), Mild Pain (1 - 3)  aluminum hydroxide/magnesium hydroxide/simethicone Suspension 30 milliLiter(s) Oral every 4 hours PRN Dyspepsia  dextrose Oral Gel 15 Gram(s) Oral once PRN Blood Glucose LESS THAN 70 milliGRAM(s)/deciliter  hydrALAZINE Injectable 5 milliGRAM(s) IV Push once PRN SBP >160  melatonin 3 milliGRAM(s) Oral at bedtime PRN Insomnia  ondansetron Injectable 4 milliGRAM(s) IV Push every 8 hours PRN Nausea and/or Vomiting  oxycodone    5 mG/acetaminophen 325 mG 1 Tablet(s) Oral every 6 hours PRN Severe Pain (7 - 10)    RADIOLOGY/EKG:

## 2023-03-25 NOTE — PROGRESS NOTE ADULT - ASSESSMENT
63 yo male with PMHx of HTN, chronic back pain, anxiety, DM, HIV presents today to continue treatment for his R foot.     #Cellulitis vs early osteomyelitis R foot of diabetic pt  -Stable   1. continue zosyn, vanco for 6 weeks   2. reconsult ID: recs appreciated. continue vanco.   3. podiatry consult read and appreciated  4. neuropathic agents for analgesia  5.Probable dc tomorrow     #DM II   -Hb A1c 11.5 on 03-15  -Pt is refusing pre meal insulin. He received lispro last night   -Will start metformin 500 mg po bid   2. increase  Lantus 26 units q HS  3. BGM  4. ISS    #HIV  has been noncompliant  recent evaluation by ID no PCP/MAC prophylaxis  1. no ART  2. ID recs appreciated: will need to folow up outpatient with ID    #HTN  -Stable   1. continue monitoring  2. low Na diet  3. hydralazine prn    #Impaired visual acuity  will need assistance with  insulin    he cannot see to safely measure and address insulin usage    #Homelessness  pt was at transient hotel  does not live w family  1. social work    #VTE  sq hep.     #Dispo planning  - Pt is stable for discharge. Pending placement.    d/w Dr. Salcedo 63 yo male with PMHx of HTN, chronic back pain, anxiety, DM, HIV presents today to continue treatment for his R foot.     #Cellulitis vs early osteomyelitis R foot of diabetic pt  -Stable   - continue zosyn, vanco for 6 weeks   - reconsult ID: recs appreciated. continue vanco.   - podiatry consult read and appreciated  - neuropathic agents for analgesia    #DM II   -Hb A1c 11.5 on 03-15  -Pt is refusing pre meal insulin. He received lispro last night   -Will start metformin 500 mg po bid   -increase  Lantus 26 units q HS  -BGM  -ISS    #HIV  has been noncompliant  recent evaluation by ID no PCP/MAC prophylaxis  no ART  ID recs appreciated: will need to follow up outpatient with ID    #HTN  -Stable    continue monitoring   low Na diet   hydralazine prn    #Impaired visual acuity  will need assistance with  insulin    he cannot see to safely measure and address insulin usage    #Homelessness  pt was at transient hotel  does not live w family  social work    #VTE  sq hep.     #Dispo planning  - Pt is stable for discharge. Pending placement.    d/w Dr. Salcedo

## 2023-03-25 NOTE — PROGRESS NOTE ADULT - TIME BILLING
I spent a total of 25 minutes on the date of this encounter coordinating the patient's care. This includes reviewing prior documentation, results and imaging and physical examination on the patient. Further tests, medications, and procedures have been ordered as indicated. Laboratory results and the plan of care were communicated to the patient. Supporting documentation was completed and added to the patient's chart.

## 2023-03-26 LAB
ALBUMIN SERPL ELPH-MCNC: 2.1 G/DL — LOW (ref 3.3–5)
ALP SERPL-CCNC: 145 U/L — HIGH (ref 40–120)
ALT FLD-CCNC: 88 U/L — HIGH (ref 12–78)
ANION GAP SERPL CALC-SCNC: 6 MMOL/L — SIGNIFICANT CHANGE UP (ref 5–17)
AST SERPL-CCNC: 48 U/L — HIGH (ref 15–37)
BILIRUB SERPL-MCNC: 0.2 MG/DL — SIGNIFICANT CHANGE UP (ref 0.2–1.2)
BUN SERPL-MCNC: 31 MG/DL — HIGH (ref 7–23)
CALCIUM SERPL-MCNC: 9.6 MG/DL — SIGNIFICANT CHANGE UP (ref 8.5–10.1)
CHLORIDE SERPL-SCNC: 104 MMOL/L — SIGNIFICANT CHANGE UP (ref 96–108)
CO2 SERPL-SCNC: 25 MMOL/L — SIGNIFICANT CHANGE UP (ref 22–31)
CREAT SERPL-MCNC: 1.05 MG/DL — SIGNIFICANT CHANGE UP (ref 0.5–1.3)
EGFR: 81 ML/MIN/1.73M2 — SIGNIFICANT CHANGE UP
GLUCOSE BLDC GLUCOMTR-MCNC: 183 MG/DL — HIGH (ref 70–99)
GLUCOSE BLDC GLUCOMTR-MCNC: 242 MG/DL — HIGH (ref 70–99)
GLUCOSE BLDC GLUCOMTR-MCNC: 295 MG/DL — HIGH (ref 70–99)
GLUCOSE BLDC GLUCOMTR-MCNC: 328 MG/DL — HIGH (ref 70–99)
GLUCOSE SERPL-MCNC: 248 MG/DL — HIGH (ref 70–99)
HCT VFR BLD CALC: 35.3 % — LOW (ref 39–50)
HGB BLD-MCNC: 11.4 G/DL — LOW (ref 13–17)
MCHC RBC-ENTMCNC: 27.7 PG — SIGNIFICANT CHANGE UP (ref 27–34)
MCHC RBC-ENTMCNC: 32.3 GM/DL — SIGNIFICANT CHANGE UP (ref 32–36)
MCV RBC AUTO: 85.7 FL — SIGNIFICANT CHANGE UP (ref 80–100)
PLATELET # BLD AUTO: 555 K/UL — HIGH (ref 150–400)
POTASSIUM SERPL-MCNC: 4.4 MMOL/L — SIGNIFICANT CHANGE UP (ref 3.5–5.3)
POTASSIUM SERPL-SCNC: 4.4 MMOL/L — SIGNIFICANT CHANGE UP (ref 3.5–5.3)
PROT SERPL-MCNC: 8.1 GM/DL — SIGNIFICANT CHANGE UP (ref 6–8.3)
RBC # BLD: 4.12 M/UL — LOW (ref 4.2–5.8)
RBC # FLD: 14 % — SIGNIFICANT CHANGE UP (ref 10.3–14.5)
SODIUM SERPL-SCNC: 135 MMOL/L — SIGNIFICANT CHANGE UP (ref 135–145)
WBC # BLD: 9.17 K/UL — SIGNIFICANT CHANGE UP (ref 3.8–10.5)
WBC # FLD AUTO: 9.17 K/UL — SIGNIFICANT CHANGE UP (ref 3.8–10.5)

## 2023-03-26 PROCEDURE — 99233 SBSQ HOSP IP/OBS HIGH 50: CPT | Mod: GC

## 2023-03-26 RX ORDER — KETOROLAC TROMETHAMINE 30 MG/ML
30 SYRINGE (ML) INJECTION ONCE
Refills: 0 | Status: DISCONTINUED | OUTPATIENT
Start: 2023-03-26 | End: 2023-03-26

## 2023-03-26 RX ORDER — LANOLIN ALCOHOL/MO/W.PET/CERES
5 CREAM (GRAM) TOPICAL AT BEDTIME
Refills: 0 | Status: DISCONTINUED | OUTPATIENT
Start: 2023-03-26 | End: 2023-04-07

## 2023-03-26 RX ORDER — LANOLIN ALCOHOL/MO/W.PET/CERES
5 CREAM (GRAM) TOPICAL AT BEDTIME
Refills: 0 | Status: DISCONTINUED | OUTPATIENT
Start: 2023-03-26 | End: 2023-03-26

## 2023-03-26 RX ADMIN — Medication 30 MILLIGRAM(S): at 23:00

## 2023-03-26 RX ADMIN — HEPARIN SODIUM 5000 UNIT(S): 5000 INJECTION INTRAVENOUS; SUBCUTANEOUS at 14:44

## 2023-03-26 RX ADMIN — METFORMIN HYDROCHLORIDE 500 MILLIGRAM(S): 850 TABLET ORAL at 11:34

## 2023-03-26 RX ADMIN — Medication 4: at 08:25

## 2023-03-26 RX ADMIN — Medication 2: at 17:38

## 2023-03-26 RX ADMIN — Medication 3 UNIT(S): at 17:37

## 2023-03-26 RX ADMIN — Medication 3 UNIT(S): at 20:00

## 2023-03-26 RX ADMIN — Medication 650 MILLIGRAM(S): at 10:35

## 2023-03-26 RX ADMIN — Medication 3 UNIT(S): at 08:26

## 2023-03-26 RX ADMIN — Medication 650 MILLIGRAM(S): at 20:58

## 2023-03-26 RX ADMIN — Medication 650 MILLIGRAM(S): at 20:18

## 2023-03-26 RX ADMIN — Medication 166.67 MILLIGRAM(S): at 12:10

## 2023-03-26 RX ADMIN — Medication 3 UNIT(S): at 12:54

## 2023-03-26 RX ADMIN — Medication 166.67 MILLIGRAM(S): at 23:15

## 2023-03-26 RX ADMIN — Medication 5 MILLIGRAM(S): at 22:44

## 2023-03-26 RX ADMIN — Medication 8: at 12:53

## 2023-03-26 RX ADMIN — HEPARIN SODIUM 5000 UNIT(S): 5000 INJECTION INTRAVENOUS; SUBCUTANEOUS at 22:45

## 2023-03-26 RX ADMIN — Medication 650 MILLIGRAM(S): at 11:15

## 2023-03-26 RX ADMIN — Medication 30 MILLIGRAM(S): at 22:44

## 2023-03-26 RX ADMIN — CEFEPIME 100 MILLIGRAM(S): 1 INJECTION, POWDER, FOR SOLUTION INTRAMUSCULAR; INTRAVENOUS at 22:43

## 2023-03-26 RX ADMIN — INSULIN GLARGINE 26 UNIT(S): 100 INJECTION, SOLUTION SUBCUTANEOUS at 22:44

## 2023-03-26 RX ADMIN — METFORMIN HYDROCHLORIDE 500 MILLIGRAM(S): 850 TABLET ORAL at 22:44

## 2023-03-26 RX ADMIN — HEPARIN SODIUM 5000 UNIT(S): 5000 INJECTION INTRAVENOUS; SUBCUTANEOUS at 06:01

## 2023-03-26 RX ADMIN — Medication 2: at 22:44

## 2023-03-26 RX ADMIN — CEFEPIME 100 MILLIGRAM(S): 1 INJECTION, POWDER, FOR SOLUTION INTRAMUSCULAR; INTRAVENOUS at 10:37

## 2023-03-26 NOTE — PROGRESS NOTE ADULT - ATTENDING COMMENTS
61 yo male with PMHx of HTN, chronic back pain, anxiety, DM, HIV presents today to continue treatment for his R foot.     #Cellulitis vs early osteomyelitis R foot of diabetic pt  -Stable   - s/p zosyn,  -continue with IV cefepime and vanco for 6 weeks   - reconsult ID: recs appreciated.  - podiatry consult recs appreciated  - neuropathic agents for analgesia  -PICC line placement pending

## 2023-03-26 NOTE — PROGRESS NOTE ADULT - SUBJECTIVE AND OBJECTIVE BOX
61 yo male with PMHx of HTN, chronic back pain, anxiety, DM, HIV presents today to continue treatment for his R foot.  Patient was recently admitted on 3/14 and left AMA on 3/17.  Patient mentions he neeeded to leave AMA, because he was staying at a hotel and they were going to throw all his things away.  Patient was not taking any medications after leaving AMA and does not follow up with any physicians as outpatient, but will like to start following with one.   Patient states his right foot pain has been worsening for the past 3 weeks, when he was inpatient the pain improved, but after stopping taking everything the pain return to 10/10.  Patient denies any fever, chills, nausea, vomiting, chest pain, trauma to the foot.    In the ED /92, HR 81, Temp 99, RR 18 and SpO2 99% on RA.    Labs: Hb 11.5, WBC wnl,  Glucose 527, Na 133, Albumin 1.8, Lactate 1.9,   Patient was seen by podiatry and he was given Zosyn, Vancomycin, 2L IVF, 20UI Lantus, 4UI Admelog and 8UI humulin    subjective: Patient seen and examined by medicine team. Patient states that he feels better. Pt refusing pre-meal insulin    Vital Signs Last 24 Hrs  T(C): 36.6 (25 Mar 2023 14:29), Max: 36.6 (25 Mar 2023 14:29)  T(F): 97.9 (25 Mar 2023 14:29), Max: 97.9 (25 Mar 2023 14:29)  HR: 86 (25 Mar 2023 14:29) (86 - 86)  BP: 126/69 (25 Mar 2023 14:29) (126/69 - 126/69)  RR: 17 (25 Mar 2023 14:29) (17 - 17)  SpO2: 99% (25 Mar 2023 14:29) (99% - 99%)    Parameters below as of 25 Mar 2023 14:29  Patient On (Oxygen Delivery Method): room air    Constitutional: Pt lying in bed, awake and alert, NAD  HEENT: normal hearing, moist mucous membranes  Neck: Soft and supple, no JVD  Respiratory: CTABL, No wheezing, rales or rhonchi  Cardiovascular: S1S2+, RRR, no M/G/R  Gastrointestinal: BS+, soft, NT/ND, no guarding, no rebound  Extremities: no pedal edema R foot with dressing in place   Vascular: 2+ peripheral pulses  Neurological: AAOx3, no focal deficits  Musculoskeletal: 5/5 strength b/l upper and lower extremities  Skin: Diffuse edema and erythema noted to the right foot forefoot  Full thickness wound noted to the right plantar forefoot, probe to soft tissue, tracking dorsally, no PTB, no malodor, no pus/purulence drainage, no fluctuance, no crepitus.      LABS:                      10.8   7.15  )-----------( 509      ( 24 Mar 2023 10:14 )             34.1     03-24    137  |  109<H>  |  30<H>  ----------------------------<  275<H>  4.5   |  24  |  0.92    Ca    8.7      24 Mar 2023 10:14    TPro  6.6  /  Alb  1.6<L>  /  TBili  0.1<L>  /  DBili  x   /  AST  33  /  ALT  63  /  AlkPhos  160<H>  03-24                           Blood Culture:   I&O's Summary    20 Mar 2023 07:01  -  21 Mar 2023 07:00  --------------------------------------------------------  IN: 0 mL / OUT: 200 mL / NET: -200 mL      CAPILLARY BLOOD GLUCOSE      POCT Blood Glucose.: 129 mg/dL (21 Mar 2023 08:45)  POCT Blood Glucose.: 174 mg/dL (20 Mar 2023 23:40)  POCT Blood Glucose.: 328 mg/dL (20 Mar 2023 19:31)  POCT Blood Glucose.: 527 mg/dL (20 Mar 2023 17:58)    MEDICATIONS  (STANDING):  cefepime   IVPB 2000 milliGRAM(s) IV Intermittent every 12 hours  dextrose 5%. 1000 milliLiter(s) (50 mL/Hr) IV Continuous <Continuous>  dextrose 5%. 1000 milliLiter(s) (100 mL/Hr) IV Continuous <Continuous>  dextrose 50% Injectable 25 Gram(s) IV Push once  dextrose 50% Injectable 12.5 Gram(s) IV Push once  dextrose 50% Injectable 25 Gram(s) IV Push once  glucagon  Injectable 1 milliGRAM(s) IntraMuscular once  heparin   Injectable 5000 Unit(s) SubCutaneous every 8 hours  insulin glargine Injectable (LANTUS) 26 Unit(s) SubCutaneous at bedtime  insulin lispro (ADMELOG) corrective regimen sliding scale   SubCutaneous three times a day before meals  insulin lispro (ADMELOG) corrective regimen sliding scale   SubCutaneous at bedtime  insulin lispro Injectable (ADMELOG) 3 Unit(s) SubCutaneous three times a day before meals  metFORMIN 500 milliGRAM(s) Oral two times a day  vancomycin  IVPB 1250 milliGRAM(s) IV Intermittent every 12 hours    MEDICATIONS  (PRN):  acetaminophen     Tablet .. 650 milliGRAM(s) Oral every 6 hours PRN Temp greater or equal to 38C (100.4F), Mild Pain (1 - 3)  aluminum hydroxide/magnesium hydroxide/simethicone Suspension 30 milliLiter(s) Oral every 4 hours PRN Dyspepsia  dextrose Oral Gel 15 Gram(s) Oral once PRN Blood Glucose LESS THAN 70 milliGRAM(s)/deciliter  hydrALAZINE Injectable 5 milliGRAM(s) IV Push once PRN SBP >160  melatonin 3 milliGRAM(s) Oral at bedtime PRN Insomnia  ondansetron Injectable 4 milliGRAM(s) IV Push every 8 hours PRN Nausea and/or Vomiting  oxycodone    5 mG/acetaminophen 325 mG 1 Tablet(s) Oral every 6 hours PRN Severe Pain (7 - 10)    RADIOLOGY/EKG: 61 yo male with PMHx of HTN, chronic back pain, anxiety, DM, HIV presents today to continue treatment for his R foot.  Patient was recently admitted on 3/14 and left AMA on 3/17.  Patient mentions he neeeded to leave AMA, because he was staying at a hotel and they were going to throw all his things away.  Patient was not taking any medications after leaving AMA and does not follow up with any physicians as outpatient, but will like to start following with one.   Patient states his right foot pain has been worsening for the past 3 weeks, when he was inpatient the pain improved, but after stopping taking everything the pain return to 10/10.  Patient denies any fever, chills, nausea, vomiting, chest pain, trauma to the foot.    In the ED /92, HR 81, Temp 99, RR 18 and SpO2 99% on RA.    Labs: Hb 11.5, WBC wnl,  Glucose 527, Na 133, Albumin 1.8, Lactate 1.9,   Patient was seen by podiatry and he was given Zosyn, Vancomycin, 2L IVF, 20UI Lantus, 4UI Admelog and 8UI humulin    subjective: Patient seen and examined by medicine team. Patient states that he feels better. BG after meals remaining high. Per nurse, increase in food intake.       REVIEW OF SYSTEMS:  CONSTITUTIONAL: No weakness, fevers or chills  EYES/ENT: No visual changes;  No vertigo or throat pain   NECK: No pain or stiffness  RESPIRATORY: No cough, wheezing, hemoptysis; No shortness of breath  CARDIOVASCULAR: No chest pain or palpitations  GASTROINTESTINAL: No abdominal or epigastric pain. No nausea, vomiting, or hematemesis; No diarrhea or constipation. No melena or hematochezia.  GENITOURINARY: No dysuria, frequency or hematuria  NEUROLOGICAL: No numbness or weakness  SKIN: No itching, burning, rashes, or lesions   All other review of systems is negative unless indicated above    PHYSICAL EXAM:  Vital Signs Last 24 Hrs  T(C): 36.8 (26 Mar 2023 15:40), Max: 37 (25 Mar 2023 22:08)  T(F): 98.2 (26 Mar 2023 15:40), Max: 98.6 (25 Mar 2023 22:08)  HR: 86 (26 Mar 2023 15:40) (85 - 92)  BP: 141/72 (26 Mar 2023 15:40) (139/72 - 147/80)  BP(mean): --  RR: 18 (26 Mar 2023 15:40) (16 - 18)  SpO2: 99% (26 Mar 2023 15:40) (98% - 99%)    Parameters below as of 26 Mar 2023 15:40  Patient On (Oxygen Delivery Method): room air      Constitutional: Pt lying in bed, awake and alert, NAD  HEENT: normal hearing, moist mucous membranes  Neck: Soft and supple, no JVD  Respiratory: CTABL, No wheezing, rales or rhonchi  Cardiovascular: S1S2+, RRR, no M/G/R  Gastrointestinal: BS+, soft, NT/ND, no guarding, no rebound  Extremities: no pedal edema R foot with dressing in place   Vascular: 2+ peripheral pulses  Neurological: AAOx3, no focal deficits  Musculoskeletal: 5/5 strength b/l upper and lower extremities  Skin: Diffuse edema and erythema noted to the right foot forefoot  Full thickness wound noted to the right plantar forefoot, probe to soft tissue, tracking dorsally, no PTB, no malodor, no pus/purulence drainage, no fluctuance, no crepitus.     MEDICATIONS:  MEDICATIONS  (STANDING):  cefepime   IVPB 2000 milliGRAM(s) IV Intermittent every 12 hours  dextrose 5%. 1000 milliLiter(s) (100 mL/Hr) IV Continuous <Continuous>  dextrose 5%. 1000 milliLiter(s) (50 mL/Hr) IV Continuous <Continuous>  dextrose 50% Injectable 25 Gram(s) IV Push once  dextrose 50% Injectable 12.5 Gram(s) IV Push once  dextrose 50% Injectable 25 Gram(s) IV Push once  glucagon  Injectable 1 milliGRAM(s) IntraMuscular once  heparin   Injectable 5000 Unit(s) SubCutaneous every 8 hours  insulin glargine Injectable (LANTUS) 26 Unit(s) SubCutaneous at bedtime  insulin lispro (ADMELOG) corrective regimen sliding scale   SubCutaneous three times a day before meals  insulin lispro (ADMELOG) corrective regimen sliding scale   SubCutaneous at bedtime  insulin lispro Injectable (ADMELOG) 3 Unit(s) SubCutaneous three times a day before meals  metFORMIN 500 milliGRAM(s) Oral two times a day  vancomycin  IVPB 1250 milliGRAM(s) IV Intermittent every 12 hours      LABS: All Labs Reviewed:                        11.4   9.17  )-----------( 555      ( 26 Mar 2023 09:02 )             35.3     03-26    135  |  104  |  31<H>  ----------------------------<  248<H>  4.4   |  25  |  1.05    Ca    9.6      26 Mar 2023 09:02    TPro  8.1  /  Alb  2.1<L>  /  TBili  0.2  /  DBili  x   /  AST  48<H>  /  ALT  88<H>  /  AlkPhos  145<H>  03-26      I&O's Summary    25 Mar 2023 07:01  -  26 Mar 2023 07:00  --------------------------------------------------------  IN: 0 mL / OUT: 800 mL / NET: -800 mL    26 Mar 2023 07:01  -  26 Mar 2023 18:11  --------------------------------------------------------  IN: 0 mL / OUT: 600 mL / NET: -600 mL      CAPILLARY BLOOD GLUCOSE      POCT Blood Glucose.: 183 mg/dL (26 Mar 2023 17:34)  POCT Blood Glucose.: 328 mg/dL (26 Mar 2023 12:50)  POCT Blood Glucose.: 242 mg/dL (26 Mar 2023 07:50)  POCT Blood Glucose.: 235 mg/dL (25 Mar 2023 22:16)      RADIOLOGY/EKG:

## 2023-03-26 NOTE — PROGRESS NOTE ADULT - ASSESSMENT
61 yo male with PMHx of HTN, chronic back pain, anxiety, DM, HIV presents today to continue treatment for his R foot.     #Cellulitis vs early osteomyelitis R foot of diabetic pt  -Stable   - continue zosyn, vanco for 6 weeks   - reconsult ID: recs appreciated. continue vanco.   - podiatry consult read and appreciated  - neuropathic agents for analgesia    #DM II   -Hb A1c 11.5 on 03-15  -Pt is refusing pre meal insulin. He received lispro last night   -Will start metformin 500 mg po bid   -increase  Lantus 26 units q HS  -BGM  -ISS    #HIV  has been noncompliant  recent evaluation by ID no PCP/MAC prophylaxis  no ART  ID recs appreciated: will need to follow up outpatient with ID    #HTN  -Stable    continue monitoring   low Na diet   hydralazine prn    #Impaired visual acuity  will need assistance with  insulin    he cannot see to safely measure and address insulin usage    #Homelessness  pt was at transient hotel  does not live w family  social work    #VTE  sq hep.     #Dispo planning  - Pt is stable for discharge. Pending placement.    d/w Dr. Salcedo 63 yo male with PMHx of HTN, chronic back pain, anxiety, DM, HIV presents today to continue treatment for his R foot.     #Cellulitis vs early osteomyelitis R foot of diabetic pt  -Stable   - s/p zosyn,  -continue with IV cefepime and vanco for 6 weeks   - reconsult ID: recs appreciated.  - podiatry consult recs appreciated  - neuropathic agents for analgesia  -PICC line placement pending    #DM II   -Hb A1c 11.5 on 03-15  -Pt  refusing pre meal insulin. He received lispro last night   -Will start metformin 500 mg po bid   -increased Lantus 26 units q HS  -Increase intake in food intake throughout the day  -BGM  -ISS    #HIV  has been noncompliant  recent evaluation by ID no PCP/MAC prophylaxis  no ART  ID recs appreciated: will need to follow up outpatient with ID    #HTN  -Stable    continue monitoring   low Na diet   hydralazine prn    #Impaired visual acuity  will need assistance with  insulin    he cannot see to safely measure and address insulin usage    #Homelessness  pt was at transient hotel  does not live w family  social work    #VTE  sq hep.     #Dispo planning  - Pt is stable for discharge. Pending placement.

## 2023-03-27 LAB
ALBUMIN SERPL ELPH-MCNC: 1.8 G/DL — LOW (ref 3.3–5)
ALP SERPL-CCNC: 104 U/L — SIGNIFICANT CHANGE UP (ref 40–120)
ALT FLD-CCNC: 63 U/L — SIGNIFICANT CHANGE UP (ref 12–78)
ANION GAP SERPL CALC-SCNC: 6 MMOL/L — SIGNIFICANT CHANGE UP (ref 5–17)
AST SERPL-CCNC: 23 U/L — SIGNIFICANT CHANGE UP (ref 15–37)
BASOPHILS # BLD AUTO: 0.02 K/UL — SIGNIFICANT CHANGE UP (ref 0–0.2)
BASOPHILS NFR BLD AUTO: 0.3 % — SIGNIFICANT CHANGE UP (ref 0–2)
BILIRUB SERPL-MCNC: 0.2 MG/DL — SIGNIFICANT CHANGE UP (ref 0.2–1.2)
BUN SERPL-MCNC: 36 MG/DL — HIGH (ref 7–23)
CALCIUM SERPL-MCNC: 9 MG/DL — SIGNIFICANT CHANGE UP (ref 8.5–10.1)
CHLORIDE SERPL-SCNC: 106 MMOL/L — SIGNIFICANT CHANGE UP (ref 96–108)
CO2 SERPL-SCNC: 23 MMOL/L — SIGNIFICANT CHANGE UP (ref 22–31)
CREAT SERPL-MCNC: 1.07 MG/DL — SIGNIFICANT CHANGE UP (ref 0.5–1.3)
EGFR: 79 ML/MIN/1.73M2 — SIGNIFICANT CHANGE UP
EOSINOPHIL # BLD AUTO: 0.11 K/UL — SIGNIFICANT CHANGE UP (ref 0–0.5)
EOSINOPHIL NFR BLD AUTO: 1.4 % — SIGNIFICANT CHANGE UP (ref 0–6)
GLUCOSE BLDC GLUCOMTR-MCNC: 121 MG/DL — HIGH (ref 70–99)
GLUCOSE BLDC GLUCOMTR-MCNC: 137 MG/DL — HIGH (ref 70–99)
GLUCOSE BLDC GLUCOMTR-MCNC: 212 MG/DL — HIGH (ref 70–99)
GLUCOSE BLDC GLUCOMTR-MCNC: 259 MG/DL — HIGH (ref 70–99)
GLUCOSE SERPL-MCNC: 212 MG/DL — HIGH (ref 70–99)
HCT VFR BLD CALC: 33.5 % — LOW (ref 39–50)
HGB BLD-MCNC: 10.7 G/DL — LOW (ref 13–17)
IMM GRANULOCYTES NFR BLD AUTO: 3.5 % — HIGH (ref 0–0.9)
LYMPHOCYTES # BLD AUTO: 0.84 K/UL — LOW (ref 1–3.3)
LYMPHOCYTES # BLD AUTO: 10.9 % — LOW (ref 13–44)
MCHC RBC-ENTMCNC: 27.8 PG — SIGNIFICANT CHANGE UP (ref 27–34)
MCHC RBC-ENTMCNC: 31.9 GM/DL — LOW (ref 32–36)
MCV RBC AUTO: 87 FL — SIGNIFICANT CHANGE UP (ref 80–100)
MONOCYTES # BLD AUTO: 0.66 K/UL — SIGNIFICANT CHANGE UP (ref 0–0.9)
MONOCYTES NFR BLD AUTO: 8.5 % — SIGNIFICANT CHANGE UP (ref 2–14)
NEUTROPHILS # BLD AUTO: 5.84 K/UL — SIGNIFICANT CHANGE UP (ref 1.8–7.4)
NEUTROPHILS NFR BLD AUTO: 75.4 % — SIGNIFICANT CHANGE UP (ref 43–77)
PLATELET # BLD AUTO: 407 K/UL — HIGH (ref 150–400)
POTASSIUM SERPL-MCNC: 4.5 MMOL/L — SIGNIFICANT CHANGE UP (ref 3.5–5.3)
POTASSIUM SERPL-SCNC: 4.5 MMOL/L — SIGNIFICANT CHANGE UP (ref 3.5–5.3)
PROT SERPL-MCNC: 7 GM/DL — SIGNIFICANT CHANGE UP (ref 6–8.3)
RBC # BLD: 3.85 M/UL — LOW (ref 4.2–5.8)
RBC # FLD: 14 % — SIGNIFICANT CHANGE UP (ref 10.3–14.5)
SODIUM SERPL-SCNC: 135 MMOL/L — SIGNIFICANT CHANGE UP (ref 135–145)
VANCOMYCIN TROUGH SERPL-MCNC: 20.3 UG/ML — HIGH (ref 10–20)
WBC # BLD: 7.74 K/UL — SIGNIFICANT CHANGE UP (ref 3.8–10.5)
WBC # FLD AUTO: 7.74 K/UL — SIGNIFICANT CHANGE UP (ref 3.8–10.5)

## 2023-03-27 PROCEDURE — 99233 SBSQ HOSP IP/OBS HIGH 50: CPT | Mod: GC

## 2023-03-27 RX ADMIN — Medication 3 UNIT(S): at 08:32

## 2023-03-27 RX ADMIN — METFORMIN HYDROCHLORIDE 500 MILLIGRAM(S): 850 TABLET ORAL at 09:14

## 2023-03-27 RX ADMIN — HEPARIN SODIUM 5000 UNIT(S): 5000 INJECTION INTRAVENOUS; SUBCUTANEOUS at 06:49

## 2023-03-27 RX ADMIN — CEFEPIME 100 MILLIGRAM(S): 1 INJECTION, POWDER, FOR SOLUTION INTRAMUSCULAR; INTRAVENOUS at 10:16

## 2023-03-28 LAB
ALBUMIN SERPL ELPH-MCNC: 1.9 G/DL — LOW (ref 3.3–5)
ALP SERPL-CCNC: 106 U/L — SIGNIFICANT CHANGE UP (ref 40–120)
ALT FLD-CCNC: 61 U/L — SIGNIFICANT CHANGE UP (ref 12–78)
ANION GAP SERPL CALC-SCNC: 5 MMOL/L — SIGNIFICANT CHANGE UP (ref 5–17)
AST SERPL-CCNC: 32 U/L — SIGNIFICANT CHANGE UP (ref 15–37)
BASOPHILS # BLD AUTO: 0.01 K/UL — SIGNIFICANT CHANGE UP (ref 0–0.2)
BASOPHILS NFR BLD AUTO: 0.1 % — SIGNIFICANT CHANGE UP (ref 0–2)
BILIRUB SERPL-MCNC: 0.3 MG/DL — SIGNIFICANT CHANGE UP (ref 0.2–1.2)
BUN SERPL-MCNC: 25 MG/DL — HIGH (ref 7–23)
CALCIUM SERPL-MCNC: 9 MG/DL — SIGNIFICANT CHANGE UP (ref 8.5–10.1)
CHLORIDE SERPL-SCNC: 100 MMOL/L — SIGNIFICANT CHANGE UP (ref 96–108)
CO2 SERPL-SCNC: 26 MMOL/L — SIGNIFICANT CHANGE UP (ref 22–31)
CREAT SERPL-MCNC: 1.04 MG/DL — SIGNIFICANT CHANGE UP (ref 0.5–1.3)
EGFR: 82 ML/MIN/1.73M2 — SIGNIFICANT CHANGE UP
EOSINOPHIL # BLD AUTO: 0.05 K/UL — SIGNIFICANT CHANGE UP (ref 0–0.5)
EOSINOPHIL NFR BLD AUTO: 0.7 % — SIGNIFICANT CHANGE UP (ref 0–6)
GLUCOSE BLDC GLUCOMTR-MCNC: 185 MG/DL — HIGH (ref 70–99)
GLUCOSE BLDC GLUCOMTR-MCNC: 196 MG/DL — HIGH (ref 70–99)
GLUCOSE BLDC GLUCOMTR-MCNC: 212 MG/DL — HIGH (ref 70–99)
GLUCOSE BLDC GLUCOMTR-MCNC: 253 MG/DL — HIGH (ref 70–99)
GLUCOSE SERPL-MCNC: 187 MG/DL — HIGH (ref 70–99)
HCT VFR BLD CALC: 33.7 % — LOW (ref 39–50)
HGB BLD-MCNC: 11 G/DL — LOW (ref 13–17)
IMM GRANULOCYTES NFR BLD AUTO: 1.6 % — HIGH (ref 0–0.9)
LYMPHOCYTES # BLD AUTO: 1.2 K/UL — SIGNIFICANT CHANGE UP (ref 1–3.3)
LYMPHOCYTES # BLD AUTO: 16.4 % — SIGNIFICANT CHANGE UP (ref 13–44)
MCHC RBC-ENTMCNC: 27.7 PG — SIGNIFICANT CHANGE UP (ref 27–34)
MCHC RBC-ENTMCNC: 32.6 GM/DL — SIGNIFICANT CHANGE UP (ref 32–36)
MCV RBC AUTO: 84.9 FL — SIGNIFICANT CHANGE UP (ref 80–100)
MONOCYTES # BLD AUTO: 0.73 K/UL — SIGNIFICANT CHANGE UP (ref 0–0.9)
MONOCYTES NFR BLD AUTO: 10 % — SIGNIFICANT CHANGE UP (ref 2–14)
NEUTROPHILS # BLD AUTO: 5.19 K/UL — SIGNIFICANT CHANGE UP (ref 1.8–7.4)
NEUTROPHILS NFR BLD AUTO: 71.2 % — SIGNIFICANT CHANGE UP (ref 43–77)
PLATELET # BLD AUTO: 414 K/UL — HIGH (ref 150–400)
POTASSIUM SERPL-MCNC: 4.6 MMOL/L — SIGNIFICANT CHANGE UP (ref 3.5–5.3)
POTASSIUM SERPL-SCNC: 4.6 MMOL/L — SIGNIFICANT CHANGE UP (ref 3.5–5.3)
PROT SERPL-MCNC: 7.3 GM/DL — SIGNIFICANT CHANGE UP (ref 6–8.3)
RBC # BLD: 3.97 M/UL — LOW (ref 4.2–5.8)
RBC # FLD: 14.1 % — SIGNIFICANT CHANGE UP (ref 10.3–14.5)
SODIUM SERPL-SCNC: 131 MMOL/L — LOW (ref 135–145)
WBC # BLD: 7.3 K/UL — SIGNIFICANT CHANGE UP (ref 3.8–10.5)
WBC # FLD AUTO: 7.3 K/UL — SIGNIFICANT CHANGE UP (ref 3.8–10.5)

## 2023-03-28 PROCEDURE — 99233 SBSQ HOSP IP/OBS HIGH 50: CPT

## 2023-03-28 RX ADMIN — ONDANSETRON 4 MILLIGRAM(S): 8 TABLET, FILM COATED ORAL at 21:11

## 2023-03-28 RX ADMIN — INSULIN GLARGINE 26 UNIT(S): 100 INJECTION, SOLUTION SUBCUTANEOUS at 22:38

## 2023-03-28 RX ADMIN — METFORMIN HYDROCHLORIDE 500 MILLIGRAM(S): 850 TABLET ORAL at 22:35

## 2023-03-28 RX ADMIN — Medication 2: at 22:38

## 2023-03-28 RX ADMIN — Medication 5 MILLIGRAM(S): at 22:35

## 2023-03-28 RX ADMIN — Medication 30 MILLILITER(S): at 22:52

## 2023-03-28 RX ADMIN — CEFEPIME 100 MILLIGRAM(S): 1 INJECTION, POWDER, FOR SOLUTION INTRAMUSCULAR; INTRAVENOUS at 22:39

## 2023-03-28 RX ADMIN — HEPARIN SODIUM 5000 UNIT(S): 5000 INJECTION INTRAVENOUS; SUBCUTANEOUS at 22:36

## 2023-03-28 NOTE — DOWNTIME INTERRUPTION NOTE - WHICH MANUAL FORMS INITIATED?
see paper records for clinical information written  Medications as per eMar  Vital Sign Sheet  Integrated Progress Note  Patient Activity Flow Sheet  Pain management flow sheet
see paper records for clinical information written during SCM downtime  pain assessment   vital signs   activity flowsheet
See paper records for clinical information entered during downtime   intake/output form  Nursing progress Note  vital sign flowsheet  fall risk

## 2023-03-28 NOTE — PROGRESS NOTE ADULT - ASSESSMENT
63 yo male with PMHx of HTN, chronic back pain, anxiety, DM, HIV presents today to continue treatment for his R foot.     #Cellulitis vs early osteomyelitis R foot of diabetic pt  -Stable   - s/p zosyn,  -continue with IV cefepime and vanco for 6 weeks   - reconsult ID: recs appreciated.  - podiatry consult recs appreciated  - neuropathic agents for analgesia  -PICC line placement pending    #DM II   -Hb A1c 11.5 on 03-15  -Pt  refusing pre meal insulin. He received lispro last night   -Will start metformin 500 mg po bid   -increased Lantus 26 units q HS  -Increase intake in food intake throughout the day  -BGM  -ISS    #HIV  has been noncompliant  recent evaluation by ID no PCP/MAC prophylaxis  no ART  ID recs appreciated: will need to follow up outpatient with ID    #HTN  -Stable    continue monitoring   low Na diet   hydralazine prn    #Impaired visual acuity  will need assistance with  insulin    he cannot see to safely measure and address insulin usage    #Homelessness  pt was at transient hotel  does not live w family  social work    #VTE  sq hep.     #Dispo planning  - Pt is stable for discharge. Pending placement.    d/w Dr. Salcedo

## 2023-03-28 NOTE — PROGRESS NOTE ADULT - SUBJECTIVE AND OBJECTIVE BOX
63 yo male with PMHx of HTN, chronic back pain, anxiety, DM, HIV presents today to continue treatment for his R foot.  Patient was recently admitted on 3/14 and left AMA on 3/17.  Patient mentions he neeeded to leave AMA, because he was staying at a hotel and they were going to throw all his things away.  Patient was not taking any medications after leaving AMA and does not follow up with any physicians as outpatient, but will like to start following with one.   Patient states his right foot pain has been worsening for the past 3 weeks, when he was inpatient the pain improved, but after stopping taking everything the pain return to 10/10.  Patient denies any fever, chills, nausea, vomiting, chest pain, trauma to the foot.    In the ED /92, HR 81, Temp 99, RR 18 and SpO2 99% on RA.    Labs: Hb 11.5, WBC wnl,  Glucose 527, Na 133, Albumin 1.8, Lactate 1.9,   Patient was seen by podiatry and he was given Zosyn, Vancomycin, 2L IVF, 20UI Lantus, 4UI Admelog and 8UI humulin    subjective 3/28/23 : Patient seen and examined by medicine team. awaiting authorization to facility then PICC line      REVIEW OF SYSTEMS:  CONSTITUTIONAL: No weakness, fevers or chills  EYES/ENT: No visual changes;  No vertigo or throat pain   NECK: No pain or stiffness  RESPIRATORY: No cough, wheezing, hemoptysis; No shortness of breath  CARDIOVASCULAR: No chest pain or palpitations  GASTROINTESTINAL: No abdominal or epigastric pain. No nausea, vomiting, or hematemesis; No diarrhea or constipation. No melena or hematochezia.  GENITOURINARY: No dysuria, frequency or hematuria  NEUROLOGICAL: No numbness or weakness  SKIN: No itching, burning, rashes, or lesions   All other review of systems is negative unless indicated above    PHYSICAL EXAM:  Vital Signs Last 24 Hrs  T(C): 36.8 (28 Mar 2023 15:56), Max: 36.9 (28 Mar 2023 07:47)  T(F): 98.2 (28 Mar 2023 15:56), Max: 98.4 (28 Mar 2023 07:47)  HR: 84 (28 Mar 2023 15:56) (84 - 84)  BP: 117/65 (28 Mar 2023 15:56) (113/66 - 117/65)  BP(mean): --  RR: 18 (28 Mar 2023 15:56) (18 - 18)  SpO2: 98% (28 Mar 2023 15:56) (98% - 99%)    Parameters below as of 28 Mar 2023 15:56  Patient On (Oxygen Delivery Method): room air      Constitutional: Pt lying in bed, awake and alert, NAD  HEENT: normal hearing, moist mucous membranes  Neck: Soft and supple, no JVD  Respiratory: CTABL, No wheezing, rales or rhonchi  Cardiovascular: S1S2+, RRR, no M/G/R  Gastrointestinal: BS+, soft, NT/ND, no guarding, no rebound  Extremities: no pedal edema R foot with dressing in place   Vascular: 2+ peripheral pulses  Neurological: AAOx3, no focal deficits  Musculoskeletal: 5/5 strength b/l upper and lower extremities  Skin: Diffuse edema and erythema noted to the right foot forefoot  Full thickness wound noted to the right plantar forefoot, probe to soft tissue, tracking dorsally, no PTB, no malodor, no pus/purulence drainage, no fluctuance, no crepitus.     MEDICATIONS:  MEDICATIONS  (STANDING):  cefepime   IVPB 2000 milliGRAM(s) IV Intermittent every 12 hours  dextrose 5%. 1000 milliLiter(s) (100 mL/Hr) IV Continuous <Continuous>  dextrose 5%. 1000 milliLiter(s) (50 mL/Hr) IV Continuous <Continuous>  dextrose 50% Injectable 25 Gram(s) IV Push once  dextrose 50% Injectable 12.5 Gram(s) IV Push once  dextrose 50% Injectable 25 Gram(s) IV Push once  glucagon  Injectable 1 milliGRAM(s) IntraMuscular once  heparin   Injectable 5000 Unit(s) SubCutaneous every 8 hours  insulin glargine Injectable (LANTUS) 26 Unit(s) SubCutaneous at bedtime  insulin lispro (ADMELOG) corrective regimen sliding scale   SubCutaneous three times a day before meals  insulin lispro (ADMELOG) corrective regimen sliding scale   SubCutaneous at bedtime  insulin lispro Injectable (ADMELOG) 3 Unit(s) SubCutaneous three times a day before meals  metFORMIN 500 milliGRAM(s) Oral two times a day  vancomycin  IVPB 1250 milliGRAM(s) IV Intermittent every 12 hours      LABS: All Labs Reviewed:                        11.4   9.17  )-----------( 555      ( 26 Mar 2023 09:02 )             35.3     03-26    135  |  104  |  31<H>  ----------------------------<  248<H>  4.4   |  25  |  1.05    Ca    9.6      26 Mar 2023 09:02    TPro  8.1  /  Alb  2.1<L>  /  TBili  0.2  /  DBili  x   /  AST  48<H>  /  ALT  88<H>  /  AlkPhos  145<H>  03-26      I&O's Summary    25 Mar 2023 07:01  -  26 Mar 2023 07:00  --------------------------------------------------------  IN: 0 mL / OUT: 800 mL / NET: -800 mL    26 Mar 2023 07:01  -  26 Mar 2023 18:11  --------------------------------------------------------  IN: 0 mL / OUT: 600 mL / NET: -600 mL      CAPILLARY BLOOD GLUCOSE      POCT Blood Glucose.: 183 mg/dL (26 Mar 2023 17:34)  POCT Blood Glucose.: 328 mg/dL (26 Mar 2023 12:50)  POCT Blood Glucose.: 242 mg/dL (26 Mar 2023 07:50)  POCT Blood Glucose.: 235 mg/dL (25 Mar 2023 22:16)      RADIOLOGY/EKG:

## 2023-03-29 LAB
GLUCOSE BLDC GLUCOMTR-MCNC: 155 MG/DL — HIGH (ref 70–99)
GLUCOSE BLDC GLUCOMTR-MCNC: 170 MG/DL — HIGH (ref 70–99)
GLUCOSE BLDC GLUCOMTR-MCNC: 196 MG/DL — HIGH (ref 70–99)
GLUCOSE BLDC GLUCOMTR-MCNC: 205 MG/DL — HIGH (ref 70–99)

## 2023-03-29 PROCEDURE — 99233 SBSQ HOSP IP/OBS HIGH 50: CPT

## 2023-03-29 RX ORDER — FAMOTIDINE 10 MG/ML
20 INJECTION INTRAVENOUS DAILY
Refills: 0 | Status: DISCONTINUED | OUTPATIENT
Start: 2023-03-29 | End: 2023-04-07

## 2023-03-29 RX ORDER — KETOROLAC TROMETHAMINE 30 MG/ML
30 SYRINGE (ML) INJECTION ONCE
Refills: 0 | Status: DISCONTINUED | OUTPATIENT
Start: 2023-03-29 | End: 2023-03-29

## 2023-03-29 RX ORDER — SUCRALFATE 1 G
1 TABLET ORAL
Refills: 0 | Status: DISCONTINUED | OUTPATIENT
Start: 2023-03-29 | End: 2023-04-07

## 2023-03-29 RX ADMIN — Medication 30 MILLIGRAM(S): at 06:51

## 2023-03-29 RX ADMIN — Medication 30 MILLILITER(S): at 18:38

## 2023-03-29 RX ADMIN — Medication 1 GRAM(S): at 22:42

## 2023-03-29 RX ADMIN — METFORMIN HYDROCHLORIDE 500 MILLIGRAM(S): 850 TABLET ORAL at 10:51

## 2023-03-29 RX ADMIN — FAMOTIDINE 20 MILLIGRAM(S): 10 INJECTION INTRAVENOUS at 12:09

## 2023-03-29 RX ADMIN — Medication 166.67 MILLIGRAM(S): at 10:51

## 2023-03-29 RX ADMIN — HEPARIN SODIUM 5000 UNIT(S): 5000 INJECTION INTRAVENOUS; SUBCUTANEOUS at 06:30

## 2023-03-29 RX ADMIN — Medication 166.67 MILLIGRAM(S): at 00:02

## 2023-03-29 RX ADMIN — CEFEPIME 100 MILLIGRAM(S): 1 INJECTION, POWDER, FOR SOLUTION INTRAMUSCULAR; INTRAVENOUS at 10:50

## 2023-03-29 RX ADMIN — Medication 166.67 MILLIGRAM(S): at 22:42

## 2023-03-29 RX ADMIN — CEFEPIME 100 MILLIGRAM(S): 1 INJECTION, POWDER, FOR SOLUTION INTRAMUSCULAR; INTRAVENOUS at 22:42

## 2023-03-29 RX ADMIN — METFORMIN HYDROCHLORIDE 500 MILLIGRAM(S): 850 TABLET ORAL at 22:42

## 2023-03-29 RX ADMIN — Medication 5 MILLIGRAM(S): at 22:43

## 2023-03-29 RX ADMIN — HEPARIN SODIUM 5000 UNIT(S): 5000 INJECTION INTRAVENOUS; SUBCUTANEOUS at 14:22

## 2023-03-29 NOTE — CHART NOTE - NSCHARTNOTEFT_GEN_A_CORE
RN informed MD that patient was complaining of upper left quadrante pain. MD went to evaluate patient at bedside. Pt says for the past 2 days he has been having sharp/burning pain in his upper left quadrant. He noticed this after he took a large bowel movement. Denies any history of known ulcers. Not in obvious distress. Had percocet earlier which did not help the pain. VSS. TTP in left upper quadrant. Will give carafate to see if it will assuage his pain.

## 2023-03-29 NOTE — PROGRESS NOTE ADULT - SUBJECTIVE AND OBJECTIVE BOX
61 yo male with PMHx of HTN, chronic back pain, anxiety, DM, HIV presents today to continue treatment for his R foot.  Patient was recently admitted on 3/14 and left AMA on 3/17.  Patient mentions he neeeded to leave AMA, because he was staying at a hotel and they were going to throw all his things away.  Patient was not taking any medications after leaving AMA and does not follow up with any physicians as outpatient, but will like to start following with one.   Patient states his right foot pain has been worsening for the past 3 weeks, when he was inpatient the pain improved, but after stopping taking everything the pain return to 10/10.  Patient denies any fever, chills, nausea, vomiting, chest pain, trauma to the foot.    In the ED /92, HR 81, Temp 99, RR 18 and SpO2 99% on RA.    Labs: Hb 11.5, WBC wnl,  Glucose 527, Na 133, Albumin 1.8, Lactate 1.9,   Patient was seen by podiatry and he was given Zosyn, Vancomycin, 2L IVF, 20UI Lantus, 4UI Admelog and 8UI humulin    3/29/23 : Patient seen and examined by medicine team. awaiting authorization to facility then PICC line. Patient had complaints of burning epigastric and LUQ abdominal pain. Famotidine 20 mg ordered. Denies nausea and vomiting.     PHYSICAL EXAM:  Vital Signs Last 24 Hrs  T(C): 36.9 (29 Mar 2023 15:32), Max: 37 (28 Mar 2023 22:50)  T(F): 98.5 (29 Mar 2023 15:32), Max: 98.6 (28 Mar 2023 22:50)  HR: 75 (29 Mar 2023 15:32) (75 - 100)  BP: 103/54 (29 Mar 2023 15:32) (103/54 - 138/66)  BP(mean): --  RR: 18 (29 Mar 2023 15:32) (17 - 18)  SpO2: 98% (29 Mar 2023 15:32) (97% - 98%)    Parameters below as of 29 Mar 2023 15:32  Patient On (Oxygen Delivery Method): room air      Constitutional: Pt lying in bed, awake and alert, NAD  HEENT: normal hearing, moist mucous membranes  Neck: Soft and supple, no JVD  Respiratory: CTABL, No wheezing, rales or rhonchi  Cardiovascular: S1S2+, RRR, no M/G/R  Gastrointestinal: BS+, soft, +TTP of epigastric and LUQ abdominal pain, no guarding, no rebound  Extremities: no pedal edema R foot with dressing in place   Vascular: 2+ peripheral pulses  Neurological: AAOx3, no focal deficits  Musculoskeletal: 5/5 strength b/l upper and lower extremities  Skin: Dressings currently in place. Diffuse edema and erythema noted to the right foot forefoot  Full thickness wound noted to the right plantar forefoot, probe to soft tissue, tracking dorsally, no PTB, no malodor, no pus/purulence drainage, no fluctuance, no crepitus.      MEDICATIONS:  MEDICATIONS  (STANDING):  cefepime   IVPB 2000 milliGRAM(s) IV Intermittent every 12 hours  dextrose 5%. 1000 milliLiter(s) (50 mL/Hr) IV Continuous <Continuous>  dextrose 5%. 1000 milliLiter(s) (100 mL/Hr) IV Continuous <Continuous>  dextrose 50% Injectable 25 Gram(s) IV Push once  dextrose 50% Injectable 12.5 Gram(s) IV Push once  dextrose 50% Injectable 25 Gram(s) IV Push once  famotidine    Tablet 20 milliGRAM(s) Oral daily  glucagon  Injectable 1 milliGRAM(s) IntraMuscular once  heparin   Injectable 5000 Unit(s) SubCutaneous every 8 hours  insulin glargine Injectable (LANTUS) 26 Unit(s) SubCutaneous at bedtime  insulin lispro (ADMELOG) corrective regimen sliding scale   SubCutaneous three times a day before meals  insulin lispro (ADMELOG) corrective regimen sliding scale   SubCutaneous at bedtime  insulin lispro Injectable (ADMELOG) 3 Unit(s) SubCutaneous three times a day before meals  metFORMIN 500 milliGRAM(s) Oral two times a day  sucralfate 1 Gram(s) Oral two times a day  vancomycin  IVPB 1250 milliGRAM(s) IV Intermittent every 12 hours      LABS: All Labs Reviewed:                        11.0   7.30  )-----------( 414      ( 28 Mar 2023 07:36 )             33.7     03-28    131<L>  |  100  |  25<H>  ----------------------------<  187<H>  4.6   |  26  |  1.04    Ca    9.0      28 Mar 2023 07:36    TPro  7.3  /  Alb  1.9<L>  /  TBili  0.3  /  DBili  x   /  AST  32  /  ALT  61  /  AlkPhos  106  03-28      CAPILLARY BLOOD GLUCOSE  POCT Blood Glucose.: 196 mg/dL (29 Mar 2023 16:52)  POCT Blood Glucose.: 170 mg/dL (29 Mar 2023 11:43)  POCT Blood Glucose.: 155 mg/dL (29 Mar 2023 08:31)  POCT Blood Glucose.: 253 mg/dL (28 Mar 2023 22:30)      RADIOLOGY/EKG:  < from: Xray Foot AP + Lateral + Oblique, Right (03.20.23 @ 16:38) > & Xray Chest 1 view  IMPRESSION: Small density left lung now seen possibly infiltrate related.    Persistent bony findings around the first MTP joint. There is increased   swelling around the second and third toes.    < from: MR Foot No Cont, Right (03.17.23 @ 10:45) >  IMPRESSION:  1.  Osseous edema within the second, third and fourth proximal to mid   phalanges without loss of normal T1 fatty marrow. Phlegmonous changes and   soft tissue wound at the plantar aspect of the second through third   phalanges. These findings are likely secondary to vascular etiology   versus early osteomyelitis given adjacent phlegmonous changes. Clinically   correlate with physical exam and wound depth.  2.  Moderate first metatarsophalangeal joint osteoarthritis with edema   spanning the joint and osseous erosions/cystic changes along the medial   aspect. These findings are secondary to osteomyelitis versus moderate to   severe osteoarthritis with subchondral cystic changes

## 2023-03-29 NOTE — PROGRESS NOTE ADULT - ASSESSMENT
63 yo male with PMHx of HTN, chronic back pain, anxiety, DM, HIV presents today to continue treatment for his R foot.     #Cellulitis vs early osteomyelitis R foot of diabetic pt  -Stable   - s/p zosyn,  -continue with IV cefepime and vanco for 6 weeks   - reconsult ID: recs appreciated.  - podiatry consult recs appreciated  - neuropathic agents for analgesia  -PICC line placement pending    #Abdominal pain  -pt reports epigastric and LUQ abdominal pain  -Describes abdominal pain as burning; Denies nausea and vomiting  -on percocet for pain  -Famotidine 20 prescribed    #DM II   -Hb A1c 11.5 on 03-15  -Pt  refusing pre meal insulin. He received lispro last night   -Will start metformin 500 mg po bid   -increased Lantus 26 units q HS  -Increase intake in food intake throughout the day  -BGM  -ISS    #HIV  has been noncompliant  recent evaluation by ID no PCP/MAC prophylaxis  no ART  ID recs appreciated: will need to follow up outpatient with ID    #HTN  -Stable    continue monitoring   low Na diet   hydralazine prn    #Impaired visual acuity  will need assistance with  insulin    he cannot see to safely measure and address insulin usage    #Homelessness  pt was at transient hotel  does not live w family  social work    #VTE  sq hep.     #Dispo planning  - Pt is stable for discharge. Pending placement.    d/w Dr. Salcedo

## 2023-03-30 LAB
ADD ON TEST-SPECIMEN IN LAB: SIGNIFICANT CHANGE UP
ALBUMIN SERPL ELPH-MCNC: 2 G/DL — LOW (ref 3.3–5)
ALP SERPL-CCNC: 95 U/L — SIGNIFICANT CHANGE UP (ref 40–120)
ALT FLD-CCNC: 54 U/L — SIGNIFICANT CHANGE UP (ref 12–78)
ANION GAP SERPL CALC-SCNC: 4 MMOL/L — LOW (ref 5–17)
AST SERPL-CCNC: 25 U/L — SIGNIFICANT CHANGE UP (ref 15–37)
BILIRUB SERPL-MCNC: 0.2 MG/DL — SIGNIFICANT CHANGE UP (ref 0.2–1.2)
BUN SERPL-MCNC: 39 MG/DL — HIGH (ref 7–23)
CALCIUM SERPL-MCNC: 8.9 MG/DL — SIGNIFICANT CHANGE UP (ref 8.5–10.1)
CHLORIDE SERPL-SCNC: 99 MMOL/L — SIGNIFICANT CHANGE UP (ref 96–108)
CO2 SERPL-SCNC: 27 MMOL/L — SIGNIFICANT CHANGE UP (ref 22–31)
CREAT SERPL-MCNC: 1.57 MG/DL — HIGH (ref 0.5–1.3)
EGFR: 50 ML/MIN/1.73M2 — LOW
GLUCOSE BLDC GLUCOMTR-MCNC: 156 MG/DL — HIGH (ref 70–99)
GLUCOSE BLDC GLUCOMTR-MCNC: 176 MG/DL — HIGH (ref 70–99)
GLUCOSE BLDC GLUCOMTR-MCNC: 227 MG/DL — HIGH (ref 70–99)
GLUCOSE BLDC GLUCOMTR-MCNC: 235 MG/DL — HIGH (ref 70–99)
GLUCOSE SERPL-MCNC: 235 MG/DL — HIGH (ref 70–99)
HCT VFR BLD CALC: 34 % — LOW (ref 39–50)
HGB BLD-MCNC: 11 G/DL — LOW (ref 13–17)
MAGNESIUM SERPL-MCNC: 2.2 MG/DL — SIGNIFICANT CHANGE UP (ref 1.6–2.6)
MCHC RBC-ENTMCNC: 27.6 PG — SIGNIFICANT CHANGE UP (ref 27–34)
MCHC RBC-ENTMCNC: 32.4 GM/DL — SIGNIFICANT CHANGE UP (ref 32–36)
MCV RBC AUTO: 85.4 FL — SIGNIFICANT CHANGE UP (ref 80–100)
PHOSPHATE SERPL-MCNC: 3.2 MG/DL — SIGNIFICANT CHANGE UP (ref 2.5–4.5)
PLATELET # BLD AUTO: 306 K/UL — SIGNIFICANT CHANGE UP (ref 150–400)
POTASSIUM SERPL-MCNC: 5 MMOL/L — SIGNIFICANT CHANGE UP (ref 3.5–5.3)
POTASSIUM SERPL-SCNC: 5 MMOL/L — SIGNIFICANT CHANGE UP (ref 3.5–5.3)
PROT SERPL-MCNC: 7.4 GM/DL — SIGNIFICANT CHANGE UP (ref 6–8.3)
RBC # BLD: 3.98 M/UL — LOW (ref 4.2–5.8)
RBC # FLD: 13.9 % — SIGNIFICANT CHANGE UP (ref 10.3–14.5)
SARS-COV-2 RNA SPEC QL NAA+PROBE: SIGNIFICANT CHANGE UP
SODIUM SERPL-SCNC: 130 MMOL/L — LOW (ref 135–145)
TROPONIN I, HIGH SENSITIVITY RESULT: 8.5 NG/L — SIGNIFICANT CHANGE UP
WBC # BLD: 4.74 K/UL — SIGNIFICANT CHANGE UP (ref 3.8–10.5)
WBC # FLD AUTO: 4.74 K/UL — SIGNIFICANT CHANGE UP (ref 3.8–10.5)

## 2023-03-30 PROCEDURE — 93010 ELECTROCARDIOGRAM REPORT: CPT

## 2023-03-30 PROCEDURE — 99232 SBSQ HOSP IP/OBS MODERATE 35: CPT | Mod: GC

## 2023-03-30 RX ORDER — SODIUM CHLORIDE 9 MG/ML
1000 INJECTION INTRAMUSCULAR; INTRAVENOUS; SUBCUTANEOUS
Refills: 0 | Status: DISCONTINUED | OUTPATIENT
Start: 2023-03-30 | End: 2023-04-07

## 2023-03-30 RX ADMIN — FAMOTIDINE 20 MILLIGRAM(S): 10 INJECTION INTRAVENOUS at 09:21

## 2023-03-30 RX ADMIN — Medication 30 MILLILITER(S): at 05:28

## 2023-03-30 RX ADMIN — CEFEPIME 100 MILLIGRAM(S): 1 INJECTION, POWDER, FOR SOLUTION INTRAMUSCULAR; INTRAVENOUS at 09:21

## 2023-03-30 RX ADMIN — METFORMIN HYDROCHLORIDE 500 MILLIGRAM(S): 850 TABLET ORAL at 09:20

## 2023-03-30 RX ADMIN — Medication 1 GRAM(S): at 09:20

## 2023-03-30 RX ADMIN — METFORMIN HYDROCHLORIDE 500 MILLIGRAM(S): 850 TABLET ORAL at 22:26

## 2023-03-30 RX ADMIN — CEFEPIME 100 MILLIGRAM(S): 1 INJECTION, POWDER, FOR SOLUTION INTRAMUSCULAR; INTRAVENOUS at 22:25

## 2023-03-30 RX ADMIN — Medication 1 GRAM(S): at 20:40

## 2023-03-30 RX ADMIN — Medication 166.67 MILLIGRAM(S): at 22:21

## 2023-03-30 RX ADMIN — SODIUM CHLORIDE 100 MILLILITER(S): 9 INJECTION INTRAMUSCULAR; INTRAVENOUS; SUBCUTANEOUS at 18:30

## 2023-03-30 RX ADMIN — Medication 166.67 MILLIGRAM(S): at 10:08

## 2023-03-30 NOTE — PROGRESS NOTE ADULT - SUBJECTIVE AND OBJECTIVE BOX
63 yo male with PMHx of HTN, chronic back pain, anxiety, DM, HIV presents today to continue treatment for his R foot.  Patient was recently admitted on 3/14 and left AMA on 3/17.  Patient mentions he neeeded to leave AMA, because he was staying at a hotel and they were going to throw all his things away.  Patient was not taking any medications after leaving AMA and does not follow up with any physicians as outpatient, but will like to start following with one.   Patient states his right foot pain has been worsening for the past 3 weeks, when he was inpatient the pain improved, but after stopping taking everything the pain return to 10/10.  Patient denies any fever, chills, nausea, vomiting, chest pain, trauma to the foot.    In the ED /92, HR 81, Temp 99, RR 18 and SpO2 99% on RA.    Labs: Hb 11.5, WBC wnl,  Glucose 527, Na 133, Albumin 1.8, Lactate 1.9,   Patient was seen by podiatry and he was given Zosyn, Vancomycin, 2L IVF, 20UI Lantus, 4UI Admelog and 8UI humulin    3/29/23 : Patient seen and examined by medicine team. awaiting authorization to facility then PICC line. Patient had complaints of burning epigastric and LUQ abdominal pain. Famotidine 20 mg ordered. Denies nausea and vomiting.     PHYSICAL EXAM:  Vital Signs Last 24 Hrs  T(C): 36.9 (29 Mar 2023 15:32), Max: 37 (28 Mar 2023 22:50)  T(F): 98.5 (29 Mar 2023 15:32), Max: 98.6 (28 Mar 2023 22:50)  HR: 75 (29 Mar 2023 15:32) (75 - 100)  BP: 103/54 (29 Mar 2023 15:32) (103/54 - 138/66)  BP(mean): --  RR: 18 (29 Mar 2023 15:32) (17 - 18)  SpO2: 98% (29 Mar 2023 15:32) (97% - 98%)    Parameters below as of 29 Mar 2023 15:32  Patient On (Oxygen Delivery Method): room air      Constitutional: Pt lying in bed, awake and alert, NAD  HEENT: normal hearing, moist mucous membranes  Neck: Soft and supple, no JVD  Respiratory: CTABL, No wheezing, rales or rhonchi  Cardiovascular: S1S2+, RRR, no M/G/R  Gastrointestinal: BS+, soft, +TTP of epigastric and LUQ abdominal pain, no guarding, no rebound  Extremities: no pedal edema R foot with dressing in place   Vascular: 2+ peripheral pulses  Neurological: AAOx3, no focal deficits  Musculoskeletal: 5/5 strength b/l upper and lower extremities  Skin: Dressings currently in place. Diffuse edema and erythema noted to the right foot forefoot  Full thickness wound noted to the right plantar forefoot, probe to soft tissue, tracking dorsally, no PTB, no malodor, no pus/purulence drainage, no fluctuance, no crepitus.      MEDICATIONS:  MEDICATIONS  (STANDING):  cefepime   IVPB 2000 milliGRAM(s) IV Intermittent every 12 hours  dextrose 5%. 1000 milliLiter(s) (50 mL/Hr) IV Continuous <Continuous>  dextrose 5%. 1000 milliLiter(s) (100 mL/Hr) IV Continuous <Continuous>  dextrose 50% Injectable 25 Gram(s) IV Push once  dextrose 50% Injectable 12.5 Gram(s) IV Push once  dextrose 50% Injectable 25 Gram(s) IV Push once  famotidine    Tablet 20 milliGRAM(s) Oral daily  glucagon  Injectable 1 milliGRAM(s) IntraMuscular once  heparin   Injectable 5000 Unit(s) SubCutaneous every 8 hours  insulin glargine Injectable (LANTUS) 26 Unit(s) SubCutaneous at bedtime  insulin lispro (ADMELOG) corrective regimen sliding scale   SubCutaneous three times a day before meals  insulin lispro (ADMELOG) corrective regimen sliding scale   SubCutaneous at bedtime  insulin lispro Injectable (ADMELOG) 3 Unit(s) SubCutaneous three times a day before meals  metFORMIN 500 milliGRAM(s) Oral two times a day  sucralfate 1 Gram(s) Oral two times a day  vancomycin  IVPB 1250 milliGRAM(s) IV Intermittent every 12 hours      LABS: All Labs Reviewed:                        11.0   7.30  )-----------( 414      ( 28 Mar 2023 07:36 )             33.7     03-28    131<L>  |  100  |  25<H>  ----------------------------<  187<H>  4.6   |  26  |  1.04    Ca    9.0      28 Mar 2023 07:36    TPro  7.3  /  Alb  1.9<L>  /  TBili  0.3  /  DBili  x   /  AST  32  /  ALT  61  /  AlkPhos  106  03-28      CAPILLARY BLOOD GLUCOSE  POCT Blood Glucose.: 196 mg/dL (29 Mar 2023 16:52)  POCT Blood Glucose.: 170 mg/dL (29 Mar 2023 11:43)  POCT Blood Glucose.: 155 mg/dL (29 Mar 2023 08:31)  POCT Blood Glucose.: 253 mg/dL (28 Mar 2023 22:30)      RADIOLOGY/EKG:  < from: Xray Foot AP + Lateral + Oblique, Right (03.20.23 @ 16:38) > & Xray Chest 1 view  IMPRESSION: Small density left lung now seen possibly infiltrate related.    Persistent bony findings around the first MTP joint. There is increased   swelling around the second and third toes.    < from: MR Foot No Cont, Right (03.17.23 @ 10:45) >  IMPRESSION:  1.  Osseous edema within the second, third and fourth proximal to mid   phalanges without loss of normal T1 fatty marrow. Phlegmonous changes and   soft tissue wound at the plantar aspect of the second through third   phalanges. These findings are likely secondary to vascular etiology   versus early osteomyelitis given adjacent phlegmonous changes. Clinically   correlate with physical exam and wound depth.  2.  Moderate first metatarsophalangeal joint osteoarthritis with edema   spanning the joint and osseous erosions/cystic changes along the medial   aspect. These findings are secondary to osteomyelitis versus moderate to   severe osteoarthritis with subchondral cystic changes               61 yo male with PMHx of HTN, chronic back pain, anxiety, DM, HIV presents today to continue treatment for his R foot.  Patient was recently admitted on 3/14 and left AMA on 3/17.  Patient mentions he neeeded to leave AMA, because he was staying at a hotel and they were going to throw all his things away.  Patient was not taking any medications after leaving AMA and does not follow up with any physicians as outpatient, but will like to start following with one.   Patient states his right foot pain has been worsening for the past 3 weeks, when he was inpatient the pain improved, but after stopping taking everything the pain return to 10/10.  Patient denies any fever, chills, nausea, vomiting, chest pain, trauma to the foot.    In the ED /92, HR 81, Temp 99, RR 18 and SpO2 99% on RA.    Labs: Hb 11.5, WBC wnl,  Glucose 527, Na 133, Albumin 1.8, Lactate 1.9,   Patient was seen by podiatry and he was given Zosyn, Vancomycin, 2L IVF, 20UI Lantus, 4UI Admelog and 8UI humulin    3/30/23 : Patient seen and examined by medicine team. Awaiting authorization to facility, likely [facility] and then PICC line placement. Patient had complaints of burning epigastric and LUQ abdominal pain. Famotidine 20 mg ordered. Carafete ordered. Patient stated that he was experiencing     PHYSICAL EXAM:  Vital Signs Last 24 Hrs  T(C): 36.9 (29 Mar 2023 15:32), Max: 37 (28 Mar 2023 22:50)  T(F): 98.5 (29 Mar 2023 15:32), Max: 98.6 (28 Mar 2023 22:50)  HR: 75 (29 Mar 2023 15:32) (75 - 100)  BP: 103/54 (29 Mar 2023 15:32) (103/54 - 138/66)  BP(mean): --  RR: 18 (29 Mar 2023 15:32) (17 - 18)  SpO2: 98% (29 Mar 2023 15:32) (97% - 98%)    Parameters below as of 29 Mar 2023 15:32  Patient On (Oxygen Delivery Method): room air      Constitutional: Pt lying in bed, awake and alert, NAD  HEENT: normal hearing, moist mucous membranes  Neck: Soft and supple, no JVD  Respiratory: CTABL, No wheezing, rales or rhonchi  Cardiovascular: S1S2+, RRR, no M/G/R  Gastrointestinal: BS+, soft, +TTP of epigastric and LUQ abdominal pain, no guarding, no rebound  Extremities: no pedal edema R foot with dressing in place   Vascular: 2+ peripheral pulses  Neurological: AAOx3, no focal deficits  Musculoskeletal: 5/5 strength b/l upper and lower extremities  Skin: Dressings currently in place. Diffuse edema and erythema noted to the right foot forefoot  Full thickness wound noted to the right plantar forefoot, probe to soft tissue, tracking dorsally, no PTB, no malodor, no pus/purulence drainage, no fluctuance, no crepitus.      MEDICATIONS:  MEDICATIONS  (STANDING):  cefepime   IVPB 2000 milliGRAM(s) IV Intermittent every 12 hours  dextrose 5%. 1000 milliLiter(s) (50 mL/Hr) IV Continuous <Continuous>  dextrose 5%. 1000 milliLiter(s) (100 mL/Hr) IV Continuous <Continuous>  dextrose 50% Injectable 25 Gram(s) IV Push once  dextrose 50% Injectable 12.5 Gram(s) IV Push once  dextrose 50% Injectable 25 Gram(s) IV Push once  famotidine    Tablet 20 milliGRAM(s) Oral daily  glucagon  Injectable 1 milliGRAM(s) IntraMuscular once  heparin   Injectable 5000 Unit(s) SubCutaneous every 8 hours  insulin glargine Injectable (LANTUS) 26 Unit(s) SubCutaneous at bedtime  insulin lispro (ADMELOG) corrective regimen sliding scale   SubCutaneous three times a day before meals  insulin lispro (ADMELOG) corrective regimen sliding scale   SubCutaneous at bedtime  insulin lispro Injectable (ADMELOG) 3 Unit(s) SubCutaneous three times a day before meals  metFORMIN 500 milliGRAM(s) Oral two times a day  sucralfate 1 Gram(s) Oral two times a day  vancomycin  IVPB 1250 milliGRAM(s) IV Intermittent every 12 hours      LABS: All Labs Reviewed:                        11.0   7.30  )-----------( 414      ( 28 Mar 2023 07:36 )             33.7     03-28    131<L>  |  100  |  25<H>  ----------------------------<  187<H>  4.6   |  26  |  1.04    Ca    9.0      28 Mar 2023 07:36    TPro  7.3  /  Alb  1.9<L>  /  TBili  0.3  /  DBili  x   /  AST  32  /  ALT  61  /  AlkPhos  106  03-28      CAPILLARY BLOOD GLUCOSE  POCT Blood Glucose.: 196 mg/dL (29 Mar 2023 16:52)  POCT Blood Glucose.: 170 mg/dL (29 Mar 2023 11:43)  POCT Blood Glucose.: 155 mg/dL (29 Mar 2023 08:31)  POCT Blood Glucose.: 253 mg/dL (28 Mar 2023 22:30)      RADIOLOGY/EKG:  < from: Xray Foot AP + Lateral + Oblique, Right (03.20.23 @ 16:38) > & Xray Chest 1 view  IMPRESSION: Small density left lung now seen possibly infiltrate related.    Persistent bony findings around the first MTP joint. There is increased   swelling around the second and third toes.    < from: MR Foot No Cont, Right (03.17.23 @ 10:45) >  IMPRESSION:  1.  Osseous edema within the second, third and fourth proximal to mid   phalanges without loss of normal T1 fatty marrow. Phlegmonous changes and   soft tissue wound at the plantar aspect of the second through third   phalanges. These findings are likely secondary to vascular etiology   versus early osteomyelitis given adjacent phlegmonous changes. Clinically   correlate with physical exam and wound depth.  2.  Moderate first metatarsophalangeal joint osteoarthritis with edema   spanning the joint and osseous erosions/cystic changes along the medial   aspect. These findings are secondary to osteomyelitis versus moderate to   severe osteoarthritis with subchondral cystic changes               61 yo male with PMHx of HTN, chronic back pain, anxiety, DM, HIV presents today to continue treatment for his R foot.  Patient was recently admitted on 3/14 and left AMA on 3/17.  Patient mentions he neeeded to leave AMA, because he was staying at a hotel and they were going to throw all his things away.  Patient was not taking any medications after leaving AMA and does not follow up with any physicians as outpatient, but will like to start following with one.   Patient states his right foot pain has been worsening for the past 3 weeks, when he was inpatient the pain improved, but after stopping taking everything the pain return to 10/10.  Patient denies any fever, chills, nausea, vomiting, chest pain, trauma to the foot.    In the ED /92, HR 81, Temp 99, RR 18 and SpO2 99% on RA.    Labs: Hb 11.5, WBC wnl,  Glucose 527, Na 133, Albumin 1.8, Lactate 1.9,   Patient was seen by podiatry and he was given Zosyn, Vancomycin, 2L IVF, 20UI Lantus, 4UI Admelog and 8UI humulin    3/30/23 : Patient seen and examined by medicine team. Awaiting authorization to facility, likely [facility] and then PICC line placement. Patient had complaints of burning epigastric and LUQ abdominal pain. Famotidine 20 mg ordered. Carafete ordered. Patient stated that he was experiencing chest pain. EKG ordered and troponin. CBC, CMP, Mg and Phos ordered.      PHYSICAL EXAM:  Vital Signs Last 24 Hrs  T(C): 37.4 (30 Mar 2023 15:15), Max: 37.4 (30 Mar 2023 15:15)  T(F): 99.4 (30 Mar 2023 15:15), Max: 99.4 (30 Mar 2023 15:15)  HR: 83 (30 Mar 2023 15:15) (83 - 98)  BP: 130/57 (30 Mar 2023 15:15) (127/66 - 134/77)  BP(mean): --  RR: 17 (30 Mar 2023 15:15) (17 - 18)  SpO2: 95% (30 Mar 2023 15:15) (94% - 100%)    Parameters below as of 30 Mar 2023 15:15  Patient On (Oxygen Delivery Method): room air    Constitutional: Pt lying in bed, awake and alert, NAD  HEENT: normal hearing, moist mucous membranes  Neck: Soft and supple, no JVD  Respiratory: CTABL, No wheezing, rales or rhonchi  Cardiovascular: S1S2+, RRR, no M/G/R  Gastrointestinal: BS+, soft, +TTP of epigastric and LUQ abdominal pain, no guarding, no rebound  Extremities: no pedal edema R foot with dressing in place   Vascular: 2+ peripheral pulses  Neurological: AAOx3, no focal deficits  Musculoskeletal: 5/5 strength b/l upper and lower extremities  Skin: Dressings currently in place. Diffuse edema and erythema noted to the right foot forefoot  Full thickness wound noted to the right plantar forefoot, probe to soft tissue, tracking dorsally, no PTB, no malodor, no pus/purulence drainage, no fluctuance, no crepitus.    MEDICATIONS:  MEDICATIONS  (STANDING):  cefepime   IVPB 2000 milliGRAM(s) IV Intermittent every 12 hours  dextrose 5%. 1000 milliLiter(s) (50 mL/Hr) IV Continuous <Continuous>  dextrose 5%. 1000 milliLiter(s) (100 mL/Hr) IV Continuous <Continuous>  dextrose 50% Injectable 25 Gram(s) IV Push once  dextrose 50% Injectable 12.5 Gram(s) IV Push once  dextrose 50% Injectable 25 Gram(s) IV Push once  famotidine    Tablet 20 milliGRAM(s) Oral daily  glucagon  Injectable 1 milliGRAM(s) IntraMuscular once  heparin   Injectable 5000 Unit(s) SubCutaneous every 8 hours  insulin glargine Injectable (LANTUS) 26 Unit(s) SubCutaneous at bedtime  insulin lispro (ADMELOG) corrective regimen sliding scale   SubCutaneous three times a day before meals  insulin lispro (ADMELOG) corrective regimen sliding scale   SubCutaneous at bedtime  insulin lispro Injectable (ADMELOG) 3 Unit(s) SubCutaneous three times a day before meals  metFORMIN 500 milliGRAM(s) Oral two times a day  sodium chloride 0.9%. 1000 milliLiter(s) (100 mL/Hr) IV Continuous <Continuous>  sucralfate 1 Gram(s) Oral two times a day  vancomycin  IVPB 1250 milliGRAM(s) IV Intermittent every 12 hours      LABS: All Labs Reviewed:                        11.0   4.74  )-----------( 306      ( 30 Mar 2023 10:34 )             34.0     03-30    130<L>  |  99  |  39<H>  ----------------------------<  235<H>  5.0   |  27  |  1.57<H>    Ca    8.9      30 Mar 2023 10:34  Phos  3.2     03-30  Mg     2.2     03-30    TPro  7.4  /  Alb  2.0<L>  /  TBili  0.2  /  DBili  x   /  AST  25  /  ALT  54  /  AlkPhos  95  03-30      I&O's Summary    30 Mar 2023 07:01  -  30 Mar 2023 16:17  --------------------------------------------------------  IN: 0 mL / OUT: 1900 mL / NET: -1900 mL      CAPILLARY BLOOD GLUCOSE  POCT Blood Glucose.: 235 mg/dL (30 Mar 2023 12:19)  POCT Blood Glucose.: 176 mg/dL (30 Mar 2023 08:18)  POCT Blood Glucose.: 205 mg/dL (29 Mar 2023 22:41)  POCT Blood Glucose.: 196 mg/dL (29 Mar 2023 16:52)      RADIOLOGY/EKG:  < from: Xray Foot AP + Lateral + Oblique, Right (03.20.23 @ 16:38) > & Xray Chest 1 view  IMPRESSION: Small density left lung now seen possibly infiltrate related.    Persistent bony findings around the first MTP joint. There is increased   swelling around the second and third toes.    < from: MR Foot No Cont, Right (03.17.23 @ 10:45) >  IMPRESSION:  1.  Osseous edema within the second, third and fourth proximal to mid   phalanges without loss of normal T1 fatty marrow. Phlegmonous changes and   soft tissue wound at the plantar aspect of the second through third   phalanges. These findings are likely secondary to vascular etiology   versus early osteomyelitis given adjacent phlegmonous changes. Clinically   correlate with physical exam and wound depth.  2.  Moderate first metatarsophalangeal joint osteoarthritis with edema   spanning the joint and osseous erosions/cystic changes along the medial   aspect. These findings are secondary to osteomyelitis versus moderate to   severe osteoarthritis with subchondral cystic changes

## 2023-03-30 NOTE — PROGRESS NOTE ADULT - ATTENDING COMMENTS
Patient seen and examined at the bedside. Please see resident note for full details regarding the service.     General: Awake and alert, cooperative with exam. No acute distress.   Cardiology: Normal S1, S2. No murmurs. RRR. + tenderness on palpation of the chest  Respiratory: Lungs CTABL. No wheezes, rales, or rhonchi.   Gastrointestinal: + BS. Soft. NT. ND. No guarding, rigidity, or rebound tenderness.  Extremities: No peripheral edema bilaterally. 2+ dorsalis pedis pulses.   Neurological: A+Ox3. CN 2-12 intact. No FND. Normal speech. No facial droop.   Psychiatric: Normal affect. Normal mood.     Assessment:   - Atypical chest pain and abdominal pain   - Acute kidney injury   - Cellulitis vs. early osteomyelitis of the right foot in diabetic pt   - Hyperglycemia secondary to Type 2 DM   - Normocytic anemia   - History of HTN, chronic back pain, anxiety, DM, HIV    Plan:   - Cr 1.57, BUN/Cr ratio 20 -> start gentle IVF   - EKG does not show any ischemic changes, troponin negative, pt with reproducible chest pain on exam which is likely muscular or GI related  - Pain control, pepcid PRN   - PICC line for 6 weeks of IV Vancomcyin and Cefepime  - Wound care instructions: carefully remove right foot dressings, apply betadine soaked gauze to right forefoot wound, wrap w/ kerlix and tape, apply ace as needed  - D/C planning (awaiting placement)

## 2023-03-30 NOTE — PROGRESS NOTE ADULT - ASSESSMENT
63 yo male with PMHx of HTN, chronic back pain, anxiety, DM, HIV presents today to continue treatment for his R foot.     #Cellulitis vs early osteomyelitis R foot of diabetic pt  -Stable   - s/p zosyn,  -continue with IV cefepime and vanco for 6 weeks   - reconsult ID: recs appreciated.  - podiatry consult recs appreciated  - neuropathic agents for analgesia  -PICC line placement pending    #Abdominal pain  -pt reports epigastric and LUQ abdominal pain  -Describes abdominal pain as burning; Denies nausea and vomiting  -on percocet for pain  -Famotidine 20 prescribed    #DM II   -Hb A1c 11.5 on 03-15  -Pt  refusing pre meal insulin. He received lispro last night   -Will start metformin 500 mg po bid   -increased Lantus 26 units q HS  -Increase intake in food intake throughout the day  -BGM  -ISS    #HIV  has been noncompliant  recent evaluation by ID no PCP/MAC prophylaxis  no ART  ID recs appreciated: will need to follow up outpatient with ID    #HTN  -Stable    continue monitoring   low Na diet   hydralazine prn    #Impaired visual acuity  will need assistance with  insulin    he cannot see to safely measure and address insulin usage    #Homelessness  pt was at transient hotel  does not live w family  social work    #VTE  sq hep.     #Dispo planning  - Pt is stable for discharge. Pending placement.    d/w Dr. Salcedo     61 yo male with PMHx of HTN, chronic back pain, anxiety, DM, HIV presents today to continue treatment for his R foot.     #Abdominal pain  #Chest pain  -Pt reports experiencing chest pain, epigastric and LUQ abdominal pain  -Describes abdominal pain as burning; Denies nausea and vomiting  -Chest pain is palpable on physical exam  -EKG NSR  -Troponin x1 WNL  -on percocet for pain  -Famotidine 20 prescribed  -On Carafete  -Will continue to monitor    #BRIDGETTE  #Hyponatremia  -Pt is pending placement to [facility]  -Na: 130  -Crt: 1.57  -NS @100cc/h ordered   -f/u AM labs    #Cellulitis vs early osteomyelitis R foot of diabetic pt  -Stable   - s/p zosyn,  -continue with IV cefepime and vanco for 6 weeks   - reconsult ID: recs appreciated.  - podiatry consult recs appreciated  - neuropathic agents for analgesia  -PICC line placement pending upon discharge    #DM II   -Hb A1c 11.5 on 03-15  -Pt  refusing pre meal insulin. He received lispro last night   -Will start metformin 500 mg po bid   -increased Lantus 26 units q HS  -Increase intake in food intake throughout the day  -BGM  -ISS    #HIV  -has been noncompliant  -recent evaluation by ID no PCP/MAC prophylaxis  -no ART  -ID recs appreciated: will need to follow up outpatient with ID    #HTN  -Stable   -continue monitoring  -low Na diet  -hydralazine prn    #Impaired visual acuity  -will need assistance with insulin    -he cannot see to safely measure and address insulin usage    #Homelessness  -pt was at transient hotel  -does not live w family  -social work following    #VTE  sq hep.     #Dispo planning  - Pt is stable for discharge. Pending placement.    d/w Dr. Casillas

## 2023-03-31 LAB
GLUCOSE BLDC GLUCOMTR-MCNC: 186 MG/DL — HIGH (ref 70–99)
GLUCOSE BLDC GLUCOMTR-MCNC: 198 MG/DL — HIGH (ref 70–99)
GLUCOSE BLDC GLUCOMTR-MCNC: 220 MG/DL — HIGH (ref 70–99)
GLUCOSE BLDC GLUCOMTR-MCNC: 270 MG/DL — HIGH (ref 70–99)

## 2023-03-31 PROCEDURE — 99232 SBSQ HOSP IP/OBS MODERATE 35: CPT | Mod: GC

## 2023-03-31 RX ORDER — PANTOPRAZOLE SODIUM 20 MG/1
40 TABLET, DELAYED RELEASE ORAL ONCE
Refills: 0 | Status: COMPLETED | OUTPATIENT
Start: 2023-03-31 | End: 2023-03-31

## 2023-03-31 RX ORDER — POLYETHYLENE GLYCOL 3350 17 G/17G
17 POWDER, FOR SOLUTION ORAL DAILY
Refills: 0 | Status: DISCONTINUED | OUTPATIENT
Start: 2023-03-31 | End: 2023-04-07

## 2023-03-31 RX ADMIN — Medication 1 GRAM(S): at 09:23

## 2023-03-31 RX ADMIN — FAMOTIDINE 20 MILLIGRAM(S): 10 INJECTION INTRAVENOUS at 09:23

## 2023-03-31 RX ADMIN — HEPARIN SODIUM 5000 UNIT(S): 5000 INJECTION INTRAVENOUS; SUBCUTANEOUS at 22:07

## 2023-03-31 RX ADMIN — POLYETHYLENE GLYCOL 3350 17 GRAM(S): 17 POWDER, FOR SOLUTION ORAL at 12:19

## 2023-03-31 RX ADMIN — Medication 166.67 MILLIGRAM(S): at 09:21

## 2023-03-31 RX ADMIN — Medication 4: at 12:50

## 2023-03-31 RX ADMIN — Medication 3 UNIT(S): at 12:51

## 2023-03-31 RX ADMIN — Medication 166.67 MILLIGRAM(S): at 22:49

## 2023-03-31 RX ADMIN — METFORMIN HYDROCHLORIDE 500 MILLIGRAM(S): 850 TABLET ORAL at 09:23

## 2023-03-31 RX ADMIN — Medication 1 GRAM(S): at 22:06

## 2023-03-31 RX ADMIN — CEFEPIME 100 MILLIGRAM(S): 1 INJECTION, POWDER, FOR SOLUTION INTRAMUSCULAR; INTRAVENOUS at 09:22

## 2023-03-31 RX ADMIN — CEFEPIME 100 MILLIGRAM(S): 1 INJECTION, POWDER, FOR SOLUTION INTRAMUSCULAR; INTRAVENOUS at 22:08

## 2023-03-31 RX ADMIN — Medication 5 MILLIGRAM(S): at 22:06

## 2023-03-31 RX ADMIN — PANTOPRAZOLE SODIUM 40 MILLIGRAM(S): 20 TABLET, DELAYED RELEASE ORAL at 18:43

## 2023-03-31 RX ADMIN — Medication 30 MILLILITER(S): at 18:01

## 2023-03-31 NOTE — PROGRESS NOTE ADULT - SUBJECTIVE AND OBJECTIVE BOX
61 yo male with PMHx of HTN, chronic back pain, anxiety, DM, HIV presents today to continue treatment for his R foot.  Patient was recently admitted on 3/14 and left AMA on 3/17.  Patient mentions he neeeded to leave AMA, because he was staying at a hotel and they were going to throw all his things away.  Patient was not taking any medications after leaving AMA and does not follow up with any physicians as outpatient, but will like to start following with one.   Patient states his right foot pain has been worsening for the past 3 weeks, when he was inpatient the pain improved, but after stopping taking everything the pain return to 10/10.  Patient denies any fever, chills, nausea, vomiting, chest pain, trauma to the foot.    In the ED /92, HR 81, Temp 99, RR 18 and SpO2 99% on RA.    Labs: Hb 11.5, WBC wnl,  Glucose 527, Na 133, Albumin 1.8, Lactate 1.9,   Patient was seen by podiatry and he was given Zosyn, Vancomycin, 2L IVF, 20UI Lantus, 4UI Admelog and 8UI humulin    3/31/23 : Patient seen and examined by medicine team. Awaiting authorization to facility, [facility] and then PICC line placement. No acute complaints. NAD. Refusing IV fluids and lab work.     PHYSICAL EXAM:  Vital Signs Last 24 Hrs  T(C): 37.3 (31 Mar 2023 15:10), Max: 37.6 (31 Mar 2023 07:54)  T(F): 99.1 (31 Mar 2023 15:10), Max: 99.7 (31 Mar 2023 07:54)  HR: 99 (31 Mar 2023 15:10) (93 - 99)  BP: 137/74 (31 Mar 2023 15:10) (122/59 - 137/74)  BP(mean): --  RR: 18 (31 Mar 2023 15:10) (18 - 19)  SpO2: 95% (31 Mar 2023 15:10) (95% - 100%)    Parameters below as of 31 Mar 2023 15:10  Patient On (Oxygen Delivery Method): room air    Constitutional: Pt lying in bed, awake and alert, NAD  HEENT: normal hearing, moist mucous membranes  Neck: Soft and supple, no JVD  Respiratory: CTABL, No wheezing, rales or rhonchi  Cardiovascular: S1S2+, RRR, no M/G/R  Gastrointestinal: BS+, soft, +TTP of epigastric and LUQ abdominal pain, no guarding, no rebound  Extremities: no pedal edema R foot with dressing in place   Vascular: 2+ peripheral pulses  Neurological: AAOx3, no focal deficits  Musculoskeletal: 5/5 strength b/l upper and lower extremities  Skin: Dressings currently in place. Diffuse edema and erythema noted to the right foot forefoot  Full thickness wound noted to the right plantar forefoot, probe to soft tissue, tracking dorsally, no PTB, no malodor, no pus/purulence drainage, no fluctuance, no crepitus.    MEDICATIONS:  MEDICATIONS  (STANDING):  cefepime   IVPB 2000 milliGRAM(s) IV Intermittent every 12 hours  dextrose 5%. 1000 milliLiter(s) (50 mL/Hr) IV Continuous <Continuous>  dextrose 5%. 1000 milliLiter(s) (100 mL/Hr) IV Continuous <Continuous>  dextrose 50% Injectable 25 Gram(s) IV Push once  dextrose 50% Injectable 12.5 Gram(s) IV Push once  dextrose 50% Injectable 25 Gram(s) IV Push once  famotidine    Tablet 20 milliGRAM(s) Oral daily  glucagon  Injectable 1 milliGRAM(s) IntraMuscular once  heparin   Injectable 5000 Unit(s) SubCutaneous every 8 hours  insulin glargine Injectable (LANTUS) 26 Unit(s) SubCutaneous at bedtime  insulin lispro (ADMELOG) corrective regimen sliding scale   SubCutaneous three times a day before meals  insulin lispro (ADMELOG) corrective regimen sliding scale   SubCutaneous at bedtime  insulin lispro Injectable (ADMELOG) 3 Unit(s) SubCutaneous three times a day before meals  metFORMIN 500 milliGRAM(s) Oral two times a day  sodium chloride 0.9%. 1000 milliLiter(s) (100 mL/Hr) IV Continuous <Continuous>  sucralfate 1 Gram(s) Oral two times a day  vancomycin  IVPB 1250 milliGRAM(s) IV Intermittent every 12 hours      LABS: All Labs Reviewed:                        11.0   4.74  )-----------( 306      ( 30 Mar 2023 10:34 )             34.0     03-30    130<L>  |  99  |  39<H>  ----------------------------<  235<H>  5.0   |  27  |  1.57<H>    Ca    8.9      30 Mar 2023 10:34  Phos  3.2     03-30  Mg     2.2     03-30    TPro  7.4  /  Alb  2.0<L>  /  TBili  0.2  /  DBili  x   /  AST  25  /  ALT  54  /  AlkPhos  95  03-30    I&O's Summary    30 Mar 2023 07:01  -  31 Mar 2023 07:00  --------------------------------------------------------  IN: 0 mL / OUT: 2300 mL / NET: -2300 mL      CAPILLARY BLOOD GLUCOSE  POCT Blood Glucose.: 186 mg/dL (31 Mar 2023 17:08)  POCT Blood Glucose.: 220 mg/dL (31 Mar 2023 12:27)  POCT Blood Glucose.: 198 mg/dL (31 Mar 2023 08:19)  POCT Blood Glucose.: 227 mg/dL (30 Mar 2023 22:12)      RADIOLOGY/EKG:  < from: Xray Foot AP + Lateral + Oblique, Right (03.20.23 @ 16:38) > & Xray Chest 1 view  IMPRESSION: Small density left lung now seen possibly infiltrate related.    Persistent bony findings around the first MTP joint. There is increased   swelling around the second and third toes.    < from: MR Foot No Cont, Right (03.17.23 @ 10:45) >  IMPRESSION:  1.  Osseous edema within the second, third and fourth proximal to mid   phalanges without loss of normal T1 fatty marrow. Phlegmonous changes and   soft tissue wound at the plantar aspect of the second through third   phalanges. These findings are likely secondary to vascular etiology   versus early osteomyelitis given adjacent phlegmonous changes. Clinically   correlate with physical exam and wound depth.  2.  Moderate first metatarsophalangeal joint osteoarthritis with edema   spanning the joint and osseous erosions/cystic changes along the medial   aspect. These findings are secondary to osteomyelitis versus moderate to   severe osteoarthritis with subchondral cystic changes

## 2023-03-31 NOTE — PROGRESS NOTE ADULT - ATTENDING COMMENTS
Patient seen and examined at the bedside. Please see resident note for full details regarding the service.     General: Awake and alert, cooperative with exam. No acute distress.   Cardiology: Normal S1, S2. No murmurs. RRR. + tenderness on palpation of the chest  Respiratory: Lungs CTABL. No wheezes, rales, or rhonchi.   Gastrointestinal: + BS. Soft. NT. ND. No guarding, rigidity, or rebound tenderness.  Extremities: No peripheral edema bilaterally. 2+ dorsalis pedis pulses.   Neurological: A+Ox3. CN 2-12 intact. No FND. Normal speech. No facial droop.   Psychiatric: Normal affect. Normal mood.     Assessment:   - Atypical chest pain and abdominal pain   - Acute kidney injury   - Cellulitis vs. early osteomyelitis of the right foot in diabetic pt   - Hyperglycemia secondary to Type 2 DM   - Normocytic anemia   - History of HTN, chronic back pain, anxiety, DM, HIV    Plan:   - Cr 1.57, BUN/Cr ratio 20 yesterday -> pt refused IVF   - Pain control, pepcid PRN   - PICC line for 6 weeks of IV Vancomcyin and Cefepime  - Wound care instructions: carefully remove right foot dressings, apply betadine soaked gauze to right forefoot wound, wrap w/ kerlix and tape, apply ace as needed  - Pt stable for d/c from a medical perspective  - D/C once placement arranged

## 2023-03-31 NOTE — PROGRESS NOTE ADULT - ASSESSMENT
61 yo male with PMHx of HTN, chronic back pain, anxiety, DM, HIV presents today to continue treatment for his R foot.     #Abdominal pain  #Chest pain  -Pt reports experiencing chest pain, epigastric and LUQ abdominal pain  -Describes abdominal pain as burning; Denies nausea and vomiting  -Chest pain is palpable on physical exam  -EKG NSR  -Troponin x1 WNL  -on percocet for pain  -Famotidine 20 prescribed  -On Carafete  -Will continue to monitor  -Pending placement to [facility] and PICC line insertion    #BRIDGETTE  #Hyponatremia  -Pt is pending placement to [facility]  -Na: 130  -Crt: 1.57  -NS @100cc/h ordered, but refused    #Cellulitis vs early osteomyelitis R foot of diabetic pt  -Stable   - s/p zosyn,  -continue with IV cefepime and vanco for 6 weeks   - reconsult ID: recs appreciated.  - podiatry consult recs appreciated  - neuropathic agents for analgesia  - PICC line placement pending upon discharge    #DM II   -Hb A1c 11.5 on 03-15  -Pt  refusing pre meal insulin. He received lispro last night   -Will start metformin 500 mg po bid   -increased Lantus 26 units q HS  -Increase intake in food intake throughout the day  -BGM  -ISS    #HIV  -has been noncompliant  -recent evaluation by ID no PCP/MAC prophylaxis  -Denies history of IV drug use  -no ART  -ID recs appreciated: will need to follow up outpatient with ID    #HTN  -Stable   -continue monitoring  -low Na diet  -hydralazine prn    #Impaired visual acuity  -will need assistance with insulin    -he cannot see to safely measure and address insulin usage    #Homelessness  -pt was at transient hotel  -does not live w family  -social work following    #VTE  sq hep.     #Dispo planning  - Pt is stable for discharge. Pending placement.    d/w Dr. Casillas

## 2023-04-01 LAB
GLUCOSE BLDC GLUCOMTR-MCNC: 205 MG/DL — HIGH (ref 70–99)
GLUCOSE BLDC GLUCOMTR-MCNC: 217 MG/DL — HIGH (ref 70–99)
GLUCOSE BLDC GLUCOMTR-MCNC: 230 MG/DL — HIGH (ref 70–99)
GLUCOSE BLDC GLUCOMTR-MCNC: 255 MG/DL — HIGH (ref 70–99)

## 2023-04-01 PROCEDURE — 99232 SBSQ HOSP IP/OBS MODERATE 35: CPT | Mod: GC

## 2023-04-01 RX ORDER — MAGNESIUM HYDROXIDE 400 MG/1
30 TABLET, CHEWABLE ORAL DAILY
Refills: 0 | Status: DISCONTINUED | OUTPATIENT
Start: 2023-04-01 | End: 2023-04-07

## 2023-04-01 RX ADMIN — HEPARIN SODIUM 5000 UNIT(S): 5000 INJECTION INTRAVENOUS; SUBCUTANEOUS at 21:35

## 2023-04-01 RX ADMIN — POLYETHYLENE GLYCOL 3350 17 GRAM(S): 17 POWDER, FOR SOLUTION ORAL at 09:39

## 2023-04-01 RX ADMIN — CEFEPIME 100 MILLIGRAM(S): 1 INJECTION, POWDER, FOR SOLUTION INTRAMUSCULAR; INTRAVENOUS at 23:39

## 2023-04-01 RX ADMIN — Medication 1 GRAM(S): at 21:35

## 2023-04-01 RX ADMIN — Medication 6: at 11:25

## 2023-04-01 RX ADMIN — Medication 166.67 MILLIGRAM(S): at 21:35

## 2023-04-01 RX ADMIN — Medication 1 GRAM(S): at 09:38

## 2023-04-01 RX ADMIN — Medication 166.67 MILLIGRAM(S): at 11:26

## 2023-04-01 RX ADMIN — Medication 4: at 08:57

## 2023-04-01 RX ADMIN — MAGNESIUM HYDROXIDE 30 MILLILITER(S): 400 TABLET, CHEWABLE ORAL at 14:53

## 2023-04-01 RX ADMIN — HEPARIN SODIUM 5000 UNIT(S): 5000 INJECTION INTRAVENOUS; SUBCUTANEOUS at 15:01

## 2023-04-01 RX ADMIN — CEFEPIME 100 MILLIGRAM(S): 1 INJECTION, POWDER, FOR SOLUTION INTRAMUSCULAR; INTRAVENOUS at 09:40

## 2023-04-01 RX ADMIN — FAMOTIDINE 20 MILLIGRAM(S): 10 INJECTION INTRAVENOUS at 09:39

## 2023-04-01 RX ADMIN — Medication 650 MILLIGRAM(S): at 20:51

## 2023-04-01 RX ADMIN — Medication 650 MILLIGRAM(S): at 21:21

## 2023-04-01 RX ADMIN — INSULIN GLARGINE 26 UNIT(S): 100 INJECTION, SOLUTION SUBCUTANEOUS at 21:40

## 2023-04-01 NOTE — PROGRESS NOTE ADULT - ATTENDING COMMENTS
Patient seen and examined at the bedside. Please see resident note for full details regarding the service.     General: Awake and alert, cooperative with exam. No acute distress.   Cardiology: Normal S1, S2. No murmurs. RRR.   Respiratory: Lungs CTABL. No wheezes, rales, or rhonchi.   Gastrointestinal: + BS. Soft. NT. ND. No guarding, rigidity, or rebound tenderness.  Extremities: No peripheral edema bilaterally. 2+ dorsalis pedis pulses. Right foot ulcer without purulence  Neurological: A+Ox3. CN 2-12 intact. No FND. Normal speech. No facial droop.   Psychiatric: Normal affect. Normal mood.     Assessment:   - Cellulitis vs. early osteomyelitis of the right foot in diabetic pt   - Atypical chest pain and abdominal pain likely secondary to GERD   - Acute kidney injury   - Hyperglycemia secondary to Type 2 DM   - Normocytic anemia   - History of HTN, chronic back pain, anxiety, DM, HIV    Plan:   - Pt has been refusing blood draws, IVF, and insulin   - Pain control, pepcid PRN   - PICC line for 6 weeks of IV Vancomycin and Cefepime  - Wound care instructions: carefully remove right foot dressings, apply betadine soaked gauze to right forefoot wound, wrap w/ kerlix and tape, apply ace as needed  - Pt stable for d/c from a medical perspective  - D/C once placement arranged with social work

## 2023-04-01 NOTE — PROGRESS NOTE ADULT - ATTENDING COMMENTS
Patient seen and examined at the bedside. Please see resident note for full details regarding the service.     General: Awake and alert, cooperative with exam. No acute distress.   Cardiology: Normal S1, S2. No murmurs. RRR.   Respiratory: Lungs CTABL. No wheezes, rales, or rhonchi.   Gastrointestinal: + BS. Soft. NT. ND. No guarding, rigidity, or rebound tenderness.  Extremities: No peripheral edema bilaterally. 2+ dorsalis pedis pulses. Right foot ulcer without purulence  Neurological: A+Ox3. CN 2-12 intact. No FND. Normal speech. No facial droop.   Psychiatric: Normal affect. Normal mood.     Assessment:   - Cellulitis vs. early osteomyelitis of the right foot in diabetic pt   - Atypical chest pain and abdominal pain likely secondary to GERD   - Acute kidney injury   - Hyperglycemia secondary to Type 2 DM   - Normocytic anemia   - History of HTN, chronic back pain, anxiety, DM, HIV    Plan:   - Pt has been refusing blood draws, IVF, and insulin   - Pain control, pepcid PRN   - PICC line for 6 weeks of IV Vancomycin and Cefepime  - Wound care instructions: carefully remove right foot dressings, apply betadine soaked gauze to right forefoot wound, wrap w/ kerlix and tape, apply ace as needed  - Pt stable for d/c from a medical perspective  - D/C once placement arranged with social work.

## 2023-04-01 NOTE — PROGRESS NOTE ADULT - SUBJECTIVE AND OBJECTIVE BOX
63 yo male with PMHx of HTN, chronic back pain, anxiety, DM, HIV presents today to continue treatment for his R foot.  Patient was recently admitted on 3/14 and left AMA on 3/17.  Patient mentions he neeeded to leave AMA, because he was staying at a hotel and they were going to throw all his things away.  Patient was not taking any medications after leaving AMA and does not follow up with any physicians as outpatient, but will like to start following with one.   Patient states his right foot pain has been worsening for the past 3 weeks, when he was inpatient the pain improved, but after stopping taking everything the pain return to 10/10.  Patient denies any fever, chills, nausea, vomiting, chest pain, trauma to the foot.    In the ED /92, HR 81, Temp 99, RR 18 and SpO2 99% on RA.    Labs: Hb 11.5, WBC wnl,  Glucose 527, Na 133, Albumin 1.8, Lactate 1.9,   Patient was seen by podiatry and he was given Zosyn, Vancomycin, 2L IVF, 20UI Lantus, 4UI Admelog and 8UI humulin    4/1/23 : Patient seen and examined by medicine team. Awaiting authorization to facility, likely [facility] and then PICC line placement. complaining of constipation. milk of magnesia ordered    PHYSICAL EXAM:  Vital Signs Last 24 Hrs  T(C): 37.1 (01 Apr 2023 08:04), Max: 37.3 (31 Mar 2023 15:10)  T(F): 98.8 (01 Apr 2023 08:04), Max: 99.1 (31 Mar 2023 15:10)  HR: 100 (01 Apr 2023 08:04) (89 - 100)  BP: 129/69 (01 Apr 2023 08:04) (124/63 - 137/74)  BP(mean): --  RR: 19 (01 Apr 2023 08:04) (18 - 19)  SpO2: 97% (01 Apr 2023 08:04) (95% - 100%)    Parameters below as of 01 Apr 2023 08:04  Patient On (Oxygen Delivery Method): room air    Constitutional: Pt lying in bed, awake and alert, NAD  HEENT: normal hearing, moist mucous membranes  Neck: Soft and supple, no JVD  Respiratory: CTABL, No wheezing, rales or rhonchi  Cardiovascular: S1S2+, RRR, no M/G/R  Gastrointestinal: BS+, soft, +TTP of epigastric and LUQ abdominal pain, no guarding, no rebound  Extremities: no pedal edema R foot with dressing in place   Vascular: 2+ peripheral pulses  Neurological: AAOx3, no focal deficits  Musculoskeletal: 5/5 strength b/l upper and lower extremities  Skin: Dressings currently in place. Diffuse edema and erythema noted to the right foot forefoot  Full thickness wound noted to the right plantar forefoot, probe to soft tissue, tracking dorsally, no PTB, no malodor, no pus/purulence drainage, no fluctuance, no crepitus.    MEDICATIONS:  MEDICATIONS  (STANDING):  cefepime   IVPB 2000 milliGRAM(s) IV Intermittent every 12 hours  dextrose 5%. 1000 milliLiter(s) (50 mL/Hr) IV Continuous <Continuous>  dextrose 5%. 1000 milliLiter(s) (100 mL/Hr) IV Continuous <Continuous>  dextrose 50% Injectable 25 Gram(s) IV Push once  dextrose 50% Injectable 12.5 Gram(s) IV Push once  dextrose 50% Injectable 25 Gram(s) IV Push once  famotidine    Tablet 20 milliGRAM(s) Oral daily  glucagon  Injectable 1 milliGRAM(s) IntraMuscular once  heparin   Injectable 5000 Unit(s) SubCutaneous every 8 hours  insulin glargine Injectable (LANTUS) 26 Unit(s) SubCutaneous at bedtime  insulin lispro (ADMELOG) corrective regimen sliding scale   SubCutaneous three times a day before meals  insulin lispro (ADMELOG) corrective regimen sliding scale   SubCutaneous at bedtime  insulin lispro Injectable (ADMELOG) 3 Unit(s) SubCutaneous three times a day before meals  metFORMIN 500 milliGRAM(s) Oral two times a day  sodium chloride 0.9%. 1000 milliLiter(s) (100 mL/Hr) IV Continuous <Continuous>  sucralfate 1 Gram(s) Oral two times a day  vancomycin  IVPB 1250 milliGRAM(s) IV Intermittent every 12 hours      LABS: All Labs Reviewed:                       I&O's Summary    30 Mar 2023 07:01  -  31 Mar 2023 07:00  --------------------------------------------------------  IN: 0 mL / OUT: 2300 mL / NET: -2300 mL      CAPILLARY BLOOD GLUCOSE  POCT Blood Glucose.: 186 mg/dL (31 Mar 2023 17:08)  POCT Blood Glucose.: 220 mg/dL (31 Mar 2023 12:27)  POCT Blood Glucose.: 198 mg/dL (31 Mar 2023 08:19)  POCT Blood Glucose.: 227 mg/dL (30 Mar 2023 22:12)      RADIOLOGY/EKG:  < from: Xray Foot AP + Lateral + Oblique, Right (03.20.23 @ 16:38) > & Xray Chest 1 view  IMPRESSION: Small density left lung now seen possibly infiltrate related.    Persistent bony findings around the first MTP joint. There is increased   swelling around the second and third toes.    < from: MR Foot No Cont, Right (03.17.23 @ 10:45) >  IMPRESSION:  1.  Osseous edema within the second, third and fourth proximal to mid   phalanges without loss of normal T1 fatty marrow. Phlegmonous changes and   soft tissue wound at the plantar aspect of the second through third   phalanges. These findings are likely secondary to vascular etiology   versus early osteomyelitis given adjacent phlegmonous changes. Clinically   correlate with physical exam and wound depth.  2.  Moderate first metatarsophalangeal joint osteoarthritis with edema   spanning the joint and osseous erosions/cystic changes along the medial   aspect. These findings are secondary to osteomyelitis versus moderate to   severe osteoarthritis with subchondral cystic changes

## 2023-04-02 LAB
ALBUMIN SERPL ELPH-MCNC: 2 G/DL — LOW (ref 3.3–5)
ALP SERPL-CCNC: 189 U/L — HIGH (ref 40–120)
ALT FLD-CCNC: 121 U/L — HIGH (ref 12–78)
ANION GAP SERPL CALC-SCNC: 4 MMOL/L — LOW (ref 5–17)
AST SERPL-CCNC: 64 U/L — HIGH (ref 15–37)
BILIRUB SERPL-MCNC: 0.3 MG/DL — SIGNIFICANT CHANGE UP (ref 0.2–1.2)
BUN SERPL-MCNC: 36 MG/DL — HIGH (ref 7–23)
CALCIUM SERPL-MCNC: 8.6 MG/DL — SIGNIFICANT CHANGE UP (ref 8.5–10.1)
CHLORIDE SERPL-SCNC: 99 MMOL/L — SIGNIFICANT CHANGE UP (ref 96–108)
CO2 SERPL-SCNC: 27 MMOL/L — SIGNIFICANT CHANGE UP (ref 22–31)
CREAT SERPL-MCNC: 1.75 MG/DL — HIGH (ref 0.5–1.3)
EGFR: 44 ML/MIN/1.73M2 — LOW
GLUCOSE BLDC GLUCOMTR-MCNC: 179 MG/DL — HIGH (ref 70–99)
GLUCOSE BLDC GLUCOMTR-MCNC: 222 MG/DL — HIGH (ref 70–99)
GLUCOSE BLDC GLUCOMTR-MCNC: 235 MG/DL — HIGH (ref 70–99)
GLUCOSE BLDC GLUCOMTR-MCNC: 297 MG/DL — HIGH (ref 70–99)
GLUCOSE SERPL-MCNC: 278 MG/DL — HIGH (ref 70–99)
HCT VFR BLD CALC: 32.8 % — LOW (ref 39–50)
HGB BLD-MCNC: 10.7 G/DL — LOW (ref 13–17)
MCHC RBC-ENTMCNC: 27.6 PG — SIGNIFICANT CHANGE UP (ref 27–34)
MCHC RBC-ENTMCNC: 32.6 GM/DL — SIGNIFICANT CHANGE UP (ref 32–36)
MCV RBC AUTO: 84.5 FL — SIGNIFICANT CHANGE UP (ref 80–100)
PLATELET # BLD AUTO: 265 K/UL — SIGNIFICANT CHANGE UP (ref 150–400)
POTASSIUM SERPL-MCNC: 5 MMOL/L — SIGNIFICANT CHANGE UP (ref 3.5–5.3)
POTASSIUM SERPL-SCNC: 5 MMOL/L — SIGNIFICANT CHANGE UP (ref 3.5–5.3)
PROT SERPL-MCNC: 7.7 GM/DL — SIGNIFICANT CHANGE UP (ref 6–8.3)
RBC # BLD: 3.88 M/UL — LOW (ref 4.2–5.8)
RBC # FLD: 13.8 % — SIGNIFICANT CHANGE UP (ref 10.3–14.5)
SODIUM SERPL-SCNC: 130 MMOL/L — LOW (ref 135–145)
WBC # BLD: 4.8 K/UL — SIGNIFICANT CHANGE UP (ref 3.8–10.5)
WBC # FLD AUTO: 4.8 K/UL — SIGNIFICANT CHANGE UP (ref 3.8–10.5)

## 2023-04-02 PROCEDURE — 99233 SBSQ HOSP IP/OBS HIGH 50: CPT | Mod: GC

## 2023-04-02 RX ORDER — PANTOPRAZOLE SODIUM 20 MG/1
40 TABLET, DELAYED RELEASE ORAL ONCE
Refills: 0 | Status: COMPLETED | OUTPATIENT
Start: 2023-04-02 | End: 2023-04-02

## 2023-04-02 RX ORDER — CALCIUM CARBONATE 500(1250)
2 TABLET ORAL ONCE
Refills: 0 | Status: COMPLETED | OUTPATIENT
Start: 2023-04-02 | End: 2023-04-02

## 2023-04-02 RX ADMIN — CEFEPIME 100 MILLIGRAM(S): 1 INJECTION, POWDER, FOR SOLUTION INTRAMUSCULAR; INTRAVENOUS at 22:15

## 2023-04-02 RX ADMIN — Medication 1 GRAM(S): at 22:16

## 2023-04-02 RX ADMIN — Medication 1 GRAM(S): at 09:45

## 2023-04-02 RX ADMIN — HEPARIN SODIUM 5000 UNIT(S): 5000 INJECTION INTRAVENOUS; SUBCUTANEOUS at 16:20

## 2023-04-02 RX ADMIN — PANTOPRAZOLE SODIUM 40 MILLIGRAM(S): 20 TABLET, DELAYED RELEASE ORAL at 12:09

## 2023-04-02 RX ADMIN — Medication 3 UNIT(S): at 12:53

## 2023-04-02 RX ADMIN — Medication 166.67 MILLIGRAM(S): at 14:40

## 2023-04-02 RX ADMIN — Medication 5 MILLIGRAM(S): at 22:16

## 2023-04-02 RX ADMIN — POLYETHYLENE GLYCOL 3350 17 GRAM(S): 17 POWDER, FOR SOLUTION ORAL at 09:45

## 2023-04-02 RX ADMIN — MAGNESIUM HYDROXIDE 30 MILLILITER(S): 400 TABLET, CHEWABLE ORAL at 09:46

## 2023-04-02 RX ADMIN — Medication 2 TABLET(S): at 13:00

## 2023-04-02 RX ADMIN — INSULIN GLARGINE 26 UNIT(S): 100 INJECTION, SOLUTION SUBCUTANEOUS at 22:16

## 2023-04-02 RX ADMIN — FAMOTIDINE 20 MILLIGRAM(S): 10 INJECTION INTRAVENOUS at 09:45

## 2023-04-02 RX ADMIN — Medication 4: at 12:52

## 2023-04-02 RX ADMIN — CEFEPIME 100 MILLIGRAM(S): 1 INJECTION, POWDER, FOR SOLUTION INTRAMUSCULAR; INTRAVENOUS at 09:45

## 2023-04-02 RX ADMIN — Medication 166.67 MILLIGRAM(S): at 23:34

## 2023-04-02 RX ADMIN — HEPARIN SODIUM 5000 UNIT(S): 5000 INJECTION INTRAVENOUS; SUBCUTANEOUS at 22:15

## 2023-04-02 NOTE — PROGRESS NOTE ADULT - ATTENDING COMMENTS
63 yo male with PMHx of HTN, chronic back pain, anxiety, DM, HIV presents today to continue treatment for his R foot.     #Abdominal pain  #Chest pain  #constipation  -Pt reports experiencing chest pain, epigastric and LUQ abdominal pain  -Describes abdominal pain as burning; Denies nausea and vomiting  -Chest pain is palpable on physical exam  -EKG NSR  -Troponin x1 WNL  -on percocet for pain  -Famotidine 20 prescribed  -On Carafete, on protonix  -Will continue to monitor  -Pt reports no BM in 8 days. On miralax, senna and milk of magnesia. Suppository and Magnesium citrate prescribed.   -Pending placement to [facility] and PICC line insertion

## 2023-04-02 NOTE — PROGRESS NOTE ADULT - SUBJECTIVE AND OBJECTIVE BOX
63 yo male with PMHx of HTN, chronic back pain, anxiety, DM, HIV presents today to continue treatment for his R foot.  Patient was recently admitted on 3/14 and left AMA on 3/17.  Patient mentions he neeeded to leave AMA, because he was staying at a hotel and they were going to throw all his things away.  Patient was not taking any medications after leaving AMA and does not follow up with any physicians as outpatient, but will like to start following with one.   Patient states his right foot pain has been worsening for the past 3 weeks, when he was inpatient the pain improved, but after stopping taking everything the pain return to 10/10.  Patient denies any fever, chills, nausea, vomiting, chest pain, trauma to the foot.    In the ED /92, HR 81, Temp 99, RR 18 and SpO2 99% on RA.    Labs: Hb 11.5, WBC wnl,  Glucose 527, Na 133, Albumin 1.8, Lactate 1.9,   Patient was seen by podiatry and he was given Zosyn, Vancomycin, 2L IVF, 20UI Lantus, 4UI Admelog and 8UI humulin    4/1/23 : Patient seen and examined by medicine team. Awaiting authorization to facility, [facility] and then PICC line placement. complaining of constipation. milk of magnesia ordered    PHYSICAL EXAM:  Vital Signs Last 24 Hrs  T(C): 37.1 (01 Apr 2023 08:04), Max: 37.3 (31 Mar 2023 15:10)  T(F): 98.8 (01 Apr 2023 08:04), Max: 99.1 (31 Mar 2023 15:10)  HR: 100 (01 Apr 2023 08:04) (89 - 100)  BP: 129/69 (01 Apr 2023 08:04) (124/63 - 137/74)  BP(mean): --  RR: 19 (01 Apr 2023 08:04) (18 - 19)  SpO2: 97% (01 Apr 2023 08:04) (95% - 100%)    Parameters below as of 01 Apr 2023 08:04  Patient On (Oxygen Delivery Method): room air    Constitutional: Pt lying in bed, awake and alert, NAD  HEENT: normal hearing, moist mucous membranes  Neck: Soft and supple, no JVD  Respiratory: CTABL, No wheezing, rales or rhonchi  Cardiovascular: S1S2+, RRR, no M/G/R  Gastrointestinal: BS+, soft, +TTP of epigastric and LUQ abdominal pain, no guarding, no rebound  Extremities: no pedal edema R foot with dressing in place   Vascular: 2+ peripheral pulses  Neurological: AAOx3, no focal deficits  Musculoskeletal: 5/5 strength b/l upper and lower extremities  Skin: Dressings currently in place. Diffuse edema and erythema noted to the right foot forefoot  Full thickness wound noted to the right plantar forefoot, probe to soft tissue, tracking dorsally, no PTB, no malodor, no pus/purulence drainage, no fluctuance, no crepitus.    MEDICATIONS:  MEDICATIONS  (STANDING):  cefepime   IVPB 2000 milliGRAM(s) IV Intermittent every 12 hours  dextrose 5%. 1000 milliLiter(s) (50 mL/Hr) IV Continuous <Continuous>  dextrose 5%. 1000 milliLiter(s) (100 mL/Hr) IV Continuous <Continuous>  dextrose 50% Injectable 25 Gram(s) IV Push once  dextrose 50% Injectable 12.5 Gram(s) IV Push once  dextrose 50% Injectable 25 Gram(s) IV Push once  famotidine    Tablet 20 milliGRAM(s) Oral daily  glucagon  Injectable 1 milliGRAM(s) IntraMuscular once  heparin   Injectable 5000 Unit(s) SubCutaneous every 8 hours  insulin glargine Injectable (LANTUS) 26 Unit(s) SubCutaneous at bedtime  insulin lispro (ADMELOG) corrective regimen sliding scale   SubCutaneous three times a day before meals  insulin lispro (ADMELOG) corrective regimen sliding scale   SubCutaneous at bedtime  insulin lispro Injectable (ADMELOG) 3 Unit(s) SubCutaneous three times a day before meals  metFORMIN 500 milliGRAM(s) Oral two times a day  sodium chloride 0.9%. 1000 milliLiter(s) (100 mL/Hr) IV Continuous <Continuous>  sucralfate 1 Gram(s) Oral two times a day  vancomycin  IVPB 1250 milliGRAM(s) IV Intermittent every 12 hours      LABS: All Labs Reviewed:                       I&O's Summary    30 Mar 2023 07:01  -  31 Mar 2023 07:00  --------------------------------------------------------  IN: 0 mL / OUT: 2300 mL / NET: -2300 mL      CAPILLARY BLOOD GLUCOSE  POCT Blood Glucose.: 186 mg/dL (31 Mar 2023 17:08)  POCT Blood Glucose.: 220 mg/dL (31 Mar 2023 12:27)  POCT Blood Glucose.: 198 mg/dL (31 Mar 2023 08:19)  POCT Blood Glucose.: 227 mg/dL (30 Mar 2023 22:12)      RADIOLOGY/EKG:  < from: Xray Foot AP + Lateral + Oblique, Right (03.20.23 @ 16:38) > & Xray Chest 1 view  IMPRESSION: Small density left lung now seen possibly infiltrate related.    Persistent bony findings around the first MTP joint. There is increased   swelling around the second and third toes.    < from: MR Foot No Cont, Right (03.17.23 @ 10:45) >  IMPRESSION:  1.  Osseous edema within the second, third and fourth proximal to mid   phalanges without loss of normal T1 fatty marrow. Phlegmonous changes and   soft tissue wound at the plantar aspect of the second through third   phalanges. These findings are likely secondary to vascular etiology   versus early osteomyelitis given adjacent phlegmonous changes. Clinically   correlate with physical exam and wound depth.  2.  Moderate first metatarsophalangeal joint osteoarthritis with edema   spanning the joint and osseous erosions/cystic changes along the medial   aspect. These findings are secondary to osteomyelitis versus moderate to   severe osteoarthritis with subchondral cystic changes               61 yo male with PMHx of HTN, chronic back pain, anxiety, DM, HIV presents today to continue treatment for his R foot.  Patient was recently admitted on 3/14 and left AMA on 3/17.  Patient mentions he neeeded to leave AMA, because he was staying at a hotel and they were going to throw all his things away.  Patient was not taking any medications after leaving AMA and does not follow up with any physicians as outpatient, but will like to start following with one.   Patient states his right foot pain has been worsening for the past 3 weeks, when he was inpatient the pain improved, but after stopping taking everything the pain return to 10/10.  Patient denies any fever, chills, nausea, vomiting, chest pain, trauma to the foot.    In the ED /92, HR 81, Temp 99, RR 18 and SpO2 99% on RA.    Labs: Hb 11.5, WBC wnl,  Glucose 527, Na 133, Albumin 1.8, Lactate 1.9,   Patient was seen by podiatry and he was given Zosyn, Vancomycin, 2L IVF, 20UI Lantus, 4UI Admelog and 8UI humulin    4/2/23 : Patient seen and examined by medicine team. Awaiting authorization to facility, likely [facility] and then PICC line placement. Pt with complaints of constipation. Suppository and magnesium citrate ordered    PHYSICAL EXAM:  Vital Signs Last 24 Hrs  T(C): 36.7 (02 Apr 2023 16:41), Max: 36.7 (02 Apr 2023 16:41)  T(F): 98.1 (02 Apr 2023 16:41), Max: 98.1 (02 Apr 2023 16:41)  HR: 82 (02 Apr 2023 16:41) (80 - 82)  BP: 119/65 (02 Apr 2023 16:41) (119/65 - 128/80)  BP(mean): --  RR: 18 (02 Apr 2023 16:41) (18 - 18)  SpO2: 99% (02 Apr 2023 16:41) (96% - 100%)    Parameters below as of 02 Apr 2023 16:41  Patient On (Oxygen Delivery Method): room air    Constitutional: Pt lying in bed, awake and alert, NAD  HEENT: normal hearing, moist mucous membranes  Neck: Soft and supple, no JVD  Respiratory: CTABL, No wheezing, rales or rhonchi  Cardiovascular: S1S2+, RRR, no M/G/R  Gastrointestinal: BS+, soft, +TTP of epigastric and LUQ abdominal pain, no guarding, no rebound  Extremities: no pedal edema R foot with dressing in place   Vascular: 2+ peripheral pulses  Neurological: AAOx3, no focal deficits  Musculoskeletal: 5/5 strength b/l upper and lower extremities  Skin: Dressings currently in place. Diffuse edema and erythema noted to the right foot forefoot  Full thickness wound noted to the right plantar forefoot, probe to soft tissue, tracking dorsally, no PTB, no malodor, no pus/purulence drainage, no fluctuance, no crepitus.      MEDICATIONS:  MEDICATIONS  (STANDING):  cefepime   IVPB 2000 milliGRAM(s) IV Intermittent every 12 hours  dextrose 5%. 1000 milliLiter(s) (50 mL/Hr) IV Continuous <Continuous>  dextrose 5%. 1000 milliLiter(s) (100 mL/Hr) IV Continuous <Continuous>  dextrose 50% Injectable 25 Gram(s) IV Push once  dextrose 50% Injectable 12.5 Gram(s) IV Push once  dextrose 50% Injectable 25 Gram(s) IV Push once  famotidine    Tablet 20 milliGRAM(s) Oral daily  glucagon  Injectable 1 milliGRAM(s) IntraMuscular once  heparin   Injectable 5000 Unit(s) SubCutaneous every 8 hours  insulin glargine Injectable (LANTUS) 26 Unit(s) SubCutaneous at bedtime  insulin lispro (ADMELOG) corrective regimen sliding scale   SubCutaneous three times a day before meals  insulin lispro (ADMELOG) corrective regimen sliding scale   SubCutaneous at bedtime  insulin lispro Injectable (ADMELOG) 3 Unit(s) SubCutaneous three times a day before meals  sodium chloride 0.9%. 1000 milliLiter(s) (100 mL/Hr) IV Continuous <Continuous>  sucralfate 1 Gram(s) Oral two times a day  vancomycin  IVPB 1250 milliGRAM(s) IV Intermittent every 12 hours      LABS: All Labs Reviewed:    I&O's Summary    01 Apr 2023 07:01  -  02 Apr 2023 07:00  --------------------------------------------------------  IN: 600 mL / OUT: 700 mL / NET: -100 mL      CAPILLARY BLOOD GLUCOSE    POCT Blood Glucose.: 297 mg/dL (02 Apr 2023 17:25)  POCT Blood Glucose.: 222 mg/dL (02 Apr 2023 12:46)  POCT Blood Glucose.: 179 mg/dL (02 Apr 2023 08:11)  POCT Blood Glucose.: 205 mg/dL (01 Apr 2023 21:38)      RADIOLOGY/EKG:  < from: Xray Foot AP + Lateral + Oblique, Right (03.20.23 @ 16:38) > & Xray Chest 1 view  IMPRESSION: Small density left lung now seen possibly infiltrate related.    Persistent bony findings around the first MTP joint. There is increased   swelling around the second and third toes.    < from: MR Foot No Cont, Right (03.17.23 @ 10:45) >  IMPRESSION:  1.  Osseous edema within the second, third and fourth proximal to mid   phalanges without loss of normal T1 fatty marrow. Phlegmonous changes and   soft tissue wound at the plantar aspect of the second through third   phalanges. These findings are likely secondary to vascular etiology   versus early osteomyelitis given adjacent phlegmonous changes. Clinically   correlate with physical exam and wound depth.  2.  Moderate first metatarsophalangeal joint osteoarthritis with edema   spanning the joint and osseous erosions/cystic changes along the medial   aspect. These findings are secondary to osteomyelitis versus moderate to   severe osteoarthritis with subchondral cystic changes

## 2023-04-02 NOTE — PROGRESS NOTE ADULT - ASSESSMENT
63 yo male with PMHx of HTN, chronic back pain, anxiety, DM, HIV presents today to continue treatment for his R foot.     #Abdominal pain  #Chest pain  -Pt reports experiencing chest pain, epigastric and LUQ abdominal pain  -Describes abdominal pain as burning; Denies nausea and vomiting  -Chest pain is palpable on physical exam  -EKG NSR  -Troponin x1 WNL  -on percocet for pain  -Famotidine 20 prescribed  -On Carafete  -Will continue to monitor  -Pending placement to [facility] and PICC line insertion    #BRIDGETTE  #Hyponatremia  -Pt is pending placement to [facility]  -Na: 130  -Crt: 1.57  -NS @100cc/h ordered, but refused    #Cellulitis vs early osteomyelitis R foot of diabetic pt  -Stable   - s/p zosyn,  -continue with IV cefepime and vanco for 6 weeks   - reconsult ID: recs appreciated.  - podiatry consult recs appreciated  - neuropathic agents for analgesia  - PICC line placement pending upon discharge    #DM II   -Hb A1c 11.5 on 03-15  -Pt  refusing pre meal insulin. He received lispro last night   -Will start metformin 500 mg po bid   -increased Lantus 26 units q HS  -Increase intake in food intake throughout the day  -BGM  -ISS    #HIV  -has been noncompliant  -recent evaluation by ID no PCP/MAC prophylaxis  -Denies history of IV drug use  -no ART  -ID recs appreciated: will need to follow up outpatient with ID    #HTN  -Stable   -continue monitoring  -low Na diet  -hydralazine prn    #Impaired visual acuity  -will need assistance with insulin    -he cannot see to safely measure and address insulin usage    #Homelessness  -pt was at transient hotel  -does not live w family  -social work following    #VTE  sq hep.     #Dispo planning  - Pt is stable for discharge. Pending placement.    d/w Dr. Casillas     63 yo male with PMHx of HTN, chronic back pain, anxiety, DM, HIV presents today to continue treatment for his R foot.     #Abdominal pain  #Chest pain  #constipation  -Pt reports experiencing chest pain, epigastric and LUQ abdominal pain  -Describes abdominal pain as burning; Denies nausea and vomiting  -Chest pain is palpable on physical exam  -EKG NSR  -Troponin x1 WNL  -on percocet for pain  -Famotidine 20 prescribed  -On Carafete, on protonix  -Will continue to monitor  -Pt reports no BM in 8 days. On miralax, senna and milk of magnesia. Suppository and Magnesium citrate prescribed.   -Pending placement to [facility] and PICC line insertion    #BRIDGETTE  #Hyponatremia  -Pt is pending placement to [facility]  -Na: 130  -Crt: 1.57  -NS @100cc/h ordered, but refused    #Cellulitis vs early osteomyelitis R foot of diabetic pt  -Stable   - s/p zosyn,  -continue with IV cefepime and vanco for 6 weeks   - reconsult ID: recs appreciated.  - podiatry consult recs appreciated  - neuropathic agents for analgesia  - PICC line placement pending upon discharge    #DM II   -Hb A1c 11.5 on 03-15  -Pt  refusing pre meal insulin. He received lispro last night   -metformin d/mimi due to abdominal pain  -increased Lantus 26 units q HS  -Increase intake in food intake throughout the day  -BGM  -ISS    #HIV  -has been noncompliant  -recent evaluation by ID no PCP/MAC prophylaxis  -Denies history of IV drug use  -no ART  -ID recs appreciated: will need to follow up outpatient with ID    #HTN  -Stable   -continue monitoring  -low Na diet  -hydralazine prn    #Impaired visual acuity  -will need assistance with insulin    -he cannot see to safely measure and address insulin usage    #Homelessness  -pt was at transient hotel  -does not live w family  -social work following    #VTE  sq hep.     #Dispo planning  - Pt is stable for discharge. Pending placement.       63 yo male with PMHx of HTN, chronic back pain, anxiety, DM, HIV presents today to continue treatment for his R foot.     #Abdominal pain  #Chest pain  #constipation  -Pt reports experiencing chest pain, epigastric and LUQ abdominal pain  -Describes abdominal pain as burning; Denies nausea and vomiting  -Chest pain is palpable on physical exam  -EKG NSR  -Troponin x1 WNL  -on percocet for pain  -Famotidine 20 prescribed  -On Carafete, on protonix  -Will continue to monitor  -Pt reports no BM in 8 days. On miralax, senna and milk of magnesia. Pt refusing recommendations of enema. Suppository prescribed as an alternative option.   -Pending placement to [facility] and PICC line insertion    #BRIDGETTE  #Hyponatremia  -Pt is pending placement to [facility]  -Na: 130  -Crt: 1.57  -NS @100cc/h ordered, but refused    #Cellulitis vs early osteomyelitis R foot of diabetic pt  -Stable   - s/p zosyn,  -continue with IV cefepime and vanco for 6 weeks   - reconsult ID: recs appreciated.  - podiatry consult recs appreciated  - neuropathic agents for analgesia  - PICC line placement pending upon discharge    #DM II   -Hb A1c 11.5 on 03-15  -Pt  refusing pre meal insulin. He received lispro last night   -metformin d/mimi due to abdominal pain  -increased Lantus 26 units q HS  -Increase intake in food intake throughout the day  -BGM  -ISS    #HIV  -has been noncompliant  -recent evaluation by ID no PCP/MAC prophylaxis  -Denies history of IV drug use  -no ART  -ID recs appreciated: will need to follow up outpatient with ID    #HTN  -Stable   -continue monitoring  -low Na diet  -hydralazine prn    #Impaired visual acuity  -will need assistance with insulin    -he cannot see to safely measure and address insulin usage    #Homelessness  -pt was at transient hotel  -does not live w family  -social work following    #VTE  sq hep.     #Dispo planning  - Pt is stable for discharge. Pending placement.

## 2023-04-03 LAB
GLUCOSE BLDC GLUCOMTR-MCNC: 180 MG/DL — HIGH (ref 70–99)
GLUCOSE BLDC GLUCOMTR-MCNC: 242 MG/DL — HIGH (ref 70–99)
GLUCOSE BLDC GLUCOMTR-MCNC: 256 MG/DL — HIGH (ref 70–99)
GLUCOSE BLDC GLUCOMTR-MCNC: 261 MG/DL — HIGH (ref 70–99)
SARS-COV-2 RNA SPEC QL NAA+PROBE: SIGNIFICANT CHANGE UP

## 2023-04-03 PROCEDURE — 99233 SBSQ HOSP IP/OBS HIGH 50: CPT | Mod: GC

## 2023-04-03 RX ORDER — SODIUM CHLORIDE 9 MG/ML
1000 INJECTION INTRAMUSCULAR; INTRAVENOUS; SUBCUTANEOUS ONCE
Refills: 0 | Status: COMPLETED | OUTPATIENT
Start: 2023-04-03 | End: 2023-04-03

## 2023-04-03 RX ORDER — SOD SULF/SODIUM/NAHCO3/KCL/PEG
2000 SOLUTION, RECONSTITUTED, ORAL ORAL ONCE
Refills: 0 | Status: COMPLETED | OUTPATIENT
Start: 2023-04-03 | End: 2023-04-03

## 2023-04-03 RX ORDER — SOD SULF/SODIUM/NAHCO3/KCL/PEG
4000 SOLUTION, RECONSTITUTED, ORAL ORAL ONCE
Refills: 0 | Status: COMPLETED | OUTPATIENT
Start: 2023-04-03 | End: 2023-04-03

## 2023-04-03 RX ADMIN — MAGNESIUM HYDROXIDE 30 MILLILITER(S): 400 TABLET, CHEWABLE ORAL at 11:13

## 2023-04-03 RX ADMIN — POLYETHYLENE GLYCOL 3350 17 GRAM(S): 17 POWDER, FOR SOLUTION ORAL at 15:34

## 2023-04-03 RX ADMIN — CEFEPIME 100 MILLIGRAM(S): 1 INJECTION, POWDER, FOR SOLUTION INTRAMUSCULAR; INTRAVENOUS at 13:28

## 2023-04-03 RX ADMIN — FAMOTIDINE 20 MILLIGRAM(S): 10 INJECTION INTRAVENOUS at 11:15

## 2023-04-03 RX ADMIN — Medication 10 MILLIGRAM(S): at 13:27

## 2023-04-03 RX ADMIN — HEPARIN SODIUM 5000 UNIT(S): 5000 INJECTION INTRAVENOUS; SUBCUTANEOUS at 05:47

## 2023-04-03 RX ADMIN — Medication 1 GRAM(S): at 11:14

## 2023-04-03 RX ADMIN — SODIUM CHLORIDE 1000 MILLILITER(S): 9 INJECTION INTRAMUSCULAR; INTRAVENOUS; SUBCUTANEOUS at 13:00

## 2023-04-03 RX ADMIN — Medication 1 GRAM(S): at 22:01

## 2023-04-03 RX ADMIN — CEFEPIME 100 MILLIGRAM(S): 1 INJECTION, POWDER, FOR SOLUTION INTRAMUSCULAR; INTRAVENOUS at 21:59

## 2023-04-03 RX ADMIN — Medication 30 MILLILITER(S): at 05:49

## 2023-04-03 NOTE — PROGRESS NOTE ADULT - SUBJECTIVE AND OBJECTIVE BOX
61 yo male with PMHx of HTN, chronic back pain, anxiety, DM, HIV presents today to continue treatment for his R foot.  Patient was recently admitted on 3/14 and left AMA on 3/17.  Patient mentions he neeeded to leave AMA, because he was staying at a hotel and they were going to throw all his things away.  Patient was not taking any medications after leaving AMA and does not follow up with any physicians as outpatient, but will like to start following with one.   Patient states his right foot pain has been worsening for the past 3 weeks, when he was inpatient the pain improved, but after stopping taking everything the pain return to 10/10.  Patient denies any fever, chills, nausea, vomiting, chest pain, trauma to the foot.    In the ED /92, HR 81, Temp 99, RR 18 and SpO2 99% on RA.    Labs: Hb 11.5, WBC wnl,  Glucose 527, Na 133, Albumin 1.8, Lactate 1.9,   Patient was seen by podiatry and he was given Zosyn, Vancomycin, 2L IVF, 20UI Lantus, 4UI Admelog and 8UI humulin    4/3/23 : Patient seen and examined by medicine team. Awaiting authorization to facility, likely [facility] and then PICC line placement. Pt with complaints of constipation. Suppository and Go lytely ordered. Will assess for bowel movements. Patient did not take bowel regimen last night or in the AM. Refused to take bowel regimen until after lunch time. Crt elevated on labs done yesterday. IVFs ordered. Patient also refused IVFs until after lunchtime.   Constitutional: Pt lying in bed, awake and alert, NAD      PHYSICAL EXAM:  Vital Signs Last 24 Hrs  T(C): 37.2 (03 Apr 2023 15:30), Max: 37.2 (03 Apr 2023 15:30)  T(F): 99 (03 Apr 2023 15:30), Max: 99 (03 Apr 2023 15:30)  HR: 82 (03 Apr 2023 15:30) (82 - 93)  BP: 141/75 (03 Apr 2023 15:30) (118/61 - 141/75)  BP(mean): --  RR: 18 (03 Apr 2023 15:30) (17 - 18)  SpO2: 95% (03 Apr 2023 15:30) (95% - 98%)    Parameters below as of 03 Apr 2023 15:30  Patient On (Oxygen Delivery Method): room air    Constitutional: Pt lying in bed, awake and alert, NAD  HEENT: normal hearing, moist mucous membranes  Neck: Soft and supple, no JVD  Respiratory: CTABL, No wheezing, rales or rhonchi  Cardiovascular: S1S2+, RRR, no M/G/R  Gastrointestinal: BS+, soft, +TTP of epigastric and LUQ abdominal pain, no guarding, no rebound  Extremities: no pedal edema R foot with dressing in place   Vascular: 2+ peripheral pulses  Neurological: AAOx3, no focal deficits  Musculoskeletal: 5/5 strength b/l upper and lower extremities  Skin: Dressings currently in place. Diffuse edema and erythema noted to the right foot forefoot  Full thickness wound noted to the right plantar forefoot, probe to soft tissue, tracking dorsally, no PTB, no malodor, no pus/purulence drainage, no fluctuance, no crepitus.    MEDICATIONS:  MEDICATIONS  (STANDING):  cefepime   IVPB 2000 milliGRAM(s) IV Intermittent every 12 hours  dextrose 5%. 1000 milliLiter(s) (50 mL/Hr) IV Continuous <Continuous>  dextrose 5%. 1000 milliLiter(s) (100 mL/Hr) IV Continuous <Continuous>  dextrose 50% Injectable 25 Gram(s) IV Push once  dextrose 50% Injectable 12.5 Gram(s) IV Push once  dextrose 50% Injectable 25 Gram(s) IV Push once  famotidine    Tablet 20 milliGRAM(s) Oral daily  glucagon  Injectable 1 milliGRAM(s) IntraMuscular once  heparin   Injectable 5000 Unit(s) SubCutaneous every 8 hours  insulin glargine Injectable (LANTUS) 26 Unit(s) SubCutaneous at bedtime  insulin lispro (ADMELOG) corrective regimen sliding scale   SubCutaneous three times a day before meals  insulin lispro (ADMELOG) corrective regimen sliding scale   SubCutaneous at bedtime  insulin lispro Injectable (ADMELOG) 3 Unit(s) SubCutaneous three times a day before meals  sodium chloride 0.9%. 1000 milliLiter(s) (100 mL/Hr) IV Continuous <Continuous>  sucralfate 1 Gram(s) Oral two times a day  vancomycin  IVPB 1250 milliGRAM(s) IV Intermittent every 12 hours      LABS: All Labs Reviewed:                        10.7   4.80  )-----------( 265      ( 02 Apr 2023 18:32 )             32.8     04-02    130<L>  |  99  |  36<H>  ----------------------------<  278<H>  5.0   |  27  |  1.75<H>    Ca    8.6      02 Apr 2023 18:32    TPro  7.7  /  Alb  2.0<L>  /  TBili  0.3  /  DBili  x   /  AST  64<H>  /  ALT  121<H>  /  AlkPhos  189<H>  04-02    CAPILLARY BLOOD GLUCOSE    POCT Blood Glucose.: 261 mg/dL (03 Apr 2023 16:52)  POCT Blood Glucose.: 256 mg/dL (03 Apr 2023 12:03)  POCT Blood Glucose.: 180 mg/dL (03 Apr 2023 08:44)  POCT Blood Glucose.: 235 mg/dL (02 Apr 2023 22:10)      RADIOLOGY/EKG:  < from: Xray Foot AP + Lateral + Oblique, Right (03.20.23 @ 16:38) > & Xray Chest 1 view  IMPRESSION: Small density left lung now seen possibly infiltrate related.    Persistent bony findings around the first MTP joint. There is increased   swelling around the second and third toes.    < from: MR Foot No Cont, Right (03.17.23 @ 10:45) >  IMPRESSION:  1.  Osseous edema within the second, third and fourth proximal to mid   phalanges without loss of normal T1 fatty marrow. Phlegmonous changes and   soft tissue wound at the plantar aspect of the second through third   phalanges. These findings are likely secondary to vascular etiology   versus early osteomyelitis given adjacent phlegmonous changes. Clinically   correlate with physical exam and wound depth.  2.  Moderate first metatarsophalangeal joint osteoarthritis with edema   spanning the joint and osseous erosions/cystic changes along the medial   aspect. These findings are secondary to osteomyelitis versus moderate to   severe osteoarthritis with subchondral cystic changes

## 2023-04-03 NOTE — PROGRESS NOTE ADULT - ASSESSMENT
61 yo male with PMHx of HTN, chronic back pain, anxiety, DM, HIV presents today to continue treatment for his R foot.     #Abdominal pain  #Chest pain  #constipation  -Pt reports experiencing chest pain, epigastric and LUQ abdominal pain  -Describes abdominal pain as burning; Denies nausea and vomiting  -Chest pain is palpable on physical exam  -EKG NSR  -Troponin x1 WNL  -on percocet for pain  -Famotidine 20 prescribed  -On Carafete, on protonix  -Will continue to monitor  -Pt reports no BM in 8 days. On miralax, senna and milk of magnesia. Pt refusing recommendations of enema. Suppository prescribed as an alternative option. Go lytely prescribed. Has not had a bowel movement yet though refused medications overnight.   -Pending placement to [facility] and PICC line insertion    #BRIDGETTE  #Hyponatremia  -Pt is pending placement to [facility]  -Na: 130  -Crt: 1.57  -NS @100cc/h ordered, agreed to get 1L bolus of IVFs today    #Cellulitis vs early osteomyelitis R foot of diabetic pt  -Stable   - s/p zosyn,  -continue with IV cefepime and vanco for 6 weeks   - reconsult ID: recs appreciated.  - podiatry consult recs appreciated  - neuropathic agents for analgesia  - PICC line placement pending upon discharge    #DM II   -Hb A1c 11.5 on 03-15  -Pt  refusing pre meal insulin. He received lispro last night   -metformin d/mimi due to abdominal pain  -increased Lantus 26 units q HS  -Increase intake in food intake throughout the day  -BGM  -ISS    #HIV  -has been noncompliant  -recent evaluation by ID no PCP/MAC prophylaxis  -Denies history of IV drug use  -no ART  -ID recs appreciated: will need to follow up outpatient with ID    #HTN  -Stable   -continue monitoring  -low Na diet  -hydralazine prn    #Impaired visual acuity  -will need assistance with insulin    -he cannot see to safely measure and address insulin usage    #Homelessness  -pt was at transient hotel  -does not live w family  -social work following    #VTE  sq hep.     #Dispo planning  - Pt is stable for discharge. Pending placement.

## 2023-04-03 NOTE — PROGRESS NOTE ADULT - ATTENDING COMMENTS
61 yo male with PMHx of HTN, chronic back pain, anxiety, DM, HIV presents today to continue treatment for his R foot.     #Abdominal pain  #Chest pain  #constipation  -Pt reports experiencing chest pain, epigastric and LUQ abdominal pain  -Describes abdominal pain as burning; Denies nausea and vomiting  -Chest pain is palpable on physical exam  -EKG NSR  -Troponin x1 WNL  -on percocet for pain  -Famotidine 20 prescribed  -On Carafete, on protonix  -Will continue to monitor  -Pt reports no BM in 8 days. On miralax, senna and milk of magnesia. Pt refusing recommendations of enema. Suppository prescribed as an alternative option. Go lytely prescribed. Has not had a bowel movement yet though refused medications overnight.   -Pending placement to [facility] and PICC line insertion

## 2023-04-04 LAB — GLUCOSE BLDC GLUCOMTR-MCNC: 152 MG/DL — HIGH (ref 70–99)

## 2023-04-04 PROCEDURE — 99233 SBSQ HOSP IP/OBS HIGH 50: CPT | Mod: GC

## 2023-04-04 RX ADMIN — CEFEPIME 100 MILLIGRAM(S): 1 INJECTION, POWDER, FOR SOLUTION INTRAMUSCULAR; INTRAVENOUS at 20:59

## 2023-04-04 RX ADMIN — Medication 10 MILLIGRAM(S): at 11:30

## 2023-04-04 RX ADMIN — FAMOTIDINE 20 MILLIGRAM(S): 10 INJECTION INTRAVENOUS at 11:29

## 2023-04-04 RX ADMIN — POLYETHYLENE GLYCOL 3350 17 GRAM(S): 17 POWDER, FOR SOLUTION ORAL at 11:29

## 2023-04-04 RX ADMIN — Medication 5 MILLIGRAM(S): at 20:59

## 2023-04-04 RX ADMIN — Medication 1 GRAM(S): at 11:29

## 2023-04-04 RX ADMIN — Medication 1 GRAM(S): at 21:00

## 2023-04-04 RX ADMIN — Medication 166.67 MILLIGRAM(S): at 12:30

## 2023-04-04 RX ADMIN — Medication 166.67 MILLIGRAM(S): at 20:59

## 2023-04-04 RX ADMIN — CEFEPIME 100 MILLIGRAM(S): 1 INJECTION, POWDER, FOR SOLUTION INTRAMUSCULAR; INTRAVENOUS at 11:28

## 2023-04-04 NOTE — PROGRESS NOTE ADULT - ATTENDING COMMENTS
61 yo male with PMHx of HTN, chronic back pain, anxiety, DM, HIV presents today to continue treatment for his R foot osteomyelitis      #Abdominal pain  #Chest pain  #constipation  -Pt reports experiencing chest pain, epigastric and LUQ abdominal pain  -Describes abdominal pain as burning; Denies nausea and vomiting  -Chest pain is palpable on physical exam  -EKG NSR  -Troponin x1 WNL  -on percocet for pain  -Famotidine 20 prescribed  -On Carafate on protonix  -Will continue to monitor  -Pt reports no BM in 8 days. On miralax, senna and milk of magnesia. Pt refusing recommendations of enema. Suppository prescribed as an alternative option. Go lytely prescribed. Has not had a bowel movement yet though refused medications overnight.   -Pending placement to [facility] and PICC line insertion

## 2023-04-04 NOTE — PROGRESS NOTE ADULT - SUBJECTIVE AND OBJECTIVE BOX
61 yo male with PMHx of HTN, chronic back pain, anxiety, DM, HIV presents today to continue treatment for his R foot.  Patient was recently admitted on 3/14 and left AMA on 3/17.  Patient mentions he neeeded to leave AMA, because he was staying at a hotel and they were going to throw all his things away.  Patient was not taking any medications after leaving AMA and does not follow up with any physicians as outpatient, but will like to start following with one.   Patient states his right foot pain has been worsening for the past 3 weeks, when he was inpatient the pain improved, but after stopping taking everything the pain return to 10/10.  Patient denies any fever, chills, nausea, vomiting, chest pain, trauma to the foot.    In the ED /92, HR 81, Temp 99, RR 18 and SpO2 99% on RA.    Labs: Hb 11.5, WBC wnl,  Glucose 527, Na 133, Albumin 1.8, Lactate 1.9,   Patient was seen by podiatry and he was given Zosyn, Vancomycin, 2L IVF, 20UI Lantus, 4UI Admelog and 8UI humulin    4/3/23 : Patient seen and examined by medicine team. Awaiting authorization to facility, likely [facility] and then PICC line placement. Pt with complaints of constipation. Suppository and Go lytely ordered. Will assess for bowel movements. Patient did not take bowel regimen last night or in the AM. Refused to take bowel regimen until after lunch time. Crt elevated on labs done yesterday. IVFs ordered. Patient also refused IVFs until after lunchtime.   Constitutional: Pt lying in bed, awake and alert, NAD      PHYSICAL EXAM:  Vital Signs Last 24 Hrs  T(C): 36.7 (04 Apr 2023 16:04), Max: 36.9 (04 Apr 2023 07:45)  T(F): 98.1 (04 Apr 2023 16:04), Max: 98.4 (04 Apr 2023 07:45)  HR: 70 (04 Apr 2023 16:04) (70 - 80)  BP: 106/48 (04 Apr 2023 16:04) (106/48 - 137/80)  BP(mean): --  RR: 18 (04 Apr 2023 16:04) (18 - 18)  SpO2: 97% (04 Apr 2023 16:04) (97% - 97%)    Parameters below as of 04 Apr 2023 16:04  Patient On (Oxygen Delivery Method): room air    Constitutional: Pt lying in bed, awake and alert, NAD  HEENT: normal hearing, moist mucous membranes  Neck: Soft and supple, no JVD  Respiratory: CTABL, No wheezing, rales or rhonchi  Cardiovascular: S1S2+, RRR, no M/G/R  Gastrointestinal: BS+, soft, +TTP of epigastric and LUQ abdominal pain, no guarding, no rebound  Extremities: no pedal edema R foot with dressing in place   Vascular: 2+ peripheral pulses  Neurological: AAOx3, no focal deficits  Musculoskeletal: 5/5 strength b/l upper and lower extremities  Skin: Dressings currently in place. Diffuse edema and erythema noted to the right foot forefoot  Full thickness wound noted to the right plantar forefoot, probe to soft tissue, tracking dorsally, no PTB, no malodor, no pus/purulence drainage, no fluctuance, no crepitus.    MEDICATIONS:  MEDICATIONS  (STANDING):  cefepime   IVPB 2000 milliGRAM(s) IV Intermittent every 12 hours  dextrose 5%. 1000 milliLiter(s) (50 mL/Hr) IV Continuous <Continuous>  dextrose 5%. 1000 milliLiter(s) (100 mL/Hr) IV Continuous <Continuous>  dextrose 50% Injectable 25 Gram(s) IV Push once  dextrose 50% Injectable 12.5 Gram(s) IV Push once  dextrose 50% Injectable 25 Gram(s) IV Push once  famotidine    Tablet 20 milliGRAM(s) Oral daily  glucagon  Injectable 1 milliGRAM(s) IntraMuscular once  heparin   Injectable 5000 Unit(s) SubCutaneous every 8 hours  insulin glargine Injectable (LANTUS) 26 Unit(s) SubCutaneous at bedtime  insulin lispro (ADMELOG) corrective regimen sliding scale   SubCutaneous three times a day before meals  insulin lispro (ADMELOG) corrective regimen sliding scale   SubCutaneous at bedtime  insulin lispro Injectable (ADMELOG) 3 Unit(s) SubCutaneous three times a day before meals  sodium chloride 0.9%. 1000 milliLiter(s) (100 mL/Hr) IV Continuous <Continuous>  sucralfate 1 Gram(s) Oral two times a day  vancomycin  IVPB 1250 milliGRAM(s) IV Intermittent every 12 hours      LABS: All Labs Reviewed:                        10.7   4.80  )-----------( 265      ( 02 Apr 2023 18:32 )             32.8     04-02    130<L>  |  99  |  36<H>  ----------------------------<  278<H>  5.0   |  27  |  1.75<H>    Ca    8.6      02 Apr 2023 18:32    TPro  7.7  /  Alb  2.0<L>  /  TBili  0.3  /  DBili  x   /  AST  64<H>  /  ALT  121<H>  /  AlkPhos  189<H>  04-02    CAPILLARY BLOOD GLUCOSE    POCT Blood Glucose.: 261 mg/dL (03 Apr 2023 16:52)  POCT Blood Glucose.: 256 mg/dL (03 Apr 2023 12:03)  POCT Blood Glucose.: 180 mg/dL (03 Apr 2023 08:44)  POCT Blood Glucose.: 235 mg/dL (02 Apr 2023 22:10)      RADIOLOGY/EKG:  < from: Xray Foot AP + Lateral + Oblique, Right (03.20.23 @ 16:38) > & Xray Chest 1 view  IMPRESSION: Small density left lung now seen possibly infiltrate related.    Persistent bony findings around the first MTP joint. There is increased   swelling around the second and third toes.    < from: MR Foot No Cont, Right (03.17.23 @ 10:45) >  IMPRESSION:  1.  Osseous edema within the second, third and fourth proximal to mid   phalanges without loss of normal T1 fatty marrow. Phlegmonous changes and   soft tissue wound at the plantar aspect of the second through third   phalanges. These findings are likely secondary to vascular etiology   versus early osteomyelitis given adjacent phlegmonous changes. Clinically   correlate with physical exam and wound depth.  2.  Moderate first metatarsophalangeal joint osteoarthritis with edema   spanning the joint and osseous erosions/cystic changes along the medial   aspect. These findings are secondary to osteomyelitis versus moderate to   severe osteoarthritis with subchondral cystic changes
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
## 2023-04-05 PROCEDURE — 99233 SBSQ HOSP IP/OBS HIGH 50: CPT | Mod: GC

## 2023-04-05 RX ADMIN — CEFEPIME 100 MILLIGRAM(S): 1 INJECTION, POWDER, FOR SOLUTION INTRAMUSCULAR; INTRAVENOUS at 20:42

## 2023-04-05 RX ADMIN — Medication 1 GRAM(S): at 09:37

## 2023-04-05 RX ADMIN — Medication 1 GRAM(S): at 20:42

## 2023-04-05 RX ADMIN — POLYETHYLENE GLYCOL 3350 17 GRAM(S): 17 POWDER, FOR SOLUTION ORAL at 09:37

## 2023-04-05 RX ADMIN — CEFEPIME 100 MILLIGRAM(S): 1 INJECTION, POWDER, FOR SOLUTION INTRAMUSCULAR; INTRAVENOUS at 09:37

## 2023-04-05 RX ADMIN — Medication 166.67 MILLIGRAM(S): at 11:00

## 2023-04-05 RX ADMIN — Medication 166.67 MILLIGRAM(S): at 20:42

## 2023-04-05 RX ADMIN — Medication 5 MILLIGRAM(S): at 20:44

## 2023-04-05 RX ADMIN — FAMOTIDINE 20 MILLIGRAM(S): 10 INJECTION INTRAVENOUS at 09:37

## 2023-04-05 NOTE — PROGRESS NOTE ADULT - ATTENDING COMMENTS
61 yo male with PMHx of HTN, chronic back pain, anxiety, DM, HIV presents today to continue treatment for his R foot osteomyelitis      #Abdominal pain  #Chest pain  #constipation  -Pt reports experiencing chest pain, epigastric and LUQ abdominal pain  -Describes abdominal pain as burning; Denies nausea and vomiting  -Chest pain is palpable on physical exam  -EKG NSR  -Troponin x1 WNL  -on percocet for pain  -Famotidine 20 prescribed  -On Carafate on protonix  -Will continue to monitor  -Pending placement, discharged with in person IV invanz at [facility] and PO doxycycline
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
## 2023-04-06 ENCOUNTER — TRANSCRIPTION ENCOUNTER (OUTPATIENT)
Age: 63
End: 2023-04-06

## 2023-04-06 LAB
GLUCOSE BLDC GLUCOMTR-MCNC: 199 MG/DL — HIGH (ref 70–99)
GLUCOSE BLDC GLUCOMTR-MCNC: 211 MG/DL — HIGH (ref 70–99)
GLUCOSE BLDC GLUCOMTR-MCNC: 221 MG/DL — HIGH (ref 70–99)
GLUCOSE BLDC GLUCOMTR-MCNC: 259 MG/DL — HIGH (ref 70–99)

## 2023-04-06 PROCEDURE — 99233 SBSQ HOSP IP/OBS HIGH 50: CPT | Mod: GC

## 2023-04-06 RX ADMIN — FAMOTIDINE 20 MILLIGRAM(S): 10 INJECTION INTRAVENOUS at 09:54

## 2023-04-06 RX ADMIN — Medication 650 MILLIGRAM(S): at 05:44

## 2023-04-06 RX ADMIN — Medication 1 GRAM(S): at 09:54

## 2023-04-06 RX ADMIN — Medication 166.67 MILLIGRAM(S): at 11:54

## 2023-04-06 RX ADMIN — Medication 4: at 08:11

## 2023-04-06 RX ADMIN — Medication 2: at 12:34

## 2023-04-06 RX ADMIN — Medication 3 UNIT(S): at 12:35

## 2023-04-06 RX ADMIN — Medication 3 UNIT(S): at 08:12

## 2023-04-06 RX ADMIN — Medication 650 MILLIGRAM(S): at 21:49

## 2023-04-06 RX ADMIN — CEFEPIME 100 MILLIGRAM(S): 1 INJECTION, POWDER, FOR SOLUTION INTRAMUSCULAR; INTRAVENOUS at 09:47

## 2023-04-06 RX ADMIN — Medication 1 GRAM(S): at 21:18

## 2023-04-06 RX ADMIN — Medication 3 UNIT(S): at 17:33

## 2023-04-06 RX ADMIN — Medication 650 MILLIGRAM(S): at 06:47

## 2023-04-06 RX ADMIN — Medication 6: at 17:33

## 2023-04-06 RX ADMIN — INSULIN GLARGINE 26 UNIT(S): 100 INJECTION, SOLUTION SUBCUTANEOUS at 21:18

## 2023-04-06 RX ADMIN — Medication 5 MILLIGRAM(S): at 21:18

## 2023-04-06 RX ADMIN — Medication 650 MILLIGRAM(S): at 21:19

## 2023-04-06 RX ADMIN — HEPARIN SODIUM 5000 UNIT(S): 5000 INJECTION INTRAVENOUS; SUBCUTANEOUS at 21:18
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
## 2023-04-06 NOTE — DISCHARGE NOTE PROVIDER - CARE PROVIDER_API CALL
Deric Mccracken (DO)  Orthopaedic Surgery  [address] [place] [address]  Phone: [phone]  Fax: [phone]  Follow Up Time: 1 week
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
## 2023-04-06 NOTE — DISCHARGE NOTE PROVIDER - NSDCMRMEDTOKEN_GEN_ALL_CORE_FT
[FreeTextEntry1] : 1/4/20:  MACI nl - mailed to pt. doxycycline hyclate 100 mg oral capsule: 1 cap(s) orally 2 times a day    doxycycline monohydrate 50 mg oral capsule: 2 cap(s) orally every 12 hours  ertapenem 1 g injection: 1 gram(s) injectable once a day  famotidine 20 mg oral tablet: 1 tab(s) orally once a day  insulin glargine 100 units/mL subcutaneous solution: 28 unit(s) subcutaneous once a day (at bedtime)  insulin lispro 100 units/mL injectable solution: 5 unit(s) injectable 3 times a day (before meals)  melatonin 5 mg oral tablet: 1 tab(s) orally once a day (at bedtime) As needed Insomnia  oxycodone-acetaminophen 5 mg-325 mg oral tablet: 1 tab(s) orally every 6 hours As needed Severe Pain (7 - 10)  polyethylene glycol 3350 oral powder for reconstitution: 17 gram(s) orally once a day As needed Constipation  sucralfate 1 g oral tablet: 1 tab(s) orally 2 times a day

## 2023-04-06 NOTE — PROGRESS NOTE ADULT - ATTENDING COMMENTS
I have personally seen and examined the patient. I have collaborated with and supervised the Resident on the assessment and plan. I have reviewed and made amendments to the documentation where necessary.
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
## 2023-04-06 NOTE — DISCHARGE NOTE PROVIDER - NSDCCPCAREPLAN_GEN_ALL_CORE_FT
PRINCIPAL DISCHARGE DIAGNOSIS  Diagnosis: Foot infection  Assessment and Plan of Treatment: Usted tiene osteomielitis. Esta es tosha infección ósea causada por bacterias o otros gérmenes. La infección puede paras comenzado en otra parte del cuerpo y se deseminó a los huesos.  El médico le recetará medicamentos (antibióticos) para que usted tome en casa para eliminar la infección en el hueso. Al principio, probablemente se necesitarán antibióticos administrados en tosha vena en el brazo, el tórax o el peter (vía intravenosa). Vas a usar un antibiotico que se llama Invanz que es intravenoso. A la misma ves, vas a su un antibiotico en forma de pastillas que se llama Doxycycline. Vas a continuar los antibioticos hasta el 30 de bart. Si no se lobo todo el medicamento o lo lobo fuera del horario indicado, los gérmenes pueden volverse difíciles de tratar. La infección puede reaparecer.  Llame a bertrand proveedor si:  Usted tiene tosha fiebre de 100.5°F (38.0°C) o superior o tiene escalofríos.  Usted se está sintiendo más cansado o enfermo.  El área por encima del hueso está más asuncion o más hinchada.  Usted tiene tosha nueva úlcera cutánea o tosha que se está agrandando.  Usted presenta más dolor alrededor del hueso donde se localiza la infección o usted ya no puede poner peso sobre tosha pierna o pie o no puede usar un brazo o tosha mano.  Si usted tiene diabetes, es muy importante mantener bertrand nivel de azúcar en la jake o el de bertrand hijo bajo control.      SECONDARY DISCHARGE DIAGNOSES  Diagnosis: Uncontrolled diabetes mellitus with hyperglycemia  Assessment and Plan of Treatment:

## 2023-04-06 NOTE — DISCHARGE NOTE PROVIDER - NSFOLLOWUPCLINICS_GEN_ALL_ED_FT
[facility]  Family Medicine  [address] [place] [address]  Phone: [phone]  Fax:   Follow Up Time: 1 week

## 2023-04-06 NOTE — DISCHARGE NOTE PROVIDER - NSDCCAREPROVSEEN_GEN_ALL_CORE_FT
Tia, Duc Hess, Gail Courtney, Lisa Rubio, Helene Mccracken, eDric Wells, Mitchell Day, Jonathan Cabral, Susie Yusuf, Carla Salcedo, Carlitos Salcedo, Harpreet Barajas, Jack Cristina, Robert Hopper, Lenin Mazariegos, Moi

## 2023-04-07 ENCOUNTER — TRANSCRIPTION ENCOUNTER (OUTPATIENT)
Age: 63
End: 2023-04-07

## 2023-04-07 VITALS
HEART RATE: 80 BPM | DIASTOLIC BLOOD PRESSURE: 75 MMHG | OXYGEN SATURATION: 95 % | RESPIRATION RATE: 18 BRPM | TEMPERATURE: 97 F | SYSTOLIC BLOOD PRESSURE: 127 MMHG

## 2023-04-07 LAB
GLUCOSE BLDC GLUCOMTR-MCNC: 168 MG/DL — HIGH (ref 70–99)
GLUCOSE BLDC GLUCOMTR-MCNC: 186 MG/DL — HIGH (ref 70–99)
GLUCOSE BLDC GLUCOMTR-MCNC: 271 MG/DL — HIGH (ref 70–99)
SARS-COV-2 RNA SPEC QL NAA+PROBE: SIGNIFICANT CHANGE UP

## 2023-04-07 PROCEDURE — 99239 HOSP IP/OBS DSCHRG MGMT >30: CPT | Mod: GC

## 2023-04-07 RX ORDER — ERTAPENEM SODIUM 1 G/1
1 INJECTION, POWDER, LYOPHILIZED, FOR SOLUTION INTRAMUSCULAR; INTRAVENOUS
Qty: 0 | Refills: 0 | DISCHARGE
Start: 2023-04-07 | End: 2023-04-30

## 2023-04-07 RX ORDER — INSULIN LISPRO 100/ML
5 VIAL (ML) SUBCUTANEOUS
Qty: 0 | Refills: 0 | DISCHARGE
Start: 2023-04-07

## 2023-04-07 RX ORDER — LANOLIN ALCOHOL/MO/W.PET/CERES
1 CREAM (GRAM) TOPICAL
Qty: 0 | Refills: 0 | DISCHARGE
Start: 2023-04-07

## 2023-04-07 RX ORDER — ERTAPENEM SODIUM 1 G/1
1000 INJECTION, POWDER, LYOPHILIZED, FOR SOLUTION INTRAMUSCULAR; INTRAVENOUS EVERY 24 HOURS
Refills: 0 | Status: DISCONTINUED | OUTPATIENT
Start: 2023-04-07 | End: 2023-04-07

## 2023-04-07 RX ORDER — SUCRALFATE 1 G
1 TABLET ORAL
Qty: 0 | Refills: 0 | DISCHARGE
Start: 2023-04-07

## 2023-04-07 RX ORDER — FAMOTIDINE 10 MG/ML
1 INJECTION INTRAVENOUS
Qty: 0 | Refills: 0 | DISCHARGE
Start: 2023-04-07

## 2023-04-07 RX ORDER — POLYETHYLENE GLYCOL 3350 17 G/17G
17 POWDER, FOR SOLUTION ORAL
Qty: 0 | Refills: 0 | DISCHARGE
Start: 2023-04-07

## 2023-04-07 RX ORDER — INSULIN GLARGINE 100 [IU]/ML
28 INJECTION, SOLUTION SUBCUTANEOUS
Qty: 0 | Refills: 0 | DISCHARGE
Start: 2023-04-07

## 2023-04-07 RX ORDER — OXYCODONE AND ACETAMINOPHEN 5; 325 MG/1; MG/1
1 TABLET ORAL
Qty: 0 | Refills: 0 | DISCHARGE
Start: 2023-04-07

## 2023-04-07 RX ADMIN — Medication 2: at 16:06

## 2023-04-07 RX ADMIN — Medication 3 UNIT(S): at 13:19

## 2023-04-07 RX ADMIN — Medication 1 GRAM(S): at 10:04

## 2023-04-07 RX ADMIN — Medication 6: at 13:18

## 2023-04-07 RX ADMIN — FAMOTIDINE 20 MILLIGRAM(S): 10 INJECTION INTRAVENOUS at 10:05

## 2023-04-07 RX ADMIN — HEPARIN SODIUM 5000 UNIT(S): 5000 INJECTION INTRAVENOUS; SUBCUTANEOUS at 05:44

## 2023-04-07 RX ADMIN — Medication 3 UNIT(S): at 08:13

## 2023-04-07 RX ADMIN — ERTAPENEM SODIUM 120 MILLIGRAM(S): 1 INJECTION, POWDER, LYOPHILIZED, FOR SOLUTION INTRAMUSCULAR; INTRAVENOUS at 15:00

## 2023-04-07 RX ADMIN — Medication 3 UNIT(S): at 16:07

## 2023-04-07 RX ADMIN — Medication 2: at 08:12
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
## 2023-04-07 NOTE — PROGRESS NOTE ADULT - ASSESSMENT
63 yo male with PMHx of HTN, chronic back pain, anxiety, DM, HIV presents 3/20 to continue treatment for his R foot.  Patient was recently admitted on 3/14 and left AMA on 3/17.  Patient mentions he neeeded to leave AMA, because he was staying at a hotel and they were going to throw all his things away.  Patient was not taking any medications after leaving AMA and does not follow up with any physicians as outpatient, but will like to start following with one.  Patient states his right foot pain has been worsening for the past 3 weeks, when he was inpatient the pain improved, but after stopping taking everything the pain return to 10/10. Patient denies any fever, chills, nausea, vomiting, chest pain, trauma to the foot. In the ED /92, HR 81, Temp 99, RR 18 and SpO2 99% on RA.  Labs: Hb 11.5, WBC wnl,  Glucose 527, Na 133, Albumin 1.8, Lactate 1.9, Patient was seen by podiatry and he was given Zosyn, Vancomycin, 2L IVF, 20UI Lantus, 4UI Admelog and 8UI humulin. Was given vancomycin/zosyn.     1. R foot cellulitis/OM. HIV.  - imaging reviewed  - on vancomycin [id number] trough wnl #19  - s/p zosyn [id number] #2, on cefepime [id number] #17  - f/u cultures - blood cx no growth  - noncompliant with hiv meds tcells/viral load reviewed  - will have to f/u with i.d outpt for hiv  - monitor temps  - tolerating abx well so far; no side effects noted  - reason for abx use and side effects reviewed with patient  - plan for midline and IV invanz 1gm daily/po doxycycline 100mg BID until 4/30  weekly cbc, cmp, esr, crp  - supportive care  - fu cbc    2. other issues - care per medicine

## 2023-04-07 NOTE — PROGRESS NOTE ADULT - PROVIDER SPECIALTY LIST ADULT
Family Medicine
Infectious Disease
Family Medicine
Hospitalist
Podiatry
Podiatry
Family Medicine
Podiatry
Family Medicine
Infectious Disease
Podiatry
Family Medicine

## 2023-04-07 NOTE — PROGRESS NOTE ADULT - NUTRITIONAL ASSESSMENT
This patient has been assessed with a concern for Malnutrition and has been determined to have a diagnosis/diagnoses of Severe protein-calorie malnutrition.    This patient is being managed with:   Diet Consistent Carbohydrate/No Snacks-  DASH/TLC {Sodium & Cholesterol Restricted} (DASH)  Entered: Mar 20 2023  8:31PM  
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
## 2023-04-07 NOTE — DISCHARGE NOTE NURSING/CASE MANAGEMENT/SOCIAL WORK - NSDCVIVACCINE_GEN_ALL_CORE_FT
Tdap; 27-Aug-2018 13:17; Pamela Albarran (CATY); [employer]; [id number]; IntraMuscular; Deltoid Left.; 0.5 milliLiter(s); VIS (VIS Published: 09-May-2013, VIS Presented: 27-Aug-2018);   Tdap; 09-Feb-2019 17:54; Abby Marion); [employer]asteur; [id number] (Exp. Date: 02-Nov-2020); IntraMuscular; Deltoid Left.; 0.5 milliLiter(s); VIS (VIS Published: 09-May-2013, VIS Presented: 09-Feb-2019);

## 2023-04-07 NOTE — PROVIDER CONTACT NOTE (OTHER) - SITUATION
Spoke with patient via  phone #[phone] about discharge planning. Has accepting subacute rehab facility but preferred being placed in a hotel.
patient refuses iv fluids that are ordered this evening. He says why now?    patient refuses dressing change on right foot that is supposed to be daily, he says it is clean and changed yesterday.
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
## 2023-04-07 NOTE — PROGRESS NOTE ADULT - SUBJECTIVE AND OBJECTIVE BOX
Date of service: 04-07-23 @ 11:55    pt seen and examined  feels better  no complaints  less foot pain  no fevers  agreeable for rehab     ROS: no fever or chills; denies dizziness, no HA, no SOB or cough, no abdominal pain, no diarrhea or constipation; no dysuria, no urinary frequency, no legs pain, no rashes      MEDICATIONS  (STANDING):  dextrose 5%. 1000 milliLiter(s) (100 mL/Hr) IV Continuous <Continuous>  dextrose 5%. 1000 milliLiter(s) (50 mL/Hr) IV Continuous <Continuous>  dextrose 50% Injectable 25 Gram(s) IV Push once  dextrose 50% Injectable 12.5 Gram(s) IV Push once  dextrose 50% Injectable 25 Gram(s) IV Push once  doxycycline monohydrate Capsule 100 milliGRAM(s) Oral every 12 hours  ertapenem  IVPB 1000 milliGRAM(s) IV Intermittent every 24 hours  famotidine    Tablet 20 milliGRAM(s) Oral daily  glucagon  Injectable 1 milliGRAM(s) IntraMuscular once  heparin   Injectable 5000 Unit(s) SubCutaneous every 8 hours  insulin glargine Injectable (LANTUS) 26 Unit(s) SubCutaneous at bedtime  insulin lispro (ADMELOG) corrective regimen sliding scale   SubCutaneous three times a day before meals  insulin lispro (ADMELOG) corrective regimen sliding scale   SubCutaneous at bedtime  insulin lispro Injectable (ADMELOG) 3 Unit(s) SubCutaneous three times a day before meals  sodium chloride 0.9%. 1000 milliLiter(s) (100 mL/Hr) IV Continuous <Continuous>  sucralfate 1 Gram(s) Oral two times a day      Vital Signs Last 24 Hrs  T(C): 36.5 (07 Apr 2023 08:50), Max: 37.6 (06 Apr 2023 14:43)  T(F): 97.7 (07 Apr 2023 08:50), Max: 99.7 (06 Apr 2023 14:43)  HR: 71 (07 Apr 2023 08:50) (71 - 89)  BP: 125/65 (07 Apr 2023 08:50) (125/65 - 151/87)  BP(mean): --  RR: 17 (07 Apr 2023 08:50) (17 - 17)  SpO2: 100% (07 Apr 2023 08:50) (98% - 100%)    Parameters below as of 07 Apr 2023 08:50  Patient On (Oxygen Delivery Method): room air      PE:  Constitutional: NAD   HEENT: NC/AT, EOMI, PERRLA, conjunctivae clear; ears and nose atraumatic; pharynx benign  Neck: supple; thyroid not palpable  Back: no tenderness  Respiratory: respiratory effort normal; clear to auscultation  Cardiovascular: S1S2 regular, no murmurs  Abdomen: soft, not tender, not distended, positive BS; liver and spleen WNL  Genitourinary: no suprapubic tenderness  Lymphatic: no LN palpable  Musculoskeletal: no muscle tenderness, no joint swelling or tenderness  Extremities: no pedal edema R foot with dressing in place   Neurological/ Psychiatric: AxOx3, Judgement and insight normal;  moving all extremities  Skin: no rashes; no palpable lesions    Labs: all available labs reviewed                              Culture - Blood (03.20.23 @ 14:54)   Specimen Source: .Blood Blood   Culture Results:   No growth to date.     Culture - Blood (03.20.23 @ 14:54)   Specimen Source: .Blood Blood   Culture Results:   No growth to date.          Radiology: all available radiological tests reviewed  < from: Xray Chest 1 View- PORTABLE-Urgent (Xray Chest 1 View- PORTABLE-Urgent .) (03.20.23 @ 16:38) >  ACC: [id number] EXAM:  XR CHEST PORTABLE URGENT 1V   ORDERED BY: ANGEL BOLAND     ACC: [id number] EXAM:  XR FOOT COMP MIN 3 VIEWS RT   ORDERED BY: NOÉ JOHNSTON     PROCEDURE DATE:  03/20/2023          INTERPRETATION:  Right foot and chest. Patient has a plantar wound and is   scheduled for admission.    Right foot. 3 views.    Erosion around the inner aspect of the first MTP joint is again noted.    Appearance is rather similar to March 14.    Mild first IP degeneration is seen.    There is increased swelling around the second and third toes.    AP chest on March 2023 at 4:26 PM.    Heart size is within normal limits.    There is a 1 cm density now evident in the left mid lower lung. This is   due to some March 14 and may represent a small infiltrate.    IMPRESSION: Small density left lung now seen possibly infiltrate related.    Persistent bony findings around the first MTP joint. There is increased   swelling around the second and third toes.        ACC: [id number] EXAM:  MR FOOT RT   ORDERED BY: PRADEEP LYNCH     PROCEDURE DATE:  03/17/2023          INTERPRETATION:  Exam Type: MR FOOT RIGHT  Exam Date and Time: 3/17/2023 10:45 AM  Indication: Concern for osteomyelitis  Comparison: Right foot radiograph on 3/14/2023    TECHNIQUE:  Multiplanar multisequence MRI of the right foot was performedusing a   standard non-contrast protocol. Limited secondary to patient motion.    FINDINGS:    Osseous edema within the second, third and fourth proximal tomid   phalanges without loss of normal T1 fatty marrow. Phlegmonous changes and   soft tissue wound at the plantar aspect of the second through third   phalanges. Moderate first metatarsophalangeal joint osteoarthritis with   edema spanning the jointand osseous erosions versus a possible cystic   changes along the medial aspect.    No intermetatarsal neuroma. No intermetatarsal bursitis. Fatty atrophy of   the muscles of the forefoot.    IMPRESSION:  1.  Osseous edema within the second, third and fourth proximal to mid   phalanges without loss of normal T1 fatty marrow. Phlegmonous changes and   soft tissue wound at the plantar aspect of the second through third   phalanges. These findings are likely secondary to vascular etiology   versus early osteomyelitis given adjacent phlegmonous changes. Clinically   correlate with physical exam and wound depth.  2.  Moderate first metatarsophalangeal joint osteoarthritis with edema   spanning the joint and osseous erosions/cystic changes along the medial   aspect. These findings are secondary to osteomyelitis versus moderate to   severe osteoarthritis with subchondral cystic changes    Advanced directives addressed: full resuscitation

## 2023-04-07 NOTE — PROVIDER CONTACT NOTE (OTHER) - BACKGROUND
Discussed [facility] being a safer option to receive the antibiotics and that he can return to [facility] after completing treatment.
patient admitted with right foot wound infection.

## 2023-04-07 NOTE — PROGRESS NOTE ADULT - ATTENDING COMMENTS
Patient seen today, feels well, no overnight issues reported. Patient and his ex wife have been deciding on alternatives for safe d/c plan.   Patient agreed on [facility] today, then refused due to Hx in [place]. Had midline placed today.     Patient ia medically optimized for d/c. It is important for patient to find stable residence to f/u with clinic where he could restart his antiretroviral medication. I discussed this with patient and he seemed to understand at this time.     Plan for discharge soon Patient seen today, feels well, no overnight issues reported. Patient and his ex wife have been deciding on alternatives for safe d/c plan.   Patient agreed on [facility] today. Had midline placed today.       Plan for discharge

## 2023-04-07 NOTE — ADVANCED PRACTICE NURSE CONSULT - ASSESSMENT
Pt confirmed using two identifiers (name and ). Pt is alert and oriented and consented to procedure. Reviewed chart and provider orders. Pt prepped in sterile fashion. Using strict aseptic technique that was maintained throughout procedure, guided by ultrasound, Arrow Endurance extended dwell peripheral midline catheter Lot #[id number], 20g, 8cm, was placed in the right brachial vein. Brisk blood return observed and flushed with 10cc NS. Stat lock and Biopatch applied. Site dressed and Curos cap applied.  Pt tolerated procedure well. Midline care instructions were placed in chart. All questions answered. Do Not Use Extremity band applied to right wrist. Report given to primary RN.

## 2023-04-07 NOTE — PROGRESS NOTE ADULT - REASON FOR ADMISSION
R foot cellulitis

## 2023-04-07 NOTE — DISCHARGE NOTE NURSING/CASE MANAGEMENT/SOCIAL WORK - CAREGIVER PHONE NUMBER
Detail Level: Simple Additional Notes: Patient consent was obtained to proceed with the visit and recommended plan of care after discussion of all risks and benefits, including the risks of COVID-19 exposure. [phone]

## 2023-04-07 NOTE — DISCHARGE NOTE NURSING/CASE MANAGEMENT/SOCIAL WORK - NSDCPEFALRISK_GEN_ALL_CORE
For information on Fall & Injury Prevention, visit: [email or web address] OR  [email or web address] OR  https://www.cdc.gov/steadi/patient.html

## 2023-04-07 NOTE — DISCHARGE NOTE NURSING/CASE MANAGEMENT/SOCIAL WORK - PATIENT PORTAL LINK FT
You can access the FollowMyHealth Patient Portal offered by [facility] by registering at the following website: [email or web address]. By joining [employer]’s FollowMyHealth portal, you will also be able to view your health information using other applications (apps) compatible with our system.

## 2023-04-07 NOTE — CHART NOTE - NSCHARTNOTEFT_GEN_A_CORE
Patient has a mobility limitation that significantly impairs the pts ability to participate in one or more MRADLs such as toileting, eating, dressing, and bathing in customary locations in the home. The patients home provides adequate access between rooms for the use of the transport wheelchair with foot rest. The wheelchair will significantly improve the patients ability to participate in MRADLs and will be used on a regular basis at home. The pts mobility limitation cannot be resolved by the use of a walker or cane. The patient has family that is able and willing to transport patient in wheelchair.
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
## 2023-04-11 DIAGNOSIS — Z59.00 HOMELESSNESS UNSPECIFIED: ICD-10-CM

## 2023-04-11 DIAGNOSIS — E87.1 HYPO-OSMOLALITY AND HYPONATREMIA: ICD-10-CM

## 2023-04-11 DIAGNOSIS — I10 ESSENTIAL (PRIMARY) HYPERTENSION: ICD-10-CM

## 2023-04-11 DIAGNOSIS — L03.115 CELLULITIS OF RIGHT LOWER LIMB: ICD-10-CM

## 2023-04-11 DIAGNOSIS — K59.00 CONSTIPATION, UNSPECIFIED: ICD-10-CM

## 2023-04-11 DIAGNOSIS — E88.09 OTHER DISORDERS OF PLASMA-PROTEIN METABOLISM, NOT ELSEWHERE CLASSIFIED: ICD-10-CM

## 2023-04-11 DIAGNOSIS — Z21 ASYMPTOMATIC HUMAN IMMUNODEFICIENCY VIRUS [HIV] INFECTION STATUS: ICD-10-CM

## 2023-04-11 DIAGNOSIS — E43 UNSPECIFIED SEVERE PROTEIN-CALORIE MALNUTRITION: ICD-10-CM

## 2023-04-11 DIAGNOSIS — N17.9 ACUTE KIDNEY FAILURE, UNSPECIFIED: ICD-10-CM

## 2023-04-11 DIAGNOSIS — E11.65 TYPE 2 DIABETES MELLITUS WITH HYPERGLYCEMIA: ICD-10-CM

## 2023-04-11 DIAGNOSIS — F17.200 NICOTINE DEPENDENCE, UNSPECIFIED, UNCOMPLICATED: ICD-10-CM

## 2023-04-11 DIAGNOSIS — M86.171 OTHER ACUTE OSTEOMYELITIS, RIGHT ANKLE AND FOOT: ICD-10-CM

## 2023-04-11 DIAGNOSIS — E11.69 TYPE 2 DIABETES MELLITUS WITH OTHER SPECIFIED COMPLICATION: ICD-10-CM

## 2023-04-11 DIAGNOSIS — D64.9 ANEMIA, UNSPECIFIED: ICD-10-CM

## 2023-04-11 SDOH — ECONOMIC STABILITY - HOUSING INSECURITY: HOMELESSNESS UNSPECIFIED: Z59.00

## 2023-04-19 NOTE — ED ADULT NURSE NOTE - HISTORY OF COVID-19 VACCINATION
flonase      Last Written Prescription Date:  7/26/22  Last Fill Quantity: 16 g,   # refills: 3  Last Office Visit: 8/15/22  Future Office visit:       
Yes

## 2023-05-22 ENCOUNTER — EMERGENCY (EMERGENCY)
Facility: HOSPITAL | Age: 63
LOS: 0 days | Discharge: HOME | End: 2023-05-22
Attending: EMERGENCY MEDICINE
Payer: COMMERCIAL

## 2023-05-22 VITALS
WEIGHT: 177.91 LBS | OXYGEN SATURATION: 94 % | DIASTOLIC BLOOD PRESSURE: 85 MMHG | HEART RATE: 90 BPM | TEMPERATURE: 98 F | HEIGHT: 69 IN | SYSTOLIC BLOOD PRESSURE: 141 MMHG | RESPIRATION RATE: 18 BRPM

## 2023-05-22 DIAGNOSIS — M25.552 PAIN IN LEFT HIP: ICD-10-CM

## 2023-05-22 DIAGNOSIS — F17.200 NICOTINE DEPENDENCE, UNSPECIFIED, UNCOMPLICATED: ICD-10-CM

## 2023-05-22 DIAGNOSIS — Z21 ASYMPTOMATIC HUMAN IMMUNODEFICIENCY VIRUS [HIV] INFECTION STATUS: ICD-10-CM

## 2023-05-22 DIAGNOSIS — S09.90XA UNSPECIFIED INJURY OF HEAD, INITIAL ENCOUNTER: ICD-10-CM

## 2023-05-22 DIAGNOSIS — R07.89 OTHER CHEST PAIN: ICD-10-CM

## 2023-05-22 DIAGNOSIS — E11.9 TYPE 2 DIABETES MELLITUS WITHOUT COMPLICATIONS: ICD-10-CM
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
## 2023-05-22 DIAGNOSIS — M54.9 DORSALGIA, UNSPECIFIED: ICD-10-CM

## 2023-05-22 DIAGNOSIS — I10 ESSENTIAL (PRIMARY) HYPERTENSION: ICD-10-CM

## 2023-05-22 DIAGNOSIS — F41.9 ANXIETY DISORDER, UNSPECIFIED: ICD-10-CM

## 2023-05-22 DIAGNOSIS — G89.29 OTHER CHRONIC PAIN: ICD-10-CM

## 2023-05-22 DIAGNOSIS — Z79.4 LONG TERM (CURRENT) USE OF INSULIN: ICD-10-CM

## 2023-05-22 DIAGNOSIS — Z98.1 ARTHRODESIS STATUS: Chronic | ICD-10-CM
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
## 2023-05-22 PROBLEM — H26.9 UNSPECIFIED CATARACT: Chronic | Status: ACTIVE | Noted: 2023-04-06

## 2023-05-22 LAB
ALBUMIN SERPL ELPH-MCNC: 2.6 G/DL — LOW (ref 3.3–5)
ALP SERPL-CCNC: 62 U/L — SIGNIFICANT CHANGE UP (ref 40–120)
ALT FLD-CCNC: 20 U/L — SIGNIFICANT CHANGE UP (ref 12–78)
APTT BLD: 30.7 SEC — SIGNIFICANT CHANGE UP (ref 27.5–35.5)
AST SERPL-CCNC: 11 U/L — LOW (ref 15–37)
BASOPHILS # BLD AUTO: 0.03 K/UL — SIGNIFICANT CHANGE UP (ref 0–0.2)
BASOPHILS NFR BLD AUTO: 0.4 % — SIGNIFICANT CHANGE UP (ref 0–2)
BILIRUB SERPL-MCNC: 0.2 MG/DL — SIGNIFICANT CHANGE UP (ref 0.2–1.2)
BLD GP AB SCN SERPL QL: SIGNIFICANT CHANGE UP
BUN SERPL-MCNC: 26 MG/DL — HIGH (ref 7–23)
CALCIUM SERPL-MCNC: 9.1 MG/DL — SIGNIFICANT CHANGE UP (ref 8.5–10.1)
CHLORIDE SERPL-SCNC: 108 MMOL/L — SIGNIFICANT CHANGE UP (ref 96–108)
CO2 SERPL-SCNC: 30 MMOL/L — SIGNIFICANT CHANGE UP (ref 22–31)
CREAT SERPL-MCNC: 1.69 MG/DL — HIGH (ref 0.5–1.3)
EGFR: 45 ML/MIN/1.73M2 — LOW
EOSINOPHIL # BLD AUTO: 0.12 K/UL — SIGNIFICANT CHANGE UP (ref 0–0.5)
EOSINOPHIL NFR BLD AUTO: 1.7 % — SIGNIFICANT CHANGE UP (ref 0–6)
GLUCOSE BLDC GLUCOMTR-MCNC: 291 MG/DL — HIGH (ref 70–99)
GLUCOSE SERPL-MCNC: 338 MG/DL — HIGH (ref 70–99)
HCT VFR BLD CALC: 31.5 % — LOW (ref 39–50)
HGB BLD-MCNC: 10.5 G/DL — LOW (ref 13–17)
IMM GRANULOCYTES NFR BLD AUTO: 0.9 % — SIGNIFICANT CHANGE UP (ref 0–0.9)
INR BLD: 0.98 RATIO — SIGNIFICANT CHANGE UP (ref 0.88–1.16)
LYMPHOCYTES # BLD AUTO: 1.44 K/UL — SIGNIFICANT CHANGE UP (ref 1–3.3)
LYMPHOCYTES # BLD AUTO: 20.9 % — SIGNIFICANT CHANGE UP (ref 13–44)
MCHC RBC-ENTMCNC: 28.6 PG — SIGNIFICANT CHANGE UP (ref 27–34)
MCHC RBC-ENTMCNC: 33.3 GM/DL — SIGNIFICANT CHANGE UP (ref 32–36)
MCV RBC AUTO: 85.8 FL — SIGNIFICANT CHANGE UP (ref 80–100)
MONOCYTES # BLD AUTO: 0.56 K/UL — SIGNIFICANT CHANGE UP (ref 0–0.9)
MONOCYTES NFR BLD AUTO: 8.1 % — SIGNIFICANT CHANGE UP (ref 2–14)
NEUTROPHILS # BLD AUTO: 4.69 K/UL — SIGNIFICANT CHANGE UP (ref 1.8–7.4)
NEUTROPHILS NFR BLD AUTO: 68 % — SIGNIFICANT CHANGE UP (ref 43–77)
PLATELET # BLD AUTO: 244 K/UL — SIGNIFICANT CHANGE UP (ref 150–400)
POTASSIUM SERPL-MCNC: 4.3 MMOL/L — SIGNIFICANT CHANGE UP (ref 3.5–5.3)
POTASSIUM SERPL-SCNC: 4.3 MMOL/L — SIGNIFICANT CHANGE UP (ref 3.5–5.3)
PROT SERPL-MCNC: 7.5 GM/DL — SIGNIFICANT CHANGE UP (ref 6–8.3)
PROTHROM AB SERPL-ACNC: 11.4 SEC — SIGNIFICANT CHANGE UP (ref 10.5–13.4)
RBC # BLD: 3.67 M/UL — LOW (ref 4.2–5.8)
RBC # FLD: 15.1 % — HIGH (ref 10.3–14.5)
SODIUM SERPL-SCNC: 140 MMOL/L — SIGNIFICANT CHANGE UP (ref 135–145)
TROPONIN I, HIGH SENSITIVITY RESULT: 7.84 NG/L — SIGNIFICANT CHANGE UP
WBC # BLD: 6.9 K/UL — SIGNIFICANT CHANGE UP (ref 3.8–10.5)
WBC # FLD AUTO: 6.9 K/UL — SIGNIFICANT CHANGE UP (ref 3.8–10.5)

## 2023-05-22 PROCEDURE — 74177 CT ABD & PELVIS W/CONTRAST: CPT | Mod: MA

## 2023-05-22 PROCEDURE — 99285 EMERGENCY DEPT VISIT HI MDM: CPT | Mod: 25

## 2023-05-22 PROCEDURE — 70450 CT HEAD/BRAIN W/O DYE: CPT | Mod: 26,MA

## 2023-05-22 PROCEDURE — 85610 PROTHROMBIN TIME: CPT

## 2023-05-22 PROCEDURE — 85025 COMPLETE CBC W/AUTO DIFF WBC: CPT

## 2023-05-22 PROCEDURE — 72125 CT NECK SPINE W/O DYE: CPT | Mod: MA

## 2023-05-22 PROCEDURE — 73502 X-RAY EXAM HIP UNI 2-3 VIEWS: CPT | Mod: 26,LT

## 2023-05-22 PROCEDURE — 73502 X-RAY EXAM HIP UNI 2-3 VIEWS: CPT | Mod: LT

## 2023-05-22 PROCEDURE — 73552 X-RAY EXAM OF FEMUR 2/>: CPT | Mod: LT

## 2023-05-22 PROCEDURE — 72125 CT NECK SPINE W/O DYE: CPT | Mod: 26,MA

## 2023-05-22 PROCEDURE — 74177 CT ABD & PELVIS W/CONTRAST: CPT | Mod: 26,MA

## 2023-05-22 PROCEDURE — 93005 ELECTROCARDIOGRAM TRACING: CPT

## 2023-05-22 PROCEDURE — 73552 X-RAY EXAM OF FEMUR 2/>: CPT | Mod: 26,LT

## 2023-05-22 PROCEDURE — 84484 ASSAY OF TROPONIN QUANT: CPT

## 2023-05-22 PROCEDURE — 99285 EMERGENCY DEPT VISIT HI MDM: CPT

## 2023-05-22 PROCEDURE — 71045 X-RAY EXAM CHEST 1 VIEW: CPT

## 2023-05-22 PROCEDURE — 82962 GLUCOSE BLOOD TEST: CPT

## 2023-05-22 PROCEDURE — 71045 X-RAY EXAM CHEST 1 VIEW: CPT | Mod: 26

## 2023-05-22 PROCEDURE — 36415 COLL VENOUS BLD VENIPUNCTURE: CPT

## 2023-05-22 PROCEDURE — 86900 BLOOD TYPING SEROLOGIC ABO: CPT

## 2023-05-22 PROCEDURE — 93010 ELECTROCARDIOGRAM REPORT: CPT

## 2023-05-22 PROCEDURE — 86850 RBC ANTIBODY SCREEN: CPT

## 2023-05-22 PROCEDURE — 80053 COMPREHEN METABOLIC PANEL: CPT

## 2023-05-22 PROCEDURE — 71260 CT THORAX DX C+: CPT | Mod: 26,MA

## 2023-05-22 PROCEDURE — 71260 CT THORAX DX C+: CPT | Mod: MA

## 2023-05-22 PROCEDURE — 86901 BLOOD TYPING SEROLOGIC RH(D): CPT

## 2023-05-22 PROCEDURE — 70450 CT HEAD/BRAIN W/O DYE: CPT | Mod: MA

## 2023-05-22 PROCEDURE — 85730 THROMBOPLASTIN TIME PARTIAL: CPT

## 2023-05-22 RX ORDER — ACETAMINOPHEN 500 MG
1000 TABLET ORAL ONCE
Refills: 0 | Status: COMPLETED | OUTPATIENT
Start: 2023-05-22 | End: 2023-05-22

## 2023-05-22 RX ORDER — SODIUM CHLORIDE 9 MG/ML
1000 INJECTION INTRAMUSCULAR; INTRAVENOUS; SUBCUTANEOUS ONCE
Refills: 0 | Status: COMPLETED | OUTPATIENT
Start: 2023-05-22 | End: 2023-05-22

## 2023-05-22 RX ORDER — TRAMADOL HYDROCHLORIDE 50 MG/1
25 TABLET ORAL ONCE
Refills: 0 | Status: DISCONTINUED | OUTPATIENT
Start: 2023-05-22 | End: 2023-05-22

## 2023-05-22 RX ADMIN — Medication 1000 MILLIGRAM(S): at 13:22

## 2023-05-22 RX ADMIN — SODIUM CHLORIDE 1000 MILLILITER(S): 9 INJECTION INTRAMUSCULAR; INTRAVENOUS; SUBCUTANEOUS at 13:22

## 2023-05-22 NOTE — ED PROVIDER NOTE - PATIENT PORTAL LINK FT
You can access the FollowMyHealth Patient Portal offered by [facility] by registering at the following website: [email or web address]. By joining [employer]’s FollowMyHealth portal, you will also be able to view your health information using other applications (apps) compatible with our system.
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
## 2023-05-22 NOTE — ED PROVIDER NOTE - NSFOLLOWUPINSTRUCTIONS_ED_ALL_ED_FT
1. return for worsening symptoms or anything concerning to you  2. take all home meds as prescribed  3. follow up with your pmd call to make an appointment  4. follow up regarding CT results    Head Injury    WHAT YOU NEED TO KNOW:    A head injury is most often caused by a blow to the head. This may occur from a fall, bicycle injury, sports injury, being struck in the head, or a motor vehicle accident.     DISCHARGE INSTRUCTIONS:    Call 911 or have someone else call for any of the following:     You cannot be woken.      You have a seizure.      You stop responding to others or you faint.      You have blurry or double vision.      Your speech becomes slurred or confused.      You have arm or leg weakness, loss of feeling, or new problems with coordination.      Your pupils are larger than usual or one pupil is a different size than the other.       You have blood or clear fluid coming out of your ears or nose.    Return to the emergency department if:     You have repeated or forceful vomiting.      You feel confused.      Your headache gets worse or becomes severe.      You or someone caring for you notices that you are harder to wake than usual.    Contact your healthcare provider if:     Your symptoms last longer than 6 weeks after the injury.      You have questions or concerns about your condition or care.    Medicines:     Acetaminophen decreases pain. Acetaminophen is available without a doctor's order. Ask how much to take and how often to take it. Follow directions. Acetaminophen can cause liver damage if not taken correctly.      Take your medicine as directed. Contact your healthcare provider if you think your medicine is not helping or if you have side effects. Tell him or her if you are allergic to any medicine. Keep a list of the medicines, vitamins, and herbs you take. Include the amounts, and when and why you take them. Bring the list or the pill bottles to follow-up visits. Carry your medicine list with you in case of an emergency.    Self-care:     Rest or do quiet activities for 24 to 48 hours. Limit your time watching TV, using the computer, or doing tasks that require a lot of thinking. Slowly return to your normal activities as directed. Do not play sports or do activities that may cause you to get hit in the head. Ask your healthcare provider when you can return to sports.       Apply ice on your head for 15 to 20 minutes every hour or as directed. Use an ice pack, or put crushed ice in a plastic bag. Cover it with a towel before you apply it to your skin. Ice helps prevent tissue damage and decreases swelling and pain.       Have someone stay with you for 24 hours or as directed. This person can monitor you for complications and call 911. When you are awake the person should ask you a few questions to see if you are thinking clearly. An example would be to ask your name or your address.     Prevent another head injury:     Wear a helmet that fits properly. Do this when you play sports, or ride a bike, scooter, or skateboard. Helmets help decrease your risk of a serious head injury. Talk to your healthcare provider about other ways you can protect yourself if you play sports.      Wear your seat belt every time you are in a car. This helps to decrease your risk for a head injury if you are in a car accident.     Follow up with your healthcare provider as directed: Write down your questions so you remember to ask them during your visits.

## 2023-05-22 NOTE — ED PROVIDER NOTE - NS ED ROS FT
Constitutional: No fever or chills  Eyes: No visual changes  HEENT: No throat pain  CV: No chest pain  Resp: No SOB no cough  GI: No abd pain, nausea or vomiting  : No dysuria  MSK: + left chest wall pain + left hip pain  Skin: No rash  Neuro: No headache
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
## 2023-05-22 NOTE — ED PROVIDER NOTE - PHYSICAL EXAMINATION
Constitutional: NAD AAOx3  Eyes: PERRLA EOMI  Head: Normocephalic atraumatic  ENT: No mcclellan sign, no raccoon eyes   Mouth: MMM  Cardiac: regular rate   Resp: unlabored breathing clear b/l  GI: Abd s/nt/nd  Neuro: grossly normal and intact GCS 15 no neuro deficits  Skin: No rashes, no bruising to chest, back, abdomen or extremities  Msk: No midline spinal ttp,no ttp of facial bones, + ttp to left chest wall, pelvis stable, + ttp left hip
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
## 2023-05-31 NOTE — ED ADULT NURSE NOTE - HIV OFFER
Previously Declined (within the last year)
FAMILY HISTORY:  No pertinent family history in first degree relatives

## 2023-07-18 NOTE — ED STATDOCS - CPE ED RESP NORM
normal... Complex Repair And Rhombic Flap Text: The defect edges were debeveled with a #15 scalpel blade.  The primary defect was closed partially with a complex linear closure.  Given the location of the remaining defect, shape of the defect and the proximity to free margins a rhombic flap was deemed most appropriate for complete closure of the defect.  Using a sterile surgical marker, an appropriate advancement flap was drawn incorporating the defect and placing the expected incisions within the relaxed skin tension lines where possible.    The area thus outlined was incised deep to adipose tissue with a #15 scalpel blade.  The skin margins were undermined to an appropriate distance in all directions utilizing iris scissors.

## 2023-07-20 DIAGNOSIS — G89.29 DORSALGIA, UNSPECIFIED: ICD-10-CM

## 2023-07-20 DIAGNOSIS — M54.9 DORSALGIA, UNSPECIFIED: ICD-10-CM

## 2023-07-20 DIAGNOSIS — I10 ESSENTIAL (PRIMARY) HYPERTENSION: ICD-10-CM

## 2023-07-20 DIAGNOSIS — E11.9 TYPE 2 DIABETES MELLITUS W/OUT COMPLICATIONS: ICD-10-CM

## 2023-07-20 DIAGNOSIS — N18.9 CHRONIC KIDNEY DISEASE, UNSPECIFIED: ICD-10-CM

## 2023-07-28 NOTE — CDI QUERY NOTE - NSCDIOTHERTXTBX_GEN_ALL_CORE_HH
This patient was admitted and an incision and drainage of the foot was done and your clarification is needed regarding the depth of the procedure:    Plan:   Chart reviewed and Patient evaluated; discussed treatment and plan with Dr. Roger Chavez  Discussed diagnosis and treatment with patient  Incision and draiange performed in the ED to the level of muscle  Wound flush with normal saline  Obtained wound culture to be sent to Pathology of right foot  Flushed copiously w/ saline and betadine   Applied betadine soaked gauze with dry sterile dressing  X-rays reviewed : results noted above  Continue with IV antibiotics As Per ID  Reccomend MRI and admission  Discussed importance of daily foot examinations and proper shoe gear and to importance of lower Fasting Blood Glucose levels.   Patient demonstrated verbal understanding of all interventions and tolerated interventions well without any complications.   Podiatry will follow while in house.      A statement referring to “down to the level of ___” does not describe the specificity for coding.    Can the depth of the incision and drainage be clarified?  -Incision and drainage of right foot including muscle  -Incision and drainage of right foot including subcutaneous tissue.  -Other, please specify:

## 2023-09-19 NOTE — PATIENT PROFILE ADULT - DO YOU LACK THE NECESSARY SUPPORT TO HELP YOU COPE WITH LIFE CHALLENGES?
Diya was contacted by [facility] to identify medication adherence barriers. Patient requested a callback from the pharmacy team for further assistance.    He was identified for pharmacy outreach based on prescription fill history of his Cholesterol medicine, atorvastatin 20 mg. Last dispensed on 6/29/23 for a 90-day supply.     The patient is taking the medication. He reports missing 0 doses in the past week.     The following barrier(s) to medication adherence were identified:   None    The following solution(s) for medication adherence were recommended:   None    The following updates were made to medication list: none    No further follow-up is required at this time. Patient was given the pharmacy team phone number should future questions or concerns arise.     Mel [place] [facility] Pharmacy Technician Specialist  [phone]    
no

## 2023-10-09 ENCOUNTER — INPATIENT (INPATIENT)
Facility: HOSPITAL | Age: 63
LOS: 1 days | Discharge: LEFT BEFORE DISCHARGE | DRG: 313 | End: 2023-10-11
Attending: HOSPITALIST | Admitting: STUDENT IN AN ORGANIZED HEALTH CARE EDUCATION/TRAINING PROGRAM
Payer: MEDICAID

## 2023-10-09 VITALS
HEART RATE: 95 BPM | OXYGEN SATURATION: 94 % | SYSTOLIC BLOOD PRESSURE: 180 MMHG | HEIGHT: 67 IN | RESPIRATION RATE: 15 BRPM | TEMPERATURE: 98 F | DIASTOLIC BLOOD PRESSURE: 65 MMHG | WEIGHT: 169.98 LBS

## 2023-10-09 DIAGNOSIS — I25.10 ATHEROSCLEROTIC HEART DISEASE OF NATIVE CORONARY ARTERY WITHOUT ANGINA PECTORIS: ICD-10-CM

## 2023-10-09 DIAGNOSIS — Z98.1 ARTHRODESIS STATUS: Chronic | ICD-10-CM

## 2023-10-09 DIAGNOSIS — R07.9 CHEST PAIN, UNSPECIFIED: ICD-10-CM

## 2023-10-09 LAB
ALBUMIN SERPL ELPH-MCNC: 2.7 G/DL — LOW (ref 3.3–5.2)
ALP SERPL-CCNC: 46 U/L — SIGNIFICANT CHANGE UP (ref 40–120)
ALT FLD-CCNC: 30 U/L — SIGNIFICANT CHANGE UP
ANION GAP SERPL CALC-SCNC: 12 MMOL/L — SIGNIFICANT CHANGE UP (ref 5–17)
AST SERPL-CCNC: 17 U/L — SIGNIFICANT CHANGE UP
BASOPHILS # BLD AUTO: 0.02 K/UL — SIGNIFICANT CHANGE UP (ref 0–0.2)
BASOPHILS NFR BLD AUTO: 0.4 % — SIGNIFICANT CHANGE UP (ref 0–2)
BILIRUB SERPL-MCNC: 0.3 MG/DL — LOW (ref 0.4–2)
BUN SERPL-MCNC: 11.9 MG/DL — SIGNIFICANT CHANGE UP (ref 8–20)
CALCIUM SERPL-MCNC: 8.4 MG/DL — SIGNIFICANT CHANGE UP (ref 8.4–10.5)
CHLORIDE SERPL-SCNC: 93 MMOL/L — LOW (ref 96–108)
CO2 SERPL-SCNC: 24 MMOL/L — SIGNIFICANT CHANGE UP (ref 22–29)
CREAT SERPL-MCNC: 1.24 MG/DL — SIGNIFICANT CHANGE UP (ref 0.5–1.3)
EGFR: 66 ML/MIN/1.73M2 — SIGNIFICANT CHANGE UP
EOSINOPHIL # BLD AUTO: 0.12 K/UL — SIGNIFICANT CHANGE UP (ref 0–0.5)
EOSINOPHIL NFR BLD AUTO: 2.2 % — SIGNIFICANT CHANGE UP (ref 0–6)
GLUCOSE BLDC GLUCOMTR-MCNC: 162 MG/DL — HIGH (ref 70–99)
GLUCOSE BLDC GLUCOMTR-MCNC: 228 MG/DL — HIGH (ref 70–99)
GLUCOSE SERPL-MCNC: 233 MG/DL — HIGH (ref 70–99)
HCT VFR BLD CALC: 33.9 % — LOW (ref 39–50)
HGB BLD-MCNC: 11.3 G/DL — LOW (ref 13–17)
IMM GRANULOCYTES NFR BLD AUTO: 0.5 % — SIGNIFICANT CHANGE UP (ref 0–0.9)
LYMPHOCYTES # BLD AUTO: 1.37 K/UL — SIGNIFICANT CHANGE UP (ref 1–3.3)
LYMPHOCYTES # BLD AUTO: 24.6 % — SIGNIFICANT CHANGE UP (ref 13–44)
MCHC RBC-ENTMCNC: 28.2 PG — SIGNIFICANT CHANGE UP (ref 27–34)
MCHC RBC-ENTMCNC: 33.3 GM/DL — SIGNIFICANT CHANGE UP (ref 32–36)
MCV RBC AUTO: 84.5 FL — SIGNIFICANT CHANGE UP (ref 80–100)
MONOCYTES # BLD AUTO: 0.52 K/UL — SIGNIFICANT CHANGE UP (ref 0–0.9)
MONOCYTES NFR BLD AUTO: 9.4 % — SIGNIFICANT CHANGE UP (ref 2–14)
NEUTROPHILS # BLD AUTO: 3.5 K/UL — SIGNIFICANT CHANGE UP (ref 1.8–7.4)
NEUTROPHILS NFR BLD AUTO: 62.9 % — SIGNIFICANT CHANGE UP (ref 43–77)
PLATELET # BLD AUTO: 169 K/UL — SIGNIFICANT CHANGE UP (ref 150–400)
POTASSIUM SERPL-MCNC: 3.9 MMOL/L — SIGNIFICANT CHANGE UP (ref 3.5–5.3)
POTASSIUM SERPL-SCNC: 3.9 MMOL/L — SIGNIFICANT CHANGE UP (ref 3.5–5.3)
PROT SERPL-MCNC: 6.5 G/DL — LOW (ref 6.6–8.7)
RBC # BLD: 4.01 M/UL — LOW (ref 4.2–5.8)
RBC # FLD: 13.9 % — SIGNIFICANT CHANGE UP (ref 10.3–14.5)
SODIUM SERPL-SCNC: 129 MMOL/L — LOW (ref 135–145)
TROPONIN T SERPL-MCNC: <0.01 NG/ML — SIGNIFICANT CHANGE UP (ref 0–0.06)
WBC # BLD: 5.56 K/UL — SIGNIFICANT CHANGE UP (ref 3.8–10.5)
WBC # FLD AUTO: 5.56 K/UL — SIGNIFICANT CHANGE UP (ref 3.8–10.5)

## 2023-10-09 PROCEDURE — 99285 EMERGENCY DEPT VISIT HI MDM: CPT

## 2023-10-09 PROCEDURE — 99223 1ST HOSP IP/OBS HIGH 75: CPT

## 2023-10-09 PROCEDURE — 70450 CT HEAD/BRAIN W/O DYE: CPT | Mod: 26,MA

## 2023-10-09 PROCEDURE — 71045 X-RAY EXAM CHEST 1 VIEW: CPT | Mod: 26

## 2023-10-09 RX ORDER — INSULIN LISPRO 100/ML
VIAL (ML) SUBCUTANEOUS
Refills: 0 | Status: DISCONTINUED | OUTPATIENT
Start: 2023-10-09 | End: 2023-10-11

## 2023-10-09 RX ORDER — MECLIZINE HCL 12.5 MG
50 TABLET ORAL ONCE
Refills: 0 | Status: COMPLETED | OUTPATIENT
Start: 2023-10-09 | End: 2023-10-09

## 2023-10-09 RX ORDER — SODIUM CHLORIDE 9 MG/ML
1000 INJECTION INTRAMUSCULAR; INTRAVENOUS; SUBCUTANEOUS ONCE
Refills: 0 | Status: COMPLETED | OUTPATIENT
Start: 2023-10-09 | End: 2023-10-09

## 2023-10-09 RX ORDER — DEXTROSE 50 % IN WATER 50 %
12.5 SYRINGE (ML) INTRAVENOUS ONCE
Refills: 0 | Status: DISCONTINUED | OUTPATIENT
Start: 2023-10-09 | End: 2023-10-11

## 2023-10-09 RX ORDER — SODIUM CHLORIDE 9 MG/ML
1000 INJECTION, SOLUTION INTRAVENOUS
Refills: 0 | Status: DISCONTINUED | OUTPATIENT
Start: 2023-10-09 | End: 2023-10-11

## 2023-10-09 RX ORDER — GLUCAGON INJECTION, SOLUTION 0.5 MG/.1ML
1 INJECTION, SOLUTION SUBCUTANEOUS ONCE
Refills: 0 | Status: DISCONTINUED | OUTPATIENT
Start: 2023-10-09 | End: 2023-10-11

## 2023-10-09 RX ORDER — DEXTROSE 50 % IN WATER 50 %
15 SYRINGE (ML) INTRAVENOUS ONCE
Refills: 0 | Status: DISCONTINUED | OUTPATIENT
Start: 2023-10-09 | End: 2023-10-11

## 2023-10-09 RX ORDER — KETOROLAC TROMETHAMINE 30 MG/ML
15 SYRINGE (ML) INJECTION EVERY 8 HOURS
Refills: 0 | Status: DISCONTINUED | OUTPATIENT
Start: 2023-10-09 | End: 2023-10-10

## 2023-10-09 RX ORDER — ACETAMINOPHEN 500 MG
650 TABLET ORAL EVERY 6 HOURS
Refills: 0 | Status: DISCONTINUED | OUTPATIENT
Start: 2023-10-09 | End: 2023-10-11

## 2023-10-09 RX ORDER — ACETAMINOPHEN 500 MG
650 TABLET ORAL ONCE
Refills: 0 | Status: COMPLETED | OUTPATIENT
Start: 2023-10-09 | End: 2023-10-09

## 2023-10-09 RX ORDER — HYDRALAZINE HCL 50 MG
10 TABLET ORAL EVERY 8 HOURS
Refills: 0 | Status: DISCONTINUED | OUTPATIENT
Start: 2023-10-09 | End: 2023-10-11

## 2023-10-09 RX ORDER — LISINOPRIL 2.5 MG/1
20 TABLET ORAL DAILY
Refills: 0 | Status: DISCONTINUED | OUTPATIENT
Start: 2023-10-09 | End: 2023-10-11

## 2023-10-09 RX ORDER — FAMOTIDINE 10 MG/ML
20 INJECTION INTRAVENOUS DAILY
Refills: 0 | Status: DISCONTINUED | OUTPATIENT
Start: 2023-10-09 | End: 2023-10-11

## 2023-10-09 RX ORDER — DEXTROSE 50 % IN WATER 50 %
25 SYRINGE (ML) INTRAVENOUS ONCE
Refills: 0 | Status: DISCONTINUED | OUTPATIENT
Start: 2023-10-09 | End: 2023-10-11

## 2023-10-09 RX ORDER — ONDANSETRON 8 MG/1
4 TABLET, FILM COATED ORAL EVERY 8 HOURS
Refills: 0 | Status: DISCONTINUED | OUTPATIENT
Start: 2023-10-09 | End: 2023-10-11

## 2023-10-09 RX ORDER — INSULIN GLARGINE 100 [IU]/ML
10 INJECTION, SOLUTION SUBCUTANEOUS AT BEDTIME
Refills: 0 | Status: DISCONTINUED | OUTPATIENT
Start: 2023-10-09 | End: 2023-10-11

## 2023-10-09 RX ORDER — LANOLIN ALCOHOL/MO/W.PET/CERES
3 CREAM (GRAM) TOPICAL AT BEDTIME
Refills: 0 | Status: DISCONTINUED | OUTPATIENT
Start: 2023-10-09 | End: 2023-10-11

## 2023-10-09 RX ADMIN — Medication 4: at 17:33

## 2023-10-09 RX ADMIN — SODIUM CHLORIDE 1000 MILLILITER(S): 9 INJECTION INTRAMUSCULAR; INTRAVENOUS; SUBCUTANEOUS at 16:27

## 2023-10-09 RX ADMIN — Medication 10 MILLIGRAM(S): at 16:33

## 2023-10-09 RX ADMIN — LISINOPRIL 20 MILLIGRAM(S): 2.5 TABLET ORAL at 21:26

## 2023-10-09 RX ADMIN — Medication 15 MILLIGRAM(S): at 19:54

## 2023-10-09 RX ADMIN — INSULIN GLARGINE 10 UNIT(S): 100 INJECTION, SOLUTION SUBCUTANEOUS at 21:26

## 2023-10-09 RX ADMIN — Medication 50 MILLIGRAM(S): at 04:01

## 2023-10-09 NOTE — ED PROVIDER NOTE - OBJECTIVE STATEMENT
62 y/o M pt w/ PMHx of HTN and DM not on medications presents to ED c/o intermittent chest pain and SOB x3 weeks. nonexertional. Pt also reports gait instability intermittently for the last 3 weeks. He denies back pain, neck pain, headache, numbness, tingling, focal weakness, N/V/D, abd pain, or any other complaints.

## 2023-10-09 NOTE — H&P ADULT - NSHPPHYSICALEXAM_GEN_ALL_CORE
T(C): 37.3 (10-09-23 @ 07:31), Max: 37.3 (10-09-23 @ 07:31)  HR: 83 (10-09-23 @ 07:31) (59 - 83)  BP: 161/82 (10-09-23 @ 07:31) (161/82 - 180/65)  RR: 18 (10-09-23 @ 07:31) (15 - 18)  SpO2: 95% (10-09-23 @ 07:31) (94% - 95%)    CONSTITUTIONAL: disheveled Czech speaking male in bed in mild distress due to pain   EYES: PERRLA and symmetric, EOMI  ENMT: Oral mucosa with dry mucous membranes   RESP: No respiratory distress, no use of accessory muscles; CTA b/l, no WRR  CV: RRR, +S1S2, no peripheral edema, pain on palpation diffusely across chest   GI: Soft, NT, ND, no rebound, no guarding  MSK: Normal gait; No digital clubbing or cyanosis; examination of the (head/neck/spine/ribs/pelvis, RUE, LUE, RLE, LLE) without misalignment,            Normal ROM without pain, no spinal tenderness, normal muscle strength/tone  SKIN: No rashes or ulcers noted; no subcutaneous nodules or induration palpable  NEURO: CN II-XII intact; normal reflexes in upper and lower extremities, sensation intact in upper and lower extremities b/l to light touch   PSYCH: Appropriate insight/judgment; A+O x 3, mood and affect appropriate, recent/remote memory intact T(C): 37.3 (10-09-23 @ 07:31), Max: 37.3 (10-09-23 @ 07:31)  HR: 83 (10-09-23 @ 07:31) (59 - 83)  BP: 161/82 (10-09-23 @ 07:31) (161/82 - 180/65)  RR: 18 (10-09-23 @ 07:31) (15 - 18)  SpO2: 95% (10-09-23 @ 07:31) (94% - 95%)    CONSTITUTIONAL: disheveled German speaking male in bed in mild distress due to pain   EYES: PERRLA and symmetric, EOMI  ENMT: Oral mucosa with dry mucous membranes   RESP: No respiratory distress, no use of accessory muscles; CTA b/l, no WRR  CV: RRR, +S1S2, no peripheral edema, pain on palpation diffusely across chest   GI: Soft, NT, ND, no rebound, no guarding  MSK: Normal ROM in all extremities tested  NEURO: CN II-XII intact; normal reflexes in upper and lower extremities, sensation intact in upper and lower extremities b/l to light touch   PSYCH: Appropriate insight/judgment; A+O x 3, mood and affect appropriate, recent/remote memory intact

## 2023-10-09 NOTE — ED PROVIDER NOTE - IV ALTEPLASE ADMIN OUTSIDE HIDDEN
Patient stating,\" Someone needs to kill Dr. Lisa Duran. If I'm not out of here by morning I am going tear this fucking place apart. \" Patient screaming . show

## 2023-10-09 NOTE — ED ADULT TRIAGE NOTE - CHIEF COMPLAINT QUOTE
Pt BIBA c.o chest pain, shortness of breath and loss of balance x 3 weeks. Pt states hx of DM and HTN, but does not see a primary physician or take any medication.

## 2023-10-09 NOTE — CONSULT NOTE ADULT - PROBLEM SELECTOR RECOMMENDATION 9
Patient
- pt has multiple risk factors for ACS  - first trop negative, continue trend w/ serial EKGs  - check nuclear stress test  - A1c is 11.5 in march  - pt lives alone in a trailer, does not see doctors. has unmanaged DM and HIV   - needs endocrine consult  - needs diabetes educator consult  - needs social work/case management consult   - start ASA 81 mg daily and lipitor 80mg daily   - check echo as well   - further workup as per echo/stress results  - should consult hospitalist service, pt has many unmanaged comorbidities that needs to be addressed.

## 2023-10-09 NOTE — CHART NOTE - NSCHARTNOTEFT_GEN_A_CORE
Pt brought down to nuclear medicine for a Pharmacological stress test.  Pt received his resting dose of Sestamibi and was imaged.  During prep setup for stress pt reported that he had Iced Tea at 1100 today.  Stress portion of test postponed until tomorrow am.  Please keep pt NPO after midnight no caffeine OR decaffeinated products for 12 hours prior to procedure.  Thank you

## 2023-10-09 NOTE — ED PROVIDER NOTE - CLINICAL SUMMARY MEDICAL DECISION MAKING FREE TEXT BOX
pt w/ pmhx of dm and htn presenting w/ intermittent chest pain, SOB, and gait instability. Labs, EKG, CXR, CT head ordered. 64 yo male w/ pmhx of DM and HTN whom is non-compliant with his medications presenting w/ intermittent chest pain, SOB, and brief but episodic dizziness sensation without focal neurologic deficits. Patient pending Labs, EKG, CXR, CT head ordered ( evaluate for signs of chronic injury).    Patient stable with normal labs. However, patient is high risk for sequale of cardiovascular disease, and heart score of 5. Will admit.

## 2023-10-09 NOTE — H&P ADULT - ASSESSMENT
Mr. Edwards is a 64 yo M with a PMH significant for HTN, DM with recent OM (3/2023), HIV noncompliant with ART, CLBP who presents to [facility] ED for chest pain. Mr. Edwards is a 64 yo M with a PMH significant for HTN, DM with recent OM (3/2023), HIV noncompliant with ART, CLBP who presents to [facility] ED for chest pain. HEART score of 4 for RF's HTN, DM, current smoker, moderately suspicious presentation, however given reproducible nature of pain, may likely be MSK related. Cardiology consulted, Continue to trend Trp, and monitor on Telemetry.     #Chest Pain   MSK vs. ACS   HEART Score 4 for RF's of HTN, DM, current smoker, moderately suspicious presentation / history   - Trp negative x1, will repeat for 3 sets   - Admit to telemetry   - Reproducible on Palpation, will give toradol and assess chest pain   - Cardiology consulted, records indicated cardiac exam 2020 with negative stress, however patient states recent stress at [facility] 2-3 months ago?   - NTG PRN     #HTN Urgency   BP noted to be 180/66 on admission, may contribute to Chest pain, patient denies taking any home medications at this time   - Started on Lisinopril 20 mg, continue to monitor   - Hydralazine PRN for SBP > 170     #DM with hyperglycemia   Patient noncompliant with home medication regiment not on any current meds   - Started on Lantus 10 U (DC'd in past on 8 U, not taken), as well as SSI   - Continue to monitor BG     #Hyponatremia   Likely in setting of poor PO intake,   - Given 1L NS, diet started, will continue to monitor     #HIV  Noncompliant with ART, does not follow with outpatient ID, unknown viral load, WBC wnl currently, but unknown CD4 count   - Patient will need outpatient ID follow up, counseled on need for proper follow up     DVT prophylaxis: SCDs   Diet: Carb consistent / DASH   Code Status: Full  Dispo: Pending cardiac recs, likely DC in next 1-2 days

## 2023-10-09 NOTE — CONSULT NOTE ADULT - SUBJECTIVE AND OBJECTIVE BOX
[facility]                                              CARDIOLOGY AT [place] [address]                                             Telephone: [phone]. Fax:[phone]                                                       CARDIOLOGY CONSULTATION NOTE                                                                                             History obtained by: Patient and medical record   Community Cardiologist: none   obtained: Yes [  ] No [ x ]  Reason for Consultation: Chest pain   Available out pt records reviewed: Yes [  ] No [  ]    Chief complaint:    Patient is a 63y old  Male who presents with a chief complaint of Chest Pain (09 Oct 2023 09:54)      HPI:  Mr. Edwards is a 64 yo M with a PMH significant for HTN, DM with recent OM (3/2023), HIV noncompliant with ART, CLBP who presents to [facility] ED for chest pain. He states that for the last 2 weeks he has been feeling intermittent sharp chest pain, characterized as stabbing pain, 8-9/10 in intensity. Located diffusely across his chest, associated with palpitations, and diaphoresis at times, but no SOB/ no pleurisy, no light headedness/ syncope. He denies exertional worsening of pain, and states it begins suddenly and can last hours at a time. He has never felt this pain in the past, however per chart review he has been seen for atypical angina in the past, with last cardiology note from [facility] documenting normal SPECT imaging in 2020. He currently continues to endorse chest pain, improved since admission and reproducible on chest palpation.   In ED patients vitals notable for elevated /66, HR of 59 with sinus rhythm, remaining vitals wnl. Labs show Hyponatremia of 129, hyperglycemia of 233, Hgb 11.3, Trp negative x1. EKG shows no ischemic changes. Cardiology consulted in ED, Admitted to medicine for further management.  (09 Oct 2023 09:54)      PAST MEDICAL HISTORY  Hypertension    Diabetes    Back ache    Head trauma    Insomnia    Anxiety    DM (diabetes mellitus)    Chronic back pain    DM (diabetes mellitus)    HIV infection    Bilateral cataracts        PAST SURGICAL HISTORY  S/P lumbar fusion    No significant past surgical history        SUBSTANCE USE HISTORY  Denies current and previous substance use [  ]   CIGARETTES - current smoker   ALCOHOL - denies   DRUGS - denies     FAMILY HISTORY:  Family history of coronary artery disease in mother (Mother)    Family history of diabetes mellitus (DM) (Mother)    FH: alcoholism (Father)    CARDIAC SPECIFIC FAMILY HX   No KNOWN family history of Cardiovascular disease, CAD, or sudden death in first degree relatives unless specified below  Family History of Cardiovascular Disease:  [  ]   Coronary Artery Disease in first degree relative:  [  ]   Sudden Cardiac Death in First degree relative: [  ]    HOME MEDICATIONS:  famotidine 20 mg oral tablet: 1 tab(s) orally once a day (07 Apr 2023 14:27)  insulin glargine 100 units/mL subcutaneous solution: 28 unit(s) subcutaneous once a day (at bedtime) (07 Apr 2023 14:30)  insulin lispro 100 units/mL injectable solution: 5 unit(s) injectable 3 times a day (before meals) (07 Apr 2023 14:30)  melatonin 5 mg oral tablet: 1 tab(s) orally once a day (at bedtime) As needed Insomnia (07 Apr 2023 14:27)  polyethylene glycol 3350 oral powder for reconstitution: 17 gram(s) orally once a day As needed Constipation (07 Apr 2023 14:27)      CURRENT CARDIAC MEDICATIONS:  hydrALAZINE Injectable 10 milliGRAM(s) IV Push every 8 hours PRN SBP > 170  lisinopril 20 milliGRAM(s) Oral daily      CURRENT OTHER MEDICATIONS:  acetaminophen     Tablet .. 650 milliGRAM(s) Oral every 6 hours PRN Temp greater or equal to 38C (100.4F), Mild Pain (1 - 3)  melatonin 3 milliGRAM(s) Oral at bedtime PRN Insomnia  ondansetron Injectable 4 milliGRAM(s) IV Push every 8 hours PRN Nausea and/or Vomiting  aluminum hydroxide/magnesium hydroxide/simethicone Suspension 30 milliLiter(s) Oral every 4 hours PRN Dyspepsia  famotidine    Tablet 20 milliGRAM(s) Oral daily  dextrose 5%. 1000 milliLiter(s) (50 mL/Hr) IV Continuous <Continuous>  dextrose 5%. 1000 milliLiter(s) (100 mL/Hr) IV Continuous <Continuous>  dextrose 50% Injectable 25 Gram(s) IV Push once, Stop order after: 1 Doses  dextrose 50% Injectable 12.5 Gram(s) IV Push once, Stop order after: 1 Doses  dextrose 50% Injectable 25 Gram(s) IV Push once, Stop order after: 1 Doses  dextrose Oral Gel 15 Gram(s) Oral once, Stop order after: 1 Doses PRN Blood Glucose LESS THAN 70 milliGRAM(s)/deciliter  glucagon  Injectable 1 milliGRAM(s) IntraMuscular once, Stop order after: 1 Doses  insulin glargine Injectable (LANTUS) 10 Unit(s) SubCutaneous at bedtime  insulin lispro (ADMELOG) corrective regimen sliding scale   SubCutaneous three times a day before meals  sodium chloride 0.9% Bolus 1000 milliLiter(s) IV Bolus once, Stop order after: 1 Doses      ALLERGIES:   No Known Allergies      VITAL SIGNS:  T(C): 37.6 (10-09-23 @ 11:37), Max: 37.6 (10-09-23 @ 11:37)  T(F): 99.7 (10-09-23 @ 11:37), Max: 99.7 (10-09-23 @ 11:37)  HR: 84 (10-09-23 @ 11:37) (59 - 84)  BP: 160/77 (10-09-23 @ 11:37) (160/77 - 180/65)  RR: 18 (10-09-23 @ 11:37) (15 - 18)  SpO2: 96% (10-09-23 @ 11:37) (94% - 96%)    INTAKE AND OUTPUT:       LABS:  ( 09 Oct 2023 03:50 )  Troponin T  <0.01,  CPK  X    , CKMB  X    , BNP X                                  11.3   5.56  )-----------( 169      ( 09 Oct 2023 03:50 )             33.9     10-09    129<L>  |  93<L>  |  11.9  ----------------------------<  233<H>  3.9   |  24.0  |  1.24    Ca    8.4      09 Oct 2023 03:50    TPro  6.5<L>  /  Alb  2.7<L>  /  TBili  0.3<L>  /  DBili  x   /  AST  17  /  ALT  30  /  AlkPhos  46  10-09      Urinalysis Basic - ( 09 Oct 2023 03:50 )    Color: x / Appearance: x / SG: x / pH: x  Gluc: 233 mg/dL / Ketone: x  / Bili: x / Urobili: x   Blood: x / Protein: x / Nitrite: x   Leuk Esterase: x / RBC: x / WBC x   Sq Epi: x / Non Sq Epi: x / Bacteria: x      RADIOLOGY IMAGING:   Nuclear Stress Test-Pharmacologic:    Indication: Chest Pain  Transport: Stretcher-Crib  Monitor: w/o Monitor  Prep: No Caffeine or Tobacco after 8pm prior to day of test.  This includes all types of Coffee, Teas, Green Teas, Soda, and Chocolate. (10-09-23 @ 11:55) [Ordered.]  Xray Chest 1 View- PORTABLE-Urgent: Urgent   Indication: Chest Pain  Transport: Portable  Exam Completed (10-09-23 @ 03:14) [Results Available]  12 Lead ECG:   Provider's Contact #: [phone] (10-09-23 @ 02:37) [Completed]

## 2023-10-09 NOTE — ED ADULT NURSE REASSESSMENT NOTE - NS ED NURSE REASSESS COMMENT FT1
Patient resting in bed, awake, alert and oriented X4, C/O chest pain, patient currently on cardiac monitor , VSS, awaiting for Cardiac consult.

## 2023-10-09 NOTE — CONSULT NOTE ADULT - ASSESSMENT
Mr. Edwards is a 64 yo M with a PMH significant for HTN, DM with recent OM (3/2023), HIV noncompliant with ART, CLBP who presents to [facility] ED for chest pain. Cardiology consulted for chest pain.

## 2023-10-09 NOTE — ED PROVIDER NOTE - ATTENDING CONTRIBUTION TO CARE
64 y/o M pt w/ PMHx of HTN and DM noncompliant with his medications due to insurance presents to ED c/o intermittent chest pain and SOB x3 weeks. I personally saw the patient with the resident, and completed the key components of the history and physical exam. I then discussed the management plan with the resident.

## 2023-10-09 NOTE — H&P ADULT - TIME BILLING
Initial encounter with patient with poor follow up and fragmented care. Records reviewed from previous [facility] hospitalizations to consolidate care. Plan of care discussed with ED / Cardiology. Labs, vitals, imaging reviewed in detail. Plan of care discussed with patient at bedside.

## 2023-10-09 NOTE — CONSULT NOTE ADULT - NS ATTEND AMEND GEN_ALL_CORE FT
Uncontrolled Diabetes Mellitus , and chest pain .    Patient non compliant. needs to establish care with CPP and endocrinologist. lives in a trailer.  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  untreated.  Cataract visualized in the left eye too.    stress test Transthoracic echocardiogram . Control of Diabetes Mellitus   if no significant ischemai, then discharge wuith out americo lopez.  CT coronary calcium score aspirin statins.

## 2023-10-09 NOTE — ED PROVIDER NOTE - RESPIRATORY [+], MLM
-- DO NOT REPLY / DO NOT REPLY ALL --  -- Message is from Engagement Center Operations (ECO) --    General Patient Message: Mother states she is sick with cold symptoms and rescheduled the patient's appointment to 1/9. Mother states she would like to see if the patient can be seen the week before the 9th. PSR did not see any openings in the week. Please call back to discuss.     Caller Information       Type Contact Phone/Fax    12/29/2022 09:28 AM CST Phone (Incoming) Froy Sher (Mother) [phone] (M)        Alternative phone number: N/A    Can a detailed message be left? Yes    Message Turnaround:     Is it Working Hours? Yes - Working Hours     [place]:    Please give this turnaround time to the caller:   \"This message will be sent to [state Provider's name]. The clinical team will fulfill your request as soon as they review your message.\"                 SHORTNESS OF BREATH

## 2023-10-09 NOTE — ED PROVIDER NOTE - NSICDXFAMILYHX_GEN_ALL_CORE_FT
FAMILY HISTORY:  Father  Still living? Unknown  FH: alcoholism, Age at diagnosis: Age Unknown    Mother  Still living? Unknown  Family history of coronary artery disease in mother, Age at diagnosis: Age Unknown  Family history of diabetes mellitus (DM), Age at diagnosis: Age Unknown

## 2023-10-10 LAB
GLUCOSE BLDC GLUCOMTR-MCNC: 137 MG/DL — HIGH (ref 70–99)
GLUCOSE BLDC GLUCOMTR-MCNC: 185 MG/DL — HIGH (ref 70–99)

## 2023-10-10 PROCEDURE — 99232 SBSQ HOSP IP/OBS MODERATE 35: CPT

## 2023-10-10 PROCEDURE — 75571 CT HRT W/O DYE W/CA TEST: CPT | Mod: 26

## 2023-10-10 RX ORDER — METOPROLOL TARTRATE 50 MG
25 TABLET ORAL DAILY
Refills: 0 | Status: DISCONTINUED | OUTPATIENT
Start: 2023-10-10 | End: 2023-10-11

## 2023-10-10 RX ORDER — MORPHINE SULFATE 50 MG/1
2 CAPSULE, EXTENDED RELEASE ORAL ONCE
Refills: 0 | Status: DISCONTINUED | OUTPATIENT
Start: 2023-10-10 | End: 2023-10-10

## 2023-10-10 RX ORDER — ASPIRIN/CALCIUM CARB/MAGNESIUM 324 MG
81 TABLET ORAL DAILY
Refills: 0 | Status: DISCONTINUED | OUTPATIENT
Start: 2023-10-10 | End: 2023-10-11

## 2023-10-10 RX ORDER — ATORVASTATIN CALCIUM 80 MG/1
40 TABLET, FILM COATED ORAL AT BEDTIME
Refills: 0 | Status: DISCONTINUED | OUTPATIENT
Start: 2023-10-10 | End: 2023-10-11

## 2023-10-10 RX ADMIN — Medication 2: at 18:33

## 2023-10-10 RX ADMIN — Medication 81 MILLIGRAM(S): at 14:05

## 2023-10-10 RX ADMIN — Medication 650 MILLIGRAM(S): at 20:38

## 2023-10-10 RX ADMIN — FAMOTIDINE 20 MILLIGRAM(S): 10 INJECTION INTRAVENOUS at 14:00

## 2023-10-10 RX ADMIN — MORPHINE SULFATE 2 MILLIGRAM(S): 50 CAPSULE, EXTENDED RELEASE ORAL at 21:13

## 2023-10-10 RX ADMIN — LISINOPRIL 20 MILLIGRAM(S): 2.5 TABLET ORAL at 05:16

## 2023-10-10 NOTE — PROGRESS NOTE ADULT - PROBLEM SELECTOR PLAN 1
- pt has multiple risk factors for ACS  - first trop negative, continue trend w/ serial EKGs  - check nuclear stress test  - A1c is 11.5 in march  - pt lives alone in a trailer, does not see doctors. has unmanaged DM and HIV   - needs endocrine consult  - needs diabetes educator consult  - needs social work/case management consult   - start ASA 81 mg daily and lipitor 80mg daily   - check echo as well   - further workup as per echo/stress results  - should consult hospitalist service, pt has many unmanaged comorbidities that needs to be addressed.

## 2023-10-10 NOTE — ADVANCED PRACTICE NURSE CONSULT - RECOMMEDATIONS
continue diabetes self management education  pt to inject all insulin doses while in the hospital suing prefilled isnulin syringe and rn supervision  insulin pen teaching the dosing to teach pt to listen to the clicks of the pen and count to the dose.  cc- pls consider home care

## 2023-10-10 NOTE — PROGRESS NOTE ADULT - NS ATTEND AMEND GEN_ALL_CORE FT
Complete PFT performed.  
Uncontrolled Diabetes Mellitus , and chest pain .    Patient non compliant. needs to establish care with CPP and endocrinologist. lives in a trailer.  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  untreated.  Cataract visualized in the left eye too.      Coronary Calcium score performed: <25th percentile  - Resume ASA, Statin    Echocardiogram and Stress pending.

## 2023-10-10 NOTE — ADVANCED PRACTICE NURSE CONSULT - ASSESSMENT
went to see pt in am pt states in Rw[place]an that he can not take insulin because he can not see. pt was educated in R[place] about the importance of using insulin. pt was using insulin pen. he was educated to listen to the clicks of the pen and count the units.

## 2023-10-11 VITALS
DIASTOLIC BLOOD PRESSURE: 63 MMHG | OXYGEN SATURATION: 100 % | HEART RATE: 78 BPM | TEMPERATURE: 98 F | RESPIRATION RATE: 18 BRPM | SYSTOLIC BLOOD PRESSURE: 143 MMHG

## 2023-10-11 LAB — GLUCOSE BLDC GLUCOMTR-MCNC: 321 MG/DL — HIGH (ref 70–99)

## 2023-10-11 PROCEDURE — 93018 CV STRESS TEST I&R ONLY: CPT

## 2023-10-11 PROCEDURE — 78452 HT MUSCLE IMAGE SPECT MULT: CPT | Mod: 26

## 2023-10-11 PROCEDURE — 93017 CV STRESS TEST TRACING ONLY: CPT

## 2023-10-11 PROCEDURE — 36415 COLL VENOUS BLD VENIPUNCTURE: CPT

## 2023-10-11 PROCEDURE — 93016 CV STRESS TEST SUPVJ ONLY: CPT

## 2023-10-11 PROCEDURE — A9500: CPT

## 2023-10-11 PROCEDURE — 80053 COMPREHEN METABOLIC PANEL: CPT

## 2023-10-11 PROCEDURE — 75571 CT HRT W/O DYE W/CA TEST: CPT

## 2023-10-11 PROCEDURE — 84484 ASSAY OF TROPONIN QUANT: CPT

## 2023-10-11 PROCEDURE — 99232 SBSQ HOSP IP/OBS MODERATE 35: CPT

## 2023-10-11 PROCEDURE — 85025 COMPLETE CBC W/AUTO DIFF WBC: CPT

## 2023-10-11 PROCEDURE — 82962 GLUCOSE BLOOD TEST: CPT

## 2023-10-11 PROCEDURE — 70450 CT HEAD/BRAIN W/O DYE: CPT | Mod: MA

## 2023-10-11 PROCEDURE — 78452 HT MUSCLE IMAGE SPECT MULT: CPT

## 2023-10-11 PROCEDURE — 71045 X-RAY EXAM CHEST 1 VIEW: CPT

## 2023-10-11 PROCEDURE — 99285 EMERGENCY DEPT VISIT HI MDM: CPT | Mod: 25

## 2023-10-11 PROCEDURE — 83690 ASSAY OF LIPASE: CPT

## 2023-10-11 RX ORDER — INSULIN GLARGINE 100 [IU]/ML
15 INJECTION, SOLUTION SUBCUTANEOUS AT BEDTIME
Refills: 0 | Status: DISCONTINUED | OUTPATIENT
Start: 2023-10-11 | End: 2023-10-11

## 2023-10-11 RX ADMIN — FAMOTIDINE 20 MILLIGRAM(S): 10 INJECTION INTRAVENOUS at 11:30

## 2023-10-11 RX ADMIN — Medication 81 MILLIGRAM(S): at 11:31

## 2023-10-11 RX ADMIN — LISINOPRIL 20 MILLIGRAM(S): 2.5 TABLET ORAL at 06:44

## 2023-10-11 RX ADMIN — Medication 8: at 09:50

## 2023-10-11 RX ADMIN — Medication 25 MILLIGRAM(S): at 06:44

## 2023-10-11 NOTE — PROVIDER CONTACT NOTE (OTHER) - NAME OF MD/NP/PA/DO NOTIFIED:
This visit is conducted using Telemedicine with live, interactive video and audio.     Telehealth outside of [address] Verbal Consent   I conducted a telehealth visit with John Becerra today, 04/19/21, which was completed using two-way, shalom Dr. Samy Callejas thought and now interested in effexor   No current or h/o depression   No family h/o depression       Component      Latest Ref Rng & Units 4/15/2021 4/15/2021 11/20/2020           8:53 AM  8:53 AM    ALLERGEN A. ALTERNATA (S)      <0.10 kUA/L  0.86 (H) 263.0   MCV      80.0 - 100.0 fL  94.6 92.0   MCH      26.0 - 34.0 pg  30.3 31.0   MCHC      31.0 - 37.0 g/dL  32.0 33.7   RDW      11.0 - 15.0 %  13.5 14.0   RDW-SD      35.1 - 46.3 fL  46.5 (H) 46.9 (H)   Prelim Neutrophil Abs      1.50 - 7.70 x10 (3) uL NEUTROPHILS ABSOLUTE      1,500 - 7,800 cells/uL      ABSOLUTE LYMPHOCYTES      850 - 3,900 cells/uL      ABSOLUTE MONOCYTES      200 - 950 cells/uL      ABSOLUTE EOSINOPHILS      15 - 500 cells/uL      ABSOLUTE BASOPHILS      0 - 200 cells/uL      NEUTR (S)      <0.10 kUA/L     ALLERGEN D. FARINAE (S)      <0.10 kUA/L     ALLERGEN D.PTERONYSSINUS (S)      <0.10 kUA/L     ALLERGEN DOG DANDER (S)      <0.10 kUA/L     ALLERGEN ELM TREE (S)      <0.10 kUA/L     ALLERGEN MARSH ELDER (S)      <0.10 kUA/L     AL - 5.1 mmol/L 3.9    Chloride      98 - 112 mmol/L 108    Carbon Dioxide, Total      21.0 - 32.0 mmol/L 25.0    ANION GAP      0 - 18 mmol/L 5    BUN      7 - 18 mg/dL 11 15   CREATININE      0.55 - 1.02 mg/dL 0.71 0.69   BUN/CREAT Ratio      10.0 - 20.0 15 mg/dL  109 (H)   VLDL      0 - 30 mg/dL     NON HDL CHOL      <130 mg/dL     CHOL/HDLC RATIO      <5.0 (calc)  2.6   NON-HDL CHOLESTEROL      <130 mg/dL (calc)  122   HEMOGLOBIN A1c      <5.7 %     ESTIMATED AVERAGE GLUCOSE      68 - 126 mg/dL     TSH RIGHT  07/2020    IDC   • OTHER      vein treatment   • RADIATION RIGHT      4wks Dec 2020-Jan 2021   • TONSILLECTOMY        Family History   Problem Relation Age of Onset   • Breast Cancer Paternal Aunt 62   • Other (Bone Cancer) Paternal Aunt 80   • Diab asthma    EXAM:   alert, appears stated age and cooperative, Normocephalic, without obvious abnormality, atraumatic, lips, mucosa, and tongue normal; teeth and gums normal, Speaking in full sentences comfortably, Normal work of breathing, Skin color, textu

## 2023-10-11 NOTE — PROGRESS NOTE ADULT - ASSESSMENT
62 yo M with a PMH significant for HTN, DM with recent OM (3/2023), HIV noncompliant with ART, CLBP who presents to [facility] ED for chest pain. HEART score of 4 for RF's HTN, DM, current smoker, moderately suspicious presentation.      #Chest Pain   MSK vs. ACS   HEART Score 4 for RF's of HTN, DM, current smoker, moderately suspicious presentation / history   Trop negative x3,    - Continue to monitor  - Reproducible on Palpation, will give toradol and assess chest pain   - Cardiology consulted, records indicated cardiac exam 2020 with negative stress, however patient states recent stress at [facility] 2-3 months ago?   - ASA, Statin, BB  - NTG PRN   - Stress test under way  - FLP in am    #HTN Urgency   BP noted to be 180/66 on admission, may contribute to Chest pain, patient denies taking any home medications at this time   - Started on Lisinopril 20 mg, continue to monitor   - Metoprolol ER 25mg  - Hydralazine PRN for SBP > 170     #DM with hyperglycemia   Patient noncompliant with home medication regiment not on any current meds   - Started on Lantus 10 U (DC'd in past on 8 U, not taken), as well as SSI   - Continue to monitor BG   - A1c in am    #Hyponatremia   Likely in setting of poor PO intake,   - Given 1L NS, diet started, will continue to monitor     #HIV  Noncompliant with ART, does not follow with outpatient ID, unknown viral load, WBC wnl currently, but unknown CD4 count   - Patient will need outpatient ID follow up, counseled on need for proper follow up     DVT prophylaxis:   - VCD     Code Status: Full    Dispo: Pending cardiac work up
62 yo M with a PMH significant for HTN, DM with recent OM (3/2023), HIV noncompliant with ART, CLBP who presents to [facility] ED for chest pain. HEART score of 4 for RF's HTN, DM, current smoker, moderately suspicious presentation.      #Chest Pain   MSK vs. ACS   HEART Score 4 for RF's of HTN, DM, current smoker, moderately suspicious presentation / history   Trop negative x3,    Stress test with fixed deficit, no ischemia  - Monitor for arrhythmia  - Reproducible on Palpation, will give toradol and assess chest pain   - Cardiology consulted, records indicated cardiac exam 2020 with negative stress, however patient states recent stress at [facility] 2-3 months ago?   - ASA, Statin, BB  - NTG PRN   - FLP in am  - CTA chest given persistent pain to rule out PE    #HTN Urgency   BP noted to be 180/66 on admission, may contribute to Chest pain, patient denies taking any home medications at this time   - Started on Lisinopril 20 mg, continue to monitor   - Metoprolol ER 25mg  - Hydralazine PRN for SBP > 170     #DM with hyperglycemia   Patient noncompliant with home medication regiment not on any current meds   - Blood glucose monitoring with sliding scale Lispro  - Started on Lantus 10 U (DC'd in past on 8 U, not taken); Increase Glargine to 15U  - A1c in am     #Hyponatremia   Likely in setting of poor PO intake,   - Given 1L NS, diet started, will continue to monitor     #HIV  Noncompliant with ART, does not follow with outpatient ID, unknown viral load, WBC wnl currently, but unknown CD4 count   - Patient will need outpatient ID follow up, counseled on need for proper follow up     DVT prophylaxis:   - VCD     Code Status: Full    Dispo: Acutely ill, work up in progress
Mr. Edwards is a 64 yo M with a PMH significant for HTN, DM with recent OM (3/2023), HIV noncompliant with ART, CLBP who presents to [facility] ED for chest pain. Cardiology consulted for chest pain.

## 2023-10-11 NOTE — PROGRESS NOTE ADULT - SUBJECTIVE AND OBJECTIVE BOX
HOSPITALIST PROGRESS NOTE    RAFAEL GORDON  [id number]  63yMale    Patient is a 63y old  Male who presents with a chief complaint of Chest Pain (10 Oct 2023 09:07)      SUBJECTIVE:   Chart reviewed since admission.  Patient seen and examined at bedside for chest pain r/o ACS, hypertensive urgency, uncontrolled diabetes  Continues to complain of episodic sharp chest pain.  Occasional dyspnea and chills  Denies any palpitations, dizziness, cough,       OBJECTIVE:  Vital Signs Last 24 Hrs  T(C): 36.8 (10 Oct 2023 12:16), Max: 37.6 (09 Oct 2023 15:34)  T(F): 98.3 (10 Oct 2023 12:16), Max: 99.7 (09 Oct 2023 15:34)  HR: 86 (10 Oct 2023 12:16) (78 - 90)  BP: 132/83 (10 Oct 2023 12:16) (105/54 - 201/94)   RR: 19 (10 Oct 2023 12:16) (18 - 19)  SpO2: 98% (10 Oct 2023 12:16) (94% - 98%)    Parameters below as of 10 Oct 2023 12:16  Patient On (Oxygen Delivery Method): room air        PHYSICAL EXAMINATION  General: Middle aged malodorous disheveled male lying on stretcher, comfortable  HEENT:  Anicteric sclera  NECK:  Supple  CVS: regular rate and rhythm S1 S2  RESP:  CTAB  GI:  Soft nondistended nontender BS+  : No suprapubic tenderness  MSK:  FROM  CNS:  Awake, alert, oriented. Follows commands. No drift or dysmetria  INTEG:  Warm dry skin  PSYCH:  Fair mood    MONITOR:  CAPILLARY BLOOD GLUCOSE      POCT Blood Glucose.: 137 mg/dL (10 Oct 2023 10:48)  POCT Blood Glucose.: 162 mg/dL (09 Oct 2023 21:25)  POCT Blood Glucose.: 228 mg/dL (09 Oct 2023 16:35)        I&O's Summary                          11.3   5.56  )-----------( 169      ( 09 Oct 2023 03:50 )             33.9       10-09    129<L>  |  93<L>  |  11.9  ----------------------------<  233<H>  3.9   |  24.0  |  1.24    Ca    8.4      09 Oct 2023 03:50    TPro  6.5<L>  /  Alb  2.7<L>  /  TBili  0.3<L>  /  DBili  x   /  AST  17  /  ALT  30  /  AlkPhos  46  10-09    CARDIAC MARKERS ( 09 Oct 2023 19:50 )  x     / <0.01 ng/mL / x     / x     / x      CARDIAC MARKERS ( 09 Oct 2023 14:36 )  x     / <0.01 ng/mL / x     / x     / x      CARDIAC MARKERS ( 09 Oct 2023 03:50 )  x     / <0.01 ng/mL / x     / x     / x          Urinalysis Basic - ( 09 Oct 2023 03:50 )    Color: x / Appearance: x / SG: x / pH: x  Gluc: 233 mg/dL / Ketone: x  / Bili: x / Urobili: x   Blood: x / Protein: x / Nitrite: x   Leuk Esterase: x / RBC: x / WBC x   Sq Epi: x / Non Sq Epi: x / Bacteria: x        Culture:    TTE:    RADIOLOGY      < from: CT Head No Cont (10.09.23 @ 05:01) >  Impression:  No evidence of acute intracranial abnormality.    < end of copied text >    < from: CT Heart Calcium Score (10.10.23 @ 10:16) >  Impression:    The calcium score is MINIMAL at 3 Agatston units, which is less than 25th   percentile, adjusted for age, gender and race.    < end of copied text >          MEDICATIONS  (STANDING):  aspirin  chewable 81 milliGRAM(s) Oral daily  atorvastatin 40 milliGRAM(s) Oral at bedtime  dextrose 5%. 1000 milliLiter(s) (50 mL/Hr) IV Continuous <Continuous>  dextrose 5%. 1000 milliLiter(s) (100 mL/Hr) IV Continuous <Continuous>  dextrose 50% Injectable 25 Gram(s) IV Push once  dextrose 50% Injectable 12.5 Gram(s) IV Push once  dextrose 50% Injectable 25 Gram(s) IV Push once  famotidine    Tablet 20 milliGRAM(s) Oral daily  glucagon  Injectable 1 milliGRAM(s) IntraMuscular once  insulin glargine Injectable (LANTUS) 10 Unit(s) SubCutaneous at bedtime  insulin lispro (ADMELOG) corrective regimen sliding scale   SubCutaneous three times a day before meals  lisinopril 20 milliGRAM(s) Oral daily  metoprolol succinate ER 25 milliGRAM(s) Oral daily      MEDICATIONS  (PRN):  acetaminophen     Tablet .. 650 milliGRAM(s) Oral every 6 hours PRN Temp greater or equal to 38C (100.4F), Mild Pain (1 - 3)  aluminum hydroxide/magnesium hydroxide/simethicone Suspension 30 milliLiter(s) Oral every 4 hours PRN Dyspepsia  dextrose Oral Gel 15 Gram(s) Oral once PRN Blood Glucose LESS THAN 70 milliGRAM(s)/deciliter  hydrALAZINE Injectable 10 milliGRAM(s) IV Push every 8 hours PRN SBP > 170  melatonin 3 milliGRAM(s) Oral at bedtime PRN Insomnia  ondansetron Injectable 4 milliGRAM(s) IV Push every 8 hours PRN Nausea and/or Vomiting  
  HOSPITALIST PROGRESS NOTE    RAFAEL GORDON  [id number]  63yMale    Patient is a 63y old  Male who presents with a chief complaint of Chest Pain (10 Oct 2023 14:34)      SUBJECTIVE:   Chart reviewed since last visit.    Refused blood work in morning    Patient seen and examined at bedside for chest pain, uncontrolled HTN and DM.  Continue to have chest pain but insistent on leaving as daughter coming from [place]        OBJECTIVE:  Vital Signs Last 24 Hrs  T(C): 36.4 (11 Oct 2023 07:35), Max: 37.1 (10 Oct 2023 16:19)  T(F): 97.5 (11 Oct 2023 07:35), Max: 98.7 (10 Oct 2023 16:19)  HR: 78 (11 Oct 2023 07:35) (66 - 80)  BP: 143/63 (11 Oct 2023 07:35) (113/66 - 143/63)   RR: 18 (11 Oct 2023 07:35) (18 - 19)  SpO2: 100% (11 Oct 2023 07:35) (97% - 100%)    Parameters below as of 11 Oct 2023 07:35  Patient On (Oxygen Delivery Method): room air      PHYSICAL EXAMINATION  General: Middle aged malodorous disheveled male walking around, restless  HEENT:  Anicteric sclera  NECK:  Supple  CVS: regular rate and rhythm S1 S2  RESP:  CTAB  GI:  Soft nondistended nontender BS+  : No suprapubic tenderness  MSK:  FROM  CNS:  Awake, alert, oriented. Follows commands. No drift or dysmetria  INTEG:  Warm dry skin  PSYCH:  Fair mood.      MONITOR:  CAPILLARY BLOOD GLUCOSE      POCT Blood Glucose.: 321 mg/dL (11 Oct 2023 09:32)  POCT Blood Glucose.: 149 mg/dL (10 Oct 2023 21:58)  POCT Blood Glucose.: 185 mg/dL (10 Oct 2023 18:14)        I&O's Summary              CARDIAC MARKERS ( 09 Oct 2023 19:50 )  x     / <0.01 ng/mL / x     / x     / x      CARDIAC MARKERS ( 09 Oct 2023 14:36 )  x     / <0.01 ng/mL / x     / x     / x              < from: Nuclear Stress Test-Pharmacologic (10.10.23 @ 06:30) >  IMPRESSIONS:  * Myocardial Perfusion SPECT results are mildly abnormal.  * There is a small, mild defect in apical wall that is  fixed, defect persists on prone imaging suggestive of  infarct, no clear evidence of ischemia, however overall  study quality is poor hence may reduce overall diganostic  accuracy, consider alternative ischemic imaging modality  if high index of clinical suspicion for ischemic heart  disaese.  * Post-stress resting myocardial perfusiongated SPECT  imaging was performed (LVEF = 47 %;LVEDV = 112 ml.), mild  global hypokinesis.    -----------------------------------    < end of copied text >      MEDICATIONS  (STANDING):  aspirin  chewable 81 milliGRAM(s) Oral daily  atorvastatin 40 milliGRAM(s) Oral at bedtime  dextrose 5%. 1000 milliLiter(s) (50 mL/Hr) IV Continuous <Continuous>  dextrose 5%. 1000 milliLiter(s) (100 mL/Hr) IV Continuous <Continuous>  dextrose 50% Injectable 12.5 Gram(s) IV Push once  dextrose 50% Injectable 25 Gram(s) IV Push once  dextrose 50% Injectable 25 Gram(s) IV Push once  famotidine    Tablet 20 milliGRAM(s) Oral daily  glucagon  Injectable 1 milliGRAM(s) IntraMuscular once  insulin glargine Injectable (LANTUS) 15 Unit(s) SubCutaneous at bedtime  insulin lispro (ADMELOG) corrective regimen sliding scale   SubCutaneous three times a day before meals  lisinopril 20 milliGRAM(s) Oral daily  metoprolol succinate ER 25 milliGRAM(s) Oral daily      MEDICATIONS  (PRN):  acetaminophen     Tablet .. 650 milliGRAM(s) Oral every 6 hours PRN Temp greater or equal to 38C (100.4F), Mild Pain (1 - 3)  aluminum hydroxide/magnesium hydroxide/simethicone Suspension 30 milliLiter(s) Oral every 4 hours PRN Dyspepsia  dextrose Oral Gel 15 Gram(s) Oral once PRN Blood Glucose LESS THAN 70 milliGRAM(s)/deciliter  hydrALAZINE Injectable 10 milliGRAM(s) IV Push every 8 hours PRN SBP > 170  melatonin 3 milliGRAM(s) Oral at bedtime PRN Insomnia  ondansetron Injectable 4 milliGRAM(s) IV Push every 8 hours PRN Nausea and/or Vomiting  
                                                         [facility]                                                         CARDIOLOGY AT [place] [address]                                                         Telephone: [phone]. Fax:[phone]                                                                             PROGRESS NOTE    Reason for admission: chest pain   Initial reason for Consult: chest pain   Reason for follow up: NST today       Review of symptoms:   Cardiac:  No chest pain. No dyspnea. No palpitations.  Respiratory: no cough. No dyspnea  Gastrointestinal: No diarrhea. No abdominal pain. No bleeding.   Neuro: No focal neuro complaints.    Vitals:  T(C): 36.8 (10-10-23 @ 07:50), Max: 37.6 (10-09-23 @ 11:37)  HR: 80 (10-10-23 @ 07:50) (78 - 90)  BP: 117/60 (10-10-23 @ 07:50) (105/54 - 201/94)  RR: 19 (10-10-23 @ 07:50) (18 - 19)  SpO2: 97% (10-10-23 @ 07:50) (94% - 97%)  Wt(kg): --  I&O's Summary    Weight (kg): 77.1 (10-09 @ 02:32)    PHYSICAL EXAM:  Appearance: Comfortable. No acute distress  HEENT:  Atraumatic. Normocephalic.  Normal oral mucosa  Neurologic: A & O x 3, no gross focal deficits.  Cardiovascular: RRR S1 S2, No murmur, no rubs/gallops. No JVD  Respiratory: Lungs clear to auscultation, unlabored   Gastrointestinal:  Soft, Non-tender, + BS  Lower Extremities: 2+ Peripheral Pulses, No clubbing, cyanosis, or edema  Psychiatry: Patient is calm. No agitation.   Skin: warm and dry.    CURRENT CARDIAC MEDICATIONS:  hydrALAZINE Injectable 10 milliGRAM(s) IV Push every 8 hours PRN  lisinopril 20 milliGRAM(s) Oral daily  metoprolol succinate ER 25 milliGRAM(s) Oral daily      CURRENT OTHER MEDICATIONS:  acetaminophen     Tablet .. 650 milliGRAM(s) Oral every 6 hours PRN Temp greater or equal to 38C (100.4F), Mild Pain (1 - 3)  melatonin 3 milliGRAM(s) Oral at bedtime PRN Insomnia  ondansetron Injectable 4 milliGRAM(s) IV Push every 8 hours PRN Nausea and/or Vomiting  aluminum hydroxide/magnesium hydroxide/simethicone Suspension 30 milliLiter(s) Oral every 4 hours PRN Dyspepsia  famotidine    Tablet 20 milliGRAM(s) Oral daily  atorvastatin 40 milliGRAM(s) Oral at bedtime  dextrose 50% Injectable 12.5 Gram(s) IV Push once, Stop order after: 1 Doses  dextrose 50% Injectable 25 Gram(s) IV Push once, Stop order after: 1 Doses  dextrose 50% Injectable 25 Gram(s) IV Push once, Stop order after: 1 Doses  dextrose Oral Gel 15 Gram(s) Oral once, Stop order after: 1 Doses PRN Blood Glucose LESS THAN 70 milliGRAM(s)/deciliter  glucagon  Injectable 1 milliGRAM(s) IntraMuscular once, Stop order after: 1 Doses  insulin glargine Injectable (LANTUS) 10 Unit(s) SubCutaneous at bedtime  insulin lispro (ADMELOG) corrective regimen sliding scale   SubCutaneous three times a day before meals  aspirin  chewable 81 milliGRAM(s) Oral daily  dextrose 5%. 1000 milliLiter(s) (50 mL/Hr) IV Continuous <Continuous>  dextrose 5%. 1000 milliLiter(s) (100 mL/Hr) IV Continuous <Continuous>      LABS:	 	  ( 09 Oct 2023 19:50 )  Troponin T  <0.01,  CPK  X    , CKMB  X    , BNP X        , ( 09 Oct 2023 14:36 )  Troponin T  <0.01,  CPK  X    , CKMB  X    , BNP X        , ( 09 Oct 2023 03:50 )  Troponin T  <0.01,  CPK  X    , CKMB  X    , BNP X                                  11.3   5.56  )-----------( 169      ( 09 Oct 2023 03:50 )             33.9     10-09    129<L>  |  93<L>  |  11.9  ----------------------------<  233<H>  3.9   |  24.0  |  1.24    Ca    8.4      09 Oct 2023 03:50    TPro  6.5<L>  /  Alb  2.7<L>  /  TBili  0.3<L>  /  DBili  x   /  AST  17  /  ALT  30  /  AlkPhos  46  10-09      Lipid Profile:   HgA1c:   TSH:

## 2023-11-06 NOTE — ED STATDOCS - CARDIOVASCULAR [+], MLM
----- Message from Sterling Cordero MD sent at 11/5/2023  1:20 PM CST -----  Please call patient with these results as discussed.  C1 Esterase Inhibitor, Functional level normal at 119%    
Spoke with patient's mother. Informed her of below results. She verbalized understanding. No further questions.  
CHEST PAIN

## 2023-12-26 ENCOUNTER — EMERGENCY (EMERGENCY)
Facility: HOSPITAL | Age: 63
LOS: 0 days | Discharge: HOME | End: 2023-12-26
Attending: STUDENT IN AN ORGANIZED HEALTH CARE EDUCATION/TRAINING PROGRAM
Payer: MEDICAID

## 2023-12-26 VITALS
HEART RATE: 81 BPM | RESPIRATION RATE: 18 BRPM | WEIGHT: 179.9 LBS | HEIGHT: 68 IN | DIASTOLIC BLOOD PRESSURE: 89 MMHG | OXYGEN SATURATION: 98 % | SYSTOLIC BLOOD PRESSURE: 153 MMHG

## 2023-12-26 VITALS
SYSTOLIC BLOOD PRESSURE: 155 MMHG | OXYGEN SATURATION: 100 % | HEART RATE: 80 BPM | DIASTOLIC BLOOD PRESSURE: 89 MMHG | TEMPERATURE: 98 F | RESPIRATION RATE: 18 BRPM

## 2023-12-26 DIAGNOSIS — I50.9 HEART FAILURE, UNSPECIFIED: ICD-10-CM

## 2023-12-26 DIAGNOSIS — R07.89 OTHER CHEST PAIN: ICD-10-CM

## 2023-12-26 DIAGNOSIS — Z21 ASYMPTOMATIC HUMAN IMMUNODEFICIENCY VIRUS [HIV] INFECTION STATUS: ICD-10-CM

## 2023-12-26 DIAGNOSIS — G89.29 OTHER CHRONIC PAIN: ICD-10-CM

## 2023-12-26 DIAGNOSIS — E11.9 TYPE 2 DIABETES MELLITUS WITHOUT COMPLICATIONS: ICD-10-CM

## 2023-12-26 DIAGNOSIS — F17.200 NICOTINE DEPENDENCE, UNSPECIFIED, UNCOMPLICATED: ICD-10-CM

## 2023-12-26 DIAGNOSIS — Z98.1 ARTHRODESIS STATUS: Chronic | ICD-10-CM

## 2023-12-26 DIAGNOSIS — F41.9 ANXIETY DISORDER, UNSPECIFIED: ICD-10-CM

## 2023-12-26 DIAGNOSIS — R06.02 SHORTNESS OF BREATH: ICD-10-CM

## 2023-12-26 DIAGNOSIS — R05.9 COUGH, UNSPECIFIED: ICD-10-CM

## 2023-12-26 DIAGNOSIS — I11.0 HYPERTENSIVE HEART DISEASE WITH HEART FAILURE: ICD-10-CM

## 2023-12-26 DIAGNOSIS — M54.9 DORSALGIA, UNSPECIFIED: ICD-10-CM

## 2023-12-26 DIAGNOSIS — R79.89 OTHER SPECIFIED ABNORMAL FINDINGS OF BLOOD CHEMISTRY: ICD-10-CM

## 2023-12-26 LAB
ALBUMIN SERPL ELPH-MCNC: 1.6 G/DL — LOW (ref 3.3–5)
ALBUMIN SERPL ELPH-MCNC: 1.6 G/DL — LOW (ref 3.3–5)
ALP SERPL-CCNC: 48 U/L — SIGNIFICANT CHANGE UP (ref 40–120)
ALP SERPL-CCNC: 48 U/L — SIGNIFICANT CHANGE UP (ref 40–120)
ALT FLD-CCNC: 22 U/L — SIGNIFICANT CHANGE UP (ref 12–78)
ALT FLD-CCNC: 22 U/L — SIGNIFICANT CHANGE UP (ref 12–78)
ANION GAP SERPL CALC-SCNC: 3 MMOL/L — LOW (ref 5–17)
ANION GAP SERPL CALC-SCNC: 3 MMOL/L — LOW (ref 5–17)
APTT BLD: 30.8 SEC — SIGNIFICANT CHANGE UP (ref 24.5–35.6)
APTT BLD: 30.8 SEC — SIGNIFICANT CHANGE UP (ref 24.5–35.6)
AST SERPL-CCNC: 21 U/L — SIGNIFICANT CHANGE UP (ref 15–37)
AST SERPL-CCNC: 21 U/L — SIGNIFICANT CHANGE UP (ref 15–37)
BASOPHILS # BLD AUTO: 0.03 K/UL — SIGNIFICANT CHANGE UP (ref 0–0.2)
BASOPHILS # BLD AUTO: 0.03 K/UL — SIGNIFICANT CHANGE UP (ref 0–0.2)
BASOPHILS NFR BLD AUTO: 0.5 % — SIGNIFICANT CHANGE UP (ref 0–2)
BASOPHILS NFR BLD AUTO: 0.5 % — SIGNIFICANT CHANGE UP (ref 0–2)
BILIRUB SERPL-MCNC: 0.1 MG/DL — LOW (ref 0.2–1.2)
BILIRUB SERPL-MCNC: 0.1 MG/DL — LOW (ref 0.2–1.2)
BUN SERPL-MCNC: 25 MG/DL — HIGH (ref 7–23)
BUN SERPL-MCNC: 25 MG/DL — HIGH (ref 7–23)
CALCIUM SERPL-MCNC: 7.8 MG/DL — LOW (ref 8.5–10.1)
CALCIUM SERPL-MCNC: 7.8 MG/DL — LOW (ref 8.5–10.1)
CHLORIDE SERPL-SCNC: 112 MMOL/L — HIGH (ref 96–108)
CHLORIDE SERPL-SCNC: 112 MMOL/L — HIGH (ref 96–108)
CO2 SERPL-SCNC: 27 MMOL/L — SIGNIFICANT CHANGE UP (ref 22–31)
CO2 SERPL-SCNC: 27 MMOL/L — SIGNIFICANT CHANGE UP (ref 22–31)
CREAT SERPL-MCNC: 1.58 MG/DL — HIGH (ref 0.5–1.3)
CREAT SERPL-MCNC: 1.58 MG/DL — HIGH (ref 0.5–1.3)
D DIMER BLD IA.RAPID-MCNC: 356 NG/ML DDU — HIGH
D DIMER BLD IA.RAPID-MCNC: 356 NG/ML DDU — HIGH
EGFR: 49 ML/MIN/1.73M2 — LOW
EGFR: 49 ML/MIN/1.73M2 — LOW
EOSINOPHIL # BLD AUTO: 0.12 K/UL — SIGNIFICANT CHANGE UP (ref 0–0.5)
EOSINOPHIL # BLD AUTO: 0.12 K/UL — SIGNIFICANT CHANGE UP (ref 0–0.5)
EOSINOPHIL NFR BLD AUTO: 1.8 % — SIGNIFICANT CHANGE UP (ref 0–6)
EOSINOPHIL NFR BLD AUTO: 1.8 % — SIGNIFICANT CHANGE UP (ref 0–6)
GLUCOSE SERPL-MCNC: 233 MG/DL — HIGH (ref 70–99)
GLUCOSE SERPL-MCNC: 233 MG/DL — HIGH (ref 70–99)
HCT VFR BLD CALC: 29.5 % — LOW (ref 39–50)
HCT VFR BLD CALC: 29.5 % — LOW (ref 39–50)
HGB BLD-MCNC: 9.4 G/DL — LOW (ref 13–17)
HGB BLD-MCNC: 9.4 G/DL — LOW (ref 13–17)
IMM GRANULOCYTES NFR BLD AUTO: 0.6 % — SIGNIFICANT CHANGE UP (ref 0–0.9)
IMM GRANULOCYTES NFR BLD AUTO: 0.6 % — SIGNIFICANT CHANGE UP (ref 0–0.9)
INR BLD: 0.91 RATIO — SIGNIFICANT CHANGE UP (ref 0.85–1.18)
INR BLD: 0.91 RATIO — SIGNIFICANT CHANGE UP (ref 0.85–1.18)
LYMPHOCYTES # BLD AUTO: 1.3 K/UL — SIGNIFICANT CHANGE UP (ref 1–3.3)
LYMPHOCYTES # BLD AUTO: 1.3 K/UL — SIGNIFICANT CHANGE UP (ref 1–3.3)
LYMPHOCYTES # BLD AUTO: 19.9 % — SIGNIFICANT CHANGE UP (ref 13–44)
LYMPHOCYTES # BLD AUTO: 19.9 % — SIGNIFICANT CHANGE UP (ref 13–44)
MAGNESIUM SERPL-MCNC: 1.8 MG/DL — SIGNIFICANT CHANGE UP (ref 1.6–2.6)
MAGNESIUM SERPL-MCNC: 1.8 MG/DL — SIGNIFICANT CHANGE UP (ref 1.6–2.6)
MCHC RBC-ENTMCNC: 28 PG — SIGNIFICANT CHANGE UP (ref 27–34)
MCHC RBC-ENTMCNC: 28 PG — SIGNIFICANT CHANGE UP (ref 27–34)
MCHC RBC-ENTMCNC: 31.9 GM/DL — LOW (ref 32–36)
MCHC RBC-ENTMCNC: 31.9 GM/DL — LOW (ref 32–36)
MCV RBC AUTO: 87.8 FL — SIGNIFICANT CHANGE UP (ref 80–100)
MCV RBC AUTO: 87.8 FL — SIGNIFICANT CHANGE UP (ref 80–100)
MONOCYTES # BLD AUTO: 0.6 K/UL — SIGNIFICANT CHANGE UP (ref 0–0.9)
MONOCYTES # BLD AUTO: 0.6 K/UL — SIGNIFICANT CHANGE UP (ref 0–0.9)
MONOCYTES NFR BLD AUTO: 9.2 % — SIGNIFICANT CHANGE UP (ref 2–14)
MONOCYTES NFR BLD AUTO: 9.2 % — SIGNIFICANT CHANGE UP (ref 2–14)
NEUTROPHILS # BLD AUTO: 4.44 K/UL — SIGNIFICANT CHANGE UP (ref 1.8–7.4)
NEUTROPHILS # BLD AUTO: 4.44 K/UL — SIGNIFICANT CHANGE UP (ref 1.8–7.4)
NEUTROPHILS NFR BLD AUTO: 68 % — SIGNIFICANT CHANGE UP (ref 43–77)
NEUTROPHILS NFR BLD AUTO: 68 % — SIGNIFICANT CHANGE UP (ref 43–77)
NT-PROBNP SERPL-SCNC: 1420 PG/ML — HIGH (ref 0–125)
NT-PROBNP SERPL-SCNC: 1420 PG/ML — HIGH (ref 0–125)
PLATELET # BLD AUTO: 252 K/UL — SIGNIFICANT CHANGE UP (ref 150–400)
PLATELET # BLD AUTO: 252 K/UL — SIGNIFICANT CHANGE UP (ref 150–400)
POTASSIUM SERPL-MCNC: 3.9 MMOL/L — SIGNIFICANT CHANGE UP (ref 3.5–5.3)
POTASSIUM SERPL-MCNC: 3.9 MMOL/L — SIGNIFICANT CHANGE UP (ref 3.5–5.3)
POTASSIUM SERPL-SCNC: 3.9 MMOL/L — SIGNIFICANT CHANGE UP (ref 3.5–5.3)
POTASSIUM SERPL-SCNC: 3.9 MMOL/L — SIGNIFICANT CHANGE UP (ref 3.5–5.3)
PROT SERPL-MCNC: 6.4 GM/DL — SIGNIFICANT CHANGE UP (ref 6–8.3)
PROT SERPL-MCNC: 6.4 GM/DL — SIGNIFICANT CHANGE UP (ref 6–8.3)
PROTHROM AB SERPL-ACNC: 10.3 SEC — SIGNIFICANT CHANGE UP (ref 9.5–13)
PROTHROM AB SERPL-ACNC: 10.3 SEC — SIGNIFICANT CHANGE UP (ref 9.5–13)
RBC # BLD: 3.36 M/UL — LOW (ref 4.2–5.8)
RBC # BLD: 3.36 M/UL — LOW (ref 4.2–5.8)
RBC # FLD: 14.4 % — SIGNIFICANT CHANGE UP (ref 10.3–14.5)
RBC # FLD: 14.4 % — SIGNIFICANT CHANGE UP (ref 10.3–14.5)
SODIUM SERPL-SCNC: 142 MMOL/L — SIGNIFICANT CHANGE UP (ref 135–145)
SODIUM SERPL-SCNC: 142 MMOL/L — SIGNIFICANT CHANGE UP (ref 135–145)
TROPONIN I, HIGH SENSITIVITY RESULT: 29.36 NG/L — SIGNIFICANT CHANGE UP
TROPONIN I, HIGH SENSITIVITY RESULT: 29.36 NG/L — SIGNIFICANT CHANGE UP
TROPONIN I, HIGH SENSITIVITY RESULT: 29.87 NG/L — SIGNIFICANT CHANGE UP
TROPONIN I, HIGH SENSITIVITY RESULT: 29.87 NG/L — SIGNIFICANT CHANGE UP
WBC # BLD: 6.53 K/UL — SIGNIFICANT CHANGE UP (ref 3.8–10.5)
WBC # BLD: 6.53 K/UL — SIGNIFICANT CHANGE UP (ref 3.8–10.5)
WBC # FLD AUTO: 6.53 K/UL — SIGNIFICANT CHANGE UP (ref 3.8–10.5)
WBC # FLD AUTO: 6.53 K/UL — SIGNIFICANT CHANGE UP (ref 3.8–10.5)

## 2023-12-26 PROCEDURE — 99285 EMERGENCY DEPT VISIT HI MDM: CPT | Mod: 25

## 2023-12-26 PROCEDURE — 36415 COLL VENOUS BLD VENIPUNCTURE: CPT

## 2023-12-26 PROCEDURE — 71275 CT ANGIOGRAPHY CHEST: CPT | Mod: MA

## 2023-12-26 PROCEDURE — 85610 PROTHROMBIN TIME: CPT

## 2023-12-26 PROCEDURE — 71045 X-RAY EXAM CHEST 1 VIEW: CPT | Mod: 26

## 2023-12-26 PROCEDURE — 83735 ASSAY OF MAGNESIUM: CPT

## 2023-12-26 PROCEDURE — 93010 ELECTROCARDIOGRAM REPORT: CPT

## 2023-12-26 PROCEDURE — 85025 COMPLETE CBC W/AUTO DIFF WBC: CPT

## 2023-12-26 PROCEDURE — 99285 EMERGENCY DEPT VISIT HI MDM: CPT

## 2023-12-26 PROCEDURE — 93005 ELECTROCARDIOGRAM TRACING: CPT

## 2023-12-26 PROCEDURE — 85379 FIBRIN DEGRADATION QUANT: CPT

## 2023-12-26 PROCEDURE — 71275 CT ANGIOGRAPHY CHEST: CPT | Mod: 26,MA

## 2023-12-26 PROCEDURE — 71045 X-RAY EXAM CHEST 1 VIEW: CPT

## 2023-12-26 PROCEDURE — 84484 ASSAY OF TROPONIN QUANT: CPT

## 2023-12-26 PROCEDURE — 83880 ASSAY OF NATRIURETIC PEPTIDE: CPT

## 2023-12-26 PROCEDURE — 80053 COMPREHEN METABOLIC PANEL: CPT

## 2023-12-26 PROCEDURE — 85730 THROMBOPLASTIN TIME PARTIAL: CPT

## 2023-12-26 RX ORDER — ASPIRIN/CALCIUM CARB/MAGNESIUM 324 MG
324 TABLET ORAL ONCE
Refills: 0 | Status: COMPLETED | OUTPATIENT
Start: 2023-12-26 | End: 2023-12-26

## 2023-12-26 RX ADMIN — Medication 324 MILLIGRAM(S): at 07:47

## 2023-12-26 RX ADMIN — Medication 100 MILLIGRAM(S): at 14:13

## 2023-12-26 NOTE — ED ADULT NURSE NOTE - NSFALLUNIVINTERV_ED_ALL_ED
Bed/Stretcher in lowest position, wheels locked, appropriate side rails in place/Call bell, personal items and telephone in reach/Instruct patient to call for assistance before getting out of bed/chair/stretcher/Non-slip footwear applied when patient is off stretcher/[place] to call system/Physically safe environment - no spills, clutter or unnecessary equipment/Purposeful proactive rounding/Room/bathroom lighting operational, light cord in reach Bed/Stretcher in lowest position, wheels locked, appropriate side rails in place/Call bell, personal items and telephone in reach/Instruct patient to call for assistance before getting out of bed/chair/stretcher/Non-slip footwear applied when patient is off stretcher/[place] to call system/Physically safe environment - no spills, clutter or unnecessary equipment/Purposeful proactive rounding/Room/bathroom lighting operational, light cord in reach

## 2023-12-26 NOTE — ED ADULT NURSE NOTE - NSSEPSISSUSPECTED_ED_A_ED
D/w pt's daughter need for straight-cath to collect urine - no cath in clinic. Advised to ED to collect sample.  
No

## 2023-12-26 NOTE — ED PROVIDER NOTE - PATIENT PORTAL LINK FT
You can access the FollowMyHealth Patient Portal offered by [facility] by registering at the following website: [email or web address]. By joining [employer]’s FollowMyHealth portal, you will also be able to view your health information using other applications (apps) compatible with our system. You can access the FollowMyHealth Patient Portal offered by [facility] by registering at the following website: [email or web address]. By joining [employer]’s FollowMyHealth portal, you will also be able to view your health information using other applications (apps) compatible with our system.

## 2023-12-26 NOTE — ED PROVIDER NOTE - NSICDXPASTSURGICALHX_GEN_ALL_CORE_FT
PAST SURGICAL HISTORY:  S/P lumbar fusion in [place]     PAST SURGICAL HISTORY:  S/P lumbar fusion in [place]

## 2023-12-26 NOTE — ED ADULT NURSE NOTE - OBJECTIVE STATEMENT
63-year-old alert male PMH HTN, diabetes, anxiety, chronic back pain, HIV, presents ER for midsternal chest pain.  Symptoms started 3 days ago,  No associated shortness of breath.  Patient states he feels similar to prior symptoms that he had in the past.  He denies any fever, chills, cough, or palpitations.  Did not take anything prior to arrival for pain. EKG preformed at bedside, cardiac monitor SVR 81bpm.

## 2023-12-26 NOTE — ED PROVIDER NOTE - OBJECTIVE STATEMENT
63-year-old male PMH HTN, diabetes, anxiety, chronic back pain, HIV, presents emergency department for midsternal chest pain.  Symptoms started 3 days ago, no exacerbating symptoms.  No associated shortness of breath.  Patient states he feels similar to prior symptoms that he had in the past.  He denies any fever, chills, cough, or palpitations.  Did not take anything prior to arrival for pain.

## 2023-12-26 NOTE — ED PROVIDER NOTE - PHYSICAL EXAMINATION
GENERAL: A&Ox3, non-toxic appearing, no acute distress  HEENT: NCAT, EOMI, oral mucosa moist, normal conjunctiva  RESP: no respiratory distress, coarse b/l, speaking in full sentences  CV: RRR, no murmurs/rubs/gallops, no JVD, no peripheral edema  ABDOMEN: soft, non-tender, non-distended, no guarding  MSK: no visible deformities, no chest wall tenderness  NEURO: no focal sensory or motor deficits, CN 2-12 grossly intact  SKIN: warm, normal color, well perfused, no rash  PSYCH: normal affect

## 2023-12-26 NOTE — ED PROVIDER NOTE - CLINICAL SUMMARY MEDICAL DECISION MAKING FREE TEXT BOX
63-year-old male presenting with midsternal chest pain x 3 days, no associated symptoms.  EKG nonischemic.  Plan for labs, chest x-ray, pain control, reassess.

## 2023-12-26 NOTE — ED PROVIDER NOTE - PROGRESS NOTE DETAILS
CTA with mild chf, otherwise no acute findings. Will give Lasix dose in ED and discharge home. Not hypoxic, ambulatory w/o symptoms. Does not appear to have decompensated CHF. Patient has pmd he can follow up with. Seen by [facility] and has outpt resources. Patient stable for d/c. Labs reviewed, mild elevation in d-dimer, will obtain CTA. Trop normal, will repeat as well. Patient appears well, aware of plan.

## 2023-12-26 NOTE — ED ADULT TRIAGE NOTE - CHIEF COMPLAINT QUOTE
Pt presents to the ED c/o chest pain. Pt reports onset of chest pain was 3 days ago with SOB. Pt reports feeling lightheaded and weak. EKG in progress.

## 2023-12-26 NOTE — ED ADULT NURSE NOTE - NSICDXPASTSURGICALHX_GEN_ALL_CORE_FT
PAST SURGICAL HISTORY:  S/P lumbar fusion in [place]     PAST SURGICAL HISTORY:  S/P lumbar fusion in [place]

## 2023-12-26 NOTE — ED PROVIDER NOTE - NSFOLLOWUPINSTRUCTIONS_ED_ALL_ED_FT
Please follow up with your primary care doctor regarding potentially starting diuretic medications (water pills).     Chest Pain    Chest pain can be caused by many different conditions which may or may not be dangerous. Causes include heartburn, lung infections, heart attack, blood clot in lungs, skin infections, strain or damage to muscle, cartilage, or bones, etc. In addition to a history and physical examination, an electrocardiogram (ECG) or other lab tests may have been performed to determine the cause of your chest pain. Follow up with your primary care provider or with a cardiologist as instructed.     SEEK IMMEDIATE MEDICAL CARE IF YOU HAVE ANY OF THE FOLLOWING SYMPTOMS: worsening chest pain, coughing up blood, unexplained back/neck/jaw pain, severe abdominal pain, dizziness or lightheadedness, fainting, shortness of breath, sweaty or clammy skin, vomiting, or racing heart beat. These symptoms may represent a serious problem that is an emergency. Do not wait to see if the symptoms will go away. Get medical help right away. Call 911 and do not drive yourself to the hospital.

## 2023-12-26 NOTE — CHART NOTE - NSCHARTNOTEFT_GEN_A_CORE
SW consulted to assist with transportation and doctors appointments.     Pt is a 63-yr old male with a PMHX of HTN, diabetes, anxiety, chronic back pain, and HIV. Pt presented to  ED via ambulance c/o chest pain. SW met with pt at bedside to discuss consult and discharge options. Pt reported he is currently staying at [facility] [address], in [facility] program run by Dorothy Gooden. Pt's pharmacy is [address]  Pt reported his HCP/ex wife, Nelli [phone], and Dorothy Gooden are working together to coordinate medical care, transportation and housing.  left for Dorothy Gooden to confirm same. Pt reported he has no social needs at this time, but would like something for his cold. Pt reported he will either contact his spouse for transport home when cleared medically, or will take a taxi as he has money with for payment. Case discussed with MD and RN. SW consulted to assist with transportation and doctors appointments.     Pt is a 63-yr old male with a PMHX of HTN, diabetes, anxiety, chronic back pain, and HIV. Pt presented to  ED via ambulance c/o chest pain. SW met with pt at bedside to discuss consult and discharge options. Pt reported he is currently staying at [facility] [address], in [facility] program run by Dorothy Gooden. Pt's pharmacy is [address].  Pt reported his HCP/ex wife, Nelli [phone], and Dorothy Gooden are working together to coordinate medical care, transportation and housing.  left for Dorothy Gooden to confirm same. Pt reported he has no social needs at this time, but would like something for his cold. Pt reported he will either contact his spouse for transport home when cleared medically, or will take a taxi as he has money with for payment. Case discussed with MD and RN.

## 2024-01-14 ENCOUNTER — INPATIENT (INPATIENT)
Facility: HOSPITAL | Age: 64
LOS: 3 days | Discharge: HOME | DRG: 203 | End: 2024-01-18
Attending: STUDENT IN AN ORGANIZED HEALTH CARE EDUCATION/TRAINING PROGRAM | Admitting: INTERNAL MEDICINE
Payer: MEDICAID

## 2024-01-14 VITALS
HEART RATE: 90 BPM | RESPIRATION RATE: 20 BRPM | SYSTOLIC BLOOD PRESSURE: 174 MMHG | WEIGHT: 173.94 LBS | DIASTOLIC BLOOD PRESSURE: 97 MMHG | HEIGHT: 68 IN | OXYGEN SATURATION: 96 % | TEMPERATURE: 98 F

## 2024-01-14 DIAGNOSIS — Z98.1 ARTHRODESIS STATUS: Chronic | ICD-10-CM

## 2024-01-14 LAB
ALBUMIN SERPL ELPH-MCNC: 1.7 G/DL — LOW (ref 3.3–5)
ALP SERPL-CCNC: 57 U/L — SIGNIFICANT CHANGE UP (ref 40–120)
ALT FLD-CCNC: 24 U/L — SIGNIFICANT CHANGE UP (ref 12–78)
ANION GAP SERPL CALC-SCNC: 1 MMOL/L — LOW (ref 5–17)
AST SERPL-CCNC: 15 U/L — SIGNIFICANT CHANGE UP (ref 15–37)
BASE EXCESS BLDV CALC-SCNC: 3.9 MMOL/L — HIGH (ref -2–3)
BASE EXCESS BLDV CALC-SCNC: 6.2 MMOL/L — HIGH (ref -2–3)
BASOPHILS # BLD AUTO: 0.02 K/UL — SIGNIFICANT CHANGE UP (ref 0–0.2)
BASOPHILS NFR BLD AUTO: 0.3 % — SIGNIFICANT CHANGE UP (ref 0–2)
BILIRUB SERPL-MCNC: 0.2 MG/DL — SIGNIFICANT CHANGE UP (ref 0.2–1.2)
BUN SERPL-MCNC: 19 MG/DL — SIGNIFICANT CHANGE UP (ref 7–23)
CALCIUM SERPL-MCNC: 7.6 MG/DL — LOW (ref 8.5–10.1)
CHLORIDE SERPL-SCNC: 111 MMOL/L — HIGH (ref 96–108)
CO2 SERPL-SCNC: 32 MMOL/L — HIGH (ref 22–31)
CREAT SERPL-MCNC: 1.68 MG/DL — HIGH (ref 0.5–1.3)
D DIMER BLD IA.RAPID-MCNC: 472 NG/ML DDU — HIGH
EGFR: 46 ML/MIN/1.73M2 — LOW
EOSINOPHIL # BLD AUTO: 0.1 K/UL — SIGNIFICANT CHANGE UP (ref 0–0.5)
EOSINOPHIL NFR BLD AUTO: 1.5 % — SIGNIFICANT CHANGE UP (ref 0–6)
FLUAV AG NPH QL: SIGNIFICANT CHANGE UP
FLUBV AG NPH QL: SIGNIFICANT CHANGE UP
GAS PNL BLDV: SIGNIFICANT CHANGE UP
GLUCOSE SERPL-MCNC: 240 MG/DL — HIGH (ref 70–99)
HCO3 BLDV-SCNC: 30 MMOL/L — HIGH (ref 22–29)
HCO3 BLDV-SCNC: 33 MMOL/L — HIGH (ref 22–29)
HCT VFR BLD CALC: 30.4 % — LOW (ref 39–50)
HGB BLD-MCNC: 10.2 G/DL — LOW (ref 13–17)
IMM GRANULOCYTES NFR BLD AUTO: 0.9 % — SIGNIFICANT CHANGE UP (ref 0–0.9)
LACTATE SERPL-SCNC: 1.2 MMOL/L — SIGNIFICANT CHANGE UP (ref 0.7–2)
LIDOCAIN IGE QN: 29 U/L — SIGNIFICANT CHANGE UP (ref 13–75)
LYMPHOCYTES # BLD AUTO: 1.6 K/UL — SIGNIFICANT CHANGE UP (ref 1–3.3)
LYMPHOCYTES # BLD AUTO: 23.7 % — SIGNIFICANT CHANGE UP (ref 13–44)
MAGNESIUM SERPL-MCNC: 1.6 MG/DL — SIGNIFICANT CHANGE UP (ref 1.6–2.6)
MCHC RBC-ENTMCNC: 28.6 PG — SIGNIFICANT CHANGE UP (ref 27–34)
MCHC RBC-ENTMCNC: 33.6 GM/DL — SIGNIFICANT CHANGE UP (ref 32–36)
MCV RBC AUTO: 85.2 FL — SIGNIFICANT CHANGE UP (ref 80–100)
MONOCYTES # BLD AUTO: 0.56 K/UL — SIGNIFICANT CHANGE UP (ref 0–0.9)
MONOCYTES NFR BLD AUTO: 8.3 % — SIGNIFICANT CHANGE UP (ref 2–14)
NEUTROPHILS # BLD AUTO: 4.42 K/UL — SIGNIFICANT CHANGE UP (ref 1.8–7.4)
NEUTROPHILS NFR BLD AUTO: 65.3 % — SIGNIFICANT CHANGE UP (ref 43–77)
NT-PROBNP SERPL-SCNC: 1758 PG/ML — HIGH (ref 0–125)
PCO2 BLDV: 51 MMHG — SIGNIFICANT CHANGE UP (ref 42–55)
PCO2 BLDV: 54 MMHG — SIGNIFICANT CHANGE UP (ref 42–55)
PH BLDV: 7.38 — SIGNIFICANT CHANGE UP (ref 7.32–7.43)
PH BLDV: 7.39 — SIGNIFICANT CHANGE UP (ref 7.32–7.43)
PLATELET # BLD AUTO: 275 K/UL — SIGNIFICANT CHANGE UP (ref 150–400)
PO2 BLDV: 54 MMHG — HIGH (ref 25–45)
PO2 BLDV: 63 MMHG — HIGH (ref 25–45)
POTASSIUM SERPL-MCNC: 3.9 MMOL/L — SIGNIFICANT CHANGE UP (ref 3.5–5.3)
POTASSIUM SERPL-SCNC: 3.9 MMOL/L — SIGNIFICANT CHANGE UP (ref 3.5–5.3)
PROT SERPL-MCNC: 6.9 GM/DL — SIGNIFICANT CHANGE UP (ref 6–8.3)
RBC # BLD: 3.57 M/UL — LOW (ref 4.2–5.8)
RBC # FLD: 14.6 % — HIGH (ref 10.3–14.5)
RSV RNA NPH QL NAA+NON-PROBE: SIGNIFICANT CHANGE UP
SAO2 % BLDV: 84 % — SIGNIFICANT CHANGE UP (ref 67–88)
SAO2 % BLDV: 92 % — HIGH (ref 67–88)
SARS-COV-2 RNA SPEC QL NAA+PROBE: SIGNIFICANT CHANGE UP
SODIUM SERPL-SCNC: 144 MMOL/L — SIGNIFICANT CHANGE UP (ref 135–145)
TROPONIN I, HIGH SENSITIVITY RESULT: 27.13 NG/L — SIGNIFICANT CHANGE UP
WBC # BLD: 6.76 K/UL — SIGNIFICANT CHANGE UP (ref 3.8–10.5)
WBC # FLD AUTO: 6.76 K/UL — SIGNIFICANT CHANGE UP (ref 3.8–10.5)

## 2024-01-14 PROCEDURE — 93010 ELECTROCARDIOGRAM REPORT: CPT

## 2024-01-14 PROCEDURE — 99285 EMERGENCY DEPT VISIT HI MDM: CPT

## 2024-01-14 PROCEDURE — 71045 X-RAY EXAM CHEST 1 VIEW: CPT | Mod: 26

## 2024-01-14 RX ORDER — MORPHINE SULFATE 50 MG/1
2 CAPSULE, EXTENDED RELEASE ORAL ONCE
Refills: 0 | Status: DISCONTINUED | OUTPATIENT
Start: 2024-01-14 | End: 2024-01-14

## 2024-01-14 RX ORDER — LABETALOL HCL 100 MG
5 TABLET ORAL ONCE
Refills: 0 | Status: COMPLETED | OUTPATIENT
Start: 2024-01-14 | End: 2024-01-14

## 2024-01-14 RX ORDER — ALPRAZOLAM 0.25 MG
0.25 TABLET ORAL ONCE
Refills: 0 | Status: DISCONTINUED | OUTPATIENT
Start: 2024-01-14 | End: 2024-01-14

## 2024-01-14 RX ADMIN — MORPHINE SULFATE 2 MILLIGRAM(S): 50 CAPSULE, EXTENDED RELEASE ORAL at 22:56

## 2024-01-14 NOTE — ED PROVIDER NOTE - MUSCULOSKELETAL, MLM
Spine appears normal, range of motion is not limited, no muscle or joint tenderness. 5/5 strength on flexion and extension

## 2024-01-14 NOTE — ED ADULT TRIAGE NOTE - NS ED NURSE AMBULANCES
[facility]

## 2024-01-14 NOTE — ED PROVIDER NOTE - CLINICAL SUMMARY MEDICAL DECISION MAKING FREE TEXT BOX
Patient with bilateral pleural effusion, no evidence of PE, persistent substernal cp with radiation to the left arm, hx of DM, HTN, HLD, no cardiac work up and no outpatient follow up, high risk for ACS, Unstable angina, trop x 1 negative. Spoke with Dr. Bronson, hospitalist admission appreciated. OBS, MS c remote tele, updated patient on the results.

## 2024-01-14 NOTE — ED PROVIDER NOTE - NSICDXPASTSURGICALHX_GEN_ALL_CORE_FT
PAST SURGICAL HISTORY:  S/P lumbar fusion in [place]     PAST SURGICAL HISTORY:  S/P lumbar fusion in [place]     PAST SURGICAL HISTORY:  S/P lumbar fusion in [place]

## 2024-01-14 NOTE — ED PROVIDER NOTE - DIFFERENTIAL DIAGNOSIS
Differential Diagnosis chest pain, high risk for ACS, r/o ACS, CHF, PNA. Patient with bilateral pleural effusion, no evidence of PE, persistent substernal cp with radiation to the left arm, hx of DM, HTN, HLD, no cardiac work up and no outpatient follow up, high risk for ACS, Unstable angina, trop x 1 negative. Spoke with Dr. Bronson, hospitalist admission appreciated. OBS, MS c remote tele, updated patient on the results.

## 2024-01-14 NOTE — ED PROVIDER NOTE - PROGRESS NOTE DETAILS
Kaylin MCCLURE: Patient with bilateral pleural effusion, no evidence of PE, persistent substernal cp with radiation to the left arm, hx of DM, HTN, HLD, no cardiac work up and no outpatient follow up, high risk for ACS, Unstable angina, trop x 1 negative. Spoke with Dr. Bronson, hospitalist admission appreciated. OBS, MS c remote tele, updated patient on the results.

## 2024-01-14 NOTE — ED ADULT NURSE NOTE - NSICDXPASTSURGICALHX_GEN_ALL_CORE_FT
PAST SURGICAL HISTORY:  S/P lumbar fusion in [place]     PAST SURGICAL HISTORY:  S/P lumbar fusion in [place]     PAST SURGICAL HISTORY:  S/P lumbar fusion in [place]

## 2024-01-14 NOTE — ED ADULT TRIAGE NOTE - CHIEF COMPLAINT QUOTE
pt BIBEMS from home to the ED with c/o SOB, CP, COUGH, N/V, FEVERS x1 day. Pt has hx of asthma, HTN and DM (doesn't take any medication). Pt is A&OX4, GCS 15. EMS . Pt denies dizziness. STAT EKG in progress. NKDA.

## 2024-01-14 NOTE — ED ADULT NURSE NOTE - OBJECTIVE STATEMENT
Pt is 64 yo A&Ox Pt is 62 yo A&Ox3 BIBems c/o chest pain. Pt states he has had chest pain fo 3 days and "feels like he has a boulder on the chest." Pt endorses the pain radiates down to his legs and is exacerbated with exertion,. Pt denies SOB, fevers, chills, n/v/d. PMHx DM, HTN, HLD. Pt does not take daily meds. Pt is 64 yo A&Ox3 BIBems c/o chest pain. Pt states he has had chest pain fo 3 days and "feels like he has a boulder on the chest." Pt endorses the pain radiates down to his legs and is exacerbated with exertion,. Pt denies SOB, fevers, chills, n/v/d. PMHx DM, HTN, HLD. Pt does not take daily meds.

## 2024-01-14 NOTE — ED PROVIDER NOTE - OBJECTIVE STATEMENT
63 year old male with PMH of HTN, HLD, CAD, DM, no primary care, no cardiologist, presents with cc of having moderate to severe substernal chest pain, radiation to the left shoulder, exacerbated with exertion. Patient has no abdominal pain. Hx of asthma as well. Takes no meds at home due to lack of access to health care. No visual or focal neurological complaints. No melena or hematochezia. No dysuria hematuria or frequency. No recent trauma. Ambulatory. No saddle anesthesia. No incontinence of urine or stool. No difficulty ambulating. No saddle anesthesia.

## 2024-01-14 NOTE — ED ADULT TRIAGE NOTE - HISTORY OF COVID-19 VACCINATION
No How Severe Is It?: mild Is This A New Presentation, Or A Follow-Up?: Cysts Additional History: Pt has been using .05 % [place] in, would like to try stronger dose

## 2024-01-14 NOTE — ED ADULT NURSE NOTE - NSFALLUNIVINTERV_ED_ALL_ED
Bed/Stretcher in lowest position, wheels locked, appropriate side rails in place/Call bell, personal items and telephone in reach/Instruct patient to call for assistance before getting out of bed/chair/stretcher/Non-slip footwear applied when patient is off stretcher/[place] to call system/Physically safe environment - no spills, clutter or unnecessary equipment/Purposeful proactive rounding/Room/bathroom lighting operational, light cord in reach Bed/Stretcher in lowest position, wheels locked, appropriate side rails in place/Call bell, personal items and telephone in reach/Instruct patient to call for assistance before getting out of bed/chair/stretcher/Non-slip footwear applied when patient is off stretcher/[place] to call system/Physically safe environment - no spills, clutter or unnecessary equipment/Purposeful proactive rounding/Room/bathroom lighting operational, light cord in reach Bed/Stretcher in lowest position, wheels locked, appropriate side rails in place/Call bell, personal items and telephone in reach/Instruct patient to call for assistance before getting out of bed/chair/stretcher/Non-slip footwear applied when patient is off stretcher/[place] to call system/Physically safe environment - no spills, clutter or unnecessary equipment/Purposeful proactive rounding/Room/bathroom lighting operational, light cord in reach

## 2024-01-15 DIAGNOSIS — I50.9 HEART FAILURE, UNSPECIFIED: ICD-10-CM

## 2024-01-15 DIAGNOSIS — R07.9 CHEST PAIN, UNSPECIFIED: ICD-10-CM

## 2024-01-15 DIAGNOSIS — I20.0 UNSTABLE ANGINA: ICD-10-CM

## 2024-01-15 LAB
APPEARANCE UR: CLEAR — SIGNIFICANT CHANGE UP
BACTERIA # UR AUTO: NEGATIVE /HPF — SIGNIFICANT CHANGE UP
BILIRUB UR-MCNC: NEGATIVE — SIGNIFICANT CHANGE UP
CAST: 3 /LPF — SIGNIFICANT CHANGE UP (ref 0–4)
COLOR SPEC: YELLOW — SIGNIFICANT CHANGE UP
DIFF PNL FLD: ABNORMAL
GLUCOSE BLDC GLUCOMTR-MCNC: 187 MG/DL — HIGH (ref 70–99)
GLUCOSE BLDC GLUCOMTR-MCNC: 233 MG/DL — HIGH (ref 70–99)
GLUCOSE BLDC GLUCOMTR-MCNC: 239 MG/DL — HIGH (ref 70–99)
GLUCOSE BLDC GLUCOMTR-MCNC: 282 MG/DL — HIGH (ref 70–99)
GLUCOSE UR QL: 250 MG/DL
KETONES UR-MCNC: NEGATIVE MG/DL — SIGNIFICANT CHANGE UP
LEUKOCYTE ESTERASE UR-ACNC: NEGATIVE — SIGNIFICANT CHANGE UP
NITRITE UR-MCNC: NEGATIVE — SIGNIFICANT CHANGE UP
PH UR: 7 — SIGNIFICANT CHANGE UP (ref 5–8)
PROT UR-MCNC: >=1000 MG/DL
RBC CASTS # UR COMP ASSIST: 14 /HPF — HIGH (ref 0–4)
SP GR SPEC: >1.03 — HIGH (ref 1–1.03)
SQUAMOUS # UR AUTO: 1 /HPF — SIGNIFICANT CHANGE UP (ref 0–5)
T4 FREE+ TSH PNL SERPL: 2.43 UU/ML — SIGNIFICANT CHANGE UP (ref 0.34–4.82)
TROPONIN I, HIGH SENSITIVITY RESULT: 19.65 NG/L — SIGNIFICANT CHANGE UP
TROPONIN I, HIGH SENSITIVITY RESULT: 25.62 NG/L — SIGNIFICANT CHANGE UP
UROBILINOGEN FLD QL: 1 MG/DL — SIGNIFICANT CHANGE UP (ref 0.2–1)
WBC UR QL: 3 /HPF — SIGNIFICANT CHANGE UP (ref 0–5)

## 2024-01-15 PROCEDURE — 99223 1ST HOSP IP/OBS HIGH 75: CPT

## 2024-01-15 PROCEDURE — 84484 ASSAY OF TROPONIN QUANT: CPT

## 2024-01-15 PROCEDURE — 93005 ELECTROCARDIOGRAM TRACING: CPT

## 2024-01-15 PROCEDURE — 71275 CT ANGIOGRAPHY CHEST: CPT | Mod: 26,MD

## 2024-01-15 PROCEDURE — 84443 ASSAY THYROID STIM HORMONE: CPT

## 2024-01-15 PROCEDURE — 82962 GLUCOSE BLOOD TEST: CPT

## 2024-01-15 PROCEDURE — 76770 US EXAM ABDO BACK WALL COMP: CPT

## 2024-01-15 PROCEDURE — 86225 DNA ANTIBODY NATIVE: CPT

## 2024-01-15 PROCEDURE — 93306 TTE W/DOPPLER COMPLETE: CPT

## 2024-01-15 PROCEDURE — 84155 ASSAY OF PROTEIN SERUM: CPT

## 2024-01-15 PROCEDURE — 36415 COLL VENOUS BLD VENIPUNCTURE: CPT

## 2024-01-15 PROCEDURE — 86334 IMMUNOFIX E-PHORESIS SERUM: CPT

## 2024-01-15 PROCEDURE — 85027 COMPLETE CBC AUTOMATED: CPT

## 2024-01-15 PROCEDURE — 84165 PROTEIN E-PHORESIS SERUM: CPT

## 2024-01-15 PROCEDURE — 83036 HEMOGLOBIN GLYCOSYLATED A1C: CPT

## 2024-01-15 PROCEDURE — 86160 COMPLEMENT ANTIGEN: CPT

## 2024-01-15 PROCEDURE — 86036 ANCA SCREEN EACH ANTIBODY: CPT

## 2024-01-15 PROCEDURE — 80048 BASIC METABOLIC PNL TOTAL CA: CPT

## 2024-01-15 RX ORDER — LANOLIN ALCOHOL/MO/W.PET/CERES
3 CREAM (GRAM) TOPICAL AT BEDTIME
Refills: 0 | Status: DISCONTINUED | OUTPATIENT
Start: 2024-01-15 | End: 2024-01-18

## 2024-01-15 RX ORDER — ONDANSETRON 8 MG/1
4 TABLET, FILM COATED ORAL EVERY 8 HOURS
Refills: 0 | Status: DISCONTINUED | OUTPATIENT
Start: 2024-01-15 | End: 2024-01-18

## 2024-01-15 RX ORDER — ASPIRIN/CALCIUM CARB/MAGNESIUM 324 MG
81 TABLET ORAL DAILY
Refills: 0 | Status: DISCONTINUED | OUTPATIENT
Start: 2024-01-15 | End: 2024-01-18

## 2024-01-15 RX ORDER — INSULIN LISPRO 100/ML
VIAL (ML) SUBCUTANEOUS
Refills: 0 | Status: DISCONTINUED | OUTPATIENT
Start: 2024-01-15 | End: 2024-01-18

## 2024-01-15 RX ORDER — DEXTROSE 50 % IN WATER 50 %
25 SYRINGE (ML) INTRAVENOUS ONCE
Refills: 0 | Status: DISCONTINUED | OUTPATIENT
Start: 2024-01-15 | End: 2024-01-18

## 2024-01-15 RX ORDER — MORPHINE SULFATE 50 MG/1
2 CAPSULE, EXTENDED RELEASE ORAL ONCE
Refills: 0 | Status: DISCONTINUED | OUTPATIENT
Start: 2024-01-15 | End: 2024-01-15

## 2024-01-15 RX ORDER — DEXTROSE 50 % IN WATER 50 %
15 SYRINGE (ML) INTRAVENOUS ONCE
Refills: 0 | Status: DISCONTINUED | OUTPATIENT
Start: 2024-01-15 | End: 2024-01-18

## 2024-01-15 RX ORDER — ACETAMINOPHEN 500 MG
650 TABLET ORAL EVERY 6 HOURS
Refills: 0 | Status: DISCONTINUED | OUTPATIENT
Start: 2024-01-15 | End: 2024-01-18

## 2024-01-15 RX ORDER — DEXTROSE 50 % IN WATER 50 %
12.5 SYRINGE (ML) INTRAVENOUS ONCE
Refills: 0 | Status: DISCONTINUED | OUTPATIENT
Start: 2024-01-15 | End: 2024-01-18

## 2024-01-15 RX ORDER — SODIUM CHLORIDE 9 MG/ML
1000 INJECTION, SOLUTION INTRAVENOUS
Refills: 0 | Status: DISCONTINUED | OUTPATIENT
Start: 2024-01-15 | End: 2024-01-18

## 2024-01-15 RX ORDER — GLUCAGON INJECTION, SOLUTION 0.5 MG/.1ML
1 INJECTION, SOLUTION SUBCUTANEOUS ONCE
Refills: 0 | Status: DISCONTINUED | OUTPATIENT
Start: 2024-01-15 | End: 2024-01-18

## 2024-01-15 RX ORDER — ASPIRIN/CALCIUM CARB/MAGNESIUM 324 MG
81 TABLET ORAL ONCE
Refills: 0 | Status: COMPLETED | OUTPATIENT
Start: 2024-01-15 | End: 2024-01-15

## 2024-01-15 RX ORDER — ATORVASTATIN CALCIUM 80 MG/1
40 TABLET, FILM COATED ORAL AT BEDTIME
Refills: 0 | Status: DISCONTINUED | OUTPATIENT
Start: 2024-01-15 | End: 2024-01-18

## 2024-01-15 RX ORDER — ENOXAPARIN SODIUM 100 MG/ML
40 INJECTION SUBCUTANEOUS EVERY 24 HOURS
Refills: 0 | Status: DISCONTINUED | OUTPATIENT
Start: 2024-01-15 | End: 2024-01-18

## 2024-01-15 RX ORDER — FUROSEMIDE 40 MG
40 TABLET ORAL
Refills: 0 | Status: DISCONTINUED | OUTPATIENT
Start: 2024-01-15 | End: 2024-01-17

## 2024-01-15 RX ADMIN — Medication 3: at 17:43

## 2024-01-15 RX ADMIN — ENOXAPARIN SODIUM 40 MILLIGRAM(S): 100 INJECTION SUBCUTANEOUS at 11:58

## 2024-01-15 RX ADMIN — ATORVASTATIN CALCIUM 40 MILLIGRAM(S): 80 TABLET, FILM COATED ORAL at 22:26

## 2024-01-15 RX ADMIN — Medication 40 MILLIGRAM(S): at 15:12

## 2024-01-15 RX ADMIN — Medication 2: at 11:13

## 2024-01-15 RX ADMIN — Medication 5 MILLIGRAM(S): at 00:15

## 2024-01-15 RX ADMIN — Medication 650 MILLIGRAM(S): at 19:56

## 2024-01-15 RX ADMIN — MORPHINE SULFATE 2 MILLIGRAM(S): 50 CAPSULE, EXTENDED RELEASE ORAL at 22:25

## 2024-01-15 RX ADMIN — Medication 650 MILLIGRAM(S): at 11:58

## 2024-01-15 RX ADMIN — Medication 3 MILLIGRAM(S): at 22:25

## 2024-01-15 RX ADMIN — Medication 0.25 MILLIGRAM(S): at 00:14

## 2024-01-15 RX ADMIN — Medication 81 MILLIGRAM(S): at 02:55

## 2024-01-15 NOTE — H&P ADULT - ASSESSMENT
* CP, cough, SOB= seems to be HF with bilateral pl effusions  start Lasix  TTE  on no meds  had a previous abnormal stress test  no thora for now waiting to see response to IV diuretic, pt is not hypoxic reeval in am    * T2Dm  not on any meds  start sliding scale    * h/o HIV unknown viral load on no meds

## 2024-01-15 NOTE — CONSULT NOTE ADULT - PROBLEM SELECTOR RECOMMENDATION 9
Pt reporting with 1 month of SOB. proBNP elevated 1758, CT without PE but with pulm edema, effusions   will f/u echo   started on lasix 40mg IV BID  TSH pending

## 2024-01-15 NOTE — H&P ADULT - NSHPPHYSICALEXAM_GEN_ALL_CORE
Vital Signs Last 24 Hrs  T(C): 36.8 (15 Marcello 2024 08:55), Max: 36.8 (14 Jan 2024 22:19)  T(F): 98.2 (15 Marcello 2024 08:55), Max: 98.3 (14 Jan 2024 22:19)  HR: 84 (15 Marcello 2024 08:55) (77 - 90)  BP: 150/90 (15 Marcello 2024 08:55) (144/73 - 174/97)  BP(mean): 110 (15 Marcello 2024 08:55) (100 - 113)  RR: 18 (15 Marcello 2024 08:55) (18 - 20)  SpO2: 100% (15 Marcello 2024 08:55) (96% - 100%)    Parameters below as of 15 Marcello 2024 08:55  Patient On (Oxygen Delivery Method): nasal cannula  O2 Flow (L/min): 2      PHYSICAL EXAM:      Constitutional: NAD  Eyes: perrl, no conjunctival changes  ENMT: no exudates, moist oral muc, uvula midline  Neck: no JVD, no LAD  Back: no cva tenderness  Respiratory: CTA, no exp wheezes decreased BS bibasilar   Right gynecomastia   Cardiovascular: S1S2 reg, no murmur gallop or rub  Gastrointestinal: abd soft, NT/ND + BS  Genitourinary: voiding  Extremities: FROM, no joint effusions, no edema, no clubbing , no cyanosis  Vascular: pedal pulses + bilateral, warm extremities  Neurological: non focal, mot str 5/5/ all extr  Skin: no rashes  Lymph Nodes: no LAD

## 2024-01-15 NOTE — H&P ADULT - HISTORY OF PRESENT ILLNESS
Offered and patient accepted Mr. Edwards is a 64 yo M with a PMH significant for HTN, T2DM  HIV+ , no primary care ( suspect non compliance pt tells me he has Medicaid)  no cardiologist, presents with cc of having moderate to severe substernal chest pain, radiation to the left shoulder, exacerbated with exertion. Patient has no abdominal pain. Hx of asthma as well. Takes no meds at home due to lack of access to health care. Has evidence of Pulm. edema, and bilat pl effusions adm for chf exacerbation  Mr. Edwards is a 62 yo M with a PMH significant for HTN, T2DM  HIV+ , no primary care ( suspect non compliance pt tells me he has Medicaid)  no cardiologist, presents with cc of having moderate to severe substernal chest pain, radiation to the left shoulder, exacerbated with exertion. Patient has no abdominal pain. Hx of asthma as well. Takes no meds at home due to lack of access to health care. Has evidence of Pulm. edema, and bilat pl effusions adm for chf exacerbation

## 2024-01-15 NOTE — H&P ADULT - NSICDXPASTSURGICALHX_GEN_ALL_CORE_FT
PAST SURGICAL HISTORY:  S/P lumbar fusion in [place]     PAST SURGICAL HISTORY:  S/P lumbar fusion in [place]     PAST SURGICAL HISTORY:  S/P lumbar fusion in [place]

## 2024-01-15 NOTE — PATIENT PROFILE ADULT - FALL HARM RISK - HARM RISK INTERVENTIONS
Assistance with ambulation/Assistance OOB with selected safe patient handling equipment/Communicate Risk of Fall with Harm to all staff/Discuss with provider need for PT consult/Monitor gait and stability/Provide patient with walking aids - walker, cane, crutches/Reinforce activity limits and safety measures with patient and family/Tailored Fall Risk Interventions/Visual Cue: Yellow wristband and red socks/Bed in lowest position, wheels locked, appropriate side rails in place/Call bell, personal items and telephone in reach/Instruct patient to call for assistance before getting out of bed or chair/Non-slip footwear when patient is out of bed/[place] to call system/Physically safe environment - no spills, clutter or unnecessary equipment/Purposeful Proactive Rounding/Room/bathroom lighting operational, light cord in reach Assistance with ambulation/Assistance OOB with selected safe patient handling equipment/Communicate Risk of Fall with Harm to all staff/Discuss with provider need for PT consult/Monitor gait and stability/Provide patient with walking aids - walker, cane, crutches/Reinforce activity limits and safety measures with patient and family/Tailored Fall Risk Interventions/Visual Cue: Yellow wristband and red socks/Bed in lowest position, wheels locked, appropriate side rails in place/Call bell, personal items and telephone in reach/Instruct patient to call for assistance before getting out of bed or chair/Non-slip footwear when patient is out of bed/[place] to call system/Physically safe environment - no spills, clutter or unnecessary equipment/Purposeful Proactive Rounding/Room/bathroom lighting operational, light cord in reach Assistance with ambulation/Assistance OOB with selected safe patient handling equipment/Communicate Risk of Fall with Harm to all staff/Discuss with provider need for PT consult/Monitor gait and stability/Provide patient with walking aids - walker, cane, crutches/Reinforce activity limits and safety measures with patient and family/Tailored Fall Risk Interventions/Visual Cue: Yellow wristband and red socks/Bed in lowest position, wheels locked, appropriate side rails in place/Call bell, personal items and telephone in reach/Instruct patient to call for assistance before getting out of bed or chair/Non-slip footwear when patient is out of bed/[place] to call system/Physically safe environment - no spills, clutter or unnecessary equipment/Purposeful Proactive Rounding/Room/bathroom lighting operational, light cord in reach

## 2024-01-15 NOTE — CONSULT NOTE ADULT - PROBLEM SELECTOR RECOMMENDATION 2
pt presents with 1 day of constant pain. atypical for CAD/ACS with trop neg x2  will f/u echo   recent nuclear stress test in 10/2023 shows no ischemia, small fixed apical defect   CT calcium score minimal at 3; <25% percentile   cont daily aspirin, statin

## 2024-01-15 NOTE — CHART NOTE - NSCHARTNOTEFT_GEN_A_CORE
Patient laying in bed. Patient reports chest pain, squeezing sensation. Patient states that tylenol did not work.     Plan  Chest pain   - EKG  - Troponin  - Morphine one time

## 2024-01-15 NOTE — H&P ADULT - NSHPLABSRESULTS_GEN_ALL_CORE
10.2   6.76  )-----------( 275      ( 14 Jan 2024 22:30 )             30.4   01-14    144  |  111<H>  |  19  ----------------------------<  240<H>  3.9   |  32<H>  |  1.68<H>    Ca    7.6<L>      14 Jan 2024 22:30  Mg     1.6     01-14    TPro  6.9  /  Alb  1.7<L>  /  TBili  0.2  /  DBili  x   /  AST  15  /  ALT  24  /  AlkPhos  57  01-14

## 2024-01-16 LAB
A1C WITH ESTIMATED AVERAGE GLUCOSE RESULT: 9.3 % — HIGH (ref 4–5.6)
ANION GAP SERPL CALC-SCNC: 6 MMOL/L — SIGNIFICANT CHANGE UP (ref 5–17)
BUN SERPL-MCNC: 24 MG/DL — HIGH (ref 7–23)
CALCIUM SERPL-MCNC: 8.6 MG/DL — SIGNIFICANT CHANGE UP (ref 8.5–10.1)
CHLORIDE SERPL-SCNC: 108 MMOL/L — SIGNIFICANT CHANGE UP (ref 96–108)
CO2 SERPL-SCNC: 29 MMOL/L — SIGNIFICANT CHANGE UP (ref 22–31)
CREAT SERPL-MCNC: 1.71 MG/DL — HIGH (ref 0.5–1.3)
EGFR: 45 ML/MIN/1.73M2 — LOW
ESTIMATED AVERAGE GLUCOSE: 220 MG/DL — HIGH (ref 68–114)
GLUCOSE BLDC GLUCOMTR-MCNC: 165 MG/DL — HIGH (ref 70–99)
GLUCOSE BLDC GLUCOMTR-MCNC: 205 MG/DL — HIGH (ref 70–99)
GLUCOSE BLDC GLUCOMTR-MCNC: 220 MG/DL — HIGH (ref 70–99)
GLUCOSE BLDC GLUCOMTR-MCNC: 249 MG/DL — HIGH (ref 70–99)
GLUCOSE SERPL-MCNC: 204 MG/DL — HIGH (ref 70–99)
POTASSIUM SERPL-MCNC: 3.6 MMOL/L — SIGNIFICANT CHANGE UP (ref 3.5–5.3)
POTASSIUM SERPL-SCNC: 3.6 MMOL/L — SIGNIFICANT CHANGE UP (ref 3.5–5.3)
SODIUM SERPL-SCNC: 143 MMOL/L — SIGNIFICANT CHANGE UP (ref 135–145)
TROPONIN I, HIGH SENSITIVITY RESULT: 17.05 NG/L — SIGNIFICANT CHANGE UP

## 2024-01-16 PROCEDURE — 99233 SBSQ HOSP IP/OBS HIGH 50: CPT

## 2024-01-16 PROCEDURE — 93010 ELECTROCARDIOGRAM REPORT: CPT | Mod: 76

## 2024-01-16 RX ORDER — MORPHINE SULFATE 50 MG/1
2 CAPSULE, EXTENDED RELEASE ORAL ONCE
Refills: 0 | Status: DISCONTINUED | OUTPATIENT
Start: 2024-01-16 | End: 2024-01-16

## 2024-01-16 RX ORDER — INSULIN GLARGINE 100 [IU]/ML
12 INJECTION, SOLUTION SUBCUTANEOUS AT BEDTIME
Refills: 0 | Status: DISCONTINUED | OUTPATIENT
Start: 2024-01-16 | End: 2024-01-18

## 2024-01-16 RX ORDER — INSULIN LISPRO 100/ML
4 VIAL (ML) SUBCUTANEOUS
Refills: 0 | Status: DISCONTINUED | OUTPATIENT
Start: 2024-01-16 | End: 2024-01-18

## 2024-01-16 RX ORDER — NITROGLYCERIN 6.5 MG
1 CAPSULE, EXTENDED RELEASE ORAL DAILY
Refills: 0 | Status: DISCONTINUED | OUTPATIENT
Start: 2024-01-16 | End: 2024-01-18

## 2024-01-16 RX ADMIN — Medication 40 MILLIGRAM(S): at 14:26

## 2024-01-16 RX ADMIN — Medication 40 MILLIGRAM(S): at 05:50

## 2024-01-16 RX ADMIN — ENOXAPARIN SODIUM 40 MILLIGRAM(S): 100 INJECTION SUBCUTANEOUS at 12:30

## 2024-01-16 RX ADMIN — Medication 2: at 09:02

## 2024-01-16 RX ADMIN — Medication 2: at 12:30

## 2024-01-16 RX ADMIN — Medication 2: at 17:58

## 2024-01-16 RX ADMIN — Medication 4 UNIT(S): at 17:59

## 2024-01-16 RX ADMIN — Medication 81 MILLIGRAM(S): at 10:22

## 2024-01-16 RX ADMIN — MORPHINE SULFATE 2 MILLIGRAM(S): 50 CAPSULE, EXTENDED RELEASE ORAL at 21:51

## 2024-01-16 RX ADMIN — ATORVASTATIN CALCIUM 40 MILLIGRAM(S): 80 TABLET, FILM COATED ORAL at 21:51

## 2024-01-16 RX ADMIN — Medication 1 INCH(S): at 09:03

## 2024-01-16 NOTE — PROGRESS NOTE ADULT - SUBJECTIVE AND OBJECTIVE BOX
CHIEF COMPLAINT: chest pain, SOB       HPI:  Mr. Edwards is a 62 yo male with a hx hypertension, DM type II, HIV+ and nonadherent with any medications over the past 6 months presenting with chest pain and SOB. He reports one month of worsening exertional dyspnea. Chest pain started yesterday and has been constant. Also reports dry cough. Denies fevers/chills, LE edema.     He has no cardiac hx. He was recently evaluated by [facility] cardiology [place] group for chest pain in 10/'2023.   Nuclear stress test at the time showed  small, fixed apical wall defect without evidence of ischemia.     In the ED, he was afebrile, HR 90, hypertensive to 174/97, saturating 96%  Labs notable for trop neg x2, hgb 10.2, Ddimer 472, Cr 1.68, proBNP 1758, RVP and COVID negative.   CTA Negative for pulmonary embolus, Bilateral pleural effusions, Pulmonary edema.    Cardiology consulted for HF     1/16/24: Pt continues to report constant chest pain.  + SOB, +dry NP cough.  Tele: RSR      PAST MEDICAL / SURGICAL HISTORY:  PAST MEDICAL & SURGICAL HISTORY:  Hypertension      Diabetes      Head trauma      Insomnia      Anxiety      Chronic back pain      DM (diabetes mellitus)      HIV infection      Bilateral cataracts      S/P lumbar fusion  in [place]          SOCIAL HISTORY:   Alcohol: Denied  Smoking: Nonsmoker  Drug Use: Denied  Marital Status:     FAMILY HISTORY: FAMILY HISTORY:  Family history of coronary artery disease in mother (Mother)    Family history of diabetes mellitus (DM) (Mother)    FH: alcoholism (Father)      MEDICATIONS  (STANDING):  aspirin  chewable 81 milliGRAM(s) Oral daily  atorvastatin 40 milliGRAM(s) Oral at bedtime  dextrose 5%. 1000 milliLiter(s) (100 mL/Hr) IV Continuous <Continuous>  dextrose 5%. 1000 milliLiter(s) (50 mL/Hr) IV Continuous <Continuous>  dextrose 50% Injectable 25 Gram(s) IV Push once  dextrose 50% Injectable 25 Gram(s) IV Push once  dextrose 50% Injectable 12.5 Gram(s) IV Push once  enoxaparin Injectable 40 milliGRAM(s) SubCutaneous every 24 hours  furosemide   Injectable 40 milliGRAM(s) IV Push two times a day  glucagon  Injectable 1 milliGRAM(s) IntraMuscular once  insulin lispro (ADMELOG) corrective regimen sliding scale   SubCutaneous three times a day before meals  nitroglycerin    2% Ointment 1 Inch(s) Transdermal daily    MEDICATIONS  (PRN):  acetaminophen     Tablet .. 650 milliGRAM(s) Oral every 6 hours PRN Temp greater or equal to 38C (100.4F), Mild Pain (1 - 3)  aluminum hydroxide/magnesium hydroxide/simethicone Suspension 30 milliLiter(s) Oral every 4 hours PRN Dyspepsia  dextrose Oral Gel 15 Gram(s) Oral once PRN Blood Glucose LESS THAN 70 milliGRAM(s)/deciliter  melatonin 3 milliGRAM(s) Oral at bedtime PRN Insomnia  ondansetron Injectable 4 milliGRAM(s) IV Push every 8 hours PRN Nausea and/or Vomiting        Allergies    No Known Allergies    Intolerances    Vital Signs Last 24 Hrs  T(C): 36.7 (16 Jan 2024 08:46), Max: 36.8 (16 Jan 2024 06:12)  T(F): 98.1 (16 Jan 2024 08:46), Max: 98.2 (16 Jan 2024 06:12)  HR: 85 (16 Jan 2024 08:46) (78 - 95)  BP: 148/72 (16 Jan 2024 08:46) (146/78 - 161/84)  BP(mean): 98 (15 Marcello 2024 14:14) (98 - 100)  RR: 18 (16 Jan 2024 08:46) (18 - 24)  SpO2: 93% (16 Jan 2024 08:46) (90% - 100%)    Parameters below as of 16 Jan 2024 08:46  Patient On (Oxygen Delivery Method): nasal cannula  O2 Flow (L/min): 2        I&O's Summary      PHYSICAL EXAM:  Constitutional: NAD, awake and alert  HEENT:  EOMI,  Pupils round, No oral cyanosis.  Pulmonary: Non-labored, mild bibasilar crackles, No wheezing, rales or rhonchi  Cardiovascular: S1 and S2, regular rate and rhythm, no Murmurs, gallops or rubs  Gastrointestinal: Bowel Sounds present, soft, nontender.   Lymph: No peripheral edema. No cervical lymphadenopathy.  Neurological: Alert, no focal deficits  Skin: No rashes.  Psych:  Mood & affect appropriate    LABS:                          10.2   6.76  )-----------( 275      ( 14 Jan 2024 22:30 )             30.4     01-16    143  |  108  |  24<H>  ----------------------------<  204<H>  3.6   |  29  |  1.71<H>    Ca    8.6      16 Jan 2024 07:44  Mg     1.6     01-14    TPro  6.9  /  Alb  1.7<L>  /  TBili  0.2  /  DBili  x   /  AST  15  /  ALT  24  /  AlkPhos  57  01-14      14 Jan 2024 22:30    144    |  111    |  19     ----------------------------<  240    3.9     |  32     |  1.68     Ca    7.6        14 Jan 2024 22:30  Mg     1.6       14 Jan 2024 22:30    TPro  6.9    /  Alb  1.7    /  TBili  0.2    /  DBili  x      /  AST  15     /  ALT  24     /  AlkPhos  57     14 Jan 2024 22:30      < from: Nuclear Stress Test-Pharmacologic (10.10.23 @ 06:30) >  IMPRESSIONS:  * Myocardial Perfusion SPECT results are mildly abnormal.  * There is a small, mild defect in apical wall that is  fixed, defect persists on prone imaging suggestive of  infarct, no clear evidence of ischemia, however overall  study quality is poor hence may reduce overall diganostic  accuracy, consider alternative ischemic imaging modality  if high index of clinical suspicion for ischemic heart  disaese.  * Post-stress resting myocardial perfusiongated SPECT  imaging was performed (LVEF = 47 %;LVEDV = 112 ml.), mild  global hypokinesis.    ------------------------------------------------------------------------        < end of copied text >             CHIEF COMPLAINT: chest pain, SOB       HPI:  Mr. Edwards is a 64 yo male with a hx hypertension, DM type II, HIV+ and nonadherent with any medications over the past 6 months presenting with chest pain and SOB. He reports one month of worsening exertional dyspnea. Chest pain started yesterday and has been constant. Also reports dry cough. Denies fevers/chills, LE edema.     He has no cardiac hx. He was recently evaluated by [facility] cardiology [place] group for chest pain in 10/'2023.   Nuclear stress test at the time showed  small, fixed apical wall defect without evidence of ischemia.     In the ED, he was afebrile, HR 90, hypertensive to 174/97, saturating 96%  Labs notable for trop neg x2, hgb 10.2, Ddimer 472, Cr 1.68, proBNP 1758, RVP and COVID negative.   CTA Negative for pulmonary embolus, Bilateral pleural effusions, Pulmonary edema.    Cardiology consulted for HF     1/16/24: Pt continues to report constant chest pain.  + SOB, +dry NP cough.  Tele: RSR      PAST MEDICAL / SURGICAL HISTORY:  PAST MEDICAL & SURGICAL HISTORY:  Hypertension      Diabetes      Head trauma      Insomnia      Anxiety      Chronic back pain      DM (diabetes mellitus)      HIV infection      Bilateral cataracts      S/P lumbar fusion  in [place]          SOCIAL HISTORY:   Alcohol: Denied  Smoking: Nonsmoker  Drug Use: Denied  Marital Status:     FAMILY HISTORY: FAMILY HISTORY:  Family history of coronary artery disease in mother (Mother)    Family history of diabetes mellitus (DM) (Mother)    FH: alcoholism (Father)      MEDICATIONS  (STANDING):  aspirin  chewable 81 milliGRAM(s) Oral daily  atorvastatin 40 milliGRAM(s) Oral at bedtime  dextrose 5%. 1000 milliLiter(s) (100 mL/Hr) IV Continuous <Continuous>  dextrose 5%. 1000 milliLiter(s) (50 mL/Hr) IV Continuous <Continuous>  dextrose 50% Injectable 25 Gram(s) IV Push once  dextrose 50% Injectable 25 Gram(s) IV Push once  dextrose 50% Injectable 12.5 Gram(s) IV Push once  enoxaparin Injectable 40 milliGRAM(s) SubCutaneous every 24 hours  furosemide   Injectable 40 milliGRAM(s) IV Push two times a day  glucagon  Injectable 1 milliGRAM(s) IntraMuscular once  insulin lispro (ADMELOG) corrective regimen sliding scale   SubCutaneous three times a day before meals  nitroglycerin    2% Ointment 1 Inch(s) Transdermal daily    MEDICATIONS  (PRN):  acetaminophen     Tablet .. 650 milliGRAM(s) Oral every 6 hours PRN Temp greater or equal to 38C (100.4F), Mild Pain (1 - 3)  aluminum hydroxide/magnesium hydroxide/simethicone Suspension 30 milliLiter(s) Oral every 4 hours PRN Dyspepsia  dextrose Oral Gel 15 Gram(s) Oral once PRN Blood Glucose LESS THAN 70 milliGRAM(s)/deciliter  melatonin 3 milliGRAM(s) Oral at bedtime PRN Insomnia  ondansetron Injectable 4 milliGRAM(s) IV Push every 8 hours PRN Nausea and/or Vomiting        Allergies    No Known Allergies    Intolerances    Vital Signs Last 24 Hrs  T(C): 36.7 (16 Jan 2024 08:46), Max: 36.8 (16 Jan 2024 06:12)  T(F): 98.1 (16 Jan 2024 08:46), Max: 98.2 (16 Jan 2024 06:12)  HR: 85 (16 Jan 2024 08:46) (78 - 95)  BP: 148/72 (16 Jan 2024 08:46) (146/78 - 161/84)  BP(mean): 98 (15 Marcello 2024 14:14) (98 - 100)  RR: 18 (16 Jan 2024 08:46) (18 - 24)  SpO2: 93% (16 Jan 2024 08:46) (90% - 100%)    Parameters below as of 16 Jan 2024 08:46  Patient On (Oxygen Delivery Method): nasal cannula  O2 Flow (L/min): 2        I&O's Summary      PHYSICAL EXAM:  Constitutional: NAD, awake and alert  HEENT:  EOMI,  Pupils round, No oral cyanosis.  Pulmonary: Non-labored, mild bibasilar crackles, No wheezing, rales or rhonchi  Cardiovascular: S1 and S2, regular rate and rhythm, no Murmurs, gallops or rubs  Gastrointestinal: Bowel Sounds present, soft, nontender.   Lymph: No peripheral edema. No cervical lymphadenopathy.  Neurological: Alert, no focal deficits  Skin: No rashes.  Psych:  Mood & affect appropriate    LABS:                          10.2   6.76  )-----------( 275      ( 14 Jan 2024 22:30 )             30.4     01-16    143  |  108  |  24<H>  ----------------------------<  204<H>  3.6   |  29  |  1.71<H>    Ca    8.6      16 Jan 2024 07:44  Mg     1.6     01-14    TPro  6.9  /  Alb  1.7<L>  /  TBili  0.2  /  DBili  x   /  AST  15  /  ALT  24  /  AlkPhos  57  01-14      14 Jan 2024 22:30    144    |  111    |  19     ----------------------------<  240    3.9     |  32     |  1.68     Ca    7.6        14 Jan 2024 22:30  Mg     1.6       14 Jan 2024 22:30    TPro  6.9    /  Alb  1.7    /  TBili  0.2    /  DBili  x      /  AST  15     /  ALT  24     /  AlkPhos  57     14 Jan 2024 22:30      < from: Nuclear Stress Test-Pharmacologic (10.10.23 @ 06:30) >  IMPRESSIONS:  * Myocardial Perfusion SPECT results are mildly abnormal.  * There is a small, mild defect in apical wall that is  fixed, defect persists on prone imaging suggestive of  infarct, no clear evidence of ischemia, however overall  study quality is poor hence may reduce overall diganostic  accuracy, consider alternative ischemic imaging modality  if high index of clinical suspicion for ischemic heart  disaese.  * Post-stress resting myocardial perfusiongated SPECT  imaging was performed (LVEF = 47 %;LVEDV = 112 ml.), mild  global hypokinesis.    ------------------------------------------------------------------------        < end of copied text >             CHIEF COMPLAINT: chest pain, SOB       HPI:  Mr. Edwards is a 62 yo male with a hx hypertension, DM type II, HIV+ and nonadherent with any medications over the past 6 months presenting with chest pain and SOB. He reports one month of worsening exertional dyspnea. Chest pain started yesterday and has been constant. Also reports dry cough. Denies fevers/chills, LE edema.     He has no cardiac hx. He was recently evaluated by [facility] cardiology [place] group for chest pain in 10/'2023.   Nuclear stress test at the time showed  small, fixed apical wall defect without evidence of ischemia.     In the ED, he was afebrile, HR 90, hypertensive to 174/97, saturating 96%  Labs notable for trop neg x2, hgb 10.2, Ddimer 472, Cr 1.68, proBNP 1758, RVP and COVID negative.   CTA Negative for pulmonary embolus, Bilateral pleural effusions, Pulmonary edema.    Cardiology consulted for HF     1/16/24: Pt continues to report constant chest pain.  + SOB, +dry NP cough.  Tele: RSR      PAST MEDICAL / SURGICAL HISTORY:  PAST MEDICAL & SURGICAL HISTORY:  Hypertension      Diabetes      Head trauma      Insomnia      Anxiety      Chronic back pain      DM (diabetes mellitus)      HIV infection      Bilateral cataracts      S/P lumbar fusion  in [place]          SOCIAL HISTORY:   Alcohol: Denied  Smoking: Nonsmoker  Drug Use: Denied  Marital Status:     FAMILY HISTORY: FAMILY HISTORY:  Family history of coronary artery disease in mother (Mother)    Family history of diabetes mellitus (DM) (Mother)    FH: alcoholism (Father)      MEDICATIONS  (STANDING):  aspirin  chewable 81 milliGRAM(s) Oral daily  atorvastatin 40 milliGRAM(s) Oral at bedtime  dextrose 5%. 1000 milliLiter(s) (100 mL/Hr) IV Continuous <Continuous>  dextrose 5%. 1000 milliLiter(s) (50 mL/Hr) IV Continuous <Continuous>  dextrose 50% Injectable 25 Gram(s) IV Push once  dextrose 50% Injectable 25 Gram(s) IV Push once  dextrose 50% Injectable 12.5 Gram(s) IV Push once  enoxaparin Injectable 40 milliGRAM(s) SubCutaneous every 24 hours  furosemide   Injectable 40 milliGRAM(s) IV Push two times a day  glucagon  Injectable 1 milliGRAM(s) IntraMuscular once  insulin lispro (ADMELOG) corrective regimen sliding scale   SubCutaneous three times a day before meals  nitroglycerin    2% Ointment 1 Inch(s) Transdermal daily    MEDICATIONS  (PRN):  acetaminophen     Tablet .. 650 milliGRAM(s) Oral every 6 hours PRN Temp greater or equal to 38C (100.4F), Mild Pain (1 - 3)  aluminum hydroxide/magnesium hydroxide/simethicone Suspension 30 milliLiter(s) Oral every 4 hours PRN Dyspepsia  dextrose Oral Gel 15 Gram(s) Oral once PRN Blood Glucose LESS THAN 70 milliGRAM(s)/deciliter  melatonin 3 milliGRAM(s) Oral at bedtime PRN Insomnia  ondansetron Injectable 4 milliGRAM(s) IV Push every 8 hours PRN Nausea and/or Vomiting        Allergies    No Known Allergies    Intolerances    Vital Signs Last 24 Hrs  T(C): 36.7 (16 Jan 2024 08:46), Max: 36.8 (16 Jan 2024 06:12)  T(F): 98.1 (16 Jan 2024 08:46), Max: 98.2 (16 Jan 2024 06:12)  HR: 85 (16 Jan 2024 08:46) (78 - 95)  BP: 148/72 (16 Jan 2024 08:46) (146/78 - 161/84)  BP(mean): 98 (15 Marcello 2024 14:14) (98 - 100)  RR: 18 (16 Jan 2024 08:46) (18 - 24)  SpO2: 93% (16 Jan 2024 08:46) (90% - 100%)    Parameters below as of 16 Jan 2024 08:46  Patient On (Oxygen Delivery Method): nasal cannula  O2 Flow (L/min): 2        I&O's Summary      PHYSICAL EXAM:  Constitutional: NAD, awake and alert  HEENT:  EOMI,  Pupils round, No oral cyanosis.  Pulmonary: Non-labored, mild bibasilar crackles, No wheezing, rales or rhonchi  Cardiovascular: S1 and S2, regular rate and rhythm, no Murmurs, gallops or rubs  Gastrointestinal: Bowel Sounds present, soft, nontender.   Lymph: No peripheral edema. No cervical lymphadenopathy.  Neurological: Alert, no focal deficits  Skin: No rashes.  Psych:  Mood & affect appropriate    LABS:                          10.2   6.76  )-----------( 275      ( 14 Jan 2024 22:30 )             30.4     01-16    143  |  108  |  24<H>  ----------------------------<  204<H>  3.6   |  29  |  1.71<H>    Ca    8.6      16 Jan 2024 07:44  Mg     1.6     01-14    TPro  6.9  /  Alb  1.7<L>  /  TBili  0.2  /  DBili  x   /  AST  15  /  ALT  24  /  AlkPhos  57  01-14      14 Jan 2024 22:30    144    |  111    |  19     ----------------------------<  240    3.9     |  32     |  1.68     Ca    7.6        14 Jan 2024 22:30  Mg     1.6       14 Jan 2024 22:30    TPro  6.9    /  Alb  1.7    /  TBili  0.2    /  DBili  x      /  AST  15     /  ALT  24     /  AlkPhos  57     14 Jan 2024 22:30      < from: Nuclear Stress Test-Pharmacologic (10.10.23 @ 06:30) >  IMPRESSIONS:  * Myocardial Perfusion SPECT results are mildly abnormal.  * There is a small, mild defect in apical wall that is  fixed, defect persists on prone imaging suggestive of  infarct, no clear evidence of ischemia, however overall  study quality is poor hence may reduce overall diganostic  accuracy, consider alternative ischemic imaging modality  if high index of clinical suspicion for ischemic heart  disaese.  * Post-stress resting myocardial perfusiongated SPECT  imaging was performed (LVEF = 47 %;LVEDV = 112 ml.), mild  global hypokinesis.    ------------------------------------------------------------------------        < end of copied text >    < from: TTE Echo Complete w/o Contrast w/ Doppler (01.15.24 @ 15:50) >     The aortic valve is well visualized, appears mildly calcified. Valve   opening seems to be normal.   Trace aortic regurgitation is present.   The left atrium is moderately dilated.   Estimated left ventricular ejection fraction is 45-50%.   The left ventricle is normal in size.   Mild concentric left ventricular hypertrophy is present.   Segmental wall motion abnormalities are present with a moderately reduced   LV function. Inferior, lateral wall hypokinesis   All visualized extra cardiac structures appears to be normal.   There is calcification of anterior mitral valve leaflet. The leaflet   opening is normal.   Mild mitral annular calcification is present.   Moderate (2+) eccentric mitral regurgitation is present.   Trace to small pericardial effusion is present near the right atrium.   Pleural effusion- is present.   Normal appearing pulmonic valve structure.   Mild pulmonic valvular regurgitation (1+) is present.   Normal appearing right atrium.   Normal appearing right ventricle structure and function.   Normal appearing tricuspid valve structure.   Mild (1+) tricuspid valve regurgitation is present.                 CHIEF COMPLAINT: chest pain, SOB       HPI:  Mr. Edwards is a 62 yo male with a hx hypertension, DM type II, HIV+ and nonadherent with any medications over the past 6 months presenting with chest pain and SOB. He reports one month of worsening exertional dyspnea. Chest pain started yesterday and has been constant. Also reports dry cough. Denies fevers/chills, LE edema.     He has no cardiac hx. He was recently evaluated by [facility] cardiology [place] group for chest pain in 10/'2023.   Nuclear stress test at the time showed  small, fixed apical wall defect without evidence of ischemia.     In the ED, he was afebrile, HR 90, hypertensive to 174/97, saturating 96%  Labs notable for trop neg x2, hgb 10.2, Ddimer 472, Cr 1.68, proBNP 1758, RVP and COVID negative.   CTA Negative for pulmonary embolus, Bilateral pleural effusions, Pulmonary edema.    Cardiology consulted for HF     1/16/24: Pt continues to report constant chest pain.  + SOB, +dry NP cough.  Tele: RSR      PAST MEDICAL / SURGICAL HISTORY:  PAST MEDICAL & SURGICAL HISTORY:  Hypertension      Diabetes      Head trauma      Insomnia      Anxiety      Chronic back pain      DM (diabetes mellitus)      HIV infection      Bilateral cataracts      S/P lumbar fusion  in [place]          SOCIAL HISTORY:   Alcohol: Denied  Smoking: Nonsmoker  Drug Use: Denied  Marital Status:     FAMILY HISTORY: FAMILY HISTORY:  Family history of coronary artery disease in mother (Mother)    Family history of diabetes mellitus (DM) (Mother)    FH: alcoholism (Father)      MEDICATIONS  (STANDING):  aspirin  chewable 81 milliGRAM(s) Oral daily  atorvastatin 40 milliGRAM(s) Oral at bedtime  dextrose 5%. 1000 milliLiter(s) (100 mL/Hr) IV Continuous <Continuous>  dextrose 5%. 1000 milliLiter(s) (50 mL/Hr) IV Continuous <Continuous>  dextrose 50% Injectable 25 Gram(s) IV Push once  dextrose 50% Injectable 25 Gram(s) IV Push once  dextrose 50% Injectable 12.5 Gram(s) IV Push once  enoxaparin Injectable 40 milliGRAM(s) SubCutaneous every 24 hours  furosemide   Injectable 40 milliGRAM(s) IV Push two times a day  glucagon  Injectable 1 milliGRAM(s) IntraMuscular once  insulin lispro (ADMELOG) corrective regimen sliding scale   SubCutaneous three times a day before meals  nitroglycerin    2% Ointment 1 Inch(s) Transdermal daily    MEDICATIONS  (PRN):  acetaminophen     Tablet .. 650 milliGRAM(s) Oral every 6 hours PRN Temp greater or equal to 38C (100.4F), Mild Pain (1 - 3)  aluminum hydroxide/magnesium hydroxide/simethicone Suspension 30 milliLiter(s) Oral every 4 hours PRN Dyspepsia  dextrose Oral Gel 15 Gram(s) Oral once PRN Blood Glucose LESS THAN 70 milliGRAM(s)/deciliter  melatonin 3 milliGRAM(s) Oral at bedtime PRN Insomnia  ondansetron Injectable 4 milliGRAM(s) IV Push every 8 hours PRN Nausea and/or Vomiting        Allergies    No Known Allergies    Intolerances    Vital Signs Last 24 Hrs  T(C): 36.7 (16 Jan 2024 08:46), Max: 36.8 (16 Jan 2024 06:12)  T(F): 98.1 (16 Jan 2024 08:46), Max: 98.2 (16 Jan 2024 06:12)  HR: 85 (16 Jan 2024 08:46) (78 - 95)  BP: 148/72 (16 Jan 2024 08:46) (146/78 - 161/84)  BP(mean): 98 (15 Marcello 2024 14:14) (98 - 100)  RR: 18 (16 Jan 2024 08:46) (18 - 24)  SpO2: 93% (16 Jan 2024 08:46) (90% - 100%)    Parameters below as of 16 Jan 2024 08:46  Patient On (Oxygen Delivery Method): nasal cannula  O2 Flow (L/min): 2        I&O's Summary      PHYSICAL EXAM:  Constitutional: NAD, awake and alert  HEENT:  EOMI,  Pupils round, No oral cyanosis.  Pulmonary: Non-labored, mild bibasilar crackles, No wheezing, rales or rhonchi  Cardiovascular: S1 and S2, regular rate and rhythm, no Murmurs, gallops or rubs  Gastrointestinal: Bowel Sounds present, soft, nontender.   Lymph: No peripheral edema. No cervical lymphadenopathy.  Neurological: Alert, no focal deficits  Skin: No rashes.  Psych:  Mood & affect appropriate    LABS:                          10.2   6.76  )-----------( 275      ( 14 Jan 2024 22:30 )             30.4     01-16    143  |  108  |  24<H>  ----------------------------<  204<H>  3.6   |  29  |  1.71<H>    Ca    8.6      16 Jan 2024 07:44  Mg     1.6     01-14    TPro  6.9  /  Alb  1.7<L>  /  TBili  0.2  /  DBili  x   /  AST  15  /  ALT  24  /  AlkPhos  57  01-14      14 Jan 2024 22:30    144    |  111    |  19     ----------------------------<  240    3.9     |  32     |  1.68     Ca    7.6        14 Jan 2024 22:30  Mg     1.6       14 Jan 2024 22:30    TPro  6.9    /  Alb  1.7    /  TBili  0.2    /  DBili  x      /  AST  15     /  ALT  24     /  AlkPhos  57     14 Jan 2024 22:30      < from: Nuclear Stress Test-Pharmacologic (10.10.23 @ 06:30) >  IMPRESSIONS:  * Myocardial Perfusion SPECT results are mildly abnormal.  * There is a small, mild defect in apical wall that is  fixed, defect persists on prone imaging suggestive of  infarct, no clear evidence of ischemia, however overall  study quality is poor hence may reduce overall diganostic  accuracy, consider alternative ischemic imaging modality  if high index of clinical suspicion for ischemic heart  disaese.  * Post-stress resting myocardial perfusiongated SPECT  imaging was performed (LVEF = 47 %;LVEDV = 112 ml.), mild  global hypokinesis.    ------------------------------------------------------------------------        < end of copied text >    < from: TTE Echo Complete w/o Contrast w/ Doppler (01.15.24 @ 15:50) >     The aortic valve is well visualized, appears mildly calcified. Valve   opening seems to be normal.   Trace aortic regurgitation is present.   The left atrium is moderately dilated.   Estimated left ventricular ejection fraction is 45-50%.   The left ventricle is normal in size.   Mild concentric left ventricular hypertrophy is present.   Segmental wall motion abnormalities are present with a moderately reduced   LV function. Inferior, lateral wall hypokinesis   All visualized extra cardiac structures appears to be normal.   There is calcification of anterior mitral valve leaflet. The leaflet   opening is normal.   Mild mitral annular calcification is present.   Moderate (2+) eccentric mitral regurgitation is present.   Trace to small pericardial effusion is present near the right atrium.   Pleural effusion- is present.   Normal appearing pulmonic valve structure.   Mild pulmonic valvular regurgitation (1+) is present.   Normal appearing right atrium.   Normal appearing right ventricle structure and function.   Normal appearing tricuspid valve structure.   Mild (1+) tricuspid valve regurgitation is present.

## 2024-01-16 NOTE — PROGRESS NOTE ADULT - SUBJECTIVE AND OBJECTIVE BOX
CHIEF COMPLAINT/INTERVAL HISTORY:    Patient is a 63y old  Male who presents with a chief complaint of Chest Pain (15 Marcello 2024 17:40)      HPI:  Mr. Edwards is a 64 yo M with a PMH significant for HTN, T2DM  HIV+ , no primary care ( suspect non compliance pt tells me he has Medicaid)  no cardiologist, presents with cc of having moderate to severe substernal chest pain, radiation to the left shoulder, exacerbated with exertion. Patient has no abdominal pain. Hx of asthma as well. Takes no meds at home due to lack of access to health care. Has evidence of Pulm. edema, and bilat pl effusions adm for chf exacerbation  (15 Marcello 2024 09:13)    Angina last night neg trop    ICU Vital Signs Last 24 Hrs  T(C): 36.8 (16 Jan 2024 06:12), Max: 36.8 (15 Marcello 2024 08:55)  T(F): 98.2 (16 Jan 2024 06:12), Max: 98.2 (15 Marcello 2024 08:55)  HR: 87 (16 Jan 2024 06:12) (78 - 95)  BP: 161/84 (16 Jan 2024 06:12) (146/78 - 161/84)  BP(mean): 98 (15 Marcello 2024 14:14) (98 - 110)  ABP: --  ABP(mean): --  RR: 18 (16 Jan 2024 06:12) (18 - 24)  SpO2: 90% (16 Jan 2024 06:12) (90% - 100%)    O2 Parameters below as of 16 Jan 2024 06:12  Patient On (Oxygen Delivery Method): nasal cannula  O2 Flow (L/min): 2            MEDICATIONS  (STANDING):  aspirin  chewable 81 milliGRAM(s) Oral daily  atorvastatin 40 milliGRAM(s) Oral at bedtime  dextrose 5%. 1000 milliLiter(s) (50 mL/Hr) IV Continuous <Continuous>  dextrose 5%. 1000 milliLiter(s) (100 mL/Hr) IV Continuous <Continuous>  dextrose 50% Injectable 25 Gram(s) IV Push once  dextrose 50% Injectable 25 Gram(s) IV Push once  dextrose 50% Injectable 12.5 Gram(s) IV Push once  enoxaparin Injectable 40 milliGRAM(s) SubCutaneous every 24 hours  furosemide   Injectable 40 milliGRAM(s) IV Push two times a day  glucagon  Injectable 1 milliGRAM(s) IntraMuscular once  insulin lispro (ADMELOG) corrective regimen sliding scale   SubCutaneous three times a day before meals    MEDICATIONS  (PRN):  acetaminophen     Tablet .. 650 milliGRAM(s) Oral every 6 hours PRN Temp greater or equal to 38C (100.4F), Mild Pain (1 - 3)  aluminum hydroxide/magnesium hydroxide/simethicone Suspension 30 milliLiter(s) Oral every 4 hours PRN Dyspepsia  dextrose Oral Gel 15 Gram(s) Oral once PRN Blood Glucose LESS THAN 70 milliGRAM(s)/deciliter  melatonin 3 milliGRAM(s) Oral at bedtime PRN Insomnia  ondansetron Injectable 4 milliGRAM(s) IV Push every 8 hours PRN Nausea and/or Vomiting        PHYSICAL EXAM:      NERVOUS SYSTEM:  Alert & Oriented X3, Motor Strength 5/5 B/L upper and lower extremities; DTRs 2+ intact and symmetric  CHEST/LUNG: Clear to auscultation bilaterally; No rales, rhonchi, wheezing, or rubs  HEART: Regular rate and rhythm; No murmurs, rubs, or gallops  ABDOMEN: Soft, Nontender, Nondistended; Bowel sounds present  EXTREMITIES:  2+ Peripheral Pulses, No clubbing, cyanosis, or edema    LABS:                        10.2   6.76  )-----------( 275      ( 14 Jan 2024 22:30 )             30.4     01-14    144  |  111<H>  |  19  ----------------------------<  240<H>  3.9   |  32<H>  |  1.68<H>    Ca    7.6<L>      14 Jan 2024 22:30  Mg     1.6     01-14    TPro  6.9  /  Alb  1.7<L>  /  TBili  0.2  /  DBili  x   /  AST  15  /  ALT  24  /  AlkPhos  57  01-14      Urinalysis Basic - ( 14 Jan 2024 22:44 )    Color: Yellow / Appearance: Clear / SG: >1.030 / pH: x  Gluc: x / Ketone: Negative mg/dL  / Bili: Negative / Urobili: 1.0 mg/dL   Blood: x / Protein: >=1000 mg/dL / Nitrite: Negative   Leuk Esterase: Negative / RBC: 14 /HPF / WBC 3 /HPF   Sq Epi: x / Non Sq Epi: 1 /HPF / Bacteria: Negative /HPF        CAPILLARY BLOOD GLUCOSE      POCT Blood Glucose.: 282 mg/dL (15 Marcello 2024 17:39)  POCT Blood Glucose.: 239 mg/dL (15 Marcello 2024 14:47)  POCT Blood Glucose.: 233 mg/dL (15 Marcello 2024 10:59)      RECENT CULTURES:      RADIOLOGY & ADDITIONAL TESTS: personally reviewed    * CP, cough, SOB= seems to be HF with bilateral pl effusions  started on  Lasix  add nitropaste  TTE  on no meds  had a previous abnormal stress test  no thora for now waiting to see response to IV diuretic, pt is not hypoxic reeval in am    * T2Dm  not on any meds  start sliding scale    * h/o HIV unknown viral load on no meds            time spent:

## 2024-01-16 NOTE — PROGRESS NOTE ADULT - PROBLEM SELECTOR PLAN 1
·  Problem: Acute decompensated heart failure.   ·  Recommendation: Pt reporting with 1 month of SOB. ProBNP elevated 1758, CT without PE but with pulm edema, effusions   .  Echocardiogram with Mod MR, Mild TR, LA mod dilated, Reduced LVF, EF 40%, mild LVH, segmental WMA present inferior, lateral wall hypokinesis, Trace-small pericardial effusion, pleural effusion.  Of note, echo from 2020 shows EF 55%  . continue IV diuresis with lasix 40mg IV BID.  Please perform daily standing weights ·  Problem: Acute decompensated heart failure.   ·  Recommendation: Pt reporting with 1 month of SOB. ProBNP elevated 1758, CT without PE but with pulm edema, effusions   .  Echocardiogram with Mod MR, Mild TR, LA mod dilated, Reduced LVF, EF 45-50%, mild LVH, segmental WMA present inferior, lateral wall hypokinesis, Trace-small pericardial effusion, pleural effusion.  Of note, echo from 2020 shows EF 55%  . continue IV diuresis with lasix 40mg IV BID.  Please perform daily standing weights

## 2024-01-16 NOTE — PROGRESS NOTE ADULT - PROBLEM SELECTOR PLAN 2
·  Problem: Acute chest pain.   ·  Recommendation: pt presents with 1 day of constant pain. atypical for CAD/ACS with trop neg x2  .  Echo as above with reduced LVF, EF 40%, inferior, lateral wall hypokinesis.     .  recent nuclear stress test in 10/2023 shows no ischemia, small fixed apical defect, EF 47%  .  CT calcium score minimal at 3; <25% percentile   .  Pt continues to report chest pain and SOB.  Will repeat troponin and EKG  .  cont daily aspirin, statin, Nitro patch  .  Consider cardiac cath for eval of constant chest pain ·  Problem: Acute chest pain appears  to be non cardiac but abnormal echo    ·  Recommendation: pt presents with 1 day of constant pain. atypical for CAD/ACS with trop neg x2  .  Echo as above with reduced LVF, EF 45-50%, inferior, lateral wall hypokinesis.     .  recent nuclear stress test in 10/2023 shows no ischemia, small fixed apical defect, EF 47%  .  CT calcium score minimal at 3; <25% percentile   .  Pt continues to report chest pain and SOB.  Will repeat troponin and EKG   patient has renal insufficiency , will discuss with interventionalist ? cardiac cath

## 2024-01-17 LAB
ANION GAP SERPL CALC-SCNC: 2 MMOL/L — LOW (ref 5–17)
BUN SERPL-MCNC: 31 MG/DL — HIGH (ref 7–23)
CALCIUM SERPL-MCNC: 8.9 MG/DL — SIGNIFICANT CHANGE UP (ref 8.5–10.1)
CHLORIDE SERPL-SCNC: 104 MMOL/L — SIGNIFICANT CHANGE UP (ref 96–108)
CO2 SERPL-SCNC: 30 MMOL/L — SIGNIFICANT CHANGE UP (ref 22–31)
CREAT SERPL-MCNC: 1.75 MG/DL — HIGH (ref 0.5–1.3)
EGFR: 43 ML/MIN/1.73M2 — LOW
GLUCOSE BLDC GLUCOMTR-MCNC: 151 MG/DL — HIGH (ref 70–99)
GLUCOSE BLDC GLUCOMTR-MCNC: 190 MG/DL — HIGH (ref 70–99)
GLUCOSE BLDC GLUCOMTR-MCNC: 300 MG/DL — HIGH (ref 70–99)
GLUCOSE SERPL-MCNC: 197 MG/DL — HIGH (ref 70–99)
POTASSIUM SERPL-MCNC: 3.3 MMOL/L — LOW (ref 3.5–5.3)
POTASSIUM SERPL-SCNC: 3.3 MMOL/L — LOW (ref 3.5–5.3)
SODIUM SERPL-SCNC: 136 MMOL/L — SIGNIFICANT CHANGE UP (ref 135–145)

## 2024-01-17 PROCEDURE — 99255 IP/OBS CONSLTJ NEW/EST HI 80: CPT

## 2024-01-17 PROCEDURE — 99233 SBSQ HOSP IP/OBS HIGH 50: CPT

## 2024-01-17 PROCEDURE — 76770 US EXAM ABDO BACK WALL COMP: CPT | Mod: 26

## 2024-01-17 RX ORDER — FUROSEMIDE 40 MG
40 TABLET ORAL DAILY
Refills: 0 | Status: DISCONTINUED | OUTPATIENT
Start: 2024-01-17 | End: 2024-01-18

## 2024-01-17 RX ORDER — POTASSIUM CHLORIDE 20 MEQ
40 PACKET (EA) ORAL ONCE
Refills: 0 | Status: COMPLETED | OUTPATIENT
Start: 2024-01-17 | End: 2024-01-17

## 2024-01-17 RX ADMIN — ATORVASTATIN CALCIUM 40 MILLIGRAM(S): 80 TABLET, FILM COATED ORAL at 21:07

## 2024-01-17 RX ADMIN — ONDANSETRON 4 MILLIGRAM(S): 8 TABLET, FILM COATED ORAL at 09:06

## 2024-01-17 RX ADMIN — Medication 650 MILLIGRAM(S): at 21:06

## 2024-01-17 RX ADMIN — Medication 1 INCH(S): at 13:42

## 2024-01-17 RX ADMIN — INSULIN GLARGINE 12 UNIT(S): 100 INJECTION, SOLUTION SUBCUTANEOUS at 21:10

## 2024-01-17 RX ADMIN — Medication 81 MILLIGRAM(S): at 09:30

## 2024-01-17 RX ADMIN — Medication 4 UNIT(S): at 17:14

## 2024-01-17 RX ADMIN — Medication 40 MILLIEQUIVALENT(S): at 13:42

## 2024-01-17 RX ADMIN — Medication 3: at 17:14

## 2024-01-17 RX ADMIN — Medication 40 MILLIGRAM(S): at 13:42

## 2024-01-17 RX ADMIN — Medication 40 MILLIGRAM(S): at 06:28

## 2024-01-17 NOTE — PROGRESS NOTE ADULT - PROBLEM SELECTOR PLAN 2
·  Problem: Acute chest pain appears  to be non cardiac but abnormal echo    ·  Recommendation: pt presents with 1 day of constant pain. atypical for CAD/ACS with trop neg x2  .  Echo as above with reduced LVF, EF 45-50%, inferior, lateral wall hypokinesis.     .  recent nuclear stress test in 10/2023 shows no ischemia, small fixed apical defect, EF 47%  .  CT calcium score minimal at 3; <25% percentile   .  Pt continues to report chest pain and SOB.  repeat trops are negative x 3, EKG with no changes,  - pt. with risk factors, - immunocompromised - HIV, non complaints with meds x 6 months - needs LHC - pt. with known CKD, creatinine 1.7 - needs renal consult to advise regarding LHC - D/W Hospitalist

## 2024-01-17 NOTE — PROVIDER CONTACT NOTE (OTHER) - ASSESSMENT
Patient complaining of generalized pain with no acute changes from baseline. Vital signs as follows /67 HR 92 Temp 99.5 F RR 18 O2 92 RA

## 2024-01-17 NOTE — CONSULT NOTE ADULT - SUBJECTIVE AND OBJECTIVE BOX
CHIEF COMPLAINT: chest pain, SOB       HPI:  Mr. Edwards is a 62 yo male with a hx hypertension, DM type II, HIV+ and nonadherent with any medications over the past 6 months presenting with chest pain and SOB. He reports one month of worsening exertional dyspnea. Chest pain started yesterday and has been constant. Also reports dry cough. Denies fevers/chills, LE edema.     He has no cardiac hx. He was recently evaluated by [facility] cardiology [facility] for chest pain in 10/'2023.   Nuclear stress test at the time showed  small, fixed apical wall defect without evidence of ischemia.     In the ED, he was afebrile, HR 90, hypertensive to 174/97, saturating 96%  Labs notable for trop neg x2, hgb 10.2, Ddimer 472, Cr 1.68, proBNP 1758, RVP and COVID negative.   CTA Negative for pulmonary embolus, Bilateral pleural effusions, Pulmonary edema.    Cardiology consulted for HF       PAST MEDICAL / SURGICAL HISTORY:  PAST MEDICAL & SURGICAL HISTORY:  Hypertension      Diabetes      Head trauma      Insomnia      Anxiety      Chronic back pain      DM (diabetes mellitus)      HIV infection      Bilateral cataracts      S/P lumbar fusion  in [place]          SOCIAL HISTORY:   Alcohol: Denied  Smoking: Nonsmoker  Drug Use: Denied  Marital Status:     FAMILY HISTORY: FAMILY HISTORY:  Family history of coronary artery disease in mother (Mother)    Family history of diabetes mellitus (DM) (Mother)    FH: alcoholism (Father)        MEDICATIONS  (STANDING):  dextrose 5%. 1000 milliLiter(s) (50 mL/Hr) IV Continuous <Continuous>  dextrose 5%. 1000 milliLiter(s) (100 mL/Hr) IV Continuous <Continuous>  dextrose 50% Injectable 25 Gram(s) IV Push once  dextrose 50% Injectable 12.5 Gram(s) IV Push once  dextrose 50% Injectable 25 Gram(s) IV Push once  enoxaparin Injectable 40 milliGRAM(s) SubCutaneous every 24 hours  furosemide   Injectable 40 milliGRAM(s) IV Push two times a day  glucagon  Injectable 1 milliGRAM(s) IntraMuscular once  insulin lispro (ADMELOG) corrective regimen sliding scale   SubCutaneous three times a day before meals    MEDICATIONS  (PRN):  acetaminophen     Tablet .. 650 milliGRAM(s) Oral every 6 hours PRN Temp greater or equal to 38C (100.4F), Mild Pain (1 - 3)  aluminum hydroxide/magnesium hydroxide/simethicone Suspension 30 milliLiter(s) Oral every 4 hours PRN Dyspepsia  dextrose Oral Gel 15 Gram(s) Oral once PRN Blood Glucose LESS THAN 70 milliGRAM(s)/deciliter  melatonin 3 milliGRAM(s) Oral at bedtime PRN Insomnia  ondansetron Injectable 4 milliGRAM(s) IV Push every 8 hours PRN Nausea and/or Vomiting      Allergies    No Known Allergies    Intolerances        REVIEW OF SYSTEMS:  CONSTITUTIONAL: No weakness, fevers or chills  Eyes: No visual changes  NECK: No pain or stiffness  RESPIRATORY: +cough, no wheezing, hemoptysis; + shortness of breath  CARDIOVASCULAR: No chest pain or palpitations  GASTROINTESTINAL: No abdominal pain. No nausea, vomiting, or hematemesis; No diarrhea or constipation. No melena or hematochezia.  GENITOURINARY: No dysuria, frequency or hematuria  NEUROLOGICAL: No numbness.  SKIN: No itching or rash  All other review of systems is negative unless indicated above    VITAL SIGNS:   Vital Signs Last 24 Hrs  T(C): 36.6 (15 Marcello 2024 16:19), Max: 36.8 (14 Jan 2024 22:19)  T(F): 97.9 (15 Marcello 2024 16:19), Max: 98.3 (14 Jan 2024 22:19)  HR: 90 (15 Marcello 2024 16:19) (77 - 90)  BP: 160/83 (15 Marcello 2024 16:19) (144/73 - 174/97)  BP(mean): 98 (15 Marcello 2024 14:14) (98 - 113)  RR: 24 (15 Marcello 2024 16:19) (18 - 24)  SpO2: 100% (15 Marcello 2024 16:19) (94% - 100%)    Parameters below as of 15 Marcello 2024 16:19  Patient On (Oxygen Delivery Method): nasal cannula  O2 Flow (L/min): 2      I&O's Summary      PHYSICAL EXAM:  Constitutional: NAD, awake and alert  HEENT:  EOMI,  Pupils round, No oral cyanosis.  Pulmonary: Non-labored, mild bibasilar crackles, No wheezing, rales or rhonchi  Cardiovascular: S1 and S2, regular rate and rhythm, no Murmurs, gallops or rubs  Gastrointestinal: Bowel Sounds present, soft, nontender.   Lymph: No peripheral edema. No cervical lymphadenopathy.  Neurological: Alert, no focal deficits  Skin: No rashes.  Psych:  Mood & affect appropriate    LABS:                        10.2   6.76  )-----------( 275      ( 14 Jan 2024 22:30 )             30.4     14 Jan 2024 22:30    144    |  111    |  19     ----------------------------<  240    3.9     |  32     |  1.68     Ca    7.6        14 Jan 2024 22:30  Mg     1.6       14 Jan 2024 22:30    TPro  6.9    /  Alb  1.7    /  TBili  0.2    /  DBili  x      /  AST  15     /  ALT  24     /  AlkPhos  57     14 Jan 2024 22:30      < from: Nuclear Stress Test-Pharmacologic (10.10.23 @ 06:30) >  IMPRESSIONS:  * Myocardial Perfusion SPECT results are mildly abnormal.  * There is a small, mild defect in apical wall that is  fixed, defect persists on prone imaging suggestive of  infarct, no clear evidence of ischemia, however overall  study quality is poor hence may reduce overall diganostic  accuracy, consider alternative ischemic imaging modality  if high index of clinical suspicion for ischemic heart  disaese.  * Post-stress resting myocardial perfusiongated SPECT  imaging was performed (LVEF = 47 %;LVEDV = 112 ml.), mild  global hypokinesis.    ------------------------------------------------------------------------        < end of copied text >          
NEPHROLOGY INTERVAL HPI/OVERNIGHT EVENTS:  RAFAEL [id number]  HPI:  Mr. Edwards is a 62 yo M with a PMH significant for HTN, T2DM  HIV+ , no primary care ( suspect non compliance pt tells me he has Medicaid)  no cardiologist, presents with cc of having moderate to severe substernal chest pain, radiation to the left shoulder, exacerbated with exertion. Patient has no abdominal pain. Hx of asthma as well. Takes no meds at home due to lack of access to health care. Has evidence of Pulm. edema, and bilat pl effusions adm for chf exacerbation  (15 Marcello 2024 09:13)      Patient is a 63y old  Male who presents with a chief complaint of Chest Pain (2024 11:39)  ================================================================  NEPHROLOGY CONSULT   above hx corroborated with pt   has no outpt fu and does not take in any medicaitons  states he lives on the streets  admitted with Chest pain with a/c syst chf with ef of 40-45%   on iv lasix with improvement of his symptoms   unclear if cardiac cath planned   pt with [facility] in 2023 has normal renal fx   in past 6 moths with scr in 1.7 range       PAST MEDICAL & SURGICAL HISTORY:  - htn   - dm   - head trauma  - insomnia   - anxiety   - chronic back pain   - HIV infection  - b/l cataracts   - s/p lumbar fusion in [place]          FAMILY HISTORY:  Family history of coronary artery disease in mother (Mother)  Family history of diabetes mellitus (DM) (Mother)    FH: alcoholism (Father)        MEDICATIONS  (STANDING):  aspirin  chewable 81 milliGRAM(s) Oral daily  atorvastatin 40 milliGRAM(s) Oral at bedtime  dextrose 5%. 1000 milliLiter(s) (100 mL/Hr) IV Continuous <Continuous>  dextrose 5%. 1000 milliLiter(s) (50 mL/Hr) IV Continuous <Continuous>  dextrose 50% Injectable 25 Gram(s) IV Push once  dextrose 50% Injectable 25 Gram(s) IV Push once  dextrose 50% Injectable 12.5 Gram(s) IV Push once  enoxaparin Injectable 40 milliGRAM(s) SubCutaneous every 24 hours  furosemide    Tablet 40 milliGRAM(s) Oral daily  glucagon  Injectable 1 milliGRAM(s) IntraMuscular once  insulin glargine Injectable (LANTUS) 12 Unit(s) SubCutaneous at bedtime  insulin lispro (ADMELOG) corrective regimen sliding scale   SubCutaneous three times a day before meals  insulin lispro Injectable (ADMELOG) 4 Unit(s) SubCutaneous three times a day before meals  nitroglycerin    2% Ointment 1 Inch(s) Transdermal daily    MEDICATIONS  (PRN):  acetaminophen     Tablet .. 650 milliGRAM(s) Oral every 6 hours PRN Temp greater or equal to 38C (100.4F), Mild Pain (1 - 3)  aluminum hydroxide/magnesium hydroxide/simethicone Suspension 30 milliLiter(s) Oral every 4 hours PRN Dyspepsia  dextrose Oral Gel 15 Gram(s) Oral once PRN Blood Glucose LESS THAN 70 milliGRAM(s)/deciliter  melatonin 3 milliGRAM(s) Oral at bedtime PRN Insomnia  ondansetron Injectable 4 milliGRAM(s) IV Push every 8 hours PRN Nausea and/or Vomiting      Allergies    No Known Allergies    Intolerances        I&O's Summary      Home Medications:          Vital Signs Last 24 Hrs  T(C): 36.9 (2024 08:49), Max: 37.3 (2024 06:15)  T(F): 98.5 (2024 08:49), Max: 99.1 (2024 06:15)  HR: 94 (2024 08:49) (88 - 94)  BP: 156/77 (2024 08:49) (156/77 - 162/76)  BP(mean): --  RR: 18 (2024 08:49) (18 - 18)  SpO2: 93% (2024 08:49) (92% - 93%)    Parameters below as of 2024 08:49  Patient On (Oxygen Delivery Method): room air      Daily     Daily Weight in k.1 (2024 06:15)  I&O's Summary      PHYSICAL EXAM:  GEN: alert awake O X 3  HEENT: MMM  NECK supple no jvd  CV: RRR s1s2  LUNGS: b/l CTA  ABD: +bs soft, nt/nd  EXT: no edema    LABS:    01-17    136  |  104  |  31<H>  ----------------------------<  197<H>  3.3<L>   |  30  |  1.75<H>    Ca    8.9      2024 08:14        Urinalysis Basic - ( 2024 08:14 )    Color: x / Appearance: x / SG: x / pH: x  Gluc: 197 mg/dL / Ketone: x  / Bili: x / Urobili: x   Blood: x / Protein: x / Nitrite: x   Leuk Esterase: x / RBC: x / WBC x   Sq Epi: x / Non Sq Epi: x / Bacteria: x

## 2024-01-17 NOTE — PROGRESS NOTE ADULT - PROBLEM SELECTOR PLAN 1
Subjective   Nancy Goodman is a 79 y.o. female.     History of Present Illness     She was in the ER on 8/5/18 with colitis  On flagyl at this time and feeling better    Diabetes Mellitus Type II, Follow-up:   Nancy Goodman is a 79 y.o. female who is here for follow-up of Type 2 diabetes mellitus.  Current symptoms/problems include none and have been stable. Patient is adherent with medications.  Known diabetic complications: none  Cardiovascular risk factors: advanced age (older than 55 for men, 65 for women), diabetes mellitus, dyslipidemia and hypertension  Current diabetic medications include lantus 36 units.   Current monitoring regimen: home blood tests - daily  Home blood sugar records: fasting range: 90-100s  Any episodes of hypoglycemia? no  Eye exam current (within one year): yes  Weight trend: stable  She is on ACE inhibitor or angiotensin II receptor blocker. Patient is on a statin.       Nancy Goodman  is here for follow-up of hypertension of several years duration. She is not exercising and is not adherent to a low-salt diet. Patient does not check her blood pressure.  Patient denies chest pain and palpitations. She is compliant with meds.        Nancy Goodman returns today for follow up of Hyperlipidemia with a lipid program recheck.   Nancy indicates her exercise level as not at all.  Diet: trying to eat better  Patient is compliant with medications   Any side effects to medications:   chest pain No myalgia No memory change No  Pt is not due for labs        The following portions of the patient's history were reviewed and updated as appropriate: allergies, current medications, past family history, past medical history, past social history, past surgical history and problem list.    Review of Systems   Constitutional: Negative.    HENT: Negative.    Eyes: Negative.    Respiratory: Negative.    Cardiovascular: Negative.    Gastrointestinal: Positive for anal bleeding (with the  colitis).   Musculoskeletal: Negative.    Skin: Negative.    Neurological: Negative.    Psychiatric/Behavioral: Negative.    All other systems reviewed and are negative.      Objective   Physical Exam   Constitutional: She is oriented to person, place, and time. She appears well-developed and well-nourished. No distress.   HENT:   Head: Normocephalic and atraumatic.   Right Ear: Tympanic membrane, external ear and ear canal normal.   Left Ear: Tympanic membrane, external ear and ear canal normal.   Nose: Nose normal.   Mouth/Throat: Uvula is midline and oropharynx is clear and moist. She has dentures.   Eyes: Conjunctivae and EOM are normal.   Neck: Normal range of motion. Neck supple. No thyromegaly present.   Cardiovascular: Normal rate and regular rhythm.    Murmur heard.   Systolic murmur is present with a grade of 4/6   Pulmonary/Chest: Effort normal and breath sounds normal. No respiratory distress.   Abdominal: Soft. Bowel sounds are normal. She exhibits no distension and no mass. There is no tenderness.   Musculoskeletal: She exhibits edema (BLE edema +1).   Lymphadenopathy:     She has no cervical adenopathy.   Neurological: She is alert and oriented to person, place, and time.   Skin: Skin is warm and dry.   Psychiatric: She has a normal mood and affect. Her behavior is normal. Judgment and thought content normal.   Nursing note and vitals reviewed.      Assessment/Plan   Nancy was seen today for follow-up.    Diagnoses and all orders for this visit:    Type 2 diabetes mellitus without complication, with long-term current use of insulin (CMS/[facility])  -     CBC & Differential  -     Comprehensive Metabolic Panel  -     Hemoglobin A1c  -     Insulin Glargine (LANTUS SOLOSTAR) 100 UNIT/ML injection pen; Inject 40 Units under the skin into the appropriate area as directed Every Morning.  -     Lancets (FREESTYLE) lancets; Check QD and PRN  -     Insulin Pen Needle (B-D UF III MINI PEN NEEDLES) 31G X 5 MM misc;  100 each Daily.  -     glucose blood test strip; Check QD and PRN    Essential hypertension  -     CBC & Differential  -     Comprehensive Metabolic Panel  -     irbesartan (AVAPRO) 75 MG tablet; Take 1 tablet by mouth Every Night.    Mixed hyperlipidemia  -     Comprehensive Metabolic Panel  -     rosuvastatin (CRESTOR) 5 MG tablet; Take 1 tablet by mouth Daily.    Coronary artery disease involving native coronary artery of native heart without angina pectoris  -     metoprolol tartrate (LOPRESSOR) 25 MG tablet; Take 0.5 tablets by mouth Every Night.  -     rosuvastatin (CRESTOR) 5 MG tablet; Take 1 tablet by mouth Daily.    Localized edema  -     furosemide (LASIX) 20 MG tablet; Take 1 tablet by mouth Daily.    we are going to recheck her DM labs and make adjustments to regimen based on results.  Pt agrees  BP stable, no change in regimen  Recheck lipids today as well  No recent chest pain nor CAD complaints  Ok to continue lasix          ·  Problem: Acute decompensated heart failure.   ·  Recommendation: Pt reporting with 1 month of SOB. ProBNP elevated 1758, CT without PE but with pulm edema, effusions   .  Echocardiogram with Mod MR, Mild TR, LA mod dilated, Reduced LVF, EF 45-50%, mild LVH, segmental WMA present inferior, lateral wall hypokinesis, Trace-small pericardial effusion, pleural effusion.  Of note, echo from 2020 shows EF 55%  . continue IV diuresis with lasix 40mg IV BID.  Please perform daily standing weights

## 2024-01-17 NOTE — PROGRESS NOTE ADULT - SUBJECTIVE AND OBJECTIVE BOX
CHIEF COMPLAINT: chest pain, SOB       HPI:  Mr. Edwards is a 62 yo male with a hx hypertension, DM type II, HIV+ and nonadherent with any medications over the past 6 months presenting with chest pain and SOB. He reports one month of worsening exertional dyspnea. Chest pain started yesterday and has been constant. Also reports dry cough. Denies fevers/chills, LE edema.     He has no cardiac hx. He was recently evaluated by [facility] cardiology [place] [facility] for chest pain in 10/'2023.   Nuclear stress test at the time showed  small, fixed apical wall defect without evidence of ischemia.     In the ED, he was afebrile, HR 90, hypertensive to 174/97, saturating 96%  Labs notable for trop neg x2, hgb 10.2, Ddimer 472, Cr 1.68, proBNP 1758, RVP and COVID negative.   CTA Negative for pulmonary embolus, Bilateral pleural effusions, Pulmonary edema.    Cardiology consulted for HF     1/16/24: Pt continues to report constant chest pain.  + SOB, +dry NP cough.  Tele: RSR  1/17/24: pt./ c/o chest and back pain, second set of trops negative,  normal EKG, Tele NST 80-90     MEDICATIONS  (STANDING):  aspirin  chewable 81 milliGRAM(s) Oral daily  atorvastatin 40 milliGRAM(s) Oral at bedtime  dextrose 5%. 1000 milliLiter(s) (100 mL/Hr) IV Continuous <Continuous>  dextrose 5%. 1000 milliLiter(s) (50 mL/Hr) IV Continuous <Continuous>  dextrose 50% Injectable 25 Gram(s) IV Push once  dextrose 50% Injectable 25 Gram(s) IV Push once  dextrose 50% Injectable 12.5 Gram(s) IV Push once  enoxaparin Injectable 40 milliGRAM(s) SubCutaneous every 24 hours  furosemide    Tablet 40 milliGRAM(s) Oral daily  glucagon  Injectable 1 milliGRAM(s) IntraMuscular once  insulin glargine Injectable (LANTUS) 12 Unit(s) SubCutaneous at bedtime  insulin lispro (ADMELOG) corrective regimen sliding scale   SubCutaneous three times a day before meals  insulin lispro Injectable (ADMELOG) 4 Unit(s) SubCutaneous three times a day before meals  nitroglycerin    2% Ointment 1 Inch(s) Transdermal daily  potassium chloride    Tablet ER 40 milliEquivalent(s) Oral once      MEDICATIONS  (PRN):  acetaminophen     Tablet .. 650 milliGRAM(s) Oral every 6 hours PRN Temp greater or equal to 38C (100.4F), Mild Pain (1 - 3)  aluminum hydroxide/magnesium hydroxide/simethicone Suspension 30 milliLiter(s) Oral every 4 hours PRN Dyspepsia  dextrose Oral Gel 15 Gram(s) Oral once PRN Blood Glucose LESS THAN 70 milliGRAM(s)/deciliter  melatonin 3 milliGRAM(s) Oral at bedtime PRN Insomnia  ondansetron Injectable 4 milliGRAM(s) IV Push every 8 hours PRN Nausea and/or Vomiting    Vital Signs Last 24 Hrs  T(C): 36.9 (17 Jan 2024 08:49), Max: 37.3 (17 Jan 2024 06:15)  T(F): 98.5 (17 Jan 2024 08:49), Max: 99.1 (17 Jan 2024 06:15)  HR: 94 (17 Jan 2024 08:49) (88 - 94)  BP: 156/77 (17 Jan 2024 08:49) (156/77 - 162/76)  BP(mean): --  RR: 18 (17 Jan 2024 08:49) (18 - 18)  SpO2: 93% (17 Jan 2024 08:49) (92% - 93%)    Parameters below as of 17 Jan 2024 08:49  Patient On (Oxygen Delivery Method): room air    PHYSICAL EXAM:  Constitutional: NAD, awake and alert  HEENT:  EOMI,  Pupils round, No oral cyanosis.  Pulmonary: Non-labored, mild bibasilar crackles, No wheezing, rales or rhonchi  Cardiovascular: S1 and S2, regular rate and rhythm, no Murmurs, gallops or rubs  Gastrointestinal: Bowel Sounds present, soft, nontender.   Lymph: No peripheral edema. No cervical lymphadenopathy.  Neurological: Alert, no focal deficits  Skin: No rashes.  Psych:  Mood & affect appropriate    LABS: reviewed     01-17    136  |  104  |  31<H>  ----------------------------<  197<H>  3.3<L>   |  30  |  1.75<H>    Ca    8.9      17 Jan 2024 08:14      - TroponinI hsT: <-17.05, <-19.65, <-25.62, <-27.13    < from: Nuclear Stress Test-Pharmacologic (10.10.23 @ 06:30) >  IMPRESSIONS:  * Myocardial Perfusion SPECT results are mildly abnormal.  * There is a small, mild defect in apical wall that is  fixed, defect persists on prone imaging suggestive of  infarct, no clear evidence of ischemia, however overall  study quality is poor hence may reduce overall diganostic  accuracy, consider alternative ischemic imaging modality  if high index of clinical suspicion for ischemic heart  disaese.  * Post-stress resting myocardial perfusiongated SPECT  imaging was performed (LVEF = 47 %;LVEDV = 112 ml.), mild  global hypokinesis.    ------------------------------------------------------------------------        < end of copied text >    < from: TTE Echo Complete w/o Contrast w/ Doppler (01.15.24 @ 15:50) >     The aortic valve is well visualized, appears mildly calcified. Valve   opening seems to be normal.   Trace aortic regurgitation is present.   The left atrium is moderately dilated.   Estimated left ventricular ejection fraction is 45-50%.   The left ventricle is normal in size.   Mild concentric left ventricular hypertrophy is present.   Segmental wall motion abnormalities are present with a moderately reduced   LV function. Inferior, lateral wall hypokinesis   All visualized extra cardiac structures appears to be normal.   There is calcification of anterior mitral valve leaflet. The leaflet   opening is normal.   Mild mitral annular calcification is present.   Moderate (2+) eccentric mitral regurgitation is present.   Trace to small pericardial effusion is present near the right atrium.   Pleural effusion- is present.   Normal appearing pulmonic valve structure.   Mild pulmonic valvular regurgitation (1+) is present.   Normal appearing right atrium.   Normal appearing right ventricle structure and function.   Normal appearing tricuspid valve structure.   Mild (1+) tricuspid valve regurgitation is present.

## 2024-01-17 NOTE — CONSULT NOTE ADULT - ASSESSMENT
62 yo M with a PMH significant for HTN, T2DM  HIV+ , no primary care ( suspect non compliance pt tells me he has Medicaid)  no cardiologist, presents with cc of having moderate to severe substernal chest pain,   admitted with a/c syst chf with worsening ef from 2020 from 55--> 45-50%  atypical chest pain nst 10/23 no ischemia  BRIDGETTE vs CKD with scr in may 2023 in normal range    ReC  - US renal/bladd  - UA  - cw po lasix   - fu trend of scr and uop  - await decision regarding cardiac cath,    64 yo M with a PMH significant for HTN, T2DM  HIV+ , no primary care ( suspect non compliance pt tells me he has Medicaid)  no cardiologist, presents with cc of having moderate to severe substernal chest pain,   admitted with a/c syst chf with worsening ef from 2020 from 55--> 45-50%  atypical chest pain nst 10/23 no ischemia  BRIDGETTE vs CKD with scr in may 2023 in normal range    ReC  - US renal/bladder  - Proteinuria, nephrotic range proteinuria     UA >1000 protein Prot/cr ratio      initiate work-up  - cw po lasix   - fu trend of scr and uop  - await decision regarding cardiac cath,

## 2024-01-17 NOTE — PROGRESS NOTE ADULT - SUBJECTIVE AND OBJECTIVE BOX
Mr. Edwards is a 64 yo M with a PMH significant for HTN, T2DM  HIV+ , no primary care ( suspect non compliance pt tells me he has Medicaid)  no cardiologist, presents with cc of having moderate to severe substernal chest pain, radiation to the left shoulder, exacerbated with exertion. Patient has no abdominal pain. Hx of asthma as well. Takes no meds at home due to lack of access to health care. Has evidence of Pulm. edema, and bilat pl effusions adm for chf exacerbation  (15 Marcello 2024 09:13)    Angina last night neg trop    PHYSICAL EXAM:  ICU Vital Signs Last 24 Hrs  T(C): 37.3 (17 Jan 2024 06:15), Max: 37.3 (17 Jan 2024 06:15)  T(F): 99.1 (17 Jan 2024 06:15), Max: 99.1 (17 Jan 2024 06:15)  HR: 92 (17 Jan 2024 06:15) (88 - 92)  BP: 157/74 (17 Jan 2024 06:15) (157/74 - 162/76)  RR: 18 (17 Jan 2024 06:15) (18 - 18)  SpO2: 92% (17 Jan 2024 06:15) (92% - 93%  NERVOUS SYSTEM:  Alert & Oriented X3, Motor Strength 5/5 B/L upper and lower extremities; DTRs 2+ intact and symmetric  CHEST/LUNG: Clear to auscultation bilaterally; No rales, rhonchi, wheezing, or rubs  HEART: Regular rate and rhythm; No murmurs, rubs, or gallops  ABDOMEN: Soft, Nontender, Nondistended; Bowel sounds present  EXTREMITIES:  2+ Peripheral Pulses, No clubbing, cyanosis, or edema    LABS:  Urinalysis Basic - ( 16 Jan 2024 07:44 )    Color: x / Appearance: x / SG: x / pH: x  Gluc: 204 mg/dL / Ketone: x  / Bili: x / Urobili: x   Blood: x / Protein: x / Nitrite: x   Leuk Esterase: x / RBC: x / WBC x   Sq Epi: x / Non Sq Epi: x / Bacteria: x    143  |  108  |  24<H>  ----------------------------<  204<H>  3.6   |  29  |  1.71<H>    Ca    8.6      16 Jan 2024 07:44                  # Acute on chronic systolic HF  - Pt reporting with 1 month of SOB. ProBNP elevated 1758, CT without PE but with pulm edema, effusions   - Echocardiogram with Mod MR, Mild TR, LA mod dilated, Reduced LVF, EF 45-50%, mild LVH, segmental WMA present inferior, lateral wall hypokinesis, Trace-small pericardial effusion, pleural effusion.  Of note, echo from 2020 shows EF 55%  - continue IV diuresis with lasix 40mg IV BID.  Please perform daily standing weights.    # Acute chest pain  - now chest pain free  -  pt presents with 1 day of constant pain. atypical for CAD/ACS with trop neg x2  - Echo as above with reduced LVF, EF 45-50%, inferior, lateral wall hypokinesis.     - recent nuclear stress test in 10/2023 shows no ischemia, small fixed apical defect, EF 47%  - CT calcium score minimal at 3; <25% percentile   -  patient has renal insufficiency , will discuss with interventionalist ? cardiac cath.    # Acute on chronic renal failure  - last Scr 1.72    * T2Dm  not on any meds  start sliding scale    * h/o HIV unknown viral load on no meds            time spent:        Mr. Edwards is a 62 yo M with a PMH significant for HTN, T2DM  HIV+ , no primary care ( suspect non compliance pt tells me he has Medicaid)  no cardiologist, presents with cc of having moderate to severe substernal chest pain, radiation to the left shoulder, exacerbated with exertion. Patient has no abdominal pain. Hx of asthma as well. Takes no meds at home due to lack of access to health care. Has evidence of Pulm. edema, and bilat pl effusions adm for chf exacerbation  (15 Marcello 2024 09:13)      Medical progress: patient continues complaining of chest pain -  it is clear that pain is reproducible Denies any associated symptoms of SOB /  diaphoresis. no jaw pain. + back pain. Care discussed with cardiology team -  needs a cath (worsenign renal function)  Complaints: chest discomfort  State of mind: normal    PHYSICAL EXAM:  ICU Vital Signs Last 24 Hrs  T(C): 37.3 (17 Jan 2024 06:15), Max: 37.3 (17 Jan 2024 06:15)  T(F): 99.1 (17 Jan 2024 06:15), Max: 99.1 (17 Jan 2024 06:15)  HR: 92 (17 Jan 2024 06:15) (88 - 92)  BP: 157/74 (17 Jan 2024 06:15) (157/74 - 162/76)  RR: 18 (17 Jan 2024 06:15) (18 - 18)  SpO2: 92% (17 Jan 2024 06:15) (92% - 93%  NERVOUS SYSTEM:  Alert & Oriented X3, Motor Strength 5/5 B/L upper and lower extremities; DTRs 2+ intact and symmetric  CHEST/LUNG: Clear to auscultation bilaterally; No rales, rhonchi, wheezing, or rubs  HEART: Regular rate and rhythm; No murmurs, rubs, or gallops  ABDOMEN: Soft, Nontender, Nondistended; Bowel sounds present  EXTREMITIES:  2+ Peripheral Pulses, No clubbing, cyanosis, or edema    LABS:          Urinalysis Basic - ( 17 Jan 2024 08:14 )    Color: x / Appearance: x / SG: x / pH: x  Gluc: 197 mg/dL / Ketone: x  / Bili: x / Urobili: x   Blood: x / Protein: x / Nitrite: x   Leuk Esterase: x / RBC: x / WBC x   Sq Epi: x / Non Sq Epi: x / Bacteria: x      01-17    136  |  104  |  31<H>  ----------------------------<  197<H>  3.3<L>   |  30  |  1.75<H>    Ca    8.9      17 Jan 2024 08:14                      # Acute on chronic systolic HF  - Pt reporting with 1 month of SOB. ProBNP elevated 1758, CT without PE but with pulm edema, effusions   - Echocardiogram with Mod MR, Mild TR, LA mod dilated, Reduced LVF, EF 45-50%, mild LVH, segmental WMA present inferior, lateral wall hypokinesis, Trace-small pericardial effusion, pleural effusion.  Of note, echo from 2020 shows EF 55%  - continue IV diuresis with lasix 40mg IV BID.  Please perform daily standing weights.    # Acute chest pain  -  pt presents with 1 day of constant pain. atypical for CAD/ACS with trop neg x2  - Echo as above with reduced LVF, EF 45-50%, inferior, lateral wall hypokinesis.     - recent nuclear stress test in 10/2023 shows no ischemia, small fixed apical defect, EF 47%  - patient has renal insufficiency , will discuss with interventionalist ? cardiac cath.  - care discussed with caridology team    # Acute on chronic renal failure  - last Scr 1.72    * T2Dm  not on any meds  start sliding scale    * h/o HIV unknown viral load on no meds            time spent:        Mr. Edwards is a 64 yo M with a PMH significant for HTN, T2DM  HIV+ , no primary care ( suspect non compliance pt tells me he has Medicaid)  no cardiologist, presents with cc of having moderate to severe substernal chest pain, radiation to the left shoulder, exacerbated with exertion. Patient has no abdominal pain. Hx of asthma as well. Takes no meds at home due to lack of access to health care. Has evidence of Pulm. edema, and bilat pl effusions adm for chf exacerbation  (15 Marcello 2024 09:13)      Medical progress: patient continues complaining of chest pain -  it is clear that pain is reproducible Denies any associated symptoms of SOB /  diaphoresis. no jaw pain. + back pain. Care discussed with cardiology team -  needs a cath (worsenign renal function)  Complaints: chest discomfort  State of mind: normal    transition from IV lasix -> po lasix    PHYSICAL EXAM:  ICU Vital Signs Last 24 Hrs  T(C): 37.3 (17 Jan 2024 06:15), Max: 37.3 (17 Jan 2024 06:15)  T(F): 99.1 (17 Jan 2024 06:15), Max: 99.1 (17 Jan 2024 06:15)  HR: 92 (17 Jan 2024 06:15) (88 - 92)  BP: 157/74 (17 Jan 2024 06:15) (157/74 - 162/76)  RR: 18 (17 Jan 2024 06:15) (18 - 18)  SpO2: 92% (17 Jan 2024 06:15) (92% - 93%  NERVOUS SYSTEM:  Alert & Oriented X3, Motor Strength 5/5 B/L upper and lower extremities; DTRs 2+ intact and symmetric  CHEST/LUNG: Clear to auscultation bilaterally; No rales, rhonchi, wheezing, or rubs  HEART: Regular rate and rhythm; No murmurs, rubs, or gallops  ABDOMEN: Soft, Nontender, Nondistended; Bowel sounds present  EXTREMITIES:  2+ Peripheral Pulses, No clubbing, cyanosis, or edema    LABS:          Urinalysis Basic - ( 17 Jan 2024 08:14 )    Color: x / Appearance: x / SG: x / pH: x  Gluc: 197 mg/dL / Ketone: x  / Bili: x / Urobili: x   Blood: x / Protein: x / Nitrite: x   Leuk Esterase: x / RBC: x / WBC x   Sq Epi: x / Non Sq Epi: x / Bacteria: x      01-17    136  |  104  |  31<H>  ----------------------------<  197<H>  3.3<L>   |  30  |  1.75<H>    Ca    8.9      17 Jan 2024 08:14                      # Acute on chronic systolic HF  - Pt reporting with 1 month of SOB. ProBNP elevated 1758, CT without PE but with pulm edema, effusions   - Echocardiogram with Mod MR, Mild TR, LA mod dilated, Reduced LVF, EF 45-50%, mild LVH, segmental WMA present inferior, lateral wall hypokinesis, Trace-small pericardial effusion, pleural effusion.  Of note, echo from 2020 shows EF 55%  - continue IV diuresis with lasix 40mg IV BID.  Please perform daily standing weights.    # Acute chest pain  -  pt presents with 1 day of constant pain. atypical for CAD/ACS with trop neg x2  - Echo as above with reduced LVF, EF 45-50%, inferior, lateral wall hypokinesis.     - recent nuclear stress test in 10/2023 shows no ischemia, small fixed apical defect, EF 47%  - patient has renal insufficiency , will discuss with interventionalist ? cardiac cath.  - care discussed with caridology team    # Acute on chronic renal failure  - last Scr 1.72    * T2Dm  not on any meds  start sliding scale    * h/o HIV unknown viral load on no meds            time spent:

## 2024-01-18 ENCOUNTER — TRANSCRIPTION ENCOUNTER (OUTPATIENT)
Age: 64
End: 2024-01-18

## 2024-01-18 VITALS
HEART RATE: 87 BPM | OXYGEN SATURATION: 95 % | DIASTOLIC BLOOD PRESSURE: 89 MMHG | TEMPERATURE: 97 F | SYSTOLIC BLOOD PRESSURE: 154 MMHG | RESPIRATION RATE: 18 BRPM

## 2024-01-18 DIAGNOSIS — I42.8 OTHER CARDIOMYOPATHIES: ICD-10-CM

## 2024-01-18 LAB
ANION GAP SERPL CALC-SCNC: 2 MMOL/L — LOW (ref 5–17)
BUN SERPL-MCNC: 36 MG/DL — HIGH (ref 7–23)
C3 SERPL-MCNC: 150 MG/DL — SIGNIFICANT CHANGE UP (ref 81–157)
C4 SERPL-MCNC: 42 MG/DL — HIGH (ref 13–39)
CALCIUM SERPL-MCNC: 8.6 MG/DL — SIGNIFICANT CHANGE UP (ref 8.5–10.1)
CHLORIDE SERPL-SCNC: 103 MMOL/L — SIGNIFICANT CHANGE UP (ref 96–108)
CO2 SERPL-SCNC: 32 MMOL/L — HIGH (ref 22–31)
CREAT SERPL-MCNC: 1.72 MG/DL — HIGH (ref 0.5–1.3)
EGFR: 44 ML/MIN/1.73M2 — LOW
GLUCOSE BLDC GLUCOMTR-MCNC: 153 MG/DL — HIGH (ref 70–99)
GLUCOSE BLDC GLUCOMTR-MCNC: 385 MG/DL — HIGH (ref 70–99)
GLUCOSE BLDC GLUCOMTR-MCNC: 420 MG/DL — HIGH (ref 70–99)
GLUCOSE SERPL-MCNC: 258 MG/DL — HIGH (ref 70–99)
HCT VFR BLD CALC: 33.9 % — LOW (ref 39–50)
HGB BLD-MCNC: 11 G/DL — LOW (ref 13–17)
MCHC RBC-ENTMCNC: 27.8 PG — SIGNIFICANT CHANGE UP (ref 27–34)
MCHC RBC-ENTMCNC: 32.4 GM/DL — SIGNIFICANT CHANGE UP (ref 32–36)
MCV RBC AUTO: 85.6 FL — SIGNIFICANT CHANGE UP (ref 80–100)
PLATELET # BLD AUTO: 318 K/UL — SIGNIFICANT CHANGE UP (ref 150–400)
POTASSIUM SERPL-MCNC: 4 MMOL/L — SIGNIFICANT CHANGE UP (ref 3.5–5.3)
POTASSIUM SERPL-SCNC: 4 MMOL/L — SIGNIFICANT CHANGE UP (ref 3.5–5.3)
RBC # BLD: 3.96 M/UL — LOW (ref 4.2–5.8)
RBC # FLD: 14.5 % — SIGNIFICANT CHANGE UP (ref 10.3–14.5)
SODIUM SERPL-SCNC: 137 MMOL/L — SIGNIFICANT CHANGE UP (ref 135–145)
WBC # BLD: 6.93 K/UL — SIGNIFICANT CHANGE UP (ref 3.8–10.5)
WBC # FLD AUTO: 6.93 K/UL — SIGNIFICANT CHANGE UP (ref 3.8–10.5)

## 2024-01-18 PROCEDURE — 99232 SBSQ HOSP IP/OBS MODERATE 35: CPT

## 2024-01-18 RX ORDER — ASPIRIN/CALCIUM CARB/MAGNESIUM 324 MG
1 TABLET ORAL
Qty: 30 | Refills: 0
Start: 2024-01-18 | End: 2024-02-16

## 2024-01-18 RX ORDER — METFORMIN HYDROCHLORIDE 850 MG/1
1 TABLET ORAL
Qty: 30 | Refills: 0
Start: 2024-01-18 | End: 2024-02-16

## 2024-01-18 RX ORDER — EMPAGLIFLOZIN 10 MG/1
1 TABLET, FILM COATED ORAL
Qty: 30 | Refills: 0
Start: 2024-01-18 | End: 2024-02-16

## 2024-01-18 RX ORDER — FUROSEMIDE 40 MG
1 TABLET ORAL
Qty: 30 | Refills: 0
Start: 2024-01-18 | End: 2024-02-16

## 2024-01-18 RX ORDER — ATORVASTATIN CALCIUM 80 MG/1
1 TABLET, FILM COATED ORAL
Qty: 30 | Refills: 0
Start: 2024-01-18 | End: 2024-02-16

## 2024-01-18 RX ORDER — METOPROLOL TARTRATE 50 MG
1 TABLET ORAL
Qty: 30 | Refills: 0
Start: 2024-01-18 | End: 2024-02-16

## 2024-01-18 RX ORDER — METOPROLOL TARTRATE 50 MG
25 TABLET ORAL DAILY
Refills: 0 | Status: DISCONTINUED | OUTPATIENT
Start: 2024-01-18 | End: 2024-01-18

## 2024-01-18 RX ADMIN — Medication 5: at 12:18

## 2024-01-18 RX ADMIN — Medication 1: at 18:17

## 2024-01-18 RX ADMIN — ENOXAPARIN SODIUM 40 MILLIGRAM(S): 100 INJECTION SUBCUTANEOUS at 09:33

## 2024-01-18 RX ADMIN — Medication 4 UNIT(S): at 12:19

## 2024-01-18 RX ADMIN — Medication 1 INCH(S): at 09:32

## 2024-01-18 RX ADMIN — Medication 4 UNIT(S): at 18:17

## 2024-01-18 RX ADMIN — Medication 40 MILLIGRAM(S): at 09:33

## 2024-01-18 RX ADMIN — Medication 81 MILLIGRAM(S): at 09:33

## 2024-01-18 NOTE — DISCHARGE NOTE PROVIDER - NSDCMRMEDTOKEN_GEN_ALL_CORE_FT
aspirin 81 mg oral tablet, chewable: 1 tab(s) orally once a day  atorvastatin 40 mg oral tablet: 1 tab(s) orally once a day (at bedtime)  furosemide 40 mg oral tablet: 1 tab(s) orally once a day  metoprolol succinate 25 mg oral tablet, extended release: 1 tab(s) orally once a day   aspirin 81 mg oral tablet, chewable: 1 tab(s) orally once a day  atorvastatin 40 mg oral tablet: 1 tab(s) orally once a day (at bedtime)  furosemide 40 mg oral tablet: 1 tab(s) orally once a day  Jardiance 10 mg oral tablet: 1 tab(s) orally once a day  metFORMIN 500 mg oral tablet: 1 tab(s) orally once a day  metoprolol succinate 25 mg oral tablet, extended release: 1 tab(s) orally once a day

## 2024-01-18 NOTE — PROGRESS NOTE ADULT - ASSESSMENT
62 yo M with a PMH significant for HTN, T2DM  HIV+ , no primary care ( suspect non compliance pt tells me he has Medicaid)  no cardiologist, presents with cc of having moderate to severe substernal chest pain,   admitted with a/c syst chf with worsening ef from 2020 from 55--> 45-50%  atypical chest pain nst 10/23 no ischemia  BRIDGETTE vs CKD with scr in may 2023 in normal range    ReC  - US renal/bladder  - Proteinuria, nephrotic range proteinuria     UA >1000 protein Prot/cr ratio      initiate work-up  - cw po lasix   - fu trend of scr and uop  - await decision regarding cardiac cath,  \    1/18 SY  --likely CKD --renal USG with bilaterally echogenic kidneys.  renal parameters holding fairly steady.  --Recheck UA and urine prot/creat to assess proteinuria, thoug h likely due to uncontrolled DM  --D/w Card : pt had negative stress test in 10/2023, no plans for cardiac cath.  --Continue po lasix.

## 2024-01-18 NOTE — PROGRESS NOTE ADULT - SUBJECTIVE AND OBJECTIVE BOX
CHIEF COMPLAINT: chest pain, SOB       HPI:  Mr. Edwards is a 62 yo male with a hx hypertension, DM type II, HIV+ and nonadherent with any medications over the past 6 months presenting with chest pain and SOB. He reports one month of worsening exertional dyspnea. Chest pain started yesterday and has been constant. Also reports dry cough. Denies fevers/chills, LE edema.     He has no cardiac hx. He was recently evaluated by [facility] cardiology [place] [facility] for chest pain in 10/'2023.   Nuclear stress test at the time showed  small, fixed apical wall defect without evidence of ischemia.     In the ED, he was afebrile, HR 90, hypertensive to 174/97, saturating 96%  Labs notable for trop neg x2, hgb 10.2, Ddimer 472, Cr 1.68, proBNP 1758, RVP and COVID negative.   CTA Negative for pulmonary embolus, Bilateral pleural effusions, Pulmonary edema.    Cardiology consulted for HF     1/16/24: Pt continues to report constant chest pain.  + SOB, +dry NP cough.  Tele: RSR  1/17/24: pt./ c/o chest and back pain, second set of trops negative,  normal EKG, Tele NST 80-90   1/18/24: chest and back pain, asking for pain meds, Tele: NSR 70a     MEDICATIONS  (STANDING):  aspirin  chewable 81 milliGRAM(s) Oral daily  atorvastatin 40 milliGRAM(s) Oral at bedtime  dextrose 5%. 1000 milliLiter(s) (100 mL/Hr) IV Continuous <Continuous>  dextrose 5%. 1000 milliLiter(s) (50 mL/Hr) IV Continuous <Continuous>  dextrose 50% Injectable 25 Gram(s) IV Push once  dextrose 50% Injectable 12.5 Gram(s) IV Push once  dextrose 50% Injectable 25 Gram(s) IV Push once  enoxaparin Injectable 40 milliGRAM(s) SubCutaneous every 24 hours  furosemide    Tablet 40 milliGRAM(s) Oral daily  glucagon  Injectable 1 milliGRAM(s) IntraMuscular once  insulin glargine Injectable (LANTUS) 12 Unit(s) SubCutaneous at bedtime  insulin lispro (ADMELOG) corrective regimen sliding scale   SubCutaneous three times a day before meals  insulin lispro Injectable (ADMELOG) 4 Unit(s) SubCutaneous three times a day before meals  metoprolol succinate ER 25 milliGRAM(s) Oral daily  nitroglycerin    2% Ointment 1 Inch(s) Transdermal daily      MEDICATIONS  (PRN):  acetaminophen     Tablet .. 650 milliGRAM(s) Oral every 6 hours PRN Temp greater or equal to 38C (100.4F), Mild Pain (1 - 3)  aluminum hydroxide/magnesium hydroxide/simethicone Suspension 30 milliLiter(s) Oral every 4 hours PRN Dyspepsia  dextrose Oral Gel 15 Gram(s) Oral once PRN Blood Glucose LESS THAN 70 milliGRAM(s)/deciliter  melatonin 3 milliGRAM(s) Oral at bedtime PRN Insomnia  ondansetron Injectable 4 milliGRAM(s) IV Push every 8 hours PRN Nausea and/or Vomiting    Vital Signs Last 24 Hrs  T(C): 36.3 (18 Jan 2024 08:45), Max: 37.5 (17 Jan 2024 23:20)  T(F): 97.4 (18 Jan 2024 08:45), Max: 99.5 (17 Jan 2024 23:20)  HR: 87 (18 Jan 2024 08:45) (83 - 92)  BP: 154/89 (18 Jan 2024 08:45) (145/67 - 154/89)  BP(mean): --  RR: 18 (18 Jan 2024 08:45) (18 - 18)  SpO2: 95% (18 Jan 2024 08:45) (92% - 95%)    Parameters below as of 18 Jan 2024 08:45  Patient On (Oxygen Delivery Method): room air      PHYSICAL EXAM:  Constitutional: NAD, awake and alert  HEENT:  EOMI,  Pupils round, No oral cyanosis.  Pulmonary: Non-labored, mild bibasilar crackles, No wheezing, rales or rhonchi  Cardiovascular: S1 and S2, regular rate and rhythm, no Murmurs, gallops or rubs  Gastrointestinal: Bowel Sounds present, soft, nontender.   Lymph: No peripheral edema. No cervical lymphadenopathy.  Neurological: Alert, no focal deficits  Skin: No rashes.  Psych:  Mood & affect appropriate    LABS: reviewed                           11.0   6.93  )-----------( 318      ( 18 Jan 2024 10:26 )             33.9     01-18    137  |  103  |  36<H>  ----------------------------<  258<H>  4.0   |  32<H>  |  1.72<H>    Ca    8.6      18 Jan 2024 10:26      - TroponinI hsT: <-17.05, <-19.65, <-25.62, <-27.13    - TroponinI hsT: <-17.05, <-19.65, <-25.62, <-27.13    < from: Nuclear Stress Test-Pharmacologic (10.10.23 @ 06:30) >  IMPRESSIONS:  * Myocardial Perfusion SPECT results are mildly abnormal.  * There is a small, mild defect in apical wall that is  fixed, defect persists on prone imaging suggestive of  infarct, no clear evidence of ischemia, however overall  study quality is poor hence may reduce overall diganostic  accuracy, consider alternative ischemic imaging modality  if high index of clinical suspicion for ischemic heart  disaese.  * Post-stress resting myocardial perfusiongated SPECT  imaging was performed (LVEF = 47 %;LVEDV = 112 ml.), mild  global hypokinesis.    ------------------------------------------------------------------------        < end of copied text >    < from: TTE Echo Complete w/o Contrast w/ Doppler (01.15.24 @ 15:50) >     The aortic valve is well visualized, appears mildly calcified. Valve   opening seems to be normal.   Trace aortic regurgitation is present.   The left atrium is moderately dilated.   Estimated left ventricular ejection fraction is 45-50%.   The left ventricle is normal in size.   Mild concentric left ventricular hypertrophy is present.   Segmental wall motion abnormalities are present with a moderately reduced   LV function. Inferior, lateral wall hypokinesis   All visualized extra cardiac structures appears to be normal.   There is calcification of anterior mitral valve leaflet. The leaflet   opening is normal.   Mild mitral annular calcification is present.   Moderate (2+) eccentric mitral regurgitation is present.   Trace to small pericardial effusion is present near the right atrium.   Pleural effusion- is present.   Normal appearing pulmonic valve structure.   Mild pulmonic valvular regurgitation (1+) is present.   Normal appearing right atrium.   Normal appearing right ventricle structure and function.   Normal appearing tricuspid valve structure.   Mild (1+) tricuspid valve regurgitation is present.                 REASON FOR VISIT: Chest pain    HPI:  Mr. Edwards is a 62 yo male with a hx hypertension, DM type II, HIV+ and nonadherent with any medications over the past 6 months presenting with chest pain and SOB. He reports one month of worsening exertional dyspnea. Chest pain started yesterday and has been constant. Also reports dry cough. Denies fevers/chills, LE edema.     He has no cardiac hx. He was recently evaluated by [facility] cardiology [place] [facility] for chest pain in 10/'2023.   Nuclear stress test at the time showed  small, fixed apical wall defect without evidence of ischemia.     In the ED, he was afebrile, HR 90, hypertensive to 174/97, saturating 96%  Labs notable for trop neg x2, hgb 10.2, Ddimer 472, Cr 1.68, proBNP 1758, RVP and COVID negative.   CTA Negative for pulmonary embolus, Bilateral pleural effusions, Pulmonary edema.    Cardiology consulted for HF     1/16/24: Pt continues to report constant chest pain.  + SOB, +dry NP cough.  Tele: RSR  1/17/24: pt./ c/o chest and back pain, second set of trops negative,  normal EKG, Tele NST 80-90   1/18/24: chest and back pain, asking for pain meds, Tele: NSR 70s    MEDICATIONS  (STANDING):  aspirin  chewable 81 milliGRAM(s) Oral daily  atorvastatin 40 milliGRAM(s) Oral at bedtime  dextrose 5%. 1000 milliLiter(s) (100 mL/Hr) IV Continuous <Continuous>  dextrose 5%. 1000 milliLiter(s) (50 mL/Hr) IV Continuous <Continuous>  dextrose 50% Injectable 25 Gram(s) IV Push once  dextrose 50% Injectable 12.5 Gram(s) IV Push once  dextrose 50% Injectable 25 Gram(s) IV Push once  enoxaparin Injectable 40 milliGRAM(s) SubCutaneous every 24 hours  furosemide    Tablet 40 milliGRAM(s) Oral daily  glucagon  Injectable 1 milliGRAM(s) IntraMuscular once  insulin glargine Injectable (LANTUS) 12 Unit(s) SubCutaneous at bedtime  insulin lispro (ADMELOG) corrective regimen sliding scale   SubCutaneous three times a day before meals  insulin lispro Injectable (ADMELOG) 4 Unit(s) SubCutaneous three times a day before meals  metoprolol succinate ER 25 milliGRAM(s) Oral daily  nitroglycerin    2% Ointment 1 Inch(s) Transdermal daily      MEDICATIONS  (PRN):  acetaminophen     Tablet .. 650 milliGRAM(s) Oral every 6 hours PRN Temp greater or equal to 38C (100.4F), Mild Pain (1 - 3)  aluminum hydroxide/magnesium hydroxide/simethicone Suspension 30 milliLiter(s) Oral every 4 hours PRN Dyspepsia  dextrose Oral Gel 15 Gram(s) Oral once PRN Blood Glucose LESS THAN 70 milliGRAM(s)/deciliter  melatonin 3 milliGRAM(s) Oral at bedtime PRN Insomnia  ondansetron Injectable 4 milliGRAM(s) IV Push every 8 hours PRN Nausea and/or Vomiting    Vital Signs Last 24 Hrs  T(C): 36.3 (18 Jan 2024 08:45), Max: 37.5 (17 Jan 2024 23:20)  T(F): 97.4 (18 Jan 2024 08:45), Max: 99.5 (17 Jan 2024 23:20)  HR: 87 (18 Jan 2024 08:45) (83 - 92)  BP: 154/89 (18 Jan 2024 08:45) (145/67 - 154/89)  RR: 18 (18 Jan 2024 08:45) (18 - 18)  SpO2: 95% (18 Jan 2024 08:45) (92% - 95%)  Patient On (Oxygen Delivery Method): room air    PHYSICAL EXAM:  Constitutional: NAD, awake and alert  HEENT:  EOMI,  Pupils round, No oral cyanosis.  Pulmonary: Non-labored, mild bibasilar crackles, No wheezing, rales or rhonchi  Cardiovascular: S1 and S2, regular rate and rhythm, no Murmurs, gallops or rubs  Gastrointestinal: Bowel Sounds present, soft, nontender.   Lymph: No peripheral edema. No cervical lymphadenopathy.  Neurological: Alert, no focal deficits  Skin: No rashes.  Psych:  Mood & affect appropriate    LABS:                       11.0   6.93  )-----------( 318      ( 18 Jan 2024 10:26 )             33.9     137  |  103  |  36<H>  ----------------------------<  258<H>  4.0   |  32<H>  |  1.72<H>    Ca    8.6      18 Jan 2024 10:26    TroponinI hsT: <-17.05, <-19.65, <-25.62, <-27.13    Nuclear Stress Test-Pharmacologic (10.10.23 @ 06:30):  IMPRESSIONS:  * Myocardial Perfusion SPECT results are mildly abnormal.  * There is a small, mild defect in apical wall that is fixed, defect persists on prone imaging suggestive of infarct, no clear evidence of ischemia, however overall study quality is poor hence may reduce overall diganostic accuracy, consider alternative ischemic imaging modality if high index of clinical suspicion for ischemic heart disaese.  * Post-stress resting myocardial perfusiongated SPECT imaging was performed (LVEF = 47 %;LVEDV = 112 ml.), mild global hypokinesis.    TTE Echo Complete w/o Contrast w/ Doppler (01.15.24 @ 15:50) >   The aortic valve is well visualized, appears mildly calcified. Valve opening seems to be normal. Trace aortic regurgitation is present.   The left atrium is moderately dilated.   Estimated left ventricular ejection fraction is 45-50%.   The left ventricle is normal in size. Mild concentric left ventricular hypertrophy is present. Segmental wall motion abnormalities are present with a moderately reduced LV function. Inferior, lateral wall hypokinesis   All visualized extra cardiac structures appears to be normal.   There is calcification of anterior mitral valve leaflet. The leaflet opening is normal.   Mild mitral annular calcification is present.  Moderate (2+) eccentric mitral regurgitation is present.   Trace to small pericardial effusion is present near the right atrium.   Pleural effusion- is present.   Normal appearing pulmonic valve structure. Mild pulmonic valvular regurgitation (1+) is present.   Normal appearing right atrium.   Normal appearing right ventricle structure and function.   Normal appearing tricuspid valve structure.   Mild (1+) tricuspid valve regurgitation is present.

## 2024-01-18 NOTE — DISCHARGE NOTE PROVIDER - HOSPITAL COURSE
Pt is a 64 yo M with a PMH significant for HTN, T2DM  HIV+ , no primary care ( suspect non compliance pt tells me he has Medicaid)  no cardiologist, presents with cc of having moderate to severe substernal chest pain, radiation to the left shoulder, exacerbated with exertion. Patient has no abdominal pain. Hx of asthma as well. Takes no meds at home due to lack of access to health care. Has evidence of Pulm. edema, and bilat pl effusions adm for chf exacerbation.       Medical progress: patient continues complaining of chest pain -  it is clear that pain is reproducible Denies any associated symptoms of SOB /  diaphoresis. no jaw pain. + back pain. Care discussed with cardiology team -  needs a cath (worsenign renal function)  Complaints: chest discomfort  State of mind: normal    Patient has a history of poor compliance with follow up and taking medications.     Care discussed with cardiology -  medical managment strongly recommended follow up with cardiology    Physical Exam:   GENERAL APPEARANCE:  NAD, hemodynamically stable  T(C): 36.3 (01-18-24 @ 08:45), Max: 37.5 (01-17-24 @ 23:20)  HR: 87 (01-18-24 @ 08:45) (83 - 92)  BP: 154/89 (01-18-24 @ 08:45) (145/67 - 154/89)  RR: 18 (01-18-24 @ 08:45) (18 - 18)  SpO2: 95% (01-18-24 @ 08:45) (92% - 95%)  HEENT:  Head is normocephalic    Skin: thin and dry  NECK:  Supple without lymphadenopathy.   HEART:  Regular rate and rhythm. normal S1 and S2, No M/R/G  LUNGS:  Good ins/exp effort, no W/R/R/C  ABDOMEN:  Soft, nontender, nondistended with good bowel sounds heard  EXTREMITIES:  Without cyanosis, clubbing or edema.   NEUROLOGICAL:  Gross nonfocal

## 2024-01-18 NOTE — PROGRESS NOTE ADULT - NS ATTEND AMEND GEN_ALL_CORE FT
Recent cardiac work-up reviewed; no ischemia on stress test in 10/2023 + normal assays of troponin + ECG without ST segment deviation; management as described above.
Agree with the above. chest pain, troponin neg but with WMA on echo, mildly reduced LVEF and prior stress test poor quality.   will plan for cardiac cath once improved from renal standpoint
as above

## 2024-01-18 NOTE — PROGRESS NOTE ADULT - SUBJECTIVE AND OBJECTIVE BOX
NEPHROLOGY INTERVAL HPI/OVERNIGHT EVENTS:    Date of Service: 24 @ 11:38    --Resting comfortably.  No SOB.  but c/o pain in chest, back and in knee.  asking for pain meds.  Has been taking Vicodin for 10 years per pt.  --has been getting them in [place].  unclear if has been taking any OTC meds for pain.    HPI:  Mr. Edwards is a 62 yo M with a PMH significant for HTN, T2DM  HIV+ , no primary care ( suspect non compliance pt tells me he has Medicaid)  no cardiologist, presents with cc of having moderate to severe substernal chest pain, radiation to the left shoulder, exacerbated with exertion. Patient has no abdominal pain. Hx of asthma as well. Takes no meds at home due to lack of access to health care. Has evidence of Pulm. edema, and bilat pl effusions adm for chf exacerbation  (15 Marcello 2024 09:13)      Patient is a 63y old  Male who presents with a chief complaint of Chest Pain (2024 11:39)  ================================================================  NEPHROLOGY CONSULT   above hx corroborated with pt   has no outpt fu and does not take in any medicaitons  states [place] on the streets  admitted with Chest pain with a/c syst chf with ef of 40-45%   on iv lasix with improvement of his symptoms   unclear if cardiac cath planned   pt with BRIDGETTE in 2023 has normal renal fx   in past 6 moths with scr in 1.7 range       PAST MEDICAL & SURGICAL HISTORY:  - htn   - dm   - head trauma  - insomnia   - anxiety   - chronic back pain   - HIV infection  - b/l cataracts   - s/p lumbar fusion in [place]      MEDICATIONS  (STANDING):  aspirin  chewable 81 milliGRAM(s) Oral daily  atorvastatin 40 milliGRAM(s) Oral at bedtime  dextrose 5%. 1000 milliLiter(s) (100 mL/Hr) IV Continuous <Continuous>  dextrose 5%. 1000 milliLiter(s) (50 mL/Hr) IV Continuous <Continuous>  dextrose 50% Injectable 25 Gram(s) IV Push once  dextrose 50% Injectable 12.5 Gram(s) IV Push once  dextrose 50% Injectable 25 Gram(s) IV Push once  enoxaparin Injectable 40 milliGRAM(s) SubCutaneous every 24 hours  furosemide    Tablet 40 milliGRAM(s) Oral daily  glucagon  Injectable 1 milliGRAM(s) IntraMuscular once  insulin glargine Injectable (LANTUS) 12 Unit(s) SubCutaneous at bedtime  insulin lispro (ADMELOG) corrective regimen sliding scale   SubCutaneous three times a day before meals  insulin lispro Injectable (ADMELOG) 4 Unit(s) SubCutaneous three times a day before meals  metoprolol succinate ER 25 milliGRAM(s) Oral daily  nitroglycerin    2% Ointment 1 Inch(s) Transdermal daily    MEDICATIONS  (PRN):  acetaminophen     Tablet .. 650 milliGRAM(s) Oral every 6 hours PRN Temp greater or equal to 38C (100.4F), Mild Pain (1 - 3)  aluminum hydroxide/magnesium hydroxide/simethicone Suspension 30 milliLiter(s) Oral every 4 hours PRN Dyspepsia  dextrose Oral Gel 15 Gram(s) Oral once PRN Blood Glucose LESS THAN 70 milliGRAM(s)/deciliter  melatonin 3 milliGRAM(s) Oral at bedtime PRN Insomnia  ondansetron Injectable 4 milliGRAM(s) IV Push every 8 hours PRN Nausea and/or Vomiting    Vital Signs Last 24 Hrs  T(C): 36.3 (2024 08:45), Max: 37.5 (2024 23:20)  T(F): 97.4 (2024 08:45), Max: 99.5 (2024 23:20)  HR: 87 (2024 08:45) (83 - 92)  BP: 154/89 (2024 08:45) (145/67 - 154/89)  BP(mean): --  RR: 18 (2024 08:45) (18 - 18)  SpO2: 95% (2024 08:45) (92% - 95%)    Parameters below as of 2024 08:45  Patient On (Oxygen Delivery Method): room air      Daily     Daily Weight in k (2024 06:24)      PHYSICAL EXAM:  GENERAL: no distress  CHEST/LUNG: Fair air entry  HEART: S1S2 RRR  ABDOMEN: soft  EXTREMITIES: no edema  SKIN:     LABS:                        11.0   6.93  )-----------( 318      ( 2024 10:26 )             33.9     01-18    137  |  103  |  36<H>  ----------------------------<  258<H>  4.0   |  32<H>  |  1.72<H>    Ca    8.6      2024 10:26        Urinalysis Basic - ( 2024 10:26 )    Color: x / Appearance: x / SG: x / pH: x  Gluc: 258 mg/dL / Ketone: x  / Bili: x / Urobili: x   Blood: x / Protein: x / Nitrite: x   Leuk Esterase: x / RBC: x / WBC x   Sq Epi: x / Non Sq Epi: x / Bacteria: x              RADIOLOGY & ADDITIONAL TESTS:

## 2024-01-18 NOTE — DISCHARGE NOTE NURSING/CASE MANAGEMENT/SOCIAL WORK - NSDCVIVACCINE_GEN_ALL_CORE_FT
Tdap; 27-Aug-2018 13:17; Pamela Albarran (CAT[facility]); [employer]; [id number]; IntraMuscular; Deltoid Left.; 0.5 milliLiter(s); VIS (VIS Published: 09-May-2013, VIS Presented: 27-Aug-2018);   Tdap; 09-Feb-2019 17:54; Abby Marion); [employer]; [id number] (Exp. Date: 02-Nov-2020); IntraMuscular; Deltoid Left.; 0.5 milliLiter(s); VIS (VIS Published: 09-May-2013, VIS Presented: 09-Feb-2019);

## 2024-01-18 NOTE — DISCHARGE NOTE PROVIDER - NSDCCPTREATMENT_GEN_ALL_CORE_FT
PRINCIPAL PROCEDURE  Procedure: Ultrasound  Findings and Treatment: Both kidneys appear echogenic, compatible with medical renal disease.  No renal mass, hydronephrosis or calculus is visualized sonographically.        SECONDARY PROCEDURE  Procedure: Transthoracic echocardiography (TTE)  Findings and Treatment: The aortic valve is well visualized, appears mildly calcified. Valve   opening seems to be normal.   Trace aortic regurgitation is present.   The left atrium is moderately dilated.   Estimated left ventricular ejection fraction is 45-50%.   The left ventricle is normal in size.   Mild concentric left ventricular hypertrophy is present.   Segmental wall motion abnormalities are present with a moderately reduced   LV function. Inferior, lateral wall hypokinesis   All visualized extra cardiac structures appears to be normal.   There is calcification of anterior mitral valve leaflet. The leaflet   opening is normal.   Mild mitral annular calcification is present.   Moderate (2+) eccentric mitral regurgitation is present.   Trace to small pericardial effusion is present near the right atrium.   Pleural effusion - is present.   Normal appearing pulmonic valve structure.   Mild pulmonic valvular regurgitation (1+) is present.   Normal appearing right atrium.   Normal appearing right ventricle structure and function.   Normal appearing tricuspid valve structure.   Mild (1+) tricuspid valve regurgitation is present.    Procedure: Chest xray, PA & lateral  Findings and Treatment: Bilateral interstitial infiltrates consistent with vascular congestion.  Right pleural effusion. Left pleural effusion cannot be excluded.  Follow-up studies are recommended.

## 2024-01-18 NOTE — PROGRESS NOTE ADULT - PROBLEM SELECTOR PROBLEM 1
Acute decompensated heart failure

## 2024-01-18 NOTE — PROGRESS NOTE ADULT - PROBLEM SELECTOR PLAN 2
.  Echo as above with reduced LVF, EF 45-50%, inferior, lateral wall hypokinesis.     .  recent nuclear stress test in 10/2023 shows no ischemia, small fixed apical defect, EF 47%  .  CT calcium score minimal at 3; <25% percentile   .  Pt continues to report chest/back pain and asking for pain meds. chest pain likley non cardiac  repeat trops are negative x 3, EKG with no changes,  - pt. with risk factors, - immunocompromised - HIV, uncontrolled T2DM, non complaints with meds x 6 months  - will optimize meds - GDMT limited by CKD, will start toprol xl 25 mg po daily and continue diuresis with lasix 40 mg po daily

## 2024-01-18 NOTE — PHARMACOTHERAPY INTERVENTION NOTE - COMMENTS
Recommended initiating basal-bolus insulin for target BG < 180
As per patient no medications x 6 months, has not followed up w/ any physicians 
Patient is a 64 YO male with a PMH of HTN, T2DM, and HIV+ admitted for of substernal chest pain, BRIDGETTE, and HF exacerbation. The patient is not on any home medications due to nonadherence and lack of access to care. Patient’s A1C noted to be above goal of <7% (9.8%), requiring initiation of outpatient antihyperglycemic regimen & eGFR of 43. Patient has been initiated on insulin in the past, would not recommend due to history of noncompliance.     Plan:   1.) Initiate metformin 500 mg PO once daily, may increase to 500 mg BID if tolerated   2.) Initiate Jardiance 10 mg PO once daily, as combination pharmacotherapy is recommended for patients with A1C >1.5% of goal. SGLTL2 recommended due to additional cardiac + renal benefits, as patient has CHF and BRIDGETTE.   3.) Encourage patient to follow up care outpatient for medication optimization.

## 2024-01-18 NOTE — DISCHARGE NOTE NURSING/CASE MANAGEMENT/SOCIAL WORK - PATIENT PORTAL LINK FT
You can access the FollowMyHealth Patient Portal offered by [facility] by registering at the following website: [email or web address]. By joining [facility]’s FollowMyHealth portal, you will also be able to view your health information using other applications (apps) compatible with our system.

## 2024-01-18 NOTE — DISCHARGE NOTE NURSING/CASE MANAGEMENT/SOCIAL WORK - NSDCPEFALRISK_GEN_ALL_CORE
For information on Fall & Injury Prevention, visit: [email or web address] OR  [email or web address] OR  https://www.cdc.gov/steadi/patient.html

## 2024-01-18 NOTE — DISCHARGE NOTE PROVIDER - NSDCCPCAREPLAN_GEN_ALL_CORE_FT
PRINCIPAL DISCHARGE DIAGNOSIS  Diagnosis: Acute chest pain  Assessment and Plan of Treatment: - care discussed with Dr. Lopez's team  - no ischemia evaluation at this time  - patient's chest pain -  MSK in nature  - close follow up with Dr. Lopez's team      SECONDARY DISCHARGE DIAGNOSES  Diagnosis: Acute decompensated heart failure  Assessment and Plan of Treatment: WHAT IS HEART FAILURE? Heart failure (HF) is a condition in which the heart cannot pump enough blood to meet the body’s needs. The weakening of the heart’s pumping ability results in the buildup of fluid in the feet, ankles and legs resulting in edema, tiredness, and shortness of breath.  THINGS TO DO: (1) Weigh yourself daily – keep a log for you cardiologist (2) Maintain a heart healthy diet and limit dietary sodium (3) Drink liquids as directed – you may need to limit the amount of liquid you drink to prevent fluid buildup (4) Maintain a healthy weight (5) Stay active with exercise  MONITOR THESE SIGNS AND SYMPTOMS: (1) Worsening shortness of breath at rest or at night (2) worsening leg swelling (3) Abdominal pain or swelling (4) Chest pain or palpitations (5) Weight gain of 5lbs or more in 1 week or 2-3lbs in 24hours. If you experience any of these, DO alert your primary care provider, or return to the Emergency Department if you feel very sick.    Diagnosis: Diabetes  Assessment and Plan of Treatment:     Diagnosis: Acute renal failure  Assessment and Plan of Treatment: - Scr - 1.7  - tight blood sugar control strongly adviced.   - close follow up with [facility] recommended    Diagnosis: Other HIV infection  Assessment and Plan of Treatment: - you need a close follow up with ID doctor

## 2024-01-18 NOTE — PROGRESS NOTE ADULT - PROBLEM SELECTOR PLAN 1
·  Problem: Acute decompensated heart failure.   ·  Recommendation: Pt reporting with 1 month of SOB. ProBNP elevated 1758, CT without PE but with pulm edema, effusions   .  Echocardiogram with Mod MR, Mild TR, LA mod dilated, Reduced LVF, EF 45-50%, mild LVH, segmental WMA present inferior, lateral wall hypokinesis, Trace-small pericardial effusion, pleural effusion.  Of note, echo from 2020 shows EF 55%  - continue diuresis - lasix changed to 40 mg po daily, GDMT limited by CKD, will start toprol xl 25 mg po daily    pt.; is stable from cardiac standpoint, will sign off, needs to followup with Dr. Collins outpatient

## 2024-01-21 LAB
AUTO DIFF PNL BLD: ABNORMAL
C-ANCA SER-ACNC: NEGATIVE — SIGNIFICANT CHANGE UP
MPO AB + PR3 PNL SER: SIGNIFICANT CHANGE UP
P-ANCA SER-ACNC: NEGATIVE — SIGNIFICANT CHANGE UP

## 2024-01-22 ENCOUNTER — NON-APPOINTMENT (OUTPATIENT)
Age: 64
End: 2024-01-22

## 2024-01-24 DIAGNOSIS — F41.9 ANXIETY DISORDER, UNSPECIFIED: ICD-10-CM

## 2024-01-24 DIAGNOSIS — I13.0 HYPERTENSIVE HEART AND CHRONIC KIDNEY DISEASE WITH HEART FAILURE AND STAGE 1 THROUGH STAGE 4 CHRONIC KIDNEY DISEASE, OR UNSPECIFIED CHRONIC KIDNEY DISEASE: ICD-10-CM

## 2024-01-24 DIAGNOSIS — R07.89 OTHER CHEST PAIN: ICD-10-CM

## 2024-01-24 DIAGNOSIS — N18.9 CHRONIC KIDNEY DISEASE, UNSPECIFIED: ICD-10-CM

## 2024-01-24 DIAGNOSIS — I50.23 ACUTE ON CHRONIC SYSTOLIC (CONGESTIVE) HEART FAILURE: ICD-10-CM

## 2024-01-24 DIAGNOSIS — G47.00 INSOMNIA, UNSPECIFIED: ICD-10-CM

## 2024-01-24 DIAGNOSIS — Z21 ASYMPTOMATIC HUMAN IMMUNODEFICIENCY VIRUS [HIV] INFECTION STATUS: ICD-10-CM

## 2024-01-24 DIAGNOSIS — M54.50 LOW BACK PAIN, UNSPECIFIED: ICD-10-CM

## 2024-01-24 DIAGNOSIS — Z98.1 ARTHRODESIS STATUS: ICD-10-CM

## 2024-01-24 DIAGNOSIS — N17.9 ACUTE KIDNEY FAILURE, UNSPECIFIED: ICD-10-CM

## 2024-01-24 DIAGNOSIS — E11.22 TYPE 2 DIABETES MELLITUS WITH DIABETIC CHRONIC KIDNEY DISEASE: ICD-10-CM

## 2024-01-24 LAB
% ALBUMIN: 32.9 % — SIGNIFICANT CHANGE UP
% ALPHA 1: 4.9 % — SIGNIFICANT CHANGE UP
% ALPHA 2: 14.3 % — SIGNIFICANT CHANGE UP
% BETA: 24.8 % — SIGNIFICANT CHANGE UP
% GAMMA: 23.1 % — SIGNIFICANT CHANGE UP
% M SPIKE: SIGNIFICANT CHANGE UP
ALBUMIN SERPL ELPH-MCNC: 3 G/DL — LOW (ref 3.6–5.5)
ALBUMIN/GLOB SERPL ELPH: 0.5 RATIO — SIGNIFICANT CHANGE UP
ALPHA1 GLOB SERPL ELPH-MCNC: 0.4 G/DL — SIGNIFICANT CHANGE UP (ref 0.1–0.4)
ALPHA2 GLOB SERPL ELPH-MCNC: 1.3 G/DL — HIGH (ref 0.5–1)
B-GLOBULIN SERPL ELPH-MCNC: 2.2 G/DL — HIGH (ref 0.5–1)
DSDNA AB SER-ACNC: 47 IU/ML — HIGH
GAMMA GLOBULIN: 2.1 G/DL — HIGH (ref 0.6–1.6)
INTERPRETATION SERPL IFE-IMP: SIGNIFICANT CHANGE UP
M-SPIKE: SIGNIFICANT CHANGE UP (ref 0–0)
PROT PATTERN SERPL ELPH-IMP: SIGNIFICANT CHANGE UP
PROT SERPL-MCNC: 9 G/DL — HIGH (ref 6–8.3)
PROT SERPL-MCNC: 9 G/DL — HIGH (ref 6–8.3)

## 2024-02-01 NOTE — ED PROVIDER NOTE - NORMAL, MLM
satish all pertinent systems normal What Type Of Note Output Would You Prefer (Optional)?: Standard Output Hpi Title: Evaluation of Skin Lesions

## 2024-02-07 NOTE — ED ADULT NURSE NOTE - DRUG PRE-SCREENING (DAST -1)
History and Physical    History:  Patient is 89 year old with history of cataracts affecting lifestyle.    Patient Active Problem List   Diagnosis    Background diabetic retinopathy(362.01)    Carotid artery stenosis    Coronary atherosclerosis    Essential hypertension, benign    Hyperlipidemia    Hypertrophy (benign) of prostate    Obesity, unspecified    Polymyalgia rheumatica (CMD)    S/P CABG x 4    Controlled type 2 diabetes mellitus with stage 3 chronic kidney disease, with long-term current use of insulin (CMD)    Type II or unspecified type diabetes mellitus with peripheral circulatory disorders, not stated as uncontrolled(250.70)    Coronary artery disease    Type 2 diabetes mellitus with stage 3 chronic kidney disease, with long-term current use of insulin (CMD)    GERD (gastroesophageal reflux disease)    BPH (benign prostatic hyperplasia)    Chronic renal insufficiency    Cataract, nuclear sclerotic senile, bilateral    Diabetic peripheral neuropathy associated with type 2 diabetes mellitus (CMD)    Chronic obstructive pulmonary disease, unspecified (CMD)    Type 2 diabetes mellitus with both eyes affected by severe nonproliferative retinopathy without macular edema, with long-term current use of insulin (CMD)    Controlled type 2 diabetes mellitus with diabetic cataract, with long-term current use of insulin (CMD)    CKD stage G3b/A1, GFR 30-44 and albumin creatinine ratio <30 mg/g (CMD)    Acute urinary obstruction    Acute blood loss anemia    Malignant neoplasm of urinary bladder (CMD)    Debility    Moderate episode of recurrent major depressive disorder (CMD)    ACP (advance care planning)    Urinary tract infection due to Proteus    Low hemoglobin and low hematocrit    Anemia in stage 4 chronic kidney disease (CMD)       Current Outpatient Medications   Medication Sig Dispense Refill    UNKNOWN TO PATIENT as needed. Corcidin      atorvastatin (LIPITOR) 20 MG tablet Take 1 tablet by mouth  daily. 90 tablet 3    aspirin 81 MG EC tablet Take 1 tablet by mouth daily. 90 tablet 3    furosemide (LASIX) 40 MG tablet Take 1 tablet by mouth daily. 90 tablet 3    traZODone (DESYREL) 100 MG tablet Take 1 tablet by mouth nightly. 90 tablet 3    clopidogrel (PLAVIX) 75 MG tablet Take 1 tablet by mouth daily. 90 tablet 3    hydrALAZINE (APRESOLINE) 10 MG tablet Take 1 tablet by mouth in the morning and 1 tablet in the evening. 180 tablet 0    insulin glargine (Lantus) 100 UNIT/ML vial solution INJECT 10UNITS UNDER THE SKIN EVERY MORNING AND 25 UNITS EVERY EVENING 30 mL 1    isosorbide mononitrate (IMDUR) 30 MG 24 hr tablet Take 1 tablet by mouth daily. 90 tablet 1    citalopram (CeleXA) 10 MG tablet TAKE 1 TABLET BY MOUTH DAILY 90 tablet 1    ferrous sulfate (FeroSul) 325 (65 FE) MG tablet Per Dr. Pinto dose change: Take 1 tablet 2 x daily with meals may take OTC - Senakot for constipation. Call pt when ready. (Patient taking differently: 2 times daily. Per Dr. Pinto dose change: Take 1 tablet 2 x daily with meals may take OTC - Senakot for constipation. Call pt when ready.) 180 tablet 3    hydrOXYzine (ATARAX) 25 MG tablet Take 1 tablet by mouth at bedtime. 30 tablet 1    glyBURIDE (DIABETA) 5 MG tablet Take 5 mg by mouth in the morning and 5 mg in the evening. 180 tablet 1    diclofenac (VOLTAREN) 1 % gel Apply 2 g topically 3 times daily as needed (left knee pain). 100 g 0    Insulin Syringe-Needle U-100 (TRUEplus Insulin Syringe) 31G X 5/16\" 0.5 ML Misc Use twice daily with insulin.  31G x 5/16\" 0.5 mL 200 each 3    docusate sodium-sennosides (SENOKOT S) 50-8.6 MG per tablet Take 1 tablet by mouth as needed. Spouse reports pt taking PRN for constipation      albuterol 108 (90 Base) MCG/ACT inhaler Inhale 2 puffs into the lungs every 4 hours as needed for Shortness of Breath or Wheezing. 18 g 0    carvedilol (COREG) 12.5 MG tablet Take 1 tablet by mouth every 12 hours. 180 tablet 0    lidocaine (XYLOCAINE) 2 %  jelly Apply 1 application topically as needed (PAIN). (Patient taking differently: Apply 1 application. topically as needed (Moderate pain).) 30 mL 0    omeprazole (PrilOSEC) 20 MG capsule Take 1 capsule by mouth daily. 90 capsule 0    ondansetron (ZOFRAN) 4 MG tablet Take 1 tablet by mouth every 8 hours as needed for Nausea. 90 tablet 0    tamsulosin (FLOMAX) 0.4 MG Cap Take 1 capsule by mouth daily. 90 capsule 0    traMADol (ULTRAM) 50 MG tablet Take 1 tablet by mouth every 6 hours as needed for Pain. 30 tablet 0    hydroCORTisone (ANUSOL-HC) 2.5 % rectal cream Place 1 application rectally 2 times daily. (Patient taking differently: Place 1 application. rectally as needed.) 30 g 0    acetaminophen (TYLENOL) 325 MG tablet Take 325 mg by mouth every 4 hours as needed for Pain.      polyethylene glycol (MIRALAX) 17 g packet Take 17 g by mouth as needed. Stir and dissolve powder in any 4 to 8 ounces of beverage, then drink.      Lancets (onetouch ultrasoft) Misc TEST TWICE DAILY 200 each 3    Accu-Chek SmartView test strip TEST BLOOD SUGAR TWICE DAILY AS DIRECTED 200 strip 3    Respiratory Therapy Supplies (Nebulizer/Tubing/Mouthpiece) Kit Use as directed with nebulizer (Patient taking differently: as needed. Use as directed with nebulizer) 1 kit 0    Blood Glucose Monitoring Suppl (ACCU-CHEK MICHAEL SMARTVIEW) w/Device Kit 1 Device daily. 1 kit 0     Current Facility-Administered Medications   Medication Dose Route Frequency Provider Last Rate Last Admin    lidocaine 1 % injection 5-10 mg  5-10 mg Subcutaneous PRN Andria Calero MD        lactated ringers infusion   Intravenous Continuous Andria Calero MD        sodium chloride 0.9% infusion   Intravenous Continuous Andria Calero MD        dextrose 5 % infusion   Intravenous Continuous PRN Andria Calero MD        PHENYLephrine (MYDFRIN) 10 % ophthalmic solution 1 drop  1 drop Right Eye See Admin Instructions Kang López MD   1 drop at 02/07/24 0634     tropicamide (MYDRIACYL) 1 % ophthalmic solution 1 drop  1 drop Right Eye See Admin Instructions Kang López MD   1 drop at 24 0623    proparacaine (ALCAINE) 0.5 % ophthalmic solution 1 drop  1 drop Right Eye See Admin Instructions Kang López MD   1 drop at 24 0621     ALLERGIES:   Allergen Reactions    Cefdinir HIVES    Opioid Analgesics PRURITUS and DIZZINESS     Dizziness       Penicillins RASH    Codeine DIZZINESS         Social History     Tobacco Use    Smoking status: Former     Current packs/day: 0.00     Average packs/day: 1 pack/day for 21.0 years (21.0 ttl pk-yrs)     Types: Cigarettes     Start date:      Quit date: 1972     Years since quittin.1    Smokeless tobacco: Never   Vaping Use    Vaping Use: never used   Substance Use Topics    Alcohol use: No    Drug use: No       Pertinent Review of Systems:   No Contributory complaints.      Physical Exam:      Mental Status:            Awake and alert, O x 3                       yes  Heart:                          regular rate & rhythm                          yes  Lungs:                         clear                                                     yes      The is patient is cleared for surgery in the ambulatory setting.      Kang López MD    Statement Selected

## 2024-03-04 ENCOUNTER — EMERGENCY (EMERGENCY)
Facility: HOSPITAL | Age: 64
LOS: 0 days | Discharge: HOME | End: 2024-03-05
Attending: EMERGENCY MEDICINE
Payer: MEDICAID

## 2024-03-04 VITALS
RESPIRATION RATE: 17 BRPM | HEIGHT: 68 IN | HEART RATE: 75 BPM | OXYGEN SATURATION: 98 % | DIASTOLIC BLOOD PRESSURE: 94 MMHG | WEIGHT: 184.97 LBS | SYSTOLIC BLOOD PRESSURE: 175 MMHG

## 2024-03-04 DIAGNOSIS — I10 ESSENTIAL (PRIMARY) HYPERTENSION: ICD-10-CM

## 2024-03-04 DIAGNOSIS — R07.9 CHEST PAIN, UNSPECIFIED: ICD-10-CM

## 2024-03-04 DIAGNOSIS — E11.9 TYPE 2 DIABETES MELLITUS WITHOUT COMPLICATIONS: ICD-10-CM

## 2024-03-04 DIAGNOSIS — R09.89 OTHER SPECIFIED SYMPTOMS AND SIGNS INVOLVING THE CIRCULATORY AND RESPIRATORY SYSTEMS: ICD-10-CM

## 2024-03-04 DIAGNOSIS — R06.02 SHORTNESS OF BREATH: ICD-10-CM

## 2024-03-04 DIAGNOSIS — Z98.1 ARTHRODESIS STATUS: Chronic | ICD-10-CM

## 2024-03-04 DIAGNOSIS — Z21 ASYMPTOMATIC HUMAN IMMUNODEFICIENCY VIRUS [HIV] INFECTION STATUS: ICD-10-CM

## 2024-03-04 LAB
ANION GAP SERPL CALC-SCNC: 4 MMOL/L — LOW (ref 5–17)
BASOPHILS # BLD AUTO: 0.01 K/UL — SIGNIFICANT CHANGE UP (ref 0–0.2)
BASOPHILS NFR BLD AUTO: 0.2 % — SIGNIFICANT CHANGE UP (ref 0–2)
BUN SERPL-MCNC: 13 MG/DL — SIGNIFICANT CHANGE UP (ref 7–23)
CALCIUM SERPL-MCNC: 8.1 MG/DL — LOW (ref 8.5–10.1)
CHLORIDE SERPL-SCNC: 111 MMOL/L — HIGH (ref 96–108)
CO2 SERPL-SCNC: 28 MMOL/L — SIGNIFICANT CHANGE UP (ref 22–31)
CREAT SERPL-MCNC: 1.58 MG/DL — HIGH (ref 0.5–1.3)
EGFR: 49 ML/MIN/1.73M2 — LOW
EOSINOPHIL # BLD AUTO: 0.12 K/UL — SIGNIFICANT CHANGE UP (ref 0–0.5)
EOSINOPHIL NFR BLD AUTO: 1.9 % — SIGNIFICANT CHANGE UP (ref 0–6)
GLUCOSE SERPL-MCNC: 193 MG/DL — HIGH (ref 70–99)
HCT VFR BLD CALC: 31 % — LOW (ref 39–50)
HGB BLD-MCNC: 10.3 G/DL — LOW (ref 13–17)
IMM GRANULOCYTES NFR BLD AUTO: 0.6 % — SIGNIFICANT CHANGE UP (ref 0–0.9)
LYMPHOCYTES # BLD AUTO: 1.71 K/UL — SIGNIFICANT CHANGE UP (ref 1–3.3)
LYMPHOCYTES # BLD AUTO: 27.1 % — SIGNIFICANT CHANGE UP (ref 13–44)
MCHC RBC-ENTMCNC: 27.9 PG — SIGNIFICANT CHANGE UP (ref 27–34)
MCHC RBC-ENTMCNC: 33.2 GM/DL — SIGNIFICANT CHANGE UP (ref 32–36)
MCV RBC AUTO: 84 FL — SIGNIFICANT CHANGE UP (ref 80–100)
MONOCYTES # BLD AUTO: 0.58 K/UL — SIGNIFICANT CHANGE UP (ref 0–0.9)
MONOCYTES NFR BLD AUTO: 9.2 % — SIGNIFICANT CHANGE UP (ref 2–14)
NEUTROPHILS # BLD AUTO: 3.85 K/UL — SIGNIFICANT CHANGE UP (ref 1.8–7.4)
NEUTROPHILS NFR BLD AUTO: 61 % — SIGNIFICANT CHANGE UP (ref 43–77)
PLATELET # BLD AUTO: 230 K/UL — SIGNIFICANT CHANGE UP (ref 150–400)
POTASSIUM SERPL-MCNC: 3.2 MMOL/L — LOW (ref 3.5–5.3)
POTASSIUM SERPL-SCNC: 3.2 MMOL/L — LOW (ref 3.5–5.3)
RBC # BLD: 3.69 M/UL — LOW (ref 4.2–5.8)
RBC # FLD: 14.5 % — SIGNIFICANT CHANGE UP (ref 10.3–14.5)
SODIUM SERPL-SCNC: 143 MMOL/L — SIGNIFICANT CHANGE UP (ref 135–145)
TROPONIN I, HIGH SENSITIVITY RESULT: 17.64 NG/L — SIGNIFICANT CHANGE UP
WBC # BLD: 6.31 K/UL — SIGNIFICANT CHANGE UP (ref 3.8–10.5)
WBC # FLD AUTO: 6.31 K/UL — SIGNIFICANT CHANGE UP (ref 3.8–10.5)

## 2024-03-04 PROCEDURE — 71045 X-RAY EXAM CHEST 1 VIEW: CPT | Mod: 26

## 2024-03-04 PROCEDURE — 84484 ASSAY OF TROPONIN QUANT: CPT

## 2024-03-04 PROCEDURE — 99285 EMERGENCY DEPT VISIT HI MDM: CPT

## 2024-03-04 PROCEDURE — 93005 ELECTROCARDIOGRAM TRACING: CPT

## 2024-03-04 PROCEDURE — 71045 X-RAY EXAM CHEST 1 VIEW: CPT

## 2024-03-04 PROCEDURE — 99285 EMERGENCY DEPT VISIT HI MDM: CPT | Mod: 25

## 2024-03-04 PROCEDURE — 93010 ELECTROCARDIOGRAM REPORT: CPT

## 2024-03-04 PROCEDURE — 85025 COMPLETE CBC W/AUTO DIFF WBC: CPT

## 2024-03-04 PROCEDURE — 36415 COLL VENOUS BLD VENIPUNCTURE: CPT

## 2024-03-04 PROCEDURE — 80048 BASIC METABOLIC PNL TOTAL CA: CPT

## 2024-03-04 RX ORDER — ACETAMINOPHEN 500 MG
975 TABLET ORAL ONCE
Refills: 0 | Status: COMPLETED | OUTPATIENT
Start: 2024-03-04 | End: 2024-03-04

## 2024-03-04 RX ORDER — FUROSEMIDE 40 MG
40 TABLET ORAL ONCE
Refills: 0 | Status: COMPLETED | OUTPATIENT
Start: 2024-03-04 | End: 2024-03-04

## 2024-03-04 RX ADMIN — Medication 975 MILLIGRAM(S): at 22:10

## 2024-03-04 RX ADMIN — Medication 40 MILLIGRAM(S): at 23:37

## 2024-03-04 NOTE — ED ADULT NURSE NOTE - OBJECTIVE STATEMENT
Pt is 64yo Male presenting to  ED with c/o chest pain and intermittent SOB. Pt states "sometimes I cant catch my breath, I have pain in my chest all the way down to both my legs, everything hurts". Pt is noted to have swollen R breast tissue that is painful. Pt endorses he has no strength, and is asking RN for pain medication. Pt states pain is "20/10 I want to kill myself because of the pain". Pt appears to be in no apparent distress. 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 EKG complete, Tx in place.

## 2024-03-04 NOTE — ED PROVIDER NOTE - OBJECTIVE STATEMENT
62 y/o male with PMHx of DM, HTN, HIV presents to the ED c/o several weeks of intermittent SOB and chest pain, also states he has some feeling of being off-balance for "a while"

## 2024-03-04 NOTE — ED ADULT TRIAGE NOTE - CHIEF COMPLAINT QUOTE
Pt BIBA from [facility] with c/o difficulty breathing on inspiration for a few months, worsening over the last 4 days. Pt sats 98% on RA, EKG completed. PMH HTN,cataracts, DM2.

## 2024-03-04 NOTE — ED ADULT NURSE NOTE - CHIEF COMPLAINT QUOTE
Pt BIBA from [facility] with c/o difficulty breathing on inspiration for a few months, worsening over the last 4 days. Pt sats 98% on RA, EKG completed. PMH HTN,cataracts, DM2.

## 2024-03-04 NOTE — ED PROVIDER NOTE - PROGRESS NOTE DETAILS
Daniel Puentes for ED attending, Dr. Houston: chart reviewed, pt with difficulty walking since at least October of 2022 when he was admitted at [facility], had negative stroke work-up at that time

## 2024-03-04 NOTE — ED PROVIDER NOTE - PATIENT PORTAL LINK FT
You can access the FollowMyHealth Patient Portal offered by [facility] by registering at the following website: [email or web address]. By joining [employer]’s FollowMyHealth portal, you will also be able to view your health information using other applications (apps) compatible with our system.

## 2024-03-04 NOTE — ED ADULT NURSE NOTE - NSHOSCREENINGQ1_ED_ALL_ED
No This is an amazingly active  89 yo male with history of CABG x 4 in 1972, Multiple Pci's LAD/RCA in 2000 and 2001. He also has a history of Right Renal Carcinoma.    He presents today with complaints of PETERSON only able to walk 60-80 feet.  Last stress test was 4 years ago (unsure of results).   Echo 6/2018 abnormal basal inferior segment, EF 50-55%.   In light of his cardiac history, abnormal echo and symptoms he is scheduled for a cardiac cath.      now s/p [facility] with successful PCI and THANIA to the 2nd diag    ASSESSMENT/PLAN:    Coronary artery disease  -Admited to telemetry overnight  	Age > 75; Co Morbid conditions GFR-43, Creat 1.41  -IV hydration for 6 hours post procedure for GFR-43  -Aspirin 81 mg PO daily  -Plavix 75mg PO daily  -Toprol XL 25 mg PO daily  -Losartan 100 mg PO daily  -atorvastatin 10mg PO QHS   -Plan of care discussed with patient, family and MD  -likely d/c in AM if patient remains stable overnight  -NP to see in AM to evaluate labs, EKG and procedure site check  -Follow-up with attending Dr Schuster  within 1 week This is a  89 yo male with history of CABG x 4 in 1972, Multiple Pci's LAD/RCA in 2000 and 2001. He also has a history of Right Renal Carcinoma.    He presents today with complaints of PETERSON only able to walk 60-80 feet.  Last stress test was 4 years ago (unsure of results).   Echo 6/2018 abnormal basal inferior segment, EF 50-55%.     Pt is now s/p [facility] with successful PCI and THANIA to the 2nd diag via right groin. No complications    Pt given RX to check bun/cr/GFR with Dr. Morton

## 2024-03-04 NOTE — ED PROVIDER NOTE - CLINICAL SUMMARY MEDICAL DECISION MAKING FREE TEXT BOX
Chest x-ray was independently reviewed by Dr. Julio Houston shows some pulmonary congestion stressed importance of patient compliance with Lasix which she has at home compliance with hypertensive meds ambulating in no acute distress not hypoxic stressed importance of HIV medications hypertension medications and his diuresis troponin negative patient ambulating with no respiratory distress not hypoxic follow-up with established cardiologist nuclear stress test reviewed from last year patient is stable from my standpoint for discharge advised strict compliance and cardiac follow-up patient agrees to plan of care

## 2024-03-05 VITALS
RESPIRATION RATE: 16 BRPM | SYSTOLIC BLOOD PRESSURE: 168 MMHG | DIASTOLIC BLOOD PRESSURE: 89 MMHG | HEART RATE: 70 BPM | OXYGEN SATURATION: 97 % | TEMPERATURE: 98 F

## 2024-03-20 ENCOUNTER — EMERGENCY (EMERGENCY)
Facility: HOSPITAL | Age: 64
LOS: 0 days | Discharge: HOME | End: 2024-03-20
Attending: EMERGENCY MEDICINE
Payer: MEDICAID

## 2024-03-20 VITALS
RESPIRATION RATE: 20 BRPM | DIASTOLIC BLOOD PRESSURE: 91 MMHG | HEART RATE: 80 BPM | HEIGHT: 68 IN | OXYGEN SATURATION: 100 % | TEMPERATURE: 98 F | WEIGHT: 179.9 LBS | SYSTOLIC BLOOD PRESSURE: 179 MMHG

## 2024-03-20 VITALS
HEART RATE: 89 BPM | DIASTOLIC BLOOD PRESSURE: 70 MMHG | TEMPERATURE: 98 F | RESPIRATION RATE: 18 BRPM | OXYGEN SATURATION: 99 % | SYSTOLIC BLOOD PRESSURE: 165 MMHG

## 2024-03-20 DIAGNOSIS — R07.9 CHEST PAIN, UNSPECIFIED: ICD-10-CM

## 2024-03-20 DIAGNOSIS — Z21 ASYMPTOMATIC HUMAN IMMUNODEFICIENCY VIRUS [HIV] INFECTION STATUS: ICD-10-CM

## 2024-03-20 DIAGNOSIS — E11.9 TYPE 2 DIABETES MELLITUS WITHOUT COMPLICATIONS: ICD-10-CM

## 2024-03-20 DIAGNOSIS — Z98.1 ARTHRODESIS STATUS: Chronic | ICD-10-CM

## 2024-03-20 DIAGNOSIS — I10 ESSENTIAL (PRIMARY) HYPERTENSION: ICD-10-CM

## 2024-03-20 DIAGNOSIS — R79.89 OTHER SPECIFIED ABNORMAL FINDINGS OF BLOOD CHEMISTRY: ICD-10-CM

## 2024-03-20 DIAGNOSIS — R06.02 SHORTNESS OF BREATH: ICD-10-CM

## 2024-03-20 DIAGNOSIS — F41.9 ANXIETY DISORDER, UNSPECIFIED: ICD-10-CM

## 2024-03-20 LAB
ALBUMIN SERPL ELPH-MCNC: 1.6 G/DL — LOW (ref 3.3–5)
ALP SERPL-CCNC: 56 U/L — SIGNIFICANT CHANGE UP (ref 40–120)
ALT FLD-CCNC: 12 U/L — SIGNIFICANT CHANGE UP (ref 12–78)
ANION GAP SERPL CALC-SCNC: 6 MMOL/L — SIGNIFICANT CHANGE UP (ref 5–17)
APTT BLD: 32.8 SEC — SIGNIFICANT CHANGE UP (ref 24.5–35.6)
AST SERPL-CCNC: 14 U/L — LOW (ref 15–37)
BASOPHILS # BLD AUTO: 0.02 K/UL — SIGNIFICANT CHANGE UP (ref 0–0.2)
BASOPHILS NFR BLD AUTO: 0.3 % — SIGNIFICANT CHANGE UP (ref 0–2)
BILIRUB SERPL-MCNC: 0.2 MG/DL — SIGNIFICANT CHANGE UP (ref 0.2–1.2)
BUN SERPL-MCNC: 12 MG/DL — SIGNIFICANT CHANGE UP (ref 7–23)
CALCIUM SERPL-MCNC: 8.2 MG/DL — LOW (ref 8.5–10.1)
CHLORIDE SERPL-SCNC: 112 MMOL/L — HIGH (ref 96–108)
CO2 SERPL-SCNC: 26 MMOL/L — SIGNIFICANT CHANGE UP (ref 22–31)
CREAT SERPL-MCNC: 1.65 MG/DL — HIGH (ref 0.5–1.3)
D DIMER BLD IA.RAPID-MCNC: 306 NG/ML DDU — HIGH
EGFR: 47 ML/MIN/1.73M2 — LOW
EOSINOPHIL # BLD AUTO: 0.13 K/UL — SIGNIFICANT CHANGE UP (ref 0–0.5)
EOSINOPHIL NFR BLD AUTO: 1.8 % — SIGNIFICANT CHANGE UP (ref 0–6)
GLUCOSE SERPL-MCNC: 221 MG/DL — HIGH (ref 70–99)
HCT VFR BLD CALC: 30.4 % — LOW (ref 39–50)
HGB BLD-MCNC: 10.1 G/DL — LOW (ref 13–17)
IMM GRANULOCYTES NFR BLD AUTO: 0.8 % — SIGNIFICANT CHANGE UP (ref 0–0.9)
INR BLD: 0.96 RATIO — SIGNIFICANT CHANGE UP (ref 0.85–1.18)
LIDOCAIN IGE QN: 24 U/L — SIGNIFICANT CHANGE UP (ref 13–75)
LYMPHOCYTES # BLD AUTO: 1.53 K/UL — SIGNIFICANT CHANGE UP (ref 1–3.3)
LYMPHOCYTES # BLD AUTO: 21.3 % — SIGNIFICANT CHANGE UP (ref 13–44)
MCHC RBC-ENTMCNC: 28.4 PG — SIGNIFICANT CHANGE UP (ref 27–34)
MCHC RBC-ENTMCNC: 33.2 GM/DL — SIGNIFICANT CHANGE UP (ref 32–36)
MCV RBC AUTO: 85.4 FL — SIGNIFICANT CHANGE UP (ref 80–100)
MONOCYTES # BLD AUTO: 0.56 K/UL — SIGNIFICANT CHANGE UP (ref 0–0.9)
MONOCYTES NFR BLD AUTO: 7.8 % — SIGNIFICANT CHANGE UP (ref 2–14)
NEUTROPHILS # BLD AUTO: 4.88 K/UL — SIGNIFICANT CHANGE UP (ref 1.8–7.4)
NEUTROPHILS NFR BLD AUTO: 68 % — SIGNIFICANT CHANGE UP (ref 43–77)
NT-PROBNP SERPL-SCNC: 1470 PG/ML — HIGH (ref 0–125)
PLATELET # BLD AUTO: 267 K/UL — SIGNIFICANT CHANGE UP (ref 150–400)
POTASSIUM SERPL-MCNC: 3.4 MMOL/L — LOW (ref 3.5–5.3)
POTASSIUM SERPL-SCNC: 3.4 MMOL/L — LOW (ref 3.5–5.3)
PROT SERPL-MCNC: 6.8 GM/DL — SIGNIFICANT CHANGE UP (ref 6–8.3)
PROTHROM AB SERPL-ACNC: 10.9 SEC — SIGNIFICANT CHANGE UP (ref 9.5–13)
RBC # BLD: 3.56 M/UL — LOW (ref 4.2–5.8)
RBC # FLD: 15.4 % — HIGH (ref 10.3–14.5)
SODIUM SERPL-SCNC: 144 MMOL/L — SIGNIFICANT CHANGE UP (ref 135–145)
TROPONIN I, HIGH SENSITIVITY RESULT: 17.88 NG/L — SIGNIFICANT CHANGE UP
TROPONIN I, HIGH SENSITIVITY RESULT: 19.07 NG/L — SIGNIFICANT CHANGE UP
WBC # BLD: 7.18 K/UL — SIGNIFICANT CHANGE UP (ref 3.8–10.5)
WBC # FLD AUTO: 7.18 K/UL — SIGNIFICANT CHANGE UP (ref 3.8–10.5)

## 2024-03-20 PROCEDURE — 84484 ASSAY OF TROPONIN QUANT: CPT

## 2024-03-20 PROCEDURE — 71045 X-RAY EXAM CHEST 1 VIEW: CPT

## 2024-03-20 PROCEDURE — 83690 ASSAY OF LIPASE: CPT

## 2024-03-20 PROCEDURE — 80053 COMPREHEN METABOLIC PANEL: CPT

## 2024-03-20 PROCEDURE — 83880 ASSAY OF NATRIURETIC PEPTIDE: CPT

## 2024-03-20 PROCEDURE — 85379 FIBRIN DEGRADATION QUANT: CPT

## 2024-03-20 PROCEDURE — 85025 COMPLETE CBC W/AUTO DIFF WBC: CPT

## 2024-03-20 PROCEDURE — 93010 ELECTROCARDIOGRAM REPORT: CPT

## 2024-03-20 PROCEDURE — 99285 EMERGENCY DEPT VISIT HI MDM: CPT

## 2024-03-20 PROCEDURE — 85610 PROTHROMBIN TIME: CPT

## 2024-03-20 PROCEDURE — 71275 CT ANGIOGRAPHY CHEST: CPT | Mod: MC

## 2024-03-20 PROCEDURE — 93005 ELECTROCARDIOGRAM TRACING: CPT

## 2024-03-20 PROCEDURE — 99285 EMERGENCY DEPT VISIT HI MDM: CPT | Mod: 25

## 2024-03-20 PROCEDURE — 71275 CT ANGIOGRAPHY CHEST: CPT | Mod: 26,MC

## 2024-03-20 PROCEDURE — 36415 COLL VENOUS BLD VENIPUNCTURE: CPT

## 2024-03-20 PROCEDURE — 71045 X-RAY EXAM CHEST 1 VIEW: CPT | Mod: 26

## 2024-03-20 PROCEDURE — 85730 THROMBOPLASTIN TIME PARTIAL: CPT

## 2024-03-20 RX ORDER — SODIUM CHLORIDE 9 MG/ML
500 INJECTION INTRAMUSCULAR; INTRAVENOUS; SUBCUTANEOUS ONCE
Refills: 0 | Status: COMPLETED | OUTPATIENT
Start: 2024-03-20 | End: 2024-03-20

## 2024-03-20 RX ADMIN — SODIUM CHLORIDE 500 MILLILITER(S): 9 INJECTION INTRAMUSCULAR; INTRAVENOUS; SUBCUTANEOUS at 08:34

## 2024-03-20 NOTE — ED PROVIDER NOTE - CLINICAL SUMMARY MEDICAL DECISION MAKING FREE TEXT BOX
62 y/o male with a PMHx of anxiety, b/l cataracts, chronic back pain, DM, HTN, HIV, insomnia presents to the ED BIBA from home c/o intermittent CP and SOB x2 to 3 weeks. Pt took Tylenol and Advil at home without relief. HHED eval 3/4 for same complaint, workup negative and pt was d/c to follow up with PCP which pt has not though states he has an appt this week. CP reproducible on chest wall palpation. Pt in NAD and not hypoxic. Plan: EKG, chest XR, labs including ddimer, lipase, BNP, troponin x2, cautious IVF, monitor, observe, reassess.    9:30: labs notable for elevated ddimer to 306. Normal troponin and lipase. Elevated BNP 1470. Elevated CR 1.65 but consistent with pt's apparent baseline, GFR 47. Ordering CTA chest r/o PE vs CHF. 64 y/o male with a PMHx of anxiety, b/l cataracts, chronic back pain, DM, HTN, HIV, insomnia presents to the ED BIBA from home c/o intermittent CP and SOB x2 to 3 weeks. Pt took Tylenol and Advil at home without relief. HHED eval 3/4 for same complaint, workup negative and pt was d/c to follow up with PCP which pt has not though states he has an appt this week. CP reproducible on chest wall palpation. Pt in NAD and not hypoxic. Plan: EKG, chest XR, labs including ddimer, lipase, BNP, troponin x2, cautious IVF, monitor, observe, reassess.    9:30: labs notable for elevated ddimer to 306. Normal troponin and lipase. Elevated BNP 1470. Elevated CR 1.65 but consistent with pt's apparent baseline, GFR 47. Ordering CTA chest r/o PE vs CHF.    13:15, VENECIA Ross MD:  2nd troponin also normal.  CTA chest: no acute pathology incl. no PE nor focal infilt.  [facility] tomorrow at 2:20 PM.  Pt stable for DC for cliniic f/u tomorrow. 64 y/o male with a PMHx of anxiety, b/l cataracts, chronic back pain, DM, HTN, HIV, insomnia presents to the ED BIBA from home c/o intermittent CP and SOB x2 to 3 weeks. Pt took Tylenol and Advil at home without relief.  ED eval 3/4 for same complaint, workup negative and pt was d/c to follow up with PCP which pt has not, though states he has an appt this week.   Exam: CP reproducible on chest wall palpation. Pt in NAD and not hypoxic.   Plan: EKG, chest XR, labs including d-dimer, lipase, BNP, troponin x2, cautious IVF, monitor, observe, reassess.    9:30: labs notable for elevated d-dimer to 306. Normal troponin and lipase. Elevated BNP 1470. Elevated Cr 1.65 but consistent with pt's apparent baseline, GFR 47. Ordering CTA chest r/o PE vs CHF.    13:15, VENECIA Ross MD:  2nd troponin also normal.  CTA chest: no acute pathology incl. no PE nor focal infilt.  [facility] has [facility] tomorrow at 2:20 PM.  Pt stable for DC for clinic f/u tomorrow.  Pt strongly urged NOT to miss his appt. & NOT to rely upon ED visits as proxy for his PCP appts.

## 2024-03-20 NOTE — ED ADULT TRIAGE NOTE - CHIEF COMPLAINT QUOTE
Pt arrives to ED by [facility] for chest pain, palpitations for two weeks. Pt was seen in  ED for same symptoms two weeks ago. Hx HTN, DM.

## 2024-03-20 NOTE — ED ADULT NURSE NOTE - OBJECTIVE STATEMENT
Pt presents to ED c/o chest pain x3 months. pt states he had several ED visits for same symptom and gets discharged home. pt reports taking Tylenol for pain

## 2024-03-20 NOTE — ED PROVIDER NOTE - PATIENT PORTAL LINK FT
You can access the FollowMyHealth Patient Portal offered by [facility] by registering at the following website: [email or web address]. By joining [employer]’s FollowMyHealth portal, you will also be able to view your health information using other applications (apps) compatible with our system.

## 2024-03-20 NOTE — ED PROVIDER NOTE - CONSTITUTIONAL, MLM
Thin  male, well appearing, awake, alert, oriented to person, place, time/situation and in no apparent distress. normal...

## 2024-03-20 NOTE — ED PROVIDER NOTE - MUSCULOSKELETAL, MLM
Spine appears normal, range of motion is not limited. MAEx4. No focal swelling tenderness. +chest wall tenderness reproduces pt's CP Spine appears normal, range of motion is not limited. MAEx4. No focal swelling tenderness. +Anterior chest wall tenderness reproduces pt's CP

## 2024-03-20 NOTE — CHART NOTE - NSCHARTNOTEFT_GEN_A_CORE
Pt is a 63 yr old male with a PMHx of anxiety, b/l cataracts, chronic back pain, DM, HTN, HIV, and insomnia. Pt presented to  ED via ambulance c/o CP and SOB for about 3 weeks. Pt was recently seen in ED, 3/4, with complaint. This morning's  workup is negative and pt is medically cleared for discharge. Pt requesting assist with transport and confirming his [facility] appointment which he believes is some time this week.   SW met with pt at bedside, to review transportation options available to him. Pt is homeless, currently residing at [facility] [address], taking part in [employer] program. Pt informed he has 2:20pm @ [facility] tomorrow. Pt has Medicaid and can call [facility] [phone] to arrange transport to medical appointments. Pt also provided with [employer] bus, [employer] bus, and [employer] contact information as transport alternatives. Pt provided with [employer] transport back to [facility].

## 2024-03-20 NOTE — ED ADULT NURSE NOTE - NSFALLUNIVINTERV_ED_ALL_ED
Bed/Stretcher in lowest position, wheels locked, appropriate side rails in place/Call bell, personal items and telephone in reach/Instruct patient to call for assistance before getting out of bed/chair/stretcher/Non-slip footwear applied when patient is off stretcher/[place] to call system/Physically safe environment - no spills, clutter or unnecessary equipment/Purposeful proactive rounding/Room/bathroom lighting operational, light cord in reach

## 2024-03-20 NOTE — ED PROVIDER NOTE - OBJECTIVE STATEMENT
64 y/o male with a PMHx of anxiety, b/l cataracts, chronic back pain, DM, HTN, HIV, insomnia presents to the ED BIBA from home c/o intermittent CP and SOB x2 to 3 weeks. Pt took Tylenol and Advil at home without relief. HHED eval 3/4 for same complaint, workup negative and pt was d/c to follow up with PCP which pt has not though states he has an appt this week. PCP @ [facility]. 64 y/o male with a PMHx of anxiety, b/l cataracts, chronic back pain, DM, HTN, HIV, insomnia, BIBA from home to ED c/o intermittent CP and SOB x2 to 3 weeks. Pt took Tylenol and Advil at home without relief.  ED eval 3/4 for same complaint, workup negative and pt was d/c to follow up with PCP which pt has not, though states he has an appt this week. PCP @ [facility].  Pt denies F/C, cough, abd pain.

## 2024-03-20 NOTE — ED PROVIDER NOTE - NSFOLLOWUPINSTRUCTIONS_ED_ALL_ED_FT
Take your medications & continue them as per routine.  You have [facility] appointment tomorrow at 2:20 PM.  It is VERY important that you make it so as to get your meds re-prescribed & further eval & management, including your HIV disease.    Chest Pain    Chest pain can be caused by many different conditions which may or may not be dangerous. Causes include heartburn, lung infections, heart attack, blood clot in lungs, skin infections, strain or damage to muscle, cartilage, or bones, etc. In addition to a history and physical examination, an electrocardiogram (ECG) or other lab tests may have been performed to determine the cause of your chest pain. Follow up with your primary care provider or with a cardiologist as instructed.     SEEK IMMEDIATE MEDICAL CARE IF YOU HAVE ANY OF THE FOLLOWING SYMPTOMS: worsening chest pain, coughing up blood, unexplained back/neck/jaw pain, severe abdominal pain, dizziness or lightheadedness, fainting, shortness of breath, sweaty or clammy skin, vomiting, or racing heart beat. These symptoms may represent a serious problem that is an emergency. Do not wait to see if the symptoms will go away. Get medical help right away. Call 911 and do not drive yourself to the hospital.

## 2024-04-04 ENCOUNTER — INPATIENT (INPATIENT)
Facility: HOSPITAL | Age: 64
LOS: 13 days | Discharge: HOME | DRG: 203 | End: 2024-04-18
Attending: STUDENT IN AN ORGANIZED HEALTH CARE EDUCATION/TRAINING PROGRAM | Admitting: STUDENT IN AN ORGANIZED HEALTH CARE EDUCATION/TRAINING PROGRAM
Payer: MEDICAID

## 2024-04-04 VITALS — HEIGHT: 68 IN

## 2024-04-04 DIAGNOSIS — R07.9 CHEST PAIN, UNSPECIFIED: ICD-10-CM

## 2024-04-04 DIAGNOSIS — Z98.1 ARTHRODESIS STATUS: Chronic | ICD-10-CM

## 2024-04-04 LAB
ADD ON TEST-SPECIMEN IN LAB: SIGNIFICANT CHANGE UP
ALBUMIN SERPL ELPH-MCNC: 1.7 G/DL — LOW (ref 3.3–5)
ALP SERPL-CCNC: 61 U/L — SIGNIFICANT CHANGE UP (ref 40–120)
ALT FLD-CCNC: 16 U/L — SIGNIFICANT CHANGE UP (ref 12–78)
ANION GAP SERPL CALC-SCNC: 3 MMOL/L — LOW (ref 5–17)
APTT BLD: 29.9 SEC — SIGNIFICANT CHANGE UP (ref 24.5–35.6)
AST SERPL-CCNC: 18 U/L — SIGNIFICANT CHANGE UP (ref 15–37)
BASOPHILS # BLD AUTO: 0.02 K/UL — SIGNIFICANT CHANGE UP (ref 0–0.2)
BASOPHILS NFR BLD AUTO: 0.3 % — SIGNIFICANT CHANGE UP (ref 0–2)
BILIRUB SERPL-MCNC: 0.2 MG/DL — SIGNIFICANT CHANGE UP (ref 0.2–1.2)
BUN SERPL-MCNC: 22 MG/DL — SIGNIFICANT CHANGE UP (ref 7–23)
CALCIUM SERPL-MCNC: 8 MG/DL — LOW (ref 8.5–10.1)
CHLORIDE SERPL-SCNC: 108 MMOL/L — SIGNIFICANT CHANGE UP (ref 96–108)
CO2 SERPL-SCNC: 29 MMOL/L — SIGNIFICANT CHANGE UP (ref 22–31)
CREAT SERPL-MCNC: 2.33 MG/DL — HIGH (ref 0.5–1.3)
EGFR: 31 ML/MIN/1.73M2 — LOW
EOSINOPHIL # BLD AUTO: 0.14 K/UL — SIGNIFICANT CHANGE UP (ref 0–0.5)
EOSINOPHIL NFR BLD AUTO: 2.3 % — SIGNIFICANT CHANGE UP (ref 0–6)
GLUCOSE BLDC GLUCOMTR-MCNC: 334 MG/DL — HIGH (ref 70–99)
GLUCOSE SERPL-MCNC: 299 MG/DL — HIGH (ref 70–99)
HCT VFR BLD CALC: 29.7 % — LOW (ref 39–50)
HGB BLD-MCNC: 9.9 G/DL — LOW (ref 13–17)
IMM GRANULOCYTES NFR BLD AUTO: 1.1 % — HIGH (ref 0–0.9)
INR BLD: 0.93 RATIO — SIGNIFICANT CHANGE UP (ref 0.85–1.18)
LACTATE SERPL-SCNC: 1.4 MMOL/L — SIGNIFICANT CHANGE UP (ref 0.7–2)
LYMPHOCYTES # BLD AUTO: 1.63 K/UL — SIGNIFICANT CHANGE UP (ref 1–3.3)
LYMPHOCYTES # BLD AUTO: 26.5 % — SIGNIFICANT CHANGE UP (ref 13–44)
MCHC RBC-ENTMCNC: 28.4 PG — SIGNIFICANT CHANGE UP (ref 27–34)
MCHC RBC-ENTMCNC: 33.3 GM/DL — SIGNIFICANT CHANGE UP (ref 32–36)
MCV RBC AUTO: 85.3 FL — SIGNIFICANT CHANGE UP (ref 80–100)
MONOCYTES # BLD AUTO: 0.44 K/UL — SIGNIFICANT CHANGE UP (ref 0–0.9)
MONOCYTES NFR BLD AUTO: 7.2 % — SIGNIFICANT CHANGE UP (ref 2–14)
NEUTROPHILS # BLD AUTO: 3.85 K/UL — SIGNIFICANT CHANGE UP (ref 1.8–7.4)
NEUTROPHILS NFR BLD AUTO: 62.6 % — SIGNIFICANT CHANGE UP (ref 43–77)
NT-PROBNP SERPL-SCNC: 2126 PG/ML — HIGH (ref 0–125)
PLATELET # BLD AUTO: 247 K/UL — SIGNIFICANT CHANGE UP (ref 150–400)
POTASSIUM SERPL-MCNC: 3.4 MMOL/L — LOW (ref 3.5–5.3)
POTASSIUM SERPL-SCNC: 3.4 MMOL/L — LOW (ref 3.5–5.3)
PROT SERPL-MCNC: 6.7 GM/DL — SIGNIFICANT CHANGE UP (ref 6–8.3)
PROTHROM AB SERPL-ACNC: 10.5 SEC — SIGNIFICANT CHANGE UP (ref 9.5–13)
RBC # BLD: 3.48 M/UL — LOW (ref 4.2–5.8)
RBC # FLD: 15.7 % — HIGH (ref 10.3–14.5)
SODIUM SERPL-SCNC: 140 MMOL/L — SIGNIFICANT CHANGE UP (ref 135–145)
TROPONIN I, HIGH SENSITIVITY RESULT: 19.65 NG/L — SIGNIFICANT CHANGE UP
TROPONIN I, HIGH SENSITIVITY RESULT: 23.05 NG/L — SIGNIFICANT CHANGE UP
WBC # BLD: 6.15 K/UL — SIGNIFICANT CHANGE UP (ref 3.8–10.5)
WBC # FLD AUTO: 6.15 K/UL — SIGNIFICANT CHANGE UP (ref 3.8–10.5)

## 2024-04-04 PROCEDURE — 80061 LIPID PANEL: CPT

## 2024-04-04 PROCEDURE — 85610 PROTHROMBIN TIME: CPT

## 2024-04-04 PROCEDURE — 80048 BASIC METABOLIC PNL TOTAL CA: CPT

## 2024-04-04 PROCEDURE — 80307 DRUG TEST PRSMV CHEM ANLYZR: CPT

## 2024-04-04 PROCEDURE — 83605 ASSAY OF LACTIC ACID: CPT

## 2024-04-04 PROCEDURE — 85027 COMPLETE CBC AUTOMATED: CPT

## 2024-04-04 PROCEDURE — 97162 PT EVAL MOD COMPLEX 30 MIN: CPT | Mod: GP

## 2024-04-04 PROCEDURE — 81001 URINALYSIS AUTO W/SCOPE: CPT

## 2024-04-04 PROCEDURE — 83036 HEMOGLOBIN GLYCOSYLATED A1C: CPT

## 2024-04-04 PROCEDURE — 84100 ASSAY OF PHOSPHORUS: CPT

## 2024-04-04 PROCEDURE — 93308 TTE F-UP OR LMTD: CPT

## 2024-04-04 PROCEDURE — 80053 COMPREHEN METABOLIC PANEL: CPT

## 2024-04-04 PROCEDURE — 71260 CT THORAX DX C+: CPT | Mod: 26

## 2024-04-04 PROCEDURE — 87040 BLOOD CULTURE FOR BACTERIA: CPT

## 2024-04-04 PROCEDURE — 82962 GLUCOSE BLOOD TEST: CPT

## 2024-04-04 PROCEDURE — 36415 COLL VENOUS BLD VENIPUNCTURE: CPT

## 2024-04-04 PROCEDURE — 85730 THROMBOPLASTIN TIME PARTIAL: CPT

## 2024-04-04 PROCEDURE — 87536 HIV-1 QUANT&REVRSE TRNSCRPJ: CPT

## 2024-04-04 PROCEDURE — 99497 ADVNCD CARE PLAN 30 MIN: CPT | Mod: 25

## 2024-04-04 PROCEDURE — 71260 CT THORAX DX C+: CPT | Mod: MC

## 2024-04-04 PROCEDURE — 0241U: CPT

## 2024-04-04 PROCEDURE — 71045 X-RAY EXAM CHEST 1 VIEW: CPT | Mod: 26

## 2024-04-04 PROCEDURE — 85025 COMPLETE CBC W/AUTO DIFF WBC: CPT

## 2024-04-04 PROCEDURE — 70450 CT HEAD/BRAIN W/O DYE: CPT | Mod: 26,MC

## 2024-04-04 PROCEDURE — 99223 1ST HOSP IP/OBS HIGH 75: CPT

## 2024-04-04 PROCEDURE — 84484 ASSAY OF TROPONIN QUANT: CPT

## 2024-04-04 PROCEDURE — 83735 ASSAY OF MAGNESIUM: CPT

## 2024-04-04 PROCEDURE — 99285 EMERGENCY DEPT VISIT HI MDM: CPT

## 2024-04-04 RX ORDER — FUROSEMIDE 40 MG
40 TABLET ORAL
Refills: 0 | Status: DISCONTINUED | OUTPATIENT
Start: 2024-04-04 | End: 2024-04-05

## 2024-04-04 RX ORDER — DEXTROSE 50 % IN WATER 50 %
15 SYRINGE (ML) INTRAVENOUS ONCE
Refills: 0 | Status: DISCONTINUED | OUTPATIENT
Start: 2024-04-04 | End: 2024-04-18

## 2024-04-04 RX ORDER — DEXTROSE 50 % IN WATER 50 %
25 SYRINGE (ML) INTRAVENOUS ONCE
Refills: 0 | Status: DISCONTINUED | OUTPATIENT
Start: 2024-04-04 | End: 2024-04-18

## 2024-04-04 RX ORDER — ATORVASTATIN CALCIUM 80 MG/1
40 TABLET, FILM COATED ORAL AT BEDTIME
Refills: 0 | Status: DISCONTINUED | OUTPATIENT
Start: 2024-04-04 | End: 2024-04-18

## 2024-04-04 RX ORDER — CEFEPIME 1 G/1
1000 INJECTION, POWDER, FOR SOLUTION INTRAMUSCULAR; INTRAVENOUS EVERY 12 HOURS
Refills: 0 | Status: DISCONTINUED | OUTPATIENT
Start: 2024-04-04 | End: 2024-04-04

## 2024-04-04 RX ORDER — SODIUM CHLORIDE 9 MG/ML
500 INJECTION INTRAMUSCULAR; INTRAVENOUS; SUBCUTANEOUS ONCE
Refills: 0 | Status: COMPLETED | OUTPATIENT
Start: 2024-04-04 | End: 2024-04-04

## 2024-04-04 RX ORDER — ASPIRIN/CALCIUM CARB/MAGNESIUM 324 MG
81 TABLET ORAL DAILY
Refills: 0 | Status: DISCONTINUED | OUTPATIENT
Start: 2024-04-04 | End: 2024-04-18

## 2024-04-04 RX ORDER — SODIUM CHLORIDE 9 MG/ML
1000 INJECTION, SOLUTION INTRAVENOUS
Refills: 0 | Status: DISCONTINUED | OUTPATIENT
Start: 2024-04-04 | End: 2024-04-18

## 2024-04-04 RX ORDER — ACETAMINOPHEN 500 MG
1000 TABLET ORAL ONCE
Refills: 0 | Status: COMPLETED | OUTPATIENT
Start: 2024-04-04 | End: 2024-04-05

## 2024-04-04 RX ORDER — DEXTROSE 10 % IN WATER 10 %
125 INTRAVENOUS SOLUTION INTRAVENOUS ONCE
Refills: 0 | Status: DISCONTINUED | OUTPATIENT
Start: 2024-04-04 | End: 2024-04-18

## 2024-04-04 RX ORDER — ACETAMINOPHEN 500 MG
650 TABLET ORAL EVERY 6 HOURS
Refills: 0 | Status: DISCONTINUED | OUTPATIENT
Start: 2024-04-04 | End: 2024-04-18

## 2024-04-04 RX ORDER — METOPROLOL TARTRATE 50 MG
25 TABLET ORAL DAILY
Refills: 0 | Status: DISCONTINUED | OUTPATIENT
Start: 2024-04-04 | End: 2024-04-18

## 2024-04-04 RX ORDER — ASPIRIN/CALCIUM CARB/MAGNESIUM 324 MG
324 TABLET ORAL ONCE
Refills: 0 | Status: COMPLETED | OUTPATIENT
Start: 2024-04-04 | End: 2024-04-04

## 2024-04-04 RX ORDER — DEXTROSE 50 % IN WATER 50 %
12.5 SYRINGE (ML) INTRAVENOUS ONCE
Refills: 0 | Status: DISCONTINUED | OUTPATIENT
Start: 2024-04-04 | End: 2024-04-18

## 2024-04-04 RX ORDER — GLUCAGON INJECTION, SOLUTION 0.5 MG/.1ML
1 INJECTION, SOLUTION SUBCUTANEOUS ONCE
Refills: 0 | Status: DISCONTINUED | OUTPATIENT
Start: 2024-04-04 | End: 2024-04-18

## 2024-04-04 RX ORDER — MORPHINE SULFATE 50 MG/1
4 CAPSULE, EXTENDED RELEASE ORAL ONCE
Refills: 0 | Status: DISCONTINUED | OUTPATIENT
Start: 2024-04-04 | End: 2024-04-04

## 2024-04-04 RX ORDER — HYDRALAZINE HCL 50 MG
10 TABLET ORAL ONCE
Refills: 0 | Status: COMPLETED | OUTPATIENT
Start: 2024-04-04 | End: 2024-04-04

## 2024-04-04 RX ORDER — INSULIN LISPRO 100/ML
VIAL (ML) SUBCUTANEOUS
Refills: 0 | Status: DISCONTINUED | OUTPATIENT
Start: 2024-04-04 | End: 2024-04-18

## 2024-04-04 RX ORDER — INSULIN LISPRO 100/ML
VIAL (ML) SUBCUTANEOUS AT BEDTIME
Refills: 0 | Status: DISCONTINUED | OUTPATIENT
Start: 2024-04-04 | End: 2024-04-18

## 2024-04-04 RX ORDER — VANCOMYCIN HCL 1 G
1000 VIAL (EA) INTRAVENOUS ONCE
Refills: 0 | Status: COMPLETED | OUTPATIENT
Start: 2024-04-04 | End: 2024-04-04

## 2024-04-04 RX ADMIN — MORPHINE SULFATE 4 MILLIGRAM(S): 50 CAPSULE, EXTENDED RELEASE ORAL at 18:50

## 2024-04-04 RX ADMIN — Medication 4: at 22:57

## 2024-04-04 RX ADMIN — Medication 40 MILLIGRAM(S): at 22:42

## 2024-04-04 RX ADMIN — Medication 324 MILLIGRAM(S): at 18:50

## 2024-04-04 RX ADMIN — Medication 10 MILLIGRAM(S): at 22:39

## 2024-04-04 RX ADMIN — SODIUM CHLORIDE 500 MILLILITER(S): 9 INJECTION INTRAMUSCULAR; INTRAVENOUS; SUBCUTANEOUS at 18:51

## 2024-04-04 RX ADMIN — Medication 250 MILLIGRAM(S): at 18:52

## 2024-04-04 RX ADMIN — ATORVASTATIN CALCIUM 40 MILLIGRAM(S): 80 TABLET, FILM COATED ORAL at 22:55

## 2024-04-04 RX ADMIN — MORPHINE SULFATE 4 MILLIGRAM(S): 50 CAPSULE, EXTENDED RELEASE ORAL at 18:59

## 2024-04-04 RX ADMIN — CEFEPIME 1000 MILLIGRAM(S): 1 INJECTION, POWDER, FOR SOLUTION INTRAMUSCULAR; INTRAVENOUS at 19:15

## 2024-04-04 NOTE — ED ADULT TRIAGE NOTE - CHIEF COMPLAINT QUOTE
pt BIBEMS from [facility] for chest pain x3 months and SOB x3 months. [facility] w. known bedbug infestation. pt taken straight to decon shower. pt well appearing in no acute distress.

## 2024-04-04 NOTE — ED PROVIDER NOTE - PHYSICAL EXAMINATION
Constitutional: NAD   Eyes: PERRLA  Head: Normocephalic   Mouth: MMM  Cardiac: regular rate. b/l symmetrical pulses.   Resp: Lungs CTAB  GI: Abd s/nt/nd, no rebound or guarding.  MSK: no leg edema  Neuro: awake, alert, moving all extremities  Skin: No rashes

## 2024-04-04 NOTE — H&P ADULT - HISTORY OF PRESENT ILLNESS
Pt is a 64 yo male with a pmh/o anxiety, bilateral cataracts for which he states he is now blind, HIV, HTN, Insomnia, DMII, noncompliance, who presents from emergency housing where there is a bed bug infestation due to left sided chest pain which is constant, pressure like, associated with cough and orthopnea, no a/a factors, present for two months. Pt states that he fell over and fainted twice today. Pt states he remembers falling but does not remember who the people were who witnessed the event. States that he was "fine" afterwards and did not lose continence, have tongue biting, hit head, have shaking/spasms, or have LOC.     Pt in ED was placed in decon for bed bug exposure and admitted for chest pain. Found to have abnormal but unchanged CXR with atelectasis, CT chest neg for PNA however pt with orthopnea, cough, and bilateral leg edema 2+. Pt has been noncompliant with his lasix or cardiac diet. Pt with HIV and has not taken meds in over two months. States he cannot afford them. SW consulted.

## 2024-04-04 NOTE — H&P ADULT - RESPIRATORY
no wheezes/no rhonchi/no respiratory distress/diminished breath sounds, L/diminished breath sounds, R

## 2024-04-04 NOTE — ED PROVIDER NOTE - OBJECTIVE STATEMENT
62 y/o male with a PMHx of anxiety, b/l cataracts, chronic back pain, DM ran out of meds 2 months ago, head trauma, HIV, HTN, insomnia presents to the ED BIBA from [facility] where there is a known bedbug infestation for CP x constant x2 months. Pt had two syncopal episodes today. Smoker. No other complaints at this time.

## 2024-04-04 NOTE — ED ADULT NURSE NOTE - NSFALLUNIVINTERV_ED_ALL_ED
Bed/Stretcher in lowest position, wheels locked, appropriate side rails in place/Call bell, personal items and telephone in reach/Instruct patient to call for assistance before getting out of bed/chair/stretcher/Non-slip footwear applied when patient is off stretcher/[place] to call system/Physically safe environment - no spills, clutter or unnecessary equipment/Purposeful proactive rounding/Room/bathroom lighting operational, light cord in reach

## 2024-04-04 NOTE — H&P ADULT - CONVERSATION DETAILS
16 min spent discussing advanced care planning and pt is a full code. Accepts trial intubation and CPR.

## 2024-04-04 NOTE — ED PROVIDER NOTE - NSICDXPASTMEDICALHX_GEN_ALL_CORE_FT
PAST MEDICAL HISTORY:  Anxiety     Bilateral cataracts     Chronic back pain     Diabetes     DM (diabetes mellitus)     Head trauma     HIV infection     Hypertension     Insomnia      Transposition Flap Text: The defect edges were debeveled with a #15 scalpel blade.  Given the location of the defect and the proximity to free margins a transposition flap was deemed most appropriate.  Using a sterile surgical marker, an appropriate transposition flap was drawn incorporating the defect.    The area thus outlined was incised deep to adipose tissue with a #15 scalpel blade.  The skin margins were undermined to an appropriate distance in all directions utilizing iris scissors.

## 2024-04-04 NOTE — PATIENT PROFILE ADULT - VISION (WITH CORRECTIVE LENSES IF THE PATIENT USUALLY WEARS THEM):
Normal vision: sees adequately in most situations; can see medication labels, newsprint pt partially blind/Partially impaired: cannot see medication labels or newsprint, but can see obstacles in path, and the surrounding layout; can count fingers at arm's length

## 2024-04-04 NOTE — PHARMACOTHERAPY INTERVENTION NOTE - COMMENTS
Medication history complete, reviewed medications with patient and confirmed with doctor first med hx. Per patient he does not have anymore meds at home.

## 2024-04-04 NOTE — H&P ADULT - TIME BILLING
A minimum of 75 min was spent completing this admission not including time spent discussing advanced care planning or discussing goals of care with a minimum of 36 min spent face to face with patient while in ED unit on admission, counseling patient on current acute on chronic conditions and discussing plan and coordination of care including diagnostic imaging such as TTE in AM, reinstating medications, consultants called and plan for cardiac assessment.

## 2024-04-04 NOTE — PATIENT PROFILE ADULT - NSPROMEDSADMININFO_GEN_A_NUR
Goal Outcome Evaluation:       Patient discharged to [facility].                no concerns

## 2024-04-04 NOTE — ED ADULT NURSE NOTE - CHIEF COMPLAINT QUOTE
pt BIBEMS from [facility] for chest pain x3 months and SOB x3 months. [facility] w. known bedbug infestation. pt taken straight to decon shower. pt well appearing in no acute distress.

## 2024-04-04 NOTE — H&P ADULT - NSICDXFAMILYHX_GEN_ALL_CORE_FT
PDMP printed and given to provider for review.  Last OV 1-21-22  Has appointment scheduled 4-26-22  Last prescribed 2-21-22.   
FAMILY HISTORY:  Father  Still living? Unknown  FH: alcoholism, Age at diagnosis: Age Unknown    Mother  Still living? Unknown  Family history of coronary artery disease in mother, Age at diagnosis: Age Unknown  Family history of diabetes mellitus (DM), Age at diagnosis: Age Unknown

## 2024-04-04 NOTE — H&P ADULT - RESPIRATORY AND THORAX
details… Gabapentin Counseling: I discussed with the patient the risks of gabapentin including but not limited to dizziness, somnolence, fatigue and ataxia.

## 2024-04-04 NOTE — H&P ADULT - ASSESSMENT
Pt is a 64 yo male who presents from emergency housing due to chest pain and two episodes of "passing out" without losing consciousness who is noncompliant with medication and lifestyle, found to have elevated BnP, Cr, and exposure to bed bugs, admitted due to :    #Atypical chest pain  #Acute decompensated HF  admit to telemetry  EKG reviewed- similar to prior  EKG prn chest pain  NTG prn chest pain  ASA, BB, statin restarted  Lasix restarted as 40 IV bid with close monitoring of renal function as below  ACE held in setting of BRIDGETTE  serial troponin wnl x 2  TTE ordered for AM- last TTE in Jan EF 45-50% with pleural effusion and trace to small pericardial effusion with moderately reduced LV fxn, inferior and lateral wall hypokinesis  HbA1c  lipid panel  cardiology consulted  fall precautions  K>4, Mg>2  f/u AM cbc, bmp, mg, phos, coags  chest xray with atelecasis, CT chest w/o PNA/ground glass opacities/effusion, pt w/o leukocytosis/fever or evidence of infection, will hold further abx - given vanco/cefepime in ED  intermittent pneumatic compression for DVT prophylaxis    #Near syncope  denies LOC or head trauma  nonfocal on neuro exam  ct head unremarkable  OOB and ambulate with assistance  fall precautions  neurochecks q 4 hrs    #CKD, baseline cr 1.7  #BRIDGETTE on CKD  diuresis with close monitoring given BRIDGETTE  renally dose meds  avoid nephrotoxic meds  nephrology consult placed    #DMII with hyperglycemia  A1c ordered  AISS qAC and HS  hold oral meds while inpatient  carb restriction    #Noncompliance  Case management and SW consult    #Bed bug exposure  Pt decontaminated in ED  Does not want to return to that particular emergency housing due to outbreak    #Chronic pain  Tylenol prn  Avoid opioids  UDS ordered    #HIV   noncompliant  ID consult

## 2024-04-04 NOTE — ED PROVIDER NOTE - WR ORDER DATE AND TIME 1
Treatment Areas: face and neck Price (Use Numbers Only, No Special Characters Or $): 50 04-Apr-2024 13:23 Detail Level: Zone Post-Care Instructions: I reviewed with the patient in detail post-care instructions. Pre-Procedure Text: The patient was placed in a recumbant position on the procedure table. Blade: 14 blade scalpel Post-Procedure Instructions: Following the dermaplane procedure, a ZO aloe mask was applied and left on for 10 minutes. Then removed with water and sponges. Daily Power Defense and Growth Factor Serum and SPF sunscreen were applied.

## 2024-04-04 NOTE — ED ADULT NURSE NOTE - OBJECTIVE STATEMENT
The patient is a 63y Male complaining of chest pain. Pt endorsing constant CP for two months. Pt has hx of HIV and DM not currently taking any medications. Pt is A & O x4, HEENT clear, PERRL, PMS x4.

## 2024-04-04 NOTE — ED PROVIDER NOTE - CLINICAL SUMMARY MEDICAL DECISION MAKING FREE TEXT BOX
62 y/o male presents with chronic CP. Plan: EKG, labs, reassess. Pt denies hitting head during syncopal episodes.

## 2024-04-05 DIAGNOSIS — R07.89 OTHER CHEST PAIN: ICD-10-CM

## 2024-04-05 DIAGNOSIS — I50.22 CHRONIC SYSTOLIC (CONGESTIVE) HEART FAILURE: ICD-10-CM

## 2024-04-05 LAB
A1C WITH ESTIMATED AVERAGE GLUCOSE RESULT: 9.8 % — HIGH (ref 4–5.6)
ALBUMIN SERPL ELPH-MCNC: 1.7 G/DL — LOW (ref 3.3–5)
ALP SERPL-CCNC: 117 U/L — SIGNIFICANT CHANGE UP (ref 40–120)
ALT FLD-CCNC: 27 U/L — SIGNIFICANT CHANGE UP (ref 12–78)
AMPHET UR-MCNC: NEGATIVE — SIGNIFICANT CHANGE UP
ANION GAP SERPL CALC-SCNC: 5 MMOL/L — SIGNIFICANT CHANGE UP (ref 5–17)
APPEARANCE UR: CLEAR — SIGNIFICANT CHANGE UP
APTT BLD: 30.7 SEC — SIGNIFICANT CHANGE UP (ref 24.5–35.6)
AST SERPL-CCNC: 30 U/L — SIGNIFICANT CHANGE UP (ref 15–37)
BACTERIA # UR AUTO: NEGATIVE /HPF — SIGNIFICANT CHANGE UP
BARBITURATES UR SCN-MCNC: NEGATIVE — SIGNIFICANT CHANGE UP
BASOPHILS # BLD AUTO: 0.02 K/UL — SIGNIFICANT CHANGE UP (ref 0–0.2)
BASOPHILS NFR BLD AUTO: 0.3 % — SIGNIFICANT CHANGE UP (ref 0–2)
BENZODIAZ UR-MCNC: NEGATIVE — SIGNIFICANT CHANGE UP
BILIRUB SERPL-MCNC: 0.2 MG/DL — SIGNIFICANT CHANGE UP (ref 0.2–1.2)
BILIRUB UR-MCNC: NEGATIVE — SIGNIFICANT CHANGE UP
BUN SERPL-MCNC: 24 MG/DL — HIGH (ref 7–23)
CALCIUM SERPL-MCNC: 8.4 MG/DL — LOW (ref 8.5–10.1)
CAST: 1 /LPF — SIGNIFICANT CHANGE UP (ref 0–4)
CHLORIDE SERPL-SCNC: 107 MMOL/L — SIGNIFICANT CHANGE UP (ref 96–108)
CHOLEST SERPL-MCNC: 188 MG/DL — SIGNIFICANT CHANGE UP
CO2 SERPL-SCNC: 28 MMOL/L — SIGNIFICANT CHANGE UP (ref 22–31)
COCAINE METAB.OTHER UR-MCNC: NEGATIVE — SIGNIFICANT CHANGE UP
COLOR SPEC: YELLOW — SIGNIFICANT CHANGE UP
CREAT SERPL-MCNC: 2.04 MG/DL — HIGH (ref 0.5–1.3)
DIFF PNL FLD: ABNORMAL
EGFR: 36 ML/MIN/1.73M2 — LOW
EOSINOPHIL # BLD AUTO: 0.21 K/UL — SIGNIFICANT CHANGE UP (ref 0–0.5)
EOSINOPHIL NFR BLD AUTO: 3 % — SIGNIFICANT CHANGE UP (ref 0–6)
ESTIMATED AVERAGE GLUCOSE: 235 MG/DL — HIGH (ref 68–114)
GLUCOSE BLDC GLUCOMTR-MCNC: 107 MG/DL — HIGH (ref 70–99)
GLUCOSE BLDC GLUCOMTR-MCNC: 199 MG/DL — HIGH (ref 70–99)
GLUCOSE BLDC GLUCOMTR-MCNC: 308 MG/DL — HIGH (ref 70–99)
GLUCOSE BLDC GLUCOMTR-MCNC: 441 MG/DL — HIGH (ref 70–99)
GLUCOSE SERPL-MCNC: 192 MG/DL — HIGH (ref 70–99)
GLUCOSE UR QL: 250 MG/DL
HCT VFR BLD CALC: 29.1 % — LOW (ref 39–50)
HDLC SERPL-MCNC: 28 MG/DL — LOW
HGB BLD-MCNC: 9.6 G/DL — LOW (ref 13–17)
IMM GRANULOCYTES NFR BLD AUTO: 0.6 % — SIGNIFICANT CHANGE UP (ref 0–0.9)
INR BLD: 0.96 RATIO — SIGNIFICANT CHANGE UP (ref 0.85–1.18)
KETONES UR-MCNC: NEGATIVE MG/DL — SIGNIFICANT CHANGE UP
LEUKOCYTE ESTERASE UR-ACNC: NEGATIVE — SIGNIFICANT CHANGE UP
LIPID PNL WITH DIRECT LDL SERPL: 111 MG/DL — HIGH
LYMPHOCYTES # BLD AUTO: 1.77 K/UL — SIGNIFICANT CHANGE UP (ref 1–3.3)
LYMPHOCYTES # BLD AUTO: 25.2 % — SIGNIFICANT CHANGE UP (ref 13–44)
MAGNESIUM SERPL-MCNC: 1.8 MG/DL — SIGNIFICANT CHANGE UP (ref 1.6–2.6)
MCHC RBC-ENTMCNC: 28.1 PG — SIGNIFICANT CHANGE UP (ref 27–34)
MCHC RBC-ENTMCNC: 33 GM/DL — SIGNIFICANT CHANGE UP (ref 32–36)
MCV RBC AUTO: 85.1 FL — SIGNIFICANT CHANGE UP (ref 80–100)
METHADONE UR-MCNC: NEGATIVE — SIGNIFICANT CHANGE UP
MONOCYTES # BLD AUTO: 0.47 K/UL — SIGNIFICANT CHANGE UP (ref 0–0.9)
MONOCYTES NFR BLD AUTO: 6.7 % — SIGNIFICANT CHANGE UP (ref 2–14)
NEUTROPHILS # BLD AUTO: 4.5 K/UL — SIGNIFICANT CHANGE UP (ref 1.8–7.4)
NEUTROPHILS NFR BLD AUTO: 64.2 % — SIGNIFICANT CHANGE UP (ref 43–77)
NITRITE UR-MCNC: NEGATIVE — SIGNIFICANT CHANGE UP
NON HDL CHOLESTEROL: 160 MG/DL — HIGH
OPIATES UR-MCNC: POSITIVE — SIGNIFICANT CHANGE UP
PCP SPEC-MCNC: SIGNIFICANT CHANGE UP
PCP UR-MCNC: NEGATIVE — SIGNIFICANT CHANGE UP
PH UR: 6.5 — SIGNIFICANT CHANGE UP (ref 5–8)
PHOSPHATE SERPL-MCNC: 2.8 MG/DL — SIGNIFICANT CHANGE UP (ref 2.5–4.5)
PLATELET # BLD AUTO: 253 K/UL — SIGNIFICANT CHANGE UP (ref 150–400)
POTASSIUM SERPL-MCNC: 3.3 MMOL/L — LOW (ref 3.5–5.3)
POTASSIUM SERPL-SCNC: 3.3 MMOL/L — LOW (ref 3.5–5.3)
PROT SERPL-MCNC: 6.6 GM/DL — SIGNIFICANT CHANGE UP (ref 6–8.3)
PROT UR-MCNC: 300 MG/DL
PROTHROM AB SERPL-ACNC: 10.9 SEC — SIGNIFICANT CHANGE UP (ref 9.5–13)
RBC # BLD: 3.42 M/UL — LOW (ref 4.2–5.8)
RBC # FLD: 15.8 % — HIGH (ref 10.3–14.5)
RBC CASTS # UR COMP ASSIST: 3 /HPF — SIGNIFICANT CHANGE UP (ref 0–4)
SODIUM SERPL-SCNC: 140 MMOL/L — SIGNIFICANT CHANGE UP (ref 135–145)
SP GR SPEC: 1.01 — SIGNIFICANT CHANGE UP (ref 1–1.03)
SQUAMOUS # UR AUTO: 0 /HPF — SIGNIFICANT CHANGE UP (ref 0–5)
THC UR QL: NEGATIVE — SIGNIFICANT CHANGE UP
TRIGL SERPL-MCNC: 282 MG/DL — HIGH
TROPONIN I, HIGH SENSITIVITY RESULT: 22.14 NG/L — SIGNIFICANT CHANGE UP
UROBILINOGEN FLD QL: 0.2 MG/DL — SIGNIFICANT CHANGE UP (ref 0.2–1)
WBC # BLD: 7.01 K/UL — SIGNIFICANT CHANGE UP (ref 3.8–10.5)
WBC # FLD AUTO: 7.01 K/UL — SIGNIFICANT CHANGE UP (ref 3.8–10.5)
WBC UR QL: 1 /HPF — SIGNIFICANT CHANGE UP (ref 0–5)

## 2024-04-05 PROCEDURE — 99223 1ST HOSP IP/OBS HIGH 75: CPT

## 2024-04-05 PROCEDURE — 93308 TTE F-UP OR LMTD: CPT | Mod: 26

## 2024-04-05 PROCEDURE — 99233 SBSQ HOSP IP/OBS HIGH 50: CPT

## 2024-04-05 RX ORDER — POTASSIUM CHLORIDE 20 MEQ
20 PACKET (EA) ORAL
Refills: 0 | Status: COMPLETED | OUTPATIENT
Start: 2024-04-05 | End: 2024-04-05

## 2024-04-05 RX ORDER — FUROSEMIDE 40 MG
40 TABLET ORAL DAILY
Refills: 0 | Status: DISCONTINUED | OUTPATIENT
Start: 2024-04-06 | End: 2024-04-06

## 2024-04-05 RX ORDER — INSULIN LISPRO 100/ML
4 VIAL (ML) SUBCUTANEOUS
Refills: 0 | Status: DISCONTINUED | OUTPATIENT
Start: 2024-04-05 | End: 2024-04-18

## 2024-04-05 RX ORDER — ALBUTEROL 90 UG/1
2 AEROSOL, METERED ORAL EVERY 6 HOURS
Refills: 0 | Status: DISCONTINUED | OUTPATIENT
Start: 2024-04-05 | End: 2024-04-18

## 2024-04-05 RX ORDER — INSULIN GLARGINE 100 [IU]/ML
12 INJECTION, SOLUTION SUBCUTANEOUS AT BEDTIME
Refills: 0 | Status: DISCONTINUED | OUTPATIENT
Start: 2024-04-05 | End: 2024-04-18

## 2024-04-05 RX ADMIN — ATORVASTATIN CALCIUM 40 MILLIGRAM(S): 80 TABLET, FILM COATED ORAL at 21:09

## 2024-04-05 RX ADMIN — Medication 8: at 12:53

## 2024-04-05 RX ADMIN — Medication 20 MILLIEQUIVALENT(S): at 21:10

## 2024-04-05 RX ADMIN — Medication 2: at 08:10

## 2024-04-05 RX ADMIN — Medication 200 MILLIGRAM(S): at 17:28

## 2024-04-05 RX ADMIN — Medication 400 MILLIGRAM(S): at 17:00

## 2024-04-05 RX ADMIN — Medication 20 MILLIEQUIVALENT(S): at 17:01

## 2024-04-05 RX ADMIN — Medication 40 MILLIGRAM(S): at 06:22

## 2024-04-05 RX ADMIN — Medication 20 MILLIEQUIVALENT(S): at 18:43

## 2024-04-05 RX ADMIN — Medication 4 UNIT(S): at 17:24

## 2024-04-05 RX ADMIN — Medication 81 MILLIGRAM(S): at 10:00

## 2024-04-05 RX ADMIN — Medication 12: at 17:23

## 2024-04-05 RX ADMIN — INSULIN GLARGINE 12 UNIT(S): 100 INJECTION, SOLUTION SUBCUTANEOUS at 21:09

## 2024-04-05 RX ADMIN — Medication 25 MILLIGRAM(S): at 10:00

## 2024-04-05 NOTE — PROGRESS NOTE ADULT - SUBJECTIVE AND OBJECTIVE BOX
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 Pt states that he fell over and fainted twice today. Pt states he remembers falling but does not remember who the people were who witnessed the event. States that he was "fine" afterwards and did not lose continence, have tongue biting, hit head, have shaking/spasms, or have LOC.   Pt in ED was placed in decon for bed bug exposure and admitted for chest pain. Found to have abnormal but unchanged CXR with atelectasis, CT chest neg for PNA however pt with orthopnea, cough, and bilateral leg edema 2+. Pt has been noncompliant with his lasix or cardiac diet. Pt with HIV and has not taken meds in over two months. States he cannot afford them. SW consulted.      : Patient seen and examined at bedside.     ROS: All 10 systems reviewed and found to be negative with the exception of what has been described above.     VITAL SIGNS:  Vital Signs Last 24 Hrs  T(C): 37.2 (2024 01:03), Max: 37.2 (2024 01:03)  T(F): 99 (2024 01:03), Max: 99 (2024 01:03)  HR: 96 (2024 06:14) (82 - 96)  BP: 150/83 (2024 06:14) (140/81 - 192/90)  BP(mean): 94 (2024 00:21) (94 - 116)  RR: 16 (2024 06:14) (16 - 18)  SpO2: 95% (2024 06:14) (95% - 98%)    Parameters below as of 2024 06:14  Patient On (Oxygen Delivery Method): room air      PHYSICAL EXAM:  General: No acute distress  HEENT: NC/AT; PERRL, anicteric sclera; MMM  Neck: Supple  Cardiovascular: +S1/S2, RRR, no murmurs, rubs, gallops  Respiratory: CTA B/L; no W/R/R  Gastrointestinal: soft, NT/ND; +BSx4  Extremities: WWP; no edema, clubbing or cyanosis  Vascular: 2+ radial, DP/PT pulses B/L  Neurological: AAOx3; no focal deficits    MEDICATIONS:  MEDICATIONS  (STANDING):  acetaminophen   IVPB .. 1000 milliGRAM(s) IV Intermittent once  aspirin  chewable 81 milliGRAM(s) Oral daily  atorvastatin 40 milliGRAM(s) Oral at bedtime  dextrose 10% Bolus 125 milliLiter(s) IV Bolus once  dextrose 5%. 1000 milliLiter(s) (100 mL/Hr) IV Continuous <Continuous>  dextrose 5%. 1000 milliLiter(s) (50 mL/Hr) IV Continuous <Continuous>  dextrose 50% Injectable 25 Gram(s) IV Push once  dextrose 50% Injectable 12.5 Gram(s) IV Push once  furosemide   Injectable 40 milliGRAM(s) IV Push two times a day  glucagon  Injectable 1 milliGRAM(s) IntraMuscular once  insulin lispro (ADMELOG) corrective regimen sliding scale   SubCutaneous at bedtime  insulin lispro (ADMELOG) corrective regimen sliding scale   SubCutaneous three times a day before meals  metoprolol succinate ER 25 milliGRAM(s) Oral daily    MEDICATIONS  (PRN):  acetaminophen     Tablet .. 650 milliGRAM(s) Oral every 6 hours PRN Temp greater or equal to 38C (100.4F), Mild Pain (1 - 3), Moderate Pain (4 - 6)  albuterol    90 MICROgram(s) HFA Inhaler 2 Puff(s) Inhalation every 6 hours PRN Shortness of Breath  dextrose Oral Gel 15 Gram(s) Oral once PRN Blood Glucose LESS THAN 70 milliGRAM(s)/deciliter  guaiFENesin Oral Liquid (Sugar-Free) 200 milliGRAM(s) Oral every 6 hours PRN Cough      ALLERGIES:  Allergies    No Known Allergies    Intolerances        LABS:                        9.6    7.01  )-----------( 253      ( 2024 07:45 )             29.1     04-04    140  |  108  |  22  ----------------------------<  299<H>  3.4<L>   |  29  |  2.33<H>    Ca    8.0<L>      2024 15:29    TPro  6.7  /  Alb  1.7<L>  /  TBili  0.2  /  DBili  x   /  AST  18  /  ALT  16  /  AlkPhos  61      PT/INR - ( 2024 15:29 )   PT: 10.5 sec;   INR: 0.93 ratio         PTT - ( 2024 15:29 )  PTT:29.9 sec  Urinalysis Basic - ( 2024 01:43 )    Color: Yellow / Appearance: Clear / S.014 / pH: x  Gluc: x / Ketone: Negative mg/dL  / Bili: Negative / Urobili: 0.2 mg/dL   Blood: x / Protein: 300 mg/dL / Nitrite: Negative   Leuk Esterase: Negative / RBC: 3 /HPF / WBC 1 /HPF   Sq Epi: x / Non Sq Epi: 0 /HPF / Bacteria: Negative /HPF      CAPILLARY BLOOD GLUCOSE      POCT Blood Glucose.: 199 mg/dL (2024 07:57)    Blood, Urine: Non Hemolyzed Trace ( @ 01:43)      RADIOLOGY & ADDITIONAL TESTS: Reviewed. 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 Pt states that he fell over and fainted twice today. Pt states he remembers falling but does not remember who the people were who witnessed the event. States that he was "fine" afterwards and did not lose continence, have tongue biting, hit head, have shaking/spasms, or have LOC.   Pt in ED was placed in decon for bed bug exposure and admitted for chest pain. Found to have abnormal but unchanged CXR with atelectasis, CT chest neg for PNA however pt with orthopnea, cough, and bilateral leg edema 2+. Pt has been noncompliant with his lasix or cardiac diet. Pt with HIV and has not taken meds in over two months. States he cannot afford them. SW consulted.      : Patient seen and examined at bedside. States he feels "horrible". Still has chest pain, unchanged.     ROS: All 10 systems reviewed and found to be negative with the exception of what has been described above.     VITAL SIGNS:  Vital Signs Last 24 Hrs  T(C): 37.2 (2024 01:03), Max: 37.2 (2024 01:03)  T(F): 99 (2024 01:03), Max: 99 (2024 01:03)  HR: 96 (2024 06:14) (82 - 96)  BP: 150/83 (2024 06:14) (140/81 - 192/90)  BP(mean): 94 (2024 00:21) (94 - 116)  RR: 16 (2024 06:14) (16 - 18)  SpO2: 95% (2024 06:14) (95% - 98%)    Parameters below as of 2024 06:14  Patient On (Oxygen Delivery Method): room air    PHYSICAL EXAM:  General: No acute distress  HEENT: NC/AT; PERRL, anicteric sclera; MMM  Neck: Supple  Cardiovascular: +S1/S2, RRR, no murmurs, rubs, gallops. Tenderness to palpation along right and left sided chest wall   Respiratory: CTA B/L; no W/R/R  Gastrointestinal: soft, NT/ND; +BSx4  Extremities: WWP; no edema, clubbing or cyanosis  Vascular: 2+ radial, DP/PT pulses B/L  Neurological: AAOx3; no focal deficits    MEDICATIONS:  MEDICATIONS  (STANDING):  acetaminophen   IVPB .. 1000 milliGRAM(s) IV Intermittent once  aspirin  chewable 81 milliGRAM(s) Oral daily  atorvastatin 40 milliGRAM(s) Oral at bedtime  dextrose 10% Bolus 125 milliLiter(s) IV Bolus once  dextrose 5%. 1000 milliLiter(s) (100 mL/Hr) IV Continuous <Continuous>  dextrose 5%. 1000 milliLiter(s) (50 mL/Hr) IV Continuous <Continuous>  dextrose 50% Injectable 25 Gram(s) IV Push once  dextrose 50% Injectable 12.5 Gram(s) IV Push once  furosemide   Injectable 40 milliGRAM(s) IV Push two times a day  glucagon  Injectable 1 milliGRAM(s) IntraMuscular once  insulin lispro (ADMELOG) corrective regimen sliding scale   SubCutaneous at bedtime  insulin lispro (ADMELOG) corrective regimen sliding scale   SubCutaneous three times a day before meals  metoprolol succinate ER 25 milliGRAM(s) Oral daily    MEDICATIONS  (PRN):  acetaminophen     Tablet .. 650 milliGRAM(s) Oral every 6 hours PRN Temp greater or equal to 38C (100.4F), Mild Pain (1 - 3), Moderate Pain (4 - 6)  albuterol    90 MICROgram(s) HFA Inhaler 2 Puff(s) Inhalation every 6 hours PRN Shortness of Breath  dextrose Oral Gel 15 Gram(s) Oral once PRN Blood Glucose LESS THAN 70 milliGRAM(s)/deciliter  guaiFENesin Oral Liquid (Sugar-Free) 200 milliGRAM(s) Oral every 6 hours PRN Cough      ALLERGIES:  Allergies    No Known Allergies    Intolerances        LABS:                        9.6    7.01  )-----------( 253      ( 2024 07:45 )             29.1     04-04    140  |  108  |  22  ----------------------------<  299<H>  3.4<L>   |  29  |  2.33<H>    Ca    8.0<L>      2024 15:29    TPro  6.7  /  Alb  1.7<L>  /  TBili  0.2  /  DBili  x   /  AST  18  /  ALT  16  /  AlkPhos  61  -04    PT/INR - ( 2024 15:29 )   PT: 10.5 sec;   INR: 0.93 ratio         PTT - ( 2024 15:29 )  PTT:29.9 sec  Urinalysis Basic - ( 2024 01:43 )    Color: Yellow / Appearance: Clear / S.014 / pH: x  Gluc: x / Ketone: Negative mg/dL  / Bili: Negative / Urobili: 0.2 mg/dL   Blood: x / Protein: 300 mg/dL / Nitrite: Negative   Leuk Esterase: Negative / RBC: 3 /HPF / WBC 1 /HPF   Sq Epi: x / Non Sq Epi: 0 /HPF / Bacteria: Negative /HPF      CAPILLARY BLOOD GLUCOSE      POCT Blood Glucose.: 199 mg/dL (2024 07:57)    Blood, Urine: Non Hemolyzed Trace ( @ 01:43)      RADIOLOGY & ADDITIONAL TESTS: Reviewed.

## 2024-04-05 NOTE — CONSULT NOTE ADULT - PROBLEM SELECTOR RECOMMENDATION 2
c/o SOB with hx HFmrEF 45-50% in 1/2024 ; does not follow up with cardiology, nonadherent with medication   he appears dry on exam, CT chest with clear lungs. Recent CTA neg for PE   will stop IV lasix and transition to PO. will need to monitor Cr   GDMT: no ACE/ARB in setting renal dysfunction. cont metoprolol succinate

## 2024-04-05 NOTE — CONSULT NOTE ADULT - PROBLEM SELECTOR RECOMMENDATION 9
pt reports constant chest pain. serial troponin negative and ECG without dynamic changes.  Reproducible on exam- do not recommend inpatient ischemic evaluation.

## 2024-04-05 NOTE — PROGRESS NOTE ADULT - ASSESSMENT
incomplete note    Pt is a 62 yo male who presents from emergency housing due to chest pain and two episodes of "passing out" without losing consciousness who is noncompliant with medication and lifestyle, found to have elevated BnP, Cr, and exposure to bed bugs, admitted due to :    #Atypical chest pain  #Acute decompensated HF  admit to telemetry  EKG reviewed- similar to prior  EKG prn chest pain  NTG prn chest pain  ASA, BB, statin restarted  Lasix restarted as 40 IV bid with close monitoring of renal function as below  ACE held in setting of BRIDGETTE  serial troponin wnl x 2  TTE ordered for AM- last TTE in Jan EF 45-50% with pleural effusion and trace to small pericardial effusion with moderately reduced LV fxn, inferior and lateral wall hypokinesis  HbA1c  lipid panel  cardiology consulted  fall precautions  K>4, Mg>2  f/u AM cbc, bmp, mg, phos, coags  chest xray with atelecasis, CT chest w/o PNA/ground glass opacities/effusion, pt w/o leukocytosis/fever or evidence of infection, will hold further abx - given vanco/cefepime in ED  intermittent pneumatic compression for DVT prophylaxis    #Near syncope  denies LOC or head trauma  nonfocal on neuro exam  ct head unremarkable  OOB and ambulate with assistance  fall precautions  neurochecks q 4 hrs    #CKD, baseline cr 1.7  #BRIDGETTE on CKD  diuresis with close monitoring given BRIDGETTE  renally dose meds  avoid nephrotoxic meds  nephrology consult placed    #DMII with hyperglycemia  A1c ordered  AISS qAC and HS  hold oral meds while inpatient  carb restriction    #Noncompliance  Case management and SW consult    #Bed bug exposure  Pt decontaminated in ED  Does not want to return to that particular emergency housing due to outbreak    #Chronic pain  Tylenol prn  Avoid opioids  UDS ordered    #HIV   noncompliant  ID consult   Pt is a 64 yo male who presents from emergency housing due to chest pain and two episodes of "passing out" without losing consciousness who is noncompliant with medication and lifestyle, found to have elevated BnP, Cr, and exposure to bed bugs, admitted due to :    #Atypical chest pain  #CHF  admitted to telemetry -> transfer to med/surg  chest xray with atelecasis, CT chest w/o PNA/ground glass opacities/effusion, pt w/o leukocytosis/fever or evidence of infection, will hold further abx - given vanco/cefepime in ED  EKG reviewed- similar to prior  chest pain reproducible with palpation. Less likely ischemic  ASA, BB, statin restarted  Does not appear overloaded, resume PO Lasix  ACE held in setting of BRIDGETTE  serial troponin wnl x 2  TTE ordered - last TTE in Jan EF 45-50% with pleural effusion and trace to small pericardial effusion with moderately reduced LV fxn, inferior and lateral wall hypokinesis  cardiology consult appreciated    #Near syncope  denies LOC or head trauma  nonfocal on neuro exam  ct head unremarkable  OOB and ambulate with assistance  fall precautions  neurochecks q 4 hrs    #CKD, baseline cr 1.7  #BRIDGETTE on CKD  improving today. discontinue IV lasix  renally dose meds  avoid nephrotoxic meds    #DMII with hyperglycemia  A1c 9.8  Start Lantus and premeals, conitnue ISS  hold oral meds while inpatient  carb restriction    #Noncompliance  Case management and SW consult - filled out DSS paperwork. Working on shelter placement  Appt. to be [facility] with [facility]    #Bed bug exposure  Pt decontaminated in ED  Does not want to return to that particular emergency housing due to outbreak    #Chronic pain  Tylenol prn  Avoid opioids    #HIV   noncompliant  ID consult    DISPO: SW help appreciated for placement - patient filled out DSS paperwork. Working on new shelter placement.

## 2024-04-05 NOTE — CONSULT NOTE ADULT - SUBJECTIVE AND OBJECTIVE BOX
CHIEF COMPLAINT: chest pain, headache       HPI:  Mr. Edwards is a 64 yo male with a hx hypertension, DM type II, HIV+ and nonadherent with medications, HFmrEF (LVEF 45-50% based on 1/2024 TTE), anxiety, bilateral cataracts presenting with constant chest pain, headache. Of note, also reports he currently resides in emergency housing where there is a bed bug outbreak.     In the ED, he was afebrile, normotensive, saturating well on room air.   Labs notable for BRIDGETTE on CKD (Cr 2.33), hgb 9.6 and stable, A1c 9.8, Hs trop neg x3.   ECG NSR 84 bpm, no ST-T wave changes.     CT chest with emphysema.     PAST MEDICAL / SURGICAL HISTORY:  PAST MEDICAL & SURGICAL HISTORY:  Hypertension      Diabetes      Head trauma      Insomnia      Anxiety      Chronic back pain      DM (diabetes mellitus)      HIV infection      Bilateral cataracts      S/P lumbar fusion  in [place]          SOCIAL HISTORY:   Alcohol: Denied  Smoking: Nonsmoker  Drug Use: Denied  Marital Status:     FAMILY HISTORY: FAMILY HISTORY:  Family history of coronary artery disease in mother (Mother)    Family history of diabetes mellitus (DM) (Mother)    FH: alcoholism (Father)        MEDICATIONS  (STANDING):  acetaminophen   IVPB .. 1000 milliGRAM(s) IV Intermittent once  aspirin  chewable 81 milliGRAM(s) Oral daily  atorvastatin 40 milliGRAM(s) Oral at bedtime  dextrose 10% Bolus 125 milliLiter(s) IV Bolus once  dextrose 5%. 1000 milliLiter(s) (100 mL/Hr) IV Continuous <Continuous>  dextrose 5%. 1000 milliLiter(s) (50 mL/Hr) IV Continuous <Continuous>  dextrose 50% Injectable 25 Gram(s) IV Push once  dextrose 50% Injectable 12.5 Gram(s) IV Push once  furosemide   Injectable 40 milliGRAM(s) IV Push two times a day  glucagon  Injectable 1 milliGRAM(s) IntraMuscular once  insulin lispro (ADMELOG) corrective regimen sliding scale   SubCutaneous at bedtime  insulin lispro (ADMELOG) corrective regimen sliding scale   SubCutaneous three times a day before meals  metoprolol succinate ER 25 milliGRAM(s) Oral daily    MEDICATIONS  (PRN):  acetaminophen     Tablet .. 650 milliGRAM(s) Oral every 6 hours PRN Temp greater or equal to 38C (100.4F), Mild Pain (1 - 3), Moderate Pain (4 - 6)  albuterol    90 MICROgram(s) HFA Inhaler 2 Puff(s) Inhalation every 6 hours PRN Shortness of Breath  dextrose Oral Gel 15 Gram(s) Oral once PRN Blood Glucose LESS THAN 70 milliGRAM(s)/deciliter  guaiFENesin Oral Liquid (Sugar-Free) 200 milliGRAM(s) Oral every 6 hours PRN Cough      Allergies    No Known Allergies    Intolerances        REVIEW OF SYSTEMS:  CONSTITUTIONAL: No weakness, fevers or chills  Eyes: No visual changes  NECK: No pain or stiffness  RESPIRATORY: No cough, wheezing, hemoptysis; + shortness of breath  CARDIOVASCULAR: + chest pain or palpitations  GASTROINTESTINAL: No abdominal pain. No nausea, vomiting, or hematemesis; No diarrhea or constipation. No melena or hematochezia.  GENITOURINARY: No dysuria, frequency or hematuria  NEUROLOGICAL: No numbness. + headache   SKIN: No itching or rash  All other review of systems is negative unless indicated above    VITAL SIGNS:   Vital Signs Last 24 Hrs  T(C): 36.7 (05 Apr 2024 08:21), Max: 37.2 (05 Apr 2024 01:03)  T(F): 98 (05 Apr 2024 08:21), Max: 99 (05 Apr 2024 01:03)  HR: 86 (05 Apr 2024 08:21) (82 - 96)  BP: 139/73 (05 Apr 2024 08:21) (139/73 - 192/90)  BP(mean): 94 (05 Apr 2024 00:21) (94 - 116)  RR: 18 (05 Apr 2024 08:21) (16 - 18)  SpO2: 97% (05 Apr 2024 08:21) (95% - 98%)    Parameters below as of 05 Apr 2024 08:21  Patient On (Oxygen Delivery Method): room air        I&O's Summary    05 Apr 2024 07:01  -  05 Apr 2024 13:23  --------------------------------------------------------  IN: 0 mL / OUT: 1450 mL / NET: -1450 mL        PHYSICAL EXAM:  Constitutional: NAD, awake and alert  HEENT:  EOMI,  Pupils round, No oral cyanosis.  Pulmonary: Non-labored, breath sounds are clear bilaterally, No wheezing, rales or rhonchi  Cardiovascular: S1 and S2, regular rate and rhythm, no Murmurs, gallops or rubs  Gastrointestinal: Bowel Sounds present, soft, nontender.   Lymph: No peripheral edema. No cervical lymphadenopathy.  Neurological: Alert, no focal deficits  Skin: No rashes.  Psych:  Mood & affect appropriate    LABS:                        9.6    7.01  )-----------( 253      ( 05 Apr 2024 07:45 )             29.1                         9.9    6.15  )-----------( 247      ( 04 Apr 2024 15:29 )             29.7     05 Apr 2024 07:45    140    |  107    |  24     ----------------------------<  192    3.3     |  28     |  2.04   04 Apr 2024 15:29    140    |  108    |  22     ----------------------------<  299    3.4     |  29     |  2.33     Ca    8.4        05 Apr 2024 07:45  Ca    8.0        04 Apr 2024 15:29  Phos  2.8       05 Apr 2024 07:45  Mg     1.8       05 Apr 2024 07:45    TPro  6.6    /  Alb  1.7    /  TBili  0.2    /  DBili  x      /  AST  30     /  ALT  27     /  AlkPhos  117    05 Apr 2024 07:45  TPro  6.7    /  Alb  1.7    /  TBili  0.2    /  DBili  x      /  AST  18     /  ALT  16     /  AlkPhos  61     04 Apr 2024 15:29    PT/INR - ( 05 Apr 2024 07:45 )   PT: 10.9 sec;   INR: 0.96 ratio         PTT - ( 05 Apr 2024 07:45 )  PTT:30.7 sec

## 2024-04-06 LAB
ANION GAP SERPL CALC-SCNC: 5 MMOL/L — SIGNIFICANT CHANGE UP (ref 5–17)
BUN SERPL-MCNC: 29 MG/DL — HIGH (ref 7–23)
CALCIUM SERPL-MCNC: 8.7 MG/DL — SIGNIFICANT CHANGE UP (ref 8.5–10.1)
CHLORIDE SERPL-SCNC: 110 MMOL/L — HIGH (ref 96–108)
CO2 SERPL-SCNC: 25 MMOL/L — SIGNIFICANT CHANGE UP (ref 22–31)
CREAT SERPL-MCNC: 2.21 MG/DL — HIGH (ref 0.5–1.3)
EGFR: 33 ML/MIN/1.73M2 — LOW
GLUCOSE BLDC GLUCOMTR-MCNC: 142 MG/DL — HIGH (ref 70–99)
GLUCOSE BLDC GLUCOMTR-MCNC: 180 MG/DL — HIGH (ref 70–99)
GLUCOSE BLDC GLUCOMTR-MCNC: 198 MG/DL — HIGH (ref 70–99)
GLUCOSE BLDC GLUCOMTR-MCNC: 201 MG/DL — HIGH (ref 70–99)
GLUCOSE SERPL-MCNC: 184 MG/DL — HIGH (ref 70–99)
HCT VFR BLD CALC: 29.7 % — LOW (ref 39–50)
HGB BLD-MCNC: 9.9 G/DL — LOW (ref 13–17)
MAGNESIUM SERPL-MCNC: 1.9 MG/DL — SIGNIFICANT CHANGE UP (ref 1.6–2.6)
MCHC RBC-ENTMCNC: 28.3 PG — SIGNIFICANT CHANGE UP (ref 27–34)
MCHC RBC-ENTMCNC: 33.3 GM/DL — SIGNIFICANT CHANGE UP (ref 32–36)
MCV RBC AUTO: 84.9 FL — SIGNIFICANT CHANGE UP (ref 80–100)
PLATELET # BLD AUTO: 244 K/UL — SIGNIFICANT CHANGE UP (ref 150–400)
POTASSIUM SERPL-MCNC: 3.7 MMOL/L — SIGNIFICANT CHANGE UP (ref 3.5–5.3)
POTASSIUM SERPL-SCNC: 3.7 MMOL/L — SIGNIFICANT CHANGE UP (ref 3.5–5.3)
RBC # BLD: 3.5 M/UL — LOW (ref 4.2–5.8)
RBC # FLD: 15.5 % — HIGH (ref 10.3–14.5)
SODIUM SERPL-SCNC: 140 MMOL/L — SIGNIFICANT CHANGE UP (ref 135–145)
WBC # BLD: 7.04 K/UL — SIGNIFICANT CHANGE UP (ref 3.8–10.5)
WBC # FLD AUTO: 7.04 K/UL — SIGNIFICANT CHANGE UP (ref 3.8–10.5)

## 2024-04-06 PROCEDURE — 99232 SBSQ HOSP IP/OBS MODERATE 35: CPT

## 2024-04-06 PROCEDURE — 99233 SBSQ HOSP IP/OBS HIGH 50: CPT

## 2024-04-06 RX ORDER — CYCLOBENZAPRINE HYDROCHLORIDE 10 MG/1
5 TABLET, FILM COATED ORAL THREE TIMES A DAY
Refills: 0 | Status: DISCONTINUED | OUTPATIENT
Start: 2024-04-06 | End: 2024-04-18

## 2024-04-06 RX ADMIN — Medication 2: at 17:54

## 2024-04-06 RX ADMIN — Medication 4 UNIT(S): at 08:32

## 2024-04-06 RX ADMIN — ATORVASTATIN CALCIUM 40 MILLIGRAM(S): 80 TABLET, FILM COATED ORAL at 21:42

## 2024-04-06 RX ADMIN — Medication 650 MILLIGRAM(S): at 08:31

## 2024-04-06 RX ADMIN — Medication 40 MILLIGRAM(S): at 10:34

## 2024-04-06 RX ADMIN — Medication 4 UNIT(S): at 12:15

## 2024-04-06 RX ADMIN — Medication 2: at 12:15

## 2024-04-06 RX ADMIN — Medication 81 MILLIGRAM(S): at 10:34

## 2024-04-06 RX ADMIN — Medication 25 MILLIGRAM(S): at 10:34

## 2024-04-06 RX ADMIN — Medication 4: at 08:32

## 2024-04-06 RX ADMIN — INSULIN GLARGINE 12 UNIT(S): 100 INJECTION, SOLUTION SUBCUTANEOUS at 21:46

## 2024-04-06 RX ADMIN — Medication 200 MILLIGRAM(S): at 12:25

## 2024-04-06 RX ADMIN — Medication 4 UNIT(S): at 17:54

## 2024-04-06 RX ADMIN — Medication 650 MILLIGRAM(S): at 17:58

## 2024-04-06 RX ADMIN — Medication 200 MILLIGRAM(S): at 23:08

## 2024-04-06 NOTE — PROGRESS NOTE ADULT - ASSESSMENT
Pt is a 62 yo male who presents from emergency housing due to chest pain and two episodes of "passing out" without losing consciousness who is noncompliant with medication and lifestyle, found to have elevated BnP, Cr, and exposure to bed bugs, admitted due to :    #Atypical chest pain  #CHF  admitted to telemetry -> transfer to med/surg  chest xray with atelecasis, CT chest w/o PNA/ground glass opacities/effusion, pt w/o leukocytosis/fever or evidence of infection, will hold further abx - given vanco/cefepime in ED  EKG reviewed- similar to prior  chest pain reproducible with palpation. Less likely ischemic  ASA, BB, statin restarted  Does not appear overloaded, resume PO Lasix  ACE held in setting of BRIDGETTE  serial troponin wnl x 2  TTE ordered - last TTE in Jan EF 45-50% with pleural effusion and trace to small pericardial effusion with moderately reduced LV fxn, inferior and lateral wall hypokinesis  cardiology consult appreciated    #Near syncope  denies LOC or head trauma  nonfocal on neuro exam  ct head unremarkable  OOB and ambulate with assistance  fall precautions  neurochecks q 4 hrs    #CKD, baseline cr 1.7  #BRIDGETTE on CKD  improving today. discontinue IV lasix  renally dose meds  avoid nephrotoxic meds    #DMII with hyperglycemia  A1c 9.8  Start Lantus and premeals, conitnue ISS  hold oral meds while inpatient  carb restriction    #Noncompliance  Case management and SW consult - filled out DSS paperwork. Working on shelter placement  Appt. to be made with [facility]    #Bed bug exposure  Pt decontaminated in ED  Does not want to return to that particular emergency housing due to outbreak    #Chronic pain  Tylenol prn  Avoid opioids    #HIV   noncompliant  ID consult    DISPO: SW help appreciated for placement - patient filled out DSS paperwork. Working on new shelter placement.   Pt is a 62 yo male who presents from emergency housing due to chest pain and two episodes of "passing out" without losing consciousness who is noncompliant with medication and lifestyle, found to have elevated BnP, Cr, and exposure to bed bugs, admitted due to :    #Atypical chest pain  #CHF  admitted to telemetry -> transfer to med/surg  chest xray with atelecasis, CT chest w/o PNA/ground glass opacities/effusion, pt w/o leukocytosis/fever or evidence of infection, will hold further abx - given vanco/cefepime in ED  EKG reviewed- similar to prior  chest pain reproducible with palpation. Less likely ischemic  ASA, BB, statin restarted  Does not appear overloaded, hold PO Lasix 40 mg in setting of BRIDGETTE  ACE held in setting of BRIDGETTE  serial troponin wnl x 2  TTE ordered - last TTE in Jan EF 45-50% with pleural effusion and trace to small pericardial effusion with moderately reduced LV fxn, inferior and lateral wall hypokinesis  cardiology consult appreciated    #Near syncope  denies LOC or head trauma  nonfocal on neuro exam  ct head unremarkable  OOB and ambulate with assistance  fall precautions  neurochecks q 4 hrs    #CKD, baseline cr 1.7  #BRIDGETTE on CKD  Slight bump in Cr, could be from IV Lasix he was getting prior. Hold Lasix and ACE  Monitor bmp  renally dose meds  avoid nephrotoxic meds    #DMII with hyperglycemia  A1c 9.8  Start Lantus and premeals, conitnue ISS  hold oral meds while inpatient  carb restriction    #Noncompliance  Case management and SW consult - filled out DSS paperwork. Working on shelter placement  Appt. to be made with [facility]    #Bed bug exposure  Pt decontaminated in ED  Does not want to return to that particular emergency housing due to outbreak    #Chronic pain  Tylenol prn  Avoid opioids    #HIV   noncompliant. Discussed with ID - wouldn't start ART now given non-compliance. Follow up outpatient     DISPO: SW help appreciated for placement - patient filled out DSS paperwork. Working on new shelter placement.

## 2024-04-06 NOTE — PROGRESS NOTE ADULT - SUBJECTIVE AND OBJECTIVE BOX
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 Pt states that he fell over and fainted twice today. Pt states he remembers falling but does not remember who the people were who witnessed the event. States that he was "fine" afterwards and did not lose continence, have tongue biting, hit head, have shaking/spasms, or have LOC.   Pt in ED was placed in decon for bed bug exposure and admitted for chest pain. Found to have abnormal but unchanged CXR with atelectasis, CT chest neg for PNA however pt with orthopnea, cough, and bilateral leg edema 2+. Pt has been noncompliant with his lasix or cardiac diet. Pt with HIV and has not taken meds in over two months. States he cannot afford them. SW consulted.      4/5: Patient seen and examined at bedside. States he feels "horrible". Still has chest pain, unchanged.     4/6: No overnight events. Patient seen and examined at bedside.     ROS: All 10 systems reviewed and found to be negative with the exception of what has been described above.     VITAL SIGNS:  Vital Signs Last 24 Hrs  T(C): 36.8 (05 Apr 2024 23:38), Max: 36.8 (05 Apr 2024 23:38)  T(F): 98.2 (05 Apr 2024 23:38), Max: 98.2 (05 Apr 2024 23:38)  HR: 84 (05 Apr 2024 23:38) (84 - 88)  BP: 152/81 (05 Apr 2024 23:38) (139/73 - 152/81)  BP(mean): 101 (05 Apr 2024 23:38) (101 - 101)  RR: 18 (05 Apr 2024 17:24) (18 - 18)  SpO2: 97% (05 Apr 2024 23:38) (97% - 97%)    Parameters below as of 05 Apr 2024 17:24  Patient On (Oxygen Delivery Method): room air      PHYSICAL EXAM:  General: No acute distress  HEENT: NC/AT; PERRL, anicteric sclera; MMM  Neck: Supple  Cardiovascular: +S1/S2, RRR, no murmurs, rubs, gallops. Tenderness to palpation along right and left sided chest wall   Respiratory: CTA B/L; no W/R/R  Gastrointestinal: soft, NT/ND; +BSx4  Extremities: WWP; no edema, clubbing or cyanosis  Vascular: 2+ radial, DP/PT pulses B/L  Neurological: AAOx3; no focal deficits    MEDICATIONS:  MEDICATIONS  (STANDING):  aspirin  chewable 81 milliGRAM(s) Oral daily  atorvastatin 40 milliGRAM(s) Oral at bedtime  dextrose 10% Bolus 125 milliLiter(s) IV Bolus once  dextrose 5%. 1000 milliLiter(s) (100 mL/Hr) IV Continuous <Continuous>  dextrose 5%. 1000 milliLiter(s) (50 mL/Hr) IV Continuous <Continuous>  dextrose 50% Injectable 25 Gram(s) IV Push once  dextrose 50% Injectable 12.5 Gram(s) IV Push once  furosemide    Tablet 40 milliGRAM(s) Oral daily  glucagon  Injectable 1 milliGRAM(s) IntraMuscular once  insulin glargine Injectable (LANTUS) 12 Unit(s) SubCutaneous at bedtime  insulin lispro (ADMELOG) corrective regimen sliding scale   SubCutaneous at bedtime  insulin lispro (ADMELOG) corrective regimen sliding scale   SubCutaneous three times a day before meals  insulin lispro Injectable (ADMELOG) 4 Unit(s) SubCutaneous three times a day before meals  metoprolol succinate ER 25 milliGRAM(s) Oral daily    MEDICATIONS  (PRN):  acetaminophen     Tablet .. 650 milliGRAM(s) Oral every 6 hours PRN Temp greater or equal to 38C (100.4F), Mild Pain (1 - 3), Moderate Pain (4 - 6)  albuterol    90 MICROgram(s) HFA Inhaler 2 Puff(s) Inhalation every 6 hours PRN Shortness of Breath  dextrose Oral Gel 15 Gram(s) Oral once PRN Blood Glucose LESS THAN 70 milliGRAM(s)/deciliter  guaiFENesin Oral Liquid (Sugar-Free) 200 milliGRAM(s) Oral every 6 hours PRN Cough      ALLERGIES:  Allergies    No Known Allergies    Intolerances        LABS:                        9.9    7.04  )-----------( 244      ( 06 Apr 2024 07:51 )             29.7     04-05    140  |  107  |  24<H>  ----------------------------<  192<H>  3.3<L>   |  28  |  2.04<H>    Ca    8.4<L>      05 Apr 2024 07:45  Phos  2.8     04-05  Mg     1.8     04-05    TPro  6.6  /  Alb  1.7<L>  /  TBili  0.2  /  DBili  x   /  AST  30  /  ALT  27  /  AlkPhos  117  04-05    PT/INR - ( 05 Apr 2024 07:45 )   PT: 10.9 sec;   INR: 0.96 ratio         PTT - ( 05 Apr 2024 07:45 )  PTT:30.7 sec  Urinalysis Basic - ( 05 Apr 2024 07:45 )    Color: x / Appearance: x / SG: x / pH: x  Gluc: 192 mg/dL / Ketone: x  / Bili: x / Urobili: x   Blood: x / Protein: x / Nitrite: x   Leuk Esterase: x / RBC: x / WBC x   Sq Epi: x / Non Sq Epi: x / Bacteria: x      CAPILLARY BLOOD GLUCOSE      POCT Blood Glucose.: 107 mg/dL (05 Apr 2024 21:07)        RADIOLOGY & ADDITIONAL TESTS: Reviewed. 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 Pt states that he fell over and fainted twice today. Pt states he remembers falling but does not remember who the people were who witnessed the event. States that he was "fine" afterwards and did not lose continence, have tongue biting, hit head, have shaking/spasms, or have LOC.   Pt in ED was placed in decon for bed bug exposure and admitted for chest pain. Found to have abnormal but unchanged CXR with atelectasis, CT chest neg for PNA however pt with orthopnea, cough, and bilateral leg edema 2+. Pt has been noncompliant with his lasix or cardiac diet. Pt with HIV and has not taken meds in over two months. States he cannot afford them. SW consulted.      4/5: Patient seen and examined at bedside. States he feels "horrible". Still has chest pain, unchanged.     4/6: No overnight events. Patient seen and examined at bedside. Still has palpable chest pain - states it's been the same for over 2 months. Will hold Lasix given BRIDGETTE. Pending shelter placement.     ROS: All 10 systems reviewed and found to be negative with the exception of what has been described above.     VITAL SIGNS:  Vital Signs Last 24 Hrs  T(C): 36.8 (05 Apr 2024 23:38), Max: 36.8 (05 Apr 2024 23:38)  T(F): 98.2 (05 Apr 2024 23:38), Max: 98.2 (05 Apr 2024 23:38)  HR: 84 (05 Apr 2024 23:38) (84 - 88)  BP: 152/81 (05 Apr 2024 23:38) (139/73 - 152/81)  BP(mean): 101 (05 Apr 2024 23:38) (101 - 101)  RR: 18 (05 Apr 2024 17:24) (18 - 18)  SpO2: 97% (05 Apr 2024 23:38) (97% - 97%)    Parameters below as of 05 Apr 2024 17:24  Patient On (Oxygen Delivery Method): room air      PHYSICAL EXAM:  General: No acute distress  HEENT: NC/AT; PERRL, anicteric sclera; MMM  Neck: Supple  Cardiovascular: +S1/S2, RRR, no murmurs, rubs, gallops. Tenderness to palpation along right and left sided chest wall   Respiratory: CTA B/L; no W/R/R  Gastrointestinal: soft, NT/ND; +BSx4  Extremities: WWP; no edema, clubbing or cyanosis  Vascular: 2+ radial, DP/PT pulses B/L  Neurological: AAOx3; no focal deficits    MEDICATIONS:  MEDICATIONS  (STANDING):  aspirin  chewable 81 milliGRAM(s) Oral daily  atorvastatin 40 milliGRAM(s) Oral at bedtime  dextrose 10% Bolus 125 milliLiter(s) IV Bolus once  dextrose 5%. 1000 milliLiter(s) (100 mL/Hr) IV Continuous <Continuous>  dextrose 5%. 1000 milliLiter(s) (50 mL/Hr) IV Continuous <Continuous>  dextrose 50% Injectable 25 Gram(s) IV Push once  dextrose 50% Injectable 12.5 Gram(s) IV Push once  furosemide    Tablet 40 milliGRAM(s) Oral daily  glucagon  Injectable 1 milliGRAM(s) IntraMuscular once  insulin glargine Injectable (LANTUS) 12 Unit(s) SubCutaneous at bedtime  insulin lispro (ADMELOG) corrective regimen sliding scale   SubCutaneous at bedtime  insulin lispro (ADMELOG) corrective regimen sliding scale   SubCutaneous three times a day before meals  insulin lispro Injectable (ADMELOG) 4 Unit(s) SubCutaneous three times a day before meals  metoprolol succinate ER 25 milliGRAM(s) Oral daily    MEDICATIONS  (PRN):  acetaminophen     Tablet .. 650 milliGRAM(s) Oral every 6 hours PRN Temp greater or equal to 38C (100.4F), Mild Pain (1 - 3), Moderate Pain (4 - 6)  albuterol    90 MICROgram(s) HFA Inhaler 2 Puff(s) Inhalation every 6 hours PRN Shortness of Breath  dextrose Oral Gel 15 Gram(s) Oral once PRN Blood Glucose LESS THAN 70 milliGRAM(s)/deciliter  guaiFENesin Oral Liquid (Sugar-Free) 200 milliGRAM(s) Oral every 6 hours PRN Cough      ALLERGIES:  Allergies    No Known Allergies    Intolerances        LABS:                        9.9    7.04  )-----------( 244      ( 06 Apr 2024 07:51 )             29.7     04-05    140  |  107  |  24<H>  ----------------------------<  192<H>  3.3<L>   |  28  |  2.04<H>    Ca    8.4<L>      05 Apr 2024 07:45  Phos  2.8     04-05  Mg     1.8     04-05    TPro  6.6  /  Alb  1.7<L>  /  TBili  0.2  /  DBili  x   /  AST  30  /  ALT  27  /  AlkPhos  117  04-05    PT/INR - ( 05 Apr 2024 07:45 )   PT: 10.9 sec;   INR: 0.96 ratio         PTT - ( 05 Apr 2024 07:45 )  PTT:30.7 sec  Urinalysis Basic - ( 05 Apr 2024 07:45 )    Color: x / Appearance: x / SG: x / pH: x  Gluc: 192 mg/dL / Ketone: x  / Bili: x / Urobili: x   Blood: x / Protein: x / Nitrite: x   Leuk Esterase: x / RBC: x / WBC x   Sq Epi: x / Non Sq Epi: x / Bacteria: x      CAPILLARY BLOOD GLUCOSE      POCT Blood Glucose.: 107 mg/dL (05 Apr 2024 21:07)        RADIOLOGY & ADDITIONAL TESTS: Reviewed.

## 2024-04-06 NOTE — PROGRESS NOTE ADULT - PROBLEM SELECTOR PLAN 1
Recommendation: pt reports constant chest pain. serial troponin negative and ECG without dynamic changes.  Reproducible on exam- do not recommend inpatient ischemic evaluatio Pt reports constant chest pain. serial troponin negative and ECG without dynamic changes.  -  Reproducible on exam  -  do not recommend inpatient ischemic evaluation

## 2024-04-06 NOTE — PROGRESS NOTE ADULT - NS ATTEND AMEND GEN_ALL_CORE FT
Agree with above.   Noncardiac chest pain  Compensated chronic HF.   will need outpatient cardiology follow up. Stable from cardiac standpoint. Please call back with any questions/concerns.

## 2024-04-06 NOTE — PROGRESS NOTE ADULT - PROBLEM SELECTOR PLAN 2
Recommendation: c/o SOB with hx HFmrEF 45-50% in 1/2024 ; does not follow up with cardiology, nonadherent with medication   he appears dry on exam, CT chest with clear lungs. Recent CTA neg for PE   will stop IV lasix and transition to PO. will need to monitor Cr   GDMT: no ACE/ARB in setting renal dysfunction. cont metoprolol succinate. c/o SOB with hx HFmrEF 45-50% in 1/2024; does not follow up with cardiology, nonadherent with medication  -  CT chest with clear lungs  -  Recent CTA neg for PE   -  Echo (4/5)  EF 50%, no pericardial effusion  -  clinically not fluid overloaded  -  cont po Lasix 40mg qd however f/u Cre today  -  GDMT:  no ACE/ARB in setting renal dysfunction. cont metoprolol succinate c/o SOB with hx HFmrEF 45-50% in 1/2024; does not follow up with cardiology, nonadherent with medication  -  CT chest with clear lungs  -  Recent CTA neg for PE   -  Echo (4/5)  EF 50%, no pericardial effusion  -  clinically not fluid overloaded  -  cont po Lasix 40mg qd   -  GDMT:  no ACE/ARB in setting renal dysfunction. cont metoprolol succinate    will need outpatient cardiology follow up. Stable from cardiac standpoint. Please call back with any questions/concerns.

## 2024-04-06 NOTE — PROGRESS NOTE ADULT - SUBJECTIVE AND OBJECTIVE BOX
CHIEF COMPLAINT: chest pain, headache       HPI:  Mr. Edwards is a 64 yo male with a hx hypertension, DM type II, HIV+ and nonadherent with medications, HFmrEF (LVEF 45-50% based on 1/2024 TTE), anxiety, bilateral cataracts presenting with constant chest pain, headache. Of note, also reports he currently resides in emergency housing where there is a bed bug outbreak.     In the ED, he was afebrile, normotensive, saturating well on room air.   Labs notable for BRIDGETTE on CKD (Cr 2.33), hgb 9.6 and stable, A1c 9.8, Hs trop neg x3.   ECG NSR 84 bpm, no ST-T wave changes.     CT chest with emphysema.     PAST MEDICAL / SURGICAL HISTORY:  PAST MEDICAL & SURGICAL HISTORY:  Hypertension      Diabetes      Head trauma      Insomnia      Anxiety      Chronic back pain      DM (diabetes mellitus)      HIV infection      Bilateral cataracts      S/P lumbar fusion  in [place]          SOCIAL HISTORY:   Alcohol: Denied  Smoking: Nonsmoker  Drug Use: Denied  Marital Status:     FAMILY HISTORY: FAMILY HISTORY:  Family history of coronary artery disease in mother (Mother)    Family history of diabetes mellitus (DM) (Mother)    FH: alcoholism (Father)        MEDICATIONS    MEDICATIONS  (STANDING):  aspirin  chewable 81 milliGRAM(s) Oral daily  atorvastatin 40 milliGRAM(s) Oral at bedtime  dextrose 10% Bolus 125 milliLiter(s) IV Bolus once  dextrose 5%. 1000 milliLiter(s) (100 mL/Hr) IV Continuous <Continuous>  dextrose 5%. 1000 milliLiter(s) (50 mL/Hr) IV Continuous <Continuous>  dextrose 50% Injectable 25 Gram(s) IV Push once  dextrose 50% Injectable 12.5 Gram(s) IV Push once  furosemide    Tablet 40 milliGRAM(s) Oral daily  glucagon  Injectable 1 milliGRAM(s) IntraMuscular once  insulin glargine Injectable (LANTUS) 12 Unit(s) SubCutaneous at bedtime  insulin lispro (ADMELOG) corrective regimen sliding scale   SubCutaneous at bedtime  insulin lispro (ADMELOG) corrective regimen sliding scale   SubCutaneous three times a day before meals  insulin lispro Injectable (ADMELOG) 4 Unit(s) SubCutaneous three times a day before meals  metoprolol succinate ER 25 milliGRAM(s) Oral daily    MEDICATIONS  (PRN):  acetaminophen     Tablet .. 650 milliGRAM(s) Oral every 6 hours PRN Temp greater or equal to 38C (100.4F), Mild Pain (1 - 3), Moderate Pain (4 - 6)  albuterol    90 MICROgram(s) HFA Inhaler 2 Puff(s) Inhalation every 6 hours PRN Shortness of Breath  dextrose Oral Gel 15 Gram(s) Oral once PRN Blood Glucose LESS THAN 70 milliGRAM(s)/deciliter  guaiFENesin Oral Liquid (Sugar-Free) 200 milliGRAM(s) Oral every 6 hours PRN Cough        Allergies    No Known Allergies    Intolerances        REVIEW OF SYSTEMS:  CONSTITUTIONAL: No weakness, fevers or chills  Eyes: No visual changes  NECK: No pain or stiffness  RESPIRATORY: No cough, wheezing, hemoptysis; + shortness of breath  CARDIOVASCULAR: + chest pain or palpitations  GASTROINTESTINAL: No abdominal pain. No nausea, vomiting, or hematemesis; No diarrhea or constipation. No melena or hematochezia.  GENITOURINARY: No dysuria, frequency or hematuria  NEUROLOGICAL: No numbness. + headache   SKIN: No itching or rash  All other review of systems is negative unless indicated above    VITAL SIGNS:   Vital Signs Last 24 Hrs  T(C): 36.8 (05 Apr 2024 23:38), Max: 36.8 (05 Apr 2024 23:38)  T(F): 98.2 (05 Apr 2024 23:38), Max: 98.2 (05 Apr 2024 23:38)  HR: 84 (05 Apr 2024 23:38) (84 - 88)  BP: 152/81 (05 Apr 2024 23:38) (139/73 - 152/81)  BP(mean): 101 (05 Apr 2024 23:38) (101 - 101)  RR: 18 (05 Apr 2024 17:24) (18 - 18)  SpO2: 97% (05 Apr 2024 23:38) (97% - 97%)    Parameters below as of 05 Apr 2024 17:24  Patient On (Oxygen Delivery Method): room air        I&O's Summary  I&O's Summary    05 Apr 2024 07:01  -  06 Apr 2024 07:00  --------------------------------------------------------  IN: 0 mL / OUT: 1750 mL / NET: -1750 mL      05 Apr 2024 07:01  -  05 Apr 2024 13:23  --------------------------------------------------------  IN: 0 mL / OUT: 1450 mL / NET: -1450 mL        PHYSICAL EXAM:  Constitutional: NAD, awake and alert  HEENT:  EOMI,  Pupils round, No oral cyanosis.  Pulmonary: Non-labored, breath sounds are clear bilaterally, No wheezing, rales or rhonchi  Cardiovascular: S1 and S2, regular rate and rhythm, no Murmurs, gallops or rubs  Gastrointestinal: Bowel Sounds present, soft, nontender.   Lymph: No peripheral edema. No cervical lymphadenopathy.  Neurological: Alert, no focal deficits  Skin: No rashes.  Psych:  Mood & affect appropriate    LABS:                                 9.6    7.01  )-----------( 253      ( 05 Apr 2024 07:45 )             29.1                         9.9    6.15  )-----------( 247      ( 04 Apr 2024 15:29 )             29.7     05 Apr 2024 07:45    140    |  107    |  24     ----------------------------<  192    3.3     |  28     |  2.04   04 Apr 2024 15:29    140    |  108    |  22     ----------------------------<  299    3.4     |  29     |  2.33     Ca    8.4        05 Apr 2024 07:45  Ca    8.0        04 Apr 2024 15:29  Phos  2.8       05 Apr 2024 07:45  Mg     1.8       05 Apr 2024 07:45    TPro  6.6    /  Alb  1.7    /  TBili  0.2    /  DBili  x      /  AST  30     /  ALT  27     /  AlkPhos  117    05 Apr 2024 07:45  TPro  6.7    /  Alb  1.7    /  TBili  0.2    /  DBili  x      /  AST  18     /  ALT  16     /  AlkPhos  61     04 Apr 2024 15:29    PT/INR - ( 05 Apr 2024 07:45 )   PT: 10.9 sec;   INR: 0.96 ratio         PTT - ( 05 Apr 2024 07:45 )  PTT:30.7 sec    < from: Transthoracic Echocardiogram Follow Up (04.05.24 @ 14:46) >   Impression   Summary   Limited study to assess left ventricular function.   The left ventricle is normal in size with borderline normal systolic   function; estimated left ventricular ejection fraction is 50 %.   Signature  < end of copied text >      < from: CT Chest w/ IV Cont (04.04.24 @ 18:27) >  IMPRESSION:  Emphysema is present. The lungs are otherwise clear..  < end of copied text >      < from: CT Head No Cont (04.04.24 @ 15:04) >  IMPRESSION:  1)  unremarkable CT study of the brain  2)  clear sinuses and mastoids..  < end of copied text >                 CHIEF COMPLAINT: chest pain, headache       HPI:  Mr. Edwards is a 64 yo male with a hx hypertension, DM type II, HIV+ and nonadherent with medications, HFmrEF (LVEF 45-50% based on 2024 TTE), anxiety, bilateral cataracts presenting with constant chest pain, headache. Of note, also reports he currently resides in emergency housing where there is a bed bug outbreak.     In the ED, he was afebrile, normotensive, saturating well on room air.   Labs notable for BRIDGETTE on CKD (Cr 2.33), hgb 9.6 and stable, A1c 9.8, Hs trop neg x3.   ECG NSR 84 bpm, no ST-T wave changes.     CT chest with emphysema.     24:  Pt laying in bed c/o constant chest pain, worse with positioning and palpation. Also c/o headache.  States has chronic cough for months.  Denies SOB, palpitations.    PAST MEDICAL / SURGICAL HISTORY:  PAST MEDICAL & SURGICAL HISTORY:  Hypertension      Diabetes      Head trauma      Insomnia      Anxiety      Chronic back pain      DM (diabetes mellitus)      HIV infection      Bilateral cataracts      S/P lumbar fusion  in [place]          SOCIAL HISTORY:   Alcohol: Denied  Smoking: Nonsmoker  Drug Use: Denied  Marital Status:     FAMILY HISTORY: FAMILY HISTORY:  Family history of coronary artery disease in mother (Mother)    Family history of diabetes mellitus (DM) (Mother)    FH: alcoholism (Father)        MEDICATIONS    MEDICATIONS  (STANDING):  aspirin  chewable 81 milliGRAM(s) Oral daily  atorvastatin 40 milliGRAM(s) Oral at bedtime  dextrose 10% Bolus 125 milliLiter(s) IV Bolus once  dextrose 5%. 1000 milliLiter(s) (100 mL/Hr) IV Continuous <Continuous>  dextrose 5%. 1000 milliLiter(s) (50 mL/Hr) IV Continuous <Continuous>  dextrose 50% Injectable 25 Gram(s) IV Push once  dextrose 50% Injectable 12.5 Gram(s) IV Push once  furosemide    Tablet 40 milliGRAM(s) Oral daily  glucagon  Injectable 1 milliGRAM(s) IntraMuscular once  insulin glargine Injectable (LANTUS) 12 Unit(s) SubCutaneous at bedtime  insulin lispro (ADMELOG) corrective regimen sliding scale   SubCutaneous at bedtime  insulin lispro (ADMELOG) corrective regimen sliding scale   SubCutaneous three times a day before meals  insulin lispro Injectable (ADMELOG) 4 Unit(s) SubCutaneous three times a day before meals  metoprolol succinate ER 25 milliGRAM(s) Oral daily    MEDICATIONS  (PRN):  acetaminophen     Tablet .. 650 milliGRAM(s) Oral every 6 hours PRN Temp greater or equal to 38C (100.4F), Mild Pain (1 - 3), Moderate Pain (4 - 6)  albuterol    90 MICROgram(s) HFA Inhaler 2 Puff(s) Inhalation every 6 hours PRN Shortness of Breath  dextrose Oral Gel 15 Gram(s) Oral once PRN Blood Glucose LESS THAN 70 milliGRAM(s)/deciliter  guaiFENesin Oral Liquid (Sugar-Free) 200 milliGRAM(s) Oral every 6 hours PRN Cough        Allergies    No Known Allergies    Intolerances    VITAL SIGNS:  Vital Signs Last 24 Hrs  T(C): 36.8 (2024 23:38), Max: 36.8 (2024 23:38)  T(F): 98.2 (2024 23:38), Max: 98.2 (2024 23:38)  HR: 84 (2024 23:38) (84 - 88)  BP: 152/81 (2024 23:38) (139/73 - 152/81)  BP(mean): 101 (2024 23:38) (101 - 101)  RR: 18 (2024 17:24) (18 - 18)  SpO2: 97% (2024 23:38) (97% - 97%)    Parameters below as of 2024 17:24  Patient On (Oxygen Delivery Method): room air      I&O's Summary    2024 07:01  -  2024 07:00  --------------------------------------------------------  IN: 0 mL / OUT: 1750 mL / NET: -1750 mL      2024 07:01  -  2024 13:23  --------------------------------------------------------  IN: 0 mL / OUT: 1450 mL / NET: -1450 mL    Daily     Daily Weight in k (2024 06:00)        PHYSICAL EXAM:  Constitutional: NAD, awake and alert  HEENT:  EOMI,  Pupils round, No oral cyanosis.  Pulmonary: Non-labored, breath sounds are clear bilaterally, No wheezing, rales or rhonchi  Cardiovascular: S1 and S2, regular rate and rhythm, no Murmurs, gallops or rubs  Gastrointestinal: Bowel Sounds present, soft, nontender.   Lymph: No peripheral edema. No cervical lymphadenopathy.  Neurological: Alert, no focal deficits  Skin: No rashes.  Psych:  Mood & affect appropriate    LABS:                              9.9    7.04  )-----------( 244      ( 2024 07:51 )             29.7                            9.6    7.01  )-----------( 253      ( 2024 07:45 )             29.1                         9.9    6.15  )-----------( 247      ( 2024 15:29 )             29.7       2024 07:45    140    |  107    |  24     ----------------------------<  192    3.3     |  28     |  2.04   2024 15:29    140    |  108    |  22     ----------------------------<  299    3.4     |  29     |  2.33     Ca    8.4        2024 07:45  Ca    8.0        2024 15:29  Phos  2.8       2024 07:45  Mg     1.8       2024 07:45    TPro  6.6    /  Alb  1.7    /  TBili  0.2    /  DBili  x      /  AST  30     /  ALT  27     /  AlkPhos  117    2024 07:45  TPro  6.7    /  Alb  1.7    /  TBili  0.2    /  DBili  x      /  AST  18     /  ALT  16     /  AlkPhos  61     2024 15:29    PT/INR - ( 2024 07:45 )   PT: 10.9 sec;   INR: 0.96 ratio         PTT - ( 2024 07:45 )  PTT:30.7 sec    < from: Transthoracic Echocardiogram Follow Up (24 @ 14:46) >   Impression   Summary   Limited study to assess left ventricular function.   The left ventricle is normal in size with borderline normal systolic   function; estimated left ventricular ejection fraction is 50 %.   Signature  < end of copied text >      < from: CT Chest w/ IV Cont (24 @ 18:27) >  IMPRESSION:  Emphysema is present. The lungs are otherwise clear..  < end of copied text >      < from: CT Head No Cont (24 @ 15:04) >  IMPRESSION:  1)  unremarkable CT study of the brain  2)  clear sinuses and mastoids..  < end of copied text >

## 2024-04-07 LAB
ANION GAP SERPL CALC-SCNC: 6 MMOL/L — SIGNIFICANT CHANGE UP (ref 5–17)
BUN SERPL-MCNC: 39 MG/DL — HIGH (ref 7–23)
CALCIUM SERPL-MCNC: 8.9 MG/DL — SIGNIFICANT CHANGE UP (ref 8.5–10.1)
CHLORIDE SERPL-SCNC: 110 MMOL/L — HIGH (ref 96–108)
CO2 SERPL-SCNC: 25 MMOL/L — SIGNIFICANT CHANGE UP (ref 22–31)
CREAT SERPL-MCNC: 2.08 MG/DL — HIGH (ref 0.5–1.3)
EGFR: 35 ML/MIN/1.73M2 — LOW
GLUCOSE BLDC GLUCOMTR-MCNC: 144 MG/DL — HIGH (ref 70–99)
GLUCOSE BLDC GLUCOMTR-MCNC: 155 MG/DL — HIGH (ref 70–99)
GLUCOSE BLDC GLUCOMTR-MCNC: 239 MG/DL — HIGH (ref 70–99)
GLUCOSE BLDC GLUCOMTR-MCNC: 319 MG/DL — HIGH (ref 70–99)
GLUCOSE SERPL-MCNC: 141 MG/DL — HIGH (ref 70–99)
POTASSIUM SERPL-MCNC: 3.7 MMOL/L — SIGNIFICANT CHANGE UP (ref 3.5–5.3)
POTASSIUM SERPL-SCNC: 3.7 MMOL/L — SIGNIFICANT CHANGE UP (ref 3.5–5.3)
SODIUM SERPL-SCNC: 141 MMOL/L — SIGNIFICANT CHANGE UP (ref 135–145)

## 2024-04-07 PROCEDURE — 99232 SBSQ HOSP IP/OBS MODERATE 35: CPT

## 2024-04-07 RX ADMIN — INSULIN GLARGINE 12 UNIT(S): 100 INJECTION, SOLUTION SUBCUTANEOUS at 22:03

## 2024-04-07 RX ADMIN — CYCLOBENZAPRINE HYDROCHLORIDE 5 MILLIGRAM(S): 10 TABLET, FILM COATED ORAL at 22:00

## 2024-04-07 RX ADMIN — Medication 4 UNIT(S): at 12:40

## 2024-04-07 RX ADMIN — Medication 8: at 12:40

## 2024-04-07 RX ADMIN — Medication 2: at 22:03

## 2024-04-07 RX ADMIN — Medication 81 MILLIGRAM(S): at 09:49

## 2024-04-07 RX ADMIN — CYCLOBENZAPRINE HYDROCHLORIDE 5 MILLIGRAM(S): 10 TABLET, FILM COATED ORAL at 15:02

## 2024-04-07 RX ADMIN — Medication 600 MILLIGRAM(S): at 22:00

## 2024-04-07 RX ADMIN — ATORVASTATIN CALCIUM 40 MILLIGRAM(S): 80 TABLET, FILM COATED ORAL at 22:00

## 2024-04-07 RX ADMIN — Medication 25 MILLIGRAM(S): at 09:48

## 2024-04-07 RX ADMIN — Medication 650 MILLIGRAM(S): at 04:20

## 2024-04-07 RX ADMIN — Medication 4 UNIT(S): at 17:36

## 2024-04-07 NOTE — PROGRESS NOTE ADULT - SUBJECTIVE AND OBJECTIVE BOX
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 Pt states that he fell over and fainted twice today. Pt states he remembers falling but does not remember who the people were who witnessed the event. States that he was "fine" afterwards and did not lose continence, have tongue biting, hit head, have shaking/spasms, or have LOC.   Pt in ED was placed in decon for bed bug exposure and admitted for chest pain. Found to have abnormal but unchanged CXR with atelectasis, CT chest neg for PNA however pt with orthopnea, cough, and bilateral leg edema 2+. Pt has been noncompliant with his lasix or cardiac diet. Pt with HIV and has not taken meds in over two months. States he cannot afford them. SW consulted.      4/5: Patient seen and examined at bedside. States he feels "horrible". Still has chest pain, unchanged.     4/6: No overnight events. Patient seen and examined at bedside. Still has palpable chest pain - states it's been the same for over 2 months. Will hold Lasix given BRIDGETTE. Pending shelter placement.     4/7: No overnight events. Patient seen and examined at bedside. Pending shelter placement.     ROS: All 10 systems reviewed and found to be negative with the exception of what has been described above.     VITAL SIGNS:  Vital Signs Last 24 Hrs  T(C): 36.2 (07 Apr 2024 08:18), Max: 37 (06 Apr 2024 20:44)  T(F): 97.2 (07 Apr 2024 08:18), Max: 98.6 (06 Apr 2024 20:44)  HR: 81 (07 Apr 2024 08:18) (81 - 89)  BP: 141/84 (07 Apr 2024 08:18) (115/70 - 141/84)  BP(mean): --  RR: 18 (07 Apr 2024 08:18) (18 - 18)  SpO2: 97% (07 Apr 2024 08:18) (96% - 97%)    Parameters below as of 06 Apr 2024 20:44  Patient On (Oxygen Delivery Method): room air  O2 Flow (L/min): 45    PHYSICAL EXAM:  General: No acute distress  HEENT: NC/AT; PERRL, anicteric sclera; MMM  Neck: Supple  Cardiovascular: +S1/S2, RRR, no murmurs, rubs, gallops. Tenderness to palpation along right and left sided chest wall   Respiratory: CTA B/L; no W/R/R  Gastrointestinal: soft, NT/ND; +BSx4  Extremities: WWP; no edema, clubbing or cyanosis  Vascular: 2+ radial, DP/PT pulses B/L  Neurological: AAOx3; no focal deficits    MEDICATIONS:  MEDICATIONS  (STANDING):  aspirin  chewable 81 milliGRAM(s) Oral daily  atorvastatin 40 milliGRAM(s) Oral at bedtime  dextrose 10% Bolus 125 milliLiter(s) IV Bolus once  dextrose 5%. 1000 milliLiter(s) (100 mL/Hr) IV Continuous <Continuous>  dextrose 5%. 1000 milliLiter(s) (50 mL/Hr) IV Continuous <Continuous>  dextrose 50% Injectable 25 Gram(s) IV Push once  dextrose 50% Injectable 12.5 Gram(s) IV Push once  glucagon  Injectable 1 milliGRAM(s) IntraMuscular once  insulin glargine Injectable (LANTUS) 12 Unit(s) SubCutaneous at bedtime  insulin lispro (ADMELOG) corrective regimen sliding scale   SubCutaneous at bedtime  insulin lispro (ADMELOG) corrective regimen sliding scale   SubCutaneous three times a day before meals  insulin lispro Injectable (ADMELOG) 4 Unit(s) SubCutaneous three times a day before meals  metoprolol succinate ER 25 milliGRAM(s) Oral daily    MEDICATIONS  (PRN):  acetaminophen     Tablet .. 650 milliGRAM(s) Oral every 6 hours PRN Temp greater or equal to 38C (100.4F), Mild Pain (1 - 3), Moderate Pain (4 - 6)  albuterol    90 MICROgram(s) HFA Inhaler 2 Puff(s) Inhalation every 6 hours PRN Shortness of Breath  cyclobenzaprine 5 milliGRAM(s) Oral three times a day PRN Muscle Spasm  dextrose Oral Gel 15 Gram(s) Oral once PRN Blood Glucose LESS THAN 70 milliGRAM(s)/deciliter  guaiFENesin Oral Liquid (Sugar-Free) 200 milliGRAM(s) Oral every 6 hours PRN Cough      ALLERGIES:  Allergies    No Known Allergies    Intolerances        LABS:                        9.9    7.04  )-----------( 244      ( 06 Apr 2024 07:51 )             29.7     04-06    140  |  110<H>  |  29<H>  ----------------------------<  184<H>  3.7   |  25  |  2.21<H>    Ca    8.7      06 Apr 2024 07:51  Mg     1.9     04-06        Urinalysis Basic - ( 06 Apr 2024 07:51 )    Color: x / Appearance: x / SG: x / pH: x  Gluc: 184 mg/dL / Ketone: x  / Bili: x / Urobili: x   Blood: x / Protein: x / Nitrite: x   Leuk Esterase: x / RBC: x / WBC x   Sq Epi: x / Non Sq Epi: x / Bacteria: x      CAPILLARY BLOOD GLUCOSE      POCT Blood Glucose.: 142 mg/dL (06 Apr 2024 21:41)        RADIOLOGY & ADDITIONAL TESTS: Reviewed. 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 Pt states that he fell over and fainted twice today. Pt states he remembers falling but does not remember who the people were who witnessed the event. States that he was "fine" afterwards and did not lose continence, have tongue biting, hit head, have shaking/spasms, or have LOC.   Pt in ED was placed in decon for bed bug exposure and admitted for chest pain. Found to have abnormal but unchanged CXR with atelectasis, CT chest neg for PNA however pt with orthopnea, cough, and bilateral leg edema 2+. Pt has been noncompliant with his lasix or cardiac diet. Pt with HIV and has not taken meds in over two months. States he cannot afford them. SW consulted.      4/5: Patient seen and examined at bedside. States he feels "horrible". Still has hest pain, unchanged.     4/6: No overnight events. Patient seen and examined at bedside. Still has palpable chest pain - states it's been the same for over 2 months. Will hold Lasix given BRIDGETTE. Pending shelter placement.     4/7: No overnight events. Patient seen and examined at bedside. Refused AM Insulin. Denies chest pain this morning but reports cough. Pending shelter placement.     ROS: All 10 systems reviewed and found to be negative with the exception of what has been described above.     VITAL SIGNS:  Vital Signs Last 24 Hrs  T(C): 36.2 (07 Apr 2024 08:18), Max: 37 (06 Apr 2024 20:44)  T(F): 97.2 (07 Apr 2024 08:18), Max: 98.6 (06 Apr 2024 20:44)  HR: 81 (07 Apr 2024 08:18) (81 - 89)  BP: 141/84 (07 Apr 2024 08:18) (115/70 - 141/84)  BP(mean): --  RR: 18 (07 Apr 2024 08:18) (18 - 18)  SpO2: 97% (07 Apr 2024 08:18) (96% - 97%)    Parameters below as of 06 Apr 2024 20:44  Patient On (Oxygen Delivery Method): room air  O2 Flow (L/min): 45    PHYSICAL EXAM:  General: No acute distress  HEENT: NC/AT; PERRL, anicteric sclera; MMM  Neck: Supple  Cardiovascular: +S1/S2, RRR, no murmurs, rubs, gallops. Tenderness to palpation along right and left sided chest wall   Respiratory: CTA B/L; no W/R/R  Gastrointestinal: soft, NT/ND; +BSx4  Extremities: WWP; no edema, clubbing or cyanosis  Vascular: 2+ radial, DP/PT pulses B/L  Neurological: AAOx3; no focal deficits    MEDICATIONS:  MEDICATIONS  (STANDING):  aspirin  chewable 81 milliGRAM(s) Oral daily  atorvastatin 40 milliGRAM(s) Oral at bedtime  dextrose 10% Bolus 125 milliLiter(s) IV Bolus once  dextrose 5%. 1000 milliLiter(s) (100 mL/Hr) IV Continuous <Continuous>  dextrose 5%. 1000 milliLiter(s) (50 mL/Hr) IV Continuous <Continuous>  dextrose 50% Injectable 25 Gram(s) IV Push once  dextrose 50% Injectable 12.5 Gram(s) IV Push once  glucagon  Injectable 1 milliGRAM(s) IntraMuscular once  insulin glargine Injectable (LANTUS) 12 Unit(s) SubCutaneous at bedtime  insulin lispro (ADMELOG) corrective regimen sliding scale   SubCutaneous at bedtime  insulin lispro (ADMELOG) corrective regimen sliding scale   SubCutaneous three times a day before meals  insulin lispro Injectable (ADMELOG) 4 Unit(s) SubCutaneous three times a day before meals  metoprolol succinate ER 25 milliGRAM(s) Oral daily    MEDICATIONS  (PRN):  acetaminophen     Tablet .. 650 milliGRAM(s) Oral every 6 hours PRN Temp greater or equal to 38C (100.4F), Mild Pain (1 - 3), Moderate Pain (4 - 6)  albuterol    90 MICROgram(s) HFA Inhaler 2 Puff(s) Inhalation every 6 hours PRN Shortness of Breath  cyclobenzaprine 5 milliGRAM(s) Oral three times a day PRN Muscle Spasm  dextrose Oral Gel 15 Gram(s) Oral once PRN Blood Glucose LESS THAN 70 milliGRAM(s)/deciliter  guaiFENesin Oral Liquid (Sugar-Free) 200 milliGRAM(s) Oral every 6 hours PRN Cough      ALLERGIES:  Allergies    No Known Allergies    Intolerances        LABS:                        9.9    7.04  )-----------( 244      ( 06 Apr 2024 07:51 )             29.7     04-06    140  |  110<H>  |  29<H>  ----------------------------<  184<H>  3.7   |  25  |  2.21<H>    Ca    8.7      06 Apr 2024 07:51  Mg     1.9     04-06        Urinalysis Basic - ( 06 Apr 2024 07:51 )    Color: x / Appearance: x / SG: x / pH: x  Gluc: 184 mg/dL / Ketone: x  / Bili: x / Urobili: x   Blood: x / Protein: x / Nitrite: x   Leuk Esterase: x / RBC: x / WBC x   Sq Epi: x / Non Sq Epi: x / Bacteria: x      CAPILLARY BLOOD GLUCOSE      POCT Blood Glucose.: 142 mg/dL (06 Apr 2024 21:41)        RADIOLOGY & ADDITIONAL TESTS: Reviewed.

## 2024-04-07 NOTE — PROGRESS NOTE ADULT - ASSESSMENT
Pt is a 64 yo male who presents from emergency housing due to chest pain and two episodes of "passing out" without losing consciousness who is noncompliant with medication and lifestyle, found to have elevated BnP, Cr, and exposure to bed bugs, admitted due to :    #Atypical chest pain  #CHF  admitted to telemetry -> transfer to med/surg  chest xray with atelecasis, CT chest w/o PNA/ground glass opacities/effusion, pt w/o leukocytosis/fever or evidence of infection, will hold further abx - given vanco/cefepime in ED  EKG reviewed- similar to prior  chest pain reproducible with palpation. Less likely ischemic  ASA, BB, statin restarted  Does not appear overloaded, hold PO Lasix 40 mg in setting of BRIDGETTE  ACE held in setting of BRIDGETTE  serial troponin wnl x 2  TTE ordered - last TTE in Jan EF 45-50% with pleural effusion and trace to small pericardial effusion with moderately reduced LV fxn, inferior and lateral wall hypokinesis  cardiology consult appreciated    #Near syncope  denies LOC or head trauma  nonfocal on neuro exam  ct head unremarkable  OOB and ambulate with assistance  fall precautions  neurochecks q 4 hrs    #CKD, baseline cr 1.7  #BRIDGETTE on CKD  Slight bump in Cr, could be from IV Lasix he was getting prior. Hold Lasix and ACE  Monitor bmp  renally dose meds  avoid nephrotoxic meds    #DMII with hyperglycemia  A1c 9.8  Start Lantus and premeals, conitnue ISS  hold oral meds while inpatient  carb restriction    #Noncompliance  Case management and SW consult - filled out DSS paperwork. Working on shelter placement  Appt. to be made with [facility]    #Bed bug exposure  Pt decontaminated in ED  Does not want to return to that particular emergency housing due to outbreak    #Chronic pain  Tylenol prn  Avoid opioids    #HIV   noncompliant. Discussed with ID - wouldn't start ART now given non-compliance. Follow up outpatient     DISPO: SW help appreciated for placement - patient filled out DSS paperwork. Working on new shelter placement.   Pt is a 64 yo male who presents from emergency housing due to chest pain and two episodes of "passing out" without losing consciousness who is noncompliant with medication and lifestyle, found to have elevated BnP, Cr, and exposure to bed bugs, admitted due to :    #Atypical chest pain  #CHF  admitted to telemetry -> transfer to med/surg  chest xray with atelecasis, CT chest w/o PNA/ground glass opacities/effusion, pt w/o leukocytosis/fever or evidence of infection, will hold further abx - given vanco/cefepime in ED  EKG reviewed- similar to prior  chest pain reproducible with palpation. Less likely ischemic  ASA, BB, statin restarted  Does not appear overloaded, hold PO Lasix 40 mg in setting of BRIDGETTE  ACE held in setting of BRIDGETTE  serial troponin wnl x 2  TTE ordered - last TTE in Jan EF 45-50% with pleural effusion and trace to small pericardial effusion with moderately reduced LV fxn, inferior and lateral wall hypokinesis  cardiology consult appreciated    #Near syncope  denies LOC or head trauma  nonfocal on neuro exam  ct head unremarkable  OOB and ambulate with assistance  fall precautions  neurochecks q 4 hrs    #CKD, baseline cr 1.7  #BRIDGETTE on CKD  Improving today. Hold Lasix and ACE  Monitor bmp  renally dose meds  avoid nephrotoxic meds    #DMII with hyperglycemia  A1c 9.8  Start Lantus and premeals, conitnue ISS  hold oral meds while inpatient  carb restriction    #Noncompliance  Case management and SW consult - filled out DSS paperwork. Working on shelter placement  Appt. to be made with [facility]    #Bed bug exposure  Pt decontaminated in ED  Does not want to return to that particular emergency housing due to outbreak    #Chronic pain  Tylenol prn  Avoid opioids    #HIV   noncompliant. Discussed with ID - wouldn't start ART now given non-compliance. Follow up outpatient     DISPO: SW help appreciated for placement - patient filled out DSS paperwork. Working on new shelter placement.

## 2024-04-08 LAB
ANION GAP SERPL CALC-SCNC: 4 MMOL/L — LOW (ref 5–17)
BUN SERPL-MCNC: 38 MG/DL — HIGH (ref 7–23)
CALCIUM SERPL-MCNC: 8.6 MG/DL — SIGNIFICANT CHANGE UP (ref 8.5–10.1)
CHLORIDE SERPL-SCNC: 111 MMOL/L — HIGH (ref 96–108)
CO2 SERPL-SCNC: 25 MMOL/L — SIGNIFICANT CHANGE UP (ref 22–31)
CREAT SERPL-MCNC: 2.01 MG/DL — HIGH (ref 0.5–1.3)
EGFR: 37 ML/MIN/1.73M2 — LOW
GLUCOSE BLDC GLUCOMTR-MCNC: 144 MG/DL — HIGH (ref 70–99)
GLUCOSE BLDC GLUCOMTR-MCNC: 148 MG/DL — HIGH (ref 70–99)
GLUCOSE BLDC GLUCOMTR-MCNC: 230 MG/DL — HIGH (ref 70–99)
GLUCOSE BLDC GLUCOMTR-MCNC: 267 MG/DL — HIGH (ref 70–99)
GLUCOSE SERPL-MCNC: 139 MG/DL — HIGH (ref 70–99)
POTASSIUM SERPL-MCNC: 3.3 MMOL/L — LOW (ref 3.5–5.3)
POTASSIUM SERPL-SCNC: 3.3 MMOL/L — LOW (ref 3.5–5.3)
SODIUM SERPL-SCNC: 140 MMOL/L — SIGNIFICANT CHANGE UP (ref 135–145)

## 2024-04-08 PROCEDURE — 99232 SBSQ HOSP IP/OBS MODERATE 35: CPT

## 2024-04-08 RX ORDER — POTASSIUM CHLORIDE 20 MEQ
40 PACKET (EA) ORAL ONCE
Refills: 0 | Status: COMPLETED | OUTPATIENT
Start: 2024-04-08 | End: 2024-04-08

## 2024-04-08 RX ADMIN — Medication 40 MILLIEQUIVALENT(S): at 11:16

## 2024-04-08 RX ADMIN — Medication 650 MILLIGRAM(S): at 20:53

## 2024-04-08 RX ADMIN — ATORVASTATIN CALCIUM 40 MILLIGRAM(S): 80 TABLET, FILM COATED ORAL at 21:16

## 2024-04-08 RX ADMIN — Medication 600 MILLIGRAM(S): at 09:21

## 2024-04-08 RX ADMIN — Medication 25 MILLIGRAM(S): at 09:21

## 2024-04-08 RX ADMIN — Medication 4 UNIT(S): at 12:58

## 2024-04-08 RX ADMIN — Medication 3: at 21:17

## 2024-04-08 RX ADMIN — Medication 600 MILLIGRAM(S): at 21:16

## 2024-04-08 RX ADMIN — Medication 4 UNIT(S): at 08:57

## 2024-04-08 RX ADMIN — INSULIN GLARGINE 12 UNIT(S): 100 INJECTION, SOLUTION SUBCUTANEOUS at 21:18

## 2024-04-08 RX ADMIN — Medication 4: at 12:58

## 2024-04-08 RX ADMIN — Medication 81 MILLIGRAM(S): at 09:21

## 2024-04-08 RX ADMIN — Medication 4 UNIT(S): at 18:08

## 2024-04-08 RX ADMIN — CYCLOBENZAPRINE HYDROCHLORIDE 5 MILLIGRAM(S): 10 TABLET, FILM COATED ORAL at 21:18

## 2024-04-08 NOTE — DIETITIAN INITIAL EVALUATION ADULT - PERTINENT MEDS FT
MEDICATIONS  (STANDING):  aspirin  chewable 81 milliGRAM(s) Oral daily  atorvastatin 40 milliGRAM(s) Oral at bedtime  dextrose 10% Bolus 125 milliLiter(s) IV Bolus once  dextrose 5%. 1000 milliLiter(s) (100 mL/Hr) IV Continuous <Continuous>  dextrose 5%. 1000 milliLiter(s) (50 mL/Hr) IV Continuous <Continuous>  dextrose 50% Injectable 25 Gram(s) IV Push once  dextrose 50% Injectable 12.5 Gram(s) IV Push once  glucagon  Injectable 1 milliGRAM(s) IntraMuscular once  guaiFENesin  milliGRAM(s) Oral every 12 hours  insulin glargine Injectable (LANTUS) 12 Unit(s) SubCutaneous at bedtime  insulin lispro (ADMELOG) corrective regimen sliding scale   SubCutaneous at bedtime  insulin lispro (ADMELOG) corrective regimen sliding scale   SubCutaneous three times a day before meals  insulin lispro Injectable (ADMELOG) 4 Unit(s) SubCutaneous three times a day before meals  metoprolol succinate ER 25 milliGRAM(s) Oral daily  potassium chloride   Powder 40 milliEquivalent(s) Oral once    MEDICATIONS  (PRN):  acetaminophen     Tablet .. 650 milliGRAM(s) Oral every 6 hours PRN Temp greater or equal to 38C (100.4F), Mild Pain (1 - 3), Moderate Pain (4 - 6)  albuterol    90 MICROgram(s) HFA Inhaler 2 Puff(s) Inhalation every 6 hours PRN Shortness of Breath  cyclobenzaprine 5 milliGRAM(s) Oral three times a day PRN Muscle Spasm  dextrose Oral Gel 15 Gram(s) Oral once PRN Blood Glucose LESS THAN 70 milliGRAM(s)/deciliter

## 2024-04-08 NOTE — DIETITIAN INITIAL EVALUATION ADULT - FACTORS AFF FOOD INTAKE
2/2 food insecurity, financial insecurity/difficulty with food procurement/preparation/other (specify)

## 2024-04-08 NOTE — PROGRESS NOTE ADULT - SUBJECTIVE AND OBJECTIVE BOX
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 Pt states that he fell over and fainted twice today. Pt states he remembers falling but does not remember who the people were who witnessed the event. States that he was "fine" afterwards and did not lose continence, have tongue biting, hit head, have shaking/spasms, or have LOC.   Pt in ED was placed in decon for bed bug exposure and admitted for chest pain. Found to have abnormal but unchanged CXR with atelectasis, CT chest neg for PNA however pt with orthopnea, cough, and bilateral leg edema 2+. Pt has been noncompliant with his lasix or cardiac diet. Pt with HIV and has not taken meds in over two months. States he cannot afford them. SW consulted.      4/5: Patient seen and examined at bedside. States he feels "horrible". Still has hest pain, unchanged.     4/6: No overnight events. Patient seen and examined at bedside. Still has palpable chest pain - states it's been the same for over 2 months. Will hold Lasix given BRIDGETTE. Pending shelter placement.     4/7: No overnight events. Patient seen and examined at bedside. Refused AM Insulin. Denies chest pain this morning but reports cough. Pending shelter placement.     4/8: No overnight events. Patient seen and examined at bedside. No new complaints. Pending shelter placement.     ROS: All 10 systems reviewed and found to be negative with the exception of what has been described above.     VITAL SIGNS:  Vital Signs Last 24 Hrs  T(C): 36.5 (08 Apr 2024 08:34), Max: 37 (07 Apr 2024 16:56)  T(F): 97.7 (08 Apr 2024 08:34), Max: 98.6 (07 Apr 2024 16:56)  HR: 82 (08 Apr 2024 08:34) (82 - 87)  BP: 144/84 (08 Apr 2024 08:34) (129/78 - 161/81)  BP(mean): --  RR: 18 (08 Apr 2024 08:34) (18 - 18)  SpO2: 95% (08 Apr 2024 08:34) (95% - 99%)    Parameters below as of 08 Apr 2024 08:34  Patient On (Oxygen Delivery Method): room air    PHYSICAL EXAM:  General: No acute distress  HEENT: NC/AT; PERRL, anicteric sclera; MMM  Neck: Supple  Cardiovascular: +S1/S2, RRR, no murmurs, rubs, gallops. Tenderness to palpation along right and left sided chest wall   Respiratory: CTA B/L; no W/R/R  Gastrointestinal: soft, NT/ND; +BSx4  Extremities: WWP; no edema, clubbing or cyanosis  Vascular: 2+ radial, DP/PT pulses B/L  Neurological: AAOx3; no focal deficits    MEDICATIONS:  MEDICATIONS  (STANDING):  aspirin  chewable 81 milliGRAM(s) Oral daily  atorvastatin 40 milliGRAM(s) Oral at bedtime  dextrose 10% Bolus 125 milliLiter(s) IV Bolus once  dextrose 5%. 1000 milliLiter(s) (100 mL/Hr) IV Continuous <Continuous>  dextrose 5%. 1000 milliLiter(s) (50 mL/Hr) IV Continuous <Continuous>  dextrose 50% Injectable 25 Gram(s) IV Push once  dextrose 50% Injectable 12.5 Gram(s) IV Push once  glucagon  Injectable 1 milliGRAM(s) IntraMuscular once  guaiFENesin  milliGRAM(s) Oral every 12 hours  insulin glargine Injectable (LANTUS) 12 Unit(s) SubCutaneous at bedtime  insulin lispro (ADMELOG) corrective regimen sliding scale   SubCutaneous at bedtime  insulin lispro (ADMELOG) corrective regimen sliding scale   SubCutaneous three times a day before meals  insulin lispro Injectable (ADMELOG) 4 Unit(s) SubCutaneous three times a day before meals  metoprolol succinate ER 25 milliGRAM(s) Oral daily    MEDICATIONS  (PRN):  acetaminophen     Tablet .. 650 milliGRAM(s) Oral every 6 hours PRN Temp greater or equal to 38C (100.4F), Mild Pain (1 - 3), Moderate Pain (4 - 6)  albuterol    90 MICROgram(s) HFA Inhaler 2 Puff(s) Inhalation every 6 hours PRN Shortness of Breath  cyclobenzaprine 5 milliGRAM(s) Oral three times a day PRN Muscle Spasm  dextrose Oral Gel 15 Gram(s) Oral once PRN Blood Glucose LESS THAN 70 milliGRAM(s)/deciliter      ALLERGIES:  Allergies    No Known Allergies    Intolerances        LABS:    04-08    140  |  111<H>  |  38<H>  ----------------------------<  139<H>  3.3<L>   |  25  |  2.01<H>    Ca    8.6      08 Apr 2024 08:17        Urinalysis Basic - ( 08 Apr 2024 08:17 )    Color: x / Appearance: x / SG: x / pH: x  Gluc: 139 mg/dL / Ketone: x  / Bili: x / Urobili: x   Blood: x / Protein: x / Nitrite: x   Leuk Esterase: x / RBC: x / WBC x   Sq Epi: x / Non Sq Epi: x / Bacteria: x      CAPILLARY BLOOD GLUCOSE      POCT Blood Glucose.: 144 mg/dL (08 Apr 2024 08:07)        RADIOLOGY & ADDITIONAL TESTS: Reviewed.

## 2024-04-08 NOTE — DIETITIAN INITIAL EVALUATION ADULT - ORAL INTAKE PTA/DIET HISTORY
Pt speaks Filipino & English; does not want to use  at this time. Living at hotel x 1 mo ; is able to grocery shop, but w/ limited appliances to cook at hotel & endorses difficulty in purchasing groceries/food. Consumes 2-3 meals/day, meeting <75% of ENN > 3 mo. W/ T2DM - skips meds/doctor visits to save money; does not measure BGL or take DM meds regularly due to financial insecurity

## 2024-04-08 NOTE — DIETITIAN INITIAL EVALUATION ADULT - PERTINENT LABORATORY DATA
04-08    140  |  111<H>  |  38<H>  ----------------------------<  139<H>  3.3<L>   |  25  |  2.01<H>    Ca    8.6      08 Apr 2024 08:17    POCT Blood Glucose.: 144 mg/dL (04-08-24 @ 08:07)  A1C with Estimated Average Glucose Result: 9.8 % (04-05-24 @ 07:45)  A1C with Estimated Average Glucose Result: 9.3 % (01-16-24 @ 07:44)

## 2024-04-08 NOTE — PROGRESS NOTE ADULT - ASSESSMENT
Pt is a 62 yo male who presents from emergency housing due to chest pain and two episodes of "passing out" without losing consciousness who is noncompliant with medication and lifestyle, found to have elevated BnP, Cr, and exposure to bed bugs, admitted due to :    #Atypical chest pain  #CHF  admitted to telemetry -> transfer to med/surg  chest xray with atelecasis, CT chest w/o PNA/ground glass opacities/effusion, pt w/o leukocytosis/fever or evidence of infection, will hold further abx - given vanco/cefepime in ED  EKG reviewed- similar to prior  chest pain reproducible with palpation. Less likely ischemic  ASA, BB, statin restarted  Does not appear overloaded, hold PO Lasix 40 mg in setting of BRIDGETTE  ACE held in setting of BRIDGETTE  serial troponin wnl x 2  TTE ordered - last TTE in Jan EF 45-50% with pleural effusion and trace to small pericardial effusion with moderately reduced LV fxn, inferior and lateral wall hypokinesis  cardiology consult appreciated    #Near syncope  denies LOC or head trauma  nonfocal on neuro exam  ct head unremarkable  OOB and ambulate with assistance  fall precautions  neurochecks q 4 hrs    #CKD, baseline cr 1.7  #BRIDGETTE on CKD  Improving today. Hold Lasix and ACE  Monitor bmp  renally dose meds  avoid nephrotoxic meds    #DMII with hyperglycemia  A1c 9.8  Start Lantus and premeals, conitnue ISS  hold oral meds while inpatient  carb restriction    #Noncompliance  Case management and SW consult - filled out DSS paperwork. Working on shelter placement  Appt. to be made with [facility]    #Bed bug exposure  Pt decontaminated in ED  Does not want to return to that particular emergency housing due to outbreak    #Chronic pain  Tylenol prn  Avoid opioids    #HIV   noncompliant. Discussed with ID - wouldn't start ART now given non-compliance. Follow up outpatient     #Moderate protein calorie malnutrition    DISPO: SW help appreciated for placement - patient filled out DSS paperwork. Working on new shelter placement.

## 2024-04-08 NOTE — DIETITIAN INITIAL EVALUATION ADULT - ADD RECOMMEND
1) C/w CHO Consistent diet as tolerated  2) Add ensure max BID (provides 150kcal, 30g protein)  3) Monitor bowel movements, if no BM for >3 days, consider implementing bowel regimen.  4) Encourage protein-rich foods, maximize food preferences  5) MVI w/ minerals daily to ensure 100% RDA met  6) Consider adding thiamine 100 mg daily 2/2 poor PO intake/ malnutrition  7) Monitor and optimize BG levels between 140-180 mg/dL by medical management  8) Encourage f/u with outpatient dietitian and endocrinologist for diabetes management  9) Strongly recommend social work consult to confirm qualification for [facility]  10) Confirm goals of care regarding nutrition support  RD will continue to monitor PO intake, labs, hydration, and wt prn.

## 2024-04-09 LAB
ANION GAP SERPL CALC-SCNC: 5 MMOL/L — SIGNIFICANT CHANGE UP (ref 5–17)
BUN SERPL-MCNC: 41 MG/DL — HIGH (ref 7–23)
CALCIUM SERPL-MCNC: 8.7 MG/DL — SIGNIFICANT CHANGE UP (ref 8.5–10.1)
CHLORIDE SERPL-SCNC: 112 MMOL/L — HIGH (ref 96–108)
CO2 SERPL-SCNC: 22 MMOL/L — SIGNIFICANT CHANGE UP (ref 22–31)
CREAT SERPL-MCNC: 2.03 MG/DL — HIGH (ref 0.5–1.3)
EGFR: 36 ML/MIN/1.73M2 — LOW
GLUCOSE BLDC GLUCOMTR-MCNC: 196 MG/DL — HIGH (ref 70–99)
GLUCOSE BLDC GLUCOMTR-MCNC: 199 MG/DL — HIGH (ref 70–99)
GLUCOSE BLDC GLUCOMTR-MCNC: 204 MG/DL — HIGH (ref 70–99)
GLUCOSE BLDC GLUCOMTR-MCNC: 214 MG/DL — HIGH (ref 70–99)
GLUCOSE SERPL-MCNC: 199 MG/DL — HIGH (ref 70–99)
MAGNESIUM SERPL-MCNC: 1.9 MG/DL — SIGNIFICANT CHANGE UP (ref 1.6–2.6)
POTASSIUM SERPL-MCNC: 4.3 MMOL/L — SIGNIFICANT CHANGE UP (ref 3.5–5.3)
POTASSIUM SERPL-SCNC: 4.3 MMOL/L — SIGNIFICANT CHANGE UP (ref 3.5–5.3)
SODIUM SERPL-SCNC: 139 MMOL/L — SIGNIFICANT CHANGE UP (ref 135–145)

## 2024-04-09 PROCEDURE — 99232 SBSQ HOSP IP/OBS MODERATE 35: CPT

## 2024-04-09 RX ADMIN — Medication 4 UNIT(S): at 08:00

## 2024-04-09 RX ADMIN — Medication 4: at 08:00

## 2024-04-09 RX ADMIN — Medication 4: at 11:56

## 2024-04-09 RX ADMIN — CYCLOBENZAPRINE HYDROCHLORIDE 5 MILLIGRAM(S): 10 TABLET, FILM COATED ORAL at 06:51

## 2024-04-09 RX ADMIN — CYCLOBENZAPRINE HYDROCHLORIDE 5 MILLIGRAM(S): 10 TABLET, FILM COATED ORAL at 21:43

## 2024-04-09 RX ADMIN — Medication 4 UNIT(S): at 11:57

## 2024-04-09 RX ADMIN — Medication 2: at 16:45

## 2024-04-09 RX ADMIN — ATORVASTATIN CALCIUM 40 MILLIGRAM(S): 80 TABLET, FILM COATED ORAL at 21:41

## 2024-04-09 RX ADMIN — Medication 650 MILLIGRAM(S): at 02:51

## 2024-04-09 RX ADMIN — Medication 650 MILLIGRAM(S): at 14:16

## 2024-04-09 RX ADMIN — Medication 1: at 21:42

## 2024-04-09 RX ADMIN — Medication 600 MILLIGRAM(S): at 09:26

## 2024-04-09 RX ADMIN — Medication 600 MILLIGRAM(S): at 21:40

## 2024-04-09 RX ADMIN — Medication 81 MILLIGRAM(S): at 09:26

## 2024-04-09 RX ADMIN — Medication 25 MILLIGRAM(S): at 09:26

## 2024-04-09 RX ADMIN — Medication 4 UNIT(S): at 16:46

## 2024-04-09 RX ADMIN — Medication 650 MILLIGRAM(S): at 21:43

## 2024-04-09 RX ADMIN — Medication 650 MILLIGRAM(S): at 14:46

## 2024-04-09 RX ADMIN — INSULIN GLARGINE 12 UNIT(S): 100 INJECTION, SOLUTION SUBCUTANEOUS at 21:42

## 2024-04-09 NOTE — PROGRESS NOTE ADULT - ASSESSMENT
Pt is a 62 yo male who presents from emergency housing due to chest pain and two episodes of "passing out" without losing consciousness who is noncompliant with medication and lifestyle, found to have elevated BnP, Cr, and exposure to bed bugs, admitted due to :    #Atypical chest pain  #CHF  admitted to telemetry -> transfer to med/surg  chest xray with atelecasis, CT chest w/o PNA/ground glass opacities/effusion, pt w/o leukocytosis/fever or evidence of infection, will hold further abx - given vanco/cefepime in ED  EKG reviewed- similar to prior  chest pain reproducible with palpation. Less likely ischemic  ASA, BB, statin restarted  Does not appear overloaded, hold PO Lasix 40 mg in setting of BRIDGETTE  ACE held in setting of BRIDGETTE  serial troponin wnl x 2  TTE ordered - last TTE in Jan EF 45-50% with pleural effusion and trace to small pericardial effusion with moderately reduced LV fxn, inferior and lateral wall hypokinesis  cardiology consult appreciated    #Near syncope  denies LOC or head trauma  nonfocal on neuro exam  ct head unremarkable  OOB and ambulate with assistance  fall precautions  neurochecks q 4 hrs    #CKD, baseline cr 1.7  #BRIDGETTE on CKD  Improving today. Hold Lasix and ACE  Monitor bmp  renally dose meds  avoid nephrotoxic meds    #DMII with hyperglycemia  A1c 9.8  Start Lantus and premeals, conitnue ISS  hold oral meds while inpatient  carb restriction    #Noncompliance  Case management and SW consult - filled out DSS paperwork. Working on shelter placement  Appt. to be made with [facility] [place]    #Bed bug exposure  Pt decontaminated in ED  Does not want to return to that particular emergency housing due to outbreak    #Chronic pain  Tylenol prn  Avoid opioids    #HIV   noncompliant. Discussed with ID - wouldn't start ART now given non-compliance. Follow up outpatient     #Moderate protein calorie malnutrition    DISPO: SW help appreciated for placement - patient filled out DSS paperwork. Working on new shelter placement.

## 2024-04-09 NOTE — PROGRESS NOTE ADULT - SUBJECTIVE AND OBJECTIVE BOX
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 Pt states that he fell over and fainted twice today. Pt states he remembers falling but does not remember who the people were who witnessed the event. States that he was "fine" afterwards and did not lose continence, have tongue biting, hit head, have shaking/spasms, or have LOC.   Pt in ED was placed in decon for bed bug exposure and admitted for chest pain. Found to have abnormal but unchanged CXR with atelectasis, CT chest neg for PNA however pt with orthopnea, cough, and bilateral leg edema 2+. Pt has been noncompliant with his lasix or cardiac diet. Pt with HIV and has not taken meds in over two months. States he cannot afford them. SW consulted.      4/5: Patient seen and examined at bedside. States he feels "horrible". Still has hest pain, unchanged.     4/6: No overnight events. Patient seen and examined at bedside. Still has palpable chest pain - states it's been the same for over 2 months. Will hold Lasix given BRIDGETTE. Pending shelter placement.     4/7: No overnight events. Patient seen and examined at bedside. Refused AM Insulin. Denies chest pain this morning but reports cough. Pending shelter placement.     4/8: No overnight events. Patient seen and examined at bedside. No new complaints. Pending shelter placement.     4/9: no complaints   await new shelter     ROS: All 10 systems reviewed and found to be negative with the exception of what has been described above.     VITAL SIGNS:    Vital Signs Last 24 Hrs  T(C): 36.8 (09 Apr 2024 08:37), Max: 36.8 (09 Apr 2024 08:37)  T(F): 98.2 (09 Apr 2024 08:37), Max: 98.2 (09 Apr 2024 08:37)  HR: 79 (09 Apr 2024 08:37) (76 - 79)  BP: 151/89 (09 Apr 2024 08:37) (141/81 - 151/89)  BP(mean): --  RR: 18 (09 Apr 2024 08:37) (17 - 18)  SpO2: 98% (09 Apr 2024 08:37) (97% - 98%)    Parameters below as of 09 Apr 2024 08:37  Patient On (Oxygen Delivery Method): room air        PHYSICAL EXAM:  General: No acute distress  HEENT: NC/AT; PERRL, anicteric sclera; MMM  Neck: Supple  Cardiovascular: +S1/S2, RRR, no murmurs, rubs, gallops. Tenderness to palpation along right and left sided chest wall   Respiratory: CTA B/L; no W/R/R  Gastrointestinal: soft, NT/ND; +BSx4  Extremities: WWP; no edema, clubbing or cyanosis  Vascular: 2+ radial, DP/PT pulses B/L  Neurological: AAOx3; no focal deficits    MEDICATIONS:  MEDICATIONS  (STANDING):  aspirin  chewable 81 milliGRAM(s) Oral daily  atorvastatin 40 milliGRAM(s) Oral at bedtime  dextrose 10% Bolus 125 milliLiter(s) IV Bolus once  dextrose 5%. 1000 milliLiter(s) (100 mL/Hr) IV Continuous <Continuous>  dextrose 5%. 1000 milliLiter(s) (50 mL/Hr) IV Continuous <Continuous>  dextrose 50% Injectable 25 Gram(s) IV Push once  dextrose 50% Injectable 12.5 Gram(s) IV Push once  glucagon  Injectable 1 milliGRAM(s) IntraMuscular once  guaiFENesin  milliGRAM(s) Oral every 12 hours  insulin glargine Injectable (LANTUS) 12 Unit(s) SubCutaneous at bedtime  insulin lispro (ADMELOG) corrective regimen sliding scale   SubCutaneous at bedtime  insulin lispro (ADMELOG) corrective regimen sliding scale   SubCutaneous three times a day before meals  insulin lispro Injectable (ADMELOG) 4 Unit(s) SubCutaneous three times a day before meals  metoprolol succinate ER 25 milliGRAM(s) Oral daily    MEDICATIONS  (PRN):  acetaminophen     Tablet .. 650 milliGRAM(s) Oral every 6 hours PRN Temp greater or equal to 38C (100.4F), Mild Pain (1 - 3), Moderate Pain (4 - 6)  albuterol    90 MICROgram(s) HFA Inhaler 2 Puff(s) Inhalation every 6 hours PRN Shortness of Breath  cyclobenzaprine 5 milliGRAM(s) Oral three times a day PRN Muscle Spasm  dextrose Oral Gel 15 Gram(s) Oral once PRN Blood Glucose LESS THAN 70 milliGRAM(s)/deciliter      ALLERGIES:  Allergies    No Known Allergies    Intolerances        LABS:  All Labs/EKG/Radiology/Meds reviewed    Lab Results:  CBC    .		Differential:	[] Automated		[] Manual  Chemistry  04-09    139  |  112<H>  |  41<H>  ----------------------------<  199<H>  4.3   |  22  |  2.03<H>    Ca    8.7      09 Apr 2024 08:08  Mg     1.9     04-09          Urinalysis Basic - ( 09 Apr 2024 08:08 )    Color: x / Appearance: x / SG: x / pH: x  Gluc: 199 mg/dL / Ketone: x  / Bili: x / Urobili: x   Blood: x / Protein: x / Nitrite: x   Leuk Esterase: x / RBC: x / WBC x   Sq Epi: x / Non Sq Epi: x / Bacteria: x        MICROBIOLOGY/CULTURES:  Culture Results:   No growth at 4 days (04-04 @ 18:44)  Culture Results:   No growth at 4 days (04-04 @ 18:44)            04-08    140  |  111<H>  |  38<H>  ----------------------------<  139<H>  3.3<L>   |  25  |  2.01<H>    Ca    8.6      08 Apr 2024 08:17        Urinalysis Basic - ( 08 Apr 2024 08:17 )    Color: x / Appearance: x / SG: x / pH: x  Gluc: 139 mg/dL / Ketone: x  / Bili: x / Urobili: x   Blood: x / Protein: x / Nitrite: x   Leuk Esterase: x / RBC: x / WBC x   Sq Epi: x / Non Sq Epi: x / Bacteria: x      CAPILLARY BLOOD GLUCOSE      POCT Blood Glucose.: 144 mg/dL (08 Apr 2024 08:07)        RADIOLOGY & ADDITIONAL TESTS: Reviewed.

## 2024-04-10 LAB
CULTURE RESULTS: SIGNIFICANT CHANGE UP
CULTURE RESULTS: SIGNIFICANT CHANGE UP
GLUCOSE BLDC GLUCOMTR-MCNC: 165 MG/DL — HIGH (ref 70–99)
GLUCOSE BLDC GLUCOMTR-MCNC: 181 MG/DL — HIGH (ref 70–99)
GLUCOSE BLDC GLUCOMTR-MCNC: 216 MG/DL — HIGH (ref 70–99)
GLUCOSE BLDC GLUCOMTR-MCNC: 280 MG/DL — HIGH (ref 70–99)
SPECIMEN SOURCE: SIGNIFICANT CHANGE UP
SPECIMEN SOURCE: SIGNIFICANT CHANGE UP

## 2024-04-10 PROCEDURE — 99232 SBSQ HOSP IP/OBS MODERATE 35: CPT

## 2024-04-10 RX ADMIN — Medication 81 MILLIGRAM(S): at 10:02

## 2024-04-10 RX ADMIN — Medication 2: at 18:08

## 2024-04-10 RX ADMIN — Medication 4 UNIT(S): at 10:03

## 2024-04-10 RX ADMIN — Medication 600 MILLIGRAM(S): at 21:24

## 2024-04-10 RX ADMIN — Medication 6: at 13:49

## 2024-04-10 RX ADMIN — Medication 4 UNIT(S): at 13:50

## 2024-04-10 RX ADMIN — Medication 4 UNIT(S): at 18:09

## 2024-04-10 RX ADMIN — Medication 25 MILLIGRAM(S): at 10:03

## 2024-04-10 RX ADMIN — Medication 600 MILLIGRAM(S): at 10:02

## 2024-04-10 RX ADMIN — ATORVASTATIN CALCIUM 40 MILLIGRAM(S): 80 TABLET, FILM COATED ORAL at 21:24

## 2024-04-10 RX ADMIN — INSULIN GLARGINE 12 UNIT(S): 100 INJECTION, SOLUTION SUBCUTANEOUS at 21:26

## 2024-04-10 RX ADMIN — Medication 650 MILLIGRAM(S): at 13:52

## 2024-04-10 NOTE — PROGRESS NOTE ADULT - SUBJECTIVE AND OBJECTIVE BOX
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 Pt states that he fell over and fainted twice today. Pt states he remembers falling but does not remember who the people were who witnessed the event. States that he was "fine" afterwards and did not lose continence, have tongue biting, hit head, have shaking/spasms, or have LOC.   Pt in ED was placed in decon for bed bug exposure and admitted for chest pain. Found to have abnormal but unchanged CXR with atelectasis, CT chest neg for PNA however pt with orthopnea, cough, and bilateral leg edema 2+. Pt has been noncompliant with his lasix or cardiac diet. Pt with HIV and has not taken meds in over two months. States he cannot afford them. SW consulted.      4/5: Patient seen and examined at bedside. States he feels "horrible". Still has hest pain, unchanged.     4/6: No overnight events. Patient seen and examined at bedside. Still has palpable chest pain - states it's been the same for over 2 months. Will hold Lasix given BRIDGETTE. Pending shelter placement.     4/7: No overnight events. Patient seen and examined at bedside. Refused AM Insulin. Denies chest pain this morning but reports cough. Pending shelter placement.     4/8: No overnight events. Patient seen and examined at bedside. No new complaints. Pending shelter placement.     4/9: no complaints   await new shelter     4/10: no complaints    ROS: All 10 systems reviewed and found to be negative with the exception of what has been described above.     VITAL SIGNS:    Vital Signs Last 24 Hrs  T(C): 36.8 (10 Apr 2024 15:54), Max: 36.8 (10 Apr 2024 15:54)  T(F): 98.3 (10 Apr 2024 15:54), Max: 98.3 (10 Apr 2024 15:54)  HR: 90 (10 Apr 2024 15:54) (78 - 90)  BP: 133/75 (10 Apr 2024 15:54) (133/75 - 152/79)  BP(mean): --  RR: 18 (10 Apr 2024 15:54) (18 - 18)  SpO2: 98% (10 Apr 2024 15:54) (98% - 99%)    Parameters below as of 10 Apr 2024 15:54  Patient On (Oxygen Delivery Method): room air          PHYSICAL EXAM:  General: No acute distress  HEENT: NC/AT; PERRL, anicteric sclera; MMM  Neck: Supple  Cardiovascular: +S1/S2, RRR, no murmurs, rubs, gallops. Tenderness to palpation along right and left sided chest wall   Respiratory: CTA B/L; no W/R/R  Gastrointestinal: soft, NT/ND; +BSx4  Extremities: WWP; no edema, clubbing or cyanosis  Vascular: 2+ radial, DP/PT pulses B/L  Neurological: AAOx3; no focal deficits    MEDICATIONS:  MEDICATIONS  (STANDING):  aspirin  chewable 81 milliGRAM(s) Oral daily  atorvastatin 40 milliGRAM(s) Oral at bedtime  dextrose 10% Bolus 125 milliLiter(s) IV Bolus once  dextrose 5%. 1000 milliLiter(s) (100 mL/Hr) IV Continuous <Continuous>  dextrose 5%. 1000 milliLiter(s) (50 mL/Hr) IV Continuous <Continuous>  dextrose 50% Injectable 25 Gram(s) IV Push once  dextrose 50% Injectable 12.5 Gram(s) IV Push once  glucagon  Injectable 1 milliGRAM(s) IntraMuscular once  guaiFENesin  milliGRAM(s) Oral every 12 hours  insulin glargine Injectable (LANTUS) 12 Unit(s) SubCutaneous at bedtime  insulin lispro (ADMELOG) corrective regimen sliding scale   SubCutaneous at bedtime  insulin lispro (ADMELOG) corrective regimen sliding scale   SubCutaneous three times a day before meals  insulin lispro Injectable (ADMELOG) 4 Unit(s) SubCutaneous three times a day before meals  metoprolol succinate ER 25 milliGRAM(s) Oral daily    MEDICATIONS  (PRN):  acetaminophen     Tablet .. 650 milliGRAM(s) Oral every 6 hours PRN Temp greater or equal to 38C (100.4F), Mild Pain (1 - 3), Moderate Pain (4 - 6)  albuterol    90 MICROgram(s) HFA Inhaler 2 Puff(s) Inhalation every 6 hours PRN Shortness of Breath  cyclobenzaprine 5 milliGRAM(s) Oral three times a day PRN Muscle Spasm  dextrose Oral Gel 15 Gram(s) Oral once PRN Blood Glucose LESS THAN 70 milliGRAM(s)/deciliter      ALLERGIES:  Allergies    No Known Allergies    Intolerances        LABS:  All Labs/EKG/Radiology/Meds reviewed    Lab Results:  CBC    .		Differential:	[] Automated		[] Manual  Chemistry  04-09    139  |  112<H>  |  41<H>  ----------------------------<  199<H>  4.3   |  22  |  2.03<H>    Ca    8.7      09 Apr 2024 08:08  Mg     1.9     04-09          Urinalysis Basic - ( 09 Apr 2024 08:08 )    Color: x / Appearance: x / SG: x / pH: x  Gluc: 199 mg/dL / Ketone: x  / Bili: x / Urobili: x   Blood: x / Protein: x / Nitrite: x   Leuk Esterase: x / RBC: x / WBC x   Sq Epi: x / Non Sq Epi: x / Bacteria: x        MICROBIOLOGY/CULTURES:  Culture Results:   No growth at 4 days (04-04 @ 18:44)  Culture Results:   No growth at 4 days (04-04 @ 18:44)            04-08    140  |  111<H>  |  38<H>  ----------------------------<  139<H>  3.3<L>   |  25  |  2.01<H>    Ca    8.6      08 Apr 2024 08:17        Urinalysis Basic - ( 08 Apr 2024 08:17 )    Color: x / Appearance: x / SG: x / pH: x  Gluc: 139 mg/dL / Ketone: x  / Bili: x / Urobili: x   Blood: x / Protein: x / Nitrite: x   Leuk Esterase: x / RBC: x / WBC x   Sq Epi: x / Non Sq Epi: x / Bacteria: x      CAPILLARY BLOOD GLUCOSE      POCT Blood Glucose.: 144 mg/dL (08 Apr 2024 08:07)        RADIOLOGY & ADDITIONAL TESTS: Reviewed.

## 2024-04-10 NOTE — PROGRESS NOTE ADULT - ASSESSMENT
Pt is a 62 yo male who presents from emergency housing due to chest pain and two episodes of "passing out" without losing consciousness who is noncompliant with medication and lifestyle, found to have elevated BnP, Cr, and exposure to bed bugs, admitted due to :    #Atypical chest pain  #CHF  admitted to telemetry -> transfer to med/surg  chest xray with atelecasis, CT chest w/o PNA/ground glass opacities/effusion, pt w/o leukocytosis/fever or evidence of infection, will hold further abx - given vanco/cefepime in ED  EKG reviewed- similar to prior  chest pain reproducible with palpation. Less likely ischemic  ASA, BB, statin restarted  Does not appear overloaded, hold PO Lasix 40 mg in setting of BRIDGETTE  ACE held in setting of BRIDGETTE  serial troponin wnl x 2  TTE ordered - last TTE in Jan EF 45-50% with pleural effusion and trace to small pericardial effusion with moderately reduced LV fxn, inferior and lateral wall hypokinesis  cardiology consult appreciated    #Near syncope  denies LOC or head trauma  nonfocal on neuro exam  ct head unremarkable  OOB and ambulate with assistance  fall precautions  neurochecks q 4 hrs    #CKD, baseline cr 1.7  #BRIDGETTE on CKD  Improving today. Hold Lasix and ACE  Monitor bmp  renally dose meds  avoid nephrotoxic meds    #DMII with hyperglycemia  A1c 9.8  Start Lantus and premeals, conitnue ISS  hold oral meds while inpatient  carb restriction    #Noncompliance  Case management and SW consult - filled out DSS paperwork. Working on shelter placement  Appt. to be made with [facility]    #Bed bug exposure  Pt decontaminated in ED  Does not want to return to that particular emergency housing due to outbreak    #Chronic pain  Tylenol prn  Avoid opioids    #HIV   noncompliant. Discussed with ID - wouldn't start ART now given non-compliance. Follow up outpatient     #Moderate protein calorie malnutrition    DISPO: await shelter placement

## 2024-04-11 LAB
ANION GAP SERPL CALC-SCNC: 4 MMOL/L — LOW (ref 5–17)
BUN SERPL-MCNC: 44 MG/DL — HIGH (ref 7–23)
CALCIUM SERPL-MCNC: 8.7 MG/DL — SIGNIFICANT CHANGE UP (ref 8.5–10.1)
CHLORIDE SERPL-SCNC: 111 MMOL/L — HIGH (ref 96–108)
CO2 SERPL-SCNC: 24 MMOL/L — SIGNIFICANT CHANGE UP (ref 22–31)
CREAT SERPL-MCNC: 2.05 MG/DL — HIGH (ref 0.5–1.3)
EGFR: 36 ML/MIN/1.73M2 — LOW
FLUAV AG NPH QL: SIGNIFICANT CHANGE UP
FLUBV AG NPH QL: SIGNIFICANT CHANGE UP
GLUCOSE BLDC GLUCOMTR-MCNC: 167 MG/DL — HIGH (ref 70–99)
GLUCOSE BLDC GLUCOMTR-MCNC: 183 MG/DL — HIGH (ref 70–99)
GLUCOSE BLDC GLUCOMTR-MCNC: 188 MG/DL — HIGH (ref 70–99)
GLUCOSE SERPL-MCNC: 217 MG/DL — HIGH (ref 70–99)
HCT VFR BLD CALC: 31.7 % — LOW (ref 39–50)
HGB BLD-MCNC: 10.1 G/DL — LOW (ref 13–17)
MAGNESIUM SERPL-MCNC: 2 MG/DL — SIGNIFICANT CHANGE UP (ref 1.6–2.6)
MCHC RBC-ENTMCNC: 27.9 PG — SIGNIFICANT CHANGE UP (ref 27–34)
MCHC RBC-ENTMCNC: 31.9 GM/DL — LOW (ref 32–36)
MCV RBC AUTO: 87.6 FL — SIGNIFICANT CHANGE UP (ref 80–100)
PHOSPHATE SERPL-MCNC: 3.2 MG/DL — SIGNIFICANT CHANGE UP (ref 2.5–4.5)
PLATELET # BLD AUTO: 255 K/UL — SIGNIFICANT CHANGE UP (ref 150–400)
POTASSIUM SERPL-MCNC: 4.6 MMOL/L — SIGNIFICANT CHANGE UP (ref 3.5–5.3)
POTASSIUM SERPL-SCNC: 4.6 MMOL/L — SIGNIFICANT CHANGE UP (ref 3.5–5.3)
RBC # BLD: 3.62 M/UL — LOW (ref 4.2–5.8)
RBC # FLD: 16 % — HIGH (ref 10.3–14.5)
RSV RNA NPH QL NAA+NON-PROBE: SIGNIFICANT CHANGE UP
SARS-COV-2 RNA SPEC QL NAA+PROBE: SIGNIFICANT CHANGE UP
SODIUM SERPL-SCNC: 139 MMOL/L — SIGNIFICANT CHANGE UP (ref 135–145)
TROPONIN I, HIGH SENSITIVITY RESULT: 12.93 NG/L — SIGNIFICANT CHANGE UP
WBC # BLD: 5.13 K/UL — SIGNIFICANT CHANGE UP (ref 3.8–10.5)
WBC # FLD AUTO: 5.13 K/UL — SIGNIFICANT CHANGE UP (ref 3.8–10.5)

## 2024-04-11 PROCEDURE — 99232 SBSQ HOSP IP/OBS MODERATE 35: CPT

## 2024-04-11 RX ORDER — ACETAMINOPHEN 500 MG
325 TABLET ORAL ONCE
Refills: 0 | Status: DISCONTINUED | OUTPATIENT
Start: 2024-04-11 | End: 2024-04-18

## 2024-04-11 RX ADMIN — Medication 2: at 08:42

## 2024-04-11 RX ADMIN — Medication 650 MILLIGRAM(S): at 18:10

## 2024-04-11 RX ADMIN — Medication 4 UNIT(S): at 08:42

## 2024-04-11 RX ADMIN — Medication 4 UNIT(S): at 18:10

## 2024-04-11 RX ADMIN — Medication 25 MILLIGRAM(S): at 10:08

## 2024-04-11 RX ADMIN — Medication 650 MILLIGRAM(S): at 10:08

## 2024-04-11 RX ADMIN — Medication 600 MILLIGRAM(S): at 10:08

## 2024-04-11 RX ADMIN — Medication 81 MILLIGRAM(S): at 10:08

## 2024-04-11 RX ADMIN — Medication 2: at 18:09

## 2024-04-11 NOTE — PROGRESS NOTE ADULT - SUBJECTIVE AND OBJECTIVE BOX
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 Pt states that he fell over and fainted twice today. Pt states he remembers falling but does not remember who the people were who witnessed the event. States that he was "fine" afterwards and did not lose continence, have tongue biting, hit head, have shaking/spasms, or have LOC.   Pt in ED was placed in decon for bed bug exposure and admitted for chest pain. Found to have abnormal but unchanged CXR with atelectasis, CT chest neg for PNA however pt with orthopnea, cough, and bilateral leg edema 2+. Pt has been noncompliant with his lasix or cardiac diet. Pt with HIV and has not taken meds in over two months. States he cannot afford them. SW consulted.      4/5: Patient seen and examined at bedside. States he feels "horrible". Still has hest pain, unchanged.     4/6: No overnight events. Patient seen and examined at bedside. Still has palpable chest pain - states it's been the same for over 2 months. Will hold Lasix given BRIDGETTE. Pending shelter placement.     4/7: No overnight events. Patient seen and examined at bedside. Refused AM Insulin. Denies chest pain this morning but reports cough. Pending shelter placement.     4/8: No overnight events. Patient seen and examined at bedside. No new complaints. Pending shelter placement.     4/9: no complaints   await new shelter     4/10: no complaints    4/11: c/o sore throat; RVP done, neg    ROS: All 10 systems reviewed and found to be negative with the exception of what has been described above.     VITAL SIGNS:    Vital Signs Last 24 Hrs  T(C): 36.8 (11 Apr 2024 08:29), Max: 36.8 (10 Apr 2024 15:54)  T(F): 98.2 (11 Apr 2024 08:29), Max: 98.3 (10 Apr 2024 15:54)  HR: 91 (11 Apr 2024 08:29) (88 - 91)  BP: 166/92 (11 Apr 2024 08:29) (131/86 - 166/92)  BP(mean): --  RR: 18 (11 Apr 2024 08:29) (18 - 18)  SpO2: 99% (11 Apr 2024 08:29) (96% - 99%)    Parameters below as of 11 Apr 2024 08:29  Patient On (Oxygen Delivery Method): room air      PHYSICAL EXAM:  General: No acute distress  HEENT: NC/AT; PERRL, anicteric sclera; MMM  Neck: Supple  Cardiovascular: +S1/S2, RRR, no murmurs, rubs, gallops. Tenderness to palpation along right and left sided chest wall   Respiratory: CTA B/L; no W/R/R  Gastrointestinal: soft, NT/ND; +BSx4  Extremities: WWP; no edema, clubbing or cyanosis  Vascular: 2+ radial, DP/PT pulses B/L  Neurological: AAOx3; no focal deficits    MEDICATIONS:  MEDICATIONS  (STANDING):  aspirin  chewable 81 milliGRAM(s) Oral daily  atorvastatin 40 milliGRAM(s) Oral at bedtime  dextrose 10% Bolus 125 milliLiter(s) IV Bolus once  dextrose 5%. 1000 milliLiter(s) (100 mL/Hr) IV Continuous <Continuous>  dextrose 5%. 1000 milliLiter(s) (50 mL/Hr) IV Continuous <Continuous>  dextrose 50% Injectable 25 Gram(s) IV Push once  dextrose 50% Injectable 12.5 Gram(s) IV Push once  glucagon  Injectable 1 milliGRAM(s) IntraMuscular once  guaiFENesin  milliGRAM(s) Oral every 12 hours  insulin glargine Injectable (LANTUS) 12 Unit(s) SubCutaneous at bedtime  insulin lispro (ADMELOG) corrective regimen sliding scale   SubCutaneous at bedtime  insulin lispro (ADMELOG) corrective regimen sliding scale   SubCutaneous three times a day before meals  insulin lispro Injectable (ADMELOG) 4 Unit(s) SubCutaneous three times a day before meals  metoprolol succinate ER 25 milliGRAM(s) Oral daily    MEDICATIONS  (PRN):  acetaminophen     Tablet .. 650 milliGRAM(s) Oral every 6 hours PRN Temp greater or equal to 38C (100.4F), Mild Pain (1 - 3), Moderate Pain (4 - 6)  albuterol    90 MICROgram(s) HFA Inhaler 2 Puff(s) Inhalation every 6 hours PRN Shortness of Breath  cyclobenzaprine 5 milliGRAM(s) Oral three times a day PRN Muscle Spasm  dextrose Oral Gel 15 Gram(s) Oral once PRN Blood Glucose LESS THAN 70 milliGRAM(s)/deciliter      ALLERGIES:  Allergies    No Known Allergies    Intolerances        LABS:  All Labs/EKG/Radiology/Meds reviewed    Lab Results:  CBC    .		Differential:	[] Automated		[] Manual  Chemistry  04-09    139  |  112<H>  |  41<H>  ----------------------------<  199<H>  4.3   |  22  |  2.03<H>    Ca    8.7      09 Apr 2024 08:08  Mg     1.9     04-09          Urinalysis Basic - ( 09 Apr 2024 08:08 )    Color: x / Appearance: x / SG: x / pH: x  Gluc: 199 mg/dL / Ketone: x  / Bili: x / Urobili: x   Blood: x / Protein: x / Nitrite: x   Leuk Esterase: x / RBC: x / WBC x   Sq Epi: x / Non Sq Epi: x / Bacteria: x        MICROBIOLOGY/CULTURES:  Culture Results:   No growth at 4 days (04-04 @ 18:44)  Culture Results:   No growth at 4 days (04-04 @ 18:44)            04-08    140  |  111<H>  |  38<H>  ----------------------------<  139<H>  3.3<L>   |  25  |  2.01<H>    Ca    8.6      08 Apr 2024 08:17        Urinalysis Basic - ( 08 Apr 2024 08:17 )    Color: x / Appearance: x / SG: x / pH: x  Gluc: 139 mg/dL / Ketone: x  / Bili: x / Urobili: x   Blood: x / Protein: x / Nitrite: x   Leuk Esterase: x / RBC: x / WBC x   Sq Epi: x / Non Sq Epi: x / Bacteria: x      CAPILLARY BLOOD GLUCOSE      POCT Blood Glucose.: 144 mg/dL (08 Apr 2024 08:07)        RADIOLOGY & ADDITIONAL TESTS: Reviewed.

## 2024-04-11 NOTE — PROGRESS NOTE ADULT - ASSESSMENT
Pt is a 64 yo male who presents from emergency housing due to chest pain and two episodes of "passing out" without losing consciousness who is noncompliant with medication and lifestyle, found to have elevated BnP, Cr, and exposure to bed bugs, admitted due to :    #Atypical chest pain  #CHF  admitted to telemetry -> transfer to med/surg  chest xray with atelecasis, CT chest w/o PNA/ground glass opacities/effusion, pt w/o leukocytosis/fever or evidence of infection, will hold further abx - given vanco/cefepime in ED  EKG reviewed- similar to prior  chest pain reproducible with palpation. Less likely ischemic  ASA, BB, statin restarted  Does not appear overloaded, hold PO Lasix 40 mg in setting of BRIDGETTE  ACE held in setting of BRIDGETTE  serial troponin wnl x 2  TTE ordered - last TTE in Jan EF 45-50% with pleural effusion and trace to small pericardial effusion with moderately reduced LV fxn, inferior and lateral wall hypokinesis  cardiology consult appreciated    #Near syncope  denies LOC or head trauma  nonfocal on neuro exam  ct head unremarkable  OOB and ambulate with assistance  fall precautions  neurochecks q 4 hrs    #CKD, baseline cr 1.7  #BRIDGETTE on CKD  Improving today. Hold Lasix and ACE  Monitor bmp  renally dose meds  avoid nephrotoxic meds    #DMII with hyperglycemia  A1c 9.8  Start Lantus and premeals, conitnue ISS  hold oral meds while inpatient  carb restriction    #Noncompliance  Case management and SW consult - filled out DSS paperwork. Working on shelter placement  Appt. to be made with [facility]    #Bed bug exposure  Pt decontaminated in ED  Does not want to return to that particular emergency housing due to outbreak    #Chronic pain  Tylenol prn  Avoid opioids    #HIV   noncompliant. Discussed with ID - wouldn't start ART now given non-compliance. Follow up outpatient     #Moderate protein calorie malnutrition    DISPO: await shelter placement

## 2024-04-11 NOTE — PROVIDER CONTACT NOTE (OTHER) - ACTION/TREATMENT ORDERED:
MICHELLE Zavala contacted and flu/covid test ordered
PA made aware, instructed to give PO tyelenol if needed.
[Initial Visit ___] : [unfilled] is here today for an initial visit  for [unfilled]

## 2024-04-11 NOTE — CHART NOTE - NSCHARTNOTEFT_GEN_A_CORE
Notified by RN of pt c/o chest pain.  Pt seen resting in bed in NAD. He c/o constant, persistent chest pain unchanged for 3 months that is relieved with analgesics. States that pain is worse with coughing and is reproducible.   Pt has been evaluated by cardio for atypical chest pain with negative trop/EKG changes. Since chest pain is chronic and reproducible, it is likely pleuritic in nature. Recommend to continue analgesics for now.

## 2024-04-12 ENCOUNTER — TRANSCRIPTION ENCOUNTER (OUTPATIENT)
Age: 64
End: 2024-04-12

## 2024-04-12 LAB
GLUCOSE BLDC GLUCOMTR-MCNC: 134 MG/DL — HIGH (ref 70–99)
GLUCOSE BLDC GLUCOMTR-MCNC: 149 MG/DL — HIGH (ref 70–99)
GLUCOSE BLDC GLUCOMTR-MCNC: 179 MG/DL — HIGH (ref 70–99)
GLUCOSE BLDC GLUCOMTR-MCNC: 247 MG/DL — HIGH (ref 70–99)

## 2024-04-12 PROCEDURE — 99232 SBSQ HOSP IP/OBS MODERATE 35: CPT

## 2024-04-12 RX ORDER — PANTOPRAZOLE SODIUM 20 MG/1
40 TABLET, DELAYED RELEASE ORAL
Refills: 0 | Status: DISCONTINUED | OUTPATIENT
Start: 2024-04-12 | End: 2024-04-18

## 2024-04-12 RX ADMIN — INSULIN GLARGINE 12 UNIT(S): 100 INJECTION, SOLUTION SUBCUTANEOUS at 21:27

## 2024-04-12 RX ADMIN — Medication 25 MILLIGRAM(S): at 10:56

## 2024-04-12 RX ADMIN — Medication 4 UNIT(S): at 17:58

## 2024-04-12 RX ADMIN — Medication 81 MILLIGRAM(S): at 10:56

## 2024-04-12 RX ADMIN — Medication 650 MILLIGRAM(S): at 11:00

## 2024-04-12 RX ADMIN — Medication 650 MILLIGRAM(S): at 10:56

## 2024-04-12 RX ADMIN — Medication 4: at 12:54

## 2024-04-12 RX ADMIN — Medication 4 UNIT(S): at 09:07

## 2024-04-12 RX ADMIN — Medication 600 MILLIGRAM(S): at 10:56

## 2024-04-12 RX ADMIN — Medication 4 UNIT(S): at 12:57

## 2024-04-12 RX ADMIN — Medication 650 MILLIGRAM(S): at 00:48

## 2024-04-12 RX ADMIN — ATORVASTATIN CALCIUM 40 MILLIGRAM(S): 80 TABLET, FILM COATED ORAL at 21:26

## 2024-04-12 RX ADMIN — PANTOPRAZOLE SODIUM 40 MILLIGRAM(S): 20 TABLET, DELAYED RELEASE ORAL at 17:55

## 2024-04-12 RX ADMIN — Medication 650 MILLIGRAM(S): at 23:23

## 2024-04-12 RX ADMIN — Medication 600 MILLIGRAM(S): at 21:27

## 2024-04-12 NOTE — DISCHARGE NOTE NURSING/CASE MANAGEMENT/SOCIAL WORK - PATIENT PORTAL LINK FT
You can access the FollowMyHealth Patient Portal offered by [facility] by registering at the following website: [email or web address]. By joining [employer]’s FollowMyHealth portal, you will also be able to view your health information using other applications (apps) compatible with our system.

## 2024-04-12 NOTE — DISCHARGE NOTE NURSING/CASE MANAGEMENT/SOCIAL WORK - NSDCFUADDAPPT_GEN_ALL_CORE_FT
Brenden appt made with MD Martinez for 5/3/24 at 10:40am at [address] [facility] [address] (please remind pt at DC due to visual impairment)

## 2024-04-12 NOTE — DISCHARGE NOTE NURSING/CASE MANAGEMENT/SOCIAL WORK - NSDCPEPT PROEDHF_GEN_ALL_CORE
Has The Growth Been Previously Biopsied?: has been previously biopsied
Call primary care provider for follow up after discharge/Activities as tolerated/Low salt diet/Monitor weight daily/Report signs and symptoms to primary care provider

## 2024-04-12 NOTE — PROGRESS NOTE ADULT - ASSESSMENT
Pt is a 64 yo male who presents from emergency housing due to chest pain and two episodes of "passing out" without losing consciousness who is noncompliant with medication and lifestyle, found to have elevated BnP, Cr, and exposure to bed bugs, admitted due to :    #Atypical chest pain  #CHF  admitted to telemetry -> transfer to med/surg  chest xray with atelecasis, CT chest w/o PNA/ground glass opacities/effusion, pt w/o leukocytosis/fever or evidence of infection, will hold further abx - given vanco/cefepime in ED  EKG reviewed- similar to prior  chest pain reproducible with palpation. Less likely ischemic  ASA, BB, statin restarted  Does not appear overloaded, hold PO Lasix 40 mg in setting of BRIDGETTE  ACE held in setting of BRIDGETTE  serial troponin wnl x 2  TTE ordered - last TTE in Jan EF 45-50% with pleural effusion and trace to small pericardial effusion with moderately reduced LV fxn, inferior and lateral wall hypokinesis  cardiology consult appreciated    #Near syncope  denies LOC or head trauma  nonfocal on neuro exam  ct head unremarkable  OOB and ambulate with assistance  fall precautions  neurochecks q 4 hrs    #CKD, baseline cr 1.7  #BRIDGETTE on CKD  Improving today. Hold Lasix and ACE  Monitor bmp  renally dose meds  avoid nephrotoxic meds    #DMII with hyperglycemia  A1c 9.8  Start Lantus and premeals, conitnue ISS  hold oral meds while inpatient  carb restriction    #Noncompliance  Case management and SW consult - filled out DSS paperwork. Working on shelter placement  Appt. to be made with [facility]    #Bed bug exposure  Pt decontaminated in ED  Does not want to return to that particular emergency housing due to outbreak    #Chronic pain  Tylenol prn  Avoid opioids    #HIV   noncompliant. Discussed with ID - wouldn't start ART now given non-compliance. Follow up outpatient     #Moderate protein calorie malnutrition    DISPO: await shelter placement

## 2024-04-12 NOTE — PROGRESS NOTE ADULT - SUBJECTIVE AND OBJECTIVE BOX
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 Pt states that he fell over and fainted twice today. Pt states he remembers falling but does not remember who the people were who witnessed the event. States that he was "fine" afterwards and did not lose continence, have tongue biting, hit head, have shaking/spasms, or have LOC.   Pt in ED was placed in decon for bed bug exposure and admitted for chest pain. Found to have abnormal but unchanged CXR with atelectasis, CT chest neg for PNA however pt with orthopnea, cough, and bilateral leg edema 2+. Pt has been noncompliant with his lasix or cardiac diet. Pt with HIV and has not taken meds in over two months. States he cannot afford them. SW consulted.      4/5: Patient seen and examined at bedside. States he feels "horrible". Still has hest pain, unchanged.     4/6: No overnight events. Patient seen and examined at bedside. Still has palpable chest pain - states it's been the same for over 2 months. Will hold Lasix given BRIDGETTE. Pending shelter placement.     4/7: No overnight events. Patient seen and examined at bedside. Refused AM Insulin. Denies chest pain this morning but reports cough. Pending shelter placement.     4/8: No overnight events. Patient seen and examined at bedside. No new complaints. Pending shelter placement.     4/9: no complaints   await new shelter     4/10: no complaints    4/11: c/o sore throat; RVP done, neg    4/12: c/o GERD like symptoms  add maalox, protonix    ROS: All 10 systems reviewed and found to be negative with the exception of what has been described above.     VITAL SIGNS:    Vital Signs Last 24 Hrs  T(C): 36.6 (12 Apr 2024 08:05), Max: 37.1 (12 Apr 2024 00:01)  T(F): 97.9 (12 Apr 2024 08:05), Max: 98.8 (12 Apr 2024 00:01)  HR: 77 (12 Apr 2024 08:05) (77 - 86)  BP: 133/77 (12 Apr 2024 08:05) (125/70 - 140/74)  BP(mean): --  RR: 18 (12 Apr 2024 08:05) (18 - 18)  SpO2: 94% (12 Apr 2024 08:05) (94% - 97%)    Parameters below as of 12 Apr 2024 08:05  Patient On (Oxygen Delivery Method): room air          PHYSICAL EXAM:  General: No acute distress  HEENT: NC/AT; PERRL, anicteric sclera; MMM  Neck: Supple  Cardiovascular: +S1/S2, RRR, no murmurs, rubs, gallops. Tenderness to palpation along right and left sided chest wall   Respiratory: CTA B/L; no W/R/R  Gastrointestinal: soft, NT/ND; +BSx4  Extremities: WWP; no edema, clubbing or cyanosis  Vascular: 2+ radial, DP/PT pulses B/L  Neurological: AAOx3; no focal deficits      LABS:  All Labs/EKG/Radiology/Meds reviewed    Lab Results:  CBC    .		Differential:	[] Automated		[] Manual  Chemistry  04-09    139  |  112<H>  |  41<H>  ----------------------------<  199<H>  4.3   |  22  |  2.03<H>    Ca    8.7      09 Apr 2024 08:08  Mg     1.9     04-09          Urinalysis Basic - ( 09 Apr 2024 08:08 )    Color: x / Appearance: x / SG: x / pH: x  Gluc: 199 mg/dL / Ketone: x  / Bili: x / Urobili: x   Blood: x / Protein: x / Nitrite: x   Leuk Esterase: x / RBC: x / WBC x   Sq Epi: x / Non Sq Epi: x / Bacteria: x        MICROBIOLOGY/CULTURES:  Culture Results:   No growth at 4 days (04-04 @ 18:44)  Culture Results:   No growth at 4 days (04-04 @ 18:44)            04-08    140  |  111<H>  |  38<H>  ----------------------------<  139<H>  3.3<L>   |  25  |  2.01<H>    Ca    8.6      08 Apr 2024 08:17        Urinalysis Basic - ( 08 Apr 2024 08:17 )    Color: x / Appearance: x / SG: x / pH: x  Gluc: 139 mg/dL / Ketone: x  / Bili: x / Urobili: x   Blood: x / Protein: x / Nitrite: x   Leuk Esterase: x / RBC: x / WBC x   Sq Epi: x / Non Sq Epi: x / Bacteria: x      CAPILLARY BLOOD GLUCOSE      POCT Blood Glucose.: 144 mg/dL (08 Apr 2024 08:07)        RADIOLOGY & ADDITIONAL TESTS: Reviewed.    MEDICATIONS  (STANDING):  acetaminophen     Tablet .. 325 milliGRAM(s) Oral once  aspirin  chewable 81 milliGRAM(s) Oral daily  atorvastatin 40 milliGRAM(s) Oral at bedtime  dextrose 10% Bolus 125 milliLiter(s) IV Bolus once  dextrose 5%. 1000 milliLiter(s) (100 mL/Hr) IV Continuous <Continuous>  dextrose 5%. 1000 milliLiter(s) (50 mL/Hr) IV Continuous <Continuous>  dextrose 50% Injectable 25 Gram(s) IV Push once  dextrose 50% Injectable 12.5 Gram(s) IV Push once  glucagon  Injectable 1 milliGRAM(s) IntraMuscular once  guaiFENesin  milliGRAM(s) Oral every 12 hours  insulin glargine Injectable (LANTUS) 12 Unit(s) SubCutaneous at bedtime  insulin lispro (ADMELOG) corrective regimen sliding scale   SubCutaneous at bedtime  insulin lispro (ADMELOG) corrective regimen sliding scale   SubCutaneous three times a day before meals  insulin lispro Injectable (ADMELOG) 4 Unit(s) SubCutaneous three times a day before meals  metoprolol succinate ER 25 milliGRAM(s) Oral daily  pantoprazole    Tablet 40 milliGRAM(s) Oral before breakfast    MEDICATIONS  (PRN):  acetaminophen     Tablet .. 650 milliGRAM(s) Oral every 6 hours PRN Temp greater or equal to 38C (100.4F), Mild Pain (1 - 3), Moderate Pain (4 - 6)  albuterol    90 MICROgram(s) HFA Inhaler 2 Puff(s) Inhalation every 6 hours PRN Shortness of Breath  aluminum hydroxide/magnesium hydroxide/simethicone Suspension 30 milliLiter(s) Oral every 6 hours PRN Dyspepsia  cyclobenzaprine 5 milliGRAM(s) Oral three times a day PRN Muscle Spasm  dextrose Oral Gel 15 Gram(s) Oral once PRN Blood Glucose LESS THAN 70 milliGRAM(s)/deciliter

## 2024-04-12 NOTE — DISCHARGE NOTE NURSING/CASE MANAGEMENT/SOCIAL WORK - NSDCVIVACCINE_GEN_ALL_CORE_FT
Tdap; 27-Aug-2018 13:17; Pamela Albarran (CATY); [employer]; [id number]; IntraMuscular; Deltoid Left.; 0.5 milliLiter(s); VIS (VIS Published: 09-May-2013, VIS Presented: 27-Aug-2018);   Tdap; 09-Feb-2019 17:54; Abby Marion); [employer]; [id number] (Exp. Date: 02-Nov-2020); IntraMuscular; Deltoid Left.; 0.5 milliLiter(s); VIS (VIS Published: 09-May-2013, VIS Presented: 09-Feb-2019);

## 2024-04-12 NOTE — DISCHARGE NOTE NURSING/CASE MANAGEMENT/SOCIAL WORK - NSDCPEFALRISK_GEN_ALL_CORE
For information on Fall & Injury Prevention, visit: [email or web address] OR  [email or web address] OR  https://www.cdc.gov/steadi/patient.html

## 2024-04-13 LAB
GLUCOSE BLDC GLUCOMTR-MCNC: 125 MG/DL — HIGH (ref 70–99)
GLUCOSE BLDC GLUCOMTR-MCNC: 132 MG/DL — HIGH (ref 70–99)
GLUCOSE BLDC GLUCOMTR-MCNC: 143 MG/DL — HIGH (ref 70–99)
GLUCOSE BLDC GLUCOMTR-MCNC: 194 MG/DL — HIGH (ref 70–99)
GLUCOSE BLDC GLUCOMTR-MCNC: 233 MG/DL — HIGH (ref 70–99)
HIV-1 VIRAL LOAD RESULT: ABNORMAL
HIV1 RNA # SERPL NAA+PROBE: SIGNIFICANT CHANGE UP
HIV1 RNA SER-IMP: SIGNIFICANT CHANGE UP
HIV1 RNA SERPL NAA+PROBE-ACNC: ABNORMAL
HIV1 RNA SERPL NAA+PROBE-LOG#: 4.19 — SIGNIFICANT CHANGE UP

## 2024-04-13 PROCEDURE — 99232 SBSQ HOSP IP/OBS MODERATE 35: CPT

## 2024-04-13 RX ORDER — ZOLPIDEM TARTRATE 10 MG/1
5 TABLET ORAL AT BEDTIME
Refills: 0 | Status: DISCONTINUED | OUTPATIENT
Start: 2024-04-13 | End: 2024-04-18

## 2024-04-13 RX ADMIN — Medication 4 UNIT(S): at 08:58

## 2024-04-13 RX ADMIN — ZOLPIDEM TARTRATE 5 MILLIGRAM(S): 10 TABLET ORAL at 23:00

## 2024-04-13 RX ADMIN — Medication 600 MILLIGRAM(S): at 23:01

## 2024-04-13 RX ADMIN — ATORVASTATIN CALCIUM 40 MILLIGRAM(S): 80 TABLET, FILM COATED ORAL at 23:00

## 2024-04-13 RX ADMIN — Medication 4 UNIT(S): at 17:57

## 2024-04-13 RX ADMIN — Medication 25 MILLIGRAM(S): at 09:59

## 2024-04-13 RX ADMIN — Medication 600 MILLIGRAM(S): at 09:59

## 2024-04-13 RX ADMIN — Medication 81 MILLIGRAM(S): at 09:59

## 2024-04-13 RX ADMIN — Medication 4 UNIT(S): at 13:04

## 2024-04-13 RX ADMIN — PANTOPRAZOLE SODIUM 40 MILLIGRAM(S): 20 TABLET, DELAYED RELEASE ORAL at 06:06

## 2024-04-13 RX ADMIN — Medication 650 MILLIGRAM(S): at 23:01

## 2024-04-13 NOTE — PROGRESS NOTE ADULT - ASSESSMENT
Pt is a 64 yo male who presents from emergency housing due to chest pain and two episodes of "passing out" without losing consciousness who is noncompliant with medication and lifestyle, found to have elevated BnP, Cr, and exposure to bed bugs, admitted due to :    #Atypical chest pain  #CHF  admitted to telemetry -> transfer to med/surg  chest xray with atelecasis, CT chest w/o PNA/ground glass opacities/effusion, pt w/o leukocytosis/fever or evidence of infection, will hold further abx - given vanco/cefepime in ED  EKG reviewed- similar to prior  chest pain reproducible with palpation. Less likely ischemic  ASA, BB, statin restarted  Does not appear overloaded, hold PO Lasix 40 mg in setting of BRIDGETTE  ACE held in setting of BRIDGETTE  serial troponin wnl x 2  TTE ordered - last TTE in Jan EF 45-50% with pleural effusion and trace to small pericardial effusion with moderately reduced LV fxn, inferior and lateral wall hypokinesis  cardiology consult appreciated    #Near syncope  denies LOC or head trauma  nonfocal on neuro exam  ct head unremarkable  OOB and ambulate with assistance  fall precautions  neurochecks q 4 hrs    #CKD, baseline cr 1.7  #BRIDGETTE on CKD  Improving today. Hold Lasix and ACE  Monitor bmp  renally dose meds  avoid nephrotoxic meds    #DMII with hyperglycemia  A1c 9.8  Start Lantus and premeals, conitnue ISS  hold oral meds while inpatient  carb restriction    #Noncompliance  Case management and SW consult - filled out DSS paperwork. Working on shelter placement  Appt. to be made with [facility]    #Bed bug exposure  Pt decontaminated in ED  Does not want to return to that particular emergency housing due to outbreak    #Chronic pain  Tylenol prn  Avoid opioids    #HIV   noncompliant. Discussed with ID - wouldn't start ART now given non-compliance. Follow up outpatient     #Moderate protein calorie malnutrition    # Heartburn: better with maalox and protonix    # Decreased vision : for over 5 months  needs to see optometrist and ophthalmologist after discharge    # Insomnia: ambien added    DISPO: await shelter placement

## 2024-04-13 NOTE — PROGRESS NOTE ADULT - SUBJECTIVE AND OBJECTIVE BOX
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 Pt states that he fell over and fainted twice today. Pt states he remembers falling but does not remember who the people were who witnessed the event. States that he was "fine" afterwards and did not lose continence, have tongue biting, hit head, have shaking/spasms, or have LOC.   Pt in ED was placed in decon for bed bug exposure and admitted for chest pain. Found to have abnormal but unchanged CXR with atelectasis, CT chest neg for PNA however pt with orthopnea, cough, and bilateral leg edema 2+. Pt has been noncompliant with his lasix or cardiac diet. Pt with HIV and has not taken meds in over two months. States he cannot afford them. SW consulted.      4/5: Patient seen and examined at bedside. States he feels "horrible". Still has hest pain, unchanged.     4/6: No overnight events. Patient seen and examined at bedside. Still has palpable chest pain - states it's been the same for over 2 months. Will hold Lasix given BRIDGETTE. Pending shelter placement.     4/7: No overnight events. Patient seen and examined at bedside. Refused AM Insulin. Denies chest pain this morning but reports cough. Pending shelter placement.     4/8: No overnight events. Patient seen and examined at bedside. No new complaints. Pending shelter placement.     4/9: no complaints   await new shelter     4/10: no complaints    4/11: c/o sore throat; RVP done, neg    4/12: c/o GERD like symptoms  add maalox, protonix    4/13: heart burn better  c/o insomnia  c/o decreased vision for 5 months    ROS: All 10 systems reviewed and found to be negative with the exception of what has been described above.     VITAL SIGNS:    Vital Signs Last 24 Hrs  T(C): 36.5 (13 Apr 2024 10:10), Max: 37.1 (12 Apr 2024 21:01)  T(F): 97.7 (13 Apr 2024 10:10), Max: 98.7 (12 Apr 2024 21:01)  HR: 77 (13 Apr 2024 10:10) (77 - 83)  BP: 129/79 (13 Apr 2024 10:10) (118/94 - 132/62)  BP(mean): --  RR: 17 (13 Apr 2024 10:10) (16 - 17)  SpO2: 97% (13 Apr 2024 10:10) (96% - 97%)    Parameters below as of 13 Apr 2024 10:10  Patient On (Oxygen Delivery Method): room air      PHYSICAL EXAM:  General: No acute distress  HEENT: NC/AT; PERRL, anicteric sclera; MMM  Neck: Supple  Cardiovascular: +S1/S2, RRR, no murmurs, rubs, gallops. Tenderness to palpation along right and left sided chest wall   Respiratory: CTA B/L; no W/R/R  Gastrointestinal: soft, NT/ND; +BSx4  Extremities: WWP; no edema, clubbing or cyanosis  Vascular: 2+ radial, DP/PT pulses B/L  Neurological: AAOx3; no focal deficits      LABS:  All Labs/EKG/Radiology/Meds reviewed    Lab Results:  CBC    .		Differential:	[] Automated		[] Manual  Chemistry  04-09    139  |  112<H>  |  41<H>  ----------------------------<  199<H>  4.3   |  22  |  2.03<H>    Ca    8.7      09 Apr 2024 08:08  Mg     1.9     04-09          Urinalysis Basic - ( 09 Apr 2024 08:08 )    Color: x / Appearance: x / SG: x / pH: x  Gluc: 199 mg/dL / Ketone: x  / Bili: x / Urobili: x   Blood: x / Protein: x / Nitrite: x   Leuk Esterase: x / RBC: x / WBC x   Sq Epi: x / Non Sq Epi: x / Bacteria: x        MICROBIOLOGY/CULTURES:  Culture Results:   No growth at 4 days (04-04 @ 18:44)  Culture Results:   No growth at 4 days (04-04 @ 18:44)            04-08    140  |  111<H>  |  38<H>  ----------------------------<  139<H>  3.3<L>   |  25  |  2.01<H>    Ca    8.6      08 Apr 2024 08:17        Urinalysis Basic - ( 08 Apr 2024 08:17 )    Color: x / Appearance: x / SG: x / pH: x  Gluc: 139 mg/dL / Ketone: x  / Bili: x / Urobili: x   Blood: x / Protein: x / Nitrite: x   Leuk Esterase: x / RBC: x / WBC x   Sq Epi: x / Non Sq Epi: x / Bacteria: x      CAPILLARY BLOOD GLUCOSE      POCT Blood Glucose.: 144 mg/dL (08 Apr 2024 08:07)        RADIOLOGY & ADDITIONAL TESTS: Reviewed.    MEDICATIONS  (STANDING):  acetaminophen     Tablet .. 325 milliGRAM(s) Oral once  aspirin  chewable 81 milliGRAM(s) Oral daily  atorvastatin 40 milliGRAM(s) Oral at bedtime  dextrose 10% Bolus 125 milliLiter(s) IV Bolus once  dextrose 5%. 1000 milliLiter(s) (100 mL/Hr) IV Continuous <Continuous>  dextrose 5%. 1000 milliLiter(s) (50 mL/Hr) IV Continuous <Continuous>  dextrose 50% Injectable 25 Gram(s) IV Push once  dextrose 50% Injectable 12.5 Gram(s) IV Push once  glucagon  Injectable 1 milliGRAM(s) IntraMuscular once  guaiFENesin  milliGRAM(s) Oral every 12 hours  insulin glargine Injectable (LANTUS) 12 Unit(s) SubCutaneous at bedtime  insulin lispro (ADMELOG) corrective regimen sliding scale   SubCutaneous at bedtime  insulin lispro (ADMELOG) corrective regimen sliding scale   SubCutaneous three times a day before meals  insulin lispro Injectable (ADMELOG) 4 Unit(s) SubCutaneous three times a day before meals  metoprolol succinate ER 25 milliGRAM(s) Oral daily  pantoprazole    Tablet 40 milliGRAM(s) Oral before breakfast    MEDICATIONS  (PRN):  acetaminophen     Tablet .. 650 milliGRAM(s) Oral every 6 hours PRN Temp greater or equal to 38C (100.4F), Mild Pain (1 - 3), Moderate Pain (4 - 6)  albuterol    90 MICROgram(s) HFA Inhaler 2 Puff(s) Inhalation every 6 hours PRN Shortness of Breath  aluminum hydroxide/magnesium hydroxide/simethicone Suspension 30 milliLiter(s) Oral every 6 hours PRN Dyspepsia  cyclobenzaprine 5 milliGRAM(s) Oral three times a day PRN Muscle Spasm  dextrose Oral Gel 15 Gram(s) Oral once PRN Blood Glucose LESS THAN 70 milliGRAM(s)/deciliter

## 2024-04-14 LAB
GLUCOSE BLDC GLUCOMTR-MCNC: 114 MG/DL — HIGH (ref 70–99)
GLUCOSE BLDC GLUCOMTR-MCNC: 150 MG/DL — HIGH (ref 70–99)
GLUCOSE BLDC GLUCOMTR-MCNC: 161 MG/DL — HIGH (ref 70–99)
GLUCOSE BLDC GLUCOMTR-MCNC: 222 MG/DL — HIGH (ref 70–99)

## 2024-04-14 PROCEDURE — 99232 SBSQ HOSP IP/OBS MODERATE 35: CPT

## 2024-04-14 RX ADMIN — Medication 650 MILLIGRAM(S): at 13:57

## 2024-04-14 RX ADMIN — ZOLPIDEM TARTRATE 5 MILLIGRAM(S): 10 TABLET ORAL at 21:31

## 2024-04-14 RX ADMIN — Medication 600 MILLIGRAM(S): at 21:36

## 2024-04-14 RX ADMIN — Medication 81 MILLIGRAM(S): at 09:37

## 2024-04-14 RX ADMIN — Medication 650 MILLIGRAM(S): at 21:36

## 2024-04-14 RX ADMIN — Medication 600 MILLIGRAM(S): at 09:37

## 2024-04-14 RX ADMIN — ATORVASTATIN CALCIUM 40 MILLIGRAM(S): 80 TABLET, FILM COATED ORAL at 21:30

## 2024-04-14 RX ADMIN — Medication 4 UNIT(S): at 09:36

## 2024-04-14 RX ADMIN — Medication 4 UNIT(S): at 13:57

## 2024-04-14 RX ADMIN — PANTOPRAZOLE SODIUM 40 MILLIGRAM(S): 20 TABLET, DELAYED RELEASE ORAL at 05:51

## 2024-04-14 RX ADMIN — Medication 650 MILLIGRAM(S): at 05:51

## 2024-04-14 RX ADMIN — Medication 25 MILLIGRAM(S): at 09:37

## 2024-04-14 RX ADMIN — Medication 30 MILLILITER(S): at 13:58

## 2024-04-14 NOTE — PROGRESS NOTE ADULT - ASSESSMENT
Pt is a 64 yo male who presents from emergency housing due to chest pain and two episodes of "passing out" without losing consciousness who is noncompliant with medication and lifestyle, found to have elevated BnP, Cr, and exposure to bed bugs, admitted due to :    #Atypical chest pain  #CHF  admitted to telemetry -> transfer to med/surg  chest xray with atelecasis, CT chest w/o PNA/ground glass opacities/effusion, pt w/o leukocytosis/fever or evidence of infection, will hold further abx - given vanco/cefepime in ED  EKG reviewed- similar to prior  chest pain reproducible with palpation. Less likely ischemic  ASA, BB, statin restarted  Does not appear overloaded, hold PO Lasix 40 mg in setting of BRIDGETTE  ACE held in setting of BRIDGETTE  serial troponin wnl x 2  TTE ordered - last TTE in Jan EF 45-50% with pleural effusion and trace to small pericardial effusion with moderately reduced LV fxn, inferior and lateral wall hypokinesis  cardiology consult appreciated    #Near syncope  denies LOC or head trauma  nonfocal on neuro exam  ct head unremarkable  OOB and ambulate with assistance  fall precautions  neurochecks q 4 hrs    #CKD, baseline cr 1.7  #BRIDGETTE on CKD  Improving today. Hold Lasix and ACE  Monitor bmp  renally dose meds  avoid nephrotoxic meds    #DMII with hyperglycemia  A1c 9.8  Start Lantus and premeals, conitnue ISS  hold oral meds while inpatient  carb restriction    #Noncompliance  Case management and SW consult - filled out DSS paperwork. Working on shelter placement  Appt. to be made with [facility]    #Bed bug exposure  Pt decontaminated in ED  Does not want to return to that particular emergency housing due to outbreak    #Chronic pain  Tylenol prn  Avoid opioids    #HIV   noncompliant. Discussed with ID - wouldn't start ART now given non-compliance. Follow up outpatient     #Moderate protein calorie malnutrition    # Heartburn: better with maalox and protonix    # Decreased vision : for over 5 months  needs to see optometrist and ophthalmologist after discharge    # Insomnia: ambien added    DISPO: await shelter placement

## 2024-04-14 NOTE — PROGRESS NOTE ADULT - SUBJECTIVE AND OBJECTIVE BOX
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 Pt states that he fell over and fainted twice today. Pt states he remembers falling but does not remember who the people were who witnessed the event. States that he was "fine" afterwards and did not lose continence, have tongue biting, hit head, have shaking/spasms, or have LOC.   Pt in ED was placed in decon for bed bug exposure and admitted for chest pain. Found to have abnormal but unchanged CXR with atelectasis, CT chest neg for PNA however pt with orthopnea, cough, and bilateral leg edema 2+. Pt has been noncompliant with his lasix or cardiac diet. Pt with HIV and has not taken meds in over two months. States he cannot afford them. SW consulted.      4/5: Patient seen and examined at bedside. States he feels "horrible". Still has hest pain, unchanged.     4/6: No overnight events. Patient seen and examined at bedside. Still has palpable chest pain - states it's been the same for over 2 months. Will hold Lasix given BRIDGETTE. Pending shelter placement.     4/7: No overnight events. Patient seen and examined at bedside. Refused AM Insulin. Denies chest pain this morning but reports cough. Pending shelter placement.     4/8: No overnight events. Patient seen and examined at bedside. No new complaints. Pending shelter placement.     4/9: no complaints   await new shelter     4/10: no complaints    4/11: c/o sore throat; RVP done, neg    4/12: c/o GERD like symptoms  add maalox, protonix    4/13: heart burn better  c/o insomnia  c/o decreased vision for 5 months    4/14: no new complaints  slept well last night with Ambien     ROS: All 10 systems reviewed and found to be negative with the exception of what has been described above.     VITAL SIGNS:    Vital Signs Last 24 Hrs  T(C): 36.4 (14 Apr 2024 09:03), Max: 36.8 (14 Apr 2024 00:03)  T(F): 97.5 (14 Apr 2024 09:03), Max: 98.2 (14 Apr 2024 00:03)  HR: 98 (14 Apr 2024 09:03) (76 - 100)  BP: 106/62 (14 Apr 2024 09:03) (106/62 - 143/86)  BP(mean): --  RR: 18 (14 Apr 2024 09:03) (17 - 18)  SpO2: 100% (14 Apr 2024 09:03) (97% - 100%)    Parameters below as of 14 Apr 2024 09:03  Patient On (Oxygen Delivery Method): room air          PHYSICAL EXAM:  General: No acute distress  HEENT: NC/AT; PERRL, anicteric sclera; MMM  Neck: Supple  Cardiovascular: +S1/S2, RRR, no murmurs, rubs, gallops. Tenderness to palpation along right and left sided chest wall   Respiratory: CTA B/L; no W/R/R  Gastrointestinal: soft, NT/ND; +BSx4  Extremities: WWP; no edema, clubbing or cyanosis  Vascular: 2+ radial, DP/PT pulses B/L  Neurological: AAOx3; no focal deficits      LABS:  All Labs/EKG/Radiology/Meds reviewed    Lab Results:  CBC    .		Differential:	[] Automated		[] Manual  Chemistry  04-09    139  |  112<H>  |  41<H>  ----------------------------<  199<H>  4.3   |  22  |  2.03<H>    Ca    8.7      09 Apr 2024 08:08  Mg     1.9     04-09          Urinalysis Basic - ( 09 Apr 2024 08:08 )    Color: x / Appearance: x / SG: x / pH: x  Gluc: 199 mg/dL / Ketone: x  / Bili: x / Urobili: x   Blood: x / Protein: x / Nitrite: x   Leuk Esterase: x / RBC: x / WBC x   Sq Epi: x / Non Sq Epi: x / Bacteria: x        MICROBIOLOGY/CULTURES:  Culture Results:   No growth at 4 days (04-04 @ 18:44)  Culture Results:   No growth at 4 days (04-04 @ 18:44)            04-08    140  |  111<H>  |  38<H>  ----------------------------<  139<H>  3.3<L>   |  25  |  2.01<H>    Ca    8.6      08 Apr 2024 08:17        Urinalysis Basic - ( 08 Apr 2024 08:17 )    Color: x / Appearance: x / SG: x / pH: x  Gluc: 139 mg/dL / Ketone: x  / Bili: x / Urobili: x   Blood: x / Protein: x / Nitrite: x   Leuk Esterase: x / RBC: x / WBC x   Sq Epi: x / Non Sq Epi: x / Bacteria: x      CAPILLARY BLOOD GLUCOSE      POCT Blood Glucose.: 144 mg/dL (08 Apr 2024 08:07)        RADIOLOGY & ADDITIONAL TESTS: Reviewed.    MEDICATIONS  (STANDING):  acetaminophen     Tablet .. 325 milliGRAM(s) Oral once  aspirin  chewable 81 milliGRAM(s) Oral daily  atorvastatin 40 milliGRAM(s) Oral at bedtime  dextrose 10% Bolus 125 milliLiter(s) IV Bolus once  dextrose 5%. 1000 milliLiter(s) (100 mL/Hr) IV Continuous <Continuous>  dextrose 5%. 1000 milliLiter(s) (50 mL/Hr) IV Continuous <Continuous>  dextrose 50% Injectable 25 Gram(s) IV Push once  dextrose 50% Injectable 12.5 Gram(s) IV Push once  glucagon  Injectable 1 milliGRAM(s) IntraMuscular once  guaiFENesin  milliGRAM(s) Oral every 12 hours  insulin glargine Injectable (LANTUS) 12 Unit(s) SubCutaneous at bedtime  insulin lispro (ADMELOG) corrective regimen sliding scale   SubCutaneous at bedtime  insulin lispro (ADMELOG) corrective regimen sliding scale   SubCutaneous three times a day before meals  insulin lispro Injectable (ADMELOG) 4 Unit(s) SubCutaneous three times a day before meals  metoprolol succinate ER 25 milliGRAM(s) Oral daily  pantoprazole    Tablet 40 milliGRAM(s) Oral before breakfast    MEDICATIONS  (PRN):  acetaminophen     Tablet .. 650 milliGRAM(s) Oral every 6 hours PRN Temp greater or equal to 38C (100.4F), Mild Pain (1 - 3), Moderate Pain (4 - 6)  albuterol    90 MICROgram(s) HFA Inhaler 2 Puff(s) Inhalation every 6 hours PRN Shortness of Breath  aluminum hydroxide/magnesium hydroxide/simethicone Suspension 30 milliLiter(s) Oral every 6 hours PRN Dyspepsia  cyclobenzaprine 5 milliGRAM(s) Oral three times a day PRN Muscle Spasm  dextrose Oral Gel 15 Gram(s) Oral once PRN Blood Glucose LESS THAN 70 milliGRAM(s)/deciliter  zolpidem 5 milliGRAM(s) Oral at bedtime PRN Insomnia

## 2024-04-15 LAB
GLUCOSE BLDC GLUCOMTR-MCNC: 167 MG/DL — HIGH (ref 70–99)
GLUCOSE BLDC GLUCOMTR-MCNC: 177 MG/DL — HIGH (ref 70–99)
GLUCOSE BLDC GLUCOMTR-MCNC: 204 MG/DL — HIGH (ref 70–99)
GLUCOSE BLDC GLUCOMTR-MCNC: 300 MG/DL — HIGH (ref 70–99)

## 2024-04-15 PROCEDURE — 99232 SBSQ HOSP IP/OBS MODERATE 35: CPT

## 2024-04-15 RX ADMIN — Medication 650 MILLIGRAM(S): at 20:35

## 2024-04-15 RX ADMIN — Medication 25 MILLIGRAM(S): at 09:56

## 2024-04-15 RX ADMIN — Medication 3: at 21:40

## 2024-04-15 RX ADMIN — INSULIN GLARGINE 12 UNIT(S): 100 INJECTION, SOLUTION SUBCUTANEOUS at 21:39

## 2024-04-15 RX ADMIN — Medication 600 MILLIGRAM(S): at 21:40

## 2024-04-15 RX ADMIN — Medication 4: at 11:59

## 2024-04-15 RX ADMIN — Medication 4 UNIT(S): at 12:00

## 2024-04-15 RX ADMIN — Medication 81 MILLIGRAM(S): at 09:53

## 2024-04-15 RX ADMIN — Medication 650 MILLIGRAM(S): at 05:49

## 2024-04-15 RX ADMIN — ATORVASTATIN CALCIUM 40 MILLIGRAM(S): 80 TABLET, FILM COATED ORAL at 21:39

## 2024-04-15 RX ADMIN — PANTOPRAZOLE SODIUM 40 MILLIGRAM(S): 20 TABLET, DELAYED RELEASE ORAL at 05:48

## 2024-04-15 RX ADMIN — Medication 600 MILLIGRAM(S): at 09:56

## 2024-04-15 RX ADMIN — ZOLPIDEM TARTRATE 5 MILLIGRAM(S): 10 TABLET ORAL at 21:40

## 2024-04-15 NOTE — PROGRESS NOTE ADULT - SUBJECTIVE AND OBJECTIVE BOX
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 Pt states that he fell over and fainted twice today. Pt states he remembers falling but does not remember who the people were who witnessed the event. States that he was "fine" afterwards and did not lose continence, have tongue biting, hit head, have shaking/spasms, or have LOC.   Pt in ED was placed in decon for bed bug exposure and admitted for chest pain. Found to have abnormal but unchanged CXR with atelectasis, CT chest neg for PNA however pt with orthopnea, cough, and bilateral leg edema 2+. Pt has been noncompliant with his lasix or cardiac diet. Pt with HIV and has not taken meds in over two months. States he cannot afford them. SW consulted.      4/5: Patient seen and examined at bedside. States he feels "horrible". Still has hest pain, unchanged.     4/6: No overnight events. Patient seen and examined at bedside. Still has palpable chest pain - states it's been the same for over 2 months. Will hold Lasix given BRIDGETTE. Pending shelter placement.     4/7: No overnight events. Patient seen and examined at bedside. Refused AM Insulin. Denies chest pain this morning but reports cough. Pending shelter placement.     4/8: No overnight events. Patient seen and examined at bedside. No new complaints. Pending shelter placement.     4/9: no complaints   await new shelter     4/10: no complaints    4/11: c/o sore throat; RVP done, neg    4/12: c/o GERD like symptoms  add maalox, protonix    4/13: heart burn better  c/o insomnia  c/o decreased vision for 5 months    4/14: no new complaints  slept well last night with Ambien     4/5: no complaints  has b/l cataracts; needs surgery      ROS: All 10 systems reviewed and found to be negative with the exception of what has been described above.     VITAL SIGNS:    Vital Signs Last 24 Hrs  T(C): 36.7 (15 Apr 2024 08:08), Max: 36.7 (15 Apr 2024 08:08)  T(F): 98 (15 Apr 2024 08:08), Max: 98 (15 Apr 2024 08:08)  HR: 81 (15 Apr 2024 08:08) (76 - 90)  BP: 139/77 (15 Apr 2024 08:08) (130/86 - 141/71)  BP(mean): 94 (14 Apr 2024 23:41) (94 - 94)  RR: 18 (15 Apr 2024 08:08) (18 - 18)  SpO2: 95% (15 Apr 2024 08:08) (95% - 97%)    Parameters below as of 15 Apr 2024 08:08  Patient On (Oxygen Delivery Method): room air      PHYSICAL EXAM:  General: No acute distress  HEENT: NC/AT; PERRL, anicteric sclera; MMM; b/l cataracts Right >>Left  Neck: Supple  Cardiovascular: +S1/S2, RRR, no murmurs, rubs, gallops. Tenderness to palpation along right and left sided chest wall   Respiratory: CTA B/L; no W/R/R  Gastrointestinal: soft, NT/ND; +BSx4  Extremities: WWP; no edema, clubbing or cyanosis  Vascular: 2+ radial, DP/PT pulses B/L  Neurological: AAOx3; no focal deficits      LABS:  All Labs/EKG/Radiology/Meds reviewed    Lab Results:  CBC    .		Differential:	[] Automated		[] Manual  Chemistry  04-09    139  |  112<H>  |  41<H>  ----------------------------<  199<H>  4.3   |  22  |  2.03<H>    Ca    8.7      09 Apr 2024 08:08  Mg     1.9     04-09          Urinalysis Basic - ( 09 Apr 2024 08:08 )    Color: x / Appearance: x / SG: x / pH: x  Gluc: 199 mg/dL / Ketone: x  / Bili: x / Urobili: x   Blood: x / Protein: x / Nitrite: x   Leuk Esterase: x / RBC: x / WBC x   Sq Epi: x / Non Sq Epi: x / Bacteria: x        MICROBIOLOGY/CULTURES:  Culture Results:   No growth at 4 days (04-04 @ 18:44)  Culture Results:   No growth at 4 days (04-04 @ 18:44)            04-08    140  |  111<H>  |  38<H>  ----------------------------<  139<H>  3.3<L>   |  25  |  2.01<H>    Ca    8.6      08 Apr 2024 08:17        Urinalysis Basic - ( 08 Apr 2024 08:17 )    Color: x / Appearance: x / SG: x / pH: x  Gluc: 139 mg/dL / Ketone: x  / Bili: x / Urobili: x   Blood: x / Protein: x / Nitrite: x   Leuk Esterase: x / RBC: x / WBC x   Sq Epi: x / Non Sq Epi: x / Bacteria: x      CAPILLARY BLOOD GLUCOSE      POCT Blood Glucose.: 144 mg/dL (08 Apr 2024 08:07)        RADIOLOGY & ADDITIONAL TESTS: Reviewed.    MEDICATIONS  (STANDING):  acetaminophen     Tablet .. 325 milliGRAM(s) Oral once  aspirin  chewable 81 milliGRAM(s) Oral daily  atorvastatin 40 milliGRAM(s) Oral at bedtime  dextrose 10% Bolus 125 milliLiter(s) IV Bolus once  dextrose 5%. 1000 milliLiter(s) (100 mL/Hr) IV Continuous <Continuous>  dextrose 5%. 1000 milliLiter(s) (50 mL/Hr) IV Continuous <Continuous>  dextrose 50% Injectable 25 Gram(s) IV Push once  dextrose 50% Injectable 12.5 Gram(s) IV Push once  glucagon  Injectable 1 milliGRAM(s) IntraMuscular once  guaiFENesin  milliGRAM(s) Oral every 12 hours  insulin glargine Injectable (LANTUS) 12 Unit(s) SubCutaneous at bedtime  insulin lispro (ADMELOG) corrective regimen sliding scale   SubCutaneous at bedtime  insulin lispro (ADMELOG) corrective regimen sliding scale   SubCutaneous three times a day before meals  insulin lispro Injectable (ADMELOG) 4 Unit(s) SubCutaneous three times a day before meals  metoprolol succinate ER 25 milliGRAM(s) Oral daily  pantoprazole    Tablet 40 milliGRAM(s) Oral before breakfast    MEDICATIONS  (PRN):  acetaminophen     Tablet .. 650 milliGRAM(s) Oral every 6 hours PRN Temp greater or equal to 38C (100.4F), Mild Pain (1 - 3), Moderate Pain (4 - 6)  albuterol    90 MICROgram(s) HFA Inhaler 2 Puff(s) Inhalation every 6 hours PRN Shortness of Breath  aluminum hydroxide/magnesium hydroxide/simethicone Suspension 30 milliLiter(s) Oral every 6 hours PRN Dyspepsia  cyclobenzaprine 5 milliGRAM(s) Oral three times a day PRN Muscle Spasm  dextrose Oral Gel 15 Gram(s) Oral once PRN Blood Glucose LESS THAN 70 milliGRAM(s)/deciliter  zolpidem 5 milliGRAM(s) Oral at bedtime PRN Insomnia

## 2024-04-15 NOTE — PHYSICAL THERAPY INITIAL EVALUATION ADULT - NSPTDMEREC_GEN_A_CORE
at patient's request. Patient utilized a straight cane occasionally prior to admission./straight cane

## 2024-04-15 NOTE — PHYSICAL THERAPY INITIAL EVALUATION ADULT - ADDITIONAL COMMENTS
Patient independent in all aspects of mobility. Patient states he used a straight cane from time to time but has been stolen.

## 2024-04-15 NOTE — PROGRESS NOTE ADULT - ASSESSMENT
Pt is a 64 yo male who presents from emergency housing due to chest pain and two episodes of "passing out" without losing consciousness who is noncompliant with medication and lifestyle, found to have elevated BnP, Cr, and exposure to bed bugs, admitted due to :    #Atypical chest pain  #CHF  admitted to telemetry -> transfer to med/surg  chest xray with atelecasis, CT chest w/o PNA/ground glass opacities/effusion, pt w/o leukocytosis/fever or evidence of infection, will hold further abx - given vanco/cefepime in ED  EKG reviewed- similar to prior  chest pain reproducible with palpation. Less likely ischemic  ASA, BB, statin restarted  Does not appear overloaded, hold PO Lasix 40 mg in setting of BRIDGETTE  ACE held in setting of BRIDGETTE  serial troponin wnl x 2  TTE ordered - last TTE in Jan EF 45-50% with pleural effusion and trace to small pericardial effusion with moderately reduced LV fxn, inferior and lateral wall hypokinesis  cardiology consult appreciated    #Near syncope  denies LOC or head trauma  nonfocal on neuro exam  ct head unremarkable  OOB and ambulate with assistance  fall precautions  neurochecks q 4 hrs    #CKD, baseline cr 1.7  #BRIDGETTE on CKD  Improving. . Hold Lasix and ACE  Monitor bmp  renally dose meds  avoid nephrotoxic meds    #DMII with hyperglycemia  A1c 9.8  Start Lantus and premeals, conitnue ISS  hold oral meds while inpatient  carb restriction    #Noncompliance  Case management and SW consult - filled out DSS paperwork. Working on shelter placement  Appt. to be made with [facility]    #Bed bug exposure  Pt decontaminated in ED  Does not want to return to that particular emergency housing due to outbreak    #Chronic pain  Tylenol prn  Avoid opioids    # B/l Cataracts: leading to diminished vision  needs surgery    #HIV   noncompliant. Discussed with ID - wouldn't start ART now given non-compliance. Follow up outpatient     #Moderate protein calorie malnutrition    # Heartburn: better with maalox and protonix    # Decreased vision : for over 5 months  needs to see optometrist and ophthalmologist after discharge    # Insomnia: ambien added    DISPO: await shelter placement

## 2024-04-15 NOTE — PHYSICAL THERAPY INITIAL EVALUATION ADULT - GENERAL OBSERVATIONS, REHAB EVAL
The Pt was received on 3N seated at the EOB calm, cooperative, and willing to participate with PT. Pt responded well to evaluation. Patient independent in all aspects of mobility without AD. Declining to attempt stairs at this time. Assisted back to bedside EOB and adjusted for comfort. The Pt was in NAD at end of tx.  Call bell, tray, and phone in place and within reach. NSG made aware.

## 2024-04-15 NOTE — PHYSICAL THERAPY INITIAL EVALUATION ADULT - PERTINENT HX OF CURRENT PROBLEM, REHAB EVAL
Pt is a 62 yo male with a pmh/o anxiety, bilateral cataracts for which he states he is now blind, HIV, HTN, Insomnia, DMII, noncompliance, who presents from emergency housing where there is a bed bug infestation due to left sided chest pain which is constant, pressure like, associated with cough and orthopnea, no a/a factors, present for two months. Pt states that he fell over and fainted twice today. Pt states he remembers falling but does not remember who the people were who witnessed the event. States that he was "fine" afterwards and did not lose continence, have tongue biting, hit head, have shaking/spasms, or have LOC.     Patient admitted 4/4/2024.

## 2024-04-16 LAB
ANION GAP SERPL CALC-SCNC: 4 MMOL/L — LOW (ref 5–17)
BUN SERPL-MCNC: 70 MG/DL — HIGH (ref 7–23)
CALCIUM SERPL-MCNC: 9.1 MG/DL — SIGNIFICANT CHANGE UP (ref 8.5–10.1)
CHLORIDE SERPL-SCNC: 116 MMOL/L — HIGH (ref 96–108)
CO2 SERPL-SCNC: 20 MMOL/L — LOW (ref 22–31)
CREAT SERPL-MCNC: 2.04 MG/DL — HIGH (ref 0.5–1.3)
EGFR: 36 ML/MIN/1.73M2 — LOW
GLUCOSE BLDC GLUCOMTR-MCNC: 122 MG/DL — HIGH (ref 70–99)
GLUCOSE BLDC GLUCOMTR-MCNC: 156 MG/DL — HIGH (ref 70–99)
GLUCOSE BLDC GLUCOMTR-MCNC: 306 MG/DL — HIGH (ref 70–99)
GLUCOSE SERPL-MCNC: 148 MG/DL — HIGH (ref 70–99)
HCT VFR BLD CALC: 31.2 % — LOW (ref 39–50)
HGB BLD-MCNC: 10 G/DL — LOW (ref 13–17)
MCHC RBC-ENTMCNC: 27.7 PG — SIGNIFICANT CHANGE UP (ref 27–34)
MCHC RBC-ENTMCNC: 32.1 GM/DL — SIGNIFICANT CHANGE UP (ref 32–36)
MCV RBC AUTO: 86.4 FL — SIGNIFICANT CHANGE UP (ref 80–100)
PLATELET # BLD AUTO: 297 K/UL — SIGNIFICANT CHANGE UP (ref 150–400)
POTASSIUM SERPL-MCNC: 5.1 MMOL/L — SIGNIFICANT CHANGE UP (ref 3.5–5.3)
POTASSIUM SERPL-SCNC: 5.1 MMOL/L — SIGNIFICANT CHANGE UP (ref 3.5–5.3)
RBC # BLD: 3.61 M/UL — LOW (ref 4.2–5.8)
RBC # FLD: 15.5 % — HIGH (ref 10.3–14.5)
SODIUM SERPL-SCNC: 140 MMOL/L — SIGNIFICANT CHANGE UP (ref 135–145)
WBC # BLD: 7.58 K/UL — SIGNIFICANT CHANGE UP (ref 3.8–10.5)
WBC # FLD AUTO: 7.58 K/UL — SIGNIFICANT CHANGE UP (ref 3.8–10.5)

## 2024-04-16 PROCEDURE — 99232 SBSQ HOSP IP/OBS MODERATE 35: CPT

## 2024-04-16 RX ADMIN — Medication 600 MILLIGRAM(S): at 08:59

## 2024-04-16 RX ADMIN — ATORVASTATIN CALCIUM 40 MILLIGRAM(S): 80 TABLET, FILM COATED ORAL at 21:42

## 2024-04-16 RX ADMIN — Medication 2: at 12:21

## 2024-04-16 RX ADMIN — Medication 4: at 21:45

## 2024-04-16 RX ADMIN — INSULIN GLARGINE 12 UNIT(S): 100 INJECTION, SOLUTION SUBCUTANEOUS at 21:45

## 2024-04-16 RX ADMIN — Medication 4 UNIT(S): at 09:00

## 2024-04-16 RX ADMIN — Medication 600 MILLIGRAM(S): at 21:42

## 2024-04-16 RX ADMIN — Medication 81 MILLIGRAM(S): at 08:58

## 2024-04-16 RX ADMIN — CYCLOBENZAPRINE HYDROCHLORIDE 5 MILLIGRAM(S): 10 TABLET, FILM COATED ORAL at 21:43

## 2024-04-16 RX ADMIN — PANTOPRAZOLE SODIUM 40 MILLIGRAM(S): 20 TABLET, DELAYED RELEASE ORAL at 05:51

## 2024-04-16 RX ADMIN — Medication 4 UNIT(S): at 12:22

## 2024-04-16 RX ADMIN — ZOLPIDEM TARTRATE 5 MILLIGRAM(S): 10 TABLET ORAL at 21:42

## 2024-04-16 RX ADMIN — Medication 25 MILLIGRAM(S): at 08:58

## 2024-04-16 NOTE — PROGRESS NOTE ADULT - ASSESSMENT
Pt is a 64 yo male who presents from emergency housing due to chest pain and two episodes of "passing out" without losing consciousness who is noncompliant with medication and lifestyle, found to have elevated BnP, Cr, and exposure to bed bugs, admitted due to :    #Atypical chest pain  #CHF  admitted to telemetry -> transfer to med/surg  chest xray with atelecasis, CT chest w/o PNA/ground glass opacities/effusion, pt w/o leukocytosis/fever or evidence of infection, will hold further abx - given vanco/cefepime in ED  EKG reviewed- similar to prior  chest pain reproducible with palpation. Less likely ischemic  ASA, BB, statin restarted  Does not appear overloaded, hold PO Lasix 40 mg in setting of BRIDGETTE  ACE held in setting of BRIDGETTE  serial troponin wnl x 2  TTE ordered - last TTE in Jan EF 45-50% with pleural effusion and trace to small pericardial effusion with moderately reduced LV fxn, inferior and lateral wall hypokinesis  cardiology consult appreciated    #Near syncope  denies LOC or head trauma  nonfocal on neuro exam  ct head unremarkable  OOB and ambulate with assistance  fall precautions  neurochecks q 4 hrs    #CKD, baseline cr 1.7  #BRIDGETTE on CKD  Improving. . Hold Lasix and ACE  Monitor bmp  renally dose meds  avoid nephrotoxic meds    #DMII with hyperglycemia  A1c 9.8  Start Lantus and premeals, conitnue ISS  hold oral meds while inpatient  carb restriction    #Noncompliance  Case management and SW consult - filled out DSS paperwork. Working on shelter placement  Appt. to be made with [facility]    #Bed bug exposure  Pt decontaminated in ED  Does not want to return to that particular emergency housing due to outbreak    #Chronic pain  Tylenol prn  Avoid opioids    # B/l Cataracts: leading to diminished vision  needs surgery    #HIV   noncompliant. Discussed with ID - wouldn't start ART now given non-compliance. Follow up outpatient     #Moderate protein calorie malnutrition    # Heartburn: better with maalox and protonix    # Decreased vision : for over 5 months  needs to see optometrist and ophthalmologist after discharge    # Insomnia: ambien added    DISPO: await shelter placement

## 2024-04-16 NOTE — PROGRESS NOTE ADULT - SUBJECTIVE AND OBJECTIVE BOX
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 Pt states that he fell over and fainted twice today. Pt states he remembers falling but does not remember who the people were who witnessed the event. States that he was "fine" afterwards and did not lose continence, have tongue biting, hit head, have shaking/spasms, or have LOC.   Pt in ED was placed in decon for bed bug exposure and admitted for chest pain. Found to have abnormal but unchanged CXR with atelectasis, CT chest neg for PNA however pt with orthopnea, cough, and bilateral leg edema 2+. Pt has been noncompliant with his lasix or cardiac diet. Pt with HIV and has not taken meds in over two months. States he cannot afford them. SW consulted.      4/5: Patient seen and examined at bedside. States he feels "horrible". Still has hest pain, unchanged.     4/6: No overnight events. Patient seen and examined at bedside. Still has palpable chest pain - states it's been the same for over 2 months. Will hold Lasix given BRIDGETTE. Pending shelter placement.     4/7: No overnight events. Patient seen and examined at bedside. Refused AM Insulin. Denies chest pain this morning but reports cough. Pending shelter placement.     4/8: No overnight events. Patient seen and examined at bedside. No new complaints. Pending shelter placement.     4/9: no complaints   await new shelter     4/10: no complaints    4/11: c/o sore throat; RVP done, neg    4/12: c/o GERD like symptoms  add maalox, protonix    4/13: heart burn better  c/o insomnia  c/o decreased vision for 5 months    4/14: no new complaints  slept well last night with Ambien     4/5: no complaints  has b/l cataracts; needs surgery    4/16: no complaints  await placement    ROS: All 10 systems reviewed and found to be negative with the exception of what has been described above.     VITAL SIGNS:    Vital Signs Last 24 Hrs  T(C): 36.4 (16 Apr 2024 09:05), Max: 36.9 (15 Apr 2024 15:40)  T(F): 97.6 (16 Apr 2024 09:05), Max: 98.4 (15 Apr 2024 15:40)  HR: 84 (16 Apr 2024 09:05) (80 - 86)  BP: 127/74 (16 Apr 2024 09:05) (121/57 - 145/79)  BP(mean): 89 (16 Apr 2024 09:05) (89 - 89)  RR: 18 (16 Apr 2024 09:05) (18 - 18)  SpO2: 99% (16 Apr 2024 09:05) (99% - 99%)    Parameters below as of 16 Apr 2024 09:05  Patient On (Oxygen Delivery Method): room air        PHYSICAL EXAM:  General: No acute distress  HEENT: NC/AT; PERRL, anicteric sclera; MMM; b/l cataracts Right >>Left  Neck: Supple  Cardiovascular: +S1/S2, RRR, no murmurs, rubs, gallops. Tenderness to palpation along right and left sided chest wall   Respiratory: CTA B/L; no W/R/R  Gastrointestinal: soft, NT/ND; +BSx4  Extremities: WWP; no edema, clubbing or cyanosis  Vascular: 2+ radial, DP/PT pulses B/L  Neurological: AAOx3; no focal deficits      LABS:    All Labs/EKG/Radiology/Meds reviewed    Lab Results:  CBC  CBC Full  -  ( 16 Apr 2024 07:55 )  WBC Count : 7.58 K/uL  RBC Count : 3.61 M/uL  Hemoglobin : 10.0 g/dL  Hematocrit : 31.2 %  Platelet Count - Automated : 297 K/uL  Mean Cell Volume : 86.4 fl  Mean Cell Hemoglobin : 27.7 pg  Mean Cell Hemoglobin Concentration : 32.1 gm/dL  Auto Neutrophil # : x  Auto Lymphocyte # : x  Auto Monocyte # : x  Auto Eosinophil # : x  Auto Basophil # : x  Auto Neutrophil % : x  Auto Lymphocyte % : x  Auto Monocyte % : x  Auto Eosinophil % : x  Auto Basophil % : x    .		Differential:	[] Automated		[] Manual  Chemistry  04-16    140  |  116<H>  |  70<H>  ----------------------------<  148<H>  5.1   |  20<L>  |  2.04<H>    Ca    9.1      16 Apr 2024 07:55          Urinalysis Basic - ( 16 Apr 2024 07:55 )    Color: x / Appearance: x / SG: x / pH: x  Gluc: 148 mg/dL / Ketone: x  / Bili: x / Urobili: x   Blood: x / Protein: x / Nitrite: x   Leuk Esterase: x / RBC: x / WBC x   Sq Epi: x / Non Sq Epi: x / Bacteria: x        MICROBIOLOGY/CULTURES:          .		Differential:	[] Automated		[] Manual  Chemistry  04-09    139  |  112<H>  |  41<H>  ----------------------------<  199<H>  4.3   |  22  |  2.03<H>    Ca    8.7      09 Apr 2024 08:08  Mg     1.9     04-09          Urinalysis Basic - ( 09 Apr 2024 08:08 )    Color: x / Appearance: x / SG: x / pH: x  Gluc: 199 mg/dL / Ketone: x  / Bili: x / Urobili: x   Blood: x / Protein: x / Nitrite: x   Leuk Esterase: x / RBC: x / WBC x   Sq Epi: x / Non Sq Epi: x / Bacteria: x        MICROBIOLOGY/CULTURES:  Culture Results:   No growth at 4 days (04-04 @ 18:44)  Culture Results:   No growth at 4 days (04-04 @ 18:44)            04-08    140  |  111<H>  |  38<H>  ----------------------------<  139<H>  3.3<L>   |  25  |  2.01<H>    Ca    8.6      08 Apr 2024 08:17        Urinalysis Basic - ( 08 Apr 2024 08:17 )    Color: x / Appearance: x / SG: x / pH: x  Gluc: 139 mg/dL / Ketone: x  / Bili: x / Urobili: x   Blood: x / Protein: x / Nitrite: x   Leuk Esterase: x / RBC: x / WBC x   Sq Epi: x / Non Sq Epi: x / Bacteria: x      CAPILLARY BLOOD GLUCOSE      POCT Blood Glucose.: 144 mg/dL (08 Apr 2024 08:07)        RADIOLOGY & ADDITIONAL TESTS: Reviewed.    MEDICATIONS  (STANDING):  acetaminophen     Tablet .. 325 milliGRAM(s) Oral once  aspirin  chewable 81 milliGRAM(s) Oral daily  atorvastatin 40 milliGRAM(s) Oral at bedtime  dextrose 10% Bolus 125 milliLiter(s) IV Bolus once  dextrose 5%. 1000 milliLiter(s) (100 mL/Hr) IV Continuous <Continuous>  dextrose 5%. 1000 milliLiter(s) (50 mL/Hr) IV Continuous <Continuous>  dextrose 50% Injectable 25 Gram(s) IV Push once  dextrose 50% Injectable 12.5 Gram(s) IV Push once  glucagon  Injectable 1 milliGRAM(s) IntraMuscular once  guaiFENesin  milliGRAM(s) Oral every 12 hours  insulin glargine Injectable (LANTUS) 12 Unit(s) SubCutaneous at bedtime  insulin lispro (ADMELOG) corrective regimen sliding scale   SubCutaneous at bedtime  insulin lispro (ADMELOG) corrective regimen sliding scale   SubCutaneous three times a day before meals  insulin lispro Injectable (ADMELOG) 4 Unit(s) SubCutaneous three times a day before meals  metoprolol succinate ER 25 milliGRAM(s) Oral daily  pantoprazole    Tablet 40 milliGRAM(s) Oral before breakfast    MEDICATIONS  (PRN):  acetaminophen     Tablet .. 650 milliGRAM(s) Oral every 6 hours PRN Temp greater or equal to 38C (100.4F), Mild Pain (1 - 3), Moderate Pain (4 - 6)  albuterol    90 MICROgram(s) HFA Inhaler 2 Puff(s) Inhalation every 6 hours PRN Shortness of Breath  aluminum hydroxide/magnesium hydroxide/simethicone Suspension 30 milliLiter(s) Oral every 6 hours PRN Dyspepsia  cyclobenzaprine 5 milliGRAM(s) Oral three times a day PRN Muscle Spasm  dextrose Oral Gel 15 Gram(s) Oral once PRN Blood Glucose LESS THAN 70 milliGRAM(s)/deciliter  zolpidem 5 milliGRAM(s) Oral at bedtime PRN Insomnia

## 2024-04-17 ENCOUNTER — TRANSCRIPTION ENCOUNTER (OUTPATIENT)
Age: 64
End: 2024-04-17

## 2024-04-17 LAB
GLUCOSE BLDC GLUCOMTR-MCNC: 127 MG/DL — HIGH (ref 70–99)
GLUCOSE BLDC GLUCOMTR-MCNC: 145 MG/DL — HIGH (ref 70–99)
GLUCOSE BLDC GLUCOMTR-MCNC: 277 MG/DL — HIGH (ref 70–99)
GLUCOSE BLDC GLUCOMTR-MCNC: 83 MG/DL — SIGNIFICANT CHANGE UP (ref 70–99)

## 2024-04-17 PROCEDURE — 99232 SBSQ HOSP IP/OBS MODERATE 35: CPT

## 2024-04-17 RX ORDER — FUROSEMIDE 40 MG
1 TABLET ORAL
Qty: 30 | Refills: 0
Start: 2024-04-17 | End: 2024-05-16

## 2024-04-17 RX ADMIN — Medication 600 MILLIGRAM(S): at 10:14

## 2024-04-17 RX ADMIN — INSULIN GLARGINE 12 UNIT(S): 100 INJECTION, SOLUTION SUBCUTANEOUS at 21:20

## 2024-04-17 RX ADMIN — ZOLPIDEM TARTRATE 5 MILLIGRAM(S): 10 TABLET ORAL at 21:27

## 2024-04-17 RX ADMIN — Medication 81 MILLIGRAM(S): at 10:14

## 2024-04-17 RX ADMIN — ATORVASTATIN CALCIUM 40 MILLIGRAM(S): 80 TABLET, FILM COATED ORAL at 21:20

## 2024-04-17 RX ADMIN — Medication 4 UNIT(S): at 13:33

## 2024-04-17 RX ADMIN — Medication 6: at 13:32

## 2024-04-17 RX ADMIN — Medication 25 MILLIGRAM(S): at 10:14

## 2024-04-17 RX ADMIN — Medication 600 MILLIGRAM(S): at 21:20

## 2024-04-17 NOTE — DISCHARGE NOTE PROVIDER - HOSPITAL COURSE
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 Pt states that he fell over and fainted twice today. Pt states he remembers falling but does not remember who the people were who witnessed the event. States that he was "fine" afterwards and did not lose continence, have tongue biting, hit head, have shaking/spasms, or have LOC.   Pt in ED was placed in decon for bed bug exposure and admitted for chest pain. Found to have abnormal but unchanged CXR with atelectasis, CT chest neg for PNA however pt with orthopnea, cough, and bilateral leg edema 2+. Pt has been noncompliant with his lasix or cardiac diet. Pt with HIV and has not taken meds in over two months. States he cannot afford them. SW consulted.      4/17: Patient seen and examined at bedside. No overnight events. Ongoing muscle pain on anterior chest, but improved. Stable for discharge to Shelter.     Hospital course (by problem):   #Atypical chest pain  #Chronic CHF  chest xray with atelecasis, CT chest w/o PNA/ground glass opacities/effusion, pt w/o leukocytosis/fever or evidence of infection, will hold further abx   EKG reviewed- similar to prior  serial troponin wnl x 2  chest pain reproducible with palpation. Less likely ischemic  ASA, BB, statin restarted  Does not appear overloaded, no need for IV Lasix  Reduce home Lasix to 20 mg in setting of BRIDGETTE  Hold off on ACE/ARB in setting of BRIDGETTE  TTE 4/5 noted, EF 50% - last TTE in Jan EF 45-50% with pleural effusion and trace to small pericardial effusion with moderately reduced LV fxn, inferior and lateral wall hypokinesis  cardiology consult appreciated    #Near syncope  denies LOC or head trauma  nonfocal on neuro exam  ct head unremarkable  OOB and ambulate with assistance  fall precautions    #CKD, baseline cr 1.7  #BRIDGETTE on CKD  Improving.   renally dose meds  avoid nephrotoxic meds    #DMII with hyperglycemia  A1c 9.8  Start Lantus and premeals, continue ISS  hold oral meds while inpatient  carb restriction    #Noncompliance  Case management and SW consult - filled out DSS paperwork. Shelter placement  Appt. to be made with [facility]    #Bed bug exposure  Pt decontaminated in ED  Does not want to return to that particular emergency housing due to outbreak    #Chronic pain  Tylenol prn  Avoid opioids    # B/l Cataracts: leading to diminished vision  needs surgery    #HIV   noncompliant. Discussed with ID - wouldn't start ART now given non-compliance. Follow up outpatient     #Moderate protein calorie malnutrition    # Heartburn: better with maalox and protonix    # Decreased vision : for over 5 months  needs to see optometrist and ophthalmologist after discharge    # Insomnia: ambien added    Patient was discharged to: Shelter     Physical exam at the time of discharge:  LOS: 13d    VITALS:   T(C): 36.6 (04-17-24 @ 08:23), Max: 36.7 (04-16-24 @ 15:57)  HR: 82 (04-17-24 @ 08:23) (76 - 93)  BP: 132/78 (04-17-24 @ 08:23) (119/88 - 155/86)  RR: 18 (04-17-24 @ 08:23) (18 - 18)  SpO2: 97% (04-17-24 @ 08:23) (97% - 99%)    PHYSICAL EXAM:  General: No acute distress  HEENT: NC/AT; PERRL, anicteric sclera; MMM; b/l cataracts Right >>Left  Neck: Supple  Cardiovascular: +S1/S2, RRR, no murmurs, rubs, gallops. 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 Pt states that he fell over and fainted twice today. Pt states he remembers falling but does not remember who the people were who witnessed the event. States that he was "fine" afterwards and did not lose continence, have tongue biting, hit head, have shaking/spasms, or have LOC.   Pt in ED was placed in decon for bed bug exposure and admitted for chest pain. Found to have abnormal but unchanged CXR with atelectasis, CT chest neg for PNA however pt with orthopnea, cough, and bilateral leg edema 2+. Pt has been noncompliant with his lasix or cardiac diet. Pt with HIV and has not taken meds in over two months. States he cannot afford them. SW consulted.      4/18: Patient seen and examined at bedside. No overnight events. Ongoing muscle pain on anterior chest, but improved. Stable for discharge to Shelter.     Hospital course (by problem):   #Atypical chest pain  #Chronic CHF  chest xray with atelecasis, CT chest w/o PNA/ground glass opacities/effusion, pt w/o leukocytosis/fever or evidence of infection, will hold further abx   EKG reviewed- similar to prior  serial troponin wnl x 2  chest pain reproducible with palpation. Less likely ischemic  ASA, BB, statin restarted  Does not appear overloaded, no need for IV Lasix  Reduce home Lasix to 20 mg in setting of BRIDGETTE  Hold off on ACE/ARB in setting of BRIDGETTE  TTE 4/5 noted, EF 50% - last TTE in Jan EF 45-50% with pleural effusion and trace to small pericardial effusion with moderately reduced LV fxn, inferior and lateral wall hypokinesis  cardiology consult appreciated    #Near syncope  denies LOC or head trauma  nonfocal on neuro exam  ct head unremarkable  OOB and ambulate with assistance  fall precautions    #CKD, baseline cr 1.7  #BRIDGETTE on CKD  Improving.   renally dose meds  avoid nephrotoxic meds    #DMII with hyperglycemia  A1c 9.8  Start Lantus and premeals, continue ISS  hold oral meds while inpatient  carb restriction    #Noncompliance  Case management and SW consult - filled out DSS paperwork. Shelter placement  Appt. to be made with [facility]    #Bed bug exposure  Pt decontaminated in ED  Does not want to return to that particular emergency housing due to outbreak    #Chronic pain  Tylenol prn  Avoid opioids    # B/l Cataracts: leading to diminished vision  needs surgery    #HIV   noncompliant. Discussed with ID - wouldn't start ART now given non-compliance. Follow up outpatient     #Moderate protein calorie malnutrition    # Heartburn: better with maalox and protonix    # Decreased vision : for over 5 months  needs to see optometrist and ophthalmologist after discharge    # Insomnia: ambien added    Patient was discharged to: Shelter     Physical exam at the time of discharge:  LOS: 14d    VITALS:   T(C): 36.7 (04-18-24 @ 06:42), Max: 36.8 (04-17-24 @ 23:33)  HR: 85 (04-18-24 @ 06:42) (85 - 87)  BP: 123/81 (04-18-24 @ 06:42) (112/67 - 147/87)  RR: 18 (04-18-24 @ 06:42) (18 - 18)  SpO2: 99% (04-18-24 @ 06:42) (98% - 99%)    PHYSICAL EXAM:  General: No acute distress  HEENT: NC/AT; PERRL, anicteric sclera; MMM; b/l cataracts Right >>Left  Neck: Supple  Cardiovascular: +S1/S2, RRR, no murmurs, rubs, gallops. Tenderness to palpation along right and left sided chest wall   Respiratory: CTA B/L; no W/R/R  Gastrointestinal: soft, NT/ND; +BSx4  Extremities: WWP; no edema, clubbing or cyanosis  Vascular: 2+ radial, DP/PT pulses B/L  Neurological: AAOx3; no focal deficits

## 2024-04-17 NOTE — PROGRESS NOTE ADULT - REASON FOR ADMISSION
Atypical chest pain, Dyspnea on exertion

## 2024-04-17 NOTE — DISCHARGE NOTE PROVIDER - PROVIDER TOKENS
FREE:[LAST:[[facility]],PHONE:[(   )    -],FAX:[(   )    -],ADDRESS:[[facility] appt made with MD Mratinez for 5/3/24 at 10:40am at [address] [facility] [id number] (please remind pt at [facility] due to visual impairment)]]

## 2024-04-17 NOTE — PROGRESS NOTE ADULT - SUBJECTIVE AND OBJECTIVE BOX
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 Pt states that he fell over and fainted twice today. Pt states he remembers falling but does not remember who the people were who witnessed the event. States that he was "fine" afterwards and did not lose continence, have tongue biting, hit head, have shaking/spasms, or have LOC.   Pt in ED was placed in decon for bed bug exposure and admitted for chest pain. Found to have abnormal but unchanged CXR with atelectasis, CT chest neg for PNA however pt with orthopnea, cough, and bilateral leg edema 2+. Pt has been noncompliant with his lasix or cardiac diet. Pt with HIV and has not taken meds in over two months. States he cannot afford them. SW consulted.      4/5: Patient seen and examined at bedside. States he feels "horrible". Still has hest pain, unchanged.     4/6: No overnight events. Patient seen and examined at bedside. Still has palpable chest pain - states it's been the same for over 2 months. Will hold Lasix given BRIDGETTE. Pending shelter placement.     4/7: No overnight events. Patient seen and examined at bedside. Refused AM Insulin. Denies chest pain this morning but reports cough. Pending shelter placement.     4/8: No overnight events. Patient seen and examined at bedside. No new complaints. Pending shelter placement.     4/9: no complaints   await new shelter     4/10: no complaints    4/11: c/o sore throat; RVP done, neg    4/12: c/o GERD like symptoms  add maalox, protonix    4/13: heart burn better  c/o insomnia  c/o decreased vision for 5 months    4/14: no new complaints  slept well last night with Ambien     4/5: no complaints  has b/l cataracts; needs surgery    4/16: no complaints  await placement    4/17: Patient seen and examined at bedside. No overnight events. Pending shelter placement.     ROS: All 10 systems reviewed and found to be negative with the exception of what has been described above.     VITAL SIGNS:  Vital Signs Last 24 Hrs  T(C): 36.6 (17 Apr 2024 08:23), Max: 36.7 (16 Apr 2024 15:57)  T(F): 97.9 (17 Apr 2024 08:23), Max: 98 (16 Apr 2024 15:57)  HR: 82 (17 Apr 2024 08:23) (76 - 93)  BP: 132/78 (17 Apr 2024 08:23) (119/88 - 155/86)  BP(mean): 93 (17 Apr 2024 08:23) (93 - 93)  RR: 18 (17 Apr 2024 08:23) (18 - 18)  SpO2: 97% (17 Apr 2024 08:23) (97% - 99%)    Parameters below as of 17 Apr 2024 08:23  Patient On (Oxygen Delivery Method): room air      PHYSICAL EXAM:  General: No acute distress  HEENT: NC/AT; PERRL, anicteric sclera; MMM; b/l cataracts Right >>Left  Neck: Supple  Cardiovascular: +S1/S2, RRR, no murmurs, rubs, gallops.   Respiratory: CTA B/L; no W/R/R  Gastrointestinal: soft, NT/ND; +BSx4  Extremities: WWP; no edema, clubbing or cyanosis  Vascular: 2+ radial, DP/PT pulses B/L  Neurological: AAOx3; no focal deficits    MEDICATIONS:  MEDICATIONS  (STANDING):  acetaminophen     Tablet .. 325 milliGRAM(s) Oral once  aspirin  chewable 81 milliGRAM(s) Oral daily  atorvastatin 40 milliGRAM(s) Oral at bedtime  dextrose 10% Bolus 125 milliLiter(s) IV Bolus once  dextrose 5%. 1000 milliLiter(s) (100 mL/Hr) IV Continuous <Continuous>  dextrose 5%. 1000 milliLiter(s) (50 mL/Hr) IV Continuous <Continuous>  dextrose 50% Injectable 25 Gram(s) IV Push once  dextrose 50% Injectable 12.5 Gram(s) IV Push once  glucagon  Injectable 1 milliGRAM(s) IntraMuscular once  guaiFENesin  milliGRAM(s) Oral every 12 hours  insulin glargine Injectable (LANTUS) 12 Unit(s) SubCutaneous at bedtime  insulin lispro (ADMELOG) corrective regimen sliding scale   SubCutaneous at bedtime  insulin lispro (ADMELOG) corrective regimen sliding scale   SubCutaneous three times a day before meals  insulin lispro Injectable (ADMELOG) 4 Unit(s) SubCutaneous three times a day before meals  metoprolol succinate ER 25 milliGRAM(s) Oral daily  pantoprazole    Tablet 40 milliGRAM(s) Oral before breakfast    MEDICATIONS  (PRN):  acetaminophen     Tablet .. 650 milliGRAM(s) Oral every 6 hours PRN Temp greater or equal to 38C (100.4F), Mild Pain (1 - 3), Moderate Pain (4 - 6)  albuterol    90 MICROgram(s) HFA Inhaler 2 Puff(s) Inhalation every 6 hours PRN Shortness of Breath  aluminum hydroxide/magnesium hydroxide/simethicone Suspension 30 milliLiter(s) Oral every 6 hours PRN Dyspepsia  cyclobenzaprine 5 milliGRAM(s) Oral three times a day PRN Muscle Spasm  dextrose Oral Gel 15 Gram(s) Oral once PRN Blood Glucose LESS THAN 70 milliGRAM(s)/deciliter  zolpidem 5 milliGRAM(s) Oral at bedtime PRN Insomnia      ALLERGIES:  Allergies    No Known Allergies    Intolerances        LABS:                        10.0   7.58  )-----------( 297      ( 16 Apr 2024 07:55 )             31.2     04-16    140  |  116<H>  |  70<H>  ----------------------------<  148<H>  5.1   |  20<L>  |  2.04<H>    Ca    9.1      16 Apr 2024 07:55        Urinalysis Basic - ( 16 Apr 2024 07:55 )    Color: x / Appearance: x / SG: x / pH: x  Gluc: 148 mg/dL / Ketone: x  / Bili: x / Urobili: x   Blood: x / Protein: x / Nitrite: x   Leuk Esterase: x / RBC: x / WBC x   Sq Epi: x / Non Sq Epi: x / Bacteria: x      CAPILLARY BLOOD GLUCOSE      POCT Blood Glucose.: 127 mg/dL (17 Apr 2024 08:03)        RADIOLOGY & ADDITIONAL TESTS: Reviewed. 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 Pt states that he fell over and fainted twice today. Pt states he remembers falling but does not remember who the people were who witnessed the event. States that he was "fine" afterwards and did not lose continence, have tongue biting, hit head, have shaking/spasms, or have LOC.   Pt in ED was placed in decon for bed bug exposure and admitted for chest pain. Found to have abnormal but unchanged CXR with atelectasis, CT chest neg for PNA however pt with orthopnea, cough, and bilateral leg edema 2+. Pt has been noncompliant with his lasix or cardiac diet. Pt with HIV and has not taken meds in over two months. States he cannot afford them. SW consulted.      4/5: Patient seen and examined at bedside. States he feels "horrible". Still has hest pain, unchanged.     4/6: No overnight events. Patient seen and examined at bedside. Still has palpable chest pain - states it's been the same for over 2 months. Will hold Lasix given BRIDGETTE. Pending shelter placement.     4/7: No overnight events. Patient seen and examined at bedside. Refused AM Insulin. Denies chest pain this morning but reports cough. Pending shelter placement.     4/8: No overnight events. Patient seen and examined at bedside. No new complaints. Pending shelter placement.     4/9: no complaints   await new shelter     4/10: no complaints    4/11: c/o sore throat; RVP done, neg    4/12: c/o GERD like symptoms  add maalox, protonix    4/13: heart burn better  c/o insomnia  c/o decreased vision for 5 months    4/14: no new complaints  slept well last night with Ambien     4/5: no complaints  has b/l cataracts; needs surgery    4/16: no complaints  await placement    4/17: Patient seen and examined at bedside. No overnight events. Pending shelter placement.     ROS: All 10 systems reviewed and found to be negative with the exception of what has been described above.     VITAL SIGNS:  Vital Signs Last 24 Hrs  T(C): 36.6 (17 Apr 2024 08:23), Max: 36.7 (16 Apr 2024 15:57)  T(F): 97.9 (17 Apr 2024 08:23), Max: 98 (16 Apr 2024 15:57)  HR: 82 (17 Apr 2024 08:23) (76 - 93)  BP: 132/78 (17 Apr 2024 08:23) (119/88 - 155/86)  BP(mean): 93 (17 Apr 2024 08:23) (93 - 93)  RR: 18 (17 Apr 2024 08:23) (18 - 18)  SpO2: 97% (17 Apr 2024 08:23) (97% - 99%)    Parameters below as of 17 Apr 2024 08:23  Patient On (Oxygen Delivery Method): room air      PHYSICAL EXAM:  General: No acute distress  HEENT: NC/AT; PERRL, anicteric sclera; MMM; b/l cataracts Right >>Left  Neck: Supple  Cardiovascular: +S1/S2, RRR, no murmurs, rubs, gallops. Chest wall tenderness  Respiratory: CTA B/L; no W/R/R  Gastrointestinal: soft, NT/ND; +BSx4  Extremities: WWP; no edema, clubbing or cyanosis  Vascular: 2+ radial, DP/PT pulses B/L  Neurological: AAOx3; no focal deficits    MEDICATIONS:  MEDICATIONS  (STANDING):  acetaminophen     Tablet .. 325 milliGRAM(s) Oral once  aspirin  chewable 81 milliGRAM(s) Oral daily  atorvastatin 40 milliGRAM(s) Oral at bedtime  dextrose 10% Bolus 125 milliLiter(s) IV Bolus once  dextrose 5%. 1000 milliLiter(s) (100 mL/Hr) IV Continuous <Continuous>  dextrose 5%. 1000 milliLiter(s) (50 mL/Hr) IV Continuous <Continuous>  dextrose 50% Injectable 25 Gram(s) IV Push once  dextrose 50% Injectable 12.5 Gram(s) IV Push once  glucagon  Injectable 1 milliGRAM(s) IntraMuscular once  guaiFENesin  milliGRAM(s) Oral every 12 hours  insulin glargine Injectable (LANTUS) 12 Unit(s) SubCutaneous at bedtime  insulin lispro (ADMELOG) corrective regimen sliding scale   SubCutaneous at bedtime  insulin lispro (ADMELOG) corrective regimen sliding scale   SubCutaneous three times a day before meals  insulin lispro Injectable (ADMELOG) 4 Unit(s) SubCutaneous three times a day before meals  metoprolol succinate ER 25 milliGRAM(s) Oral daily  pantoprazole    Tablet 40 milliGRAM(s) Oral before breakfast    MEDICATIONS  (PRN):  acetaminophen     Tablet .. 650 milliGRAM(s) Oral every 6 hours PRN Temp greater or equal to 38C (100.4F), Mild Pain (1 - 3), Moderate Pain (4 - 6)  albuterol    90 MICROgram(s) HFA Inhaler 2 Puff(s) Inhalation every 6 hours PRN Shortness of Breath  aluminum hydroxide/magnesium hydroxide/simethicone Suspension 30 milliLiter(s) Oral every 6 hours PRN Dyspepsia  cyclobenzaprine 5 milliGRAM(s) Oral three times a day PRN Muscle Spasm  dextrose Oral Gel 15 Gram(s) Oral once PRN Blood Glucose LESS THAN 70 milliGRAM(s)/deciliter  zolpidem 5 milliGRAM(s) Oral at bedtime PRN Insomnia      ALLERGIES:  Allergies    No Known Allergies    Intolerances        LABS:                        10.0   7.58  )-----------( 297      ( 16 Apr 2024 07:55 )             31.2     04-16    140  |  116<H>  |  70<H>  ----------------------------<  148<H>  5.1   |  20<L>  |  2.04<H>    Ca    9.1      16 Apr 2024 07:55        Urinalysis Basic - ( 16 Apr 2024 07:55 )    Color: x / Appearance: x / SG: x / pH: x  Gluc: 148 mg/dL / Ketone: x  / Bili: x / Urobili: x   Blood: x / Protein: x / Nitrite: x   Leuk Esterase: x / RBC: x / WBC x   Sq Epi: x / Non Sq Epi: x / Bacteria: x      CAPILLARY BLOOD GLUCOSE      POCT Blood Glucose.: 127 mg/dL (17 Apr 2024 08:03)        RADIOLOGY & ADDITIONAL TESTS: Reviewed.

## 2024-04-17 NOTE — DISCHARGE NOTE PROVIDER - CARE PROVIDER_API CALL
[facility] appt made with MD Martinez for 5/3/24 at 10:40am at [address] [facility] [address] (please remind pt at [facility] due to visual impairment)  Phone: (   )    -  Fax: (   )    -  Follow Up Time:

## 2024-04-17 NOTE — DISCHARGE NOTE PROVIDER - NSDCFUADDAPPT_GEN_ALL_CORE_FT
[facility] appt made with MD Martinez for 5/3/24 at 10:40am at [address] [facility] [address] (please remind pt at [facility] due to visual impairment)

## 2024-04-17 NOTE — PROGRESS NOTE ADULT - ASSESSMENT
Pt is a 62 yo male who presents from emergency housing due to chest pain and two episodes of "passing out" without losing consciousness who is noncompliant with medication and lifestyle, found to have elevated BnP, Cr, and exposure to bed bugs, admitted due to :    #Atypical chest pain  #CHF  admitted to telemetry -> transfer to med/surg  chest xray with atelecasis, CT chest w/o PNA/ground glass opacities/effusion, pt w/o leukocytosis/fever or evidence of infection, will hold further abx - given vanco/cefepime in ED  EKG reviewed- similar to prior  chest pain reproducible with palpation. Less likely ischemic  ASA, BB, statin restarted  Does not appear overloaded, hold PO Lasix 40 mg in setting of BRIDGETTE  ACE held in setting of BRIDGETTE  serial troponin wnl x 2  TTE ordered - last TTE in Jan EF 45-50% with pleural effusion and trace to small pericardial effusion with moderately reduced LV fxn, inferior and lateral wall hypokinesis  cardiology consult appreciated    #Near syncope  denies LOC or head trauma  nonfocal on neuro exam  ct head unremarkable  OOB and ambulate with assistance  fall precautions  neurochecks q 4 hrs    #CKD, baseline cr 1.7  #BRIDGETTE on CKD  Improving. . Hold Lasix and ACE  Monitor bmp  renally dose meds  avoid nephrotoxic meds    #DMII with hyperglycemia  A1c 9.8  Start Lantus and premeals, conitnue ISS  hold oral meds while inpatient  carb restriction    #Noncompliance  Case management and SW consult - filled out DSS paperwork. Working on shelter placement  Appt. to be made with [facility]    #Bed bug exposure  Pt decontaminated in ED  Does not want to return to that particular emergency housing due to outbreak    #Chronic pain  Tylenol prn  Avoid opioids    # B/l Cataracts: leading to diminished vision  needs surgery    #HIV   noncompliant. Discussed with ID - wouldn't start ART now given non-compliance. Follow up outpatient     #Moderate protein calorie malnutrition    # Heartburn: better with maalox and protonix    # Decreased vision : for over 5 months  needs to see optometrist and ophthalmologist after discharge    # Insomnia: ambien added    DISPO: await shelter placement   Pt is a 64 yo male who presents from emergency housing due to chest pain and two episodes of "passing out" without losing consciousness who is noncompliant with medication and lifestyle, found to have elevated BnP, Cr, and exposure to bed bugs, admitted due to :    #Atypical chest pain  #Chronic CHF  chest xray with atelecasis, CT chest w/o PNA/ground glass opacities/effusion, pt w/o leukocytosis/fever or evidence of infection, will hold further abx   EKG reviewed- similar to prior  serial troponin wnl x 2  chest pain reproducible with palpation. Less likely ischemic  ASA, BB, statin restarted  Does not appear overloaded, no need for IV Lasix  Reduce home Lasix to 20 mg in setting of BRIDGETTE  Hold off on ACE/ARB in setting of BRIDGETTE  TTE 4/5 noted, EF 50% - last TTE in Jan EF 45-50% with pleural effusion and trace to small pericardial effusion with moderately reduced LV fxn, inferior and lateral wall hypokinesis  cardiology consult appreciated    #Near syncope  denies LOC or head trauma  nonfocal on neuro exam  ct head unremarkable  OOB and ambulate with assistance  fall precautions    #CKD, baseline cr 1.7  #BRIDGETTE on CKD  Improving.   renally dose meds  avoid nephrotoxic meds    #DMII with hyperglycemia  A1c 9.8  Start Lantus and premeals, continue ISS  hold oral meds while inpatient  carb restriction    #Noncompliance  Case management and SW consult - filled out DSS paperwork. Shelter placement  Appt. to be made with [facility]    #Bed bug exposure  Pt decontaminated in ED  Does not want to return to that particular emergency housing due to outbreak    #Chronic pain  Tylenol prn  Avoid opioids    # B/l Cataracts: leading to diminished vision  needs surgery    #HIV   noncompliant. Discussed with ID - wouldn't start ART now given non-compliance. Follow up outpatient     #Moderate protein calorie malnutrition    # Heartburn: better with maalox and protonix    # Decreased vision : for over 5 months  needs to see optometrist and ophthalmologist after discharge    # Insomnia: ambien added    DISPO: shelter placement

## 2024-04-17 NOTE — PROGRESS NOTE ADULT - NUTRITIONAL ASSESSMENT
This patient has been assessed with a concern for Malnutrition and has been determined to have a diagnosis/diagnoses of Moderate protein-calorie malnutrition.    This patient is being managed with:   Diet Regular-  Consistent Carbohydrate {Evening Snack} (CSTCHOSN)  DASH/TLC {Sodium & Cholesterol Restricted} (DASH)  Entered: Apr 4 2024 10:13PM  

## 2024-04-17 NOTE — DISCHARGE NOTE PROVIDER - NSDCCPCAREPLAN_GEN_ALL_CORE_FT
PRINCIPAL DISCHARGE DIAGNOSIS  Diagnosis: Atypical chest pain  Assessment and Plan of Treatment: Costochondritis is a benign cause of chest wall pain that results from inflammation of the costal cartilage, the cartilage that connect the ribs to the sternum.  Continue to take Tylenol as needed for pain control.      SECONDARY DISCHARGE DIAGNOSES  Diagnosis: Heart failure with mildly reduced ejection fraction  Assessment and Plan of Treatment: Continue Lasix 20 mg daily and follow up at the [facility] for further management.    Diagnosis: History of cataract  Assessment and Plan of Treatment: Please follow up with your Ophthalmologist upon discharge for further management.    Diagnosis: CKD (chronic kidney disease)  Assessment and Plan of Treatment: Continue to monitor your kidney function outpatient.    Diagnosis: Diabetes mellitus  Assessment and Plan of Treatment: You have a diagnosis of type 2 diabetes (sometimes called type 2 "diabetes mellitus"). This is a disorder that disrupts the way your body uses sugar. All the cells in your body need sugar to work normally. Sugar gets into the cells with the help of a hormone called insulin. If there is not enough insulin, or if the body stops responding to insulin, sugar builds up in the blood. That is what happens to people with diabetes. Type 2 diabetes usually causes no symptoms. When symptoms do occur, they include the need to urinate often, intense thirst and blurry vision. Even though type 2 diabetes might not make you feel sick, it can cause serious problems over time, if it is not treated. The disorder can lead to heart attacks, strokes, kidney disease, vision problems (or even blindness), pain or loss of feeling in the hands and feet, and the need to have fingers, toes, or other body parts removed (amputated). In addition to maintaining an active lifestyle, losing weight, eating right, and not smoking it is important to take your diabetes medications as directed to control your blood sugar and prevent the possible complications from this disease.

## 2024-04-17 NOTE — DISCHARGE NOTE PROVIDER - NSDCMRMEDTOKEN_GEN_ALL_CORE_FT
aspirin 81 mg oral tablet, chewable: 1 tab(s) orally once a day  atorvastatin 40 mg oral tablet: 1 tab(s) orally once a day (at bedtime)  furosemide 20 mg oral tablet: 1 tab(s) orally once a day  Jardiance 10 mg oral tablet: 1 tab(s) orally once a day  metFORMIN 500 mg oral tablet: 1 tab(s) orally once a day  metoprolol succinate 25 mg oral tablet, extended release: 1 tab(s) orally once a day

## 2024-04-17 NOTE — DISCHARGE NOTE PROVIDER - NSDCCAREPROVSEEN_GEN_ALL_CORE_FT
Roz, Lisa Collins, Bety Helm, Abby Seymour, Andrés Henderson, Martina Michael, Eriberto Casillas, Jaylin Paredes, Suk-Hyeon

## 2024-04-17 NOTE — PROGRESS NOTE ADULT - PROVIDER SPECIALTY LIST ADULT
Hospitalist
Cardiology
Hospitalist

## 2024-04-18 VITALS
HEART RATE: 85 BPM | WEIGHT: 167.99 LBS | TEMPERATURE: 98 F | RESPIRATION RATE: 18 BRPM | SYSTOLIC BLOOD PRESSURE: 123 MMHG | OXYGEN SATURATION: 99 % | DIASTOLIC BLOOD PRESSURE: 81 MMHG

## 2024-04-18 LAB — GLUCOSE BLDC GLUCOMTR-MCNC: 144 MG/DL — HIGH (ref 70–99)

## 2024-04-18 PROCEDURE — 99239 HOSP IP/OBS DSCHRG MGMT >30: CPT

## 2024-04-18 RX ORDER — ASPIRIN/CALCIUM CARB/MAGNESIUM 324 MG
1 TABLET ORAL
Qty: 30 | Refills: 0
Start: 2024-04-18 | End: 2024-05-17

## 2024-04-18 RX ORDER — ATORVASTATIN CALCIUM 80 MG/1
1 TABLET, FILM COATED ORAL
Qty: 30 | Refills: 0
Start: 2024-04-18 | End: 2024-05-17

## 2024-04-18 RX ORDER — EMPAGLIFLOZIN 10 MG/1
1 TABLET, FILM COATED ORAL
Qty: 30 | Refills: 0
Start: 2024-04-18 | End: 2024-05-17

## 2024-04-18 RX ORDER — METOPROLOL TARTRATE 50 MG
1 TABLET ORAL
Qty: 30 | Refills: 0
Start: 2024-04-18 | End: 2024-05-17

## 2024-04-18 RX ORDER — METFORMIN HYDROCHLORIDE 850 MG/1
1 TABLET ORAL
Qty: 30 | Refills: 0
Start: 2024-04-18 | End: 2024-05-17

## 2024-04-18 RX ADMIN — PANTOPRAZOLE SODIUM 40 MILLIGRAM(S): 20 TABLET, DELAYED RELEASE ORAL at 11:31

## 2024-04-18 RX ADMIN — Medication 25 MILLIGRAM(S): at 11:30

## 2024-04-18 RX ADMIN — Medication 4 UNIT(S): at 11:33

## 2024-04-18 RX ADMIN — Medication 600 MILLIGRAM(S): at 11:30

## 2024-04-18 RX ADMIN — Medication 81 MILLIGRAM(S): at 11:31

## 2024-04-22 NOTE — ED ADULT NURSE NOTE - SUICIDE SCREENING DEPRESSION
[FreeTextEntry1] : DULCE KEITH is a 66 year F here for follow up. She has a history of HLD, RA, osteopenia, HTN.  Reports her reflux symptoms are well controlled while taking Omeprazole 40mg. No GI complaints. HRM 3/2024: does not fulfill any motility disorder of esophagus, 1.2 cm HH  Wants to lose some weight, has not made significant lifestyle changes. Never met with our dietician Smoking marijuana nightly for sleep.    Brief Summary: Seen by me initially 11/2022 for reflux, triggered by dairy and caffeine. Had 2004 EGD that was normal. Repeat EGD by me 12/2022 was notable only for very small Hill grade 0 hiatal hernia. Esophagus with normal bx. Colonoscopy showed no adenomas. She responded well to PPI x 2 months but was concerned about long-term side effects given hx of osteopenia. Plan was to change diet to avoid known triggers (she had restarted them while on PPI without reflux, but off PPI they caused reflux), and use H2RA PRN.  At last visit 6/2023: Reflux continues, but very clearly triggered by lifestyle choices. Heavy dinners, eating late at night. Tums works very well to relieve sx. Pepcid not helpful. Tums almost every night.  Referred her to dietician but she skipped her appointment but she doesn't remember why.  Current symptoms: Reflux much worse. Waking up at night to vomit acid. Been going on this severe for 3 months. Irrespective of diet. Is trying to not eat late at night any more.  Weight is stable. No dysphagia. No change in bowel movements. Can have epigastric abd discomfort and sensitivity around bra underwire.   GI ROS negative for unintentional weight loss, fevers/chills, dysphagia, abdominal pain, bloating, nausea, vomiting, early satiety, diarrhea, constipation, melena, hematochezia. Patient denies NSAID use.  Current Meds: Reviewed All: Reviewed  Relevant Exam: Well appearing Negative

## 2024-04-24 DIAGNOSIS — Z91.119 PATIENT'S NONCOMPLIANCE WITH DIETARY REGIMEN DUE TO UNSPECIFIED REASON: ICD-10-CM

## 2024-04-24 DIAGNOSIS — Z79.84 LONG TERM (CURRENT) USE OF ORAL HYPOGLYCEMIC DRUGS: ICD-10-CM

## 2024-04-24 DIAGNOSIS — J98.11 ATELECTASIS: ICD-10-CM

## 2024-04-24 DIAGNOSIS — E11.65 TYPE 2 DIABETES MELLITUS WITH HYPERGLYCEMIA: ICD-10-CM

## 2024-04-24 DIAGNOSIS — Z79.82 LONG TERM (CURRENT) USE OF ASPIRIN: ICD-10-CM

## 2024-04-24 DIAGNOSIS — Z91.148 PATIENT'S OTHER NONCOMPLIANCE WITH MEDICATION REGIMEN FOR OTHER REASON: ICD-10-CM

## 2024-04-24 DIAGNOSIS — I50.22 CHRONIC SYSTOLIC (CONGESTIVE) HEART FAILURE: ICD-10-CM

## 2024-04-24 DIAGNOSIS — E11.22 TYPE 2 DIABETES MELLITUS WITH DIABETIC CHRONIC KIDNEY DISEASE: ICD-10-CM

## 2024-04-24 DIAGNOSIS — N18.9 CHRONIC KIDNEY DISEASE, UNSPECIFIED: ICD-10-CM

## 2024-04-24 DIAGNOSIS — B88.0 OTHER ACARIASIS: ICD-10-CM

## 2024-04-24 DIAGNOSIS — R12 HEARTBURN: ICD-10-CM

## 2024-04-24 DIAGNOSIS — F17.210 NICOTINE DEPENDENCE, CIGARETTES, UNCOMPLICATED: ICD-10-CM

## 2024-04-24 DIAGNOSIS — R07.89 OTHER CHEST PAIN: ICD-10-CM

## 2024-04-24 DIAGNOSIS — N17.9 ACUTE KIDNEY FAILURE, UNSPECIFIED: ICD-10-CM

## 2024-04-24 DIAGNOSIS — M94.0 CHONDROCOSTAL JUNCTION SYNDROME [TIETZE]: ICD-10-CM

## 2024-04-24 DIAGNOSIS — B20 HUMAN IMMUNODEFICIENCY VIRUS [HIV] DISEASE: ICD-10-CM

## 2024-04-24 DIAGNOSIS — H26.9 UNSPECIFIED CATARACT: ICD-10-CM

## 2024-04-24 DIAGNOSIS — G89.29 OTHER CHRONIC PAIN: ICD-10-CM

## 2024-04-24 DIAGNOSIS — E44.0 MODERATE PROTEIN-CALORIE MALNUTRITION: ICD-10-CM

## 2024-04-24 DIAGNOSIS — I13.0 HYPERTENSIVE HEART AND CHRONIC KIDNEY DISEASE WITH HEART FAILURE AND STAGE 1 THROUGH STAGE 4 CHRONIC KIDNEY DISEASE, OR UNSPECIFIED CHRONIC KIDNEY DISEASE: ICD-10-CM

## 2024-04-24 DIAGNOSIS — G47.00 INSOMNIA, UNSPECIFIED: ICD-10-CM

## 2024-04-24 DIAGNOSIS — F41.9 ANXIETY DISORDER, UNSPECIFIED: ICD-10-CM

## 2024-05-06 ENCOUNTER — EMERGENCY (EMERGENCY)
Facility: HOSPITAL | Age: 64
LOS: 0 days | Discharge: HOME | End: 2024-05-06
Attending: STUDENT IN AN ORGANIZED HEALTH CARE EDUCATION/TRAINING PROGRAM
Payer: MEDICAID

## 2024-05-06 VITALS
DIASTOLIC BLOOD PRESSURE: 72 MMHG | HEART RATE: 80 BPM | OXYGEN SATURATION: 100 % | SYSTOLIC BLOOD PRESSURE: 130 MMHG | RESPIRATION RATE: 18 BRPM | TEMPERATURE: 98 F

## 2024-05-06 VITALS
TEMPERATURE: 98 F | RESPIRATION RATE: 19 BRPM | HEIGHT: 68 IN | HEART RATE: 75 BPM | OXYGEN SATURATION: 99 % | SYSTOLIC BLOOD PRESSURE: 148 MMHG | DIASTOLIC BLOOD PRESSURE: 79 MMHG

## 2024-05-06 DIAGNOSIS — E11.36 TYPE 2 DIABETES MELLITUS WITH DIABETIC CATARACT: ICD-10-CM

## 2024-05-06 DIAGNOSIS — I10 ESSENTIAL (PRIMARY) HYPERTENSION: ICD-10-CM

## 2024-05-06 DIAGNOSIS — R07.9 CHEST PAIN, UNSPECIFIED: ICD-10-CM

## 2024-05-06 DIAGNOSIS — Z91.148 PATIENT'S OTHER NONCOMPLIANCE WITH MEDICATION REGIMEN FOR OTHER REASON: ICD-10-CM

## 2024-05-06 DIAGNOSIS — Z21 ASYMPTOMATIC HUMAN IMMUNODEFICIENCY VIRUS [HIV] INFECTION STATUS: ICD-10-CM

## 2024-05-06 DIAGNOSIS — M54.9 DORSALGIA, UNSPECIFIED: ICD-10-CM

## 2024-05-06 DIAGNOSIS — G89.29 OTHER CHRONIC PAIN: ICD-10-CM

## 2024-05-06 DIAGNOSIS — F41.9 ANXIETY DISORDER, UNSPECIFIED: ICD-10-CM

## 2024-05-06 DIAGNOSIS — Z98.1 ARTHRODESIS STATUS: Chronic | ICD-10-CM

## 2024-05-06 LAB
ALBUMIN SERPL ELPH-MCNC: 2.1 G/DL — LOW (ref 3.3–5)
ALP SERPL-CCNC: 55 U/L — SIGNIFICANT CHANGE UP (ref 40–120)
ALT FLD-CCNC: 19 U/L — SIGNIFICANT CHANGE UP (ref 12–78)
ANION GAP SERPL CALC-SCNC: 4 MMOL/L — LOW (ref 5–17)
AST SERPL-CCNC: 18 U/L — SIGNIFICANT CHANGE UP (ref 15–37)
BASOPHILS # BLD AUTO: 0.02 K/UL — SIGNIFICANT CHANGE UP (ref 0–0.2)
BASOPHILS NFR BLD AUTO: 0.3 % — SIGNIFICANT CHANGE UP (ref 0–2)
BILIRUB SERPL-MCNC: 0.1 MG/DL — LOW (ref 0.2–1.2)
BUN SERPL-MCNC: 30 MG/DL — HIGH (ref 7–23)
CALCIUM SERPL-MCNC: 8.7 MG/DL — SIGNIFICANT CHANGE UP (ref 8.5–10.1)
CHLORIDE SERPL-SCNC: 109 MMOL/L — HIGH (ref 96–108)
CO2 SERPL-SCNC: 26 MMOL/L — SIGNIFICANT CHANGE UP (ref 22–31)
CREAT SERPL-MCNC: 2.29 MG/DL — HIGH (ref 0.5–1.3)
EGFR: 31 ML/MIN/1.73M2 — LOW
EOSINOPHIL # BLD AUTO: 0.16 K/UL — SIGNIFICANT CHANGE UP (ref 0–0.5)
EOSINOPHIL NFR BLD AUTO: 2.2 % — SIGNIFICANT CHANGE UP (ref 0–6)
GLUCOSE SERPL-MCNC: 338 MG/DL — HIGH (ref 70–99)
HCT VFR BLD CALC: 30.2 % — LOW (ref 39–50)
HGB BLD-MCNC: 9.8 G/DL — LOW (ref 13–17)
IMM GRANULOCYTES NFR BLD AUTO: 1 % — HIGH (ref 0–0.9)
LYMPHOCYTES # BLD AUTO: 2.09 K/UL — SIGNIFICANT CHANGE UP (ref 1–3.3)
LYMPHOCYTES # BLD AUTO: 29.2 % — SIGNIFICANT CHANGE UP (ref 13–44)
MCHC RBC-ENTMCNC: 28.3 PG — SIGNIFICANT CHANGE UP (ref 27–34)
MCHC RBC-ENTMCNC: 32.5 GM/DL — SIGNIFICANT CHANGE UP (ref 32–36)
MCV RBC AUTO: 87.3 FL — SIGNIFICANT CHANGE UP (ref 80–100)
MONOCYTES # BLD AUTO: 0.63 K/UL — SIGNIFICANT CHANGE UP (ref 0–0.9)
MONOCYTES NFR BLD AUTO: 8.8 % — SIGNIFICANT CHANGE UP (ref 2–14)
NEUTROPHILS # BLD AUTO: 4.19 K/UL — SIGNIFICANT CHANGE UP (ref 1.8–7.4)
NEUTROPHILS NFR BLD AUTO: 58.5 % — SIGNIFICANT CHANGE UP (ref 43–77)
PLATELET # BLD AUTO: 252 K/UL — SIGNIFICANT CHANGE UP (ref 150–400)
POTASSIUM SERPL-MCNC: 4.1 MMOL/L — SIGNIFICANT CHANGE UP (ref 3.5–5.3)
POTASSIUM SERPL-SCNC: 4.1 MMOL/L — SIGNIFICANT CHANGE UP (ref 3.5–5.3)
PROT SERPL-MCNC: 7.5 GM/DL — SIGNIFICANT CHANGE UP (ref 6–8.3)
RBC # BLD: 3.46 M/UL — LOW (ref 4.2–5.8)
RBC # FLD: 14.3 % — SIGNIFICANT CHANGE UP (ref 10.3–14.5)
SODIUM SERPL-SCNC: 139 MMOL/L — SIGNIFICANT CHANGE UP (ref 135–145)
TROPONIN I, HIGH SENSITIVITY RESULT: 6.87 NG/L — SIGNIFICANT CHANGE UP
WBC # BLD: 7.16 K/UL — SIGNIFICANT CHANGE UP (ref 3.8–10.5)
WBC # FLD AUTO: 7.16 K/UL — SIGNIFICANT CHANGE UP (ref 3.8–10.5)

## 2024-05-06 PROCEDURE — 99284 EMERGENCY DEPT VISIT MOD MDM: CPT | Mod: 25

## 2024-05-06 PROCEDURE — 93010 ELECTROCARDIOGRAM REPORT: CPT

## 2024-05-06 PROCEDURE — 85025 COMPLETE CBC W/AUTO DIFF WBC: CPT

## 2024-05-06 PROCEDURE — 36415 COLL VENOUS BLD VENIPUNCTURE: CPT

## 2024-05-06 PROCEDURE — 99285 EMERGENCY DEPT VISIT HI MDM: CPT

## 2024-05-06 PROCEDURE — 96374 THER/PROPH/DIAG INJ IV PUSH: CPT

## 2024-05-06 PROCEDURE — 80053 COMPREHEN METABOLIC PANEL: CPT

## 2024-05-06 PROCEDURE — 93005 ELECTROCARDIOGRAM TRACING: CPT

## 2024-05-06 PROCEDURE — 84484 ASSAY OF TROPONIN QUANT: CPT

## 2024-05-06 RX ORDER — KETOROLAC TROMETHAMINE 30 MG/ML
15 SYRINGE (ML) INJECTION ONCE
Refills: 0 | Status: DISCONTINUED | OUTPATIENT
Start: 2024-05-06 | End: 2024-05-06

## 2024-05-06 RX ADMIN — Medication 15 MILLIGRAM(S): at 19:21

## 2024-05-06 NOTE — ED STATDOCS - ATTENDING APP SHARED VISIT CONTRIBUTION OF CARE
I, Juan Tubbs MD,  performed the initial face to face bedside interview with this patient regarding history of present illness, review of symptoms and relevant past medical, social and family history.  I completed an independent physical examination.  I was the initial provider who evaluated this patient.   I personally saw the patient and performed a substantive portion of the visit including all aspects of the medical decision making.  I have signed out the follow up of any pending tests (i.e. labs, radiological studies) to the MAINOR.  I have communicated the patient’s plan of care and disposition with the MAINOR.  The history, relevant review of systems, past medical and surgical history, medical decision making, and physical examination was documented by the scribe in my presence and I attest to the accuracy of the documentation.

## 2024-05-06 NOTE — ED STATDOCS - NSFOLLOWUPINSTRUCTIONS_ED_ALL_ED_FT
Dolor de pecho no específico en los adultos  Nonspecific Chest Pain, Adult  El dolor de pecho es tosha sensación molesta, opresiva o dolorosa en el pecho. El dolor se siente carlos alberto tosha aplastamiento, torsión u opresión en el pecho. Tosha persona puede sentir tosha sensación de ardor u hormigueo. El dolor de pecho también se puede sentir en la espalda, el peter, la mandíbula, el hombro o el brazo. Apolonia dolor puede empeorar al moverse, estornudar o respirar profundamente.    El dolor de pecho puede deberse a tosha afección potencialmente mortal. Se debe tratar de inmediato. También puede ser provocado por algo que no es potencialmente mortal. Si tiene dolor de pecho, puede ser difícil saber la diferencia, por lo tanto es importante que obtenga ayuda de inmediato para asegurarse de que no tiene tosha afección grave.    Algunas causas potencialmente mortales del dolor de pecho incluyen:  Infarto de miocardio.  Un desgarro en el vaso sanguíneo principal del cuerpo (disección aórtica).  Inflamación alrededor del corazón (pericarditis).  Un problema en los pulmones, carlos alberto un coágulo de jake (embolia pulmonar) o un pulmón colapsado (neumotórax).  Algunas causas de dolor de pecho que no son potencialmente mortales incluyen:  Acidez estomacal.  Ansiedad o estrés.  Daño de los huesos, los músculos y los cartílagos que conforman la pared torácica.  Neumonía o bronquitis.  Culebrilla (virus de la varicela zóster).  El dolor de pecho puede aparecer y desaparecer. También puede ser hilda. Bertrand médico le hará pruebas clínicas y otros estudios para tratar de determinar la causa del dolor. El tratamiento dependerá de la causa del dolor de pecho.    Siga estas instrucciones en bertrand casa:  Medicamentos    Use los medicamentos de venta regan y los recetados solamente carlos alberto se lo haya indicado el médico.  Si le recetaron un antibiótico, tómelo carlos alberto se lo haya indicado el médico. No deje de su el antibiótico aunque comience a sentirse mejor.  Actividad    Evite las actividades que le causen dolor de pecho.  No levante ningún objeto que pese más de 10 libras (4,5 kg) o que supere el límite de peso que le hayan indicado, hasta que el médico le diga que puede hacerlo.  Yann reposo carlos alberto se lo haya indicado el médico.  Retome mike actividades normales solo carlos alberto se lo haya indicado el médico. Pregúntele al médico qué actividades son seguras para usted.  Estilo de freeman    A plate along with examples of foods in a healthy diet.  Silhouette of a person sitting on the floor doing yoga.  No consuma ningún producto que contenga nicotina o tabaco, carlos alberto cigarrillos, cigarrillos electrónicos y tabaco de mascar. Si necesita ayuda para dejar de fumar, consulte al médico.  No lorin alcohol.  Opte por un estilo de freeman saludable carlos alberto se lo hayan recomendado. Puede incluir:  Practicar actividad física con regularidad. Pídale al médico que le sugiera ejercicios que mary seguros para usted.  Seguir tosha dieta cardiosaludable. Esta debe incluir muchas frutas y verduras frescas, cereales integrales, proteínas (magras) con bajo contenido de grasa y productos lácteos bajos en grasa. Un nutricionista podrá ayudarlo a hacer elecciones de alimentación saludables.  Mantener un peso saludable.  Controlar cualquier otra afección que tenga, carlos alberto presión arterial ana (hipertensión) o diabetes.  Disminuir el nivel de estrés; por ejemplo, con yoga o técnicas de relajación.  Instrucciones generales    Esté atento a cualquier cambio en los síntomas.  Es bertrand responsabilidad obtener los resultados de cualquier prueba que se haya realizado. Consulte al médico o pregunte en el departamento donde se realizan las pruebas cuándo estarán listos los resultados.  Concurra a todas las visitas de seguimiento carlos alberto se lo haya indicado el médico. [place] es importante.  Es posible que le pidan que se someta a más pruebas si el dolor de pecho no desaparece.  Comuníquese con un médico si:  El dolor de pecho no desaparece.  Se siente deprimido.  Tiene fiebre.  Nota cambios en los síntomas o desarrolla síntomas nuevos.  Solicite ayuda de inmediato si:  El dolor en el pecho empeora.  Tiene tos que empeora o tose con jake.  Siente un dolor intenso en el abdomen.  Se desmaya.  Tiene tosha molestia repentina e inexplicable en el pecho.  Tiene molestias repentinas e inexplicables en los brazos, la espalda, el peter o la mandíbula.  Le falta el aire en cualquier momento.  Comienza a sudar de manera repentina o la piel se le humedece.  Siente náuseas o vomita.  Se siente repentinamente mareado o se desmaya.  Tiene debilidad intensa, o debilidad o fatiga sin explicación.  Siente que el corazón comienza a latir rápidamente o que se saltea latidos.  Estos síntomas pueden representar un problema grave que constituye tosha emergencia. No espere a priti si los síntomas desaparecen. Solicite atención médica de inmediato. Comuníquese con el servicio de emergencias de bertrand localidad (911 en los [place]). No conduzca por mike propios medios hasta el hospital.    Resumen  El dolor de pecho puede ser provocado por tosha afección que es grave y requiere tratamiento urgente. También puede ser provocado por algo que no es potencialmente mortal.  El médico puede hacerle pruebas clínicas y otros estudios para tratar de determinar la causa del dolor.  Siga las instrucciones de bertrand médico sobre su medicamentos, hacer cambios en bertrand estilo de freeman y recibir tratamiento de urgencia si los síntomas empeoran.  Concurra a todas las visitas de seguimiento carlos alberto se lo haya indicado el médico. [place] incluye las visitas para realizarle otras pruebas si el dolor de pecho no desaparece.  Esta información no tiene carlos alberto fin reemplazar el consejo del médico. Asegúrese de hacerle al médico cualquier pregunta que tenga.

## 2024-05-06 NOTE — ED ADULT TRIAGE NOTE - CHIEF COMPLAINT QUOTE
PT presents to er with complaints of left and right sided chest pain for the past 3 months, states he had a fever last night which have been intermittent, states he lives with 14 people in his place of residence,  states he has been here and had x-rays with no resolution of symptoms.

## 2024-05-06 NOTE — ED STATDOCS - PROGRESS NOTE DETAILS
EKG NSR @ 7a BPM. 64 yo male w/PMHx PMHx of anxiety, b/l cataracts, chronic back pain, DM says he has been taking his medication, head trauma, HIV, HTN, insomnia presents to the ED c/o chest pain x3 months. Pt denying any SOB. States he does not have a PCP that he sees. Pt states he has been given Tylenol for his pain in the past with "only 10 minutes of relief". States he has an appointment at the clinic coming up.  Tara Sánchez PA-C EKG NSR @ 7a BPM.  Pt. with recent workup and admission.  Pt. due to have appointment at [facility] [place].  Tara Sánchez PA-C Neg. Troponin.  Remainder of labs similar to values at last discharge.  PT. refused CXR.  Pt. non compliant with his medications.  Return for any concerns or worsening symptoms.  PT. to follow at [facility] [place].  Tara Sánchez PA-C

## 2024-05-06 NOTE — ED STATDOCS - OBJECTIVE STATEMENT
64 yo male w/PMHx PMHx of anxiety, b/l cataracts, chronic back pain, DM says he has been taking his medication, head trauma, HIV, HTN, insomnia presents to the ED c/o chest pain x3 months. Pt denying any SOB. States he does not have a PCP that he sees. Pt states he has been given Tylenol for his pain in the past with "only 10 minutes of relief". States he has an appointment at the clinic coming up.

## 2024-05-06 NOTE — ED STATDOCS - CLINICAL SUMMARY MEDICAL DECISION MAKING FREE TEXT BOX
Patient with chest pain for three months. Patient recently discharged in April for same complaint. Was non compliant with medication at that time. States he is now compliant. States he just wants something for pain. Labs, cxr, pain control ordered. EKG normal sinus.

## 2024-05-06 NOTE — ED STATDOCS - PATIENT PORTAL LINK FT
You can access the FollowMyHealth Patient Portal offered by [facility] by registering at the following website: [email or web address]. By joining [facility]’s FollowMyHealth portal, you will also be able to view your health information using other applications (apps) compatible with our system.

## 2024-05-10 ENCOUNTER — EMERGENCY (EMERGENCY)
Facility: HOSPITAL | Age: 64
LOS: 0 days | Discharge: HOME | End: 2024-05-10
Attending: EMERGENCY MEDICINE
Payer: MEDICAID

## 2024-05-10 VITALS
TEMPERATURE: 98 F | DIASTOLIC BLOOD PRESSURE: 90 MMHG | WEIGHT: 169.98 LBS | HEIGHT: 68 IN | RESPIRATION RATE: 18 BRPM | SYSTOLIC BLOOD PRESSURE: 187 MMHG | HEART RATE: 77 BPM | OXYGEN SATURATION: 98 %

## 2024-05-10 VITALS
HEART RATE: 81 BPM | DIASTOLIC BLOOD PRESSURE: 78 MMHG | RESPIRATION RATE: 16 BRPM | SYSTOLIC BLOOD PRESSURE: 139 MMHG | OXYGEN SATURATION: 97 % | TEMPERATURE: 98 F

## 2024-05-10 DIAGNOSIS — E78.5 HYPERLIPIDEMIA, UNSPECIFIED: ICD-10-CM

## 2024-05-10 DIAGNOSIS — R07.89 OTHER CHEST PAIN: ICD-10-CM

## 2024-05-10 DIAGNOSIS — Z98.1 ARTHRODESIS STATUS: Chronic | ICD-10-CM

## 2024-05-10 DIAGNOSIS — Z21 ASYMPTOMATIC HUMAN IMMUNODEFICIENCY VIRUS [HIV] INFECTION STATUS: ICD-10-CM

## 2024-05-10 DIAGNOSIS — R07.9 CHEST PAIN, UNSPECIFIED: ICD-10-CM

## 2024-05-10 DIAGNOSIS — I10 ESSENTIAL (PRIMARY) HYPERTENSION: ICD-10-CM

## 2024-05-10 DIAGNOSIS — E11.9 TYPE 2 DIABETES MELLITUS WITHOUT COMPLICATIONS: ICD-10-CM

## 2024-05-10 LAB
ALBUMIN SERPL ELPH-MCNC: 2 G/DL — LOW (ref 3.3–5)
ALP SERPL-CCNC: 49 U/L — SIGNIFICANT CHANGE UP (ref 40–120)
ALT FLD-CCNC: 20 U/L — SIGNIFICANT CHANGE UP (ref 12–78)
ANION GAP SERPL CALC-SCNC: 2 MMOL/L — LOW (ref 5–17)
AST SERPL-CCNC: 16 U/L — SIGNIFICANT CHANGE UP (ref 15–37)
BASOPHILS # BLD AUTO: 0.02 K/UL — SIGNIFICANT CHANGE UP (ref 0–0.2)
BASOPHILS NFR BLD AUTO: 0.3 % — SIGNIFICANT CHANGE UP (ref 0–2)
BILIRUB SERPL-MCNC: 0.2 MG/DL — SIGNIFICANT CHANGE UP (ref 0.2–1.2)
BUN SERPL-MCNC: 24 MG/DL — HIGH (ref 7–23)
CALCIUM SERPL-MCNC: 8.6 MG/DL — SIGNIFICANT CHANGE UP (ref 8.5–10.1)
CHLORIDE SERPL-SCNC: 110 MMOL/L — HIGH (ref 96–108)
CO2 SERPL-SCNC: 28 MMOL/L — SIGNIFICANT CHANGE UP (ref 22–31)
CREAT SERPL-MCNC: 1.79 MG/DL — HIGH (ref 0.5–1.3)
EGFR: 42 ML/MIN/1.73M2 — LOW
EOSINOPHIL # BLD AUTO: 0.15 K/UL — SIGNIFICANT CHANGE UP (ref 0–0.5)
EOSINOPHIL NFR BLD AUTO: 2.3 % — SIGNIFICANT CHANGE UP (ref 0–6)
GLUCOSE SERPL-MCNC: 247 MG/DL — HIGH (ref 70–99)
HCT VFR BLD CALC: 28.2 % — LOW (ref 39–50)
HGB BLD-MCNC: 9.2 G/DL — LOW (ref 13–17)
IMM GRANULOCYTES NFR BLD AUTO: 0.6 % — SIGNIFICANT CHANGE UP (ref 0–0.9)
LIDOCAIN IGE QN: 28 U/L — SIGNIFICANT CHANGE UP (ref 13–75)
LYMPHOCYTES # BLD AUTO: 1.65 K/UL — SIGNIFICANT CHANGE UP (ref 1–3.3)
LYMPHOCYTES # BLD AUTO: 25 % — SIGNIFICANT CHANGE UP (ref 13–44)
MCHC RBC-ENTMCNC: 28 PG — SIGNIFICANT CHANGE UP (ref 27–34)
MCHC RBC-ENTMCNC: 32.6 GM/DL — SIGNIFICANT CHANGE UP (ref 32–36)
MCV RBC AUTO: 85.7 FL — SIGNIFICANT CHANGE UP (ref 80–100)
MONOCYTES # BLD AUTO: 0.53 K/UL — SIGNIFICANT CHANGE UP (ref 0–0.9)
MONOCYTES NFR BLD AUTO: 8 % — SIGNIFICANT CHANGE UP (ref 2–14)
NEUTROPHILS # BLD AUTO: 4.2 K/UL — SIGNIFICANT CHANGE UP (ref 1.8–7.4)
NEUTROPHILS NFR BLD AUTO: 63.8 % — SIGNIFICANT CHANGE UP (ref 43–77)
NT-PROBNP SERPL-SCNC: 1034 PG/ML — HIGH (ref 0–125)
PLATELET # BLD AUTO: 250 K/UL — SIGNIFICANT CHANGE UP (ref 150–400)
POTASSIUM SERPL-MCNC: 4 MMOL/L — SIGNIFICANT CHANGE UP (ref 3.5–5.3)
POTASSIUM SERPL-SCNC: 4 MMOL/L — SIGNIFICANT CHANGE UP (ref 3.5–5.3)
PROT SERPL-MCNC: 6.9 GM/DL — SIGNIFICANT CHANGE UP (ref 6–8.3)
RBC # BLD: 3.29 M/UL — LOW (ref 4.2–5.8)
RBC # FLD: 14.1 % — SIGNIFICANT CHANGE UP (ref 10.3–14.5)
SODIUM SERPL-SCNC: 140 MMOL/L — SIGNIFICANT CHANGE UP (ref 135–145)
TROPONIN I, HIGH SENSITIVITY RESULT: 10.31 NG/L — SIGNIFICANT CHANGE UP
TROPONIN I, HIGH SENSITIVITY RESULT: 11.04 NG/L — SIGNIFICANT CHANGE UP
WBC # BLD: 6.59 K/UL — SIGNIFICANT CHANGE UP (ref 3.8–10.5)
WBC # FLD AUTO: 6.59 K/UL — SIGNIFICANT CHANGE UP (ref 3.8–10.5)

## 2024-05-10 PROCEDURE — 85025 COMPLETE CBC W/AUTO DIFF WBC: CPT

## 2024-05-10 PROCEDURE — 93010 ELECTROCARDIOGRAM REPORT: CPT

## 2024-05-10 PROCEDURE — 83690 ASSAY OF LIPASE: CPT

## 2024-05-10 PROCEDURE — 36415 COLL VENOUS BLD VENIPUNCTURE: CPT

## 2024-05-10 PROCEDURE — 83880 ASSAY OF NATRIURETIC PEPTIDE: CPT

## 2024-05-10 PROCEDURE — 99284 EMERGENCY DEPT VISIT MOD MDM: CPT | Mod: 25

## 2024-05-10 PROCEDURE — 96374 THER/PROPH/DIAG INJ IV PUSH: CPT

## 2024-05-10 PROCEDURE — 80053 COMPREHEN METABOLIC PANEL: CPT

## 2024-05-10 PROCEDURE — 84484 ASSAY OF TROPONIN QUANT: CPT

## 2024-05-10 PROCEDURE — 99284 EMERGENCY DEPT VISIT MOD MDM: CPT

## 2024-05-10 PROCEDURE — 93005 ELECTROCARDIOGRAM TRACING: CPT

## 2024-05-10 RX ORDER — FUROSEMIDE 40 MG
40 TABLET ORAL ONCE
Refills: 0 | Status: COMPLETED | OUTPATIENT
Start: 2024-05-10 | End: 2024-05-10

## 2024-05-10 RX ORDER — CYCLOBENZAPRINE HYDROCHLORIDE 10 MG/1
10 TABLET, FILM COATED ORAL ONCE
Refills: 0 | Status: COMPLETED | OUTPATIENT
Start: 2024-05-10 | End: 2024-05-10

## 2024-05-10 RX ORDER — ACETAMINOPHEN 500 MG
2 TABLET ORAL
Qty: 42 | Refills: 0
Start: 2024-05-10 | End: 2024-05-16

## 2024-05-10 RX ADMIN — Medication 40 MILLIGRAM(S): at 06:16

## 2024-05-10 RX ADMIN — CYCLOBENZAPRINE HYDROCHLORIDE 10 MILLIGRAM(S): 10 TABLET, FILM COATED ORAL at 05:19

## 2024-05-10 NOTE — ED ADULT TRIAGE NOTE - CHIEF COMPLAINT QUOTE
patient c/o non radiating left side chest pain x 2 months.  took Tylenol at 8pm with no relief.  patient states he has been to the ER multiples times for his pain.  denies fevers/SOB

## 2024-05-10 NOTE — ED PROVIDER NOTE - NSFOLLOWUPINSTRUCTIONS_ED_ALL_ED_FT
Chest Pain    Chest pain can be caused by many different conditions which may or may not be dangerous. Causes include heartburn, lung infections, heart attack, blood clot in lungs, skin infections, strain or damage to muscle, cartilage, or bones, etc. In addition to a history and physical examination, an electrocardiogram (ECG) or other lab tests may have been performed to determine the cause of your chest pain. Follow up with your primary care provider or with a cardiologist as instructed.     SEEK IMMEDIATE MEDICAL CARE IF YOU HAVE ANY OF THE FOLLOWING SYMPTOMS: worsening chest pain, coughing up blood, unexplained back/neck/jaw pain, severe abdominal pain, dizziness or lightheadedness, fainting, shortness of breath, sweaty or clammy skin, vomiting, or racing heart beat. These symptoms may represent a serious problem that is an emergency. Do not wait to see if the symptoms will go away. Get medical help right away. Call 911 and do not drive yourself to the hospital. Chest Pain    Chest pain can be caused by many different conditions which may or may not be dangerous. Causes include heartburn, lung infections, heart attack, blood clot in lungs, skin infections, strain or damage to muscle, cartilage, or bones, etc. In addition to a history and physical examination, an electrocardiogram (ECG) or other lab tests may have been performed to determine the cause of your chest pain. Follow up with your primary care provider or with a cardiologist as instructed.     Your condition requires rest. Please obtain 6-8 hours of sleep daily.     SEEK IMMEDIATE MEDICAL CARE IF YOU HAVE ANY OF THE FOLLOWING SYMPTOMS: worsening chest pain, coughing up blood, unexplained back/neck/jaw pain, severe abdominal pain, dizziness or lightheadedness, fainting, shortness of breath, sweaty or clammy skin, vomiting, or racing heart beat. These symptoms may represent a serious problem that is an emergency. Do not wait to see if the symptoms will go away. Get medical help right away. Call 911 and do not drive yourself to the hospital.

## 2024-05-10 NOTE — ED ADULT NURSE NOTE - OBJECTIVE STATEMENT
63y male AAOx4 ambulatory at baseline presents to ED c/o chest pain. Pt reports having the same chest pain fo 6 months: left sided and non-radiating. Pt has been seen in the ED for it multiple times and explains he is "given Tylenol which helps the pain, sent home, and then the pain comes back." Pt denies any HA, SOB, N/V/D, or urinary symptoms. No fevers or sick contacts. Pt reports being a daily smoker- cigarettes 1-2x a day. EKG complete, pt placed on cardiac monitor in normal sinus rhythm. Labs sent as per MD order, medicated as per MD order. Respirations are even and unlabored, in NAD.

## 2024-05-10 NOTE — ED ADULT NURSE NOTE - CAS EDP DISCH TYPE

## 2024-05-10 NOTE — ED PROVIDER NOTE - PROGRESS NOTE DETAILS
Peter DO: Patient signed out to me by Dr. Underwood, here with chest pain that appears more musculoskeletal.  Plan for follow-up of repeat troponin, discharge if negative.

## 2024-05-10 NOTE — ED PROVIDER NOTE - OBJECTIVE STATEMENT
Pt. is a 64 yo M with hx of DM, HTN, hyperlipidemia, chronic pain, HIV presenting with chest pain. Chest pain is worse with movement.  No trauma.  Denies association with food.  Denies sweats, SOB, dizziness.

## 2024-05-10 NOTE — ED PROVIDER NOTE - CLINICAL SUMMARY MEDICAL DECISION MAKING FREE TEXT BOX
64 yo M with chest pain; reproducible.  EKG, cardiac labs, CXR and re-evaluation after pain control planned.

## 2024-05-10 NOTE — ED PROVIDER NOTE - RESPIRATORY, MLM
All other review of systems negative, except as noted in HPI Breath sounds clear and equal bilaterally.

## 2024-05-10 NOTE — ED PROVIDER NOTE - CARDIAC, MLM
Normal rate, regular rhythm.  Heart sounds S1, S2.  No murmurs, rubs or gallops. +reproducible sternal and bilateral pectoralis tenderness

## 2024-05-10 NOTE — ED ADULT NURSE NOTE - NSFALLCONCLUSION_ED_ALL_ED
[facility] Medicine Daily Progress Note    Date of Service  6/16/2019    Chief Complaint  89 y.o. male admitted 6/10/2019 with fall, PNA    Hospital Course    89 y.o. male who presented 6/10/2019 with fall.     I have reviewed some of the recent provider documentation available to me in the patient's medical chart.  Records are briefly summarized: Mr. Page has a history of movement disorder/possible Parkinson's disease.  He is treated with Sinemet.  During an office visit on 3/12/2019, it is noted that his memory is stable.     Patient speaks in a soft voice, he does not say much, it is very difficult to understand him during the interview.  He indicates that the pain in his right chest is tolerable, beyond that I am unable to elicit any history of present illness due to the patient's difficulties with communication.  His daughter is at the bedside, she indicates that he is generally easier to understand.  She says that he recently took a cruise to [place] and returned about a week ago.  Since that time the patient has been weaker than normal.  Distances with a walker and has had difficulty doing that.  She is noted him to be more fatigued than normal.  No cough, no fever.  Patient today had a fall, he landed on his right side and came to the emergency room for evaluation.       Interval Problem Update  6/16: Patient was seen and examined this morning he is lying in bed and calm he is alert and oriented x3.  He denies any complaints or concerns.  He denies any pain any shortness of breath any chest pain.      6/17: Patient seen and examined this morning he is comfortable in bed.  He denies any acute questions or concerns.  Blood pressure is much better this morning on Midrin and fludrocortisone.  I reviewed his BMP as well  Consultants/Specialty  none    Code Status  DNR    Disposition  Back to his assisted living facility once oxygen is arranged    Review of Systems  Review of Systems   Constitutional: Positive for  malaise/fatigue. Negative for chills, diaphoresis, fever and weight loss.   HENT: Negative for congestion, ear pain, hearing loss and tinnitus.    Eyes: Negative for photophobia and pain.   Respiratory: Negative for cough, hemoptysis, sputum production and shortness of breath.    Cardiovascular: Negative for chest pain, palpitations, orthopnea and leg swelling.   Gastrointestinal: Negative for abdominal pain, heartburn, nausea and vomiting.   Genitourinary: Negative for dysuria, hematuria and urgency.   Musculoskeletal: Positive for back pain, falls and myalgias.   Skin: Negative for itching and rash.   Neurological: Positive for weakness. Negative for dizziness and headaches.   Psychiatric/Behavioral: Negative for depression, hallucinations, substance abuse and suicidal ideas.        Physical Exam  Temp:  [36.3 °C (97.4 °F)-36.7 °C (98 °F)] 36.6 °C (97.8 °F)  Pulse:  [66-79] 68  Resp:  [12-18] 12  BP: (110-120)/(58-64) 111/58  SpO2:  [90 %-93 %] 90 %    Physical Exam   Constitutional: He is oriented to person, place, and time. He appears well-developed. No distress.   fraile   HENT:   Head: Normocephalic and atraumatic.   Mouth/Throat: Oropharynx is clear and moist. No oropharyngeal exudate.   Eyes: Pupils are equal, round, and reactive to light. Conjunctivae and EOM are normal. No scleral icterus.   Neck: Normal range of motion. Neck supple. No JVD present. No thyromegaly present.   Cardiovascular: Normal rate, regular rhythm and intact distal pulses.    No murmur heard.  Pulmonary/Chest: Effort normal and breath sounds normal. No respiratory distress. He has no wheezes. He has no rales.   Diminished breath sounds b/l   Abdominal: Soft. Bowel sounds are normal. He exhibits no distension. There is no tenderness. There is no rebound.   Right sided flank and abd bruising from his recent fall   Genitourinary:   Genitourinary Comments: Heck catheter in place secondary to urinary retention   Musculoskeletal: Normal  range of motion. He exhibits no edema or tenderness.   Lymphadenopathy:     He has no cervical adenopathy.   Neurological: He is alert and oriented to person, place, and time. He exhibits normal muscle tone.   Skin: Skin is warm and dry. No erythema.   Psychiatric: He has a normal mood and affect. His behavior is normal.       Fluids    Intake/Output Summary (Last 24 hours) at 06/16/19 1254  Last data filed at 06/16/19 0811   Gross per 24 hour   Intake              720 ml   Output             1400 ml   Net             -680 ml       Laboratory      Recent Labs      06/14/19   0810  06/15/19   0921  06/16/19   0848   SODIUM  135  137  137   POTASSIUM  4.4  4.0  3.9   CHLORIDE  105  101  104   CO2  25  25  27   GLUCOSE  99  114*  100*   BUN  29*  30*  26*   CREATININE  0.96  0.86  0.92   CALCIUM  8.4*  8.5  8.4*                   Imaging  US-RENAL   Final Result      No hydronephrosis.      CT-CHEST,ABDOMEN,PELVIS W/O   Final Result      1.  Acute fractures of the posterior aspects of the right 8th through 10th ribs.      2.  Large right pleural effusion. Hemothorax cannot be excluded. No pneumothorax is present.      3.  Small left pleural effusion.      4.  Bibasilar atelectasis or pneumonia, right greater than left.      5.  No acute posttraumatic change identified in the abdomen or pelvis.      6.  Markedly distended bladder. Enlarged prostate gland.      EC-ECHOCARDIOGRAM COMPLETE W/O CONT   Final Result      DX-CHEST-LIMITED (1 VIEW)   Final Result      Right basilar opacity likely represents pneumonia.      Trace right pleural effusion is not excluded.      Atherosclerotic plaque.      Prominent scoliosis.      If clinical suspicion for rib fracture is high, further evaluation with CT is recommended.              Assessment/Plan  * Acute respiratory failure with hypoxia (HCC)   Assessment & Plan    Persistent; Likely 2/2 PNA and large right pleural effusion as seen on chest CT  Likely viral etiology as there is  no white count and procalcitonin is negative  Refused ultrasound-guided thoracentesis  Supplemental O2  DNR/DNI  Monitor closely  Influenza screen is negative    Echo was noted normal     Pneumonia- (present on admission)   Assessment & Plan    Right lower lobe opacity, consistent with pneumonia, however no white count, procalc neg  Likely viral etiology hold off on antibiotics   CT chest/abd shows right-sided pneumonia and large pleural effusion  I ordered ultrasound-guided thoracentesis however patient refusing to obtain it.  I discussed the risks of not getting the thoracentesis for the large pleural effusion being acute shortness of breath and worsening hypoxia and respiratory failure however patient continues to refuse it.  I did discuss palliative care comfort care however at this time patient does not want to discuss this.  He wants to go home and then think about it.    At this point continue supplemental oxygen and conservative management.       Parkinson's disease (HCC)- (present on admission)   Assessment & Plan    Patient is treated with Sinemet for probable Parkinson's disease  Mobility is severely limited, came from [facility]  Continue sinemet  GOC discussed, wishes are DNR   Patient already getting scheduled physical therapy at [facility] [place] [facility], however PT evaluated patient and recommended 24/7 assistance, will place referral for SNF, however patient refused to go to a skilled nursing facility.  Patient and daughter adamant about him being discharged back to assisted living and they will arrange for 24-hour care giver at the facility.     Orthostatic hypotension   Assessment & Plan    Symptomatic with dizziness, patient's blood pressure dropped after physical therapy and he was given 500 mL fluid bolus with caution given his large right pleural effusion which improved the blood pressure slightly however still remains low his orthostatics were also positive.   Continue midodrine 3 times daily  I will  also add in fludrocortisone.     Severe protein-calorie malnutrition (HCC)   Assessment & Plan    Patient with a BMI of 15.  He is very tiny and weak.  Nutrition consulted     COPD (chronic obstructive pulmonary disease) ([facility])   Assessment & Plan    She does have remote history of smoking in the past and I suspect he may have some COPD underlying however he is not in acute exacerbation  He is requiring chronic oxygen therefore I have ordered DME O2 for home.     Dysphasia   Assessment & Plan    As noted by speech evaluation today.  He is unable to eat thin liquids and will be placed on a dysphagia diet however speech therapist is still concerned about aspiration   Fees study noted   After having a discussion with patient for possible core track versus feeding tube patient adamantly refused.  Speech has made recommendations moving forward with his diet even after discharge  Palliative care consulted     CARLA (acute kidney injury) ([facility])   Assessment & Plan    No hx of CKD, creatinine is back to baseline  Dehydration likely cause, large pleural effusion therefore I have DC'd his IV fluids  Avoid nephrotoxics, renally dose all meds     urine protein.cr ratio noted  DC Lovenox and add heparin for DVT prophylaxis instead    Ultrasound of renals have been negative  Creatinine is back to baseline       Advanced care planning/counseling discussion   Assessment & Plan    Discussed in detail about goals of care and at this time he wishes to be DNR/DNI  DNR.DNI  Palliative consulted-as patient is high risk for aspiration according to speech evaluation however is declining any sort of feeding tube     Edema- (present on admission)   Assessment & Plan    Bilateral lower extremity edema  This is a new problem, he is never experienced this before  Echo noted, normal EF 65% no DD  Labs monitored    resolved     Macrocytosis- (present on admission)   Assessment & Plan    b12 and folate normal, nutrition consulted        This patient  has high medical complexity conditions at this time and increased risk of deterioration clinically  We will have to monitor him closely.  However at this time patient and his daughter want him to go back to the acute assisted living facility and they will get 24-hour care at that time.    Pressure much better controlled however he still refusing thoracentesis and invasive treatment.    VTE prophylaxis: lovenox       Universal Safety Interventions

## 2024-05-14 ENCOUNTER — EMERGENCY (EMERGENCY)
Facility: HOSPITAL | Age: 64
LOS: 0 days | Discharge: HOME | End: 2024-05-14
Attending: EMERGENCY MEDICINE
Payer: MEDICAID

## 2024-05-14 VITALS
OXYGEN SATURATION: 100 % | TEMPERATURE: 97 F | SYSTOLIC BLOOD PRESSURE: 166 MMHG | DIASTOLIC BLOOD PRESSURE: 86 MMHG | HEART RATE: 71 BPM | HEIGHT: 68 IN | RESPIRATION RATE: 18 BRPM

## 2024-05-14 DIAGNOSIS — Z98.1 ARTHRODESIS STATUS: Chronic | ICD-10-CM

## 2024-05-14 DIAGNOSIS — R07.89 OTHER CHEST PAIN: ICD-10-CM

## 2024-05-14 DIAGNOSIS — Z82.49 FAMILY HISTORY OF ISCHEMIC HEART DISEASE AND OTHER DISEASES OF THE CIRCULATORY SYSTEM: ICD-10-CM

## 2024-05-14 DIAGNOSIS — E11.9 TYPE 2 DIABETES MELLITUS WITHOUT COMPLICATIONS: ICD-10-CM

## 2024-05-14 DIAGNOSIS — Z21 ASYMPTOMATIC HUMAN IMMUNODEFICIENCY VIRUS [HIV] INFECTION STATUS: ICD-10-CM

## 2024-05-14 DIAGNOSIS — Z76.0 ENCOUNTER FOR ISSUE OF REPEAT PRESCRIPTION: ICD-10-CM

## 2024-05-14 DIAGNOSIS — I10 ESSENTIAL (PRIMARY) HYPERTENSION: ICD-10-CM

## 2024-05-14 PROCEDURE — 93010 ELECTROCARDIOGRAM REPORT: CPT

## 2024-05-14 PROCEDURE — 99283 EMERGENCY DEPT VISIT LOW MDM: CPT | Mod: 25

## 2024-05-14 PROCEDURE — 99284 EMERGENCY DEPT VISIT MOD MDM: CPT

## 2024-05-14 PROCEDURE — 82962 GLUCOSE BLOOD TEST: CPT

## 2024-05-14 PROCEDURE — 93005 ELECTROCARDIOGRAM TRACING: CPT

## 2024-05-14 RX ORDER — OXYCODONE AND ACETAMINOPHEN 5; 325 MG/1; MG/1
1 TABLET ORAL
Qty: 16 | Refills: 0
Start: 2024-05-14 | End: 2024-05-17

## 2024-05-14 NOTE — ED PROVIDER NOTE - OBJECTIVE STATEMENT
64 yo male w/PMHx PMHx of anxiety, b/l cataracts, chronic back pain, DM says he has been taking his medication, head trauma, HIV, HTN, insomnia presents to the ED c/o Chest pain x 3 months.  Patient was seen in this ED earlier this week and a week ago for the same.  Patient says pain is unchanged.  Patient states during last visit he was given oxycodone with some relief and requesting a short supply until he can follow-up with [facility] and pain management.

## 2024-05-14 NOTE — ED PROVIDER NOTE - PATIENT PORTAL LINK FT
You can access the FollowMyHealth Patient Portal offered by [facility] by registering at the following website: [email or web address]. By joining [employer]’s FollowMyHealth portal, you will also be able to view your health information using other applications (apps) compatible with our system.

## 2024-05-14 NOTE — ED PROVIDER NOTE - CLINICAL SUMMARY MEDICAL DECISION MAKING FREE TEXT BOX
Patient requesting med refill for oxycodone, has PCP appointment in 2 weeks, will send short supply pain control.  Patient also states he had chest pain for 3 months will check EKG.  I interpreted the EKG myself.  The EKG was normal sinus rhythm.  There were no acute ST segment changes

## 2024-05-14 NOTE — ED ADULT TRIAGE NOTE - CHIEF COMPLAINT QUOTE
pt presents to ED due to elevated blood sugars, chest pain for several months pt also has been having trouble with his eyesight recently feeling weakness

## 2024-05-14 NOTE — ED PROVIDER NOTE - NSFOLLOWUPINSTRUCTIONS_ED_ALL_ED_FT
Please follow-up with your primary doctor.  Return to ED if worsening pain shortness of breath or other concerning symptoms.

## 2024-05-19 ENCOUNTER — EMERGENCY (EMERGENCY)
Facility: HOSPITAL | Age: 64
LOS: 0 days | Discharge: HOME | End: 2024-05-19
Attending: STUDENT IN AN ORGANIZED HEALTH CARE EDUCATION/TRAINING PROGRAM
Payer: MEDICAID

## 2024-05-19 VITALS
SYSTOLIC BLOOD PRESSURE: 153 MMHG | HEIGHT: 68 IN | WEIGHT: 164.91 LBS | OXYGEN SATURATION: 96 % | DIASTOLIC BLOOD PRESSURE: 78 MMHG | RESPIRATION RATE: 17 BRPM | HEART RATE: 79 BPM | TEMPERATURE: 99 F

## 2024-05-19 VITALS
OXYGEN SATURATION: 98 % | HEART RATE: 78 BPM | RESPIRATION RATE: 18 BRPM | SYSTOLIC BLOOD PRESSURE: 150 MMHG | TEMPERATURE: 98 F | DIASTOLIC BLOOD PRESSURE: 68 MMHG

## 2024-05-19 DIAGNOSIS — I10 ESSENTIAL (PRIMARY) HYPERTENSION: ICD-10-CM

## 2024-05-19 DIAGNOSIS — Z98.1 ARTHRODESIS STATUS: Chronic | ICD-10-CM

## 2024-05-19 DIAGNOSIS — M94.0 CHONDROCOSTAL JUNCTION SYNDROME [TIETZE]: ICD-10-CM

## 2024-05-19 DIAGNOSIS — E11.9 TYPE 2 DIABETES MELLITUS WITHOUT COMPLICATIONS: ICD-10-CM

## 2024-05-19 DIAGNOSIS — R07.89 OTHER CHEST PAIN: ICD-10-CM

## 2024-05-19 PROCEDURE — 99284 EMERGENCY DEPT VISIT MOD MDM: CPT

## 2024-05-19 PROCEDURE — 96372 THER/PROPH/DIAG INJ SC/IM: CPT

## 2024-05-19 PROCEDURE — 93010 ELECTROCARDIOGRAM REPORT: CPT

## 2024-05-19 PROCEDURE — 93005 ELECTROCARDIOGRAM TRACING: CPT

## 2024-05-19 PROCEDURE — 99283 EMERGENCY DEPT VISIT LOW MDM: CPT | Mod: 25

## 2024-05-19 RX ORDER — ACETAMINOPHEN 500 MG
2 TABLET ORAL
Qty: 24 | Refills: 0
Start: 2024-05-19 | End: 2024-05-21

## 2024-05-19 RX ORDER — CYCLOBENZAPRINE HYDROCHLORIDE 10 MG/1
5 TABLET, FILM COATED ORAL ONCE
Refills: 0 | Status: COMPLETED | OUTPATIENT
Start: 2024-05-19 | End: 2024-05-19

## 2024-05-19 RX ORDER — KETOROLAC TROMETHAMINE 30 MG/ML
15 SYRINGE (ML) INJECTION ONCE
Refills: 0 | Status: DISCONTINUED | OUTPATIENT
Start: 2024-05-19 | End: 2024-05-19

## 2024-05-19 RX ORDER — CYCLOBENZAPRINE HYDROCHLORIDE 10 MG/1
1 TABLET, FILM COATED ORAL
Qty: 9 | Refills: 0
Start: 2024-05-19 | End: 2024-05-21

## 2024-05-19 RX ADMIN — Medication 15 MILLIGRAM(S): at 18:56

## 2024-05-19 RX ADMIN — CYCLOBENZAPRINE HYDROCHLORIDE 5 MILLIGRAM(S): 10 TABLET, FILM COATED ORAL at 18:56

## 2024-05-19 NOTE — ED STATDOCS - OBJECTIVE STATEMENT
65 y/o male with PMHx of DM, HTN presents to the ED c/o 4 months of intermittent chest pain. Pt has been seen in [facility] multiple times for this complaint, had admission last month and was cleared by cardiology. Pt reports that pain improves with Tylenol. Pt has appointment with cardiologist on 5/28/24

## 2024-05-19 NOTE — ED STATDOCS - MUSCULOSKELETAL, MLM
range of motion is not limited,, + bilateral reproducible ttp to intercostal spaces to anterior chest

## 2024-05-19 NOTE — ED STATDOCS - PATIENT PORTAL LINK FT
You can access the FollowMyHealth Patient Portal offered by [facility] by registering at the following website: [email or web address]. By joining [employer]’s FollowMyHealth portal, you will also be able to view your health information using other applications (apps) compatible with our system.

## 2024-05-19 NOTE — ED STATDOCS - CLINICAL SUMMARY MEDICAL DECISION MAKING FREE TEXT BOX
with 4 months of anterior chest pain, negative inpatient cardiology evaluation last months, pain is reproducible on exam, responds well to Tylenol but pain comes back, H&P not consistent with MI, PE, dissection, PNA, pneumothorax, well appearing in no acute distress, WSS, no tachycardia or hypoxia, will give anti-inflammatories, muscle relaxer and discharge

## 2024-05-19 NOTE — ED ADULT TRIAGE NOTE - CHIEF COMPLAINT QUOTE
Pt A&Ox4, ADRIANO from [facility] c/o intermittent chest pain since 6am. Denies SOB or radiation of pain. PMH of DM, HLD, and HTN. Pt sent for EKG.

## 2024-05-19 NOTE — ED STATDOCS - ATTENDING APP SHARED VISIT CONTRIBUTION OF CARE
I, Roger Tee, DO personally saw the patient with MAINOR.  I have personally performed a face to face diagnostic evaluation on this patient.  I have reviewed the MAINOR note and agree with the history, exam, and plan of care, except as noted.  I personally saw the patient and performed a substantive portion of the visit including all aspects of the medical decision making.

## 2024-05-19 NOTE — ED STATDOCS - PROGRESS NOTE DETAILS
63 y/o male with PMHx of DM, HTN presents to the ED c/o 4 months of intermittent chest pain. Pt has been seen in [facility] multiple times for this complaint, had admission last month and was cleared by cardiology. Pt reports that pain improves with Tylenol. Pt has appointment with cardiologist on 5/28/24  Tara Sánchez PA-C Pt. has had cardiac workup in the past without [id number] findings.  To follow with his appointment with cardiology.  Tylenol and Flexeril at home.  EKG unremarkable as compared to previous  Tara Sánchez PA-C

## 2024-05-19 NOTE — ED STATDOCS - NSFOLLOWUPINSTRUCTIONS_ED_ALL_ED_FT
[facility]  Family Medicine  [address] [place] [id number]  Phone: [phone]     Costocondritis    LO QUE NECESITA SABER:    La costocondritis es tosha condición que causa dolor en el cartílago que conecta mike costillas a bertrand esternón. El cartílago es el tejido juan carlos y flexible que protege a los huesos.    INSTRUCCIONES SOBRE EL [facility]:    Medicamentos:    Acetaminofeno:Apolonia medicamento disminuye el dolor. El acetaminofeno puede obtener sin receta médica. Pregunte la cantidad y la frecuencia con que debe tomarlos. Siga las indicaciones. El acetaminofén puede causar daño en el hígado cuando no se lobo de forma correcta.    AINEcomo el ibuprofeno, ayudan a disminuir la inflamación, el dolor y la fiebre. Apolonia medicamento está disponible con o sin tosha receta médica. Los PUSHPA pueden causar sangrado estomacal o problemas renales en ciertas personas. Si usted está tomando un anticoagulante, siempre pregunte si los PUSHPA son seguros para usted. Siempre saray la etiqueta de apolonia medicamento y siga las instrucciones. No administre apolonia medicamento a niños menores de 6 meses de freeman sin antes obtener la autorización del médico.    Crozier mike medicamentos carlos alberto se le haya indicado.Consulte con bertrand médico si usted zac que bertrand medicamento no le está ayudando o si presenta efectos secundarios. Infórmele al médico si usted es alérgico a algún medicamento. Mantenga tosha lista actualizada de los medicamentos, las vitaminas y los productos herbales que lobo. Incluya los siguientes datos de los medicamentos: cantidad, frecuencia y motivo de administración. Traiga con usted la lista o los envases de las píldoras a mike citas de seguimiento. Lleve la lista de los medicamentos con usted en angella de tosha emergencia.  Acuda a la consulta de control con bertrand médico según las indicaciones:Anote mike preguntas para que se acuerde de hacerlas tray mike visitas.    Descanse:Es posible que necesite descansar más. Aprenda cuáles movimientos y actividades le causan dolor y deje de hacerlas. No levante objetos, carlos alberto un bolso o tosha mochila, si esto le causa dolor. Evite las actividades carlos alberto remar y levantar pesas hasta que bertrand dolor disminuya o desaparezca. Pregúntele a bertrand médico cuáles actividades son adecuadas para usted mientras se recupera.    Calor:La aplicación de calor facilita la reducción del dolor en algunos pacientes. Aplíquese calor en el área lesionada tray 20 a 30 minutos cada 2 horas tray la cantidad de días que le indiquen.    Hielo:El hielo ayuda a disminuir la inflamación y el dolor. El hielo también puede contribuir a evitar el daño de los tejidos. Use tosha compresa de hielo o ponga hielo triturado en tosha bolsa de plástico. Cúbralo con tosha toalla y colóquelo en el área adolorida por 15 a 20 minutos cada hora, o carlos alberto se le indique.    Ejercicios de estiramiento:El estiramiento suave podría aliviar mike síntomas. Párese en tosha shaan y ponga mike enrico en el gita de la shaan al nivel de las orejas u hombros. Crozier un paso hacia adelante y estire bertrand pecho suavemente. Yann lo mismo con las enrico a un nivel más alto también.    Comuníquese con bertrand médico si:    Tiene fiebre.    Las áreas dolorosas en bertrand pecho se maru inflamadas, enrojecidas o se sienten calientes al tacto.    Usted no puede dormir a causa del dolor.    Usted tiene preguntas o inquietudes acerca de bertrand condición o cuidado.

## 2024-05-20 NOTE — ED POST DISCHARGE NOTE - REASON FOR FOLLOW-UP
[facility] called to ask what prescriptions were sent to the pharmacy because they called the [facility] and were told that there was nothing sent. Confirmed that tylenol and flexeril were sent pre chart. -Deion Robbins PA-C Other

## 2024-05-25 ENCOUNTER — EMERGENCY (EMERGENCY)
Facility: HOSPITAL | Age: 64
LOS: 0 days | Discharge: HOME | End: 2024-05-26
Attending: FAMILY MEDICINE
Payer: MEDICAID

## 2024-05-25 VITALS
RESPIRATION RATE: 18 BRPM | DIASTOLIC BLOOD PRESSURE: 90 MMHG | OXYGEN SATURATION: 96 % | WEIGHT: 164.91 LBS | HEART RATE: 101 BPM | TEMPERATURE: 98 F | SYSTOLIC BLOOD PRESSURE: 139 MMHG | HEIGHT: 68 IN

## 2024-05-25 DIAGNOSIS — R20.3 HYPERESTHESIA: ICD-10-CM

## 2024-05-25 DIAGNOSIS — M79.605 PAIN IN LEFT LEG: ICD-10-CM

## 2024-05-25 DIAGNOSIS — Z98.1 ARTHRODESIS STATUS: Chronic | ICD-10-CM

## 2024-05-25 DIAGNOSIS — F17.210 NICOTINE DEPENDENCE, CIGARETTES, UNCOMPLICATED: ICD-10-CM

## 2024-05-25 DIAGNOSIS — E78.5 HYPERLIPIDEMIA, UNSPECIFIED: ICD-10-CM

## 2024-05-25 DIAGNOSIS — H54.7 UNSPECIFIED VISUAL LOSS: ICD-10-CM

## 2024-05-25 DIAGNOSIS — I10 ESSENTIAL (PRIMARY) HYPERTENSION: ICD-10-CM

## 2024-05-25 DIAGNOSIS — R07.1 CHEST PAIN ON BREATHING: ICD-10-CM

## 2024-05-25 DIAGNOSIS — E11.9 TYPE 2 DIABETES MELLITUS WITHOUT COMPLICATIONS: ICD-10-CM

## 2024-05-25 DIAGNOSIS — M79.604 PAIN IN RIGHT LEG: ICD-10-CM

## 2024-05-25 LAB
ALBUMIN SERPL ELPH-MCNC: 2.2 G/DL — LOW (ref 3.3–5)
ALP SERPL-CCNC: 55 U/L — SIGNIFICANT CHANGE UP (ref 40–120)
ALT FLD-CCNC: 32 U/L — SIGNIFICANT CHANGE UP (ref 12–78)
ANION GAP SERPL CALC-SCNC: 4 MMOL/L — LOW (ref 5–17)
APPEARANCE UR: CLEAR — SIGNIFICANT CHANGE UP
APTT BLD: 32.2 SEC — SIGNIFICANT CHANGE UP (ref 24.5–35.6)
AST SERPL-CCNC: 22 U/L — SIGNIFICANT CHANGE UP (ref 15–37)
BACTERIA # UR AUTO: ABNORMAL /HPF
BASOPHILS # BLD AUTO: 0 K/UL — SIGNIFICANT CHANGE UP (ref 0–0.2)
BASOPHILS NFR BLD AUTO: 0 % — SIGNIFICANT CHANGE UP (ref 0–2)
BILIRUB SERPL-MCNC: 0.2 MG/DL — SIGNIFICANT CHANGE UP (ref 0.2–1.2)
BILIRUB UR-MCNC: NEGATIVE — SIGNIFICANT CHANGE UP
BLD GP AB SCN SERPL QL: SIGNIFICANT CHANGE UP
BUN SERPL-MCNC: 31 MG/DL — HIGH (ref 7–23)
CALCIUM SERPL-MCNC: 8.7 MG/DL — SIGNIFICANT CHANGE UP (ref 8.5–10.1)
CAST: 2 /LPF — SIGNIFICANT CHANGE UP (ref 0–4)
CHLORIDE SERPL-SCNC: 109 MMOL/L — HIGH (ref 96–108)
CO2 SERPL-SCNC: 25 MMOL/L — SIGNIFICANT CHANGE UP (ref 22–31)
COLOR SPEC: YELLOW — SIGNIFICANT CHANGE UP
CREAT SERPL-MCNC: 2.28 MG/DL — HIGH (ref 0.5–1.3)
D DIMER BLD IA.RAPID-MCNC: 188 NG/ML DDU — SIGNIFICANT CHANGE UP
DIFF PNL FLD: NEGATIVE — SIGNIFICANT CHANGE UP
EGFR: 31 ML/MIN/1.73M2 — LOW
EOSINOPHIL # BLD AUTO: 0 K/UL — SIGNIFICANT CHANGE UP (ref 0–0.5)
EOSINOPHIL NFR BLD AUTO: 0 % — SIGNIFICANT CHANGE UP (ref 0–6)
GLUCOSE SERPL-MCNC: 312 MG/DL — HIGH (ref 70–99)
GLUCOSE UR QL: >=1000 MG/DL
HCT VFR BLD CALC: 29.4 % — LOW (ref 39–50)
HGB BLD-MCNC: 9.6 G/DL — LOW (ref 13–17)
INR BLD: 0.93 RATIO — SIGNIFICANT CHANGE UP (ref 0.85–1.18)
KETONES UR-MCNC: NEGATIVE MG/DL — SIGNIFICANT CHANGE UP
LEUKOCYTE ESTERASE UR-ACNC: NEGATIVE — SIGNIFICANT CHANGE UP
LYMPHOCYTES # BLD AUTO: 1.63 K/UL — SIGNIFICANT CHANGE UP (ref 1–3.3)
LYMPHOCYTES # BLD AUTO: 30 % — SIGNIFICANT CHANGE UP (ref 13–44)
MAGNESIUM SERPL-MCNC: 2.3 MG/DL — SIGNIFICANT CHANGE UP (ref 1.6–2.6)
MANUAL SMEAR VERIFICATION: SIGNIFICANT CHANGE UP
MCHC RBC-ENTMCNC: 28.3 PG — SIGNIFICANT CHANGE UP (ref 27–34)
MCHC RBC-ENTMCNC: 32.7 GM/DL — SIGNIFICANT CHANGE UP (ref 32–36)
MCV RBC AUTO: 86.7 FL — SIGNIFICANT CHANGE UP (ref 80–100)
MONOCYTES # BLD AUTO: 0.38 K/UL — SIGNIFICANT CHANGE UP (ref 0–0.9)
MONOCYTES NFR BLD AUTO: 7 % — SIGNIFICANT CHANGE UP (ref 2–14)
NEUTROPHILS # BLD AUTO: 3.26 K/UL — SIGNIFICANT CHANGE UP (ref 1.8–7.4)
NEUTROPHILS NFR BLD AUTO: 59 % — SIGNIFICANT CHANGE UP (ref 43–77)
NEUTS BAND # BLD: 1 % — SIGNIFICANT CHANGE UP (ref 0–8)
NITRITE UR-MCNC: NEGATIVE — SIGNIFICANT CHANGE UP
NRBC # BLD: 0 /100 WBCS — SIGNIFICANT CHANGE UP (ref 0–0)
NRBC # BLD: SIGNIFICANT CHANGE UP /100 WBCS (ref 0–0)
PH UR: 5.5 — SIGNIFICANT CHANGE UP (ref 5–8)
PLAT MORPH BLD: NORMAL — SIGNIFICANT CHANGE UP
PLATELET # BLD AUTO: 239 K/UL — SIGNIFICANT CHANGE UP (ref 150–400)
PLATELET COUNT - ESTIMATE: NORMAL — SIGNIFICANT CHANGE UP
POTASSIUM SERPL-MCNC: 4.3 MMOL/L — SIGNIFICANT CHANGE UP (ref 3.5–5.3)
POTASSIUM SERPL-SCNC: 4.3 MMOL/L — SIGNIFICANT CHANGE UP (ref 3.5–5.3)
PROT SERPL-MCNC: 7.3 GM/DL — SIGNIFICANT CHANGE UP (ref 6–8.3)
PROT UR-MCNC: >=1000 MG/DL
PROTHROM AB SERPL-ACNC: 10.5 SEC — SIGNIFICANT CHANGE UP (ref 9.5–13)
RBC # BLD: 3.39 M/UL — LOW (ref 4.2–5.8)
RBC # FLD: 14.6 % — HIGH (ref 10.3–14.5)
RBC BLD AUTO: NORMAL — SIGNIFICANT CHANGE UP
RBC CASTS # UR COMP ASSIST: 3 /HPF — SIGNIFICANT CHANGE UP (ref 0–4)
SODIUM SERPL-SCNC: 138 MMOL/L — SIGNIFICANT CHANGE UP (ref 135–145)
SP GR SPEC: 1.03 — SIGNIFICANT CHANGE UP (ref 1–1.03)
SQUAMOUS # UR AUTO: 4 /HPF — SIGNIFICANT CHANGE UP (ref 0–5)
TROPONIN I, HIGH SENSITIVITY RESULT: 12.49 NG/L — SIGNIFICANT CHANGE UP
UROBILINOGEN FLD QL: 0.2 MG/DL — SIGNIFICANT CHANGE UP (ref 0.2–1)
VARIANT LYMPHS # BLD: 3 % — SIGNIFICANT CHANGE UP (ref 0–6)
WBC # BLD: 5.43 K/UL — SIGNIFICANT CHANGE UP (ref 3.8–10.5)
WBC # FLD AUTO: 5.43 K/UL — SIGNIFICANT CHANGE UP (ref 3.8–10.5)
WBC UR QL: 7 /HPF — HIGH (ref 0–5)

## 2024-05-25 PROCEDURE — 71045 X-RAY EXAM CHEST 1 VIEW: CPT

## 2024-05-25 PROCEDURE — 71045 X-RAY EXAM CHEST 1 VIEW: CPT | Mod: 26

## 2024-05-25 PROCEDURE — 85379 FIBRIN DEGRADATION QUANT: CPT

## 2024-05-25 PROCEDURE — 93010 ELECTROCARDIOGRAM REPORT: CPT

## 2024-05-25 PROCEDURE — 85730 THROMBOPLASTIN TIME PARTIAL: CPT

## 2024-05-25 PROCEDURE — 93005 ELECTROCARDIOGRAM TRACING: CPT

## 2024-05-25 PROCEDURE — 99285 EMERGENCY DEPT VISIT HI MDM: CPT | Mod: 25

## 2024-05-25 PROCEDURE — 86901 BLOOD TYPING SEROLOGIC RH(D): CPT

## 2024-05-25 PROCEDURE — 83735 ASSAY OF MAGNESIUM: CPT

## 2024-05-25 PROCEDURE — 84484 ASSAY OF TROPONIN QUANT: CPT

## 2024-05-25 PROCEDURE — 85025 COMPLETE CBC W/AUTO DIFF WBC: CPT

## 2024-05-25 PROCEDURE — 80053 COMPREHEN METABOLIC PANEL: CPT

## 2024-05-25 PROCEDURE — 85610 PROTHROMBIN TIME: CPT

## 2024-05-25 PROCEDURE — 81001 URINALYSIS AUTO W/SCOPE: CPT

## 2024-05-25 PROCEDURE — 36415 COLL VENOUS BLD VENIPUNCTURE: CPT

## 2024-05-25 PROCEDURE — 86850 RBC ANTIBODY SCREEN: CPT

## 2024-05-25 PROCEDURE — 86900 BLOOD TYPING SEROLOGIC ABO: CPT

## 2024-05-25 PROCEDURE — 99285 EMERGENCY DEPT VISIT HI MDM: CPT

## 2024-05-25 PROCEDURE — 36000 PLACE NEEDLE IN VEIN: CPT

## 2024-05-25 RX ORDER — METFORMIN HYDROCHLORIDE 850 MG/1
500 TABLET ORAL ONCE
Refills: 0 | Status: COMPLETED | OUTPATIENT
Start: 2024-05-25 | End: 2024-05-25

## 2024-05-25 RX ORDER — ATORVASTATIN CALCIUM 80 MG/1
40 TABLET, FILM COATED ORAL AT BEDTIME
Refills: 0 | Status: DISCONTINUED | OUTPATIENT
Start: 2024-05-25 | End: 2024-05-26

## 2024-05-25 RX ORDER — GABAPENTIN 400 MG/1
300 CAPSULE ORAL ONCE
Refills: 0 | Status: COMPLETED | OUTPATIENT
Start: 2024-05-25 | End: 2024-05-25

## 2024-05-25 RX ORDER — METOPROLOL TARTRATE 50 MG
25 TABLET ORAL DAILY
Refills: 0 | Status: DISCONTINUED | OUTPATIENT
Start: 2024-05-25 | End: 2024-05-26

## 2024-05-25 RX ORDER — GABAPENTIN 400 MG/1
1 CAPSULE ORAL
Qty: 5 | Refills: 0
Start: 2024-05-25 | End: 2024-05-29

## 2024-05-25 RX ORDER — LIDOCAINE 4 G/100G
1 CREAM TOPICAL
Qty: 5 | Refills: 0
Start: 2024-05-25 | End: 2024-05-29

## 2024-05-25 RX ORDER — LIDOCAINE 4 G/100G
1 CREAM TOPICAL ONCE
Refills: 0 | Status: COMPLETED | OUTPATIENT
Start: 2024-05-25 | End: 2024-05-25

## 2024-05-25 RX ORDER — SODIUM CHLORIDE 9 MG/ML
3 INJECTION INTRAMUSCULAR; INTRAVENOUS; SUBCUTANEOUS ONCE
Refills: 0 | Status: COMPLETED | OUTPATIENT
Start: 2024-05-25 | End: 2024-05-25

## 2024-05-25 RX ORDER — SODIUM CHLORIDE 9 MG/ML
1000 INJECTION INTRAMUSCULAR; INTRAVENOUS; SUBCUTANEOUS ONCE
Refills: 0 | Status: COMPLETED | OUTPATIENT
Start: 2024-05-25 | End: 2024-05-25

## 2024-05-25 RX ADMIN — LIDOCAINE 1 PATCH: 4 CREAM TOPICAL at 21:17

## 2024-05-25 RX ADMIN — METFORMIN HYDROCHLORIDE 500 MILLIGRAM(S): 850 TABLET ORAL at 20:25

## 2024-05-25 RX ADMIN — SODIUM CHLORIDE 1000 MILLILITER(S): 9 INJECTION INTRAMUSCULAR; INTRAVENOUS; SUBCUTANEOUS at 20:24

## 2024-05-25 RX ADMIN — Medication 25 MILLIGRAM(S): at 20:25

## 2024-05-25 RX ADMIN — GABAPENTIN 300 MILLIGRAM(S): 400 CAPSULE ORAL at 18:49

## 2024-05-25 RX ADMIN — ATORVASTATIN CALCIUM 40 MILLIGRAM(S): 80 TABLET, FILM COATED ORAL at 20:25

## 2024-05-25 RX ADMIN — SODIUM CHLORIDE 3 MILLILITER(S): 9 INJECTION INTRAMUSCULAR; INTRAVENOUS; SUBCUTANEOUS at 17:44

## 2024-05-25 NOTE — ED ADULT NURSE REASSESSMENT NOTE - NS ED NURSE REASSESS COMMENT FT1
Received pt from [facility], pt awake, alert, speaking in full and complete sentences without difficulty.

## 2024-05-25 NOTE — ED ADULT NURSE NOTE - OBJECTIVE STATEMENT
Pt. presents to ED via EMS with c/o chest pain. pt. states that he had chest pain x5 days and is not able to sleep because of it. Denies SOB, palpitations, nausea, vomiting, diarrhea, constipation, fevers/chills, urinary s/s. pt. states that only oxycodone helped with the pain in the past, states no otc medications work.

## 2024-05-25 NOTE — ED PROVIDER NOTE - NSFOLLOWUPINSTRUCTIONS_ED_ALL_ED_FT
Use lidocaine patch 12 hours on 12 hours off right chest as needed . Continue your regular medication Take gabapentin 300mg daily. Follow up at [facility] as previously planned and have them refilll your medications. Return to ER if worse.

## 2024-05-25 NOTE — ED PROVIDER NOTE - PROGRESS NOTE DETAILS
SW saw pt. Pt stated his insurance was not working and he is out of meds. Has appt on 28th at [facility]. Our pharmacy will fill metoprolol, metformin and atorvastatin. I will send script for gabapentin and jardiance.Kyle MCCLURE

## 2024-05-25 NOTE — ED PROVIDER NOTE - OBJECTIVE STATEMENT
63 y/o male with PMHx of HTN, HLD one sided blindness, and DM presents to ED c/o chest pain for the past 4 months worse with breathing. patient states they have been to [facility] several times, however the symptoms persist. He also notes recent bilateral leg pain and twitching recently as well. patient denies taking any medications for the pain. patient denies any physical trauma to the chest. patient denies previous cardiac history. Patient admits to smoking 2-5 cigarettes a day. denies EtOH and drug use. Patient had PCP follow-up on 5/28/24.

## 2024-05-25 NOTE — ED PROVIDER NOTE - SKIN, MLM
Skin normal color for race, warm, dry and intact. No evidence of rash or lesions, hypersensitivity over bilateral anterior chest, no nipple discharge, no redness, no erythema

## 2024-05-25 NOTE — ED ADULT TRIAGE NOTE - CHIEF COMPLAINT QUOTE
pt presents to Ed with complaints of chest pain starting today. 324 ASA and 1 sublingual nitro given PTA with EMS. EKG in progress.

## 2024-05-25 NOTE — ED ADULT TRIAGE NOTE - MEANS OF ARRIVAL
"  [facility]  [address]  [place] [address]  Phone   Fax       SLEEP CLINIC FOLLOW UP PROGRESS NOTE.    Renato Marquez  1947  74 y.o.  male      PCP: Provider, No Known      Date of visit: 4/29/2022    Chief Complaint   Patient presents with   • Sleep Apnea   • Obesity       HPI:  This is a 74 y.o. years old patient who has a history of complex sleep apnea is here for  the initial compliance follow-up.  Sleep apnea is severe in severity with a AHI of 70/h with central apneas 43/hr. Patient is using positive airway pressure therapy with BiPAP ST and the symptoms of snoring, non-restorative sleep and daytime excessive sleepiness have improved significantly on the therapy. Normally goes to bed at 12 midnight and wakes up at 5:30 AM.  The patient wakes up 3 time(s) during the night and has no problem going back to sleep.  Feels refreshed after waking up.  Patient also denies headaches and nasal congestion.   Patient feels lot better but he also is using the BiPAP ST when he is watching television sitting in a recliner.  He is slowly transitioning into sleep.    Mediactions and allergies are reviewed by me and documented in the encounter.     SOCIAL ( habits pertaining to sleep medicine)  History of smoking:No   History of alcohol use: 10 per week  Caffeine use: 2    REVIEW OF SYSTEMS:   Caro Sleepiness Scale :Total score: 7   Nsaal congestion:No   Dry mouth/nose:No   Post nasal drip; No   Acid reflux/Heartburn:No   Abd bloating:No   Morining headache:No   Anxiety:No   Depression:No     PHYSICAL EXAMINATION:  CONSTITUTIONAL:  Vitals:    04/29/22 0900   BP: 127/65   Pulse: (!) 44   Weight: 103 kg (227 lb)   Height: 182.9 cm (72\")    Body mass index is 30.79 kg/m².   NOSE: nasal passages are clear, no nasal polyps, septum in the midline.  THROAT: throat is clear, oral airway Mallampati class 3  RESP SYSTEM: Breath sounds are normal, no wheezes or " crackles  CARDIOVASULAR: Heart rate is regular without murmur. No edema      Data reviewed:  The Smart card downloaded on 4/29/2022 has been reviewed independently by me for compliance and discussed the data with the patient.   Compliance; 100%  > 4 hr use, 93%  Average use of the device 3 hours and 45 minutes per night  Residual AHI: 8/hr  mask type: Nasal mask  DME: [facility]         ASSESSMENT AND PLAN:  · Complex sleep apnea .  It includes both central sleep apnea and obstructive sleep apnea and is on BiPAP ST.  The symptoms of sleep apnea have improved with the device and the treatment.  Patient's compliance with the device is excellent for treatment of sleep apnea.  I have independently reviewed the smart card down load and discussed with the patient the download data and encouarged the patient to continue to use the device.The residual AHI is acceptable. The device is benefiting the patient and the device is medically necessary.  Without proper control of sleep apnea and good compliance there is a increased risk for hypertension, diabetes mellitus and nonrestorative sleep with hypersomnia which can increase risk for motor vehicle accidents.  Untreated sleep apnea is also a risk factor for development of atrial fibrillation, pulmonary hypertension and stroke. The patient is also instructed to get the supplies from the [facility] and and change them on a regular basis.  A prescription for supplies has been sent to the [facility].  I have also discussed the good sleep hygiene habits and adequate amount of sleep needed for good health.  · Obesity, class 1 with BMI is Body mass index is 30.79 kg/m².. I have discuss the relationship between the weight and sleep apnea. The benefit of weight loss in reducing severity of sleep apnea was discussed. Discussed diet and exercise with the patient to archive ideal BMI.  · Return in about 1 year (around 4/29/2023) for Annual visit with smartcard download. . Patient's  questions were answered.        Jett Mcguire MD  Sleep Medicine  Medical Director for [facility]  4/29/2022                stretcher

## 2024-05-25 NOTE — CHART NOTE - NSCHARTNOTEFT_GEN_A_CORE
Pt is a 63 y/o male with a past medical hx of HTN, HLD, onesided blindness.  Pt admits to smoking 2 to 5 cigarettes a day.  Pt denies ETOH misuse. Pt presented to the ER with c/o CP. Pt was evaluated and cleared for d/c.  Pt reports he is currently staying at the [facility] shelter.  Sw called the shelter and confirmed above.  Pt reports he has run out of his medications. Pt states he has an appointment at the [facility] on 5/28/2024 with Dr. Martinez.  SAMANTHA reviewed with pt his medications and contacted  Pharmacy and spoke to Destinee and they will give pt 2 days of his medications until his appointment 5/28/24 at the [facility].  Pt stated his insurance is not working.  SAMANTHA will leave a message for SAMANTHA Clark at the [facility] regarding above.  Dr. Wright will also sent a Rx to [facility] for pt and SAMANTHA will notify [facility] to see if they can help pt pay for RX.  Samantha will leave voucher for pt to be transported back to [facility]. Case discussed with RN/MD.

## 2024-05-25 NOTE — ED PROVIDER NOTE - PATIENT PORTAL LINK FT
You can access the FollowMyHealth Patient Portal offered by [facility] by registering at the following website: [email or web address]. By joining [employer]’s FollowMyHealth portal, you will also be able to view your health information using other applications (apps) compatible with our system.

## 2024-05-25 NOTE — ED PROVIDER NOTE - NSICDXFAMILYHX_GEN_ALL_CORE_FT
negative FAMILY HISTORY:  Father  Still living? Unknown  FH: alcoholism, Age at diagnosis: Age Unknown    Mother  Still living? Unknown  Family history of coronary artery disease in mother, Age at diagnosis: Age Unknown  Family history of diabetes mellitus (DM), Age at diagnosis: Age Unknown

## 2024-05-25 NOTE — ED PROVIDER NOTE - CLINICAL SUMMARY MEDICAL DECISION MAKING FREE TEXT BOX
63 y/o male with PMHx of HTN. HLD, one sided blindness, and DM presents to ED c/o recurring chest pain for the past 4 months worse with breathing, which has been worked up in the past. Plan: labs,cxr, EKG likely to begin gabapentin.

## 2024-05-26 VITALS
RESPIRATION RATE: 18 BRPM | DIASTOLIC BLOOD PRESSURE: 86 MMHG | SYSTOLIC BLOOD PRESSURE: 166 MMHG | HEART RATE: 75 BPM | OXYGEN SATURATION: 98 % | TEMPERATURE: 98 F

## 2024-05-28 ENCOUNTER — APPOINTMENT (OUTPATIENT)
Dept: MAMMOGRAPHY | Facility: CLINIC | Age: 64
End: 2024-05-28

## 2024-05-28 ENCOUNTER — OFFICE (OUTPATIENT)
Dept: URBAN - METROPOLITAN AREA CLINIC 102 | Facility: CLINIC | Age: 64
Setting detail: OPHTHALMOLOGY
End: 2024-05-28
Payer: MEDICAID

## 2024-05-28 ENCOUNTER — APPOINTMENT (OUTPATIENT)
Dept: ULTRASOUND IMAGING | Facility: CLINIC | Age: 64
End: 2024-05-28

## 2024-05-28 DIAGNOSIS — H25.13: ICD-10-CM

## 2024-05-28 DIAGNOSIS — E11.9: ICD-10-CM

## 2024-05-28 PROCEDURE — 99204 OFFICE O/P NEW MOD 45 MIN: CPT | Performed by: STUDENT IN AN ORGANIZED HEALTH CARE EDUCATION/TRAINING PROGRAM

## 2024-05-28 ASSESSMENT — CONFRONTATIONAL VISUAL FIELD TEST (CVF)
OS_FINDINGS: FULL
OD_FINDINGS: FULL

## 2024-05-31 ENCOUNTER — NON-APPOINTMENT (OUTPATIENT)
Age: 64
End: 2024-05-31

## 2024-05-31 ENCOUNTER — LABORATORY RESULT (OUTPATIENT)
Age: 64
End: 2024-05-31

## 2024-05-31 DIAGNOSIS — R07.9 CHEST PAIN, UNSPECIFIED: ICD-10-CM

## 2024-05-31 DIAGNOSIS — G47.00 INSOMNIA, UNSPECIFIED: ICD-10-CM

## 2024-05-31 DIAGNOSIS — B20 HUMAN IMMUNODEFICIENCY VIRUS [HIV] DISEASE: ICD-10-CM

## 2024-05-31 RX ORDER — INSULIN GLARGINE 100 [IU]/ML
100 INJECTION, SOLUTION SUBCUTANEOUS DAILY
Refills: 0 | Status: COMPLETED | COMMUNITY
Start: 2023-07-21 | End: 2024-05-31

## 2024-05-31 RX ORDER — ACETAMINOPHEN EXTRA STRENGTH 500 MG/1
500 TABLET ORAL
Refills: 0 | Status: COMPLETED | COMMUNITY
Start: 2023-07-21 | End: 2024-05-31

## 2024-05-31 RX ORDER — METFORMIN HYDROCHLORIDE 1000 MG/1
1000 TABLET, COATED ORAL TWICE DAILY
Qty: 60 | Refills: 3 | Status: COMPLETED | COMMUNITY
Start: 2023-07-21 | End: 2024-05-31

## 2024-05-31 RX ORDER — CHLORHEXIDINE GLUCONATE 4 %
5 LIQUID (ML) TOPICAL
Qty: 30 | Refills: 6 | Status: ACTIVE | COMMUNITY
Start: 2024-05-31 | End: 1900-01-01

## 2024-05-31 RX ORDER — INSULIN GLARGINE 100 [IU]/ML
100 INJECTION, SOLUTION SUBCUTANEOUS
Qty: 1 | Refills: 6 | Status: ACTIVE | COMMUNITY
Start: 2024-05-31

## 2024-05-31 RX ORDER — METFORMIN HYDROCHLORIDE 500 MG/1
500 TABLET, COATED ORAL
Qty: 180 | Refills: 2 | Status: ACTIVE | COMMUNITY
Start: 2024-05-31

## 2024-05-31 RX ORDER — ASPIRIN ENTERIC COATED TABLETS 81 MG 81 MG/1
81 TABLET, DELAYED RELEASE ORAL
Qty: 90 | Refills: 2 | Status: ACTIVE | COMMUNITY
Start: 2024-05-31

## 2024-05-31 RX ORDER — ATORVASTATIN CALCIUM 40 MG/1
40 TABLET, FILM COATED ORAL
Qty: 30 | Refills: 0 | Status: ACTIVE | COMMUNITY
Start: 2024-05-31

## 2024-05-31 RX ORDER — BICTEGRAVIR SODIUM, EMTRICITABINE, AND TENOFOVIR ALAFENAMIDE FUMARATE 50; 200; 25 MG/1; MG/1; MG/1
50-200-25 TABLET ORAL
Qty: 90 | Refills: 2 | Status: ACTIVE | COMMUNITY
Start: 2024-05-31 | End: 1900-01-01

## 2024-05-31 RX ORDER — HYDRALAZINE HYDROCHLORIDE 25 MG/1
25 TABLET ORAL TWICE DAILY
Refills: 0 | Status: COMPLETED | COMMUNITY
Start: 2023-07-21 | End: 2024-05-31

## 2024-05-31 RX ORDER — FUROSEMIDE 20 MG/1
20 TABLET ORAL DAILY
Refills: 0 | Status: ACTIVE | COMMUNITY
Start: 2024-05-31

## 2024-05-31 RX ORDER — METOPROLOL SUCCINATE 25 MG/1
25 TABLET, EXTENDED RELEASE ORAL
Qty: 30 | Refills: 4 | Status: ACTIVE | COMMUNITY
Start: 2024-05-31

## 2024-05-31 NOTE — HISTORY OF PRESENT ILLNESS
[FreeTextEntry1] : Patient is a 63 y/o male, presents today for initial HIV consult.  PMH: CKD Baseline cr 1.7, DMT2 A1c 9.8, pulmonary emphysema, HIV infection, CHF EF 50%, HTN. Recent hospitalization: Went to ER 5/25/24 for chest pain - Diagnosed with costochondritis.  Recent travel: Denies Blood transfusion: Denies Diagnosed with HIV: 2023 Unsure how he contracted HIV- did not start medications at time of dx.  PSH: Back surgery X2  PFH: Mother: DM Father: ETOH PCP: [facility]. Martinez at [facility] Vaccinations: Covid, does not remember which type. Screenings: Colonoscopy Denies. Social: Smoking 4 cigarettes a day ETOH: Denies, IVDU- Denies, 4 tattoos. Sexual active: Not active X 1 year. Heterosexual. Condom use sometimes. Denies STIs Occupation: Was a  - No longer working. Who is aware of HIV: No one is aware of status.  Allergies Medications: Denies   Patient with complaint of insomnia. Patient currently not taking medication for insomnia.   Pt with chest pain X 4 months. Went to ER 5/24/24. Diagnosed with atypical chest pain/costochondritis. EKG: NSR. CXR: Unremarkable. Trops unremarkable Followed up with PCP recently for this issue. Advised to take Tylenol for the pain. Patient referred to pain management for pain by PCP. Patient referred to cardiology. Pt has not followed up.   Consult complete by YVONNE Miles and TONY Hoffman

## 2024-05-31 NOTE — ASSESSMENT
[FreeTextEntry1] : 5/31/2024 Initial HIV consult  HIV 1.) Blood work and STI testing 2.) HIV education: Modes of transmission, S&S, Risk factors, U=U, Importance of compliance.  3.) Start Biktarvy daily  4.) RTC in 1 month   Chest pain 1.) Trial Tylenol and OTC Lidocaine patches 2.) Follow up with PCP and reschedule missed Cardio appointment  Insomnia  1.) Trial Melatonin 5mg    [Treatment Education] : treatment education [Treatment Adherence] : treatment adherence [Rx Dose / Side Effects] : Rx dose/side effects [Medical Care Issues] : medical care issues [HIV Education] : HIV Education

## 2024-05-31 NOTE — REVIEW OF SYSTEMS
What Type Of Note Output Would You Prefer (Optional)?: Standard Output How Severe Is Your Skin Lesion?: moderate Has Your Skin Lesion Been Treated?: not been treated Is This A New Presentation, Or A Follow-Up?: Skin Lesions Which Family Member (Optional)?: Father [Negative] : Heme/Lymph [As Noted in HPI] : as noted in HPI [Chest Pain] : chest pain [FreeTextEntry2] : Difficulty sleeping. [FreeTextEntry5] : Intermittent chest pain x 4  months.

## 2024-05-31 NOTE — PHYSICAL EXAM
[General Appearance - Alert] : alert [General Appearance - In No Acute Distress] : in no acute distress [Sclera] : the sclera and conjunctiva were normal [PERRL With Normal Accommodation] : pupils were equal in size, round, reactive to light [Extraocular Movements] : extraocular movements were intact [Hearing Threshold Finger Rub Not Bath] : hearing was normal [Examination Of The Oral Cavity] : the lips and gums were normal [Neck Appearance] : the appearance of the neck was normal [Neck Cervical Mass (___cm)] : no neck mass was observed [Jugular Venous Distention Increased] : there was no jugular-venous distention [Thyroid Diffuse Enlargement] : the thyroid was not enlarged [Respiration, Rhythm And Depth] : normal respiratory rhythm and effort [Exaggerated Use Of Accessory Muscles For Inspiration] : no accessory muscle use [Auscultation Breath Sounds / Voice Sounds] : lungs were clear to auscultation bilaterally [Heart Rate And Rhythm] : heart rate was normal and rhythm regular [Heart Sounds] : normal S1 and S2 [Heart Sounds Gallop] : no gallops [Murmurs] : no murmurs [Heart Sounds Pericardial Friction Rub] : no pericardial rub [Edema] : there was no peripheral edema [Bowel Sounds] : normal bowel sounds [Abdomen Soft] : soft [Abdomen Tenderness] : non-tender [Costovertebral Angle Tenderness] : no CVA tenderness [Abdomen Mass (___ Cm)] : no abdominal mass palpated [No Palpable Adenopathy] : no palpable adenopathy [Cervical Lymph Nodes Enlarged Posterior Bilaterally] : posterior cervical [Cervical Lymph Nodes Enlarged Anterior Bilaterally] : anterior cervical [Range of Motion to Joints] : range of motion to joints [Musculoskeletal - Swelling] : no joint swelling [Skin Color & Pigmentation] : normal skin color and pigmentation [] : no rash [Cranial Nerves] : cranial nerves 2-12 were intact [Sensation] : the sensory exam was normal to light touch and pinprick [Motor Exam] : the motor exam was normal [Oriented To Time, Place, And Person] : oriented to person, place, and time [FreeTextEntry1] : noted with quater sized scab on right shin- no surrounding erythema [No Focal Deficits] : no focal deficits [Affect] : the affect was normal

## 2024-06-01 ENCOUNTER — EMERGENCY (EMERGENCY)
Facility: HOSPITAL | Age: 64
LOS: 0 days | Discharge: HOME | End: 2024-06-01
Attending: EMERGENCY MEDICINE
Payer: MEDICAID

## 2024-06-01 VITALS
TEMPERATURE: 98 F | RESPIRATION RATE: 18 BRPM | HEIGHT: 68 IN | OXYGEN SATURATION: 100 % | WEIGHT: 186.07 LBS | HEART RATE: 65 BPM | SYSTOLIC BLOOD PRESSURE: 163 MMHG | DIASTOLIC BLOOD PRESSURE: 86 MMHG

## 2024-06-01 DIAGNOSIS — Z76.5 MALINGERER [CONSCIOUS SIMULATION]: ICD-10-CM

## 2024-06-01 DIAGNOSIS — E11.9 TYPE 2 DIABETES MELLITUS WITHOUT COMPLICATIONS: ICD-10-CM

## 2024-06-01 DIAGNOSIS — E78.5 HYPERLIPIDEMIA, UNSPECIFIED: ICD-10-CM

## 2024-06-01 DIAGNOSIS — R07.9 CHEST PAIN, UNSPECIFIED: ICD-10-CM

## 2024-06-01 DIAGNOSIS — Z98.1 ARTHRODESIS STATUS: Chronic | ICD-10-CM

## 2024-06-01 DIAGNOSIS — I10 ESSENTIAL (PRIMARY) HYPERTENSION: ICD-10-CM

## 2024-06-01 PROCEDURE — 99282 EMERGENCY DEPT VISIT SF MDM: CPT

## 2024-06-01 PROCEDURE — 99283 EMERGENCY DEPT VISIT LOW MDM: CPT

## 2024-06-01 NOTE — ED ADULT NURSE NOTE - OBJECTIVE STATEMENT
65yo Male co CP for the past 4 months that worsens with breathing. Pt saw cardiologist yesterday. Pt states only Oxycodone can help with pain. Pt recently cleared by cardiology for cardiac cause of pain. AOx4, No signs of distress at this time.

## 2024-06-01 NOTE — ED ADULT NURSE NOTE - NSFALLUNIVINTERV_ED_ALL_ED
Bed/Stretcher in lowest position, wheels locked, appropriate side rails in place/Call bell, personal items and telephone in reach/Instruct patient to call for assistance before getting out of bed/chair/stretcher/Non-slip footwear applied when patient is off stretcher/[place] to call system/Physically safe environment - no spills, clutter or unnecessary equipment/Purposeful proactive rounding/Room/bathroom lighting operational, light cord in reach

## 2024-06-01 NOTE — ED PROVIDER NOTE - OBJECTIVE STATEMENT
63 y/o male with PMHx of HTN, HLD one sided blindness, and DM presents to ED c/o chest pain for the past 4 months worse with breathingPatient saw cardiologist yesterday.  Patient states the only thing that helps him with his pain is oxycodone.  Patient had admission and was cleared by cardiology for cardiac cause of pain.  No history of clots.  Not on anticoagulation.  No calf pain.

## 2024-06-01 NOTE — ED ADULT TRIAGE NOTE - MEANS OF ARRIVAL
Bedside and Verbal shift change report given to self (oncoming nurse) by  Dallas Alex RN (offgoing nurse). Report included the following information SBAR, Kardex, Intake/Output, MAR, and Recent Results. stretcher

## 2024-06-01 NOTE — ED PROVIDER NOTE - CLINICAL SUMMARY MEDICAL DECISION MAKING FREE TEXT BOX
Patient well-known to me presenting with chest pain ongoing x 4 months.  Has been thoroughly worked up by cardiology.  Requesting oxycodone.  Will discharge with PCP follow-up.

## 2024-06-01 NOTE — ED ADULT NURSE NOTE - BIRTH SEX
pt couldn't be found in bed/hallway/bathrooms or anywhere in the facility or outside facility, charge nurse notified, charge nurse states this pt was found outside of facility smoking before, pt was notified she couldn't leave facility without being discharged. Male

## 2024-06-01 NOTE — ED PROVIDER NOTE - PATIENT PORTAL LINK FT
You can access the FollowMyHealth Patient Portal offered by [facility] by registering at the following website: [email or web address]. By joining [employer]s FollowMyHealth portal, you will also be able to view your health information using other applications (apps) compatible with our system.

## 2024-06-01 NOTE — ED ADULT TRIAGE NOTE - CHIEF COMPLAINT QUOTE
PT BIBEMS from [facility] c/o L. sided chest pain since 11pm last night. denies radiation, endorsing mild SOB. pt denies medical history, NKDA.

## 2024-06-03 DIAGNOSIS — D64.9 ANEMIA, UNSPECIFIED: ICD-10-CM

## 2024-06-03 DIAGNOSIS — E55.9 VITAMIN D DEFICIENCY, UNSPECIFIED: ICD-10-CM

## 2024-06-03 LAB
25(OH)D3 SERPL-MCNC: 18.6 NG/ML
ALBUMIN SERPL ELPH-MCNC: 3.5 G/DL
ALP BLD-CCNC: 57 U/L
ALT SERPL-CCNC: 24 U/L
ANION GAP SERPL CALC-SCNC: 12 MMOL/L
AST SERPL-CCNC: 24 U/L
BILIRUB SERPL-MCNC: <0.2 MG/DL
BUN SERPL-MCNC: 29 MG/DL
C TRACH RRNA SPEC QL NAA+PROBE: NOT DETECTED
C TRACH RRNA SPEC QL NAA+PROBE: NOT DETECTED
CALCIUM SERPL-MCNC: 9.3 MG/DL
CD3 CELLS # BLD: 1558 CELLS/UL
CD3 CELLS NFR BLD: 70 %
CD3+CD4+ CELLS # BLD: 534 CELLS/UL
CD3+CD4+ CELLS NFR BLD: 24 %
CD3+CD4+ CELLS/CD3+CD8+ CLL SPEC: 0.53 RATIO
CD3+CD8+ CELLS # SPEC: 1007 CELLS/UL
CD3+CD8+ CELLS NFR BLD: 46 %
CHLORIDE SERPL-SCNC: 103 MMOL/L
CHOLEST SERPL-MCNC: 148 MG/DL
CO2 SERPL-SCNC: 21 MMOL/L
CREAT SERPL-MCNC: 2.08 MG/DL
EGFR: 35 ML/MIN/1.73M2
GLUCOSE SERPL-MCNC: 170 MG/DL
HBV CORE IGG+IGM SER QL: NONREACTIVE
HBV SURFACE AB SER QL: NONREACTIVE
HBV SURFACE AG SER QL: NONREACTIVE
HCT VFR BLD CALC: 33.4 %
HCV AB SER QL: NONREACTIVE
HCV S/CO RATIO: 0.16 S/CO
HDLC SERPL-MCNC: 28 MG/DL
HEPATITIS A IGG ANTIBODY: REACTIVE
HEPB DNA PCR INT: NOT DETECTED
HEPB DNA PCR LOG: NOT DETECTED LOGIU/ML
HGB BLD-MCNC: 10.4 G/DL
HIV1 RNA # SERPL NAA+PROBE: ABNORMAL
HIV1 RNA # SERPL NAA+PROBE: NORMAL
LDLC SERPL CALC-MCNC: 80 MG/DL
M TB IFN-G BLD-IMP: NEGATIVE
MCHC RBC-ENTMCNC: 27.7 PG
MCHC RBC-ENTMCNC: 31.1 GM/DL
MCV RBC AUTO: 88.8 FL
N GONORRHOEA RRNA SPEC QL NAA+PROBE: NOT DETECTED
N GONORRHOEA RRNA SPEC QL NAA+PROBE: NOT DETECTED
NONHDLC SERPL-MCNC: 120 MG/DL
PLATELET # BLD AUTO: 320 K/UL
POTASSIUM SERPL-SCNC: 5.1 MMOL/L
PROT SERPL-MCNC: 8.4 G/DL
PSA SERPL-MCNC: 0.94 NG/ML
QUANTIFERON TB PLUS MITOGEN MINUS NIL: 1.88 IU/ML
QUANTIFERON TB PLUS NIL: 0.04 IU/ML
QUANTIFERON TB PLUS TB1 MINUS NIL: 0.01 IU/ML
QUANTIFERON TB PLUS TB2 MINUS NIL: 0 IU/ML
RBC # BLD: 3.76 M/UL
RBC # FLD: 14.8 %
SODIUM SERPL-SCNC: 136 MMOL/L
SOURCE AMPLIFICATION: NORMAL
SOURCE AMPLIFICATION: NORMAL
SOURCE ORAL: NORMAL
T PALLIDUM AB SER QL IA: NEGATIVE
T VAGINALIS RRNA SPEC QL NAA+PROBE: NOT DETECTED
TRIGL SERPL-MCNC: 240 MG/DL
TSH SERPL-ACNC: 1.64 UIU/ML
VIRAL LOAD INTERP: NORMAL
VIRAL LOAD LOG: 5.05
WBC # FLD AUTO: 6.33 K/UL

## 2024-06-03 RX ORDER — CHLORHEXIDINE GLUCONATE 4 %
325 (65 FE) LIQUID (ML) TOPICAL DAILY
Qty: 90 | Refills: 1 | Status: ACTIVE | COMMUNITY
Start: 2024-06-03 | End: 1900-01-01

## 2024-06-03 RX ORDER — ERGOCALCIFEROL 1.25 MG/1
1.25 MG CAPSULE, LIQUID FILLED ORAL
Qty: 12 | Refills: 2 | Status: ACTIVE | COMMUNITY
Start: 2024-06-03 | End: 1900-01-01

## 2024-06-05 NOTE — ED PROVIDER NOTE - PATIENT PORTAL LINK FT
You can access the FollowMyHealth Patient Portal offered by [facility] by registering at the following website: [email or web address]. By joining [employer]’s FollowMyHealth portal, you will also be able to view your health information using other applications (apps) compatible with our system. Universal Safety Interventions

## 2024-06-07 LAB — HLA-B27 QL NAA+PROBE: NORMAL

## 2024-06-08 NOTE — ED STATDOCS - CHPI ED SYMPTOM POS
Esha Marcos is a 77 year old female presenting to the walk-in clinic today alone for  sore throat x 2 days c/o difficulty swallowing, increase in phlegm  feels like I have two marbles on each side.    Treatment tried prior to visit: gargled with salt water    Swabs/Specimens collected during rooming process:  Strep PCR    Work, School or  note needed: No    New vs Established: Established    Patient would like communication of their results via:      Cell Phone:   Telephone Information:   Mobile [phone]     Okay to leave a message containing results? Yes   CP, HA, abd pain, body aches/COUGH

## 2024-06-11 LAB
HIV GENOSURE PRIME INTERP: NORMAL
HIV GENOSURE PRIME1: NORMAL
HIV GENOSURE PRIME2: NORMAL

## 2024-06-26 ENCOUNTER — NON-APPOINTMENT (OUTPATIENT)
Age: 64
End: 2024-06-26

## 2024-08-08 ENCOUNTER — TRANSCRIPTION ENCOUNTER (OUTPATIENT)
Age: 64
End: 2024-08-08

## 2024-08-08 NOTE — CONSULT NOTE ADULT - CONSULT REASON
[right 2nd digit laceration

## 2024-08-13 ENCOUNTER — TRANSCRIPTION ENCOUNTER (OUTPATIENT)
Age: 64
End: 2024-08-13

## 2024-08-19 ENCOUNTER — OUTPATIENT (OUTPATIENT)
Dept: OUTPATIENT SERVICES | Facility: HOSPITAL | Age: 64
LOS: 1 days | End: 2024-08-19
Payer: MEDICAID

## 2024-08-19 DIAGNOSIS — Z98.1 ARTHRODESIS STATUS: Chronic | ICD-10-CM

## 2024-08-19 DIAGNOSIS — L97.522 NON-PRESSURE CHRONIC ULCER OF OTHER PART OF LEFT FOOT WITH FAT LAYER EXPOSED: ICD-10-CM

## 2024-08-19 PROCEDURE — 11043 DBRDMT MUSC&/FSCA 1ST 20/<: CPT

## 2024-08-19 PROCEDURE — 99203 OFFICE O/P NEW LOW 30 MIN: CPT | Mod: 25

## 2024-08-26 ENCOUNTER — OUTPATIENT (OUTPATIENT)
Dept: OUTPATIENT SERVICES | Facility: HOSPITAL | Age: 64
LOS: 1 days | End: 2024-08-26
Payer: MEDICAID

## 2024-08-26 DIAGNOSIS — Z98.1 ARTHRODESIS STATUS: Chronic | ICD-10-CM

## 2024-08-26 DIAGNOSIS — L97.522 NON-PRESSURE CHRONIC ULCER OF OTHER PART OF LEFT FOOT WITH FAT LAYER EXPOSED: ICD-10-CM

## 2024-08-26 PROCEDURE — 11042 DBRDMT SUBQ TIS 1ST 20SQCM/<: CPT

## 2024-08-27 DIAGNOSIS — I73.9 PERIPHERAL VASCULAR DISEASE, UNSPECIFIED: ICD-10-CM

## 2024-08-27 DIAGNOSIS — B20 HUMAN IMMUNODEFICIENCY VIRUS [HIV] DISEASE: ICD-10-CM

## 2024-08-27 DIAGNOSIS — E11.621 TYPE 2 DIABETES MELLITUS WITH FOOT ULCER: ICD-10-CM

## 2024-08-27 DIAGNOSIS — E11.49 TYPE 2 DIABETES MELLITUS WITH OTHER DIABETIC NEUROLOGICAL COMPLICATION: ICD-10-CM

## 2024-08-27 DIAGNOSIS — M86.9 OSTEOMYELITIS, UNSPECIFIED: ICD-10-CM

## 2024-08-27 DIAGNOSIS — L97.522 NON-PRESSURE CHRONIC ULCER OF OTHER PART OF LEFT FOOT WITH FAT LAYER EXPOSED: ICD-10-CM

## 2024-08-27 DIAGNOSIS — F17.210 NICOTINE DEPENDENCE, CIGARETTES, UNCOMPLICATED: ICD-10-CM

## 2024-08-27 DIAGNOSIS — Z79.4 LONG TERM (CURRENT) USE OF INSULIN: ICD-10-CM

## 2024-08-27 DIAGNOSIS — I10 ESSENTIAL (PRIMARY) HYPERTENSION: ICD-10-CM

## 2024-09-04 DIAGNOSIS — M86.9 OSTEOMYELITIS, UNSPECIFIED: ICD-10-CM

## 2024-09-04 DIAGNOSIS — F17.210 NICOTINE DEPENDENCE, CIGARETTES, UNCOMPLICATED: ICD-10-CM

## 2024-09-04 DIAGNOSIS — I73.9 PERIPHERAL VASCULAR DISEASE, UNSPECIFIED: ICD-10-CM

## 2024-09-04 DIAGNOSIS — L97.522 NON-PRESSURE CHRONIC ULCER OF OTHER PART OF LEFT FOOT WITH FAT LAYER EXPOSED: ICD-10-CM

## 2024-09-04 DIAGNOSIS — B20 HUMAN IMMUNODEFICIENCY VIRUS [HIV] DISEASE: ICD-10-CM

## 2024-09-04 DIAGNOSIS — E11.621 TYPE 2 DIABETES MELLITUS WITH FOOT ULCER: ICD-10-CM

## 2024-09-04 DIAGNOSIS — I10 ESSENTIAL (PRIMARY) HYPERTENSION: ICD-10-CM

## 2024-09-04 DIAGNOSIS — Z79.4 LONG TERM (CURRENT) USE OF INSULIN: ICD-10-CM

## 2024-09-04 DIAGNOSIS — E11.49 TYPE 2 DIABETES MELLITUS WITH OTHER DIABETIC NEUROLOGICAL COMPLICATION: ICD-10-CM

## 2024-09-06 ENCOUNTER — NON-APPOINTMENT (OUTPATIENT)
Age: 64
End: 2024-09-06

## 2024-09-09 ENCOUNTER — OUTPATIENT (OUTPATIENT)
Dept: OUTPATIENT SERVICES | Facility: HOSPITAL | Age: 64
LOS: 1 days | End: 2024-09-09
Payer: MEDICAID

## 2024-09-09 DIAGNOSIS — Z98.1 ARTHRODESIS STATUS: Chronic | ICD-10-CM

## 2024-09-09 DIAGNOSIS — L97.522 NON-PRESSURE CHRONIC ULCER OF OTHER PART OF LEFT FOOT WITH FAT LAYER EXPOSED: ICD-10-CM

## 2024-09-09 PROCEDURE — 11042 DBRDMT SUBQ TIS 1ST 20SQCM/<: CPT

## 2024-09-12 NOTE — PHYSICAL THERAPY INITIAL EVALUATION ADULT - PERTINENT HX OF CURRENT PROBLEM, REHAB EVAL
Please let the patient know that her labs are normal.  Thyroid level also normalized so she can continue with same dose Synthroid 50 mcg daily.
Pt is CT82F with PMH of Jose's esophagitis presents with 3d of abdominal pain a/w N, and chills. No fevers, or vomiting. She has also had poor PO intake over the last few days 2/2 abdominal and back pain. CT negative for CVA

## 2024-09-16 ENCOUNTER — OUTPATIENT (OUTPATIENT)
Dept: OUTPATIENT SERVICES | Facility: HOSPITAL | Age: 64
LOS: 1 days | End: 2024-09-16
Payer: MEDICAID

## 2024-09-16 DIAGNOSIS — Z98.1 ARTHRODESIS STATUS: Chronic | ICD-10-CM

## 2024-09-16 DIAGNOSIS — L97.522 NON-PRESSURE CHRONIC ULCER OF OTHER PART OF LEFT FOOT WITH FAT LAYER EXPOSED: ICD-10-CM

## 2024-09-16 PROCEDURE — 11042 DBRDMT SUBQ TIS 1ST 20SQCM/<: CPT

## 2024-09-18 DIAGNOSIS — I73.9 PERIPHERAL VASCULAR DISEASE, UNSPECIFIED: ICD-10-CM

## 2024-09-18 DIAGNOSIS — M86.9 OSTEOMYELITIS, UNSPECIFIED: ICD-10-CM

## 2024-09-18 DIAGNOSIS — E11.49 TYPE 2 DIABETES MELLITUS WITH OTHER DIABETIC NEUROLOGICAL COMPLICATION: ICD-10-CM

## 2024-09-18 DIAGNOSIS — E11.621 TYPE 2 DIABETES MELLITUS WITH FOOT ULCER: ICD-10-CM

## 2024-09-18 DIAGNOSIS — L97.522 NON-PRESSURE CHRONIC ULCER OF OTHER PART OF LEFT FOOT WITH FAT LAYER EXPOSED: ICD-10-CM

## 2024-09-18 DIAGNOSIS — Z79.4 LONG TERM (CURRENT) USE OF INSULIN: ICD-10-CM

## 2024-09-18 DIAGNOSIS — F17.210 NICOTINE DEPENDENCE, CIGARETTES, UNCOMPLICATED: ICD-10-CM

## 2024-09-18 DIAGNOSIS — B20 HUMAN IMMUNODEFICIENCY VIRUS [HIV] DISEASE: ICD-10-CM

## 2024-09-18 DIAGNOSIS — I10 ESSENTIAL (PRIMARY) HYPERTENSION: ICD-10-CM

## 2024-09-23 ENCOUNTER — OUTPATIENT (OUTPATIENT)
Dept: OUTPATIENT SERVICES | Facility: HOSPITAL | Age: 64
LOS: 1 days | End: 2024-09-23
Payer: MEDICAID

## 2024-09-23 DIAGNOSIS — Z98.1 ARTHRODESIS STATUS: Chronic | ICD-10-CM

## 2024-09-23 DIAGNOSIS — L97.522 NON-PRESSURE CHRONIC ULCER OF OTHER PART OF LEFT FOOT WITH FAT LAYER EXPOSED: ICD-10-CM

## 2024-09-23 PROCEDURE — 11042 DBRDMT SUBQ TIS 1ST 20SQCM/<: CPT

## 2024-09-25 DIAGNOSIS — I10 ESSENTIAL (PRIMARY) HYPERTENSION: ICD-10-CM

## 2024-09-25 DIAGNOSIS — Z79.4 LONG TERM (CURRENT) USE OF INSULIN: ICD-10-CM

## 2024-09-25 DIAGNOSIS — E11.621 TYPE 2 DIABETES MELLITUS WITH FOOT ULCER: ICD-10-CM

## 2024-09-25 DIAGNOSIS — B20 HUMAN IMMUNODEFICIENCY VIRUS [HIV] DISEASE: ICD-10-CM

## 2024-09-25 DIAGNOSIS — L97.522 NON-PRESSURE CHRONIC ULCER OF OTHER PART OF LEFT FOOT WITH FAT LAYER EXPOSED: ICD-10-CM

## 2024-09-25 DIAGNOSIS — F17.210 NICOTINE DEPENDENCE, CIGARETTES, UNCOMPLICATED: ICD-10-CM

## 2024-09-25 DIAGNOSIS — I73.9 PERIPHERAL VASCULAR DISEASE, UNSPECIFIED: ICD-10-CM

## 2024-09-25 DIAGNOSIS — E11.49 TYPE 2 DIABETES MELLITUS WITH OTHER DIABETIC NEUROLOGICAL COMPLICATION: ICD-10-CM

## 2024-09-29 NOTE — CONSULT NOTE ADULT - SUBJECTIVE AND OBJECTIVE BOX
Date of Consult:  Chief Complaint : 63 y/o male with PMHx of HTN, DM, chronic back pain, anxiety, HIV infection presents to the ED c/o right leg and foot pain x9 days, now with fever. Denies any recent trauma or injury. Pt able to ambulate with pain. Pt states he doesn't take any medications, states he does not have a PCP, has not been to the [facility]. Pt states he thinks he might have stepped on a piece of glass, but says he took it out the same day, at the area of the right forefoot where he now feels pain. Patient says that he has chills. Patient says that he is type 2 DM but has feeling to his feet, no neuropathy. Patient denies any other pedal complaints at this time.     REVIEW OF SYSTEMS:  All other review of systems is negative unless indicated above    PMH: Hypertension    Diabetes    Back ache    Head trauma    Insomnia    Anxiety    DM (diabetes mellitus)    Chronic back pain    DM (diabetes mellitus)    HIV infection      PSH:S/P lumbar fusion    No significant past surgical history        Allergies:No Known Allergies      Vitals  T(F): 100.2 (03-14-23 @ 10:32), Max: 100.2 (03-14-23 @ 10:32)  HR: 99 (03-14-23 @ 10:32) (99 - 99)  BP: 155/69 (03-14-23 @ 10:32) (155/69 - 155/69)  RR: 18 (03-14-23 @ 10:32) (18 - 18)  SpO2: 94% (03-14-23 @ 10:32) (94% - 94%)  Wt(kg): --    MEDICATIONS  (STANDING):  piperacillin/tazobactam IVPB... 3.375 Gram(s) IV Intermittent once  vancomycin  IVPB. 1000 milliGRAM(s) IV Intermittent once    MEDICATIONS  (PRN):      Physical Exam:   Constitutiona: NAD, alert;  Derm:  Skin warm, dry and supple bilateral.    Erythema noted to the right foot marked  Hyperkeratotic tissue noted at the right plantar forefoot, with + fluctance noted underneath, and pain on palpation.   Vascular: Dorsalis Pedis and Posterior Tibial pulses 2/4.  Capillary re-fill time less then 3 seconds digits 1-5 bilateral.    Neuro: Protective sensation intact to the level of the digits bilateral.  MSK: Muscle strength 5/5 in all major muscle groups of Right lower extremity. 5/5 in all muscle groups of LLE;  .        Labs:                          11.9   11.26 )-----------( 272      ( 14 Mar 2023 12:49 )             36.0     WBC Trend  11.26<H> Date (03-14 @ 12:49)      Chem  03-14    133<L>  |  99  |  17  ----------------------------<  291<H>  4.3   |  28  |  1.13    Ca    9.1      14 Mar 2023 12:49    TPro  8.0  /  Alb  2.1<L>  /  TBili  0.4  /  DBili  x   /  AST  12<L>  /  ALT  12  /  AlkPhos  68  03-14    Rad/EKG  < from: Xray Foot AP + Lateral + Oblique, Right (03.14.23 @ 12:40) >  IMPRESSION:    Cortical irregularity at the first MTP medially, osteomyelitis cannot be   excluded    Soft tissue irregularity at the distal first 3 digits. If there is   continued clinical concern follow-up MRI can be ordered    --- End of Report ---    < end of copied text >        Date of Consult: 3/14/23  Chief Complaint : 61 y/o male with PMHx of HTN, DM, chronic back pain, anxiety, HIV infection presents to the ED c/o right leg and foot pain x9 days, now with fever. Denies any recent trauma or injury. Pt able to ambulate with pain. Pt states he doesn't take any medications, states he does not have a PCP, has not been to the [facility]. Pt states he thinks he might have stepped on a piece of glass, but says he took it out the same day, at the area of the right forefoot where he now feels pain. Patient says that he has chills. Patient says that he is type 2 DM but has feeling to his feet, no neuropathy. Patient denies any other pedal complaints at this time.     REVIEW OF SYSTEMS:  All other review of systems is negative unless indicated above    PMH: Hypertension    Diabetes    Back ache    Head trauma    Insomnia    Anxiety    DM (diabetes mellitus)    Chronic back pain    DM (diabetes mellitus)    HIV infection      PSH:S/P lumbar fusion    No significant past surgical history        Allergies:No Known Allergies      Vitals  T(F): 100.2 (03-14-23 @ 10:32), Max: 100.2 (03-14-23 @ 10:32)  HR: 99 (03-14-23 @ 10:32) (99 - 99)  BP: 155/69 (03-14-23 @ 10:32) (155/69 - 155/69)  RR: 18 (03-14-23 @ 10:32) (18 - 18)  SpO2: 94% (03-14-23 @ 10:32) (94% - 94%)  Wt(kg): --    MEDICATIONS  (STANDING):  piperacillin/tazobactam IVPB... 3.375 Gram(s) IV Intermittent once  vancomycin  IVPB. 1000 milliGRAM(s) IV Intermittent once    MEDICATIONS  (PRN):      Physical Exam:   Constitutiona: NAD, alert;  Derm:  Skin warm, dry and supple bilateral.    Erythema noted to the right foot marked  Hyperkeratotic tissue noted at the right plantar forefoot, with + fluctance noted underneath, and pain on palpation.   Vascular: Dorsalis Pedis and Posterior Tibial pulses 2/4.  Capillary re-fill time less then 3 seconds digits 1-5 bilateral.    Neuro: Protective sensation intact to the level of the digits bilateral.  MSK: Muscle strength 5/5 in all major muscle groups of Right lower extremity. 5/5 in all muscle groups of LLE;  .        Labs:                          11.9   11.26 )-----------( 272      ( 14 Mar 2023 12:49 )             36.0     WBC Trend  11.26<H> Date (03-14 @ 12:49)      Chem  03-14    133<L>  |  99  |  17  ----------------------------<  291<H>  4.3   |  28  |  1.13    Ca    9.1      14 Mar 2023 12:49    TPro  8.0  /  Alb  2.1<L>  /  TBili  0.4  /  DBili  x   /  AST  12<L>  /  ALT  12  /  AlkPhos  68  03-14    Rad/EKG  < from: Xray Foot AP + Lateral + Oblique, Right (03.14.23 @ 12:40) >  IMPRESSION:    Cortical irregularity at the first MTP medially, osteomyelitis cannot be   excluded    Soft tissue irregularity at the distal first 3 digits. If there is   continued clinical concern follow-up MRI can be ordered    --- End of Report ---    < end of copied text >        hide

## 2024-09-30 ENCOUNTER — OUTPATIENT (OUTPATIENT)
Dept: OUTPATIENT SERVICES | Facility: HOSPITAL | Age: 64
LOS: 1 days | End: 2024-09-30
Payer: MEDICAID

## 2024-09-30 DIAGNOSIS — E11.49 TYPE 2 DIABETES MELLITUS WITH OTHER DIABETIC NEUROLOGICAL COMPLICATION: ICD-10-CM

## 2024-09-30 DIAGNOSIS — M86.9 OSTEOMYELITIS, UNSPECIFIED: ICD-10-CM

## 2024-09-30 DIAGNOSIS — Z79.4 LONG TERM (CURRENT) USE OF INSULIN: ICD-10-CM

## 2024-09-30 DIAGNOSIS — Z98.1 ARTHRODESIS STATUS: Chronic | ICD-10-CM

## 2024-09-30 DIAGNOSIS — L97.522 NON-PRESSURE CHRONIC ULCER OF OTHER PART OF LEFT FOOT WITH FAT LAYER EXPOSED: ICD-10-CM

## 2024-09-30 DIAGNOSIS — I10 ESSENTIAL (PRIMARY) HYPERTENSION: ICD-10-CM

## 2024-09-30 PROCEDURE — 99212 OFFICE O/P EST SF 10 MIN: CPT

## 2024-10-01 DIAGNOSIS — E11.49 TYPE 2 DIABETES MELLITUS WITH OTHER DIABETIC NEUROLOGICAL COMPLICATION: ICD-10-CM

## 2024-10-01 DIAGNOSIS — M86.9 OSTEOMYELITIS, UNSPECIFIED: ICD-10-CM

## 2024-10-01 DIAGNOSIS — F17.210 NICOTINE DEPENDENCE, CIGARETTES, UNCOMPLICATED: ICD-10-CM

## 2024-10-01 DIAGNOSIS — I73.9 PERIPHERAL VASCULAR DISEASE, UNSPECIFIED: ICD-10-CM

## 2024-10-01 DIAGNOSIS — Z79.4 LONG TERM (CURRENT) USE OF INSULIN: ICD-10-CM

## 2024-10-01 DIAGNOSIS — L97.522 NON-PRESSURE CHRONIC ULCER OF OTHER PART OF LEFT FOOT WITH FAT LAYER EXPOSED: ICD-10-CM

## 2024-10-01 DIAGNOSIS — E11.621 TYPE 2 DIABETES MELLITUS WITH FOOT ULCER: ICD-10-CM

## 2024-10-01 DIAGNOSIS — I10 ESSENTIAL (PRIMARY) HYPERTENSION: ICD-10-CM

## 2024-10-01 DIAGNOSIS — B20 HUMAN IMMUNODEFICIENCY VIRUS [HIV] DISEASE: ICD-10-CM

## 2024-10-04 DIAGNOSIS — B20 HUMAN IMMUNODEFICIENCY VIRUS [HIV] DISEASE: ICD-10-CM

## 2024-10-04 DIAGNOSIS — E11.49 TYPE 2 DIABETES MELLITUS WITH OTHER DIABETIC NEUROLOGICAL COMPLICATION: ICD-10-CM

## 2024-10-04 DIAGNOSIS — L97.522 NON-PRESSURE CHRONIC ULCER OF OTHER PART OF LEFT FOOT WITH FAT LAYER EXPOSED: ICD-10-CM

## 2024-10-04 DIAGNOSIS — F17.210 NICOTINE DEPENDENCE, CIGARETTES, UNCOMPLICATED: ICD-10-CM

## 2024-10-04 DIAGNOSIS — I73.9 PERIPHERAL VASCULAR DISEASE, UNSPECIFIED: ICD-10-CM
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
## 2024-10-18 ENCOUNTER — OUTPATIENT (OUTPATIENT)
Dept: OUTPATIENT SERVICES | Facility: HOSPITAL | Age: 64
LOS: 1 days | End: 2024-10-18

## 2024-10-18 DIAGNOSIS — L97.522 NON-PRESSURE CHRONIC ULCER OF OTHER PART OF LEFT FOOT WITH FAT LAYER EXPOSED: ICD-10-CM

## 2024-10-18 DIAGNOSIS — Z98.1 ARTHRODESIS STATUS: Chronic | ICD-10-CM

## 2024-10-18 PROCEDURE — 99211 OFF/OP EST MAY X REQ PHY/QHP: CPT

## 2024-10-24 DIAGNOSIS — I73.9 PERIPHERAL VASCULAR DISEASE, UNSPECIFIED: ICD-10-CM

## 2024-10-24 DIAGNOSIS — E11.49 TYPE 2 DIABETES MELLITUS WITH OTHER DIABETIC NEUROLOGICAL COMPLICATION: ICD-10-CM

## 2024-10-24 DIAGNOSIS — B20 HUMAN IMMUNODEFICIENCY VIRUS [HIV] DISEASE: ICD-10-CM

## 2024-10-24 DIAGNOSIS — I10 ESSENTIAL (PRIMARY) HYPERTENSION: ICD-10-CM

## 2024-10-24 DIAGNOSIS — Z79.4 LONG TERM (CURRENT) USE OF INSULIN: ICD-10-CM

## 2024-10-24 DIAGNOSIS — F17.210 NICOTINE DEPENDENCE, CIGARETTES, UNCOMPLICATED: ICD-10-CM

## 2024-10-24 DIAGNOSIS — M86.9 OSTEOMYELITIS, UNSPECIFIED: ICD-10-CM

## 2024-11-08 ENCOUNTER — OUTPATIENT (OUTPATIENT)
Dept: OUTPATIENT SERVICES | Facility: HOSPITAL | Age: 64
LOS: 1 days | End: 2024-11-08

## 2024-11-08 DIAGNOSIS — L97.522 NON-PRESSURE CHRONIC ULCER OF OTHER PART OF LEFT FOOT WITH FAT LAYER EXPOSED: ICD-10-CM

## 2024-11-08 DIAGNOSIS — Z98.1 ARTHRODESIS STATUS: Chronic | ICD-10-CM

## 2024-11-08 PROCEDURE — 99212 OFFICE O/P EST SF 10 MIN: CPT

## 2024-12-11 ENCOUNTER — EMERGENCY (EMERGENCY)
Facility: HOSPITAL | Age: 64
LOS: 1 days | Discharge: OTHER INSTITUTION | End: 2024-12-11
Attending: HOSPITALIST
Payer: MEDICAID

## 2024-12-11 VITALS
OXYGEN SATURATION: 96 % | SYSTOLIC BLOOD PRESSURE: 209 MMHG | WEIGHT: 177.91 LBS | DIASTOLIC BLOOD PRESSURE: 112 MMHG | RESPIRATION RATE: 19 BRPM | HEART RATE: 91 BPM | TEMPERATURE: 99 F | HEIGHT: 69 IN

## 2024-12-11 DIAGNOSIS — Z98.1 ARTHRODESIS STATUS: Chronic | ICD-10-CM
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
## 2024-12-11 DIAGNOSIS — H54.8 LEGAL BLINDNESS, AS DEFINED IN USA: ICD-10-CM

## 2024-12-11 DIAGNOSIS — S02.32XB: ICD-10-CM

## 2024-12-11 DIAGNOSIS — I10 ESSENTIAL (PRIMARY) HYPERTENSION: ICD-10-CM

## 2024-12-11 DIAGNOSIS — Z23 ENCOUNTER FOR IMMUNIZATION: ICD-10-CM

## 2024-12-11 DIAGNOSIS — R51.9 HEADACHE, UNSPECIFIED: ICD-10-CM

## 2024-12-11 DIAGNOSIS — Z21 ASYMPTOMATIC HUMAN IMMUNODEFICIENCY VIRUS [HIV] INFECTION STATUS: ICD-10-CM

## 2024-12-11 LAB
BASOPHILS # BLD AUTO: 0.04 K/UL — SIGNIFICANT CHANGE UP (ref 0–0.2)
BASOPHILS NFR BLD AUTO: 0.4 % — SIGNIFICANT CHANGE UP (ref 0–2)
EOSINOPHIL # BLD AUTO: 0.26 K/UL — SIGNIFICANT CHANGE UP (ref 0–0.5)
EOSINOPHIL NFR BLD AUTO: 2.5 % — SIGNIFICANT CHANGE UP (ref 0–6)
HCT VFR BLD CALC: 28.9 % — LOW (ref 39–50)
HGB BLD-MCNC: 9.3 G/DL — LOW (ref 13–17)
IMM GRANULOCYTES NFR BLD AUTO: 0.8 % — SIGNIFICANT CHANGE UP (ref 0–0.9)
LYMPHOCYTES # BLD AUTO: 1.68 K/UL — SIGNIFICANT CHANGE UP (ref 1–3.3)
LYMPHOCYTES # BLD AUTO: 15.8 % — SIGNIFICANT CHANGE UP (ref 13–44)
MCHC RBC-ENTMCNC: 30.4 PG — SIGNIFICANT CHANGE UP (ref 27–34)
MCHC RBC-ENTMCNC: 32.2 G/DL — SIGNIFICANT CHANGE UP (ref 32–36)
MCV RBC AUTO: 94.4 FL — SIGNIFICANT CHANGE UP (ref 80–100)
MONOCYTES # BLD AUTO: 0.89 K/UL — SIGNIFICANT CHANGE UP (ref 0–0.9)
MONOCYTES NFR BLD AUTO: 8.4 % — SIGNIFICANT CHANGE UP (ref 2–14)
NEUTROPHILS # BLD AUTO: 7.66 K/UL — HIGH (ref 1.8–7.4)
NEUTROPHILS NFR BLD AUTO: 72.1 % — SIGNIFICANT CHANGE UP (ref 43–77)
PLATELET # BLD AUTO: 232 K/UL — SIGNIFICANT CHANGE UP (ref 150–400)
RBC # BLD: 3.06 M/UL — LOW (ref 4.2–5.8)
RBC # FLD: 13.2 % — SIGNIFICANT CHANGE UP (ref 10.3–14.5)
WBC # BLD: 10.61 K/UL — HIGH (ref 3.8–10.5)
WBC # FLD AUTO: 10.61 K/UL — HIGH (ref 3.8–10.5)

## 2024-12-11 PROCEDURE — 80053 COMPREHEN METABOLIC PANEL: CPT

## 2024-12-11 PROCEDURE — 71045 X-RAY EXAM CHEST 1 VIEW: CPT

## 2024-12-11 PROCEDURE — 76376 3D RENDER W/INTRP POSTPROCES: CPT

## 2024-12-11 PROCEDURE — 70486 CT MAXILLOFACIAL W/O DYE: CPT | Mod: 26,MC

## 2024-12-11 PROCEDURE — 90715 TDAP VACCINE 7 YRS/> IM: CPT

## 2024-12-11 PROCEDURE — 93005 ELECTROCARDIOGRAM TRACING: CPT

## 2024-12-11 PROCEDURE — 84484 ASSAY OF TROPONIN QUANT: CPT

## 2024-12-11 PROCEDURE — 90471 IMMUNIZATION ADMIN: CPT

## 2024-12-11 PROCEDURE — 99285 EMERGENCY DEPT VISIT HI MDM: CPT | Mod: 25

## 2024-12-11 PROCEDURE — 99291 CRITICAL CARE FIRST HOUR: CPT | Mod: 25

## 2024-12-11 PROCEDURE — 36415 COLL VENOUS BLD VENIPUNCTURE: CPT

## 2024-12-11 PROCEDURE — 72125 CT NECK SPINE W/O DYE: CPT | Mod: 26,MC

## 2024-12-11 PROCEDURE — 71045 X-RAY EXAM CHEST 1 VIEW: CPT | Mod: 26

## 2024-12-11 PROCEDURE — 72125 CT NECK SPINE W/O DYE: CPT | Mod: MC

## 2024-12-11 PROCEDURE — 70450 CT HEAD/BRAIN W/O DYE: CPT | Mod: 26,MC

## 2024-12-11 PROCEDURE — 85025 COMPLETE CBC W/AUTO DIFF WBC: CPT

## 2024-12-11 PROCEDURE — 76376 3D RENDER W/INTRP POSTPROCES: CPT | Mod: 26

## 2024-12-11 PROCEDURE — 70450 CT HEAD/BRAIN W/O DYE: CPT | Mod: MC

## 2024-12-11 PROCEDURE — 12013 RPR F/E/E/N/L/M 2.6-5.0 CM: CPT

## 2024-12-11 PROCEDURE — 70486 CT MAXILLOFACIAL W/O DYE: CPT | Mod: MC

## 2024-12-12 ENCOUNTER — EMERGENCY (EMERGENCY)
Facility: HOSPITAL | Age: 64
LOS: 1 days | Discharge: HOME | End: 2024-12-12
Attending: STUDENT IN AN ORGANIZED HEALTH CARE EDUCATION/TRAINING PROGRAM | Admitting: STUDENT IN AN ORGANIZED HEALTH CARE EDUCATION/TRAINING PROGRAM
Payer: MEDICAID

## 2024-12-12 ENCOUNTER — NON-APPOINTMENT (OUTPATIENT)
Age: 64
End: 2024-12-12

## 2024-12-12 VITALS
RESPIRATION RATE: 16 BRPM | HEIGHT: 69 IN | OXYGEN SATURATION: 95 % | SYSTOLIC BLOOD PRESSURE: 170 MMHG | DIASTOLIC BLOOD PRESSURE: 98 MMHG | TEMPERATURE: 99 F | WEIGHT: 177.03 LBS | HEART RATE: 73 BPM

## 2024-12-12 VITALS
DIASTOLIC BLOOD PRESSURE: 84 MMHG | SYSTOLIC BLOOD PRESSURE: 181 MMHG | HEART RATE: 70 BPM | OXYGEN SATURATION: 97 % | RESPIRATION RATE: 20 BRPM | TEMPERATURE: 98 F

## 2024-12-12 VITALS
TEMPERATURE: 98 F | RESPIRATION RATE: 16 BRPM | HEART RATE: 75 BPM | OXYGEN SATURATION: 94 % | SYSTOLIC BLOOD PRESSURE: 154 MMHG | DIASTOLIC BLOOD PRESSURE: 881 MMHG

## 2024-12-12 DIAGNOSIS — Z98.1 ARTHRODESIS STATUS: Chronic | ICD-10-CM

## 2024-12-12 DIAGNOSIS — S02.0XXA FRACTURE OF VAULT OF SKULL, INITIAL ENCOUNTER FOR CLOSED FRACTURE: ICD-10-CM

## 2024-12-12 LAB
4/8 RATIO: 1.02 RATIO — SIGNIFICANT CHANGE UP (ref 0.9–3.6)
ABS CD8: 655 CELLS/UL — SIGNIFICANT CHANGE UP (ref 142–740)
ALBUMIN SERPL ELPH-MCNC: 2 G/DL — LOW (ref 3.3–5)
ALP SERPL-CCNC: 62 U/L — SIGNIFICANT CHANGE UP (ref 40–120)
ALT FLD-CCNC: 13 U/L — SIGNIFICANT CHANGE UP (ref 12–78)
ANION GAP SERPL CALC-SCNC: 3 MMOL/L — LOW (ref 5–17)
APTT BLD: 31.7 SEC — SIGNIFICANT CHANGE UP (ref 24.5–35.6)
AST SERPL-CCNC: 14 U/L — LOW (ref 15–37)
BASOPHILS # BLD AUTO: 0.03 K/UL — SIGNIFICANT CHANGE UP (ref 0–0.2)
BASOPHILS NFR BLD AUTO: 0.3 % — SIGNIFICANT CHANGE UP (ref 0–2)
BILIRUB SERPL-MCNC: 0.2 MG/DL — SIGNIFICANT CHANGE UP (ref 0.2–1.2)
BLD GP AB SCN SERPL QL: NEGATIVE — SIGNIFICANT CHANGE UP
BUN SERPL-MCNC: 32 MG/DL — HIGH (ref 7–23)
CALCIUM SERPL-MCNC: 8.7 MG/DL — SIGNIFICANT CHANGE UP (ref 8.5–10.1)
CD16+CD56+ CELLS NFR BLD: 7 % — SIGNIFICANT CHANGE UP (ref 5–23)
CD16+CD56+ CELLS NFR SPEC: 146 CELLS/UL — SIGNIFICANT CHANGE UP (ref 71–410)
CD19 BLASTS SPEC-ACNC: 27 % — HIGH (ref 6–24)
CD19 BLASTS SPEC-ACNC: 555 CELLS/UL — HIGH (ref 84–469)
CD3 BLASTS SPEC-ACNC: 1347 CELLS/UL — SIGNIFICANT CHANGE UP (ref 672–1870)
CD3 BLASTS SPEC-ACNC: 65 % — SIGNIFICANT CHANGE UP (ref 59–83)
CD4 %: 32 % — SIGNIFICANT CHANGE UP (ref 30–62)
CD8 %: 31 % — SIGNIFICANT CHANGE UP (ref 12–36)
CHLORIDE SERPL-SCNC: 109 MMOL/L — HIGH (ref 96–108)
CO2 SERPL-SCNC: 27 MMOL/L — SIGNIFICANT CHANGE UP (ref 22–31)
CREAT SERPL-MCNC: 2.72 MG/DL — HIGH (ref 0.5–1.3)
EGFR: 25 ML/MIN/1.73M2 — LOW
EGFR: 25 ML/MIN/1.73M2 — LOW
EOSINOPHIL # BLD AUTO: 0.15 K/UL — SIGNIFICANT CHANGE UP (ref 0–0.5)
EOSINOPHIL NFR BLD AUTO: 1.4 % — SIGNIFICANT CHANGE UP (ref 0–6)
GLUCOSE SERPL-MCNC: 190 MG/DL — HIGH (ref 70–99)
HCT VFR BLD CALC: 30.4 % — LOW (ref 39–50)
HGB BLD-MCNC: 9.9 G/DL — LOW (ref 13–17)
IANC: 7.79 K/UL — HIGH (ref 1.8–7.4)
IMM GRANULOCYTES NFR BLD AUTO: 0.5 % — SIGNIFICANT CHANGE UP (ref 0–0.9)
INR BLD: 0.99 RATIO — SIGNIFICANT CHANGE UP (ref 0.85–1.16)
LYMPHOCYTES # BLD AUTO: 1.57 K/UL — SIGNIFICANT CHANGE UP (ref 1–3.3)
LYMPHOCYTES # BLD AUTO: 15 % — SIGNIFICANT CHANGE UP (ref 13–44)
MCHC RBC-ENTMCNC: 30.1 PG — SIGNIFICANT CHANGE UP (ref 27–34)
MCHC RBC-ENTMCNC: 32.6 G/DL — SIGNIFICANT CHANGE UP (ref 32–36)
MCV RBC AUTO: 92.4 FL — SIGNIFICANT CHANGE UP (ref 80–100)
MONOCYTES # BLD AUTO: 0.91 K/UL — HIGH (ref 0–0.9)
MONOCYTES NFR BLD AUTO: 8.7 % — SIGNIFICANT CHANGE UP (ref 2–14)
NEUTROPHILS # BLD AUTO: 7.79 K/UL — HIGH (ref 1.8–7.4)
NEUTROPHILS NFR BLD AUTO: 74.1 % — SIGNIFICANT CHANGE UP (ref 43–77)
NRBC # BLD AUTO: 0 K/UL — SIGNIFICANT CHANGE UP (ref 0–0)
NRBC # BLD: 0 /100 WBCS — SIGNIFICANT CHANGE UP (ref 0–0)
NRBC # FLD: 0 K/UL — SIGNIFICANT CHANGE UP (ref 0–0)
NRBC BLD-RTO: 0 /100 WBCS — SIGNIFICANT CHANGE UP (ref 0–0)
PLATELET # BLD AUTO: 246 K/UL — SIGNIFICANT CHANGE UP (ref 150–400)
POTASSIUM SERPL-MCNC: 4.1 MMOL/L — SIGNIFICANT CHANGE UP (ref 3.5–5.3)
POTASSIUM SERPL-SCNC: 4.1 MMOL/L — SIGNIFICANT CHANGE UP (ref 3.5–5.3)
PROT SERPL-MCNC: 6.8 GM/DL — SIGNIFICANT CHANGE UP (ref 6–8.3)
PROTHROM AB SERPL-ACNC: 11.8 SEC — SIGNIFICANT CHANGE UP (ref 9.9–13.4)
RBC # BLD: 3.29 M/UL — LOW (ref 4.2–5.8)
RBC # FLD: 13.2 % — SIGNIFICANT CHANGE UP (ref 10.3–14.5)
RH IG SCN BLD-IMP: POSITIVE — SIGNIFICANT CHANGE UP
SODIUM SERPL-SCNC: 139 MMOL/L — SIGNIFICANT CHANGE UP (ref 135–145)
T-CELL CD4 SUBSET PNL BLD: 669 CELLS/UL — SIGNIFICANT CHANGE UP (ref 489–1457)
TROPONIN I, HIGH SENSITIVITY RESULT: 8.58 NG/L — SIGNIFICANT CHANGE UP
WBC # BLD: 10.5 K/UL — SIGNIFICANT CHANGE UP (ref 3.8–10.5)
WBC # FLD AUTO: 10.5 K/UL — SIGNIFICANT CHANGE UP (ref 3.8–10.5)

## 2024-12-12 PROCEDURE — 93010 ELECTROCARDIOGRAM REPORT: CPT

## 2024-12-12 PROCEDURE — 70486 CT MAXILLOFACIAL W/O DYE: CPT | Mod: 26,MC

## 2024-12-12 PROCEDURE — 99283 EMERGENCY DEPT VISIT LOW MDM: CPT

## 2024-12-12 PROCEDURE — 99285 EMERGENCY DEPT VISIT HI MDM: CPT

## 2024-12-12 RX ORDER — AMOXICILLIN AND CLAVULANATE POTASSIUM 500; 125 MG/1; MG/1
1 TABLET, FILM COATED ORAL ONCE
Refills: 0 | Status: COMPLETED | OUTPATIENT
Start: 2024-12-12 | End: 2024-12-12

## 2024-12-12 RX ORDER — METOPROLOL SUCCINATE 50 MG/1
50 TABLET, EXTENDED RELEASE ORAL ONCE
Refills: 0 | Status: COMPLETED | OUTPATIENT
Start: 2024-12-12 | End: 2024-12-12

## 2024-12-12 RX ORDER — ACETAMINOPHEN 500 MG/5ML
1000 LIQUID (ML) ORAL ONCE
Refills: 0 | Status: COMPLETED | OUTPATIENT
Start: 2024-12-12 | End: 2024-12-12

## 2024-12-12 RX ADMIN — Medication 500 MILLILITER(S): at 09:00

## 2024-12-12 RX ADMIN — AMOXICILLIN AND CLAVULANATE POTASSIUM 1 TABLET(S): 500; 125 TABLET, FILM COATED ORAL at 01:26

## 2024-12-12 RX ADMIN — Medication 400 MILLIGRAM(S): at 05:11

## 2024-12-12 RX ADMIN — METOPROLOL SUCCINATE 50 MILLIGRAM(S): 50 TABLET, EXTENDED RELEASE ORAL at 00:10

## 2024-12-16 ENCOUNTER — EMERGENCY (EMERGENCY)
Facility: HOSPITAL | Age: 64
LOS: 1 days | Discharge: HOME | End: 2024-12-16
Attending: STUDENT IN AN ORGANIZED HEALTH CARE EDUCATION/TRAINING PROGRAM | Admitting: STUDENT IN AN ORGANIZED HEALTH CARE EDUCATION/TRAINING PROGRAM
Payer: MEDICAID

## 2024-12-16 VITALS
HEART RATE: 70 BPM | TEMPERATURE: 98 F | SYSTOLIC BLOOD PRESSURE: 166 MMHG | DIASTOLIC BLOOD PRESSURE: 80 MMHG | WEIGHT: 190.04 LBS | OXYGEN SATURATION: 99 % | RESPIRATION RATE: 18 BRPM | HEIGHT: 69 IN

## 2024-12-16 DIAGNOSIS — Z98.1 ARTHRODESIS STATUS: Chronic | ICD-10-CM

## 2024-12-16 PROCEDURE — 99284 EMERGENCY DEPT VISIT MOD MDM: CPT

## 2024-12-16 RX ORDER — OXYCODONE AND ACETAMINOPHEN 5; 325 MG/1; MG/1
1 TABLET ORAL ONCE
Refills: 0 | Status: DISCONTINUED | OUTPATIENT
Start: 2024-12-16 | End: 2024-12-16

## 2024-12-16 RX ADMIN — OXYCODONE AND ACETAMINOPHEN 1 TABLET(S): 5; 325 TABLET ORAL at 13:13

## 2024-12-16 NOTE — PROVIDER CONTACT NOTE (OTHER) - SITUATION
Per provider, patient is medically cleared for discharge and is able to return to previous residence, [facility] [address] [place] [address]. Patient is AOx4 and ambulatory.

## 2024-12-16 NOTE — ED PROVIDER NOTE - NSFOLLOWUPINSTRUCTIONS_ED_ALL_ED_FT
You were seen today in the emergency department.    [facility] [place] [facility] [place]18 at 08:30 AM. This is located on the first floor of [facility].

## 2024-12-16 NOTE — ED PROVIDER NOTE - CLINICAL SUMMARY MEDICAL DECISION MAKING FREE TEXT BOX
63 y/o M recently seen in this ED for orbital floor fracture presents for follow-up. Patient states he is following up for possible surgery on the eye. Endorses pain in the L jaz-orbital region, blindness at baseline for patient given h/o cataracts. Per documentation, patient was told to follow-up in respective clinics for OMFS, ophtho, and NSGY. Patient returning to ED and is unclear who told him to come here. Spoke with OMFS who stated patient is supposed to follow-up as an outpatient. Will provide pain control, arrange outpatient services, anticipate d/c home.
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
## 2024-12-16 NOTE — ED PROVIDER NOTE - CARE PROVIDER_API CALL
Deion Perez  Oral/Maxillofacial Surgery  [address] [place] [id number]  Phone: [phone]  Fax: [phone]  Scheduled Appointment: 12/18/2024 08:30 AM
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
## 2024-12-16 NOTE — ED ADULT NURSE NOTE - OBJECTIVE STATEMENT
Pt arrives to intake. Pt is A and Ox4 and ambulatory. Pt arrives to the ED complaining of left eye pain s/p orbital fracture. Pt endorses he has hx of cataracts so he has difficulty with his vision at baseline. Bruising noted around the left. eye. Airway is patent, respirations are even and unlabored. Pt denies chest pain, shortness of breath, headaches, dizziness, numbness and tingling, fever and chills, nausea/vomitting/diarrhea. Skin is clean, dry, intact, and appropriate for race. Pt medicated per MAR. Plan of care ongoing, safety maintained.

## 2024-12-16 NOTE — PROVIDER CONTACT NOTE (OTHER) - ACTION/TREATMENT ORDERED:
Transportation coordinated via [facility] with [employer] [phone] for 3:15pm . Inv#: [id number]. No further SW needs identified at this time.
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
## 2024-12-16 NOTE — ED PROVIDER NOTE - PATIENT PORTAL LINK FT
You can access the FollowMyHealth Patient Portal offered by [facility] by registering at the following website: [email or web address]. By joining [employer]’s FollowMyHealth portal, you will also be able to view your health information using other applications (apps) compatible with our system.
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
## 2024-12-18 ENCOUNTER — APPOINTMENT (OUTPATIENT)
Age: 64
End: 2024-12-18
Payer: MEDICAID

## 2024-12-18 PROCEDURE — 99203 OFFICE O/P NEW LOW 30 MIN: CPT

## 2024-12-23 ENCOUNTER — EMERGENCY (EMERGENCY)
Facility: HOSPITAL | Age: 64
LOS: 0 days | Discharge: HOME | End: 2024-12-23
Attending: STUDENT IN AN ORGANIZED HEALTH CARE EDUCATION/TRAINING PROGRAM
Payer: MEDICAID

## 2024-12-23 VITALS
TEMPERATURE: 98 F | HEART RATE: 72 BPM | SYSTOLIC BLOOD PRESSURE: 150 MMHG | RESPIRATION RATE: 18 BRPM | OXYGEN SATURATION: 97 % | DIASTOLIC BLOOD PRESSURE: 69 MMHG

## 2024-12-23 VITALS
TEMPERATURE: 98 F | RESPIRATION RATE: 18 BRPM | SYSTOLIC BLOOD PRESSURE: 188 MMHG | OXYGEN SATURATION: 67 % | DIASTOLIC BLOOD PRESSURE: 76 MMHG | HEIGHT: 69 IN | HEART RATE: 67 BPM

## 2024-12-23 DIAGNOSIS — E11.9 TYPE 2 DIABETES MELLITUS WITHOUT COMPLICATIONS: ICD-10-CM

## 2024-12-23 DIAGNOSIS — S09.93XA UNSPECIFIED INJURY OF FACE, INITIAL ENCOUNTER: ICD-10-CM
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
## 2024-12-23 DIAGNOSIS — Z21 ASYMPTOMATIC HUMAN IMMUNODEFICIENCY VIRUS [HIV] INFECTION STATUS: ICD-10-CM

## 2024-12-23 DIAGNOSIS — I10 ESSENTIAL (PRIMARY) HYPERTENSION: ICD-10-CM

## 2024-12-23 DIAGNOSIS — Z98.1 ARTHRODESIS STATUS: Chronic | ICD-10-CM

## 2024-12-23 PROCEDURE — 70486 CT MAXILLOFACIAL W/O DYE: CPT | Mod: MC

## 2024-12-23 PROCEDURE — 76376 3D RENDER W/INTRP POSTPROCES: CPT

## 2024-12-23 PROCEDURE — 99284 EMERGENCY DEPT VISIT MOD MDM: CPT | Mod: 25

## 2024-12-23 PROCEDURE — 99284 EMERGENCY DEPT VISIT MOD MDM: CPT

## 2024-12-23 PROCEDURE — 76376 3D RENDER W/INTRP POSTPROCES: CPT | Mod: 26

## 2024-12-23 PROCEDURE — 70486 CT MAXILLOFACIAL W/O DYE: CPT | Mod: 26,MC

## 2024-12-23 RX ORDER — ACETAMINOPHEN 500MG 500 MG/1
975 TABLET, COATED ORAL ONCE
Refills: 0 | Status: COMPLETED | OUTPATIENT
Start: 2024-12-23 | End: 2024-12-23

## 2024-12-23 RX ADMIN — ACETAMINOPHEN 500MG 975 MILLIGRAM(S): 500 TABLET, COATED ORAL at 02:31

## 2024-12-23 NOTE — ED PROVIDER NOTE - PHYSICAL EXAMINATION
Constitutional: Awake, alert, in no acute distress  Eyes: PERRL, EOM intact  HENT: Left orbit ecchymosis and left facial tenderness. no scalp tenderness or deformity, airway patent. No scute tooth damage. Poor dentition.  Neck: no cervical spine tenderness, no palpable stepoff, no tracheal deviation  CV: no chest wall tenderness, no crepitus or subcutaneous emphysema.  RRR, no murmur, 2+ distal pulses in all extremities  Pulm: non-labored respirations, CTAB  Abdomen: soft, non-tender, non-distended, no ecchymosis  Back: no spinal tenderness, no palpable stepoff  Extremities: stable pelvis, no extremity tenderness or deformity  Skin: no rash  Neuro: AAOx3, GCS 15, moving all extremities equally, no focal neurologic deficit

## 2024-12-23 NOTE — ED PROVIDER NOTE - PATIENT PORTAL LINK FT
You can access the FollowMyHealth Patient Portal offered by [facility] by registering at the following website: [email or web address]. By joining [employer]’s FollowMyHealth portal, you will also be able to view your health information using other applications (apps) compatible with our system.
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
## 2024-12-23 NOTE — ED PROVIDER NOTE - CARE PROVIDER_API CALL
[facility]  Plastic Surgery  [address] [place] [address], [address]  [place] [address]  Phone: [phone]  Fax: [phone]  Follow Up Time:
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
## 2024-12-23 NOTE — ED PROVIDER NOTE - NSFOLLOWUPINSTRUCTIONS_ED_ALL_ED_FT
Facial Fracture    WHAT YOU NEED TO KNOW:    A facial fracture is a break in one or more of the bones in your face. A facial fracture may also damage nearby tissue.  Skull    DISCHARGE INSTRUCTIONS:    Call your local emergency number (911 in the [place]) if:    You suddenly feel lightheaded and short of breath.    You have chest pain when you take a deep breath or cough.    You cough up blood.  Seek care immediately if:    You suddenly have trouble chewing or swallowing.    You have clear or pinkish fluid draining from your nose or mouth.    You have numbness in your face.    You have worsening pain in your eye or face.    You have double vision or sudden trouble seeing.    Your arm or leg feels warm, tender, and painful. It may look swollen and red.  Call your doctor if:    You are bleeding from a wound on your face.    You have questions or concerns about your condition or care.  Medicines: You may need any of the following:    Decongestants help decrease swelling in your nose and sinuses. This medicine may also help you breathe easier.    Prescription pain medicine may be given. Ask your healthcare provider how to take this medicine safely. Some prescription pain medicines contain acetaminophen. Do not take other medicines that contain acetaminophen without talking to your healthcare provider. Too much acetaminophen may cause liver damage. Prescription pain medicine may cause constipation. Ask your healthcare provider how to prevent or treat constipation.    Steroids help decrease swelling in your face.    Antibiotics help treat an infection caused by bacteria. This medicine may be given if you have an open wound.    Take your medicine as directed. Contact your healthcare provider if you think your medicine is not helping or if you have side effects. Tell your provider if you are allergic to any medicine. Keep a list of the medicines, vitamins, and herbs you take. Include the amounts, and when and why you take them. Bring the list or the pill bottles to follow-up visits. Carry your medicine list with you in case of an emergency.  Nutrition: You may not be able to eat solid food for a period of time. You may first be started on a liquid diet, starting with water, broth, gelatin, apple juice, or lemon-lime soda pop. After a few days, you may be allowed to eat soft foods, such as applesauce, bananas, cooked cereal, cottage cheese, pudding, and yogurt. Ask for more information about the type of foods you can eat.    Rehabilitation: If you had surgery to fix your facial fracture, you may need oral and facial rehabilitation. This is done to restore normal use and movement of your facial muscles. Ask for more information about rehabilitation.    Self-care:    Apply ice as directed. Ice helps decrease swelling and pain. Ice may also help prevent tissue damage. Use an ice pack, or put crushed ice in a plastic bag. Cover the bag with a towel before you place it on your skin. Apply ice for 15 to 20 minutes every hour or as directed.    Keep your head elevated. Keep your head above the level of your heart as often as you can. This will help decrease swelling and pain. Prop your upper body on pillows or blankets to keep your head elevated comfortably.    Do not put pressure on your face:  Do not sleep on the injured side of your face. Pressure may cause more damage.    Sneeze with your mouth open to decrease pressure on your broken facial bones. Too much pressure from a sneeze may cause your broken bones to move and cause more damage.    Try not to blow your nose. It may cause more damage if you have a fracture near your eye. The pressure from blowing your nose may pinch the nerve of your eye and cause permanent damage.    Clean your mouth carefully. It may be hard to clean your teeth if have an injury or fracture near your mouth. Your healthcare provider will show you the best way to do this so you do not hurt yourself. A waterpik or a child-sized soft toothbrush may work well to clean your mouth.  Follow up with your doctor as directed: Write down your questions so you remember to ask them during your visits.
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
## 2025-01-06 ENCOUNTER — APPOINTMENT (OUTPATIENT)
Dept: NEUROSURGERY | Facility: CLINIC | Age: 65
End: 2025-01-06

## 2025-01-06 VITALS
HEART RATE: 84 BPM | WEIGHT: 178 LBS | OXYGEN SATURATION: 98 % | BODY MASS INDEX: 26.36 KG/M2 | DIASTOLIC BLOOD PRESSURE: 89 MMHG | SYSTOLIC BLOOD PRESSURE: 164 MMHG | HEIGHT: 69 IN

## 2025-01-06 DIAGNOSIS — S06.9XAD UNSPECIFIED INTRACRANIAL INJURY WITH LOSS OF CONSCIOUSNESS STATUS UNKNOWN, SUBSEQUENT ENCOUNTER: ICD-10-CM

## 2025-01-06 DIAGNOSIS — S06.5XAA TRAUMATIC SUBDURAL HEMORRHAGE WITH LOSS OF CONSCIOUSNESS STATUS UNKNOWN, INITIAL ENCOUNTER: ICD-10-CM

## 2025-01-06 PROCEDURE — 99214 OFFICE O/P EST MOD 30 MIN: CPT

## 2025-01-10 ENCOUNTER — APPOINTMENT (OUTPATIENT)
Dept: CT IMAGING | Facility: CLINIC | Age: 65
End: 2025-01-10
Payer: MEDICAID

## 2025-01-10 ENCOUNTER — OUTPATIENT (OUTPATIENT)
Dept: OUTPATIENT SERVICES | Facility: HOSPITAL | Age: 65
LOS: 1 days | End: 2025-01-10
Payer: MEDICAID

## 2025-01-10 DIAGNOSIS — Z98.1 ARTHRODESIS STATUS: Chronic | ICD-10-CM

## 2025-01-10 DIAGNOSIS — S06.9XAD UNSPECIFIED INTRACRANIAL INJURY WITH LOSS OF CONSCIOUSNESS STATUS UNKNOWN, SUBSEQUENT ENCOUNTER: ICD-10-CM

## 2025-01-10 PROCEDURE — 70450 CT HEAD/BRAIN W/O DYE: CPT | Mod: 26

## 2025-01-10 PROCEDURE — 70450 CT HEAD/BRAIN W/O DYE: CPT
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
## 2025-02-10 ENCOUNTER — APPOINTMENT (OUTPATIENT)
Dept: NEUROSURGERY | Facility: CLINIC | Age: 65
End: 2025-02-10

## 2025-02-18 ENCOUNTER — EMERGENCY (EMERGENCY)
Facility: HOSPITAL | Age: 65
LOS: 1 days | Discharge: HOME | End: 2025-02-18
Attending: EMERGENCY MEDICINE
Payer: MEDICAID

## 2025-02-18 VITALS
TEMPERATURE: 98 F | HEART RATE: 80 BPM | RESPIRATION RATE: 20 BRPM | OXYGEN SATURATION: 95 % | HEIGHT: 69 IN | DIASTOLIC BLOOD PRESSURE: 101 MMHG | WEIGHT: 175.05 LBS | SYSTOLIC BLOOD PRESSURE: 198 MMHG

## 2025-02-18 DIAGNOSIS — Z98.1 ARTHRODESIS STATUS: Chronic | ICD-10-CM

## 2025-02-18 PROCEDURE — 99285 EMERGENCY DEPT VISIT HI MDM: CPT

## 2025-02-18 PROCEDURE — 93010 ELECTROCARDIOGRAM REPORT: CPT

## 2025-02-18 NOTE — ED ADULT TRIAGE NOTE - CHIEF COMPLAINT QUOTE
pt BIBA for multiple complaints c/o eye pain post op cataract surgery 1 week ago, chest pain, arm pain, hx HTN,

## 2025-02-19 VITALS
RESPIRATION RATE: 18 BRPM | HEART RATE: 75 BPM | SYSTOLIC BLOOD PRESSURE: 190 MMHG | OXYGEN SATURATION: 95 % | TEMPERATURE: 98 F | DIASTOLIC BLOOD PRESSURE: 88 MMHG

## 2025-02-19 LAB
ALBUMIN SERPL ELPH-MCNC: 2.3 G/DL — LOW (ref 3.3–5.2)
ALP SERPL-CCNC: 61 U/L — SIGNIFICANT CHANGE UP (ref 40–120)
ALT FLD-CCNC: 8 U/L — SIGNIFICANT CHANGE UP
ANION GAP SERPL CALC-SCNC: 9 MMOL/L — SIGNIFICANT CHANGE UP (ref 5–17)
AST SERPL-CCNC: 11 U/L — SIGNIFICANT CHANGE UP
BASOPHILS # BLD AUTO: 0.03 K/UL — SIGNIFICANT CHANGE UP (ref 0–0.2)
BASOPHILS NFR BLD AUTO: 0.4 % — SIGNIFICANT CHANGE UP (ref 0–2)
BILIRUB SERPL-MCNC: <0.2 MG/DL — LOW (ref 0.4–2)
BUN SERPL-MCNC: 31.8 MG/DL — HIGH (ref 8–20)
CALCIUM SERPL-MCNC: 7.7 MG/DL — LOW (ref 8.4–10.5)
CHLORIDE SERPL-SCNC: 109 MMOL/L — HIGH (ref 96–108)
CO2 SERPL-SCNC: 23 MMOL/L — SIGNIFICANT CHANGE UP (ref 22–29)
CREAT SERPL-MCNC: 3.02 MG/DL — HIGH (ref 0.5–1.3)
EGFR: 22 ML/MIN/1.73M2 — LOW
EOSINOPHIL # BLD AUTO: 0.36 K/UL — SIGNIFICANT CHANGE UP (ref 0–0.5)
EOSINOPHIL NFR BLD AUTO: 4.2 % — SIGNIFICANT CHANGE UP (ref 0–6)
GLUCOSE SERPL-MCNC: 245 MG/DL — HIGH (ref 70–99)
HCT VFR BLD CALC: 28.7 % — LOW (ref 39–50)
HGB BLD-MCNC: 9.5 G/DL — LOW (ref 13–17)
IMM GRANULOCYTES # BLD AUTO: 0.14 K/UL — HIGH (ref 0–0.07)
IMM GRANULOCYTES NFR BLD AUTO: 1.6 % — HIGH (ref 0–0.9)
LYMPHOCYTES # BLD AUTO: 1.51 K/UL — SIGNIFICANT CHANGE UP (ref 1–3.3)
LYMPHOCYTES NFR BLD AUTO: 17.6 % — SIGNIFICANT CHANGE UP (ref 13–44)
MCHC RBC-ENTMCNC: 29.8 PG — SIGNIFICANT CHANGE UP (ref 27–34)
MCHC RBC-ENTMCNC: 33.1 G/DL — SIGNIFICANT CHANGE UP (ref 32–36)
MCV RBC AUTO: 90 FL — SIGNIFICANT CHANGE UP (ref 80–100)
MONOCYTES # BLD AUTO: 0.94 K/UL — HIGH (ref 0–0.9)
MONOCYTES NFR BLD AUTO: 11 % — SIGNIFICANT CHANGE UP (ref 2–14)
NEUTROPHILS # BLD AUTO: 5.58 K/UL — SIGNIFICANT CHANGE UP (ref 1.8–7.4)
NEUTROPHILS NFR BLD AUTO: 65.2 % — SIGNIFICANT CHANGE UP (ref 43–77)
NRBC # BLD AUTO: 0 K/UL — SIGNIFICANT CHANGE UP (ref 0–0)
NRBC # FLD: 0 K/UL — SIGNIFICANT CHANGE UP (ref 0–0)
NRBC BLD AUTO-RTO: 0 /100 WBCS — SIGNIFICANT CHANGE UP (ref 0–0)
PLATELET # BLD AUTO: 248 K/UL — SIGNIFICANT CHANGE UP (ref 150–400)
PMV BLD: 9.4 FL — SIGNIFICANT CHANGE UP (ref 7–13)
POTASSIUM SERPL-MCNC: 3.8 MMOL/L — SIGNIFICANT CHANGE UP (ref 3.5–5.3)
POTASSIUM SERPL-SCNC: 3.8 MMOL/L — SIGNIFICANT CHANGE UP (ref 3.5–5.3)
PROT SERPL-MCNC: 5.6 G/DL — LOW (ref 6.6–8.7)
RBC # BLD: 3.19 M/UL — LOW (ref 4.2–5.8)
RBC # FLD: 13.7 % — SIGNIFICANT CHANGE UP (ref 10.3–14.5)
SODIUM SERPL-SCNC: 141 MMOL/L — SIGNIFICANT CHANGE UP (ref 135–145)
TROPONIN T, HIGH SENSITIVITY RESULT: 35 NG/L — SIGNIFICANT CHANGE UP (ref 0–51)
TROPONIN T, HIGH SENSITIVITY RESULT: 35 NG/L — SIGNIFICANT CHANGE UP (ref 0–51)
WBC # BLD: 8.56 K/UL — SIGNIFICANT CHANGE UP (ref 3.8–10.5)
WBC # FLD AUTO: 8.56 K/UL — SIGNIFICANT CHANGE UP (ref 3.8–10.5)

## 2025-02-19 PROCEDURE — 70480 CT ORBIT/EAR/FOSSA W/O DYE: CPT | Mod: 26

## 2025-02-19 PROCEDURE — 36415 COLL VENOUS BLD VENIPUNCTURE: CPT

## 2025-02-19 PROCEDURE — 93005 ELECTROCARDIOGRAM TRACING: CPT

## 2025-02-19 PROCEDURE — 70480 CT ORBIT/EAR/FOSSA W/O DYE: CPT | Mod: MC

## 2025-02-19 PROCEDURE — 70450 CT HEAD/BRAIN W/O DYE: CPT | Mod: 26

## 2025-02-19 PROCEDURE — 96374 THER/PROPH/DIAG INJ IV PUSH: CPT

## 2025-02-19 PROCEDURE — 71045 X-RAY EXAM CHEST 1 VIEW: CPT | Mod: 26

## 2025-02-19 PROCEDURE — 99285 EMERGENCY DEPT VISIT HI MDM: CPT | Mod: 25

## 2025-02-19 PROCEDURE — 80053 COMPREHEN METABOLIC PANEL: CPT

## 2025-02-19 PROCEDURE — 71045 X-RAY EXAM CHEST 1 VIEW: CPT

## 2025-02-19 PROCEDURE — 84484 ASSAY OF TROPONIN QUANT: CPT

## 2025-02-19 PROCEDURE — 85025 COMPLETE CBC W/AUTO DIFF WBC: CPT

## 2025-02-19 PROCEDURE — 70450 CT HEAD/BRAIN W/O DYE: CPT | Mod: MC

## 2025-02-19 RX ORDER — SULFAMETHOXAZOLE AND TRIMETHOPRIM 400; 80 MG/1; MG/1
1 TABLET ORAL
Qty: 14 | Refills: 0
Start: 2025-02-19 | End: 2025-02-25

## 2025-02-19 RX ORDER — KETOROLAC TROMETHAMINE 10 MG
15 TABLET ORAL ONCE
Refills: 0 | Status: DISCONTINUED | OUTPATIENT
Start: 2025-02-19 | End: 2025-02-19

## 2025-02-19 RX ORDER — SULFAMETHOXAZOLE AND TRIMETHOPRIM 400; 80 MG/1; MG/1
1 TABLET ORAL ONCE
Refills: 0 | Status: COMPLETED | OUTPATIENT
Start: 2025-02-19 | End: 2025-02-19

## 2025-02-19 RX ADMIN — SULFAMETHOXAZOLE AND TRIMETHOPRIM 1 TABLET(S): 400; 80 TABLET ORAL at 05:36

## 2025-02-19 RX ADMIN — Medication 15 MILLIGRAM(S): at 02:43

## 2025-02-19 NOTE — ED PROVIDER NOTE - PATIENT PORTAL LINK FT
You can access the FollowMyHealth Patient Portal offered by [facility] by registering at the following website: [email or web address]. By joining [employer]’s FollowMyHealth portal, you will also be able to view your health information using other applications (apps) compatible with our system.

## 2025-02-19 NOTE — ED PROVIDER NOTE - NSFOLLOWUPINSTRUCTIONS_ED_ALL_ED_FT
- Follow up with your Primary Care Doctor within the next 3-5 days.   -  Follow-up with your ophthalmologist, call in the morning for a close follow-up appointment.  - Bring results with you to the appointment.   - Take Tylenol (Acetaminophen) 650mg or Motrin (Ibuprofen/Advil) 600mg every 6 hours as needed for pain.   - Please fill the prescription for the antibiotics and take as directed.  Please finish the entire course of medication as prescribed.  If you have any belly pain after the antibiotics, yogurt has been shown to help with this.  Do not use any alcohol or grapefruit juice with any antibiotics.   - Return to the ED for any new or worsening symptoms.

## 2025-02-19 NOTE — ED PROVIDER NOTE - PHYSICAL EXAMINATION
General: Awake, alert, lying in bed in NAD  HEENT: +pupils 2 mm bilateral, +conjunctival injection, no scleral icterus. EOMI. No TTP over bilateral orbits. IOP Left eye 16, IOP right eye 18  Neck: Soft and supple.  Cardiac: RRR, S1/S2 present  Resp: Lungs CTAB. Symmetric chest expansion with inspiration. No accessory muscle use  Abd: Soft, non-tender, non-distended. No guarding, rebound, or rigidity.  Skin: No rashes, abrasions, or lacerations.  Extremities: Palpable DP pulses bilaterally. No LE edema.  Neuro: AO x 4. Moves all extremities symmetrically. Motor strength and sensation grossly intact.  Psych: Appropriate mood and affect

## 2025-02-19 NOTE — ED PROVIDER NOTE - CLINICAL SUMMARY MEDICAL DECISION MAKING FREE TEXT BOX
65 y/o M with PMH of HTN, DM2 (on insulin), HIV, b/l cataracts, orbital fx presents to the ED c/o decreased vision and chest pain. Patient states he began to have difficulty seeing out of his L eye approximately 5 days ago. mariela pen used at bedside: IOP Left eye 16, IOP right eye 18. Patient pending labs and CTH/CT orbit. Received Toradol for pain control. 65 y/o M with PMH of HTN, DM2 (on insulin), HIV, b/l cataracts, orbital fx presents to the ED c/o decreased vision and chest pain. Patient states he began to have difficulty seeing out of his L eye approximately 5 days ago.  Reports he has a history of eye pain that has felt similar, but improved after following up with his ophthalmologist a couple months ago, now has bilateral eye pain over the last 5 days but was unable to see his ophthalmologist. mariela pen used at bedside: IOP Left eye 16, IOP right eye 18.  As per chest pain started today, EKG without any ischemic changes. Patient pending labs and CTH/CT orbit. Received Toradol for pain control.      Delta high-sensitivity troponin negative.  Chest x-ray within normal limits.  After Toradol patient states he feels better.  CT orbits showing possible preseptal cellulitis.  No evidence of cellulitis on exam, but will treat with Bactrim based on CT read.  Patient states his eyesight is back to normal, and pain has resolved.  Patient to call his ophthalmologist in the morning for close follow-up appointment.  Strict return precautions given.  Patient comfortable plan for discharge at this point.

## 2025-02-19 NOTE — ED PROVIDER NOTE - EKG/XRAY ADDITIONAL INFORMATION
EKG February 18 at 2331  normal sinus rhythm at 76 bpm, no ischemic S-T/Tw changes on my independent interpretation of the Emergency Department EKG.

## 2025-02-19 NOTE — ED PROVIDER NOTE - OBJECTIVE STATEMENT
63 y/o M with PMH of HTN, DM2 (on insulin), HIV, b/l cataracts, orbital fx presents to the ED c/o decreased vision and chest pain. Patient states he began to have difficulty seeing out of his L eye approximately 5 days ago. Patient also states he has progressively worsening pain in his L eye. He recently saw opthalmology 1 week ago but states he did not have the pain at that time. Patient is alos endorsing midline chest pain that started 5 days ago. Per patient, he has never had these symptoms before and has never seen a cardiologist. 63 y/o M with PMH of HTN, DM2 (on insulin), HIV, b/l cataracts, orbital fx presents to the ED c/o decreased vision and chest pain. Patient states he began to have difficulty seeing out of his L eye approximately 5 days ago. Patient also states he has progressively worsening pain in his L eye. He recently saw opthalmology 1 week ago but states he did not have the pain at that time. Patient is alss endorsing midline chest pain that started 5 days ago. Per patient, he has never had these symptoms before and has never seen a cardiologist. Patient denies SOB, fever, chills, nausea, vomiting, diarrhea, constipation, weakness, dizziness, dysuria, hematuria.

## 2025-02-19 NOTE — ED ADULT NURSE NOTE - OBJECTIVE STATEMENT
PT is a 63yo M coming from home c/o multiple Medical complaints. Pt states that he had cataract sx 1 week ago and is now having b/l eye pain. PT also endorsing chest pain, SOB and arm pain. PMH of HIV, DM, anxiety, insomnia, HTN, PT A,Ox4, ambulatory at baseline. Respirations even and unlabored, abd soft, nondistended and nontender, skin warm, dry and intact, LOZA. Denies HA, n/v/d, fever, chills and urinary symptoms. Stretcher locked in lowest position, appropriate side rails up for safety, pt instructed to call for RN if anything needed.

## 2025-02-19 NOTE — ED PROVIDER NOTE - ATTENDING CONTRIBUTION TO CARE
Kierra: I performed a face to face bedside interview with patient regarding history of present illness, review of symptoms and past medical history. I completed an independent physical exam.  I have discussed patient's plan of care with resident.   I agree with note as stated above including HISTORY OF PRESENT ILLNESS, HIV, PAST MEDICAL/SURGICAL/FAMILY/SOCIAL HISTORY, ALLERGIES AND HOME MEDICATIONS, REVIEW OF SYSTEMS, PHYSICAL EXAM, MEDICAL DECISION MAKING and any PROGRESS NOTES during the time I functioned as the attending physician for this patient unless otherwise noted. My brief assessment is as follows: 65 y/o M with PMH of HTN, DM2 (on insulin), HIV, b/l cataracts, orbital fx presents to the ED c/o decreased vision and chest pain. Patient states he began to have difficulty seeing out of his L eye approximately 5 days ago.  Reports he has a history of eye pain that has felt similar, but improved after following up with his ophthalmologist a couple months ago, now has bilateral eye pain over the last 5 days but was unable to see his ophthalmologist. mariela pen used at bedside: IOP Left eye 16, IOP right eye 18.  As per chest pain started today, EKG without any ischemic changes. Patient pending labs and CTH/CT orbit. Received Toradol for pain control.      Delta high-sensitivity troponin negative.  Chest x-ray within normal limits.  After Toradol patient states he feels better.  CT orbits showing possible preseptal cellulitis.  No evidence of cellulitis on exam, but will treat with Bactrim based on CT read.  Patient states his eyesight is back to normal, and pain has resolved.  Patient to call his ophthalmologist in the morning for close follow-up appointment.  Strict return precautions given.  Patient comfortable plan for discharge at this point.

## 2025-03-07 ENCOUNTER — NON-APPOINTMENT (OUTPATIENT)
Age: 65
End: 2025-03-07

## 2025-03-10 NOTE — ED ADULT NURSE NOTE - NSICDXPASTSURGICALHX_GEN_ALL_CORE_FT
"Hilton Mejia (68 y.o. Male)      STR        Date of Birth   1957    Social Security Number       Address   [address] [place], [place] [address]    Home Phone   [phone]    MRN   [id number]       B[place]ai   None    Marital Status                               Admission Date   3/7/2025    Admission Type   Emergency    Admitting Provider   Nicko Rodriguez MD    Attending Provider   Jung Cummins MD    Department, Room/Bed   [facility] TELEMETRY 4, 431/1       Discharge Date       Discharge Disposition       Discharge Destination                                 Attending Provider: Jung Cummins MD    Allergies: Oxycodone, Topamax [Topiramate]    Isolation: None   Infection: None   Code Status: CPR    Ht: 147.3 cm (58\")   Wt: 116 kg (255 lb 8.2 oz)    Admission Cmt: None   Principal Problem: Pubic ramus fracture [S32.599A]                   Active Insurance as of 3/7/2025       Primary Coverage       Payor Plan Insurance Group Employer/Plan Group    ANTH MEDICARE REPLACEMENT [facility] MEDICARE ADVANTAGE O [id number]       Payor Plan Address Payor Plan Phone Number Payor Plan Fax Number Effective Dates    [address] [phone]  2025 - None Entered    [facility] [id number]         Subscriber Name Subscriber Birth Date Member ID       HILTON MEJIA 1957 [id number]                     Emergency Contacts        (Rel.) Home Phone Work Phone Mobile Phone    Diana Mejia (Spouse) [phone] -- --                 History & Physical        Nicko Rodriguez MD at [address] [place], [place] [address]IST HISTORY AND PHYSICAL    Patient Identification:  Name:  Hilton Mejia  Age:  68 y.o.  Sex:  male  :  1957  MRN:  [id number]   Visit Number:  [id number]  Admit Date: 3/7/2025   Room number:    Primary Care Physician:  Naty Lebron MD    Date of Admission: 3/7/2025     Subjective     Chief complaint:    Chief " Complaint   Patient presents with    Fall    Hip Pain       History of presenting illness:     This is a 68-year-old male with a past medical history of hypertension, coronary artery disease, anxiety, GERD, hyperlipidemia and BPH presenting with left hip pain pain.  Patient states, he got up to go to the bathroom, turned around too quickly and lost his balance.  Unfortunately patient fell landing on his left hip.  At this time, he is complaining of severe left hip pain.  He describes the pain as sharp and nonradiating.  He rates the pain a 9 out of 10 in severity and movement makes it worse.    In ED, blood pressure 136/89, heart rate 78, respiratory rate 18, temperature 98.2 and O2 saturation 93% on room air.  Labs on arrival showed sodium 137, potassium 4.2, BUN 11, creatinine 1.03, WBC 11.1, hemoglobin 16.1 and platelet count 144.  CT of abdomen and pelvis showed Acute nondisplaced fracture of the inferior left pubic ramus. Acute nondisplaced intra-articular fracture of the anterior inferior left acetabulum/root of the superior pubic ramus. No acute hip fracture or dislocation.    ---------------------------------------------------------------------------------------------------------------------   Review of Systems   Constitutional:  Negative for chills.   HENT:  Negative for ear discharge and sinus pressure.    Eyes:  Negative for pain.   Respiratory:  Negative for choking.    Cardiovascular:  Negative for leg swelling.   Gastrointestinal:  Negative for anal bleeding.   Endocrine: Negative for polydipsia.   Genitourinary:  Negative for flank pain.   Musculoskeletal:  Positive for back pain and gait problem.   Skin:  Negative for pallor.   Allergic/Immunologic: Negative for food allergies.   Neurological:  Negative for light-headedness.   Hematological:  Negative for adenopathy.   Psychiatric/Behavioral:  Negative for confusion.       ---------------------------------------------------------------------------------------------------------------------   Past Medical History:   Diagnosis Date    Allergic rhinitis     Anxiety     Arthritis     Balance problem     Carpal tunnel syndrome     Cluster headache     Coronary artery disease     Developmental delay     Diabetes mellitus     Noted by PCP    Difficulty walking     Dizziness     Dysphagia     Enlarged prostate     GERD (gastroesophageal reflux disease)     Gout     Hip fracture, left     History of cardiovascular stress test 2018    positive for inferior and lateral ischemia     History of echocardiogram     Hyperlipidemia     Hypertension     Impaired mobility     Lymphadenitis     Memory loss     Migraine     Morbid obesity with BMI of 40.0-44.9, adult 08/10/2020    Associated with hypertension, hyperlipidemia       Myocardial infarction 2000    Obesity     DENZEL (obstructive sleep apnea)     oxygen at night- 2LNC    Peptic ulcer     Peptic ulceration     Poor historian     Rheumatoid arthritis     Right shoulder pain     SOB (shortness of breath) on exertion     Spinocerebellar ataxia     Tension headache     Trigeminal neuralgia     Vitamin B 12 deficiency     Vitamin D deficiency      Past Surgical History:   Procedure Laterality Date    CARDIAC CATHETERIZATION      CARDIAC CATHETERIZATION Left 3/14/2018    Procedure: Cardiac Catheterization/Vascular Study;  Surgeon: Adam Perkins MD;  Location: [facility] CATH INVASIVE LOCATION;  Service: Cardiovascular    COLONOSCOPY      COLONOSCOPY N/A 9/13/2019    Procedure: COLONOSCOPY W/ COLD SNARE POLYPECTOMY;  Surgeon: Melba Lopez MD;  Location: [facility] ENDOSCOPY;  Service: Gastroenterology    COLONOSCOPY N/A 9/24/2024    Procedure: COLONOSCOPY;  Surgeon: Melba Lopez MD;  Location: [facility] ENDOSCOPY;  Service: Gastroenterology;  Laterality: N/A;    ELBOW ARTHROPLASTY Bilateral     ENDOSCOPY      ENDOSCOPY N/A 9/24/2024    Procedure:  ESOPHAGOGASTRODUODENOSCOPY WITH BIOPSY;  Surgeon: Melba Lopez MD;  Location: [facility] ENDOSCOPY;  Service: Gastroenterology;  Laterality: N/A;    HERNIA REPAIR      x3    HIP HEMIARTHROPLASTY Left     NEUROPLASTY Bilateral     decompression median nerve at carpal tunnel    TOTAL KNEE ARTHROPLASTY Bilateral     2005, 2012     Family History   Problem Relation Age of Onset    Deep vein thrombosis Mother     Diabetes Mother     Hyperlipidemia Mother     Hypertension Mother     Heart attack Mother     Cancer Father     Kidney disease Father     Deep vein thrombosis Daughter     Arthritis Sister     Diabetes Sister     Hyperlipidemia Sister     Hypertension Sister     Osteoporosis Sister     Thyroid disease Sister     Liver disease Sister     Arthritis Brother     Diabetes Brother     Hyperlipidemia Brother     Hypertension Brother     Stroke Brother     Cancer Brother     Heart attack Brother     Mental illness Brother      Social History     Socioeconomic History    Marital status:    Tobacco Use    Smoking status: Never     Passive exposure: Never    Smokeless tobacco: Never   Vaping Use    Vaping status: Never Used   Substance and Sexual Activity    Alcohol use: Yes     Comment: Rarely    Drug use: No    Sexual activity: Defer     ---------------------------------------------------------------------------------------------------------------------   Allergies:  Oxycodone and Topamax [topiramate]  ---------------------------------------------------------------------------------------------------------------------   Medications below are reported home medications pulling from within the system; at this time, these medications have not been reconciled unless otherwise specified and are in the verification process for further verifcation as current home medications.      Prior to Admission Medications       Prescriptions Last Dose Informant Patient Reported? Taking?    amLODIPine (NORVASC) 10 MG tablet   No No     Take 1 tablet by mouth Daily. Indications: High Blood Pressure Disorder    aspirin 81 MG tablet   Yes No    Take 1 tablet by mouth Daily.    azelastine (ASTELIN) 0.1 % nasal spray   No No    2 sprays into the nostril(s) as directed by provider 2 (Two) Times a Day.    busPIRone (BUSPAR) 15 MG tablet   No No    TAKE 1 TABLET TWICE A DAY AS NEEDED FOR ANXIETY    carvedilol (COREG) 25 MG tablet   No No    Take 1 tablet by mouth 2 (Two) Times a Day With Meals.    cyanocobalamin injection 1,000 mcg   No No    dexlansoprazole (Dexilant) 60 MG capsule   No No    Take 1 capsule by mouth Daily.    hydroCHLOROthiazide 25 MG tablet   Yes No    Take 1 tablet by mouth Daily.    isosorbide mononitrate (IMDUR) 30 MG 24 hr tablet   No No    Take 1 tablet by mouth Daily for 360 days.    levocetirizine (XYZAL) 5 MG tablet   No No    Take 1 tablet by mouth Every Evening.    meclizine (ANTIVERT) 25 MG tablet   Yes No    Take 1 tablet by mouth 3 (Three) Times a Day As Needed for Dizziness.    nitroglycerin (Nitrostat) 0.4 MG SL tablet   No No    Place 1 tablet under the tongue Every 5 (Five) Minutes As Needed for Chest Pain. Take no more than 3 doses in 15 minutes.    pantoprazole (PROTONIX) 40 MG EC tablet   Yes No    Take 1 tablet by mouth Daily.    pramipexole (MIRAPEX) 0.125 MG tablet   Yes No    Take 1 tablet by mouth 3 (Three) Times a Day. Unsure of dose    pravastatin (PRAVACHOL) 40 MG tablet   No No    TAKE ONE TABLET BY MOUTH EVERY DAY AT NIGHT    tamsulosin (FLOMAX) 0.4 MG capsule 24 hr capsule   No No    TAKE 1 CAPSULE EVERY NIGHT (DOSE DECREASE DUE TO      VISION DISTURBANCE)    tiZANidine (ZANAFLEX) 4 MG tablet   No No    TAKE 1 TABLET BY MOUTH EVERY NIGHT          Objective     Vital Signs:  Temp:  [98.2 °F (36.8 °C)] 98.2 °F (36.8 °C)  Heart Rate:  [71-84] 84  Resp:  [18] 18  BP: (110-168)/(80-90) 117/82    Mean Arterial Pressure (Non-Invasive) for the past 24 hrs (Last 3 readings):   Noninvasive MAP (mmHg)   03/08/25  0229 94   03/08/25 0158 108   03/08/25 0128 97     SpO2:  [91 %-94 %] 94 %  on   ;   Device (Oxygen Therapy): room air  Body mass index is 49.97 kg/m².    Wt Readings from Last 3 Encounters:   03/07/25 107 kg (235 lb)   02/07/25 101 kg (222 lb 1.9 oz)   10/03/24 114 kg (251 lb)      ----------------------------------------------------------------------------------------------------------------------  PHYSICAL EXAMINATION:  GENERAL: The patient is well developed and nontoxic.  HEENT: Anicteric sclerae, PERRLA, EOMI. Oropharynx clear. Moist mucous membranes. Conjunctivae appear well perfused.  CHEST: Chest wall is nontender.  HEART: Regular rate and rhythm without murmurs.  LUNGS: Clear to auscultation bilaterally.  ABDOMEN: Soft, positive bowel sounds, nontender, no organomegaly.  RECTAL: Deferred.  SKIN: No rash, no excessive bruising, petechiae, or purpura.  NEUROLOGIC: Cranial nerves II-XII intact without motor/sensory deficit.    ---------------------------------------------------------------------------------------------------------------------  --------------------------------------------------------------------------------------------------------------------  LABS:    CBC and coagulation:  Results from last 7 days   Lab Units 03/08/25  0018   WBC 10*3/mm3 11.12*   HEMOGLOBIN g/dL 16.1   HEMATOCRIT % 45.1   MCV fL 95.1   MCHC g/dL 35.7   PLATELETS 10*3/mm3 144     Acid/base balance:      Renal and electrolytes:  Results from last 7 days   Lab Units 03/08/25  0018   SODIUM mmol/L 137   POTASSIUM mmol/L 4.2   CHLORIDE mmol/L 101   CO2 mmol/L 25.4   BUN mg/dL 11   CREATININE mg/dL 1.03   CALCIUM mg/dL 8.8   GLUCOSE mg/dL 114*     Estimated Creatinine Clearance: 71.2 mL/min (by C-G formula based on SCr of 1.03 mg/dL).    Liver and pancreatic function:  Results from last 7 days   Lab Units 03/08/25  0018   ALBUMIN g/dL 4.0   BILIRUBIN mg/dL 0.5   ALK PHOS U/L 45   AST (SGOT) U/L 37   ALT (SGPT) U/L 42*     Endocrine  "function:  Lab Results   Component Value Date    HGBA1C 5.70 (H) 07/10/2024     Point of care bedside glucose levels:      Lab Results   Component Value Date    TSH 1.980 03/22/2024    FREET4 1.38 03/22/2024     Cardiac:        Cultures:  Lab Results   Component Value Date    COLORU Yellow 03/02/2023    CLARITYU Clear 03/02/2023    PHUR 8.0 03/02/2023    GLUCOSEU Negative 03/02/2023    KETONESU Negative 03/02/2023    BLOODU Negative 03/02/2023    NITRITEU Negative 03/02/2023    LEUKOCYTESUR Negative 03/02/2023    BILIRUBINUR Negative 03/02/2023    UROBILINOGEN 1.0 E.U./dL 03/02/2023     Microbiology Results (last 10 days)       ** No results found for the last 240 hours. **            No results found for: \"PREGTESTUR\", \"PREGSERUM\", \"HCG\", \"HCGQUANT\"  Pain Management Panel          Latest Ref Rng & Units 3/2/2023   Pain Management Panel   Amphetamine, Urine Qual Negative Negative    Barbiturates Screen, Urine Negative Negative    Benzodiazepine Screen, Urine Negative Negative    Buprenorphine, Screen, Urine Negative Negative    Cocaine Screen, Urine Negative Negative    Methadone Screen , Urine Negative Negative    Methamphetamine, Ur Negative Negative        I have personally looked at the labs and they are summarized above.  ----------------------------------------------------------------------------------------------------------------------  Detailed radiology reports for the last 24 hours:    Imaging Results (Last 24 Hours)       Procedure Component Value Units Date/Time    CT Pelvis Without Contrast [[id number]] Collected: 03/07/25 2344     Updated: 03/08/25 0144    Addenda:        ADDENDUM REPORT    ADDENDUM:  Addendum:    Error in the report. Fractures are both left-sided.    Findings:    Acute nondisplaced fracture of the inferior left pubic ramus.    Acute nondisplaced intra-articular fracture of the anterior inferior left   acetabulum/root of the superior pubic ramus on axial series 2 image 55-58.    No " acute right hip fracture or dislocation. Moderate to severe right hip   arthritis    No acute left hip fracture or dislocation. Moderate to severe left hip   arthritis    Prior ORIF for left femur fracture.    Degenerative disease at L4-5 with suspected severe canal and foraminal   stenosis.    Degenerative disease at L5-S1 with mild canal narrowing and severe   bilateral foraminal stenosis.    Impression:    1. Acute nondisplaced fracture of the inferior left pubic ramus.    2. Acute nondisplaced intra-articular fracture of the anterior inferior   left acetabulum/root of the superior pubic ramus.    3. No acute hip fracture or dislocation.    Authenticated and Electronically Signed by Joann Roberto MD  on 03/08/2025 01:36:49 AM  Signed: 03/08/25 0136 by Joann Roberto MD    Narrative:      FINAL REPORT    TECHNIQUE:  null    CLINICAL HISTORY:  s/p fall, left hip pain, unable to bear weight, possible pelvic  fracture on Xray    COMPARISON:  null    FINDINGS:  Exam: CT pelvis without contrast    Comparison: None    Clinical history: Status post fall, left hip pain unable to bear weight.    Findings:    Acute nondisplaced fracture of the inferior left pubic ramus.    Acute nondisplaced intra-articular fracture of the anterior inferior right acetabulum/root of the superior pubic ramus on axial series 2 image 55-58.    No acute right hip fracture or dislocation. Moderate to severe right hip arthritis    No acute left hip fracture or dislocation. Moderate to severe left hip arthritis    Prior ORIF for left femur fracture.    Degenerative disease at L4-5 with suspected severe canal and foraminal stenosis.    Degenerative disease at L5-S1 with mild canal narrowing and severe bilateral foraminal stenosis.      Impression:      Impression:    1. Acute nondisplaced fracture of the inferior left pubic ramus.    2. Acute nondisplaced intra-articular fracture of the anterior inferior right acetabulum/root of the superior  pubic ramus.    3. No acute hip fracture or dislocation.    Authenticated and Electronically Signed by Joann Roberto MD  on 03/07/2025 11:44:21 PM    XR Hip With or Without Pelvis 2 - 3 View Left [[id number]] Collected: 03/07/25 2339     Updated: 03/07/25 2342    Narrative:      FINAL REPORT    TECHNIQUE:  null    CLINICAL HISTORY:  left hip injury, fall    COMPARISON:  null    FINDINGS:  Exam: AP pelvis with two-view left hip    Comparison: None    Findings:    No acute pelvic fracture.    No acute right hip fracture or dislocation.    Prior ORIF for left femur fracture. No acute left hip fracture or dislocation.    Moderate bilateral hip arthritis.    Degenerative disease in the lower lumbar spine.    No acute fracture or dislocation.    No radiodense foreign bodies.      Impression:      Impression:    1. No acute pelvic or hip fracture.    If there is concern for occult fracture, then CT may be considered.    Authenticated and Electronically Signed by Joann Roberto MD  on 03/07/2025 11:39:53 PM          Final impressions for the last 30 days of radiology reports:    CT Pelvis Without Contrast  Addendum Date: 3/8/2025  ADDENDUM REPORT ADDENDUM: Addendum: Error in the report. Fractures are both left-sided. Findings: Acute nondisplaced fracture of the inferior left pubic ramus. Acute nondisplaced intra-articular fracture of the anterior inferior left acetabulum/root of the superior pubic ramus on axial series 2 image 55-58. No acute right hip fracture or dislocation. Moderate to severe right hip arthritis No acute left hip fracture or dislocation. Moderate to severe left hip arthritis Prior ORIF for left femur fracture. Degenerative disease at L4-5 with suspected severe canal and foraminal stenosis. Degenerative disease at L5-S1 with mild canal narrowing and severe bilateral foraminal stenosis. Impression: 1. Acute nondisplaced fracture of the inferior left pubic ramus. 2. Acute nondisplaced intra-articular  fracture of the anterior inferior left acetabulum/root of the superior pubic ramus. 3. No acute hip fracture or dislocation. Authenticated and Electronically Signed by Joann Roberto MD on 2025 01:36:49 AM    Result Date: 3/8/2025  Impression: 1. Acute nondisplaced fracture of the inferior left pubic ramus. 2. Acute nondisplaced intra-articular fracture of the anterior inferior right acetabulum/root of the superior pubic ramus. 3. No acute hip fracture or dislocation. Authenticated and Electronically Signed by Joann Roberto MD on 2025 11:44:21 PM    XR Hip With or Without Pelvis 2 - 3 View Left  Result Date: 3/7/2025  Impression: 1. No acute pelvic or hip fracture. If there is concern for occult fracture, then CT may be considered. Authenticated and Electronically Signed by Joann Roberto MD on 2025 11:39:53 PM        Assessment & Plan     This is a 68-year-old male with a past medical history of hypertension, coronary artery disease, anxiety, GERD, hyperlipidemia and BPH presenting with left hip pain pain.     Assessment:  Left hip pain  Pubic ramus fracture  Fall  Hypertension  Coronary disease  GERD  Anxiety  Hyperlipidemia  BPH    Plan:  - CT pelvis showed Acute nondisplaced fracture of the inferior left pubic ramus. Acute nondisplaced intra-articular fracture of the anterior inferior left acetabulum/root of the superior pubic ramus. No acute hip fracture or dislocation.  - Admit to observation  - Pain management  - PT/OT  - Consult ortho  - BP is Currently well controlled  - Hydralazine as needed  - Resume home meds        Nicko Rodriguez MD  [facility] Hospitalist  25  02:49 EST      Electronically signed by Nicko Rodriguez MD at 25 0256          Physician Progress Notes (last 48 hours)        Denilson Aguilar MD at 25 1511                [facility]  INTERNAL MEDICINE PROGRESS NOTE    Name:  Chris Mejia   Age:  68 y.o.  Sex:  male  :   1957  MRN:  [id number]   Visit Number:  [id number]  Admission Date:  3/7/2025  Date Of Service:  03/09/25  Primary Care Physician:  Naty Lebron MD     LOS: 0 days :  Patient Care Team:  Naty Lebron MD as PCP - General (Family Medicine)  Adam Perkins MD as Consulting Physician (Cardiology)  Melba Lopez MD as Surgeon (General Surgery)  Nils Salinas (Optometry):      Subjective / Interval History:     68-year-old patient with history of hypertension, coronary artery disease, anxiety, GERD, hyperlipidemia, BPH, who comes into the ER because of fall related pubic ramus fracture.  Surgical evaluation has been done and Dr. Fernando recommends conservative treatment and rehab and weightbearing as tolerated    Patient had PT evaluation done and he is unable to walk safely.  He says his pain is tolerable with medications      Vital Signs:    Temp:  [98 °F (36.7 °C)-99.4 °F (37.4 °C)] 98.1 °F (36.7 °C)  Heart Rate:  [75-87] 81  Resp:  [16-18] 18  BP: (115-140)/(71-86) 131/82    Intake and output:    I/O last 3 completed shifts:  In: 2733.8 [P.O.:720; I.V.:2013.8]  Out: 2150 [Urine:2150]  I/O this shift:  In: 600 [P.O.:600]  Out: 1500 [Urine:1500]    Physical Examination:    General Appearance:    Alert and cooperative, not in any acute distress.   Head:    Atraumatic and normocephalic, without obvious abnormality.   Eyes:            PERRLA,  No pallor. Extraocular movements are within normal limits.   Neck:   Supple,  No lymph glands, no bruit.   Lungs:     Chest shape is normal. Breath sounds heard bilaterally equally.  No crackles or wheezing.     Heart:    Normal S1 and S2, no murmur, no JVD.   Abdomen:     Normal bowel sounds, no masses, no organomegaly. Soft     nontender, no guarding, no rebound tenderness.   Extremities:   Moves all extremities well, no edema, no cyanosis,    Skin:   No  bruising or rash.   Neurologic:   Grossly nonfocal and moves all extremities.  Gait not checked      Laboratory results:  Results from last 7 days   Lab Units 03/09/25  0608 03/08/25 0447 03/08/25  0018   SODIUM mmol/L 136 137 137   POTASSIUM mmol/L 3.7 4.3 4.2   CHLORIDE mmol/L 100 100 101   CO2 mmol/L 24.8 23.7 25.4   BUN mg/dL 9 13 11   CREATININE mg/dL 0.86 0.98 1.03   CALCIUM mg/dL 8.4* 8.9 8.8   BILIRUBIN mg/dL  --   --  0.5   ALK PHOS U/L  --   --  45   ALT (SGPT) U/L  --   --  42*   AST (SGOT) U/L  --   --  37   GLUCOSE mg/dL 127* 134* 114*     Results from last 7 days   Lab Units 03/09/25  0608 03/08/25 0447 03/08/25  0018   WBC 10*3/mm3 9.13 12.67* 11.12*   HEMOGLOBIN g/dL 15.3 16.4 16.1   HEMATOCRIT % 42.7 46.6 45.1   PLATELETS 10*3/mm3 112*  --  144                   Radiology results:    Imaging Results (Last 24 Hours)       ** No results found for the last 24 hours. **            I have reviewed the patient's radiology reports.    Medication Review:     I have reviewed the patient's active and prn medications.     Assessment:      Pubic ramus fracture  Fall  Hypertension  Coronary disease  GERD  Anxiety  Hyperlipidemia  BPH      Plan:    Patient has a nondisplaced intra-articular fracture of the anterior-inferior left acetabulum and root of the superior pubic ramus.  Conservative treatment has been planned as per Dr. Gaytan and recommends a weightbearing as tolerated and he can use a walker and participate in physical therapy.    He would benefit from inpatient physical therapy and unable to go home as per the physical therapy evaluation and social service consult to be done accordingly  Pain control as per orders    Goals of treatment plan of care has been addressed with the patient and agreeable with the above    Denilson LOYA. MD Jeff  03/09/25  15:11 EDT      Please note that portions of this note were completed with a voice recognition program.        Electronically signed by Denilson Aguilar MD at 03/09/25 1695          Physical Therapy Notes (last 48 hours)        Mechelle Mcfarland, PT at 03/08/25  1329  Version 1 of 1         Goal Outcome Evaluation:  Plan of Care Reviewed With: patient, spouse        Progress: no change  Outcome Evaluation: Patient participated well in PT evaluation and demonstrated decreased overall mobility due to left pubic rami fracture.  He was able to perform bed mobility with minimal assistance for left leg movements.  He was able to perform sit to stand transfers with RW and minimal assistance.  He is able to perform gait x 3 feet with RW and CGA.  He is able to use his arms to assist with weight bearing through his left leg.  He reports pain increased to 3/10 while walking.  He is eager to go home as soon as his mobility allows.  Patient is expected to benefit from additional PT services while hospitalized and upon discharge to home with home health care.    Anticipated Discharge Disposition (PT): home with home health                          Electronically signed by Mechelle Mcfarland, PT at 25 1329       Mechelle Mcfarland, PT at 25 1331  Version 1 of 1         Patient Name: Chris Mejia  : 1957    MRN: [id number]                              Today's Date: 3/8/2025       Admit Date: 3/7/2025    Visit Dx:     ICD-10-CM ICD-9-CM   1. Closed fracture of left inferior pubic ramus, initial encounter  S32.592A 808.2   2. Closed fracture of superior ramus of left pubis, initial encounter  S32.512A 808.2     Patient Active Problem List   Diagnosis    Tubular adenoma of colon    Trigeminal neuralgia    Spinocerebellar ataxia    RA (rheumatoid arthritis)    Insomnia    Essential hypertension    Hypercholesteremia    Acid reflux    Anxiety    Vitamin B12 deficiency    Vitamin D deficiency    DENZEL (obstructive sleep apnea)    Atopic rhinitis    Ataxic gait    Shoulder pain    Ophthalmoplegia    CAD in native artery    Seasonal allergies    Benign non-nodular prostatic hyperplasia with lower urinary tract symptoms    Weakness    Tension headache    Limited response to serotonin  reuptake inhibitors associated with SS genotype of 5-HTTLPR region of SLC6A4 gene    HTR2A GG genotype    HLA-B*1502 allele negative    CYP2B6 intermediate metabolizer    Vertigo    Cerebellar atrophy    Chest pain    SOB (shortness of breath)    Aortic root dilation    Dysphagia    Pubic ramus fracture     Past Medical History:   Diagnosis Date    Allergic rhinitis     Anxiety     Arthritis     Balance problem     Carpal tunnel syndrome     Cluster headache     Coronary artery disease     Developmental delay     Diabetes mellitus     Noted by PCP    Difficulty walking     Dizziness     Dysphagia     Enlarged prostate     GERD (gastroesophageal reflux disease)     Gout     Hip fracture, left     History of cardiovascular stress test 2018    positive for inferior and lateral ischemia     History of echocardiogram     Hyperlipidemia     Hypertension     Impaired mobility     Lymphadenitis     Memory loss     Migraine     Morbid obesity with BMI of 40.0-44.9, adult 08/10/2020    Associated with hypertension, hyperlipidemia       Myocardial infarction 2000    Obesity     DENZEL (obstructive sleep apnea)     oxygen at night- 2LNC    Peptic ulcer     Peptic ulceration     Poor historian     Rheumatoid arthritis     Right shoulder pain     SOB (shortness of breath) on exertion     Spinocerebellar ataxia     Tension headache     Trigeminal neuralgia     Vitamin B 12 deficiency     Vitamin D deficiency      Past Surgical History:   Procedure Laterality Date    CARDIAC CATHETERIZATION      CARDIAC CATHETERIZATION Left 3/14/2018    Procedure: Cardiac Catheterization/Vascular Study;  Surgeon: Adam Perkins MD;  Location: [facility] CATH INVASIVE LOCATION;  Service: Cardiovascular    COLONOSCOPY      COLONOSCOPY N/A 9/13/2019    Procedure: COLONOSCOPY W/ COLD SNARE POLYPECTOMY;  Surgeon: Melba Lopez MD;  Location: [facility] ENDOSCOPY;  Service: Gastroenterology    COLONOSCOPY N/A 9/24/2024    Procedure: COLONOSCOPY;  Surgeon: John  Melba SHIELDS MD;  Location: [facility] ENDOSCOPY;  Service: Gastroenterology;  Laterality: N/A;    ELBOW ARTHROPLASTY Bilateral     ENDOSCOPY      ENDOSCOPY N/A 9/24/2024    Procedure: ESOPHAGOGASTRODUODENOSCOPY WITH BIOPSY;  Surgeon: Melba Lopez MD;  Location: [facility] ENDOSCOPY;  Service: Gastroenterology;  Laterality: N/A;    HERNIA REPAIR      x3    HIP HEMIARTHROPLASTY Left     NEUROPLASTY Bilateral     decompression median nerve at carpal tunnel    TOTAL KNEE ARTHROPLASTY Bilateral     2005, 2012      General Information       Row Name 03/08/25 1001          Physical Therapy Time and Intention    Document Type evaluation  -JR     Mode of Treatment physical therapy  -       Row Name 03/08/25 1001          General Information    Patient Profile Reviewed yes  -JR     Prior Level of Function independent:;all household mobility;community mobility  -     Existing Precautions/Restrictions fall;oxygen therapy device and L/min  -     Barriers to Rehab none identified  -       Row Name 03/08/25 1001          Living Environment    Current Living Arrangements home  -JR     People in Home spouse  -       Row Name 03/08/25 1001          Home Main Entrance    Number of Stairs, Main Entrance three  -JR     Stair Railings, Main Entrance railing on right side (ascending)  -JR       Row Name 03/08/25 1001          Stairs Within Home, Primary    Number of Stairs, Within Home, Primary none  -JR       Row Name 03/08/25 1001          Cognition    Orientation Status (Cognition) oriented x 4  -JR       Row Name 03/08/25 1001          Safety Issues/Impairments Affecting Functional Mobility    Safety Issues Affecting Function (Mobility) safety precautions follow-through/compliance;insight into deficits/self-awareness;positioning of assistive device;friction/shear risk;safety precaution awareness  -     Impairments Affecting Function (Mobility) pain;strength;endurance/activity tolerance;balance;postural/trunk control  -                User Key  (r) = Recorded By, (t) = Taken By, (c) = Cosigned By      Initials Name Provider Type    Mechelle Orta, CODY Physical Therapist                   Mobility       Row Name 03/08/25 1001          Bed Mobility    Bed Mobility supine-sit;sit-supine  -JR     Supine-Sit [place] (Bed Mobility) minimum assist (75% patient effort)  -     Sit-Supine [place] (Bed Mobility) minimum assist (75% patient effort)  -     Assistive Device (Bed Mobility) head of bed elevated;bed rails  -     Comment, (Bed Mobility) he uses hospital bed with a bed rail at home  -       Row Name 03/08/25 1001          Sit-Stand Transfer    Sit-Stand [place] (Transfers) minimum assist (75% patient effort)  -     Assistive Device (Sit-Stand Transfers) walker, front-wheeled  -       Row Name 03/08/25 1001          Gait/Stairs (Locomotion)    [place] Level (Gait) contact guard  -     Assistive Device (Gait) walker, front-wheeled  -     Patient was able to Ambulate yes  -JR     Distance in Feet (Gait) 3  -JR     Deviations/Abnormal Patterns (Gait) antalgic;base of support, wide;festinating/shuffling;stride length decreased  -JR     Bilateral Gait Deviations forward flexed posture  -JR     Left Sided Gait Deviations weight shift ability decreased;heel strike decreased  -     Comment, (Gait/Stairs) educated on stair sequencing with verbalized understanding  -       Row Name 03/08/25 1001          Mobility    Extremity Weight-bearing Status left lower extremity  -JR     Left Lower Extremity (Weight-bearing Status) weight-bearing as tolerated (WBAT)  -               User Key  (r) = Recorded By, (t) = Taken By, (c) = Cosigned By      Initials Name Provider Type    Mechelle Orta PT Physical Therapist                   Obj/Interventions       Row Name 03/08/25 1001          Range of Motion Comprehensive    Comment, General Range of Motion LLE causes pelvic pain, but has WFL range  -       Row Name  03/08/25 1001          Strength Comprehensive (MMT)    Comment, General Manual Muscle Testing (MMT) Assessment LLE grossly 3-/5t  -JR       Row Name 03/08/25 1001          Balance    Balance Assessment sitting static balance;sitting dynamic balance;standing static balance;standing dynamic balance  -JR     Static Sitting Balance supervision  -JR     Dynamic Sitting Balance supervision  -JR     Position, Sitting Balance unsupported;sitting edge of bed  -JR     Static Standing Balance contact guard  -JR     Dynamic Standing Balance minimal assist  -JR     Position/Device Used, Standing Balance walker, rolling  -JR               User Key  (r) = Recorded By, (t) = Taken By, (c) = Cosigned By      Initials Name Provider Type    Mechelle Orta, PT Physical Therapist                   Goals/Plan       Row Name 03/08/25 1001          Bed Mobility Goal 1 (PT)    Activity/Assistive Device (Bed Mobility Goal 1, PT) bed mobility activities, all  -JR     [place] Level/Cues Needed (Bed Mobility Goal 1, PT) supervision required  -JR     Time Frame (Bed Mobility Goal 1, PT) short term goal (STG)  -JR     Progress/Outcomes (Bed Mobility Goal 1, PT) goal ongoing  -JR       Row Name 03/08/25 1001          Transfer Goal 1 (PT)    Activity/Assistive Device (Transfer Goal 1, PT) sit-to-stand/stand-to-sit;bed-to-chair/chair-to-bed;walker, rolling  -JR     [place] Level/Cues Needed (Transfer Goal 1, PT) contact guard required  -JR     Time Frame (Transfer Goal 1, PT) long term goal (LTG)  -JR     Progress/Outcome (Transfer Goal 1, PT) goal ongoing  -JR       Row Name 03/08/25 1001          Gait Training Goal 1 (PT)    Activity/Assistive Device (Gait Training Goal 1, PT) gait (walking locomotion);walker, rolling;increase endurance/gait distance;diminish gait deviation;improve balance and speed  -JR     [place] Level (Gait Training Goal 1, PT) contact guard required  -JR     Distance (Gait Training Goal 1, PT) 50  -JR     Time  Frame (Gait Training Goal 1, PT) long term goal (LTG)  -JR     Progress/Outcome (Gait Training Goal 1, PT) goal ongoing  -JR       Row Name 03/08/25 1001          Patient Education Goal (PT)    Activity (Patient Education Goal, PT) Perform BLE exercises x 20 reps  -JR     Cascade/Cues/Accuracy (Memory Goal 2, PT) demonstrates adequately  -JR     Time Frame (Patient Education Goal, PT) 3 days  -JR     Progress/Outcome (Patient Education Goal, PT) goal ongoing  -JR       Row Name 03/08/25 1001          Therapy Assessment/Plan (PT)    Planned Therapy Interventions (PT) strengthening;balance training;bed mobility training;transfer training;gait training;home exercise program;patient/family education  -JR               User Key  (r) = Recorded By, (t) = Taken By, (c) = Cosigned By      Initials Name Provider Type    Mechelle Orta, PT Physical Therapist                   Clinical Impression       Row Name 03/08/25 1001          Pain    Pretreatment Pain Rating 0/10 - no pain  -JR     Posttreatment Pain Rating 3/10  -JR     Pain Location groin  -JR     Pain Side/Orientation left  -JR     Pain Management Interventions activity modification encouraged;exercise or physical activity utilized  -JR     Response to Pain Interventions activity participation with tolerable pain;activity level improved  -JR       Row Name 03/08/25 1001          Plan of Care Review    Plan of Care Reviewed With patient;spouse  -JR     Progress no change  -JR     Outcome Evaluation Patient participated well in PT evaluation and demonstrated decreased overall mobility due to left pubic rami fracture.  He was able to perform bed mobility with minimal assistance for left leg movements.  He was able to perform sit to stand transfers with RW and minimal assistance.  He is able to perform gait x 3 feet with RW and CGA.  He is able to use his arms to assist with weight bearing through his left leg.  He reports pain increased to 3/10 while walking.  He  is eager to go home as soon as his mobility allows.  Patient is expected to benefit from additional PT services while hospitalized and upon discharge to home with home health care.  -       Row Name 03/08/25 1001          Therapy Assessment/Plan (PT)    Patient/Family Therapy Goals Statement (PT) Patient is eager to get better and go home  -JR     Rehab Potential (PT) good  -JR     Criteria for Skilled Interventions Met (PT) yes;meets criteria;skilled treatment is necessary  -JR     Therapy Frequency (PT) daily  -JR       Row Name 03/08/25 1001          Positioning and Restraints    Pre-Treatment Position in bed  -JR     Post Treatment Position bed  -JR     In Bed fowlers;call light within reach;encouraged to call for assist;with family/caregiver;with nsg  -               User Key  (r) = Recorded By, (t) = Taken By, (c) = Cosigned By      Initials Name Provider Type    Mechelle Orta, PT Physical Therapist                   Outcome Measures       Row Name 03/08/25 1001 03/08/25 0900       How much help from another person do you currently need...    Turning from your back to your side while in flat bed without using bedrails? 3  -JR 2  -CC    Moving from lying on back to sitting on the side of a flat bed without bedrails? 3  -JR 2  -CC    Moving to and from a bed to a chair (including a wheelchair)? 3  -JR 2  -CC    Standing up from a chair using your arms (e.g., wheelchair, bedside chair)? 3  -JR 2  -CC    Climbing 3-5 steps with a railing? 2  -JR 2  -CC    To walk in hospital room? 3  -JR 2  -CC    AM-PAC 6 Clicks Score (PT) 17  -JR 12  -CC    Highest Level of Mobility Goal 5 --> Static standing  -JR 4 --> Transfer to chair/commode  -CC      Row Name 03/08/25 1001          Functional Assessment    Outcome Measure Options AM-PAC 6 Clicks Basic Mobility (PT)  -               User Key  (r) = Recorded By, (t) = Taken By, (c) = Cosigned By      Initials Name Provider Type    Mechelle Orta, PT Physical  Therapist    Tracy Najera, RN Registered Nurse                                 Physical Therapy Education       Title: PT OT SLP Therapies (In Progress)       Topic: Physical Therapy (In Progress)       Point: Mobility training (Done)       Learning Progress Summary            Patient Acceptance, E,TB, VU by  at 3/8/2025 1328    Comment: Role of PT and POC.  Proper use of RW and sequencing for stairs   Family Acceptance, E,TB, VU by  at 3/8/2025 1328    Comment: Role of PT and POC.  Proper use of RW and sequencing for stairs                      Point: Home exercise program (Not Started)       Learner Progress:  Not documented in this visit.              Point: Body mechanics (Not Started)       Learner Progress:  Not documented in this visit.              Point: Precautions (Not Started)       Learner Progress:  Not documented in this visit.                              User Key       Initials Effective Dates Name Provider Type Discipline     08/22/23 -  Mechelle Mcfarland, PT Physical Therapist PT                  PT Recommendation and Plan  Planned Therapy Interventions (PT): strengthening, balance training, bed mobility training, transfer training, gait training, home exercise program, patient/family education  Progress: no change  Outcome Evaluation: Patient participated well in PT evaluation and demonstrated decreased overall mobility due to left pubic rami fracture.  He was able to perform bed mobility with minimal assistance for left leg movements.  He was able to perform sit to stand transfers with RW and minimal assistance.  He is able to perform gait x 3 feet with RW and CGA.  He is able to use his arms to assist with weight bearing through his left leg.  He reports pain increased to 3/10 while walking.  He is eager to go home as soon as his mobility allows.  Patient is expected to benefit from additional PT services while hospitalized and upon discharge to home with home health care.      Time Calculation:   PT Evaluation Complexity  History, PT Evaluation Complexity: 1-2 personal factors and/or comorbidities  Examination of Body Systems (PT Eval Complexity): total of 3 or more elements  Clinical Presentation (PT Evaluation Complexity): evolving  Clinical Decision Making (PT Evaluation Complexity): moderate complexity  Overall Complexity (PT Evaluation Complexity): moderate complexity     PT Charges       Row Name 03/08/25 1001             Time Calculation    Start Time 1001  -JR      PT Received On 03/08/25  -JR      PT Goal Re-Cert Due Date 03/18/25  -JR         Untimed Charges    PT Eval/Re-eval Minutes 80  -JR         Total Minutes    Untimed Charges Total Minutes 80  -JR       Total Minutes 80  -JR                User Key  (r) = Recorded By, (t) = Taken By, (c) = Cosigned By      Initials Name Provider Type    Mechelle Orta, PT Physical Therapist                  Therapy Charges for Today       Code Description Service Date Service Provider Modifiers Qty    [id number] HC-PT EVAL MOD COMPLEXITY 5 3/8/2025 Mechelle Mcfarland, PT  1            PT G-Codes  Outcome Measure Options: AM-PAC 6 Clicks Basic Mobility (PT)  AM-PAC 6 Clicks Score (PT): 17  PT Discharge Summary  Anticipated Discharge Disposition (PT): home with home health    Mechelle Mcfarland, PT  3/8/2025      Electronically signed by Mechelle Mcfarland, PT at 03/08/25 1331       Janis Colindres PT at 03/09/25 1221  Version 1 of 1         Goal Outcome Evaluation:  Plan of Care Reviewed With: patient, spouse           Outcome Evaluation: PT treatment completed this date with pt presenting supine in bed with pain in L pelvic area rated at 2/10. Pt needed mod assist to come to sit on L side of the bed with HOB raised. Pt was able to sit on EOB with SBA. Sit to stand wtih FWW min assist and pt amb 10 ft with difficulty wt shifting onto the LLE due to pain increasing to 8/10. Very short shuffle steps with pt having decreased control of hips/balance during  gait needing min to mod assist for safe ambulation. Pt had to sit post 10 ft due to fatigue and increased pain. Post resting, Mod assist for sit to stand and min assist to pivot to bedside chair. Once positioned in chair pt reated L pelvic pain 4/10. Pt's O2 sat on room air post gait 92%. After seeing how difficult it was for ambulation pt and his wife open to discussing STR upon d/c. Cont current PT POC.    Anticipated Discharge Disposition (PT): inpatient rehabilitation facility                          Electronically signed by Janis Colindres, PT at 25 1406       Janis Colindres, PT at 25 1221  Version 1 of 1         Patient Name: Chris Mejia  : 1957    MRN: [id number]                              Today's Date: 3/9/2025       Admit Date: 3/7/2025    Visit Dx:     ICD-10-CM ICD-9-CM   1. Closed fracture of left inferior pubic ramus, initial encounter  S32.592A 808.2   2. Closed fracture of superior ramus of left pubis, initial encounter  S32.512A 808.2     Patient Active Problem List   Diagnosis    Tubular adenoma of colon    Trigeminal neuralgia    Spinocerebellar ataxia    RA (rheumatoid arthritis)    Insomnia    Essential hypertension    Hypercholesteremia    Acid reflux    Anxiety    Vitamin B12 deficiency    Vitamin D deficiency    DENZEL (obstructive sleep apnea)    Atopic rhinitis    Ataxic gait    Shoulder pain    Ophthalmoplegia    CAD in native artery    Seasonal allergies    Benign non-nodular prostatic hyperplasia with lower urinary tract symptoms    Weakness    Tension headache    Limited response to serotonin reuptake inhibitors associated with SS genotype of 5-HTTLPR region of SLC6A4 gene    HTR2A GG genotype    HLA-B*1502 allele negative    CYP2B6 intermediate metabolizer    Vertigo    Cerebellar atrophy    Chest pain    SOB (shortness of breath)    Aortic root dilation    Dysphagia    Pubic ramus fracture     Past Medical History:   Diagnosis Date    Allergic rhinitis      Anxiety     Arthritis     Balance problem     Carpal tunnel syndrome     Cluster headache     Coronary artery disease     Developmental delay     Diabetes mellitus     Noted by PCP    Difficulty walking     Dizziness     Dysphagia     Enlarged prostate     GERD (gastroesophageal reflux disease)     Gout     Hip fracture, left     History of cardiovascular stress test 2018    positive for inferior and lateral ischemia     History of echocardiogram     Hyperlipidemia     Hypertension     Impaired mobility     Lymphadenitis     Memory loss     Migraine     Morbid obesity with BMI of 40.0-44.9, adult 08/10/2020    Associated with hypertension, hyperlipidemia       Myocardial infarction 2000    Obesity     DENZEL (obstructive sleep apnea)     oxygen at night- 2LNC    Peptic ulcer     Peptic ulceration     Poor historian     Rheumatoid arthritis     Right shoulder pain     SOB (shortness of breath) on exertion     Spinocerebellar ataxia     Tension headache     Trigeminal neuralgia     Vitamin B 12 deficiency     Vitamin D deficiency      Past Surgical History:   Procedure Laterality Date    CARDIAC CATHETERIZATION      CARDIAC CATHETERIZATION Left 3/14/2018    Procedure: Cardiac Catheterization/Vascular Study;  Surgeon: Adam Perkins MD;  Location: [facility] CATH INVASIVE LOCATION;  Service: Cardiovascular    COLONOSCOPY      COLONOSCOPY N/A 9/13/2019    Procedure: COLONOSCOPY W/ COLD SNARE POLYPECTOMY;  Surgeon: Melba Lopez MD;  Location: [facility] ENDOSCOPY;  Service: Gastroenterology    COLONOSCOPY N/A 9/24/2024    Procedure: COLONOSCOPY;  Surgeon: Melba Lopez MD;  Location: [facility] ENDOSCOPY;  Service: Gastroenterology;  Laterality: N/A;    ELBOW ARTHROPLASTY Bilateral     ENDOSCOPY      ENDOSCOPY N/A 9/24/2024    Procedure: ESOPHAGOGASTRODUODENOSCOPY WITH BIOPSY;  Surgeon: Melba Lopez MD;  Location: [facility] ENDOSCOPY;  Service: Gastroenterology;  Laterality: N/A;    HERNIA REPAIR      x3    HIP  HEMIARTHROPLASTY Left     NEUROPLASTY Bilateral     decompression median nerve at carpal tunnel    TOTAL KNEE ARTHROPLASTY Bilateral     2005, 2012      General Information       Row Name 03/09/25 1221          Physical Therapy Time and Intention    Document Type therapy note (daily note)  -TW     Mode of Treatment physical therapy;individual therapy  -TW       Row Name 03/09/25 1221          General Information    Patient Profile Reviewed yes  -TW     Existing Precautions/Restrictions fall  -TW     Barriers to Rehab previous functional deficit  -TW       Row Name 03/09/25 1221          Cognition    Orientation Status (Cognition) oriented to;person;place;situation  -       Row Name 03/09/25 1221          Safety Issues/Impairments Affecting Functional Mobility    Safety Issues Affecting Function (Mobility) insight into deficits/self-awareness;judgment;positioning of assistive device;safety precaution awareness;safety precautions follow-through/compliance;sequencing abilities  -TW     Impairments Affecting Function (Mobility) pain;endurance/activity tolerance;balance;strength;postural/trunk control  -TW               User Key  (r) = Recorded By, (t) = Taken By, (c) = Cosigned By      Initials Name Provider Type    TW Janis Colindres, PT Physical Therapist                   Mobility       Row Name 03/09/25 1221          Bed Mobility    Bed Mobility supine-sit  -TW     Supine-Sit [place] (Bed Mobility) moderate assist (50% patient effort);verbal cues;nonverbal cues (demo/gesture)  -TW     Assistive Device (Bed Mobility) head of bed elevated;bed rails  -TW     Comment, (Bed Mobility) pt was able to sit on EOB with SBA only without loss of balance.  -       Row Name 03/09/25 1221          Bed-Chair Transfer    Bed-Chair [place] (Transfers) minimum assist (75% patient effort);verbal cues  -TW     Assistive Device (Bed-Chair Transfers) walker, front-wheeled  -TW     Comment, (Bed-Chair Transfer) Cues to fully  turn and to reach back prior to sitting down.  -TW       Row Name 03/09/25 1221          Sit-Stand Transfer    Sit-Stand [place] (Transfers) minimum assist (75% patient effort);moderate assist (50% patient effort);verbal cues  -TW     Assistive Device (Sit-Stand Transfers) walker, front-wheeled  -TW     Comment, (Sit-Stand Transfer) On first sit to stand min assist needed. On second sit to stand, pt was more fatigued and needed mod assist for coming up from EOB.  -TW       Row Name 03/09/25 1221          Gait/Stairs (Locomotion)    [place] Level (Gait) minimum assist (75% patient effort);moderate assist (50% patient effort)  -TW     Assistive Device (Gait) walker, front-wheeled  -TW     Patient was able to Ambulate yes  -TW     Distance in Feet (Gait) 10  -TW     Deviations/Abnormal Patterns (Gait) festinating/shuffling;weight shifting decreased;antalgic;other (see comments)  -TW     Bilateral Gait Deviations forward flexed posture;heel strike decreased;weight shift ability decreased  -TW     Comment, (Gait/Stairs) pt had difficulty advancing BLE and pain in LLE pelvic area increasing to 8/10. Pt was noted to have unsteadiness in his trunk in static stand that increased with ambulation. Per pt's wife pf has had balance difficulties for awhile now.  -TW               User Key  (r) = Recorded By, (t) = Taken By, (c) = Cosigned By      Initials Name Provider Type    TW Janis Colindres PT Physical Therapist                   Obj/Interventions       Row Name 03/09/25 1221          Balance    Balance Assessment sitting static balance;sitting dynamic balance;standing static balance;standing dynamic balance  -TW     Static Sitting Balance standby assist  -TW     Dynamic Sitting Balance standby assist  -TW     Position, Sitting Balance sitting edge of bed  -TW     Static Standing Balance minimal assist;contact guard  -TW     Dynamic Standing Balance minimal assist;moderate assist  -TW     Position/Device Used,  Standing Balance supported;walker, rolling  -TW               User Key  (r) = Recorded By, (t) = Taken By, (c) = Cosigned By      Initials Name Provider Type    Janis Mohr, PT Physical Therapist                   Goals/Plan    No documentation.                  Clinical Impression       Row Name 03/09/25 1221          Pain    Pretreatment Pain Rating 2/10  -TW     Posttreatment Pain Rating 4/10  -TW     Pain Location other (see comments)  pelvic  -TW     Pain Side/Orientation left  -TW     Pain Management Interventions other (see comments)  -TW     Pre/Posttreatment Pain Comment Pain in standing had L pelvic pain at 8/10. Once in chair this pain did decrease to 4/10  -TW       Row Name 03/09/25 1221          Plan of Care Review    Plan of Care Reviewed With patient;spouse  -TW     Outcome Evaluation PT treatment completed this date with pt presenting supine in bed with pain in L pelvic area rated at 2/10. Pt needed mod assist to come to sit on L side of the bed with HOB raised. Pt was able to sit on EOB with SBA. Sit to stand wtih FWW min assist and pt amb 10 ft with difficulty wt shifting onto the LLE due to pain increasing to 8/10. Very short shuffle steps with pt having decreased control of hips/balance during gait needing min to mod assist for safe ambulation. Pt had to sit post 10 ft due to fatigue and increased pain. Post resting, Mod assist for sit to stand and min assist to pivot to bedside chair. Once positioned in chair pt reated L pelvic pain 4/10. Pt's O2 sat on room air post gait 92%. After seeing how difficult it was for ambulation pt and his wife open to discussing STR upon d/c. Cont current PT POC.  -TW       Row Name 03/09/25 1221          Vital Signs    Pre SpO2 (%) 93  -TW     O2 Delivery Pre Treatment supplemental O2  -TW     Post SpO2 (%) 92  -TW     O2 Delivery Post Treatment room air  -TW     Pre Patient Position Supine  -TW     Intra Patient Position Standing  -TW     Post Patient  Position Sitting  -TW       Row Name 03/09/25 1221          Positioning and Restraints    Pre-Treatment Position in bed  -TW     Post Treatment Position chair  -TW     In Chair notified nsg;reclined;call light within reach;encouraged to call for assist;with family/caregiver  -               User Key  (r) = Recorded By, (t) = Taken By, (c) = Cosigned By      Initials Name Provider Type     Janis Colindres, PT Physical Therapist                   Outcome Measures       Row Name 03/09/25 1221 03/09/25 0800       How much help from another person do you currently need...    Turning from your back to your side while in flat bed without using bedrails? 3  -TW 3  -LA    Moving from lying on back to sitting on the side of a flat bed without bedrails? 2  -TW 3  -LA    Moving to and from a bed to a chair (including a wheelchair)? 3  -TW 3  -LA    Standing up from a chair using your arms (e.g., wheelchair, bedside chair)? 3  -TW 3  -LA    Climbing 3-5 steps with a railing? 1  -TW 2  -LA    To walk in hospital room? 2  -TW 2  -LA    AM-PAC 6 Clicks Score (PT) 14  -TW 16  -LA    Highest Level of Mobility Goal 4 --> Transfer to chair/commode  -TW 5 --> Static standing  -LA      Row Name 03/09/25 1221          Functional Assessment    Outcome Measure Options AM-PAC 6 Clicks Basic Mobility (PT)  -TW               User Key  (r) = Recorded By, (t) = Taken By, (c) = Cosigned By      Initials Name Provider Type    TW Janis Colindres PT Physical Therapist    Hayley Gilbert, RN Registered Nurse                                 Physical Therapy Education       Title: PT OT SLP Therapies (In Progress)       Topic: Physical Therapy (In Progress)       Point: Mobility training (Done)       Learning Progress Summary            Patient Acceptance, E,D, VU,DU by  at 3/9/2025 1221    Comment: Sequencing with FWW for ambulation discussed with pt prior to ambulation and safety cues for sequencing during gait.    Acceptance, E,TB, VU by  at  3/8/2025 1328    Comment: Role of PT and POC.  Proper use of RW and sequencing for stairs   Family Acceptance, E,TB, VU by  at 3/8/2025 1328    Comment: Role of PT and POC.  Proper use of RW and sequencing for stairs                      Point: Home exercise program (Not Started)       Learner Progress:  Not documented in this visit.              Point: Body mechanics (Not Started)       Learner Progress:  Not documented in this visit.              Point: Precautions (Not Started)       Learner Progress:  Not documented in this visit.                              User Key       Initials Effective Dates Name Provider Type Discipline     08/22/23 -  Mechelle Mcfarland, PT Physical Therapist PT    TW 06/16/21 -  Janis Colindres, PT Physical Therapist PT                  PT Recommendation and Plan     Outcome Evaluation: PT treatment completed this date with pt presenting supine in bed with pain in L pelvic area rated at 2/10. Pt needed mod assist to come to sit on L side of the bed with HOB raised. Pt was able to sit on EOB with SBA. Sit to stand wtih FWW min assist and pt amb 10 ft with difficulty wt shifting onto the LLE due to pain increasing to 8/10. Very short shuffle steps with pt having decreased control of hips/balance during gait needing min to mod assist for safe ambulation. Pt had to sit post 10 ft due to fatigue and increased pain. Post resting, Mod assist for sit to stand and min assist to pivot to bedside chair. Once positioned in chair pt reated L pelvic pain 4/10. Pt's O2 sat on room air post gait 92%. After seeing how difficult it was for ambulation pt and his wife open to discussing STR upon d/c. Cont current PT POC.     Time Calculation:         PT Charges       Row Name 03/09/25 1221             Time Calculation    Start Time 1152  -TW      Stop Time 1221  -TW      Time Calculation (min) 29 min  -TW      PT Received On 03/09/25  -TW         Timed Charges    74905 - Gait Training Minutes  14  -TW       61726 - PT Therapeutic Activity Minutes 15  -TW         Total Minutes    Timed Charges Total Minutes 29  -TW       Total Minutes 29  -TW                User Key  (r) = Recorded By, (t) = Taken By, (c) = Cosigned By      Initials Name Provider Type    Janis Mohr PT Physical Therapist                  Therapy Charges for Today       Code Description Service Date Service Provider Modifiers Qty    44500823801 HC GAIT TRAINING EA 15 MIN 3/9/2025 Janis Colindres, PT GP 1    [id number]  PT THERAPEUTIC ACT EA 15 MIN 3/9/2025 Janis Colindres PT GP 1            PT G-Codes  Outcome Measure Options: AM-PAC 6 Clicks Basic Mobility (PT)  AM-PAC 6 Clicks Score (PT): 14  PT Discharge Summary  Anticipated Discharge Disposition (PT): inpatient rehabilitation facility    Janis Colindres PT  3/9/2025      Electronically signed by Janis Colindres PT at 03/09/25 1407       Occupational Therapy Notes (last 72 hours)  Notes from 03/07/25 1153 through 03/10/25 1153   No notes exist for this encounter.        PAST SURGICAL HISTORY:  S/P lumbar fusion in [place]

## 2025-03-24 ENCOUNTER — EMERGENCY (EMERGENCY)
Facility: HOSPITAL | Age: 65
LOS: 1 days | Discharge: HOME | End: 2025-03-24
Attending: STUDENT IN AN ORGANIZED HEALTH CARE EDUCATION/TRAINING PROGRAM
Payer: MEDICAID

## 2025-03-24 VITALS
SYSTOLIC BLOOD PRESSURE: 185 MMHG | TEMPERATURE: 98 F | HEART RATE: 68 BPM | OXYGEN SATURATION: 97 % | RESPIRATION RATE: 18 BRPM | DIASTOLIC BLOOD PRESSURE: 96 MMHG

## 2025-03-24 VITALS
OXYGEN SATURATION: 100 % | HEIGHT: 69 IN | SYSTOLIC BLOOD PRESSURE: 190 MMHG | HEART RATE: 71 BPM | RESPIRATION RATE: 18 BRPM | DIASTOLIC BLOOD PRESSURE: 93 MMHG | TEMPERATURE: 98 F

## 2025-03-24 DIAGNOSIS — Z98.1 ARTHRODESIS STATUS: Chronic | ICD-10-CM

## 2025-03-24 LAB
ALBUMIN SERPL ELPH-MCNC: 2.3 G/DL — LOW (ref 3.3–5.2)
ALP SERPL-CCNC: 53 U/L — SIGNIFICANT CHANGE UP (ref 40–120)
ALT FLD-CCNC: 6 U/L — SIGNIFICANT CHANGE UP
ANION GAP SERPL CALC-SCNC: 9 MMOL/L — SIGNIFICANT CHANGE UP (ref 5–17)
AST SERPL-CCNC: 14 U/L — SIGNIFICANT CHANGE UP
BASOPHILS # BLD AUTO: 0.03 K/UL — SIGNIFICANT CHANGE UP (ref 0–0.2)
BASOPHILS NFR BLD AUTO: 0.5 % — SIGNIFICANT CHANGE UP (ref 0–2)
BILIRUB SERPL-MCNC: <0.2 MG/DL — LOW (ref 0.4–2)
BUN SERPL-MCNC: 32 MG/DL — HIGH (ref 8–20)
CALCIUM SERPL-MCNC: 8.2 MG/DL — LOW (ref 8.4–10.5)
CHLORIDE SERPL-SCNC: 105 MMOL/L — SIGNIFICANT CHANGE UP (ref 96–108)
CO2 SERPL-SCNC: 25 MMOL/L — SIGNIFICANT CHANGE UP (ref 22–29)
CREAT SERPL-MCNC: 3.3 MG/DL — HIGH (ref 0.5–1.3)
EGFR: 20 ML/MIN/1.73M2 — LOW
EGFR: 20 ML/MIN/1.73M2 — LOW
EOSINOPHIL # BLD AUTO: 0.13 K/UL — SIGNIFICANT CHANGE UP (ref 0–0.5)
EOSINOPHIL NFR BLD AUTO: 2 % — SIGNIFICANT CHANGE UP (ref 0–6)
GAS PNL BLDV: SIGNIFICANT CHANGE UP
GLUCOSE SERPL-MCNC: 142 MG/DL — HIGH (ref 70–99)
HCT VFR BLD CALC: 28.1 % — LOW (ref 39–50)
HGB BLD-MCNC: 9 G/DL — LOW (ref 13–17)
IMM GRANULOCYTES # BLD AUTO: 0.09 K/UL — HIGH (ref 0–0.07)
IMM GRANULOCYTES NFR BLD AUTO: 1.4 % — HIGH (ref 0–0.9)
LIDOCAIN IGE QN: 21 U/L — LOW (ref 22–51)
LYMPHOCYTES # BLD AUTO: 1.82 K/UL — SIGNIFICANT CHANGE UP (ref 1–3.3)
LYMPHOCYTES NFR BLD AUTO: 27.9 % — SIGNIFICANT CHANGE UP (ref 13–44)
MCHC RBC-ENTMCNC: 28.7 PG — SIGNIFICANT CHANGE UP (ref 27–34)
MCV RBC AUTO: 89.5 FL — SIGNIFICANT CHANGE UP (ref 80–100)
MONOCYTES # BLD AUTO: 0.55 K/UL — SIGNIFICANT CHANGE UP (ref 0–0.9)
MONOCYTES NFR BLD AUTO: 8.4 % — SIGNIFICANT CHANGE UP (ref 2–14)
NEUTROPHILS # BLD AUTO: 3.9 K/UL — SIGNIFICANT CHANGE UP (ref 1.8–7.4)
NEUTROPHILS NFR BLD AUTO: 59.8 % — SIGNIFICANT CHANGE UP (ref 43–77)
NRBC # BLD AUTO: 0 K/UL — SIGNIFICANT CHANGE UP (ref 0–0)
NRBC # FLD: 0 K/UL — SIGNIFICANT CHANGE UP (ref 0–0)
NRBC BLD AUTO-RTO: 0 /100 WBCS — SIGNIFICANT CHANGE UP (ref 0–0)
PLATELET # BLD AUTO: 212 K/UL — SIGNIFICANT CHANGE UP (ref 150–400)
PMV BLD: 9.1 FL — SIGNIFICANT CHANGE UP (ref 7–13)
POTASSIUM SERPL-MCNC: 3.9 MMOL/L — SIGNIFICANT CHANGE UP (ref 3.5–5.3)
POTASSIUM SERPL-SCNC: 3.9 MMOL/L — SIGNIFICANT CHANGE UP (ref 3.5–5.3)
PROT SERPL-MCNC: 5.6 G/DL — LOW (ref 6.6–8.7)
RBC # BLD: 3.14 M/UL — LOW (ref 4.2–5.8)
RBC # FLD: 14 % — SIGNIFICANT CHANGE UP (ref 10.3–14.5)
SODIUM SERPL-SCNC: 139 MMOL/L — SIGNIFICANT CHANGE UP (ref 135–145)
TROPONIN T, HIGH SENSITIVITY RESULT: 37 NG/L — SIGNIFICANT CHANGE UP (ref 0–51)
TROPONIN T, HIGH SENSITIVITY RESULT: 37 NG/L — SIGNIFICANT CHANGE UP (ref 0–51)
WBC # BLD: 6.52 K/UL — SIGNIFICANT CHANGE UP (ref 3.8–10.5)
WBC # FLD AUTO: 6.52 K/UL — SIGNIFICANT CHANGE UP (ref 3.8–10.5)

## 2025-03-24 PROCEDURE — 84484 ASSAY OF TROPONIN QUANT: CPT

## 2025-03-24 PROCEDURE — 84132 ASSAY OF SERUM POTASSIUM: CPT

## 2025-03-24 PROCEDURE — 72125 CT NECK SPINE W/O DYE: CPT | Mod: 26

## 2025-03-24 PROCEDURE — 36415 COLL VENOUS BLD VENIPUNCTURE: CPT

## 2025-03-24 PROCEDURE — 70450 CT HEAD/BRAIN W/O DYE: CPT | Mod: MC

## 2025-03-24 PROCEDURE — 83690 ASSAY OF LIPASE: CPT

## 2025-03-24 PROCEDURE — 85014 HEMATOCRIT: CPT

## 2025-03-24 PROCEDURE — 85018 HEMOGLOBIN: CPT

## 2025-03-24 PROCEDURE — 82435 ASSAY OF BLOOD CHLORIDE: CPT

## 2025-03-24 PROCEDURE — 99284 EMERGENCY DEPT VISIT MOD MDM: CPT | Mod: 25

## 2025-03-24 PROCEDURE — 96374 THER/PROPH/DIAG INJ IV PUSH: CPT

## 2025-03-24 PROCEDURE — 83605 ASSAY OF LACTIC ACID: CPT

## 2025-03-24 PROCEDURE — 82330 ASSAY OF CALCIUM: CPT

## 2025-03-24 PROCEDURE — 80053 COMPREHEN METABOLIC PANEL: CPT

## 2025-03-24 PROCEDURE — 99285 EMERGENCY DEPT VISIT HI MDM: CPT

## 2025-03-24 PROCEDURE — 85025 COMPLETE CBC W/AUTO DIFF WBC: CPT

## 2025-03-24 PROCEDURE — T1013: CPT

## 2025-03-24 PROCEDURE — 82803 BLOOD GASES ANY COMBINATION: CPT

## 2025-03-24 PROCEDURE — 70450 CT HEAD/BRAIN W/O DYE: CPT | Mod: 26

## 2025-03-24 PROCEDURE — 84295 ASSAY OF SERUM SODIUM: CPT

## 2025-03-24 PROCEDURE — 82947 ASSAY GLUCOSE BLOOD QUANT: CPT

## 2025-03-24 PROCEDURE — 72125 CT NECK SPINE W/O DYE: CPT | Mod: MC

## 2025-03-24 RX ORDER — OXYCODONE HYDROCHLORIDE AND ACETAMINOPHEN 10; 325 MG/1; MG/1
1 TABLET ORAL
Qty: 10 | Refills: 0
Start: 2025-03-24 | End: 2025-03-28

## 2025-03-24 RX ORDER — GABAPENTIN 400 MG/1
100 CAPSULE ORAL ONCE
Refills: 0 | Status: COMPLETED | OUTPATIENT
Start: 2025-03-24 | End: 2025-03-24

## 2025-03-24 RX ORDER — GABAPENTIN 400 MG/1
1 CAPSULE ORAL
Qty: 15 | Refills: 0
Start: 2025-03-24 | End: 2025-03-28

## 2025-03-24 RX ORDER — AMLODIPINE BESYLATE 10 MG/1
10 TABLET ORAL ONCE
Refills: 0 | Status: COMPLETED | OUTPATIENT
Start: 2025-03-24 | End: 2025-03-24

## 2025-03-24 RX ORDER — ACETAMINOPHEN 500 MG/5ML
1000 LIQUID (ML) ORAL ONCE
Refills: 0 | Status: DISCONTINUED | OUTPATIENT
Start: 2025-03-24 | End: 2025-03-31

## 2025-03-24 RX ADMIN — GABAPENTIN 100 MILLIGRAM(S): 400 CAPSULE ORAL at 07:12

## 2025-03-24 RX ADMIN — Medication 2 MILLIGRAM(S): at 07:13

## 2025-03-24 RX ADMIN — Medication 500 MILLILITER(S): at 07:12

## 2025-03-24 RX ADMIN — AMLODIPINE BESYLATE 10 MILLIGRAM(S): 10 TABLET ORAL at 06:38

## 2025-03-24 NOTE — ED ADULT NURSE REASSESSMENT NOTE - NS ED NURSE REASSESS COMMENT FT1
Assumed care of patient at 0730. A&Ox3, reports right sided eye pain x 2 months that persists MD aware, pt medicated. Breathing is spontaneous even and unlabored. Bed is in lowest position and side rails up. Safety precautions maintained. will be DC.

## 2025-03-24 NOTE — ED PROVIDER NOTE - EYES NEGATIVE STATEMENT, MLM
Kimberli Pal with [facility] states they faxed request for One Touch Ultra Test Strips and Lancets on 6/22 and 6/24 and have not received back.    CCR unable to request reorder for Lancets as not listed on med list.     Please send and any questions call.      Outpatient Medications Marked as Taking for the 6/26/20 encounter (Telephone) with Onel Granados MD   Medication Sig Dispense Refill   • blood glucose (ONE TOUCH ULTRA TEST) test strip 2 times daily. 180 strip 1        Ok to leave detailed Message: No  Informed caller of refill policy- 24-48 hours: No  No call back needed unless nurse has questions.     Pharmacy: [facility] - [address]       
Per Protocol Medication refilled, closing encounter  
no discharge, no irritation, no pain, no redness, and no visual changes.
Tachycardia
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
## 2025-03-24 NOTE — ED PROVIDER NOTE - IV ALTEPLASE INCLUSION HIDDEN
Prior to surgery you will need to complete:  - Dietitian consult and follow up appointments as needed  - Cardiac clearance  - Labs  - Chest X-ray  - EKG  - Psychological evaluation, Please call psychiatry [phone] to schedule  - Stress test  - UGI  - EGD  - Plan with pain management     In preparation for bariatric surgery, please complete the following:   Discuss your current medications with your primary care provider, remember your medications will need to be crushed, chewable, or in liquid form for the first 2-4 weeks after a gastric bypass or sleeve.  For a gastric band, your medications will need to be crushed indefinitely.    If you take a blood thinner such as: Coumadin (warfarin), Pradaxa (dabigatran), or Plavix (clopidogrel), you will need to speak with your prescribing provider on how or if this medication can be stopped before surgery.   If you take a medication for depression or anxiety, remember to discuss this with the psychologist or psychiatrist that you see.   If you take medication for arthritis on a daily basis that is considered a non-steroidal anti-inflammatory (NSAID), please discuss with your prescribing physician an alternative medication.  After having gastric bypass or gastric sleeve, this group of medications is not appropriate to take due to increased risk of bleeding stomach ulcers.      DEFINITIONS  Appointments: Pre-scheduled meetings or consultations with any physician, advanced practice provider, dietitian, or psychologist, and labs, imaging studies, sleep studies, etc.   Late cancellation: Cancelling an appointment 24-48 hours prior to scheduled time.  No-Show appointment:  is when   You do NOT arrive to your appointment at the time its scheduled.  You call to cancel or cancel via [facility] less than 24 hours in advance of your scheduled appointment  You show up 15 minutes AFTER your scheduled appointment time without any notification of being late.     POLICY  You are  allowed up to 3 cancellations for appointments.   After 3 cancellations your case will be placed on hold for 2 months and after that time you can resume the program.   You are allowed only 1 no-show for an appointment.   You will be re-scheduled one time and if there is a 2nd no-show at any point, your case will be placed on hold for 3 months.  After 3 months you can resume the program.     Upon resuming the program after being placed on hold for either above mentioned reasons, if you have a late cancel or no show for any appointment, the bariatric team will review if youre an appropriate candidate for surgery at the monthly meeting.       show
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
## 2025-03-24 NOTE — ED PROVIDER NOTE - PATIENT PORTAL LINK FT
You can access the FollowMyHealth Patient Portal offered by [facility] by registering at the following website: [email or web address]. By joining [facility]’s FollowMyHealth portal, you will also be able to view your health information using other applications (apps) compatible with our system.

## 2025-03-24 NOTE — ED PROVIDER NOTE - NSFOLLOWUPINSTRUCTIONS_ED_ALL_ED_FT
1) Follow up with your doctor in 1-2 days  2) Return to the ER for worsening or concerning symptoms  3) Take medication as prescribed    1) Acuda a tosha reji de seguimiento con bertrand médico en 1 o 2 días.  2) Regrese a urgencias si presenta síntomas preocupantes o empeoramiento.  3) [place] la medicación según lo prescrito.      Cefalea migrañosa crónica  Chronic Migraine Headache    La migraña es un tipo de dolor de tj que suele ser más juan carlos y repentino que otros randi de tj. Se caracterizan por un dolor intenso, pulsante y punzante que, generalmente, se presenta en un solo lado de la tj. El dolor de la migraña generalmente empeora con la actividad.    Las migrañas pueden causar náuseas, vómitos, sensibilidad a la moris y al chandu, y cambios en la visión. Las migrañas que vuelven a aparecer tosha y otra vez se denominan migrañas recurrentes. Tosha migraña se considera migraña crónica si ocurre al menos 15 días del mes tray más de 3 meses. Hable con bertrand médico sobre los factores que pueden causar (desencadenar) las migrañas.    ¿Cuáles son las causas?  Se desconoce la causa exacta de esta afección. Sin embargo, tosha migraña puede aparecer cuando los nervios del cerebro se irritan y liberan ciertas sustancias químicas que causan inflamación de los vasos sanguíneos. La inflamación de los vasos sanguíneos produce dolor.    Las causas o los desencadenantes de las migrañas pueden ser los siguientes:    Fumar.  Ciertos alimentos y bebidas, tales carlos alberto:    Quesos curados.  Chocolate.  El alcohol.  La cafeína.  Los alimentos o las bebidas que contienen nitratos, glutamato, aspartamo, glutamato monosódico (GMS) o tiramina.  Medicamentos, carlos alberto las píldoras anticonceptivas o algunos medicamentos para la presión arterial.    Otros factores que pueden provocar migraña incluyen los siguientes:    Menstruación.  Estrés emocional.  Dormir poco o dormir demasiado.  Cansancio (fatiga).  Luces brillantes o ruidos yola.  Olores.  Cambios climáticos y altitud elevada.    ¿Qué incrementa el riesgo?  Los siguientes factores pueden hacer que usted sea más propenso a tener migrañas crónicas:    Tener migrañas o antecedentes familiares de migraña.  Tener tosha afección de fabiana mental, carlos alberto depresión o ansiedad.  Tener que su muchos analgésicos.  Tener problemas para dormir.  Tener enfermedades cardíacas, diabetes u obesidad.    ¿Cuáles son los signos o síntomas?  Los síntomas de las migrañas varían según la persona y pueden incluir los siguientes:    Dolor pulsante o punzante.  Dolor que normalmente aparece en un solo lado de la tj. En algunos casos, el dolor puede presentarse en ambos lados o alrededor de la tj o el peter.  Dolor intenso que le impide realizar las actividades diarias.  Dolor que empeora con la actividad física.  Náuseas, vómitos o ambos.  Dolor con la exposición a las luces brillantes, a los ruidos yola o a la actividad.  Sensibilidad general a las luces brillantes, a los ruidos yola o a los olores.  Mareos.    Un signo de que la migraña se está volviendo crónica es el aumento de la cantidad de episodios de migraña. Se considera que tosha migraña es crónica si ocurre al menos 15 días del mes tray más de 3 meses.    ¿Cómo se diagnostica?  A menudo, esta afección se diagnostica en función de lo siguiente:    Los síntomas y los antecedentes médicos.  Un examen físico.    También pueden hacerle estudios, que incluyen los siguientes:    Tosha exploración por tomografía computarizada (TC) o resonancia magnética (RM) del cerebro. Estas pruebas de diagnóstico por imagen no pueden diagnosticar las migrañas, fabrice pueden ayudar a descartar otras causas de las cefaleas.  Tosha muestra de líquido cefalorraquídeo (punción lumbar) para analizar (análisis de líquido cefalorraquídeo o análisis de LCR).  Realizar análisis de jake.    ¿Cómo se trata?  Esta afección se trata con lo siguiente:    Medicamentos. Estos ayudan a:    Aliviar el dolor y las náuseas.  Prevenir las migrañas.  Cambios en el estilo de freeman, carlos alberto modificar la dieta o los patrones de sueño.  Terapia conductual. [place] puede incluir:    Ejercicios de relajación.  Biorretroalimentación. Apolonia es un tratamiento que enseña a relajarse y usar el cerebro para disminuir la frecuencia cardíaca, y a controlar la respiración.  Terapia cognitivo conductual (TCC). Es tosha forma de psicoterapia. Esta terapia le ayuda a establecer objetivos y a realizar un seguimiento de los cambios que hace.  Acupuntura.  Usar un dispositivo que emite estimulación eléctrica a los nervios, que puede aliviar el dolor (terapia de neuromodulación).  Someterse a tosha cirugía si los demás tratamientos no resultan eficaces.    Siga estas instrucciones en bertrand casa:      Medicamentos    Use los medicamentos de venta regan y los recetados solamente carlos alberto se lo haya indicado el médico.  Pregúntele al médico si el medicamento recetado le impide conducir o usar maquinaria.        Estilo de freeman     No consuma ningún producto que contenga nicotina o tabaco, carlos alberto cigarrillos, cigarrillos electrónicos y tabaco de mascar. Si necesita ayuda para dejar de fumar, consulte al médico.  No lorin alcohol.  Duerma entre 7 y 9 horas todas las noches o la cantidad de horas que le haya recomendado el médico.  Encuentre modos de manejar el estrés, por ejemplo, a través de la meditación, la respiración profunda o el yoga.  Mantenga un peso saludable. Si necesita ayuda para bajar de peso, hable con el médico.  [place] ejercicio regularmente. Tenga un objetivo de 150 minutos de ejercicio de intensidad moderada, carlos alberto caminar, andar en bicicleta o practicar yoga, o de 75 minutos de ejercicio vigoroso todas las semanas. El ejercicio vigoroso incluye correr, entrenar en circuito y practicar natación.        Instrucciones generales     Mantenga un registro para descubrir qué le desencadena las migrañas, con el fin de poder evitar esos factores. Registre, por ejemplo, lo siguiente:    Lo que usted come y rebecca.  El tiempo que duerme.  Algún cambio en bertrand dieta o en los medicamentos.  Cuando tenga la migraña, acuéstese en un cuarto oscuro y tranquilo.  Trate de colocar un paño frío sobre la tj cuando tenga tosha migraña.  Mantenga las luces tenues si le molestan las luces brillantes o estas hacen que la migraña empeore.  Concurra a todas las visitas de seguimiento carlos alberto se lo haya indicado el médico. [place] es importante.    Dónde buscar más información  Coalition for Headache and Migraine Patients (CHAMP) (Coalición para Pacientes con Cefaleas y Migrañas): headachemigraine.org  American Migraine Foundation (Fundación Estadounidense para la Migraña): americanmigrainefoundation.org  National Headache Foundation (Fundación Nacional para la Cefalea): headaches.org    Comuníquese con un médico si:  El dolor no mejora, ni siquiera con los medicamentos.  Las migrañas vuelven a aparecer, incluso si lobo medicamentos.    Solicite ayuda de inmediato si:  La migraña se torna intensa y el medicamento no hace efecto.  Tiene rigidez en el peter y fiebre.  Sufre pérdida de la visión.  Tiene debilidad muscular o pérdida del control muscular.  Comienza a perder el equilibrio o tiene problemas para caminar.  Siente que podría desmayarse o se desmaya.  Comienza a tener randi de tj intensos, repentinos e inesperados.  Tiene tosha convulsión.    Resumen  Las cefaleas migrañosas son, normalmente, más intensas y repentinas que otros randi de tj. Se caracterizan por un dolor intenso, pulsante y punzante que, generalmente, se presenta en un solo lado de la tj.  Las migrañas que vuelven a aparecer tosha y otra vez se denominan migrañas recurrentes. Tosha migraña se considera migraña crónica si ocurre 15 días del mes tray más de 3 meses.  Existen ciertos factores desencadenantes de las migrañas, tales carlos alberto la falta o el exceso de sueño, el consumo de cigarrillos, algunos alimentos, el alcohol, el estrés y ciertos medicamentos.  Bertrand plan de tratamiento puede incluir medicamentos, cambios en el estilo de freeman y terapia conductual.    NOTAS ADICIONALES E INSTRUCCIONES    Please follow up with your Primary MD in 24-48 hr.  Seek immediate medical care for any new/worsening signs or symptoms.         Dolor de pecho inespecífico en los adultos  Nonspecific Chest Pain, Adult    El dolor de pecho puede deberse a muchas afecciones diferentes. Puede ser provocado por tosha afección que es potencialmente mortal y requiere tratamiento hospitalario de inmediato. También puede ser provocado por algo que no es potencialmente mortal. Si tiene dolor de pecho, puede ser difícil saber la diferencia, por lo tanto es importante que obtenga ayuda de inmediato para asegurarse de que no tiene tosha afección grave.    Algunas causas potencialmente mortales del dolor de pecho incluyen:    Infarto de miocardio.  Un desgarro en el vaso sanguíneo principal del cuerpo (disección aórtica).  Inflamación alrededor del corazón (pericarditis).  Un problema en los pulmones, carlos alberto un coágulo de jake (embolia pulmonar) o un pulmón colapsado (neumotórax).    Algunas causas de dolor de pecho que no son potencialmente mortales incluyen:    Acidez estomacal.  Ansiedad o estrés.  Daño de los huesos, los músculos y los cartílagos que conforman la pared torácica.  Neumonía o bronquitis.  Culebrilla (virus de la varicela zóster).    El dolor de pecho puede provocar las siguientes sensaciones:    Dolor o molestias en la superficie o en lo profundo del pecho.  Dolor opresivo, continuo o constrictivo.  Ardor u hormigueo.  Dolor sordo o intenso que empeora al moverse, toser o inhalar profundamente.  Dolor o molestias que también se sienten en la espalda, el peter, la mandíbula, el hombro o el brazo, o dolor que se extiende a cualquiera de estas zonas.    El dolor de pecho puede aparecer y desaparecer. También puede ser hilda. Bertrand médico le hará análisis clínicos y otros estudios para tratar de determinar la causa del dolor. El tratamiento dependerá de la causa del dolor de pecho.    Siga estas indicaciones en bertrand casa:      Medicamentos    [place] los medicamentos de venta regan y los recetados solamente carlos alberto se lo haya indicado el médico.  Si le recetaron un antibiótico, tómelo o úselo carlos alberto se lo haya indicado el médico. No deje de su los antibióticos aunque comience a sentirse mejor.        Estilo de freeman     [place] reposo carlos alberto se lo haya indicado el médico.  No consuma ningún producto que contenga nicotina o tabaco, carlos alberto cigarrillos y cigarrillos electrónicos. Si necesita ayuda para dejar de fumar, consulte al médico.  No lorin alcohol.  Opte por un estilo de freeman saludable según lo recomendado. [place] puede incluir lo siguiente:    Practicar actividad física con regularidad. Pida al médico que le sugiera algunas actividades que mary seguras para usted.  Seguir tosha dieta cardiosaludable. Esta debe incluir muchas frutas y verduras frescas, cereales integrales, proteínas (magras) con bajo contenido de grasa y productos lácteos bajos en grasa. Un nutricionista podrá ayudarlo a hacer elecciones de alimentación saludables.  Mantener un peso saludable.  Controlar cualquier otra afección que tenga, carlos alberto presión arterial ana (hipertensión) o diabetes.  Disminuir el nivel de estrés; por ejemplo, con yoga o técnicas de relajación.        Indicaciones generales    Esté atento a cualquier cambio en los síntomas. Informe al médico sobre estos o si tiene síntomas nuevos.  Evite las actividades que le causen dolor de pecho.  Concurra a todas las visitas de seguimiento carlos alberto se lo haya indicado el médico. [place] es importante. [place] incluye las visitas para realizarle otros estudios si el dolor de pecho no desaparece.    Comuníquese con un médico si:  El dolor de pecho no desaparece.  Se siente deprimido.  Tiene fiebre.    Solicite ayuda inmediatamente si:  El dolor en el pecho empeora.  Tiene tos que empeora o tose con jake.  Siente un dolor intenso en el abdomen.  Se desmaya.  Tiene tosha molestia repentina e inexplicable en el pecho.  Tiene molestias repentinas e inexplicables en los brazos, la espalda, el peter o la mandíbula.  Le falta el aire en cualquier momento.  Comienza a sudar de manera repentina o la piel se le humedece.  Siente náuseas o vomita.  Se siente repentinamente mareado o se desmaya.  Tiene debilidad intensa, o debilidad o fatiga sin explicación.  Siente que el corazón comienza a latir rápidamente o que se saltea latidos.    Estos síntomas pueden representar un problema grave que constituye tosha emergencia. No espere a priti si los síntomas desaparecen. Solicite atención médica de inmediato. Comuníquese con el servicio de emergencias de bertrand localidad (911 en los [place]). No conduzca por mike propios medios hasta el hospital.    Resumen  El dolor de pecho puede ser provocado por tosha afección que es grave y requiere tratamiento urgente. También puede ser provocado por algo que no es potencialmente mortal.  Si tiene dolor de pecho, es muy importante que consulte con el médico. El médico podrá hacerle análisis clínicos y otros estudios para tratar de determinar la causa del dolor.  Siga las indicaciones de bertrand médico sobre su medicamentos, hacer cambios en bertrand estilo de freeman y recibir tratamiento de urgencia si los síntomas empeoran.  Concurra a todas las visitas de seguimiento carlos alberto se lo haya indicado el médico. [place] incluye las visitas para realizarle otros estudios si el dolor de pecho no desaparece.    NOTAS ADICIONALES E INSTRUCCIONES    Please follow up with your Primary MD in 24-48 hr.  Seek immediate medical care for any new/worsening signs or symptoms.     Hipertensión en los adultos  Hypertension, Adult    La presión arterial ana (hipertensión) se produce cuando la fuerza de la jake bombea a través de las arterias con mucha fuerza. Las arterias son los vasos sanguíneos que transportan la jake desde el corazón al kiki del cuerpo. La hipertensión hace que el corazón [place] más esfuerzo para bombear jake y puede provocar que las arterias se estrechen o endurezcan. La hipertensión no tratada o no controlada puede causar infarto de miocardio, insuficiencia cardíaca, accidente cerebrovascular, enfermedad renal y otros problemas.    Tosha lectura de la presión arterial consta de un número más alto sobre un número más bajo. En condiciones ideales, la presión arterial debe estar por debajo de 120/80. El primer número (“superior”) es la presión sistólica. Es la medida de la presión de las arterias cuando el corazón late. El valeria número (“inferior”) es la presión diastólica. Es la medida de la presión en las arterias cuando el corazón se relaja.    ¿Cuáles son las causas?  Se desconoce la causa exacta de esta afección. Hay algunas afecciones que causan presión arterial ana o están relacionadas con philipp.    ¿Qué incrementa el riesgo?  Algunos factores de riesgo de hipertensión están bajo bertrand control. Los siguientes factores pueden hacer que sea más propenso a desarrollar esta afección:    Fumar.  Tener diabetes mellitus tipo 2, colesterol alto, o ambos.  No hacer la cantidad suficiente de actividad física o ejercicio.  Tener sobrepeso.  Consumir mucha grasa, azúcar, calorías o sal (sodio) en bertrand dieta.  Beber alcohol en exceso.    Algunos factores de riesgo para la presión arterial ana pueden ser difíciles o imposibles de cambiar. Algunos de estos factores son los siguientes:    Tener enfermedad renal crónica.  Tener antecedentes familiares de presión arterial ana.  Edad. Los riesgos aumentan con la edad.  [place]. El riesgo es mayor para las personas afroamericanas.  Sexo. Antes de los 45 años, los hombres corren más riesgo que las mujeres. Después de los 65 años, las mujeres corren más riesgo que los hombres.  Tener apnea obstructiva del sueño.  Estrés.    ¿Cuáles son los signos o los síntomas?  Es posible que la presión arterial ana puede no cause síntomas. La presión arterial muy ana (crisis hipertensiva) puede provocar:    Dolor de tj.  Ansiedad.  Falta de aire.  Hemorragia nasal.  Náuseas y vómitos.  Cambios en la visión.  Dolor de pecho intenso.  Convulsiones.    ¿Cómo se diagnostica?  Esta afección se diagnostica al medir bertrand presión arterial mientras se encuentra sentado, con el brazo apoyado sobre tosha superficie plana, las piernas sin cruzar y los pies leonidas apoyados en el piso. El brazalete del tensiómetro debe colocarse directamente sobre la piel de la parte superior del brazo y al nivel de bertrand corazón. Debe medirla al menos dos veces en el mismo brazo. Determinadas condiciones pueden causar tosha diferencia de presión arterial entre el brazo raeann y el derecho.    Ciertos factores pueden provocar que las lecturas de la presión arterial mary inferiores o superiores a lo normal por un período corto de tiempo:    Si bertrand presión arterial es más ana cuando se encuentra en el consultorio del médico que cuando la mide en bertrand hogar, se denomina “hipertensión de bata julián”. La mayoría de las personas que tienen esta afección no deben ser medicadas.  Si bertrand presión arterial es más ana en el hogar que cuando se encuentra en el consultorio del médico, se denomina “hipertensión enmascarada”. La mayoría de las personas que tienen esta afección deben ser medicadas para controlar la presión arterial.    Si tiene tosha lecturas de presión arterial ana tray tosha visita o si tiene presión arterial normal con otros factores de riesgo, se le podrá pedir que [place] lo siguiente:    Que regrese otro día para volver a controlar bertrand presión arterial nuevamente.  Que se controle la presión arterial en bertrand casa tray 1 semana o más.    Si se le diagnostica hipertensión, es posible que se le realicen otros análisis de jake o estudios de diagnóstico por imágenes para ayudar a bertrand médico a comprender bertrand riesgo general de tener otras afecciones.    ¿Cómo se trata?  Esta afección se trata haciendo cambios saludables en el estilo de freeman, tales carlos alberto ingerir alimentos saludables, realizar más ejercicio y reducir el consumo de alcohol. El médico puede recetarle medicamentos si los cambios en el estilo de freeman no son suficientes para lograr controlar la presión arterial y si:    [place] presión arterial sistólica está por encima de 130.  [place] presión arterial diastólica está por encima de 80.    La presión arterial deseada puede variar en función de las enfermedades, la edad y otros factores personales.    Siga estas instrucciones en bertrand casa:      Comida y bebida     Siga tosha dieta con alto contenido de fibras y potasio, y con bajo contenido de sodio, azúcar agregada y grasas. Un ejemplo de plan alimenticio es la dieta DASH (Dietary Approaches to Stop Hypertension, Métodos alimenticios para detener la hipertensión). Para alimentarse de esta manera:    Coma mucha fruta y verdura fresca. Trate de que la mitad del plato de cada comida sea de frutas y verduras.  Coma cereales integrales, carlos alberto pasta integral, arroz integral o pan integral. Llene aproximadamente un cuarto del plato con cereales integrales.  Coma y lorin productos lácteos con bajo contenido de grasa, carlos alberto leche descremada o yogur bajo en grasas.  Evite la ingesta de greenberg de carne grasa, carne procesada o curada, y carne de ave con piel. Llene aproximadamente un cuarto del plato con proteínas magras, carlos alberto pescado, leticia sin piel, frijoles, huevos o tofu.  Evite ingerir alimentos prehechos y procesados. En general, estos tienen mayor cantidad de sodio, azúcar agregada y grasa.  Reduzca bertrand ingesta diaria de sodio. La mayoría de las personas que tienen hipertensión deben comer menos de 1500 mg de sodio por día.  No lorin alcohol si:    Bertrand médico le indica no hacerlo.  Está embarazada, puede estar embarazada o está tratando de quedar embarazada.  Si rebecca alcohol:    Limite la cantidad que rebecca a lo siguiente:    De 0 a 1 medida por día para las mujeres.  De 0 a 2 medidas por día para los hombres.  Esté atento a la cantidad de alcohol que hay en las bebidas que lobo. En los [place], tosha medida equivale a tosha botella de cerveza de 12 oz (355 ml), un vaso de vino de 5 oz (148 ml) o un vaso de tosha bebida alcohólica de ana graduación de 1½ oz (44 ml).        Estilo de freeman     Trabaje con bertrand médico para mantener un peso saludable o perder peso. Pregúntele cuál es el peso recomendado para usted.  [place] al menos 30 minutos de ejercicio la mayoría de los días de la semana. Estas actividades pueden incluir caminar, nadar o andar en bicicleta.  Incluya ejercicios para fortalecer mike músculos (ejercicios de resistencia), carlos alberto [place] o levantamiento de pesas, carlos alberto parte de bertrand rutina semanal de ejercicios. Intente realizar 30 minutos de apolonia tipo de ejercicios al menos yuliet días a la semana.  No consuma ningún producto que contenga nicotina o tabaco, carlos alberto cigarrillos, cigarrillos electrónicos y tabaco de mascar. Si necesita ayuda para dejar de fumar, consulte al médico.  Contrólese la presión arterial en bertrand casa según las indicaciones del médico.  Concurra a todas las visitas de seguimiento carlos alberto se lo haya indicado el médico. [place] es importante.        Medicamentos    [place] los medicamentos de venta regan y los recetados solamente carlos alberto se lo haya indicado el médico. Siga cuidadosamente las indicaciones. Los medicamentos para la presión arterial deben tomarse según las indicaciones.  No omita las dosis de medicamentos para la presión arterial. Si lo hace, estará en riesgo de tener problemas y puede hacer que los medicamentos mary menos eficaces.  Pregúntele a bertrand médico a qué efectos secundarios o reacciones a los medicamentos debe prestar atención.    Comuníquese con un médico si:  Piensa que tiene tosha reacción a un medicamento que está tomando.  Tiene randi de tj frecuentes (recurrentes).  Se siente mareado.  Tiene hinchazón en los tobillos.  Tiene problemas de visión.    Solicite ayuda inmediatamente si:  Siente un dolor de tj intenso o confusión.  Siente debilidad inusual o adormecimiento.  Siente que va a desmayarse.  Siente un dolor intenso en el pecho o el abdomen.  Vomita repetidas veces.  Tiene dificultad para respirar.    Resumen  La hipertensión se produce cuando la jake bombea en las arterias con mucha fuerza. Si esta afección no se controla, podría correr riesgo de tener complicaciones graves.  La presión arterial deseada puede variar en función de las enfermedades, la edad y otros factores personales. Para la mayoría de las personas, tosha presión arterial normal es aranza que 120/80.  La hipertensión se trata con cambios en el estilo de freeman, medicamentos o tosha combinación de ambos. Los cambios en el estilo de freeman incluyen pérdida de peso, ingerir alimentos sanos, seguir tosha dieta baja en sodio, hacer más ejercicio y limitar el consumo de alcohol.    NOTAS ADICIONALES E INSTRUCCIONES    Please follow up with your Primary MD in 24-48 hr.  Seek immediate medical care for any new/worsening signs or symptoms.

## 2025-03-24 NOTE — ED PROVIDER NOTE - CARDIAC, MLM
Normal rate, regular rhythm.  Heart sounds S1, S2.  No murmurs, rubs or gallops. Diffuse right sided chest wall tenderness

## 2025-03-24 NOTE — ED ADULT TRIAGE NOTE - ACCOMPANIED BY
"3/6/2024    To: [facility] [place]  [address]  [place] [address]    Re:  Sally Booker    YOB: 2016    MRN: [id number]    Dear Colleague,     It was my pleasure to see Sally on 3/6/2024.  In summary, Sally Bokoer is a 7 year old female with a history of ROP who presents with:     Intermittent exotropia, alternating and bilateral dissociated vertical deviation status-post BLR7 10/9/18 (RGA) with worse control again today in updated glasses prescription..  - I recommend eye muscle surgery. Today with Sally and her mother, I reviewed the indications, risks, benefits, and alternatives of eye muscle surgery including, but not limited to, failure obtain the desired ocular alignment (\"over\" or \"under\" correction), diplopia, and damage to any structure in or around the eye that may necessitate treatment with medicine, laser, or surgery. I further explained that the goal of surgery is to help control Sally's strabismus. Surgery will not \"cure\" Sally's strabismus or resolve/prevent the need for refractive correction. Additional strabismus surgery may be required in the short or long term. I emphasized that regular follow-up to monitor and optimize her vision and alignment would be necessary. I also discussed that trainees would be involved in Sally's surgery under my direct supervision. We also discussed the risks of surgical injury, bleeding, and infection which may necessitate further medical or surgical treatment and which may result in diplopia, loss of vision, blindness, or loss of the eye(s) in less than 1% of cases and the remote possibility of permanent damage to any organ system or death with the use of general anesthesia.  I explained that we would hide visible scars as much as possible in natural creases but that every patient heals and pigments differently resulting in a variable degree of scarring to the eyes or surrounding facial structures after surgery.  I " provided multiple opportunities for questions, answered all questions to the best of my ability, and confirmed that my answers and my discussion were understood.  Plan for bilateral medial rectus resection and bilateral inferior oblique anteriorization.     Refractive amblyopia of both eyes secondary to myopic astigmatism both eyes  - Continue full time glasses wear (100% of waking hours).   - Riboflavin in multivitamin is fine per review of Dr. Hawk's 2/12/24 visit which did not identify Keratoconus. Mom is struggling to afford this at this time. Offered SW consult but declines for now.     Rotary nystagmus   Stable, chin down posture today. Monitor.      Thank you for the opportunity to care for Sally. I have asked her to Return for Schedule Surgery.  Until then, please do not hesitate to contact me or my clinic with any questions or concerns.          Warm regards,          Jennifer Hill MD                 Pediatric Ophthalmology & Strabismus        Department of Ophthalmology & Visual Neurosciences        [facility]   CC:  Guardian of Sally Booker   EMT/paramedic

## 2025-03-24 NOTE — ED PROVIDER NOTE - CARE PLAN
Principal Discharge DX:	Chronic chest pain  Secondary Diagnosis:	Chronic headache  Secondary Diagnosis:	Hypertension  Secondary Diagnosis:	Chronic kidney disease, unspecified CKD stage   1

## 2025-03-29 ENCOUNTER — EMERGENCY (EMERGENCY)
Facility: HOSPITAL | Age: 65
LOS: 1 days | Discharge: HOME | End: 2025-03-29
Attending: STUDENT IN AN ORGANIZED HEALTH CARE EDUCATION/TRAINING PROGRAM
Payer: MEDICAID

## 2025-03-29 VITALS
DIASTOLIC BLOOD PRESSURE: 94 MMHG | OXYGEN SATURATION: 99 % | HEART RATE: 71 BPM | TEMPERATURE: 98 F | SYSTOLIC BLOOD PRESSURE: 167 MMHG | RESPIRATION RATE: 18 BRPM

## 2025-03-29 VITALS
HEART RATE: 70 BPM | HEIGHT: 69 IN | DIASTOLIC BLOOD PRESSURE: 88 MMHG | TEMPERATURE: 98 F | RESPIRATION RATE: 18 BRPM | SYSTOLIC BLOOD PRESSURE: 166 MMHG | OXYGEN SATURATION: 95 % | WEIGHT: 164.91 LBS

## 2025-03-29 DIAGNOSIS — Z98.1 ARTHRODESIS STATUS: Chronic | ICD-10-CM

## 2025-03-29 PROCEDURE — 99284 EMERGENCY DEPT VISIT MOD MDM: CPT

## 2025-03-29 PROCEDURE — 70480 CT ORBIT/EAR/FOSSA W/O DYE: CPT | Mod: MC

## 2025-03-29 PROCEDURE — 70480 CT ORBIT/EAR/FOSSA W/O DYE: CPT | Mod: 26

## 2025-03-30 ENCOUNTER — INPATIENT (INPATIENT)
Facility: HOSPITAL | Age: 65
LOS: 43 days | Discharge: OTHER INSTITUTION | DRG: 312 | End: 2025-05-13
Attending: HOSPITALIST | Admitting: STUDENT IN AN ORGANIZED HEALTH CARE EDUCATION/TRAINING PROGRAM
Payer: MEDICAID

## 2025-03-30 VITALS
RESPIRATION RATE: 17 BRPM | OXYGEN SATURATION: 97 % | WEIGHT: 179.9 LBS | SYSTOLIC BLOOD PRESSURE: 126 MMHG | HEIGHT: 69 IN | HEART RATE: 86 BPM | TEMPERATURE: 94 F | DIASTOLIC BLOOD PRESSURE: 73 MMHG

## 2025-03-30 DIAGNOSIS — R79.89 OTHER SPECIFIED ABNORMAL FINDINGS OF BLOOD CHEMISTRY: ICD-10-CM

## 2025-03-30 DIAGNOSIS — R55 SYNCOPE AND COLLAPSE: ICD-10-CM

## 2025-03-30 DIAGNOSIS — Z98.1 ARTHRODESIS STATUS: Chronic | ICD-10-CM

## 2025-03-30 LAB
ALBUMIN SERPL ELPH-MCNC: 2.9 G/DL — LOW (ref 3.3–5.2)
ALP SERPL-CCNC: 62 U/L — SIGNIFICANT CHANGE UP (ref 40–120)
ALT FLD-CCNC: 18 U/L — SIGNIFICANT CHANGE UP
ANION GAP SERPL CALC-SCNC: 15 MMOL/L — SIGNIFICANT CHANGE UP (ref 5–17)
APTT BLD: 33.9 SEC — SIGNIFICANT CHANGE UP (ref 24.5–35.6)
AST SERPL-CCNC: 23 U/L — SIGNIFICANT CHANGE UP
BASOPHILS # BLD AUTO: 0.03 K/UL — SIGNIFICANT CHANGE UP (ref 0–0.2)
BASOPHILS NFR BLD AUTO: 0.3 % — SIGNIFICANT CHANGE UP (ref 0–2)
BILIRUB SERPL-MCNC: 0.3 MG/DL — LOW (ref 0.4–2)
BUN SERPL-MCNC: 35.6 MG/DL — HIGH (ref 8–20)
CALCIUM SERPL-MCNC: 8.3 MG/DL — LOW (ref 8.4–10.5)
CHLORIDE SERPL-SCNC: 102 MMOL/L — SIGNIFICANT CHANGE UP (ref 96–108)
CO2 SERPL-SCNC: 23 MMOL/L — SIGNIFICANT CHANGE UP (ref 22–29)
CREAT SERPL-MCNC: 4.78 MG/DL — HIGH (ref 0.5–1.3)
EGFR: 13 ML/MIN/1.73M2 — LOW
EGFR: 13 ML/MIN/1.73M2 — LOW
EOSINOPHIL # BLD AUTO: 0.05 K/UL — SIGNIFICANT CHANGE UP (ref 0–0.5)
EOSINOPHIL NFR BLD AUTO: 0.5 % — SIGNIFICANT CHANGE UP (ref 0–6)
GLUCOSE BLDC GLUCOMTR-MCNC: 162 MG/DL — HIGH (ref 70–99)
GLUCOSE BLDC GLUCOMTR-MCNC: 203 MG/DL — HIGH (ref 70–99)
GLUCOSE SERPL-MCNC: 132 MG/DL — HIGH (ref 70–99)
HCT VFR BLD CALC: 32.2 % — LOW (ref 39–50)
HGB BLD-MCNC: 10.4 G/DL — LOW (ref 13–17)
IMM GRANULOCYTES # BLD AUTO: 0.43 K/UL — HIGH (ref 0–0.07)
IMM GRANULOCYTES NFR BLD AUTO: 4.1 % — HIGH (ref 0–0.9)
INR BLD: 1.04 RATIO — SIGNIFICANT CHANGE UP (ref 0.85–1.16)
LYMPHOCYTES # BLD AUTO: 1.44 K/UL — SIGNIFICANT CHANGE UP (ref 1–3.3)
LYMPHOCYTES NFR BLD AUTO: 13.8 % — SIGNIFICANT CHANGE UP (ref 13–44)
MAGNESIUM SERPL-MCNC: 1.6 MG/DL — SIGNIFICANT CHANGE UP (ref 1.6–2.6)
MCHC RBC-ENTMCNC: 28.6 PG — SIGNIFICANT CHANGE UP (ref 27–34)
MCHC RBC-ENTMCNC: 32.3 G/DL — SIGNIFICANT CHANGE UP (ref 32–36)
MCV RBC AUTO: 88.5 FL — SIGNIFICANT CHANGE UP (ref 80–100)
MONOCYTES # BLD AUTO: 0.61 K/UL — SIGNIFICANT CHANGE UP (ref 0–0.9)
MONOCYTES NFR BLD AUTO: 5.9 % — SIGNIFICANT CHANGE UP (ref 2–14)
NEUTROPHILS # BLD AUTO: 7.84 K/UL — HIGH (ref 1.8–7.4)
NEUTROPHILS NFR BLD AUTO: 75.4 % — SIGNIFICANT CHANGE UP (ref 43–77)
NRBC # BLD AUTO: 0 K/UL — SIGNIFICANT CHANGE UP (ref 0–0)
NRBC # FLD: 0 K/UL — SIGNIFICANT CHANGE UP (ref 0–0)
NRBC BLD AUTO-RTO: 0 /100 WBCS — SIGNIFICANT CHANGE UP (ref 0–0)
NT-PROBNP SERPL-SCNC: 2937 PG/ML — HIGH (ref 0–300)
PLATELET # BLD AUTO: 274 K/UL — SIGNIFICANT CHANGE UP (ref 150–400)
PMV BLD: 9 FL — SIGNIFICANT CHANGE UP (ref 7–13)
POTASSIUM SERPL-MCNC: 4.3 MMOL/L — SIGNIFICANT CHANGE UP (ref 3.5–5.3)
POTASSIUM SERPL-SCNC: 4.3 MMOL/L — SIGNIFICANT CHANGE UP (ref 3.5–5.3)
PROT SERPL-MCNC: 7 G/DL — SIGNIFICANT CHANGE UP (ref 6.6–8.7)
PROTHROM AB SERPL-ACNC: 11.7 SEC — SIGNIFICANT CHANGE UP (ref 9.9–13.4)
RBC # BLD: 3.64 M/UL — LOW (ref 4.2–5.8)
RBC # FLD: 14.1 % — SIGNIFICANT CHANGE UP (ref 10.3–14.5)
SODIUM SERPL-SCNC: 140 MMOL/L — SIGNIFICANT CHANGE UP (ref 135–145)
TROPONIN T, HIGH SENSITIVITY RESULT: 59 NG/L — HIGH (ref 0–51)
TROPONIN T, HIGH SENSITIVITY RESULT: 61 NG/L — HIGH (ref 0–51)
TROPONIN T, HIGH SENSITIVITY RESULT: 62 NG/L — HIGH (ref 0–51)
WBC # BLD: 10.4 K/UL — SIGNIFICANT CHANGE UP (ref 3.8–10.5)
WBC # FLD AUTO: 10.4 K/UL — SIGNIFICANT CHANGE UP (ref 3.8–10.5)

## 2025-03-30 PROCEDURE — 72125 CT NECK SPINE W/O DYE: CPT | Mod: 26

## 2025-03-30 PROCEDURE — 73502 X-RAY EXAM HIP UNI 2-3 VIEWS: CPT | Mod: 26,LT

## 2025-03-30 PROCEDURE — 70450 CT HEAD/BRAIN W/O DYE: CPT | Mod: 26

## 2025-03-30 PROCEDURE — 71045 X-RAY EXAM CHEST 1 VIEW: CPT | Mod: 26

## 2025-03-30 PROCEDURE — 73590 X-RAY EXAM OF LOWER LEG: CPT | Mod: 26,LT

## 2025-03-30 PROCEDURE — 73562 X-RAY EXAM OF KNEE 3: CPT | Mod: 26,LT

## 2025-03-30 PROCEDURE — 99285 EMERGENCY DEPT VISIT HI MDM: CPT

## 2025-03-30 PROCEDURE — 99223 1ST HOSP IP/OBS HIGH 75: CPT

## 2025-03-30 PROCEDURE — 93306 TTE W/DOPPLER COMPLETE: CPT | Mod: 26

## 2025-03-30 RX ORDER — POLYMYXIN B SULFATE AND TRIMETHOPRIM SULFATE 10000; 1 [USP'U]/ML; MG/ML
1 SOLUTION/ DROPS OPHTHALMIC DAILY
Refills: 0 | Status: DISCONTINUED | OUTPATIENT
Start: 2025-03-30 | End: 2025-05-13

## 2025-03-30 RX ORDER — PILOCARPINE HYDROCHLORIDE OPHTHALMIC SOLUTION 20 MG/ML
2 SOLUTION/ DROPS OPHTHALMIC ONCE
Refills: 0 | Status: COMPLETED | OUTPATIENT
Start: 2025-03-30 | End: 2025-03-30

## 2025-03-30 RX ORDER — ONDANSETRON HCL/PF 4 MG/2 ML
4 VIAL (ML) INJECTION EVERY 8 HOURS
Refills: 0 | Status: DISCONTINUED | OUTPATIENT
Start: 2025-03-30 | End: 2025-05-13

## 2025-03-30 RX ORDER — TIMOLOL MALEATE 6.8 MG/ML
2 SOLUTION OPHTHALMIC ONCE
Refills: 0 | Status: COMPLETED | OUTPATIENT
Start: 2025-03-30 | End: 2025-03-30

## 2025-03-30 RX ORDER — BICTEGRAVIR SODIUM, EMTRICITABINE, AND TENOFOVIR ALAFENAMIDE FUMARATE 50; 200; 25 MG/1; MG/1; MG/1
1 TABLET ORAL DAILY
Refills: 0 | Status: DISCONTINUED | OUTPATIENT
Start: 2025-03-30 | End: 2025-03-30

## 2025-03-30 RX ORDER — DEXTROSE 50 % IN WATER 50 %
15 SYRINGE (ML) INTRAVENOUS ONCE
Refills: 0 | Status: DISCONTINUED | OUTPATIENT
Start: 2025-03-30 | End: 2025-05-13

## 2025-03-30 RX ORDER — HYPROMELLOSE 0.4 %
2 DROPS OPHTHALMIC (EYE) EVERY 8 HOURS
Refills: 0 | Status: DISCONTINUED | OUTPATIENT
Start: 2025-03-30 | End: 2025-05-13

## 2025-03-30 RX ORDER — SODIUM CHLORIDE 9 G/1000ML
1000 INJECTION, SOLUTION INTRAVENOUS
Refills: 0 | Status: DISCONTINUED | OUTPATIENT
Start: 2025-03-30 | End: 2025-05-13

## 2025-03-30 RX ORDER — MELATONIN 5 MG
3 TABLET ORAL AT BEDTIME
Refills: 0 | Status: DISCONTINUED | OUTPATIENT
Start: 2025-03-30 | End: 2025-05-13

## 2025-03-30 RX ORDER — GLUCAGON 3 MG/1
1 POWDER NASAL ONCE
Refills: 0 | Status: DISCONTINUED | OUTPATIENT
Start: 2025-03-30 | End: 2025-05-13

## 2025-03-30 RX ORDER — POLYMYXIN B SULFATE AND TRIMETHOPRIM SULFATE 10000; 1 [USP'U]/ML; MG/ML
1 SOLUTION/ DROPS OPHTHALMIC
Qty: 1 | Refills: 0
Start: 2025-03-30 | End: 2025-04-03

## 2025-03-30 RX ORDER — GABAPENTIN 400 MG/1
100 CAPSULE ORAL THREE TIMES A DAY
Refills: 0 | Status: DISCONTINUED | OUTPATIENT
Start: 2025-03-30 | End: 2025-05-13

## 2025-03-30 RX ORDER — DEXTROSE 50 % IN WATER 50 %
25 SYRINGE (ML) INTRAVENOUS ONCE
Refills: 0 | Status: DISCONTINUED | OUTPATIENT
Start: 2025-03-30 | End: 2025-05-13

## 2025-03-30 RX ORDER — ACETAZOLAMIDE 250 MG/1
500 TABLET ORAL ONCE
Refills: 0 | Status: COMPLETED | OUTPATIENT
Start: 2025-03-30 | End: 2025-03-30

## 2025-03-30 RX ORDER — DEXTROSE 50 % IN WATER 50 %
12.5 SYRINGE (ML) INTRAVENOUS ONCE
Refills: 0 | Status: DISCONTINUED | OUTPATIENT
Start: 2025-03-30 | End: 2025-05-13

## 2025-03-30 RX ORDER — BRIMONIDINE TARTRATE 1.5 MG/ML
2 SOLUTION/ DROPS OPHTHALMIC ONCE
Refills: 0 | Status: COMPLETED | OUTPATIENT
Start: 2025-03-30 | End: 2025-03-30

## 2025-03-30 RX ORDER — INSULIN LISPRO 100 U/ML
INJECTION, SOLUTION INTRAVENOUS; SUBCUTANEOUS
Refills: 0 | Status: DISCONTINUED | OUTPATIENT
Start: 2025-03-30 | End: 2025-05-07

## 2025-03-30 RX ORDER — ATORVASTATIN CALCIUM 80 MG/1
40 TABLET, FILM COATED ORAL AT BEDTIME
Refills: 0 | Status: DISCONTINUED | OUTPATIENT
Start: 2025-03-30 | End: 2025-05-13

## 2025-03-30 RX ORDER — INSULIN GLARGINE-YFGN 100 [IU]/ML
10 INJECTION, SOLUTION SUBCUTANEOUS AT BEDTIME
Refills: 0 | Status: DISCONTINUED | OUTPATIENT
Start: 2025-03-30 | End: 2025-05-09

## 2025-03-30 RX ORDER — ACETAMINOPHEN 500 MG/5ML
1000 LIQUID (ML) ORAL ONCE
Refills: 0 | Status: COMPLETED | OUTPATIENT
Start: 2025-03-30 | End: 2025-03-30

## 2025-03-30 RX ORDER — DORZOLAMIDE 20 MG/ML
2 SOLUTION/ DROPS OPHTHALMIC ONCE
Refills: 0 | Status: COMPLETED | OUTPATIENT
Start: 2025-03-30 | End: 2025-03-30

## 2025-03-30 RX ORDER — MAGNESIUM, ALUMINUM HYDROXIDE 200-200 MG
30 TABLET,CHEWABLE ORAL EVERY 4 HOURS
Refills: 0 | Status: DISCONTINUED | OUTPATIENT
Start: 2025-03-30 | End: 2025-05-13

## 2025-03-30 RX ORDER — ACETAMINOPHEN 500 MG/5ML
650 LIQUID (ML) ORAL EVERY 6 HOURS
Refills: 0 | Status: DISCONTINUED | OUTPATIENT
Start: 2025-03-30 | End: 2025-04-04

## 2025-03-30 RX ADMIN — TIMOLOL MALEATE 2 DROP(S): 6.8 SOLUTION OPHTHALMIC at 15:24

## 2025-03-30 RX ADMIN — DORZOLAMIDE 2 DROP(S): 20 SOLUTION/ DROPS OPHTHALMIC at 15:26

## 2025-03-30 RX ADMIN — Medication 400 MILLIGRAM(S): at 11:16

## 2025-03-30 RX ADMIN — BRIMONIDINE TARTRATE 2 DROP(S): 1.5 SOLUTION/ DROPS OPHTHALMIC at 15:24

## 2025-03-30 RX ADMIN — ACETAZOLAMIDE 110 MILLIGRAM(S): 250 TABLET ORAL at 11:33

## 2025-03-30 RX ADMIN — INSULIN GLARGINE-YFGN 10 UNIT(S): 100 INJECTION, SOLUTION SUBCUTANEOUS at 21:52

## 2025-03-30 RX ADMIN — POLYMYXIN B SULFATE AND TRIMETHOPRIM SULFATE 1 DROP(S): 10000; 1 SOLUTION/ DROPS OPHTHALMIC at 21:53

## 2025-03-30 RX ADMIN — TIMOLOL MALEATE 2 DROP(S): 6.8 SOLUTION OPHTHALMIC at 15:25

## 2025-03-30 RX ADMIN — ATORVASTATIN CALCIUM 40 MILLIGRAM(S): 80 TABLET, FILM COATED ORAL at 21:31

## 2025-03-30 RX ADMIN — INSULIN LISPRO 1: 100 INJECTION, SOLUTION INTRAVENOUS; SUBCUTANEOUS at 18:02

## 2025-03-30 RX ADMIN — Medication 75 MILLILITER(S): at 18:19

## 2025-03-30 RX ADMIN — PILOCARPINE HYDROCHLORIDE OPHTHALMIC SOLUTION 2 DROP(S): 20 SOLUTION/ DROPS OPHTHALMIC at 15:25

## 2025-03-30 RX ADMIN — GABAPENTIN 100 MILLIGRAM(S): 400 CAPSULE ORAL at 21:31

## 2025-03-30 RX ADMIN — Medication 650 MILLIGRAM(S): at 23:14

## 2025-03-30 RX ADMIN — Medication 2 DROP(S): at 21:31
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
## 2025-03-30 NOTE — ED PROVIDER NOTE - PROGRESS NOTE DETAILS
IOP: R 35, L 16, will reduce IOP with meds Second eye drops given. Ophthalmology consulted. Likely chronic IOP elevation 2/2 vitreous hemorrhage. Pt reports Pt is scheduled surgery on  April 1st, at [place]. IOP 31. Denies eye pain. Second eye drops given. IOP 39
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
## 2025-03-30 NOTE — ED ADULT NURSE NOTE - NS ED NURSE LEVEL OF CONSCIOUSNESS ORIENTATION
Oriented - self; Oriented - place; Oriented - time
Normal vision: sees adequately in most situations; can see medication labels, newsprint

## 2025-03-30 NOTE — ED PROVIDER NOTE - LOWER EXTREMITY EXAM, LEFT
60 yof pw epigastric abd pain since this AM after drinking golytely for colonoscopy.  hx of gastritis, w/ similar visits in the past to the ED.  sp colonoscopy today.  states pain in epigastrium, no lower abd pain.  no paresthesia to extremities, no back pain. 60 yof pw epigastric abd pain since this AM after drinking golytely for colonoscopy.  hx of gastritis, w/ similar visits in the past to the ED.  sp colonoscopy today.  states pain in epigastrium, no lower abd pain.  no paresthesia to extremities, no back pain.  no black stool or bloody stool. TENDERNESS

## 2025-03-30 NOTE — CONSULT NOTE ADULT - PROBLEM SELECTOR RECOMMENDATION 9
in setting of questionable syncope   will trend trop  elevated in setting of acute renal failure  No ekg changes or cardiac sounding chest pain  will hold off on heparin and asa due to head trauma  will get echo to assess wall motion and EF  Consider ischemic work up due to elevated trop and risk factors of htn and uncontrolled Dm  Keep k >4 and mg >2
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
## 2025-03-30 NOTE — CONSULT NOTE ADULT - PROBLEM SELECTOR RECOMMENDATION 2
Baseline creat was 1.7 ( 2 on 5/24)  now 4  and on metformin  consider renal consult  d/c metformin  Consider gently hydration  intake and out put  needs dm meds adjusted or changed
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
## 2025-03-30 NOTE — H&P ADULT - NSHPPHYSICALEXAM_GEN_ALL_CORE
T(C): 36.7 (03-30-25 @ 11:17), Max: 36.7 (03-30-25 @ 11:17)  HR: 88 (03-30-25 @ 11:17) (70 - 88)  BP: 147/74 (03-30-25 @ 11:17) (126/73 - 167/94)  RR: 18 (03-30-25 @ 11:17) (17 - 18)  SpO2: 95% (03-30-25 @ 11:17) (95% - 99%)    CONSTITUTIONAL: Well groomed, no apparent distress  EYES: PERRLA and symmetric, EOMI, No conjunctival or scleral injection, non-icteric  ENMT: s/p bilateral cataract surgery in both eyes, no icterus, no conjunctival effusion  RESP: No respiratory distress, no use of accessory muscles; CTA b/l, no WRR  CV: RRR, +S1S2, no MRG; no JVD; no peripheral edema  GI: Soft, NT, ND, no rebound, no guarding; no palpable masses; no hepatosplenomegaly; no hernia palpated  LYMPH: No cervical LAD or tenderness; no axillary LAD or tenderness; no inguinal LAD or tenderness  MSK: Normal gait; No digital clubbing or cyanosis; examination of the (head/neck/spine/ribs/pelvis, RUE, LUE, RLE, LLE) without misalignment,            Normal ROM without pain, no spinal tenderness, normal muscle strength/tone  SKIN: No rashes or ulcers noted; no subcutaneous nodules or induration palpable  NEURO: CN II-XII intact; normal reflexes in upper and lower extremities, sensation intact in upper and lower extremities b/l to light touch   PSYCH: Appropriate insight/judgment; A+O x 3, mood and affect appropriate, recent/remote memory intact

## 2025-03-30 NOTE — PATIENT PROFILE ADULT - FALL HARM RISK - HARM RISK INTERVENTIONS

## 2025-03-30 NOTE — CONSULT NOTE ADULT - SUBJECTIVE AND OBJECTIVE BOX
[facility]                                              CARDIOLOGY AT [facility]                                                   [address], [place] [address]                                             Telephone: [phone]. Fax:[phone]                                                         CARDIOLOGY CONSULTATION NOTE                                                                                             Consult requested by:  Dr Macedo  History obtained by: Patient and medical record  Community Cardiologist: None   obtained: Yes [  ] No [ x ]  Reason for Consultation:  Syncope  non stemi  Avialable out pt records reviewed: Yes [x  ] No [  ]      This is a 62 y/o male with PMH of Renal insuff (1/7), Dm (uncontrolled), COPD, HIV, HIV last cd4 >100, Chf with mildly low ef, and htn who presented to ER earlier  with eye pain  seen and discharged and he represented to er after taxi dropped him off at the wrong place,  he fell and questionable syncope and now complaining of pain all over and right ankle and knee pain.  of note trop earlier today was 37 now 68  ekg          CARDIAC TESTING   ECHO:    STRESS:    CATH:     ELECTROPHYSIOLOGY:       PAST MEDICAL HISTORY  Hypertension    Diabetes    Back ache    Head trauma    Insomnia    Anxiety    DM (diabetes mellitus)    Chronic back pain    DM (diabetes mellitus)    HIV infection    Bilateral cataracts          PAST SURGICAL HISTORY  S/P lumbar fusion    No significant past surgical history          SOCIAL HISTORY:  Denies smoking/alcohol/drugs  CIGARETTES:     ALCOHOL:  DRUGS:      FAMILY HISTORY:  FAMILY HISTORY:  Family history of coronary artery disease in mother (Mother)    Family history of diabetes mellitus (DM) (Mother)    FH: alcoholism (Father)        Family History of Cardiovascular Disease:  Yes [  ] No [  ]  Coronary Artery Disease in first degree relative: Yes [  ] No [  ]  Sudden Cardiac Death in First degree relative: Yes [  ] No [  ]      HOME MEDICATIONS:      CURRENT MEDICATIONS:     acetaminophen   IVPB ..        ALLERGIES: Allergies    No Known Allergies    Intolerances          REVIEW OF SYMPTOMS:   CONSTITUTIONAL: No fever, no chills, no weight loss, no weight gain, no fatigue   ENMT:  No vertigo; No sinus or throat pain  NECK: No pain or stiffness  CARDIOVASCULAR: No chest pain, no dyspnea, no syncope/presyncope, no palpitations, no dizziness, no Orthopnea, no Paroxsymal nocturnal dyspnea  RESPIRATORY: no Shortness of breath, no cough, no wheezing  : No dysuria, no hematuria   GI: No dark color stool, no nausea, no diarrhea, no constipation, no abdominal pain   NEURO: No headache, no slurred speech   MUSCULOSKELETAL: No joint pain or swelling; No muscle, back, or extremity pain  PSYCH: No agitation, no anxiety.    ALL OTHER REVIEW OF SYSTEMS ARE NEGATIVE.      Vital Signs Last 24 Hrs  T(C): 34.4 (30 Mar 2025 08:46), Max: 36.6 (29 Mar 2025 18:16)  T(F): 94 (30 Mar 2025 08:46), Max: 97.8 (29 Mar 2025 18:16)  HR: 86 (30 Mar 2025 08:46) (70 - 86)  BP: 126/73 (30 Mar 2025 08:46) (126/73 - 167/94)  BP(mean): 118 (29 Mar 2025 23:33) (118 - 118)  RR: 17 (30 Mar 2025 08:46) (17 - 18)  SpO2: 97% (30 Mar 2025 08:46) (95% - 99%)    Parameters below as of 30 Mar 2025 08:46  Patient On (Oxygen Delivery Method): room air      INTAKE AND OUTPUT:     PHYSICAL EXAM:  Constitutional: Comfortable . No acute distress.   HEENT: Atraumatic and normocephalic , neck is supple . no JVD. No carotid bruit.  CNS: A&Ox3. No focal deficits.   Respiratory: CTAB, unlabored   Cardiovascular: RRR normal s1 s2. No murmur. No rubs or gallop.  Gastrointestinal: Soft, non-tender. +Bowel sounds.   MSK: full ROM extremities x 4  Extremities: No edema. No cyanosis   Psychiatric: Calm . no agitation.   Skin: Warm and dry, no ulcers on extremities       LABS:                        10.4   10.40 )-----------( 274      ( 30 Mar 2025 09:25 )             32.2     03-30    140  |  102  |  35.6[H]  ----------------------------<  132[H]  4.3   |  23.0  |  4.78[H]    Ca    8.3[L]      30 Mar 2025 09:25  Mg     1.6     03-30    TPro  7.0  /  Alb  2.9[L]  /  TBili  0.3[L]  /  DBili  x   /  AST  23  /  ALT  18  /  AlkPhos  62  03-30      ;p-BNP=  PT/INR - ( 30 Mar 2025 09:25 )   PT: 11.7 sec;   INR: 1.04 ratio         PTT - ( 30 Mar 2025 09:25 )  PTT:33.9 sec  Urinalysis Basic - ( 30 Mar 2025 09:25 )    Color: x / Appearance: x / SG: x / pH: x  Gluc: 132 mg/dL / Ketone: x  / Bili: x / Urobili: x   Blood: x / Protein: x / Nitrite: x   Leuk Esterase: x / RBC: x / WBC x   Sq Epi: x / Non Sq Epi: x / Bacteria: x          INTERPRETATION OF TELEMETRY:     ECG:   Prior ECG: Yes [  ] No [  ]      RADIOLOGY & ADDITIONAL STUDIES:    X-ray:  reviewed by me.   CT scan:   MRI:   US:                                                [facility]                                              CARDIOLOGY AT [facility]                                                   [address], [place] [address]                                             Telephone: [phone]. Fax:[phone]                                                         CARDIOLOGY CONSULTATION NOTE                                                                                             Consult requested by:  Dr Macedo  History obtained by: Patient and medical record  Community Cardiologist: None   obtained: Yes [  ] No [ x ]  Reason for Consultation:  Syncope  non stemi  Avialable out pt records reviewed: Yes [x  ] No [  ]      This is a 62 y/o male with PMH of Renal insuff (1.7), Dm (uncontrolled), COPD, HIV, HIV last cd4 >100, Chf with mildly low ef, and htn who presented to ER earlier  with eye pain  seen and discharged and he represented to er after taxi dropped him off at the wrong place,  he fell and questionable syncope and now complaining of pain all over and right ankle and knee pain.  of note trop earlier today was 37 now 68  ekg          CARDIAC TESTING   ECHO:    STRESS:    CATH:     ELECTROPHYSIOLOGY:       PAST MEDICAL HISTORY  Hypertension    Diabetes    Back ache    Head trauma    Insomnia    Anxiety    DM (diabetes mellitus)    Chronic back pain    DM (diabetes mellitus)    HIV infection    Bilateral cataracts          PAST SURGICAL HISTORY  S/P lumbar fusion    No significant past surgical history          SOCIAL HISTORY:  Denies smoking/alcohol/drugs  CIGARETTES:     ALCOHOL:  DRUGS:      FAMILY HISTORY:  FAMILY HISTORY:  Family history of coronary artery disease in mother (Mother)    Family history of diabetes mellitus (DM) (Mother)    FH: alcoholism (Father)        Family History of Cardiovascular Disease:  Yes [  ] No [  ]  Coronary Artery Disease in first degree relative: Yes [  ] No [  ]  Sudden Cardiac Death in First degree relative: Yes [  ] No [  ]      HOME MEDICATIONS:      CURRENT MEDICATIONS:     acetaminophen   IVPB ..        ALLERGIES: Allergies    No Known Allergies    Intolerances          REVIEW OF SYMPTOMS:   CONSTITUTIONAL: No fever, no chills, no weight loss, no weight gain, no fatigue   ENMT:  No vertigo; No sinus or throat pain  NECK: No pain or stiffness  CARDIOVASCULAR: No chest pain, no dyspnea, no syncope/presyncope, no palpitations, no dizziness, no Orthopnea, no Paroxsymal nocturnal dyspnea  RESPIRATORY: no Shortness of breath, no cough, no wheezing  : No dysuria, no hematuria   GI: No dark color stool, no nausea, no diarrhea, no constipation, no abdominal pain   NEURO: No headache, no slurred speech   MUSCULOSKELETAL: No joint pain or swelling; No muscle, back, or extremity pain  PSYCH: No agitation, no anxiety.    ALL OTHER REVIEW OF SYSTEMS ARE NEGATIVE.      Vital Signs Last 24 Hrs  T(C): 34.4 (30 Mar 2025 08:46), Max: 36.6 (29 Mar 2025 18:16)  T(F): 94 (30 Mar 2025 08:46), Max: 97.8 (29 Mar 2025 18:16)  HR: 86 (30 Mar 2025 08:46) (70 - 86)  BP: 126/73 (30 Mar 2025 08:46) (126/73 - 167/94)  BP(mean): 118 (29 Mar 2025 23:33) (118 - 118)  RR: 17 (30 Mar 2025 08:46) (17 - 18)  SpO2: 97% (30 Mar 2025 08:46) (95% - 99%)    Parameters below as of 30 Mar 2025 08:46  Patient On (Oxygen Delivery Method): room air      INTAKE AND OUTPUT:     PHYSICAL EXAM:  Constitutional: Comfortable . No acute distress.   HEENT: Atraumatic and normocephalic , neck is supple . no JVD. No carotid bruit.  CNS: A&Ox3. No focal deficits.   Respiratory: CTAB, unlabored   Cardiovascular: RRR normal s1 s2. No murmur. No rubs or gallop.  Gastrointestinal: Soft, non-tender. +Bowel sounds.   MSK: full ROM extremities x 4  Extremities: No edema. No cyanosis   Psychiatric: Calm . no agitation.   Skin: Warm and dry, no ulcers on extremities       LABS:                        10.4   10.40 )-----------( 274      ( 30 Mar 2025 09:25 )             32.2     03-30    140  |  102  |  35.6[H]  ----------------------------<  132[H]  4.3   |  23.0  |  4.78[H]    Ca    8.3[L]      30 Mar 2025 09:25  Mg     1.6     03-30    TPro  7.0  /  Alb  2.9[L]  /  TBili  0.3[L]  /  DBili  x   /  AST  23  /  ALT  18  /  AlkPhos  62  03-30      ;p-BNP=  PT/INR - ( 30 Mar 2025 09:25 )   PT: 11.7 sec;   INR: 1.04 ratio         PTT - ( 30 Mar 2025 09:25 )  PTT:33.9 sec  Urinalysis Basic - ( 30 Mar 2025 09:25 )    Color: x / Appearance: x / SG: x / pH: x  Gluc: 132 mg/dL / Ketone: x  / Bili: x / Urobili: x   Blood: x / Protein: x / Nitrite: x   Leuk Esterase: x / RBC: x / WBC x   Sq Epi: x / Non Sq Epi: x / Bacteria: x          INTERPRETATION OF TELEMETRY:     ECG:   Prior ECG: Yes [  ] No [  ]      RADIOLOGY & ADDITIONAL STUDIES:    X-ray:  reviewed by me.   CT scan:   MRI:   US:                                                [facility]                                              CARDIOLOGY AT [facility]                                                   [address], [place] [address]                                             Telephone: [phone]. Fax:[phone]                                                         CARDIOLOGY CONSULTATION NOTE                                                                                             Consult requested by:  Dr Macedo  History obtained by: Patient and medical record  Community Cardiologist: None   obtained: Yes [  ] No [ x ]  Reason for Consultation:  Syncope  non stemi  Avialable out pt records reviewed: Yes [x  ] No [  ]      This is a 62 y/o male with PMH of Renal insuff (1.7), Dm (uncontrolled), COPD, HIV, HIV last cd4 >100, Chf with mildly low ef, and htn who presented to ER earlier  with eye pain  seen and discharged and he represented to er after taxi dropped him off at the wrong place,  he fell and questionable syncope and now complaining of pain all over and right ankle and knee pain.  of note trop earlier today was 37 now 68  ekg Nsr non specific st changes  lVh  He reports he has pain all over his chest and abdomen which is reproducible  Had no exertional chest pain prior this this admisison    CARDIAC TESTING   ECHO:  < from: Transthoracic Echocardiogram Follow Up (04.05.24 @ 14:46) >   Limited study to assess left ventricular function.   The left ventricle is normal in size with borderline normal systolic   function; estimated left ventricular ejection fraction is 50 %.    PAST MEDICAL HISTORY  Hypertension  Diabetes  Anxiety  DM (diabetes mellitus)  Chronic back pain  HIV infection  Bilateral cataracts    PAST SURGICAL HISTORY  S/P lumbar fusion    SOCIAL HISTORY:    CIGARETTES:   smokes 3 cigs per day  ALCOHOL:  No  DRUGS:  No    FAMILY HISTORY:  Family history of coronary artery disease in mother (Mother)  Family history of diabetes mellitus (DM) (Mother)  FH: alcoholism (Father)      Family History of Cardiovascular Disease:  Yes [  ] No [ x ]  Coronary Artery Disease in first degree relative: Yes [  ] No [x  ]  Sudden Cardiac Death in First degree relative: Yes [  ] No [  x]      HOME MEDICATIONS:  Aspirin 81 MG Oral Tablet Delayed Release; TAKE 1 TABLET BY MOUTH EVERY DAY  Atorvastatin Calcium 40 MG Oral Tablet; TAKE 1 TABLET BY MOUTH EVERY DAY  Biktarvy -25 MG Oral Tablet; TAKE ONE TABLET BY MOUTH DAILY  Ferrous Sulfate 325 (65 Fe) MG Oral Tablet; TAKE 1 TABLET DAILY WITH FOOD  Furosemide 20 MG Oral Tablet; TAKE 1 TABLET DAILY  Lantus SoloStar 100 UNIT/ML Subcutaneous Solution Pen-injector; INJECT 10 UNIT AT  BEDTIME  Melatonin 5 MG Oral Tablet; take 1 tablet at bedtime  metFORMIN HCl - 500 MG Oral Tablet; TAKE 1 TABLET EVERY 12 HOURS WITH FOOD  Metoprolol Succinate ER 25 MG Oral Tablet Extended Release 24 Hour; TAKE 1 TABLET  BY MOUTH DAILY  Vitamin D (Ergocalciferol) 1.25 MG (67094 UT) Oral Capsule; TAKE 1 CAPSULE WEEKLY      ALLERGIES: Allergies    REVIEW OF SYMPTOMS:   CONSTITUTIONAL: No fever, no chills, no weight loss, no weight gain, +fatigue   ENMT:  No vertigo; No sinus or throat pain  NECK: No pain or stiffness  CARDIOVASCULAR: No chest pain, no dyspnea, no syncope/presyncope, no palpitations, no dizziness, no Orthopnea, no Paroxsymal nocturnal dyspnea  RESPIRATORY: no Shortness of breath, no cough, no wheezing  : No dysuria, no hematuria   GI: No dark color stool, no nausea, no diarrhea, no constipation, no abdominal pain   NEURO: No headache, no slurred speech   MUSCULOSKELETAL: "everything hurts"  PSYCH: No agitation, no anxiety.    ALL OTHER REVIEW OF SYSTEMS ARE NEGATIVE.      Vital Signs Last 24 Hrs  T(C): 34.4 (30 Mar 2025 08:46), Max: 36.6 (29 Mar 2025 18:16)  T(F): 94 (30 Mar 2025 08:46), Max: 97.8 (29 Mar 2025 18:16)  HR: 86 (30 Mar 2025 08:46) (70 - 86)  BP: 126/73 (30 Mar 2025 08:46) (126/73 - 167/94)  BP(mean): 118 (29 Mar 2025 23:33) (118 - 118)  RR: 17 (30 Mar 2025 08:46) (17 - 18)  SpO2: 97% (30 Mar 2025 08:46) (95% - 99%)    Parameters below as of 30 Mar 2025 08:46  Patient On (Oxygen Delivery Method): room air      INTAKE AND OUTPUT:     PHYSICAL EXAM:  Constitutional: Lying on side with eyes closed  HEENT: Atraumatic and normocephalic , neck is supple . no JVD. No carotid bruit.  CNS: A&Ox3. No focal deficits.   Respiratory: CTAB, unlabored   Cardiovascular: RRR normal s1 s2. No murmur. No rubs or gallop. Reproducible chest pain and abd pain  Gastrointestinal: Soft, non-tender. +Bowel sounds.   MSK: full ROM extremities x 4  Extremities: No edema. No cyanosis   Psychiatric: Calm . no agitation.   Skin: Warm and dry, no ulcers on extremities       LABS:                        10.4   10.40 )-----------( 274      ( 30 Mar 2025 09:25 )             32.2     03-30    140  |  102  |  35.6[H]  ----------------------------<  132[H]  4.3   |  23.0  |  4.78[H]    Ca    8.3[L]      30 Mar 2025 09:25  Mg     1.6     03-30    TPro  7.0  /  Alb  2.9[L]  /  TBili  0.3[L]  /  DBili  x   /  AST  23  /  ALT  18  /  AlkPhos  62  03-30  PT/INR - ( 30 Mar 2025 09:25 )   PT: 11.7 sec;   INR: 1.04 ratio    PTT - ( 30 Mar 2025 09:25 )  PTT:33.9 sec  Urinalysis Basic - ( 30 Mar 2025 09:25 )    Color: x / Appearance: x / SG: x / pH: x  Gluc: 132 mg/dL / Ketone: x  / Bili: x / Urobili: x   Blood: x / Protein: x / Nitrite: x   Leuk Esterase: x / RBC: x / WBC x   Sq Epi: x / Non Sq Epi: x / Bacteria: x          INTERPRETATION OF TELEMETRY:     ECG:   Prior ECG: Yes [  ] No [  ]      RADIOLOGY & ADDITIONAL STUDIES:    X-ray:  reviewed by me.   CT scan:   MRI:   US:                                                [facility]                                              CARDIOLOGY AT [facility]                                                   [address], [place] [address]                                             Telephone: [phone]. Fax:[phone]                                                         CARDIOLOGY CONSULTATION NOTE                                                                                             Consult requested by:  Dr Macedo  History obtained by: Patient and medical record  Community Cardiologist: None   obtained: Yes [  ] No [ x ]  Reason for Consultation:  Syncope  non stemi  Avialable out pt records reviewed: Yes [x  ] No [  ]      This is a 64 y/o male with PMH of Renal insuff (1.7), Dm (uncontrolled), COPD, HIV, HIV last cd4 >100, Chf with mildly low ef, and htn who presented to ER earlier  with eye pain  seen and discharged and he represented to er after taxi dropped him off at the wrong place,  he fell and questionable syncope and now complaining of pain all over and right ankle and knee pain.  of note trop earlier today was 37 now 68  ekg Nsr non specific st changes  lVh  He reports he has pain all over his chest and abdomen which is reproducible  Had no exertional chest pain prior this this admisison    CARDIAC TESTING   ECHO:  < from: Transthoracic Echocardiogram Follow Up (04.05.24 @ 14:46) >   Limited study to assess left ventricular function.   The left ventricle is normal in size with borderline normal systolic   function; estimated left ventricular ejection fraction is 50 %.    PAST MEDICAL HISTORY  Hypertension  Diabetes  Anxiety  DM (diabetes mellitus)  Chronic back pain  HIV infection  Bilateral cataracts  Renal insuff    PAST SURGICAL HISTORY  S/P lumbar fusion    SOCIAL HISTORY:    CIGARETTES:   smokes 3 cigs per day  ALCOHOL:  No  DRUGS:  No    FAMILY HISTORY:  Family history of coronary artery disease in mother (Mother)  Family history of diabetes mellitus (DM) (Mother)  FH: alcoholism (Father)      Family History of Cardiovascular Disease:  Yes [  ] No [ x ]  Coronary Artery Disease in first degree relative: Yes [  ] No [x  ]  Sudden Cardiac Death in First degree relative: Yes [  ] No [  x]      HOME MEDICATIONS:  Aspirin 81 MG Oral Tablet Delayed Release; TAKE 1 TABLET BY MOUTH EVERY DAY  Atorvastatin Calcium 40 MG Oral Tablet; TAKE 1 TABLET BY MOUTH EVERY DAY  Biktarvy -25 MG Oral Tablet; TAKE ONE TABLET BY MOUTH DAILY  Ferrous Sulfate 325 (65 Fe) MG Oral Tablet; TAKE 1 TABLET DAILY WITH FOOD  Furosemide 20 MG Oral Tablet; TAKE 1 TABLET DAILY  Lantus SoloStar 100 UNIT/ML Subcutaneous Solution Pen-injector; INJECT 10 UNIT AT  BEDTIME  Melatonin 5 MG Oral Tablet; take 1 tablet at bedtime  metFORMIN HCl - 500 MG Oral Tablet; TAKE 1 TABLET EVERY 12 HOURS WITH FOOD  Metoprolol Succinate ER 25 MG Oral Tablet Extended Release 24 Hour; TAKE 1 TABLET  BY MOUTH DAILY  Vitamin D (Ergocalciferol) 1.25 MG (82205 UT) Oral Capsule; TAKE 1 CAPSULE WEEKLY      ALLERGIES: Allergies    REVIEW OF SYMPTOMS:   CONSTITUTIONAL: No fever, no chills, no weight loss, no weight gain, +fatigue   ENMT:  No vertigo; No sinus or throat pain  NECK: No pain or stiffness  CARDIOVASCULAR: No chest pain, no dyspnea, no syncope/presyncope, no palpitations, no dizziness, no Orthopnea, no Paroxsymal nocturnal dyspnea  RESPIRATORY: no Shortness of breath, no cough, no wheezing  : No dysuria, no hematuria   GI: No dark color stool, no nausea, no diarrhea, no constipation, no abdominal pain   NEURO: No headache, no slurred speech   MUSCULOSKELETAL: "everything hurts"  PSYCH: No agitation, no anxiety.    ALL OTHER REVIEW OF SYSTEMS ARE NEGATIVE.      Vital Signs Last 24 Hrs  T(C): 34.4 (30 Mar 2025 08:46), Max: 36.6 (29 Mar 2025 18:16)  T(F): 94 (30 Mar 2025 08:46), Max: 97.8 (29 Mar 2025 18:16)  HR: 86 (30 Mar 2025 08:46) (70 - 86)  BP: 126/73 (30 Mar 2025 08:46) (126/73 - 167/94)  BP(mean): 118 (29 Mar 2025 23:33) (118 - 118)  RR: 17 (30 Mar 2025 08:46) (17 - 18)  SpO2: 97% (30 Mar 2025 08:46) (95% - 99%)    Parameters below as of 30 Mar 2025 08:46  Patient On (Oxygen Delivery Method): room air      INTAKE AND OUTPUT:     PHYSICAL EXAM:  Constitutional: Lying on side with eyes closed  HEENT: Atraumatic and normocephalic , neck is supple . no JVD. No carotid bruit.  CNS: A&Ox3. No focal deficits.   Respiratory: CTAB, unlabored   Cardiovascular: RRR normal s1 s2. No murmur. No rubs or gallop. Reproducible chest pain and abd pain  Gastrointestinal: Soft, non-tender. +Bowel sounds.   MSK: full ROM extremities x 4  Extremities: No edema. No cyanosis   Psychiatric: Calm . no agitation.   Skin: Warm and dry, no ulcers on extremities       LABS:                        10.4   10.40 )-----------( 274      ( 30 Mar 2025 09:25 )             32.2     03-30    140  |  102  |  35.6[H]  ----------------------------<  132[H]  4.3   |  23.0  |  4.78[H]    Ca    8.3[L]      30 Mar 2025 09:25  Mg     1.6     03-30    TPro  7.0  /  Alb  2.9[L]  /  TBili  0.3[L]  /  DBili  x   /  AST  23  /  ALT  18  /  AlkPhos  62  03-30  PT/INR - ( 30 Mar 2025 09:25 )   PT: 11.7 sec;   INR: 1.04 ratio    PTT - ( 30 Mar 2025 09:25 )  PTT:33.9 sec  Urinalysis Basic - ( 30 Mar 2025 09:25 )    Color: x / Appearance: x / SG: x / pH: x  Gluc: 132 mg/dL / Ketone: x  / Bili: x / Urobili: x   Blood: x / Protein: x / Nitrite: x   Leuk Esterase: x / RBC: x / WBC x   Sq Epi: x / Non Sq Epi: x / Bacteria: x          INTERPRETATION OF TELEMETRY:     ECG:   Prior ECG: Yes [  ] No [  ]      RADIOLOGY & ADDITIONAL STUDIES:    X-ray:  reviewed by me.   CT scan:   MRI:   US:                                                [facility]                                              CARDIOLOGY AT [facility]                                                   [address], [place] [address]                                             Telephone: [phone]. Fax:[phone]                                                         CARDIOLOGY CONSULTATION NOTE                                                                                             Consult requested by:  Dr Macedo  History obtained by: Patient and medical record  Community Cardiologist: None   obtained: Yes [  ] No [ x ]  Reason for Consultation:  Syncope  non stemi  Avialable out pt records reviewed: Yes [x  ] No [  ]      This is a 62 y/o male with PMH of Renal insuff (1.7), Dm (uncontrolled), COPD, HIV, HIV last cd4 >100, Chf with mildly low ef, and htn who presented to ER earlier  with eye pain  seen and discharged and he represented to er after taxi dropped him off at the wrong place,  he fell and questionable syncope and now complaining of pain all over and right ankle and knee pain.  of note trop earlier today was 37 now 68  ekg Nsr non specific st changes  lVh  He reports he has pain all over his chest and abdomen which is reproducible  Had no exertional chest pain prior this this admisison    CARDIAC TESTING   ECHO:  < from: Transthoracic Echocardiogram Follow Up (04.05.24 @ 14:46) >   Limited study to assess left ventricular function.   The left ventricle is normal in size with borderline normal systolic   function; estimated left ventricular ejection fraction is 50 %.    PAST MEDICAL HISTORY  Hypertension  Diabetes  Anxiety  DM (diabetes mellitus)  Chronic back pain  HIV infection  Bilateral cataracts  Renal insuff    PAST SURGICAL HISTORY  S/P lumbar fusion    SOCIAL HISTORY:    CIGARETTES:   smokes 3 cigs per day  ALCOHOL:  No  DRUGS:  No    FAMILY HISTORY:  Family history of coronary artery disease in mother (Mother)  Family history of diabetes mellitus (DM) (Mother)  FH: alcoholism (Father)      Family History of Cardiovascular Disease:  Yes [  ] No [ x ]  Coronary Artery Disease in first degree relative: Yes [  ] No [x  ]  Sudden Cardiac Death in First degree relative: Yes [  ] No [  x]      HOME MEDICATIONS:  Aspirin 81 MG Oral Tablet Delayed Release; TAKE 1 TABLET BY MOUTH EVERY DAY  Atorvastatin Calcium 40 MG Oral Tablet; TAKE 1 TABLET BY MOUTH EVERY DAY  Biktarvy -25 MG Oral Tablet; TAKE ONE TABLET BY MOUTH DAILY  Ferrous Sulfate 325 (65 Fe) MG Oral Tablet; TAKE 1 TABLET DAILY WITH FOOD  Furosemide 20 MG Oral Tablet; TAKE 1 TABLET DAILY  Lantus SoloStar 100 UNIT/ML Subcutaneous Solution Pen-injector; INJECT 10 UNIT AT  BEDTIME  Melatonin 5 MG Oral Tablet; take 1 tablet at bedtime  metFORMIN HCl - 500 MG Oral Tablet; TAKE 1 TABLET EVERY 12 HOURS WITH FOOD  Metoprolol Succinate ER 25 MG Oral Tablet Extended Release 24 Hour; TAKE 1 TABLET  BY MOUTH DAILY  Vitamin D (Ergocalciferol) 1.25 MG (40860 UT) Oral Capsule; TAKE 1 CAPSULE WEEKLY      ALLERGIES: Allergies    REVIEW OF SYMPTOMS:   CONSTITUTIONAL: No fever, no chills, no weight loss, no weight gain, +fatigue   ENMT:  No vertigo; No sinus or throat pain  NECK: No pain or stiffness  CARDIOVASCULAR: No chest pain, no dyspnea, no syncope/presyncope, no palpitations, no dizziness, no Orthopnea, no Paroxsymal nocturnal dyspnea  RESPIRATORY: no Shortness of breath, no cough, no wheezing  : No dysuria, no hematuria   GI: No dark color stool, no nausea, no diarrhea, no constipation, no abdominal pain   NEURO: No headache, no slurred speech   MUSCULOSKELETAL: "everything hurts"  PSYCH: No agitation, no anxiety.    ALL OTHER REVIEW OF SYSTEMS ARE NEGATIVE.      Vital Signs Last 24 Hrs  T(C): 34.4 (30 Mar 2025 08:46), Max: 36.6 (29 Mar 2025 18:16)  T(F): 94 (30 Mar 2025 08:46), Max: 97.8 (29 Mar 2025 18:16)  HR: 86 (30 Mar 2025 08:46) (70 - 86)  BP: 126/73 (30 Mar 2025 08:46) (126/73 - 167/94)  BP(mean): 118 (29 Mar 2025 23:33) (118 - 118)  RR: 17 (30 Mar 2025 08:46) (17 - 18)  SpO2: 97% (30 Mar 2025 08:46) (95% - 99%)    Parameters below as of 30 Mar 2025 08:46  Patient On (Oxygen Delivery Method): room air      INTAKE AND OUTPUT:     PHYSICAL EXAM:  Constitutional: Lying on side with eyes closed  HEENT: Atraumatic and normocephalic , neck is supple . no JVD. No carotid bruit.  CNS: A&Ox3. No focal deficits.   Respiratory: CTAB, unlabored   Cardiovascular: RRR normal s1 s2. No murmur. No rubs or gallop. Reproducible chest pain and abd pain  Gastrointestinal: Soft, non-tender. +Bowel sounds.   MSK: full ROM extremities x 4  Extremities: No edema. No cyanosis   Psychiatric: Calm . no agitation.   Skin: Warm and dry, no ulcers on extremities       LABS:                        10.4   10.40 )-----------( 274      ( 30 Mar 2025 09:25 )             32.2     03-30    140  |  102  |  35.6[H]  ----------------------------<  132[H]  4.3   |  23.0  |  4.78[H]    Ca    8.3[L]      30 Mar 2025 09:25  Mg     1.6     03-30    TPro  7.0  /  Alb  2.9[L]  /  TBili  0.3[L]  /  DBili  x   /  AST  23  /  ALT  18  /  AlkPhos  62  03-30  PT/INR - ( 30 Mar 2025 09:25 )   PT: 11.7 sec;   INR: 1.04 ratio    PTT - ( 30 Mar 2025 09:25 )  PTT:33.9 sec  Urinalysis Basic - ( 30 Mar 2025 09:25 )    Color: x / Appearance: x / SG: x / pH: x  Gluc: 132 mg/dL / Ketone: x  / Bili: x / Urobili: x   Blood: x / Protein: x / Nitrite: x   Leuk Esterase: x / RBC: x / WBC x   Sq Epi: x / Non Sq Epi: x / Bacteria: x          INTERPRETATION OF TELEMETRY:     ECG: nsr no ischemic changes  Prior ECG: Yes [  ] No [  ]      RADIOLOGY & ADDITIONAL STUDIES:    X-ray:  reviewed by me.   CT scan:   MRI:   US:
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
## 2025-03-30 NOTE — PATIENT PROFILE ADULT - FALL HARM RISK - PATIENT NEEDS ASSISTANCE
Attempted to reach Ms. Umanzor to see if she would be interested in going home after her upcoming procedure 1/30. VM left X2 at this time. Awaiting call back.    No assistance needed

## 2025-03-31 ENCOUNTER — TRANSCRIPTION ENCOUNTER (OUTPATIENT)
Age: 65
End: 2025-03-31

## 2025-03-31 LAB
A1C WITH ESTIMATED AVERAGE GLUCOSE RESULT: 8.2 % — HIGH (ref 4–5.6)
ALBUMIN SERPL ELPH-MCNC: 2.8 G/DL — LOW (ref 3.3–5.2)
ALP SERPL-CCNC: 57 U/L — SIGNIFICANT CHANGE UP (ref 40–120)
ALT FLD-CCNC: 14 U/L — SIGNIFICANT CHANGE UP
ANION GAP SERPL CALC-SCNC: 15 MMOL/L — SIGNIFICANT CHANGE UP (ref 5–17)
AST SERPL-CCNC: 17 U/L — SIGNIFICANT CHANGE UP
BILIRUB SERPL-MCNC: 0.2 MG/DL — LOW (ref 0.4–2)
BUN SERPL-MCNC: 36.3 MG/DL — HIGH (ref 8–20)
CALCIUM SERPL-MCNC: 8.3 MG/DL — LOW (ref 8.4–10.5)
CHLORIDE SERPL-SCNC: 102 MMOL/L — SIGNIFICANT CHANGE UP (ref 96–108)
CO2 SERPL-SCNC: 23 MMOL/L — SIGNIFICANT CHANGE UP (ref 22–29)
CREAT SERPL-MCNC: 4.83 MG/DL — HIGH (ref 0.5–1.3)
EGFR: 13 ML/MIN/1.73M2 — LOW
EGFR: 13 ML/MIN/1.73M2 — LOW
ESTIMATED AVERAGE GLUCOSE: 189 MG/DL — HIGH (ref 68–114)
GLUCOSE BLDC GLUCOMTR-MCNC: 139 MG/DL — HIGH (ref 70–99)
GLUCOSE BLDC GLUCOMTR-MCNC: 147 MG/DL — HIGH (ref 70–99)
GLUCOSE BLDC GLUCOMTR-MCNC: 235 MG/DL — HIGH (ref 70–99)
GLUCOSE SERPL-MCNC: 133 MG/DL — HIGH (ref 70–99)
HCT VFR BLD CALC: 31.4 % — LOW (ref 39–50)
HGB BLD-MCNC: 10.4 G/DL — LOW (ref 13–17)
HIV-1 VIRAL LOAD RESULT: ABNORMAL
HIV1 RNA # SERPL NAA+PROBE: SIGNIFICANT CHANGE UP
HIV1 RNA SER-IMP: SIGNIFICANT CHANGE UP
HIV1 RNA SERPL NAA+PROBE-ACNC: ABNORMAL
HIV1 RNA SERPL NAA+PROBE-LOG#: 5.49 — SIGNIFICANT CHANGE UP
MCHC RBC-ENTMCNC: 28.9 PG — SIGNIFICANT CHANGE UP (ref 27–34)
MCHC RBC-ENTMCNC: 33.1 G/DL — SIGNIFICANT CHANGE UP (ref 32–36)
MCV RBC AUTO: 87.2 FL — SIGNIFICANT CHANGE UP (ref 80–100)
NRBC # BLD AUTO: 0 K/UL — SIGNIFICANT CHANGE UP (ref 0–0)
NRBC # FLD: 0 K/UL — SIGNIFICANT CHANGE UP (ref 0–0)
NRBC BLD AUTO-RTO: 0 /100 WBCS — SIGNIFICANT CHANGE UP (ref 0–0)
PLATELET # BLD AUTO: 291 K/UL — SIGNIFICANT CHANGE UP (ref 150–400)
PMV BLD: 9.3 FL — SIGNIFICANT CHANGE UP (ref 7–13)
POTASSIUM SERPL-MCNC: 3.4 MMOL/L — LOW (ref 3.5–5.3)
POTASSIUM SERPL-SCNC: 3.4 MMOL/L — LOW (ref 3.5–5.3)
PROT SERPL-MCNC: 6.7 G/DL — SIGNIFICANT CHANGE UP (ref 6.6–8.7)
RBC # BLD: 3.6 M/UL — LOW (ref 4.2–5.8)
RBC # FLD: 14 % — SIGNIFICANT CHANGE UP (ref 10.3–14.5)
SODIUM SERPL-SCNC: 140 MMOL/L — SIGNIFICANT CHANGE UP (ref 135–145)
WBC # BLD: 7.59 K/UL — SIGNIFICANT CHANGE UP (ref 3.8–10.5)
WBC # FLD AUTO: 7.59 K/UL — SIGNIFICANT CHANGE UP (ref 3.8–10.5)

## 2025-03-31 PROCEDURE — 99232 SBSQ HOSP IP/OBS MODERATE 35: CPT

## 2025-03-31 PROCEDURE — 99223 1ST HOSP IP/OBS HIGH 75: CPT

## 2025-03-31 PROCEDURE — 99233 SBSQ HOSP IP/OBS HIGH 50: CPT

## 2025-03-31 RX ORDER — MAGNESIUM SULFATE 500 MG/ML
1 SYRINGE (ML) INJECTION ONCE
Refills: 0 | Status: COMPLETED | OUTPATIENT
Start: 2025-03-31 | End: 2025-03-31

## 2025-03-31 RX ORDER — AMLODIPINE BESYLATE 10 MG/1
5 TABLET ORAL DAILY
Refills: 0 | Status: DISCONTINUED | OUTPATIENT
Start: 2025-03-31 | End: 2025-04-03

## 2025-03-31 RX ADMIN — GABAPENTIN 100 MILLIGRAM(S): 400 CAPSULE ORAL at 05:36

## 2025-03-31 RX ADMIN — AMLODIPINE BESYLATE 5 MILLIGRAM(S): 10 TABLET ORAL at 09:57

## 2025-03-31 RX ADMIN — GABAPENTIN 100 MILLIGRAM(S): 400 CAPSULE ORAL at 13:37

## 2025-03-31 RX ADMIN — Medication 650 MILLIGRAM(S): at 18:36

## 2025-03-31 RX ADMIN — Medication 2 DROP(S): at 21:34

## 2025-03-31 RX ADMIN — GABAPENTIN 100 MILLIGRAM(S): 400 CAPSULE ORAL at 21:33

## 2025-03-31 RX ADMIN — INSULIN GLARGINE-YFGN 10 UNIT(S): 100 INJECTION, SOLUTION SUBCUTANEOUS at 21:33

## 2025-03-31 RX ADMIN — ATORVASTATIN CALCIUM 40 MILLIGRAM(S): 80 TABLET, FILM COATED ORAL at 21:33

## 2025-03-31 RX ADMIN — Medication 100 GRAM(S): at 09:22

## 2025-03-31 RX ADMIN — Medication 20 MILLIEQUIVALENT(S): at 09:57

## 2025-03-31 NOTE — PROGRESS NOTE ADULT - SUBJECTIVE AND OBJECTIVE BOX
[facility]                                                         CARDIOLOGY AT [facility], [place]                                                                  [address] [facility] [place] [address]                                                         Telephone: [phone]. Fax:[phone]                                                                             PROGRESS NOTE    Reason for follow up: Elevated trop s/p fall ? syncope  Update: trops appear flat in setting of renal failure  Patient is difficult to interview does not participate in lengthy discussion  Creat is increasing  renal consult is pending    Review of symptoms:   Cardiac:  No chest pain. No dyspnea. No palpitations.  Respiratory: no cough. No dyspnea  Gastrointestinal: No diarrhea. No abdominal pain. No bleeding.   Neuro: No focal neuro complaints.      Vitals:  T(C): 36.6 (03-31-25 @ 04:37), Max: 36.8 (03-30-25 @ 18:09)  HR: 78 (03-31-25 @ 04:37) (78 - 88)  BP: 165/76 (03-31-25 @ 04:37) (126/73 - 165/76)  RR: 17 (03-31-25 @ 04:37) (17 - 20)  SpO2: 98% (03-31-25 @ 04:37) (95% - 98%)  Wt(kg): --  I&O's Summary    Weight (kg): 81.647 (03-30 @ 08:46), 74.8 (03-29 @ 18:16)      PHYSICAL EXAM:  Appearance: Comfortable. No acute distress  HEENT:  Atraumatic. Normocephalic.  Normal oral mucosa  Neurologic: A & O x 3, no gross focal deficits.  Cardiovascular: RRR S1 S2, No murmur, no rubs/gallops. No JVD  Respiratory: Lungs clear to auscultation, unlabored   Gastrointestinal:  Soft, Non-tender, + BS  Lower Extremities: No edema  Psychiatry: Patient is calm. No agitation.   Skin: warm and dry.      CURRENT MEDICATIONS:  MEDICATIONS  (STANDING):  artificial tears (preservative free) Ophthalmic Solution 2 Drop(s) Both EYES every 8 hours  atorvastatin 40 milliGRAM(s) Oral at bedtime  gabapentin 100 milliGRAM(s) Oral three times a day  glucagon  Injectable 1 milliGRAM(s) IntraMuscular once  insulin glargine Injectable (LANTUS) 10 Unit(s) SubCutaneous at bedtime  insulin lispro (ADMELOG) corrective regimen sliding scale   SubCutaneous three times a day before meals  trimethoprim/polymyxin Solution 1 Drop(s) Both EYES daily      LABS:	 	                        10.4   7.59  )-----------( 291      ( 31 Mar 2025 04:28 )             31.4     03-31    140  |  102  |  36.3[H]  ----------------------------<  133[H]  3.4[L]   |  23.0  |  4.83[H]    Ca    8.3[L]      31 Mar 2025 04:28  Mg     1.6     03-30    TPro  6.7  /  Alb  2.8[L]  /  TBili  0.2[L]  /  DBili  x   /  AST  17  /  ALT  14  /  AlkPhos  57  03-31    TELEMETRY: NSR no arrhythmias  DIAGNOSTIC TESTING:  [ ] Echocardiogram: < from: TTE W or WO Ultrasound Enhancing Agent (03.30.25 @ 15:33) >   1. Left ventricular systolic function is low normal with an ejection fraction visually estimated at 50 to 55 %.   2. Normal right ventricular cavity size and normal right ventricular systolic function.   3. No pericardial effusion seen.   4. No prior echocardiogram is available for comparison.

## 2025-03-31 NOTE — CONSULT NOTE ADULT - TIME BILLING
Time spent reviewing the chart documentation, reviewing labs and imaging studies, evaluating the patient, clinical exam, discussing the plan of care with the medical team and/or all necessary consultants, and documenting.

## 2025-03-31 NOTE — CONSULT NOTE ADULT - ASSESSMENT
63 YOM DM, HIV on HAART admitted after fall.  Nephrology consulted for progressive worsening CKD.    Acute kidney injury on CKD stage III versus progressive CKD stage V;  -Serum creatinine has slowly progressed now to 4.78  -UA with proteinuria more than 1000 g on last check in May 24  -Will repeat UA and UACR  -Ddx-?  FSGS related to HIV.?  Biktarvy associated BRIDGETTE  -Will also check renal ultrasound  -Labs discussed with the patient  -No emergent need for dialysis, might need biopsy.     Proteinuria: As above will quantify UACR  Hypokalemia: Repletion orders reviewed  Anemia: CKD versus RITESH.  Check iron anemia panel  HTN: Controlled  HIV on Bikatrvy  DM: discontinue metformin (low GFR), place on Lantus + SSI ACHS    Discussed with Dr. Weber
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
## 2025-03-31 NOTE — DISCHARGE NOTE PROVIDER - CARE PROVIDER_API CALL
Primary, Doctor  Phone: (   )    -  Fax: (   )    -  Follow Up Time: 2 weeks   Primary, Doctor  Phone: (   )    -  Fax: (   )    -  Follow Up Time: 2 weeks    eye doctor at [place],   Phone: (   )    -  Fax: (   )    -  Follow Up Time:

## 2025-03-31 NOTE — PROGRESS NOTE ADULT - SUBJECTIVE AND OBJECTIVE BOX
Hospitalist Progress Note    Chief Complaint:  Fall    SUBJECTIVE / OVERNIGHT EVENTS:  No events overnight, patient seen at bedside, in NAD, complaining of eye pain, needs his home eye drops Patient denies SOB, abd pain, N/V, fever, chills, dysuria or any other complaints. All remainder ROS negative.     MEDICATIONS  (STANDING):  amLODIPine   Tablet 5 milliGRAM(s) Oral daily  artificial tears (preservative free) Ophthalmic Solution 2 Drop(s) Both EYES every 8 hours  atorvastatin 40 milliGRAM(s) Oral at bedtime  dextrose 5%. 1000 milliLiter(s) (100 mL/Hr) IV Continuous <Continuous>  dextrose 5%. 1000 milliLiter(s) (50 mL/Hr) IV Continuous <Continuous>  dextrose 50% Injectable 25 Gram(s) IV Push once  dextrose 50% Injectable 12.5 Gram(s) IV Push once  dextrose 50% Injectable 25 Gram(s) IV Push once  gabapentin 100 milliGRAM(s) Oral three times a day  glucagon  Injectable 1 milliGRAM(s) IntraMuscular once  insulin glargine Injectable (LANTUS) 10 Unit(s) SubCutaneous at bedtime  insulin lispro (ADMELOG) corrective regimen sliding scale   SubCutaneous three times a day before meals  trimethoprim/polymyxin Solution 1 Drop(s) Both EYES daily    MEDICATIONS  (PRN):  acetaminophen     Tablet .. 650 milliGRAM(s) Oral every 6 hours PRN Temp greater or equal to 38C (100.4F), Mild Pain (1 - 3)  aluminum hydroxide/magnesium hydroxide/simethicone Suspension 30 milliLiter(s) Oral every 4 hours PRN Dyspepsia  dextrose Oral Gel 15 Gram(s) Oral once PRN Blood Glucose LESS THAN 70 milliGRAM(s)/deciliter  melatonin 3 milliGRAM(s) Oral at bedtime PRN Insomnia  ondansetron Injectable 4 milliGRAM(s) IV Push every 8 hours PRN Nausea and/or Vomiting        I&O's Summary      PHYSICAL EXAM:  Vital Signs Last 24 Hrs  T(C): 36.6 (31 Mar 2025 04:37), Max: 36.8 (30 Mar 2025 18:09)  T(F): 97.8 (31 Mar 2025 04:37), Max: 98.2 (30 Mar 2025 18:09)  HR: 72 (31 Mar 2025 09:49) (72 - 88)  BP: 183/87 (31 Mar 2025 09:49) (147/74 - 183/87)  BP(mean): --  RR: 18 (31 Mar 2025 09:49) (17 - 20)  SpO2: 92% (31 Mar 2025 09:49) (92% - 98%)    Parameters below as of 31 Mar 2025 09:49  Patient On (Oxygen Delivery Method): room air          CONSTITUTIONAL: Well groomed, no apparent distress  EYES: PERRLA and symmetric, EOMI, No conjunctival or scleral injection, non-icteric  ENMT: s/p bilateral cataract surgery in both eyes, no icterus, no conjunctival effusion  RESP: No respiratory distress, no use of accessory muscles; CTA b/l, no WRR  CV: RRR, +S1S2, no MRG; no JVD; no peripheral edema  GI: Soft, NT, ND, no rebound, no guarding; no palpable masses; no hepatosplenomegaly; no hernia palpated  LYMPH: No cervical LAD or tenderness; no axillary LAD or tenderness; no inguinal LAD or tenderness  MSK: Normal gait; No digital clubbing or cyanosis; examination of the (head/neck/spine/ribs/pelvis, RUE, LUE, RLE, LLE) without misalignment,            Normal ROM without pain, no spinal tenderness, normal muscle strength/tone  SKIN: No rashes or ulcers noted; no subcutaneous nodules or induration palpable  NEURO: CN II-XII intact; normal reflexes in upper and lower extremities, sensation intact in upper and lower extremities b/l to light touch   PSYCH: Appropriate insight/judgment; A+O x 3, mood and affect appropriate, recent/remote memory intact    LABS:                        10.4   7.59  )-----------( 291      ( 31 Mar 2025 04:28 )             31.4     03-31    140  |  102  |  36.3[H]  ----------------------------<  133[H]  3.4[L]   |  23.0  |  4.83[H]    Ca    8.3[L]      31 Mar 2025 04:28  Mg     1.6     03-30    TPro  6.7  /  Alb  2.8[L]  /  TBili  0.2[L]  /  DBili  x   /  AST  17  /  ALT  14  /  AlkPhos  57  03-31    PT/INR - ( 30 Mar 2025 09:25 )   PT: 11.7 sec;   INR: 1.04 ratio         PTT - ( 30 Mar 2025 09:25 )  PTT:33.9 sec      Urinalysis Basic - ( 31 Mar 2025 04:28 )    Color: x / Appearance: x / SG: x / pH: x  Gluc: 133 mg/dL / Ketone: x  / Bili: x / Urobili: x   Blood: x / Protein: x / Nitrite: x   Leuk Esterase: x / RBC: x / WBC x   Sq Epi: x / Non Sq Epi: x / Bacteria: x        CAPILLARY BLOOD GLUCOSE      POCT Blood Glucose.: 147 mg/dL (31 Mar 2025 08:17)  POCT Blood Glucose.: 203 mg/dL (30 Mar 2025 21:10)  POCT Blood Glucose.: 162 mg/dL (30 Mar 2025 17:38)        RADIOLOGY & ADDITIONAL TESTS:  Results Reviewed: Y  Imaging Personally Reviewed: N  Electrocardiogram Personally Reviewed: KEILA

## 2025-03-31 NOTE — DISCHARGE NOTE PROVIDER - NSDCMRMEDTOKEN_GEN_ALL_CORE_FT
acetaminophen 325 mg oral tablet: 2 tab(s) orally every 8 hours as needed for Temp greater or equal to 38C (100.4F), Mild Pain (1 - 3)  aspirin 81 mg oral delayed release tablet: 1 tab(s) orally once a day  atorvastatin 40 mg oral tablet: 1 tab(s) orally once a day (at bedtime)  Biktarvy 50 mg-200 mg-25 mg oral tablet: 1 tab(s) orally once a day  gabapentin 100 mg oral capsule: 1 cap(s) orally 3 times a day  Jardiance 10 mg oral tablet: 1 tab(s) orally once a day  Lantus Solostar Pen 100 units/mL subcutaneous solution: 10 unit(s) subcutaneous once a day (at bedtime)  metFORMIN 500 mg oral tablet: 1 tab(s) orally 2 times a day  Percocet 5 mg-325 mg oral tablet: 1 tab(s) orally 2 times a day MDD: two  Vitamin D2 1.25 mg (50,000 intl units) oral capsule: 1 cap(s) orally once a week on Sundays   acetaminophen 325 mg oral tablet: 2 tab(s) orally every 8 hours as needed for Temp greater or equal to 38C (100.4F), Mild Pain (1 - 3)  amLODIPine 10 mg oral tablet: 1 tab(s) orally once a day  aspirin 81 mg oral delayed release tablet: 1 tab(s) orally once a day  atorvastatin 40 mg oral tablet: 1 tab(s) orally once a day (at bedtime)  Biktarvy 50 mg-200 mg-25 mg oral tablet: 1 tab(s) orally once a day  brimonidine 0.2% ophthalmic solution: 1 drop(s) to each affected eye 2 times a day  dorzolamide 2% ophthalmic solution: 1 drop(s) to each affected eye once a day  gabapentin 100 mg oral capsule: 1 cap(s) orally 3 times a day  hydrALAZINE 25 mg oral tablet: 1 tab(s) orally 3 times a day  Jardiance 10 mg oral tablet: 1 tab(s) orally once a day meds to bed by 3 pm  latanoprost 0.005% ophthalmic solution: 1 drop(s) to each affected eye once a day (at bedtime)  lidocaine 4% topical film: Apply topically to affected area once a day  metoprolol tartrate 50 mg oral tablet: 1 tab(s) orally 2 times a day  polyethylene glycol 3350 oral powder for reconstitution: 17 gram(s) orally 2 times a day  senna leaf extract oral tablet: 2 tab(s) orally once a day (at bedtime)  timolol maleate 0.5% ophthalmic solution: 1 drop(s) to each affected eye 2 times a day
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
## 2025-03-31 NOTE — DISCHARGE NOTE PROVIDER - NSDCCPCAREPLAN_GEN_ALL_CORE_FT
PRINCIPAL DISCHARGE DIAGNOSIS  Diagnosis: Syncope  Assessment and Plan of Treatment: Take all meds as ordered.  Keep all follow up appointments.      SECONDARY DISCHARGE DIAGNOSES  Diagnosis: Chronic progressive renal failure  Assessment and Plan of Treatment: Take all meds as ordered.  Keep all follow up appointments    Diagnosis: Elevated troponin  Assessment and Plan of Treatment: Take all meds as ordered.  Keep all follow up appointments     PRINCIPAL DISCHARGE DIAGNOSIS  Diagnosis: Syncope  Assessment and Plan of Treatment: Take all meds as ordered.  Keep all follow up appointments.      SECONDARY DISCHARGE DIAGNOSES  Diagnosis: Elevated troponin  Assessment and Plan of Treatment: Take all meds as ordered.  Keep all follow up appointments    Diagnosis: Chronic progressive renal failure  Assessment and Plan of Treatment: Take all meds as ordered.  Keep to follow up with kidney specialist    Diagnosis: Diabetic nephropathy  Assessment and Plan of Treatment:     Diagnosis: Primary HIV infection  Assessment and Plan of Treatment:     Diagnosis: HTN (hypertension)  Assessment and Plan of Treatment:     Diagnosis: Head trauma  Assessment and Plan of Treatment:     Diagnosis: Stage 5 chronic kidney disease not on chronic dialysis  Assessment and Plan of Treatment:

## 2025-03-31 NOTE — DISCHARGE NOTE PROVIDER - PROVIDER TOKENS
FREE:[LAST:[Primary],FIRST:[Doctor],PHONE:[(   )    -],FAX:[(   )    -],FOLLOWUP:[2 weeks]] FREE:[LAST:[Primary],FIRST:[Doctor],PHONE:[(   )    -],FAX:[(   )    -],FOLLOWUP:[2 weeks]],FREE:[LAST:[eye doctor at [place]],PHONE:[(   )    -],FAX:[(   )    -]]
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
## 2025-03-31 NOTE — CONSULT NOTE ADULT - SUBJECTIVE AND OBJECTIVE BOX
Patient is a 63-year-old male with past medical history significant for diabetes mellitus, HIV on HAART and CKD who presented after fall from vehicle.  Nephrology consulted for progressive worsening CKD.  Serum creatinine on admission was up to 4.78 for a GFR of 13.  Patient metformin and Biktarvy was placed on hold.  He denied increased urination frequency, dysuria, gross hematuria, fever, chills, skin rash or itching, nausea, vomiting, diarrhea, bleeding problems and joint pain or swelling. Review of other systems was negative.    Review of systems: All systems were reviewed in detail, pertinent positive and negative have been mentioned above, otherwise negative.  Past medical and surgical history: Anxiety, bilateral cataract, chronic back pain, diabetes, Hydron, HIV, hypertension, insomnia, status post lumbar fusion.  Allergies: NKDA  Home medications: · 	Percocet 5 mg-325 mg oral tablet: 1 tab(s) orally 2 times a day MDD: two  · 	gabapentin 100 mg oral capsule: 1 cap(s) orally 3 times a day  · 	acetaminophen 325 mg oral tablet: 2 tab(s) orally every 8 hours as needed for Temp greater or equal to 38C (100.4F), Mild Pain (1 - 3)  · 	metFORMIN 500 mg oral tablet: 1 tab(s) orally 2 times a day  · 	Jardiance 10 mg oral tablet: 1 tab(s) orally once a day  · 	atorvastatin 40 mg oral tablet: 1 tab(s) orally once a day (at bedtime)  · 	Vitamin D2 1.25 mg (50,000 intl units) oral capsule: 1 cap(s) orally once a week on Sundays  · 	Lantus Solostar Pen 100 units/mL subcutaneous solution: 10 unit(s) subcutaneous once a day (at bedtime)  · 	Biktarvy 50 mg-200 mg-25 mg oral tablet: 1 tab(s) orally once a day  · 	aspirin 81 mg oral delayed release tablet: 1 tab(s) orally once a day  Family history: No pertinent family history of renal disease or electrolyte disorder in first degree relatives.  Social history: Denies tobacco, alcohol, drug abuse.     Vital Signs Last 24 Hrs  T(C): 36.6 (31 Mar 2025 04:37), Max: 36.8 (30 Mar 2025 18:09)  T(F): 97.8 (31 Mar 2025 04:37), Max: 98.2 (30 Mar 2025 18:09)  HR: 72 (31 Mar 2025 09:49) (72 - 86)  BP: 183/87 (31 Mar 2025 09:49) (155/75 - 183/87)  RR: 18 (31 Mar 2025 09:49) (17 - 20)  SpO2: 92% (31 Mar 2025 09:49) (92% - 98%)  Parameters below as of 31 Mar 2025 09:49  Patient On (Oxygen Delivery Method): room air    Physical Exam:  Gen: no acute distress  MS: alert, conversing normally  Eyes: EOMI, no icterus  HENT: NCAT, MMM  CV: rhythm reg reg, rate normal, no m/g/r, no LE edema  Chest: CTAB, no w/r/r,  Abd: soft, NT, ND  Neuro: moving all 4 limbs spontaneously, no tremor  MSK: normal bulk and tone, no joint swelling  Skin: dry, warm, no rash or jaundice    03-31    140  |  102  |  36.3[H]  ----------------------------<  133[H]  3.4[L]   |  23.0  |  4.83[H]    Ca    8.3[L]      31 Mar 2025 04:28  Mg     1.6     03-30    TPro  6.7  /  Alb  2.8[L]  /  TBili  0.2[L]  /  DBili  x   /  AST  17  /  ALT  14  /  AlkPhos  57  03-31                        10.4   7.59  )-----------( 291      ( 31 Mar 2025 04:28 )             31.4

## 2025-04-01 LAB
GLUCOSE BLDC GLUCOMTR-MCNC: 150 MG/DL — HIGH (ref 70–99)
GLUCOSE BLDC GLUCOMTR-MCNC: 170 MG/DL — HIGH (ref 70–99)
GLUCOSE BLDC GLUCOMTR-MCNC: 172 MG/DL — HIGH (ref 70–99)
GLUCOSE BLDC GLUCOMTR-MCNC: 195 MG/DL — HIGH (ref 70–99)
MRSA PCR RESULT.: SIGNIFICANT CHANGE UP
S AUREUS DNA NOSE QL NAA+PROBE: SIGNIFICANT CHANGE UP

## 2025-04-01 PROCEDURE — 99223 1ST HOSP IP/OBS HIGH 75: CPT

## 2025-04-01 PROCEDURE — 99232 SBSQ HOSP IP/OBS MODERATE 35: CPT

## 2025-04-01 PROCEDURE — 99233 SBSQ HOSP IP/OBS HIGH 50: CPT

## 2025-04-01 PROCEDURE — 75571 CT HRT W/O DYE W/CA TEST: CPT | Mod: 26

## 2025-04-01 RX ORDER — LAMIVUDINE 10 MG/ML
100 SOLUTION ORAL DAILY
Refills: 0 | Status: DISCONTINUED | OUTPATIENT
Start: 2025-04-01 | End: 2025-05-13

## 2025-04-01 RX ORDER — DOLUTEGRAVIR SODIUM 5 MG/1
50 TABLET, FOR SUSPENSION ORAL DAILY
Refills: 0 | Status: DISCONTINUED | OUTPATIENT
Start: 2025-04-01 | End: 2025-05-13

## 2025-04-01 RX ADMIN — Medication 650 MILLIGRAM(S): at 16:31

## 2025-04-01 RX ADMIN — INSULIN GLARGINE-YFGN 10 UNIT(S): 100 INJECTION, SOLUTION SUBCUTANEOUS at 21:23

## 2025-04-01 RX ADMIN — DOLUTEGRAVIR SODIUM 50 MILLIGRAM(S): 5 TABLET, FOR SUSPENSION ORAL at 13:19

## 2025-04-01 RX ADMIN — Medication 1 APPLICATION(S): at 13:20

## 2025-04-01 RX ADMIN — Medication 2 DROP(S): at 05:29

## 2025-04-01 RX ADMIN — Medication 650 MILLIGRAM(S): at 09:25

## 2025-04-01 RX ADMIN — GABAPENTIN 100 MILLIGRAM(S): 400 CAPSULE ORAL at 05:29

## 2025-04-01 RX ADMIN — Medication 650 MILLIGRAM(S): at 21:14

## 2025-04-01 RX ADMIN — ATORVASTATIN CALCIUM 40 MILLIGRAM(S): 80 TABLET, FILM COATED ORAL at 21:14

## 2025-04-01 RX ADMIN — Medication 650 MILLIGRAM(S): at 15:31

## 2025-04-01 RX ADMIN — POLYMYXIN B SULFATE AND TRIMETHOPRIM SULFATE 1 DROP(S): 10000; 1 SOLUTION/ DROPS OPHTHALMIC at 13:19

## 2025-04-01 RX ADMIN — Medication 2 DROP(S): at 13:20

## 2025-04-01 RX ADMIN — INSULIN LISPRO 1: 100 INJECTION, SOLUTION INTRAVENOUS; SUBCUTANEOUS at 08:30

## 2025-04-01 RX ADMIN — INSULIN LISPRO 1: 100 INJECTION, SOLUTION INTRAVENOUS; SUBCUTANEOUS at 17:58

## 2025-04-01 RX ADMIN — GABAPENTIN 100 MILLIGRAM(S): 400 CAPSULE ORAL at 13:22

## 2025-04-01 RX ADMIN — LAMIVUDINE 100 MILLIGRAM(S): 10 SOLUTION ORAL at 13:19

## 2025-04-01 RX ADMIN — GABAPENTIN 100 MILLIGRAM(S): 400 CAPSULE ORAL at 21:14

## 2025-04-01 RX ADMIN — AMLODIPINE BESYLATE 5 MILLIGRAM(S): 10 TABLET ORAL at 05:29

## 2025-04-01 RX ADMIN — Medication 650 MILLIGRAM(S): at 08:25

## 2025-04-01 RX ADMIN — Medication 650 MILLIGRAM(S): at 01:38

## 2025-04-01 NOTE — PROGRESS NOTE ADULT - SUBJECTIVE AND OBJECTIVE BOX
[facility] DIVISION OF KIDNEY DISEASES AND HYPERTENSION -- FOLLOW UP NOTE  --------------------------------------------------------------------------------  Chief Complaint:   Geoff on CKD    24 hour events/subjective:  no acute event noted  pt refused NST        PAST HISTORY  --------------------------------------------------------------------------------  No significant changes to PMH, PSH, FHx, SHx, unless otherwise noted    ALLERGIES & MEDICATIONS  --------------------------------------------------------------------------------  Allergies    No Known Allergies      Standing Inpatient Medications  amLODIPine   Tablet 5 milliGRAM(s) Oral daily  artificial tears (preservative free) Ophthalmic Solution 2 Drop(s) Both EYES every 8 hours  atorvastatin 40 milliGRAM(s) Oral at bedtime  chlorhexidine 2% Cloths 1 Application(s) Topical <User Schedule>  dextrose 5%. 1000 milliLiter(s) IV Continuous <Continuous>  dextrose 5%. 1000 milliLiter(s) IV Continuous <Continuous>  dextrose 50% Injectable 25 Gram(s) IV Push once  dextrose 50% Injectable 12.5 Gram(s) IV Push once  dextrose 50% Injectable 25 Gram(s) IV Push once  dolutegravir 50 milliGRAM(s) Oral daily  gabapentin 100 milliGRAM(s) Oral three times a day  glucagon  Injectable 1 milliGRAM(s) IntraMuscular once  insulin glargine Injectable (LANTUS) 10 Unit(s) SubCutaneous at bedtime  insulin lispro (ADMELOG) corrective regimen sliding scale   SubCutaneous three times a day before meals  lamiVUDine- milliGRAM(s) Oral daily  trimethoprim/polymyxin Solution 1 Drop(s) Both EYES daily    PRN Inpatient Medications  acetaminophen     Tablet .. 650 milliGRAM(s) Oral every 6 hours PRN  aluminum hydroxide/magnesium hydroxide/simethicone Suspension 30 milliLiter(s) Oral every 4 hours PRN  dextrose Oral Gel 15 Gram(s) Oral once PRN  melatonin 3 milliGRAM(s) Oral at bedtime PRN  ondansetron Injectable 4 milliGRAM(s) IV Push every 8 hours PRN      REVIEW OF SYSTEMS  --------------------------------------------------------------------------------  Gen: No weight changes, fatigue, fevers/chills, weakness  Skin: No rashes  Head/Eyes/Ears/Mouth: No headache; Normal hearing; Normal vision w/o blurriness; No sinus pain/discomfort, sore throat  Respiratory: No dyspnea, cough, wheezing, hemoptysis  CV: No chest pain, PND, orthopnea  GI: No abdominal pain, diarrhea, constipation, nausea, vomiting, melena, hematochezia  : No increased frequency, dysuria, hematuria, nocturia  MSK: No joint pain/swelling; no back pain; no edema  Neuro: No dizziness/lightheadedness, weakness, seizures, numbness, tingling  Heme: No easy bruising or bleeding  Endo: No heat/cold intolerance  Psych: No significant nervousness, anxiety, stress, depression    All other systems were reviewed and are negative, except as noted.    VITALS/PHYSICAL EXAM  --------------------------------------------------------------------------------  T(C): 36.6 (04-01-25 @ 08:27), Max: 36.6 (04-01-25 @ 05:23)  HR: 86 (04-01-25 @ 08:27) (69 - 88)  BP: 160/86 (04-01-25 @ 08:27) (160/86 - 164/89)  RR: 18 (04-01-25 @ 08:27) (18 - 18)  SpO2: 96% (04-01-25 @ 08:27) (96% - 97%)  Wt(kg): --        Physical Exam:  	Gen: NAD, well-appearing  	HEENT: Supple neck, clear oropharynx  	Pulm: CTA B/L  	CV: RRR, S1S2; no rub  	Abd: +BS, soft, nontender/nondistended  	: No suprapubic tenderness  	UE: Warm, no edema  	LE: Warm, no edema  	Neuro: No focal deficit  	Psych: Normal affect and mood  	Skin: Warm    LABS/STUDIES  --------------------------------------------------------------------------------              10.4   7.59  >-----------<  291      [03-31-25 @ 04:28]              31.4     140  |  102  |  36.3  ----------------------------<  133      [03-31-25 @ 04:28]  3.4   |  23.0  |  4.83        Ca     8.3     [03-31-25 @ 04:28]    TPro  6.7  /  Alb  2.8  /  TBili  0.2  /  DBili  x   /  AST  17  /  ALT  14  /  AlkPhos  57  [03-31-25 @ 04:28]          Creatinine Trend:  SCr 4.83 [03-31 @ 04:28]  SCr 4.78 [03-30 @ 09:25]  SCr 3.30 [03-24 @ 03:58]    Urinalysis - [03-31-25 @ 04:28]      Color  / Appearance  / SG  / pH       Gluc 133 / Ketone   / Bili  / Urobili        Blood  / Protein  / Leuk Est  / Nitrite       RBC  / WBC  / Hyaline  / Gran  / Sq Epi  / Non Sq Epi  / Bacteria       Lipid: chol 100, , HDL 23, LDL --      [08-08-24 @ 06:35]

## 2025-04-01 NOTE — PROGRESS NOTE ADULT - ASSESSMENT
63 YOM DM, HIV on HAART admitted after fall.  Nephrology consulted for progressive worsening CKD.    Acute kidney injury on CKD stage III versus progressive CKD stage V;  -Serum creatinine has slowly progressed now to 4.8  -UA with proteinuria more than 1000 g on last check in May 24  -Repeat UA and UPCR pending collection  -Ddx-? HIVICK, Nephrotoxicity from tenofovir alafenamide vs HIVAN  -Obtain renal ultrasound  -No emergent need for dialysis, might need biopsy.     Proteinuria: As above will quantify UPCR  Hypokalemia: Repletion orders reviewed  Anemia: CKD versus RITESH.  Check iron anemia panel  HTN: Controlled  HIV on Bikatrvy- meds now being changed as per ID  DM: discontinued metformin (low GFR), place on Lantus + SSI ACHS

## 2025-04-01 NOTE — DIETITIAN INITIAL EVALUATION ADULT - OTHER INFO
Per chart "64 y/o male with PMH of Renal insuff (1.7), Dm (uncontrolled), COPD, HIV last cd4 >100, HF with mildly low ef, and HTN who presented to ER earlier  with eye pain  seen and discharged and he represented to er after taxi dropped him off at the wrong place,  he fell and questionable syncope and now complaining of pain all over and right ankle and knee pain. ekg Nsr non specific st changes  LVH.  He reports he has pain all over his chest and abdomen which is reproducible."

## 2025-04-01 NOTE — PROGRESS NOTE ADULT - ASSESSMENT
63M hx of CKD, IDDM, HIV on HAART, HFrEF, recent bilateral cataract surgery at [facility] presenting after fall from vehicle. Patient without evidence o acute traumatic injury to head or c-spine. Patient found to have elevated troponin, chest pain seems to be more so from fall rather than cardiac in nature.     #fall  -ct head and cspine without evidence of traumatic injury  -tylenol prn for pain control  -PT    #elevated troponin  -cardiology on board, recs noted  -holding heparin and asa iso head trauma  -TTE Reviewed  -ct coronary today  -NST TOMORROW/    #jazmyn on [id number]  #CKD  -Renal on board  -hold metformin  -renally dose meds  -avoid nephrotoxins    #Hypokalemia  -replenish gently, monitor for cr    #hiv  -holding bikrtarvy due to poor renal fxn at this time, can resume if jazmyn improves  -ID Consulted  -Started on Dolutegrair and epivir  -check cd4 and VL    #hfref  -tte  -c/w metoprolol  -no ace or arb due to jazmyn  -not on lasix at home (euvolemic)    #recent bilateral cataract surgery  -ct with chronic lens dislocation vs vitreous hemorrhage   -optho contacted by ED - as per optho: IOP will be chronically elevated iso vitreous hemorrhage  -given pilocarpine, dorzolamide, brimonidine, timolol in ED -- as per optho, not to continue as IOP chronically elevated  -pt to continue with polytrim drops  -artifical tears  -had scheduled procedure with his outside optho on 04/01    diet: carb consistent  dvt ppx: scd  dispo: anticipate 24hours LOS after nst   63M hx of CKD, IDDM, HIV on HAART, HFrEF, recent bilateral cataract surgery at [facility] presenting after fall from vehicle. Patient without evidence o acute traumatic injury to head or c-spine. Patient found to have elevated troponin, chest pain seems to be more so from fall rather than cardiac in nature.     #fall  -ct head and cspine without evidence of traumatic injury  -tylenol prn for pain control  -PT    #elevated troponin  -cardiology on board, recs noted  -holding heparin and asa iso head trauma  -TTE Reviewed  -ct coronary today  -NST Tomorrow    #jazmyn on ckd3b  #CKD  -Renal on board  -hold metformin  -renally dose meds  -avoid nephrotoxins    #Hypokalemia  -replenish gently, monitor for cr    #hiv  -holding bikrtarvy due to poor renal fxn at this time, can resume if jazmyn improves  -ID Consulted  -Started on Dolutegrair and epivir  -check cd4 and VL    #hfref  -tte  -c/w metoprolol  -no ace or arb due to jazmyn  -not on lasix at home (euvolemic)    #recent bilateral cataract surgery  -ct with chronic lens dislocation vs vitreous hemorrhage   -optho contacted by ED - as per optho: IOP will be chronically elevated iso vitreous hemorrhage  -given pilocarpine, dorzolamide, brimonidine, timolol in ED -- as per optho, not to continue as IOP chronically elevated  -pt to continue with polytrim drops  -artifical tears  -had scheduled procedure with his outside optho on 04/01    diet: carb consistent  dvt ppx: scd  dispo: anticipate 24hours LOS after nst

## 2025-04-01 NOTE — DIETITIAN INITIAL EVALUATION ADULT - ADD RECOMMEND
1) Change to consistent carbohydrate diet.   2) Encourage po intake, monitor diet tolerance, and provide assistance at meals as needed.   3) Monitor BG levels, correct prn   4) Rx: MVI daily.   5) Obtain daily weights to monitor trends.

## 2025-04-01 NOTE — DIETITIAN INITIAL EVALUATION ADULT - ORAL INTAKE PTA/DIET HISTORY
Patient NPO today for stress test. Pt states he was previously tolerating diet well with good appetite/PO intake. Pt states PTA his appetite was also good and he was eating 3 meals/day. Pt denies following ay diet restrictions. Pt provided with verbal education on consistent carbohydrate diet as pt with hx of DM (HgA1c 8.2%). Pt with good understanding and refused printed education at this time. Pt with no c/o N/V/C/D at this time. Pt states his UBW is about 178 lbs, current weight 180 lbs. No edema noted. Pt with no further questions or concerns regarding nutrition at this time. Will continue to monitor and follow up as needed. RD remains available.

## 2025-04-01 NOTE — CHART NOTE - NSCHARTNOTEFT_GEN_A_CORE
Pt admitted for mechanical fall   ct scans negative   mildly elevated trop.  S/P left Cataract surgery on 1/30/25 DC from [facility] PACU      Pt is refusing NST   MD made aware

## 2025-04-01 NOTE — PROGRESS NOTE ADULT - ASSESSMENT
64 y/o male with PMH of Renal insuff (1.7), Dm (uncontrolled), COPD, HIV last cd4 >100, HF with mildly low ef, and HTN who presented to ER earlier  with eye pain  seen and discharged and he represented to er after taxi dropped him off at the wrong place,  he fell and questionable syncope and now complaining of pain all over and right ankle and knee pain. ekg Nsr non specific st changes  LVH.  He reports he has pain all over his chest and abdomen which is reproducible.  Had no exertional chest pain prior this this admission.  Echo EF 55% . Trop 62--59--61. Cr 4.83.
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
## 2025-04-01 NOTE — DIETITIAN INITIAL EVALUATION ADULT - PERTINENT LABORATORY DATA
03-31    140  |  102  |  36.3[H]  ----------------------------<  133[H]  3.4[L]   |  23.0  |  4.83[H]    Ca    8.3[L]      31 Mar 2025 04:28    TPro  6.7  /  Alb  2.8[L]  /  TBili  0.2[L]  /  DBili  x   /  AST  17  /  ALT  14  /  AlkPhos  57  03-31  POCT Blood Glucose.: 150 mg/dL (04-01-25 @ 11:53)  A1C with Estimated Average Glucose Result: 8.2 % (03-31-25 @ 04:28)

## 2025-04-01 NOTE — PROGRESS NOTE ADULT - SUBJECTIVE AND OBJECTIVE BOX
Hospitalist Daily Progress Note    Chief Complaint:  Patient is a 64y old  Male who presents with a chief complaint of Syncope and collapse     (01 Apr 2025 13:50)      SUBJECTIVE / OVERNIGHT EVENTS:  Patient was seen and examined at bedside.   Patient denies chest pain, SOB, abd pain, N/V, fever, chills, dysuria or any other complaints. All remainder ROS negative.     MEDICATIONS  (STANDING):  amLODIPine   Tablet 5 milliGRAM(s) Oral daily  artificial tears (preservative free) Ophthalmic Solution 2 Drop(s) Both EYES every 8 hours  atorvastatin 40 milliGRAM(s) Oral at bedtime  chlorhexidine 2% Cloths 1 Application(s) Topical <User Schedule>  dextrose 5%. 1000 milliLiter(s) (100 mL/Hr) IV Continuous <Continuous>  dextrose 5%. 1000 milliLiter(s) (50 mL/Hr) IV Continuous <Continuous>  dextrose 50% Injectable 25 Gram(s) IV Push once  dextrose 50% Injectable 12.5 Gram(s) IV Push once  dextrose 50% Injectable 25 Gram(s) IV Push once  dolutegravir 50 milliGRAM(s) Oral daily  gabapentin 100 milliGRAM(s) Oral three times a day  glucagon  Injectable 1 milliGRAM(s) IntraMuscular once  insulin glargine Injectable (LANTUS) 10 Unit(s) SubCutaneous at bedtime  insulin lispro (ADMELOG) corrective regimen sliding scale   SubCutaneous three times a day before meals  lamiVUDine- milliGRAM(s) Oral daily  trimethoprim/polymyxin Solution 1 Drop(s) Both EYES daily    MEDICATIONS  (PRN):  acetaminophen     Tablet .. 650 milliGRAM(s) Oral every 6 hours PRN Temp greater or equal to 38C (100.4F), Mild Pain (1 - 3)  aluminum hydroxide/magnesium hydroxide/simethicone Suspension 30 milliLiter(s) Oral every 4 hours PRN Dyspepsia  dextrose Oral Gel 15 Gram(s) Oral once PRN Blood Glucose LESS THAN 70 milliGRAM(s)/deciliter  melatonin 3 milliGRAM(s) Oral at bedtime PRN Insomnia  ondansetron Injectable 4 milliGRAM(s) IV Push every 8 hours PRN Nausea and/or Vomiting        I&O's Summary      PHYSICAL EXAM:  Vital Signs Last 24 Hrs  T(C): 36.6 (01 Apr 2025 08:27), Max: 36.6 (01 Apr 2025 05:23)  T(F): 97.8 (01 Apr 2025 08:27), Max: 97.9 (01 Apr 2025 05:23)  HR: 86 (01 Apr 2025 08:27) (69 - 88)  BP: 160/86 (01 Apr 2025 08:27) (160/86 - 164/89)  BP(mean): --  RR: 18 (01 Apr 2025 08:27) (18 - 18)  SpO2: 96% (01 Apr 2025 08:27) (96% - 97%)    Parameters below as of 01 Apr 2025 08:27  Patient On (Oxygen Delivery Method): room air          Constitutional: NAD, Resting  ENT: Supple, No JVD  Lungs: CTA B/L, Non-labored breathing  Cardio: RRR, S1/S2, No murmur  Abdomen: Soft, Nontender, Nondistended; Bowel sounds present  Extremities: No calf tenderness, No pitting edema  Musculoskeletal:   No joint swelling  Psych: Calm, cooperative affect appropriate  Neuro: Awake and alert  Skin: No rashes; no palpable lesions    LABS:                        10.4   7.59  )-----------( 291      ( 31 Mar 2025 04:28 )             31.4     03-31    140  |  102  |  36.3[H]  ----------------------------<  133[H]  3.4[L]   |  23.0  |  4.83[H]    Ca    8.3[L]      31 Mar 2025 04:28    TPro  6.7  /  Alb  2.8[L]  /  TBili  0.2[L]  /  DBili  x   /  AST  17  /  ALT  14  /  AlkPhos  57  03-31          Urinalysis Basic - ( 31 Mar 2025 04:28 )    Color: x / Appearance: x / SG: x / pH: x  Gluc: 133 mg/dL / Ketone: x  / Bili: x / Urobili: x   Blood: x / Protein: x / Nitrite: x   Leuk Esterase: x / RBC: x / WBC x   Sq Epi: x / Non Sq Epi: x / Bacteria: x        CAPILLARY BLOOD GLUCOSE      POCT Blood Glucose.: 150 mg/dL (01 Apr 2025 11:53)  POCT Blood Glucose.: 195 mg/dL (01 Apr 2025 08:02)  POCT Blood Glucose.: 235 mg/dL (31 Mar 2025 21:01)  POCT Blood Glucose.: 139 mg/dL (31 Mar 2025 17:14)        RADIOLOGY REVIEWED   Hospitalist Daily Progress Note    Chief Complaint:  Patient is a 64y old  Male who presents with a chief complaint of Syncope and collapse     (01 Apr 2025 13:50)      SUBJECTIVE / OVERNIGHT EVENTS:  Patient was seen and examined at bedside. Sleepy  Patient denies chest pain, SOB, abd pain, N/V, fever, chills, dysuria or any other complaints. All remainder ROS negative.     MEDICATIONS  (STANDING):  amLODIPine   Tablet 5 milliGRAM(s) Oral daily  artificial tears (preservative free) Ophthalmic Solution 2 Drop(s) Both EYES every 8 hours  atorvastatin 40 milliGRAM(s) Oral at bedtime  chlorhexidine 2% Cloths 1 Application(s) Topical <User Schedule>  dextrose 5%. 1000 milliLiter(s) (100 mL/Hr) IV Continuous <Continuous>  dextrose 5%. 1000 milliLiter(s) (50 mL/Hr) IV Continuous <Continuous>  dextrose 50% Injectable 25 Gram(s) IV Push once  dextrose 50% Injectable 12.5 Gram(s) IV Push once  dextrose 50% Injectable 25 Gram(s) IV Push once  dolutegravir 50 milliGRAM(s) Oral daily  gabapentin 100 milliGRAM(s) Oral three times a day  glucagon  Injectable 1 milliGRAM(s) IntraMuscular once  insulin glargine Injectable (LANTUS) 10 Unit(s) SubCutaneous at bedtime  insulin lispro (ADMELOG) corrective regimen sliding scale   SubCutaneous three times a day before meals  lamiVUDine- milliGRAM(s) Oral daily  trimethoprim/polymyxin Solution 1 Drop(s) Both EYES daily    MEDICATIONS  (PRN):  acetaminophen     Tablet .. 650 milliGRAM(s) Oral every 6 hours PRN Temp greater or equal to 38C (100.4F), Mild Pain (1 - 3)  aluminum hydroxide/magnesium hydroxide/simethicone Suspension 30 milliLiter(s) Oral every 4 hours PRN Dyspepsia  dextrose Oral Gel 15 Gram(s) Oral once PRN Blood Glucose LESS THAN 70 milliGRAM(s)/deciliter  melatonin 3 milliGRAM(s) Oral at bedtime PRN Insomnia  ondansetron Injectable 4 milliGRAM(s) IV Push every 8 hours PRN Nausea and/or Vomiting        I&O's Summary      PHYSICAL EXAM:  Vital Signs Last 24 Hrs  T(C): 36.6 (01 Apr 2025 08:27), Max: 36.6 (01 Apr 2025 05:23)  T(F): 97.8 (01 Apr 2025 08:27), Max: 97.9 (01 Apr 2025 05:23)  HR: 86 (01 Apr 2025 08:27) (69 - 88)  BP: 160/86 (01 Apr 2025 08:27) (160/86 - 164/89)  BP(mean): --  RR: 18 (01 Apr 2025 08:27) (18 - 18)  SpO2: 96% (01 Apr 2025 08:27) (96% - 97%)    Parameters below as of 01 Apr 2025 08:27  Patient On (Oxygen Delivery Method): room air          Constitutional: NAD, Resting  ENT: Supple, No JVD  Lungs: CTA B/L, Non-labored breathing  Cardio: RRR, S1/S2, No murmur  Abdomen: Soft, Nontender, Nondistended; Bowel sounds present  Extremities: No calf tenderness, No pitting edema  Musculoskeletal:   No joint swelling  Psych: Calm, cooperative affect appropriate  Neuro: Awake and alert  Skin: No rashes; no palpable lesions    LABS:                        10.4   7.59  )-----------( 291      ( 31 Mar 2025 04:28 )             31.4     03-31    140  |  102  |  36.3[H]  ----------------------------<  133[H]  3.4[L]   |  23.0  |  4.83[H]    Ca    8.3[L]      31 Mar 2025 04:28    TPro  6.7  /  Alb  2.8[L]  /  TBili  0.2[L]  /  DBili  x   /  AST  17  /  ALT  14  /  AlkPhos  57  03-31          Urinalysis Basic - ( 31 Mar 2025 04:28 )    Color: x / Appearance: x / SG: x / pH: x  Gluc: 133 mg/dL / Ketone: x  / Bili: x / Urobili: x   Blood: x / Protein: x / Nitrite: x   Leuk Esterase: x / RBC: x / WBC x   Sq Epi: x / Non Sq Epi: x / Bacteria: x        CAPILLARY BLOOD GLUCOSE      POCT Blood Glucose.: 150 mg/dL (01 Apr 2025 11:53)  POCT Blood Glucose.: 195 mg/dL (01 Apr 2025 08:02)  POCT Blood Glucose.: 235 mg/dL (31 Mar 2025 21:01)  POCT Blood Glucose.: 139 mg/dL (31 Mar 2025 17:14)        RADIOLOGY REVIEWED

## 2025-04-01 NOTE — DIETITIAN INITIAL EVALUATION ADULT - PERTINENT MEDS FT
MEDICATIONS  (STANDING):  amLODIPine   Tablet 5 milliGRAM(s) Oral daily  dextrose 5%. 1000 milliLiter(s) (50 mL/Hr) IV Continuous <Continuous>  dextrose 50% Injectable 25 Gram(s) IV Push once  dolutegravir 50 milliGRAM(s) Oral daily  glucagon  Injectable 1 milliGRAM(s) IntraMuscular once  insulin glargine Injectable (LANTUS) 10 Unit(s) SubCutaneous at bedtime  insulin lispro (ADMELOG) corrective regimen sliding scale   SubCutaneous three times a day before meals  lamiVUDine- milliGRAM(s) Oral daily    MEDICATIONS  (PRN):  dextrose Oral Gel 15 Gram(s) Oral once PRN Blood Glucose LESS THAN 70 milliGRAM(s)/deciliter  ondansetron Injectable 4 milliGRAM(s) IV Push every 8 hours PRN Nausea and/or Vomiting

## 2025-04-01 NOTE — PROGRESS NOTE ADULT - SUBJECTIVE AND OBJECTIVE BOX
[facility]                                                         CARDIOLOGY AT [facility]                                                                  [address] [facility], [id number] [place] [address]                                                         Telephone: [phone]. Fax:[phone]                                                                             PROGRESS NOTE    Reason for follow up: Elevated trop   Update: Pt seen and examined at bedside. He denies any cardiac complaints today. He refused NST this morning. States "my heart is fine. I don't have chest pain. I feel fine."      Review of symptoms:   Cardiac:  No chest pain. No dyspnea. No palpitations.  Respiratory: no cough. No dyspnea  Gastrointestinal: No diarrhea. No abdominal pain. No bleeding.   Neuro: No focal neuro complaints.    Vitals:  T(C): 36.6 (04-01-25 @ 08:27), Max: 36.6 (04-01-25 @ 05:23)  HR: 86 (04-01-25 @ 08:27) (69 - 88)  BP: 160/86 (04-01-25 @ 08:27) (160/86 - 164/89)  RR: 18 (04-01-25 @ 08:27) (18 - 18)  SpO2: 96% (04-01-25 @ 08:27) (96% - 97%)  Wt(kg): --  I&O's Summary    Weight (kg): 81.647 (03-30 @ 08:46), 74.8 (03-29 @ 18:16)    PHYSICAL EXAM:  Appearance: Comfortable. No acute distress  HEENT:  Atraumatic. Normocephalic.  Normal oral mucosa  Neurologic: A & O x 3, no gross focal deficits.  Cardiovascular: RRR S1 S2, No murmur, no rubs/gallops. No JVD  Respiratory: Lungs clear to auscultation, unlabored   Gastrointestinal:  Soft, Non-tender, + BS  Lower Extremities: 2+ Peripheral Pulses, No clubbing, cyanosis, or edema  Psychiatry: Patient is calm. No agitation.   Skin: warm and dry.    CURRENT CARDIAC MEDICATIONS:  amLODIPine   Tablet 5 milliGRAM(s) Oral daily      CURRENT OTHER MEDICATIONS:  acetaminophen     Tablet .. 650 milliGRAM(s) Oral every 6 hours PRN Temp greater or equal to 38C (100.4F), Mild Pain (1 - 3)  gabapentin 100 milliGRAM(s) Oral three times a day  melatonin 3 milliGRAM(s) Oral at bedtime PRN Insomnia  ondansetron Injectable 4 milliGRAM(s) IV Push every 8 hours PRN Nausea and/or Vomiting  aluminum hydroxide/magnesium hydroxide/simethicone Suspension 30 milliLiter(s) Oral every 4 hours PRN Dyspepsia  atorvastatin 40 milliGRAM(s) Oral at bedtime  dextrose 50% Injectable 25 Gram(s) IV Push once, Stop order after: 1 Doses  dextrose 50% Injectable 12.5 Gram(s) IV Push once, Stop order after: 1 Doses  dextrose 50% Injectable 25 Gram(s) IV Push once, Stop order after: 1 Doses  dextrose Oral Gel 15 Gram(s) Oral once, Stop order after: 1 Doses PRN Blood Glucose LESS THAN 70 milliGRAM(s)/deciliter  glucagon  Injectable 1 milliGRAM(s) IntraMuscular once, Stop order after: 1 Doses  insulin glargine Injectable (LANTUS) 10 Unit(s) SubCutaneous at bedtime  insulin lispro (ADMELOG) corrective regimen sliding scale   SubCutaneous three times a day before meals  artificial tears (preservative free) Ophthalmic Solution 2 Drop(s) Both EYES every 8 hours  chlorhexidine 2% Cloths 1 Application(s) Topical <User Schedule>  dextrose 5%. 1000 milliLiter(s) (100 mL/Hr) IV Continuous <Continuous>  dextrose 5%. 1000 milliLiter(s) (50 mL/Hr) IV Continuous <Continuous>  trimethoprim/polymyxin Solution 1 Drop(s) Both EYES daily      LABS:	 	                            10.4   7.59  )-----------( 291      ( 31 Mar 2025 04:28 )             31.4     03-31    140  |  102  |  36.3[H]  ----------------------------<  133[H]  3.4[L]   |  23.0  |  4.83[H]    Ca    8.3[L]      31 Mar 2025 04:28    TPro  6.7  /  Alb  2.8[L]  /  TBili  0.2[L]  /  DBili  x   /  AST  17  /  ALT  14  /  AlkPhos  57  03-31    PT/INR/PTT ( 30 Mar 2025 09:25 )                       :                       :      11.7         :       33.9                  .        .                   .              .           .       1.04        .                                          TELEMETRY: Pt refusing tele        DIAGNOSTIC TESTING:  [x ] Echocardiogram:   < from: TTE W or WO Ultrasound Enhancing Agent (03.30.25 @ 15:33) >  CONCLUSIONS:      1. Left ventricular systolic function is low normal with an ejection fraction visually estimated at 50 to 55 %.   2. Normal right ventricular cavity size and normal right ventricular systolic function.   3. No pericardial effusion seen.   4. No prior echocardiogram is available for comparison.    < end of copied text >

## 2025-04-01 NOTE — PROGRESS NOTE ADULT - PROBLEM SELECTOR PLAN 1
- NST recommended but pt refused NST this AM when he went for the procedure  - Saw pt after he refused NST this AM and explained to him the risks, benefits, indications  - Pt is now amenable to NST tomorrow after discussion. He will likely need this for cardiac risk stratification prior to eye surgery   - Plan for NST tomorrow. NPO after midnight.   - Amlodipine started yesterday. BP still high . Increase to 10 mg if no improvement. Or consider BB if NST is ischemic. Avoid ACE /ARB due to renal failure   - continue statin   - monitor on Tele for acute arrhythmia monitoring  - management of renal failure as per nephrology     Discussed with Dr. Martinez. Assessment and recommendations are final when note is signed by the attending.

## 2025-04-02 ENCOUNTER — RESULT REVIEW (OUTPATIENT)
Age: 65
End: 2025-04-02

## 2025-04-02 LAB
ANION GAP SERPL CALC-SCNC: 14 MMOL/L — SIGNIFICANT CHANGE UP (ref 5–17)
BASOPHILS # BLD AUTO: 0.02 K/UL — SIGNIFICANT CHANGE UP (ref 0–0.2)
BASOPHILS NFR BLD AUTO: 0.3 % — SIGNIFICANT CHANGE UP (ref 0–2)
BUN SERPL-MCNC: 36.3 MG/DL — HIGH (ref 8–20)
CALCIUM SERPL-MCNC: 8.3 MG/DL — LOW (ref 8.4–10.5)
CHLORIDE SERPL-SCNC: 108 MMOL/L — SIGNIFICANT CHANGE UP (ref 96–108)
CO2 SERPL-SCNC: 20 MMOL/L — LOW (ref 22–29)
CREAT SERPL-MCNC: 4.86 MG/DL — HIGH (ref 0.5–1.3)
EGFR: 13 ML/MIN/1.73M2 — LOW
EGFR: 13 ML/MIN/1.73M2 — LOW
EOSINOPHIL # BLD AUTO: 0.1 K/UL — SIGNIFICANT CHANGE UP (ref 0–0.5)
EOSINOPHIL NFR BLD AUTO: 1.5 % — SIGNIFICANT CHANGE UP (ref 0–6)
GLUCOSE BLDC GLUCOMTR-MCNC: 160 MG/DL — HIGH (ref 70–99)
GLUCOSE BLDC GLUCOMTR-MCNC: 192 MG/DL — HIGH (ref 70–99)
GLUCOSE BLDC GLUCOMTR-MCNC: 224 MG/DL — HIGH (ref 70–99)
GLUCOSE BLDC GLUCOMTR-MCNC: 279 MG/DL — HIGH (ref 70–99)
GLUCOSE SERPL-MCNC: 105 MG/DL — HIGH (ref 70–99)
HCT VFR BLD CALC: 29.7 % — LOW (ref 39–50)
HGB BLD-MCNC: 9.7 G/DL — LOW (ref 13–17)
IMM GRANULOCYTES # BLD AUTO: 0.08 K/UL — HIGH (ref 0–0.07)
IMM GRANULOCYTES NFR BLD AUTO: 1.2 % — HIGH (ref 0–0.9)
LYMPHOCYTES # BLD AUTO: 1.97 K/UL — SIGNIFICANT CHANGE UP (ref 1–3.3)
LYMPHOCYTES NFR BLD AUTO: 29.4 % — SIGNIFICANT CHANGE UP (ref 13–44)
MCHC RBC-ENTMCNC: 28.5 PG — SIGNIFICANT CHANGE UP (ref 27–34)
MCHC RBC-ENTMCNC: 32.7 G/DL — SIGNIFICANT CHANGE UP (ref 32–36)
MCV RBC AUTO: 87.4 FL — SIGNIFICANT CHANGE UP (ref 80–100)
MONOCYTES # BLD AUTO: 0.48 K/UL — SIGNIFICANT CHANGE UP (ref 0–0.9)
MONOCYTES NFR BLD AUTO: 7.2 % — SIGNIFICANT CHANGE UP (ref 2–14)
NEUTROPHILS # BLD AUTO: 4.05 K/UL — SIGNIFICANT CHANGE UP (ref 1.8–7.4)
NEUTROPHILS NFR BLD AUTO: 60.4 % — SIGNIFICANT CHANGE UP (ref 43–77)
NRBC # BLD AUTO: 0 K/UL — SIGNIFICANT CHANGE UP (ref 0–0)
NRBC # FLD: 0 K/UL — SIGNIFICANT CHANGE UP (ref 0–0)
NRBC BLD AUTO-RTO: 0 /100 WBCS — SIGNIFICANT CHANGE UP (ref 0–0)
PLATELET # BLD AUTO: 299 K/UL — SIGNIFICANT CHANGE UP (ref 150–400)
PMV BLD: 8.9 FL — SIGNIFICANT CHANGE UP (ref 7–13)
POTASSIUM SERPL-MCNC: 3.8 MMOL/L — SIGNIFICANT CHANGE UP (ref 3.5–5.3)
POTASSIUM SERPL-SCNC: 3.8 MMOL/L — SIGNIFICANT CHANGE UP (ref 3.5–5.3)
RBC # BLD: 3.4 M/UL — LOW (ref 4.2–5.8)
RBC # FLD: 14.1 % — SIGNIFICANT CHANGE UP (ref 10.3–14.5)
SODIUM SERPL-SCNC: 141 MMOL/L — SIGNIFICANT CHANGE UP (ref 135–145)
WBC # BLD: 6.7 K/UL — SIGNIFICANT CHANGE UP (ref 3.8–10.5)
WBC # FLD AUTO: 6.7 K/UL — SIGNIFICANT CHANGE UP (ref 3.8–10.5)

## 2025-04-02 PROCEDURE — 99221 1ST HOSP IP/OBS SF/LOW 40: CPT

## 2025-04-02 PROCEDURE — 99232 SBSQ HOSP IP/OBS MODERATE 35: CPT | Mod: 25

## 2025-04-02 PROCEDURE — 93018 CV STRESS TEST I&R ONLY: CPT

## 2025-04-02 PROCEDURE — 76775 US EXAM ABDO BACK WALL LIM: CPT | Mod: 26

## 2025-04-02 PROCEDURE — 99232 SBSQ HOSP IP/OBS MODERATE 35: CPT

## 2025-04-02 PROCEDURE — 78452 HT MUSCLE IMAGE SPECT MULT: CPT | Mod: 26

## 2025-04-02 PROCEDURE — 99233 SBSQ HOSP IP/OBS HIGH 50: CPT

## 2025-04-02 PROCEDURE — 93016 CV STRESS TEST SUPVJ ONLY: CPT

## 2025-04-02 RX ADMIN — ATORVASTATIN CALCIUM 40 MILLIGRAM(S): 80 TABLET, FILM COATED ORAL at 22:19

## 2025-04-02 RX ADMIN — GABAPENTIN 100 MILLIGRAM(S): 400 CAPSULE ORAL at 14:31

## 2025-04-02 RX ADMIN — LAMIVUDINE 100 MILLIGRAM(S): 10 SOLUTION ORAL at 14:31

## 2025-04-02 RX ADMIN — Medication 2 DROP(S): at 22:18

## 2025-04-02 RX ADMIN — GABAPENTIN 100 MILLIGRAM(S): 400 CAPSULE ORAL at 05:10

## 2025-04-02 RX ADMIN — POLYMYXIN B SULFATE AND TRIMETHOPRIM SULFATE 1 DROP(S): 10000; 1 SOLUTION/ DROPS OPHTHALMIC at 14:31

## 2025-04-02 RX ADMIN — INSULIN GLARGINE-YFGN 10 UNIT(S): 100 INJECTION, SOLUTION SUBCUTANEOUS at 22:18

## 2025-04-02 RX ADMIN — GABAPENTIN 100 MILLIGRAM(S): 400 CAPSULE ORAL at 22:18

## 2025-04-02 RX ADMIN — Medication 1 APPLICATION(S): at 05:12

## 2025-04-02 RX ADMIN — INSULIN LISPRO 1: 100 INJECTION, SOLUTION INTRAVENOUS; SUBCUTANEOUS at 17:28

## 2025-04-02 RX ADMIN — Medication 650 MILLIGRAM(S): at 04:08

## 2025-04-02 RX ADMIN — AMLODIPINE BESYLATE 5 MILLIGRAM(S): 10 TABLET ORAL at 05:10

## 2025-04-02 RX ADMIN — INSULIN LISPRO 2: 100 INJECTION, SOLUTION INTRAVENOUS; SUBCUTANEOUS at 14:31

## 2025-04-02 RX ADMIN — Medication 2 DROP(S): at 14:31

## 2025-04-02 RX ADMIN — DOLUTEGRAVIR SODIUM 50 MILLIGRAM(S): 5 TABLET, FOR SUSPENSION ORAL at 14:31

## 2025-04-02 NOTE — CONSULT NOTE ADULT - ASSESSMENT
Patient is a 64 year-old male admitted for syncope. IR consulted for renal biopsy Keep patient NPO will add on for tomorrow schedule.     Please call extension [phone] with any questions, concerns or issues regarding above.  Patient is a 64 year-old male admitted for syncope. IR consulted for renal biopsy. Chart reviewed, patient noted to be on aspirin and Jardiance at home. biopsy can be performed once patient has been off aspirin for at least 5 day and Jardiance for at least 4 days. will need to confirm Last dose prior to scheduling biopsy.    Please call extension [phone] with any questions, concerns or issues regarding above.

## 2025-04-02 NOTE — PROGRESS NOTE ADULT - SUBJECTIVE AND OBJECTIVE BOX
[facility] Division of Hospital Medicine    SUBJECTIVE / OVERNIGHT EVENTS:    Pt seen and examined at bedside. Pt has no complaints. States feels well. Denies CP, SOB, N/V/D, abd pain, fever, chills. Rest of ROS (-).     MEDICATIONS  (STANDING):  amLODIPine   Tablet 5 milliGRAM(s) Oral daily  artificial tears (preservative free) Ophthalmic Solution 2 Drop(s) Both EYES every 8 hours  atorvastatin 40 milliGRAM(s) Oral at bedtime  chlorhexidine 2% Cloths 1 Application(s) Topical <User Schedule>  dextrose 5%. 1000 milliLiter(s) (50 mL/Hr) IV Continuous <Continuous>  dextrose 5%. 1000 milliLiter(s) (100 mL/Hr) IV Continuous <Continuous>  dextrose 50% Injectable 25 Gram(s) IV Push once  dextrose 50% Injectable 12.5 Gram(s) IV Push once  dextrose 50% Injectable 25 Gram(s) IV Push once  dolutegravir 50 milliGRAM(s) Oral daily  gabapentin 100 milliGRAM(s) Oral three times a day  glucagon  Injectable 1 milliGRAM(s) IntraMuscular once  insulin glargine Injectable (LANTUS) 10 Unit(s) SubCutaneous at bedtime  insulin lispro (ADMELOG) corrective regimen sliding scale   SubCutaneous three times a day before meals  lamiVUDine- milliGRAM(s) Oral daily  trimethoprim/polymyxin Solution 1 Drop(s) Both EYES daily    MEDICATIONS  (PRN):  acetaminophen     Tablet .. 650 milliGRAM(s) Oral every 6 hours PRN Temp greater or equal to 38C (100.4F), Mild Pain (1 - 3)  aluminum hydroxide/magnesium hydroxide/simethicone Suspension 30 milliLiter(s) Oral every 4 hours PRN Dyspepsia  dextrose Oral Gel 15 Gram(s) Oral once PRN Blood Glucose LESS THAN 70 milliGRAM(s)/deciliter  melatonin 3 milliGRAM(s) Oral at bedtime PRN Insomnia  ondansetron Injectable 4 milliGRAM(s) IV Push every 8 hours PRN Nausea and/or Vomiting        I&O's Summary    01 Apr 2025 07:01  -  02 Apr 2025 07:00  --------------------------------------------------------  IN: 720 mL / OUT: 0 mL / NET: 720 mL    02 Apr 2025 07:01  -  02 Apr 2025 11:57  --------------------------------------------------------  IN: 0 mL / OUT: 0 mL / NET: 0 mL        PHYSICAL EXAM:  Vital Signs Last 24 Hrs  T(C): 36.6 (02 Apr 2025 04:14), Max: 36.6 (01 Apr 2025 20:26)  T(F): 97.9 (02 Apr 2025 04:14), Max: 97.9 (02 Apr 2025 04:14)  HR: 80 (02 Apr 2025 04:14) (80 - 90)  BP: 127/69 (02 Apr 2025 04:14) (127/69 - 151/88)  BP(mean): --  RR: 18 (02 Apr 2025 04:14) (17 - 18)  SpO2: 95% (02 Apr 2025 04:14) (95% - 96%)    Parameters below as of 02 Apr 2025 04:14  Patient On (Oxygen Delivery Method): room air            General: Age-appearing, in no acute distress  Head: Normocephalic, atraumatic  ENMT: EOMI, no scleral icterus, neck supple, no JVD  Cardiovascular: +S1, S2; Regular rate and rhythm, no murmurs.  Respiratory: CTAB, no wheezing, no rales, no rhonchi  Gastrointestinal: soft, ND, NT, (+) BS, (-) rebound  Neuro: AAOx3, CN2-12 intact, no FND  Musculoskeletal: Normal tone, no deformities  Skin: Warm, dry, no rash, no jaundice  Psych: Calm, cooperative       LABS:                        9.7    6.70  )-----------( 299      ( 02 Apr 2025 05:16 )             29.7     04-02    141  |  108  |  36.3[H]  ----------------------------<  105[H]  3.8   |  20.0[L]  |  4.86[H]    Ca    8.3[L]      02 Apr 2025 05:16            Urinalysis Basic - ( 02 Apr 2025 05:16 )    Color: x / Appearance: x / SG: x / pH: x  Gluc: 105 mg/dL / Ketone: x  / Bili: x / Urobili: x   Blood: x / Protein: x / Nitrite: x   Leuk Esterase: x / RBC: x / WBC x   Sq Epi: x / Non Sq Epi: x / Bacteria: x        CAPILLARY BLOOD GLUCOSE      POCT Blood Glucose.: 170 mg/dL (01 Apr 2025 21:22)  POCT Blood Glucose.: 172 mg/dL (01 Apr 2025 17:24)        RADIOLOGY & ADDITIONAL TESTS:  Results Reviewed:   Imaging Personally Reviewed:  Electrocardiogram Personally Reviewed:

## 2025-04-02 NOTE — PROGRESS NOTE ADULT - NS ATTEND AMEND GEN_ALL_CORE FT
seen with above,    64M history significant for HIV, DM2, CKD 4 and cataracts surgery in 1/2025 with worsening vision was in the ER on 3/29 with eye pain pending repeat surgery at [facility], was discharged home but found with mechanical fall questionable syncope admitted 3/30/25 with worsening Cr. 3.3--> 4.8 compared to 2/2025, lab work with mildly positive Trop 60s, Echo done with preserved LV EF and no event on telemetry     -was again planned for nuclear stress today but patient again refusing test today   -CKD 4-5 unable to obtain cardiac CTA or cath, if no significant ischemia on NST then medically manage  -collaterals information from [facility] he is pending complex retinal surgery under general anesthesia at [facility] upcoming, discussed importance to complete nuclear stress testing for risk stratification   - nephrology following given worsening CKD since 2/2025      Hugo Martinez DO, [facility]  Faculty Non-Invasive Cardiologist  [phone].
seen with above,    64M history significant for HIV, DM2, CKD 4 and cataracts surgery in 1/2025 with worsening vision was in the ER on 3/29 with eye pain pending repeat surgery at [facility], was discharged home but found with mechanical fall questionable syncope admitted 3/30/25 with worsening Cr. 3.3--> 4.8 compared to 2/2025, lab work with mildly positive Trop 60s, Echo done with preserved LV EF and no event on telemetry     -was planned for nuclear stress today but patient did not want to stay NPO and postponed the test to tomorrow  -CKD 4-5 unable to obtain cardiac CTA or cath, if no significant ischemia on NST then medically manage  -needs collaterals information from [facility] regarding what other surgery he is pending  -consider nephrology eval. given worsening CKD since 2/2025      Hugo Martinez DO, [facility]  Faculty Non-Invasive Cardiologist  [phone]
seen with above,    64M history significant for HIV, DM2, CKD 4 and cataracts surgery in 1/2025 with worsening vision was in the ER on 3/29 with eye pain pending repeat surgery at [facility], was discharged home but found with mechanical fall questionable syncope admitted 3/30/25 with worsening Cr. 3.3--> 4.8 compared to 2/2025, lab work with mildly positive Trop 60s, Echo done with preserved LV EF and no event on telemetry     -underwent pharm NST no perfusion defect and CT-calcium minimal coronary calcification  -CKD 4-5 seen by IR for possible renal biopsy, nephrology following  -collaterals information from [facility] he is pending complex retinal surgery under general anesthesia at [facility] upcoming, at acceptable cardiac risk, no cardiac contraindication for the surgery   -reconsult if new cardiac issue arise       Hugo Martinez DO, [facility]  Faculty Non-Invasive Cardiologist  [phone].

## 2025-04-02 NOTE — DISCHARGE NOTE NURSING/CASE MANAGEMENT/SOCIAL WORK - FINANCIAL ASSISTANCE
[facility] provides services at a reduced cost to those who are determined to be eligible through [facility]’s financial assistance program. Information regarding [facility]’s financial assistance program can be found by going to [email or web address] or by calling [phone].

## 2025-04-02 NOTE — CONSULT NOTE ADULT - SUBJECTIVE AND OBJECTIVE BOX
HPI:    63 year-old male with a history of CKD, IDDM, HIV on HAART, HFrEF, recent bilateral cataract surgery presented to [facility] ED after a fall. in the ED patient found to have BRIDGETTE. Nephrology following and requesting renal biopsy.     IR consulted for nontargeted renal biopsy.     ============================================================================  Medications:  Home Medications:  acetaminophen 325 mg oral tablet: 2 tab(s) orally every 8 hours as needed for Temp greater or equal to 38C (100.4F), Mild Pain (1 - 3) (13 Aug 2024 12:54)  aspirin 81 mg oral delayed release tablet: 1 tab(s) orally once a day (07 Aug 2024 18:37)  Biktarvy 50 mg-200 mg-25 mg oral tablet: 1 tab(s) orally once a day (07 Aug 2024 18:37)  Lantus Solostar Pen 100 units/mL subcutaneous solution: 10 unit(s) subcutaneous once a day (at bedtime) (07 Aug 2024 18:37)  metFORMIN 500 mg oral tablet: 1 tab(s) orally 2 times a day (07 Aug 2024 18:37)  Vitamin D2 1.25 mg (50,000 intl units) oral capsule: 1 cap(s) orally once a week on Sundays (07 Aug 2024 18:37)    MEDICATIONS  (STANDING):  amLODIPine   Tablet 5 milliGRAM(s) Oral daily  artificial tears (preservative free) Ophthalmic Solution 2 Drop(s) Both EYES every 8 hours  atorvastatin 40 milliGRAM(s) Oral at bedtime  chlorhexidine 2% Cloths 1 Application(s) Topical <User Schedule>  dextrose 5%. 1000 milliLiter(s) (100 mL/Hr) IV Continuous <Continuous>  dextrose 5%. 1000 milliLiter(s) (50 mL/Hr) IV Continuous <Continuous>  dextrose 50% Injectable 25 Gram(s) IV Push once  dextrose 50% Injectable 12.5 Gram(s) IV Push once  dextrose 50% Injectable 25 Gram(s) IV Push once  dolutegravir 50 milliGRAM(s) Oral daily  gabapentin 100 milliGRAM(s) Oral three times a day  glucagon  Injectable 1 milliGRAM(s) IntraMuscular once  insulin glargine Injectable (LANTUS) 10 Unit(s) SubCutaneous at bedtime  insulin lispro (ADMELOG) corrective regimen sliding scale   SubCutaneous three times a day before meals  lamiVUDine- milliGRAM(s) Oral daily  trimethoprim/polymyxin Solution 1 Drop(s) Both EYES daily    MEDICATIONS  (PRN):  acetaminophen     Tablet .. 650 milliGRAM(s) Oral every 6 hours PRN Temp greater or equal to 38C (100.4F), Mild Pain (1 - 3)  aluminum hydroxide/magnesium hydroxide/simethicone Suspension 30 milliLiter(s) Oral every 4 hours PRN Dyspepsia  dextrose Oral Gel 15 Gram(s) Oral once PRN Blood Glucose LESS THAN 70 milliGRAM(s)/deciliter  melatonin 3 milliGRAM(s) Oral at bedtime PRN Insomnia  ondansetron Injectable 4 milliGRAM(s) IV Push every 8 hours PRN Nausea and/or Vomiting      Allergies:   No Known Allergies    ============================================================================  PAST MEDICAL & SURGICAL HISTORY:  Hypertension      Diabetes      Head trauma      Insomnia      Anxiety      Chronic back pain      DM (diabetes mellitus)      HIV infection      Bilateral cataracts      S/P lumbar fusion  in IN          FAMILY HISTORY:  Family history of coronary artery disease in mother (Mother)    Family history of diabetes mellitus (DM) (Mother)    FH: alcoholism (Father)        Social History:      ============================================================================  Vitals:  Vital Signs Last 24 Hrs  T(C): 36.9 (02 Apr 2025 11:00), Max: 36.9 (02 Apr 2025 11:00)  T(F): 98.4 (02 Apr 2025 11:00), Max: 98.4 (02 Apr 2025 11:00)  HR: 82 (02 Apr 2025 11:00) (80 - 90)  BP: 122/82 (02 Apr 2025 11:00) (122/82 - 151/88)  BP(mean): 100 (02 Apr 2025 11:00) (100 - 100)  RR: 18 (02 Apr 2025 11:00) (17 - 18)  SpO2: 98% (02 Apr 2025 11:00) (95% - 98%)    Parameters below as of 02 Apr 2025 11:00  Patient On (Oxygen Delivery Method): room air    Labs:                        9.7    6.70  )-----------( 299      ( 02 Apr 2025 05:16 )             29.7     04-02    141  |  108  |  36.3[H]  ----------------------------<  105[H]  3.8   |  20.0[L]  |  4.86[H]    Ca    8.3[L]      02 Apr 2025 05:16          Imaging:   Pertinent Imaging Reviewed.    ============================================================================

## 2025-04-02 NOTE — PROGRESS NOTE ADULT - SUBJECTIVE AND OBJECTIVE BOX
[facility] DIVISION OF KIDNEY DISEASES AND HYPERTENSION -- FOLLOW UP NOTE  --------------------------------------------------------------------------------  Chief Complaint: Geoff on CKD    24 hour events/subjective:  no acute event noted   NST today      PAST HISTORY  --------------------------------------------------------------------------------  No significant changes to PMH, PSH, FHx, SHx, unless otherwise noted    ALLERGIES & MEDICATIONS  --------------------------------------------------------------------------------  Allergies  No Known Allergies      Standing Inpatient Medications  amLODIPine   Tablet 5 milliGRAM(s) Oral daily  artificial tears (preservative free) Ophthalmic Solution 2 Drop(s) Both EYES every 8 hours  atorvastatin 40 milliGRAM(s) Oral at bedtime  chlorhexidine 2% Cloths 1 Application(s) Topical <User Schedule>  dextrose 5%. 1000 milliLiter(s) IV Continuous <Continuous>  dextrose 5%. 1000 milliLiter(s) IV Continuous <Continuous>  dextrose 50% Injectable 25 Gram(s) IV Push once  dextrose 50% Injectable 12.5 Gram(s) IV Push once  dextrose 50% Injectable 25 Gram(s) IV Push once  dolutegravir 50 milliGRAM(s) Oral daily  gabapentin 100 milliGRAM(s) Oral three times a day  glucagon  Injectable 1 milliGRAM(s) IntraMuscular once  insulin glargine Injectable (LANTUS) 10 Unit(s) SubCutaneous at bedtime  insulin lispro (ADMELOG) corrective regimen sliding scale   SubCutaneous three times a day before meals  lamiVUDine- milliGRAM(s) Oral daily  trimethoprim/polymyxin Solution 1 Drop(s) Both EYES daily    PRN Inpatient Medications  acetaminophen     Tablet .. 650 milliGRAM(s) Oral every 6 hours PRN  aluminum hydroxide/magnesium hydroxide/simethicone Suspension 30 milliLiter(s) Oral every 4 hours PRN  dextrose Oral Gel 15 Gram(s) Oral once PRN  melatonin 3 milliGRAM(s) Oral at bedtime PRN  ondansetron Injectable 4 milliGRAM(s) IV Push every 8 hours PRN      REVIEW OF SYSTEMS  --------------------------------------------------------------------------------  Gen: No weight changes, fatigue, fevers/chills, weakness  Skin: No rashes  Head/Eyes/Ears/Mouth: No headache; Normal hearing; Normal vision w/o blurriness; No sinus pain/discomfort, sore throat  Respiratory: No dyspnea, cough, wheezing, hemoptysis  CV: No chest pain, PND, orthopnea  GI: No abdominal pain, diarrhea, constipation, nausea, vomiting, melena, hematochezia  : No increased frequency, dysuria, hematuria, nocturia  MSK: No joint pain/swelling; no back pain; no edema  Neuro: No dizziness/lightheadedness, weakness, seizures, numbness, tingling  Heme: No easy bruising or bleeding  Endo: No heat/cold intolerance  Psych: No significant nervousness, anxiety, stress, depression    All other systems were reviewed and are negative, except as noted.    VITALS/PHYSICAL EXAM  --------------------------------------------------------------------------------  T(C): 36.9 (04-02-25 @ 11:00), Max: 36.9 (04-02-25 @ 11:00)  HR: 82 (04-02-25 @ 11:00) (80 - 90)  BP: 122/82 (04-02-25 @ 11:00) (122/82 - 151/88)  RR: 18 (04-02-25 @ 11:00) (17 - 18)  SpO2: 98% (04-02-25 @ 11:00) (95% - 98%)  Wt(kg): --        04-01-25 @ 07:01  -  04-02-25 @ 07:00  --------------------------------------------------------  IN: 720 mL / OUT: 0 mL / NET: 720 mL    04-02-25 @ 07:01  -  04-02-25 @ 14:30  --------------------------------------------------------  IN: 300 mL / OUT: 0 mL / NET: 300 mL      Physical Exam:  	Gen: NAD, well-appearing  	HEENT: PERRL, supple neck, clear oropharynx  	Pulm: CTA B/L  	CV: RRR, S1S2; no rub  	Back: No spinal or CVA tenderness; no sacral edema  	Abd: +BS, soft, nontender/nondistended  	: No suprapubic tenderness  	UE: Warm,  no edema  	LE: Warm, no edema  	Neuro: No focal deficit  	Psych: Normal affect and mood  	Skin: Warm,  LABS/STUDIES  --------------------------------------------------------------------------------              9.7    6.70  >-----------<  299      [04-02-25 @ 05:16]              29.7     141  |  108  |  36.3  ----------------------------<  105      [04-02-25 @ 05:16]  3.8   |  20.0  |  4.86        Ca     8.3     [04-02-25 @ 05:16]            Creatinine Trend:  SCr 4.86 [04-02 @ 05:16]  SCr 4.83 [03-31 @ 04:28]  SCr 4.78 [03-30 @ 09:25]  SCr 3.30 [03-24 @ 03:58]    Urinalysis - [04-02-25 @ 05:16]      Color  / Appearance  / SG  / pH       Gluc 105 / Ketone   / Bili  / Urobili        Blood  / Protein  / Leuk Est  / Nitrite       RBC  / WBC  / Hyaline  / Gran  / Sq Epi  / Non Sq Epi  / Bacteria       Lipid: chol 100, , HDL 23, LDL --      [08-08-24 @ 06:35]

## 2025-04-02 NOTE — DISCHARGE NOTE NURSING/CASE MANAGEMENT/SOCIAL WORK - NSDCPEFALRISK_GEN_ALL_CORE
For information on Fall & Injury Prevention, visit: [email or web address] OR  [email or web address] OR  https://www.cdc.gov/steadi/patient.html

## 2025-04-02 NOTE — PROGRESS NOTE ADULT - SUBJECTIVE AND OBJECTIVE BOX
[facility]                                                         CARDIOLOGY AT [place] [address]                                                         Telephone: [phone]. Fax:[phone]                                                                             PROGRESS NOTE    Reason for follow up: Elevated trop  Update: Pt seen and examined at bedside. No acute events overnight. NST today. Denies any complaints at this time.       Review of symptoms:   Cardiac:  No chest pain. No dyspnea. No palpitations.  Respiratory: no cough. No dyspnea  Gastrointestinal: No diarrhea. No abdominal pain. No bleeding.   Neuro: No focal neuro complaints.    Vitals:  T(C): 36.6 (04-02-25 @ 04:14), Max: 36.6 (04-01-25 @ 20:26)  HR: 80 (04-02-25 @ 04:14) (80 - 90)  BP: 127/69 (04-02-25 @ 04:14) (127/69 - 151/88)  RR: 18 (04-02-25 @ 04:14) (17 - 18)  SpO2: 95% (04-02-25 @ 04:14) (95% - 96%)  Wt(kg): --  I&O's Summary    01 Apr 2025 07:01  -  02 Apr 2025 07:00  --------------------------------------------------------  IN: 720 mL / OUT: 0 mL / NET: 720 mL    02 Apr 2025 07:01  -  02 Apr 2025 10:18  --------------------------------------------------------  IN: 0 mL / OUT: 0 mL / NET: 0 mL      Weight (kg): 81.647 (03-30 @ 08:46), 74.8 (03-29 @ 18:16)    PHYSICAL EXAM:  Appearance: Comfortable. No acute distress  HEENT:  Atraumatic. Normocephalic.  Normal oral mucosa  Neurologic: A & O x 3, no gross focal deficits.  Cardiovascular: RRR S1 S2, No murmur, no rubs/gallops. No JVD  Respiratory: Lungs clear to auscultation, unlabored   Gastrointestinal:  Soft, Non-tender, + BS  Lower Extremities: 2+ Peripheral Pulses, No clubbing, cyanosis, or edema  Psychiatry: Patient is calm. No agitation.   Skin: warm and dry.    CURRENT CARDIAC MEDICATIONS:  amLODIPine   Tablet 5 milliGRAM(s) Oral daily      CURRENT OTHER MEDICATIONS:  dolutegravir 50 milliGRAM(s) Oral daily  lamiVUDine- milliGRAM(s) Oral daily  acetaminophen     Tablet .. 650 milliGRAM(s) Oral every 6 hours PRN Temp greater or equal to 38C (100.4F), Mild Pain (1 - 3)  gabapentin 100 milliGRAM(s) Oral three times a day  melatonin 3 milliGRAM(s) Oral at bedtime PRN Insomnia  ondansetron Injectable 4 milliGRAM(s) IV Push every 8 hours PRN Nausea and/or Vomiting  aluminum hydroxide/magnesium hydroxide/simethicone Suspension 30 milliLiter(s) Oral every 4 hours PRN Dyspepsia  atorvastatin 40 milliGRAM(s) Oral at bedtime  dextrose 50% Injectable 25 Gram(s) IV Push once, Stop order after: 1 Doses  dextrose 50% Injectable 12.5 Gram(s) IV Push once, Stop order after: 1 Doses  dextrose 50% Injectable 25 Gram(s) IV Push once, Stop order after: 1 Doses  dextrose Oral Gel 15 Gram(s) Oral once, Stop order after: 1 Doses PRN Blood Glucose LESS THAN 70 milliGRAM(s)/deciliter  glucagon  Injectable 1 milliGRAM(s) IntraMuscular once, Stop order after: 1 Doses  insulin glargine Injectable (LANTUS) 10 Unit(s) SubCutaneous at bedtime  insulin lispro (ADMELOG) corrective regimen sliding scale   SubCutaneous three times a day before meals  artificial tears (preservative free) Ophthalmic Solution 2 Drop(s) Both EYES every 8 hours  chlorhexidine 2% Cloths 1 Application(s) Topical <User Schedule>  dextrose 5%. 1000 milliLiter(s) (50 mL/Hr) IV Continuous <Continuous>  dextrose 5%. 1000 milliLiter(s) (100 mL/Hr) IV Continuous <Continuous>  trimethoprim/polymyxin Solution 1 Drop(s) Both EYES daily      LABS:	 	                            9.7    6.70  )-----------( 299      ( 02 Apr 2025 05:16 )             29.7     04-02    141  |  108  |  36.3[H]  ----------------------------<  105[H]  3.8   |  20.0[L]  |  4.86[H]    Ca    8.3[L]      02 Apr 2025 05:16      PT/INR/PTT ( 30 Mar 2025 09:25 )                       :                       :      11.7         :       33.9                  .        .                   .              .           .       1.04        .                                       Lipid Profile:   HgA1c:   TSH:     TELEMETRY: Pt refusing tele  ECG: < from: 12 Lead ECG (03.30.25 @ 09:53) >  Ventricular Rate 86 BPM    Atrial Rate 86 BPM    P-R Interval 130 ms    QRS Duration 72 ms    Q-T Interval 402 ms    QTC Calculation(Bazett) 481 ms    P Axis 34 degrees    R Axis 52 degrees    T Axis 90 degrees    Diagnosis Line Normal sinus rhythm  Minimal voltage criteria for LVH, may be normal variant ( Sokolow-Stein )  Nonspecific T wave abnormality  Statement not found (#1146)  Abnormal ECG      DIAGNOSTIC TESTING:  [ X] Echocardiogram: < from: TTE W or WO Ultrasound Enhancing Agent (03.30.25 @ 15:33) >  CONCLUSIONS:      1. Left ventricular systolic function is low normal with an ejection fraction visually estimated at 50 to 55 %.   2. Normal right ventricular cavity size and normal right ventricular systolic function.   3. No pericardial effusion seen.   4. No prior echocardiogram is available for comparison.    < end of copied text >    [ ]  Catheterization:  [ ] Stress Test:  < from: Nuclear Stress Test-Pharmacologic (10.10.23 @ 06:30) >  IMPRESSIONS:  * Myocardial Perfusion SPECT results are mildly abnormal.  * There is a small, mild defect in apical wall that is  fixed, defect persists on prone imaging suggestive of  infarct, no clear evidence of ischemia, however overall  study quality is poor hence may reduce overall diganostic  accuracy, consider alternative ischemic imaging modality  if high index of clinical suspicion for ischemic heart  disaese.  * Post-stress resting myocardial perfusiongated SPECT  imaging was performed (LVEF = 47 %;LVEDV = 112 ml.), mild  global hypokinesis.    < end of copied text >    OTHER: 	                                                                [facility]                                                         CARDIOLOGY AT [place] [address]                                                         Telephone: [phone]. Fax:[phone]                                                                             PROGRESS NOTE    Reason for follow up: Elevated trop  Update: Pt seen and examined at bedside. No acute events overnight. NST today. Denies any complaints at this time.       Review of symptoms:   Cardiac:  No chest pain. No dyspnea. No palpitations.  Respiratory: no cough. No dyspnea  Gastrointestinal: No diarrhea. No abdominal pain. No bleeding.   Neuro: No focal neuro complaints.    Vitals:  T(C): 36.6 (04-02-25 @ 04:14), Max: 36.6 (04-01-25 @ 20:26)  HR: 80 (04-02-25 @ 04:14) (80 - 90)  BP: 127/69 (04-02-25 @ 04:14) (127/69 - 151/88)  RR: 18 (04-02-25 @ 04:14) (17 - 18)  SpO2: 95% (04-02-25 @ 04:14) (95% - 96%)  Wt(kg): --  I&O's Summary    01 Apr 2025 07:01  -  02 Apr 2025 07:00  --------------------------------------------------------  IN: 720 mL / OUT: 0 mL / NET: 720 mL    02 Apr 2025 07:01  -  02 Apr 2025 10:18  --------------------------------------------------------  IN: 0 mL / OUT: 0 mL / NET: 0 mL      Weight (kg): 81.647 (03-30 @ 08:46), 74.8 (03-29 @ 18:16)    PHYSICAL EXAM:  Appearance: Comfortable. No acute distress  HEENT:  Atraumatic. Normocephalic.  Normal oral mucosa  Neurologic: A & O x 3, no gross focal deficits.  Cardiovascular: RRR S1 S2, No murmur, no rubs/gallops. No JVD  Respiratory: Lungs clear to auscultation, unlabored   Gastrointestinal:  Soft, Non-tender, + BS  Lower Extremities: 2+ Peripheral Pulses, No clubbing, cyanosis, or edema  Psychiatry: Patient is calm. No agitation.   Skin: warm and dry.    CURRENT CARDIAC MEDICATIONS:  amLODIPine   Tablet 5 milliGRAM(s) Oral daily      CURRENT OTHER MEDICATIONS:  dolutegravir 50 milliGRAM(s) Oral daily  lamiVUDine- milliGRAM(s) Oral daily  acetaminophen     Tablet .. 650 milliGRAM(s) Oral every 6 hours PRN Temp greater or equal to 38C (100.4F), Mild Pain (1 - 3)  gabapentin 100 milliGRAM(s) Oral three times a day  melatonin 3 milliGRAM(s) Oral at bedtime PRN Insomnia  ondansetron Injectable 4 milliGRAM(s) IV Push every 8 hours PRN Nausea and/or Vomiting  aluminum hydroxide/magnesium hydroxide/simethicone Suspension 30 milliLiter(s) Oral every 4 hours PRN Dyspepsia  atorvastatin 40 milliGRAM(s) Oral at bedtime  dextrose 50% Injectable 25 Gram(s) IV Push once, Stop order after: 1 Doses  dextrose 50% Injectable 12.5 Gram(s) IV Push once, Stop order after: 1 Doses  dextrose 50% Injectable 25 Gram(s) IV Push once, Stop order after: 1 Doses  dextrose Oral Gel 15 Gram(s) Oral once, Stop order after: 1 Doses PRN Blood Glucose LESS THAN 70 milliGRAM(s)/deciliter  glucagon  Injectable 1 milliGRAM(s) IntraMuscular once, Stop order after: 1 Doses  insulin glargine Injectable (LANTUS) 10 Unit(s) SubCutaneous at bedtime  insulin lispro (ADMELOG) corrective regimen sliding scale   SubCutaneous three times a day before meals  artificial tears (preservative free) Ophthalmic Solution 2 Drop(s) Both EYES every 8 hours  chlorhexidine 2% Cloths 1 Application(s) Topical <User Schedule>  dextrose 5%. 1000 milliLiter(s) (50 mL/Hr) IV Continuous <Continuous>  dextrose 5%. 1000 milliLiter(s) (100 mL/Hr) IV Continuous <Continuous>  trimethoprim/polymyxin Solution 1 Drop(s) Both EYES daily      LABS:	 	                            9.7    6.70  )-----------( 299      ( 02 Apr 2025 05:16 )             29.7     04-02    141  |  108  |  36.3[H]  ----------------------------<  105[H]  3.8   |  20.0[L]  |  4.86[H]    Ca    8.3[L]      02 Apr 2025 05:16      PT/INR/PTT ( 30 Mar 2025 09:25 )                       :                       :      11.7         :       33.9                  .        .                   .              .           .       1.04        .                                       Lipid Profile:   HgA1c: 8.2%  TSH:     TELEMETRY: Pt refusing tele  ECG: < from: 12 Lead ECG (03.30.25 @ 09:53) >  Ventricular Rate 86 BPM    Atrial Rate 86 BPM    P-R Interval 130 ms    QRS Duration 72 ms    Q-T Interval 402 ms    QTC Calculation(Bazett) 481 ms    P Axis 34 degrees    R Axis 52 degrees    T Axis 90 degrees    Diagnosis Line Normal sinus rhythm  Minimal voltage criteria for LVH, may be normal variant ( Sokolow-Stein )  Nonspecific T wave abnormality  Statement not found (#1146)  Abnormal ECG      DIAGNOSTIC TESTING:  [ X] Echocardiogram: < from: TTE W or WO Ultrasound Enhancing Agent (03.30.25 @ 15:33) >  CONCLUSIONS:      1. Left ventricular systolic function is low normal with an ejection fraction visually estimated at 50 to 55 %.   2. Normal right ventricular cavity size and normal right ventricular systolic function.   3. No pericardial effusion seen.   4. No prior echocardiogram is available for comparison.    < end of copied text >    [ ]  Catheterization:  [ ] Stress Test:  < from: Nuclear Stress Test-Pharmacologic (10.10.23 @ 06:30) >  IMPRESSIONS:  * Myocardial Perfusion SPECT results are mildly abnormal.  * There is a small, mild defect in apical wall that is  fixed, defect persists on prone imaging suggestive of  infarct, no clear evidence of ischemia, however overall  study quality is poor hence may reduce overall diganostic  accuracy, consider alternative ischemic imaging modality  if high index of clinical suspicion for ischemic heart  disaese.  * Post-stress resting myocardial perfusiongated SPECT  imaging was performed (LVEF = 47 %;LVEDV = 112 ml.), mild  global hypokinesis.    < end of copied text >    OTHER: 	                                                                [facility]                                                         CARDIOLOGY AT [place] [address]                                                         Telephone: [phone]. Fax:[phone]                                                                             PROGRESS NOTE    Reason for follow up: Elevated trop  Update: Pt seen and examined at bedside. No acute events overnight. NST today. Denies any complaints at this time.       Review of symptoms:   Cardiac:  No chest pain. No dyspnea. No palpitations.  Respiratory: no cough. No dyspnea  Gastrointestinal: No diarrhea. No abdominal pain. No bleeding.   Neuro: No focal neuro complaints.    Vitals:  T(C): 36.6 (04-02-25 @ 04:14), Max: 36.6 (04-01-25 @ 20:26)  HR: 80 (04-02-25 @ 04:14) (80 - 90)  BP: 127/69 (04-02-25 @ 04:14) (127/69 - 151/88)  RR: 18 (04-02-25 @ 04:14) (17 - 18)  SpO2: 95% (04-02-25 @ 04:14) (95% - 96%)  Wt(kg): --  I&O's Summary    01 Apr 2025 07:01  -  02 Apr 2025 07:00  --------------------------------------------------------  IN: 720 mL / OUT: 0 mL / NET: 720 mL    02 Apr 2025 07:01  -  02 Apr 2025 10:18  --------------------------------------------------------  IN: 0 mL / OUT: 0 mL / NET: 0 mL      Weight (kg): 81.647 (03-30 @ 08:46), 74.8 (03-29 @ 18:16)    PHYSICAL EXAM:  Appearance: Comfortable. No acute distress  HEENT:  Atraumatic. Normocephalic.  Normal oral mucosa  Neurologic: A & O x 3, no gross focal deficits.  Cardiovascular: RRR S1 S2, No murmur, no rubs/gallops. No JVD  Respiratory: Lungs clear to auscultation, unlabored   Gastrointestinal:  Soft, Non-tender, + BS  Lower Extremities: 2+ Peripheral Pulses, No clubbing, cyanosis, or edema  Psychiatry: Patient is calm. No agitation.   Skin: warm and dry.    CURRENT CARDIAC MEDICATIONS:  amLODIPine   Tablet 5 milliGRAM(s) Oral daily      CURRENT OTHER MEDICATIONS:  dolutegravir 50 milliGRAM(s) Oral daily  lamiVUDine- milliGRAM(s) Oral daily  acetaminophen     Tablet .. 650 milliGRAM(s) Oral every 6 hours PRN Temp greater or equal to 38C (100.4F), Mild Pain (1 - 3)  gabapentin 100 milliGRAM(s) Oral three times a day  melatonin 3 milliGRAM(s) Oral at bedtime PRN Insomnia  ondansetron Injectable 4 milliGRAM(s) IV Push every 8 hours PRN Nausea and/or Vomiting  aluminum hydroxide/magnesium hydroxide/simethicone Suspension 30 milliLiter(s) Oral every 4 hours PRN Dyspepsia  atorvastatin 40 milliGRAM(s) Oral at bedtime  dextrose 50% Injectable 25 Gram(s) IV Push once, Stop order after: 1 Doses  dextrose 50% Injectable 12.5 Gram(s) IV Push once, Stop order after: 1 Doses  dextrose 50% Injectable 25 Gram(s) IV Push once, Stop order after: 1 Doses  dextrose Oral Gel 15 Gram(s) Oral once, Stop order after: 1 Doses PRN Blood Glucose LESS THAN 70 milliGRAM(s)/deciliter  glucagon  Injectable 1 milliGRAM(s) IntraMuscular once, Stop order after: 1 Doses  insulin glargine Injectable (LANTUS) 10 Unit(s) SubCutaneous at bedtime  insulin lispro (ADMELOG) corrective regimen sliding scale   SubCutaneous three times a day before meals  artificial tears (preservative free) Ophthalmic Solution 2 Drop(s) Both EYES every 8 hours  chlorhexidine 2% Cloths 1 Application(s) Topical <User Schedule>  dextrose 5%. 1000 milliLiter(s) (50 mL/Hr) IV Continuous <Continuous>  dextrose 5%. 1000 milliLiter(s) (100 mL/Hr) IV Continuous <Continuous>  trimethoprim/polymyxin Solution 1 Drop(s) Both EYES daily      LABS:	 	                            9.7    6.70  )-----------( 299      ( 02 Apr 2025 05:16 )             29.7     04-02    141  |  108  |  36.3[H]  ----------------------------<  105[H]  3.8   |  20.0[L]  |  4.86[H]    Ca    8.3[L]      02 Apr 2025 05:16      PT/INR/PTT ( 30 Mar 2025 09:25 )                       :                       :      11.7         :       33.9                  .        .                   .              .           .       1.04        .                                       Lipid Profile:   HgA1c: 8.2%  TSH:     TELEMETRY: Pt refusing tele  ECG: < from: 12 Lead ECG (03.30.25 @ 09:53) >  Ventricular Rate 86 BPM    Atrial Rate 86 BPM    P-R Interval 130 ms    QRS Duration 72 ms    Q-T Interval 402 ms    QTC Calculation(Bazett) 481 ms    P Axis 34 degrees    R Axis 52 degrees    T Axis 90 degrees    Diagnosis Line Normal sinus rhythm  Minimal voltage criteria for LVH, may be normal variant ( Sokolow-Stein )  Nonspecific T wave abnormality  Statement not found (#1146)  Abnormal ECG      DIAGNOSTIC TESTING:  [ X] Echocardiogram: < from: TTE W or WO Ultrasound Enhancing Agent (03.30.25 @ 15:33) >  CONCLUSIONS:      1. Left ventricular systolic function is low normal with an ejection fraction visually estimated at 50 to 55 %.   2. Normal right ventricular cavity size and normal right ventricular systolic function.   3. No pericardial effusion seen.   4. No prior echocardiogram is available for comparison.    < end of copied text >    [ ]  Catheterization:  [X ] Stress Test:  < from: Nuclear Stress Test-Pharmacologic (10.10.23 @ 06:30) >  IMPRESSIONS:  * Myocardial Perfusion SPECT results are mildly abnormal.  * There is a small, mild defect in apical wall that is  fixed, defect persists on prone imaging suggestive of  infarct, no clear evidence of ischemia, however overall  study quality is poor hence may reduce overall diganostic  accuracy, consider alternative ischemic imaging modality  if high index of clinical suspicion for ischemic heart  disaese.  * Post-stress resting myocardial perfusiongated SPECT  imaging was performed (LVEF = 47 %;LVEDV = 112 ml.), mild  global hypokinesis.    < end of copied text >    OTHER:

## 2025-04-02 NOTE — PROGRESS NOTE ADULT - ASSESSMENT
63 YOM DM, HIV on HAART admitted after fall.  Nephrology consulted for progressive worsening CKD.    Acute kidney injury on CKD stage III versus progressive CKD stage V;  -Serum creatinine has slowly progressed now to 4.8  -UA with proteinuria more than 1000 g on last check in May 24  -Repeat UA and UPCR pending collection- ordered  -Ddx-? HIVICK, Nephrotoxicity from tenofovir alafenamide vs HIVAN  - renal ultrasound reviewed; renal medical disease  - obtain serological work up  -No emergent need for dialysis, will need kidney biopsy.     Proteinuria: As above will quantify UPCR  Hypokalemia: Replete K+ as needed  Anemia: CKD versus RITESH.  Check iron anemia panel- LDH, haptoglobin  HTN: Controlled- continue current regimen  [facility]s now being changed as per ID  DM: discontinued metformin (low GFR), place on Lantus + SSI ACHS    cyndee Wang

## 2025-04-02 NOTE — PROGRESS NOTE ADULT - ASSESSMENT
63M hx of CKD, IDDM, HIV on HAART, HFrEF, recent bilateral cataract surgery at [facility] presenting after fall from vehicle. Patient without evidence o acute traumatic injury to head or c-spine. Patient found to have elevated troponin, chest pain seems to be more so from fall rather than cardiac in nature.     #fall  -ct head and cspine without evidence of traumatic injury  -tylenol prn for pain control  -PT    #elevated troponin  -cardiology on board, recs noted  -holding heparin and asa iso head trauma  -TTE Reviewed  -ct coronary today  -NST Tomorrow    #jazmyn on ckd3b  #CKD  -Renal on board  -hold metformin  -renally dose meds  -avoid nephrotoxins    #Hypokalemia  -replenish gently, monitor for cr    #hiv  -holding bikrtarvy due to poor renal fxn at this time, can resume if jazmyn improves  -ID Consulted  -Started on Dolutegrair and epivir  -check cd4 and VL    #hfref  -tte  -c/w metoprolol  -no ace or arb due to jazmyn  -not on lasix at home (euvolemic)    #recent bilateral cataract surgery  -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 Patient without evidence o acute traumatic injury to head or c-spine. Patient found to have elevated troponin, chest pain seems to be more so from fall rather than cardiac in nature.     #Fall  -ct head and cspine without evidence of traumatic injury  -tylenol prn for pain control  -PT recommending outpatient PT    #Elevated troponin  -Cardiology on board, recs noted  -Holding heparin and asa iso head trauma  -TTE Reviewed  -CT coronary done 4/1  -NST today, w/o ischemia/infarct.     #BRIDGETTE on CKD3B  #CKD3B  -Renal on board, recommending urine studies, renal U/S, renal bx - discussed w/ Dr. Guy.   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

## 2025-04-02 NOTE — PROGRESS NOTE ADULT - PROBLEM SELECTOR PLAN 1
.  - Trop 62 --> 59 --> 61  - EKG NSR, ST & T wave abnormality  - NST planned for today  - C/w Amlodipine 5mg may increase to 10 mg if no improvement in BP. Or consider BB if NST is ischemic. Avoid ACE /ARB due to renal failure   - C/w Atorvastatin 40mg   - Monitor on Tele for acute arrhythmia monitoring, however, pt refusing   - Management of renal failure as per nephrology, Cr worsening 4.86    Will discuss with Dr. Martinez. Assessment and recommendations are final when note is signed by the attending. .  - Trop 62 --> 59 --> 61  - EKG NSR, ST & T wave abnormality  - NST today with no ischemia or infarct  - C/w Amlodipine 5mg may increase to 10 mg if no improvement in BP  - Avoid ACE /ARB due to renal failure   - C/w Atorvastatin 40mg   - Management of renal failure as per nephrology, Cr worsening 4.86    Will discuss with Dr. Martinez. Assessment and recommendations are final when note is signed by the attending.

## 2025-04-02 NOTE — DISCHARGE NOTE NURSING/CASE MANAGEMENT/SOCIAL WORK - PATIENT PORTAL LINK FT
You can access the FollowMyHealth Patient Portal offered by [facility] by registering at the following website: [email or web address]. By joining [facility]’s FollowMyHealth portal, you will also be able to view your health information using other applications (apps) compatible with our system.

## 2025-04-02 NOTE — PROGRESS NOTE ADULT - ASSESSMENT
64 y/o male with PMH of Renal insuff (1.7), Dm (uncontrolled), COPD, HIV last cd4 >100, HF with mildly low ef, and HTN who presented to ER earlier  with eye pain  seen and discharged and he represented to er after taxi dropped him off at the wrong place,  he fell and questionable syncope and now complaining of pain all over and right ankle and knee pain. ekg Nsr non specific st changes  LVH.  He reports he has pain all over his chest and abdomen which is reproducible.  Had no exertional chest pain prior this this admission.  Echo EF 55% . Trop 62--59--61. Cr 4.83. NST planned for today.

## 2025-04-02 NOTE — DISCHARGE NOTE NURSING/CASE MANAGEMENT/SOCIAL WORK - NSDCVIVACCINE_GEN_ALL_CORE_FT
Tdap; 27-Aug-2018 13:17; Pamela Albarran (RN); [employer]; [id number]; IntraMuscular; Deltoid Left.; 0.5 milliLiter(s); VIS (VIS Published: 09-May-2013, VIS Presented: 27-Aug-2018);   Tdap; 09-Feb-2019 17:54; Abby Marion (RN); [employer]; [id number] (Exp. Date: 02-Nov-2020); IntraMuscular; Deltoid Left.; 0.5 milliLiter(s); VIS (VIS Published: 09-May-2013, VIS Presented: 09-Feb-2019);   Tdap; 11-Dec-2024 23:47; Janet Brizuela (RN); [employer]; [id number] (Exp. Date: 08-Dec-2026); IntraMuscular; Deltoid Left.; 0.5 milliLiter(s); VIS (VIS Published: 09-May-2013, VIS Presented: 11-Dec-2024);

## 2025-04-03 LAB
ANION GAP SERPL CALC-SCNC: 11 MMOL/L — SIGNIFICANT CHANGE UP (ref 5–17)
APPEARANCE UR: CLEAR — SIGNIFICANT CHANGE UP
BACTERIA # UR AUTO: NEGATIVE /HPF — SIGNIFICANT CHANGE UP
BILIRUB UR-MCNC: NEGATIVE — SIGNIFICANT CHANGE UP
BUN SERPL-MCNC: 34.5 MG/DL — HIGH (ref 8–20)
CALCIUM SERPL-MCNC: 8.2 MG/DL — LOW (ref 8.4–10.5)
CAST: 6 /LPF — HIGH (ref 0–4)
CHLORIDE SERPL-SCNC: 110 MMOL/L — HIGH (ref 96–108)
CO2 SERPL-SCNC: 20 MMOL/L — LOW (ref 22–29)
COLOR SPEC: YELLOW — SIGNIFICANT CHANGE UP
CREAT ?TM UR-MCNC: 120 MG/DL — SIGNIFICANT CHANGE UP
CREAT SERPL-MCNC: 5.56 MG/DL — HIGH (ref 0.5–1.3)
DIFF PNL FLD: NEGATIVE — SIGNIFICANT CHANGE UP
EGFR: 11 ML/MIN/1.73M2 — LOW
EGFR: 11 ML/MIN/1.73M2 — LOW
GLUCOSE BLDC GLUCOMTR-MCNC: 115 MG/DL — HIGH (ref 70–99)
GLUCOSE BLDC GLUCOMTR-MCNC: 142 MG/DL — HIGH (ref 70–99)
GLUCOSE BLDC GLUCOMTR-MCNC: 227 MG/DL — HIGH (ref 70–99)
GLUCOSE SERPL-MCNC: 136 MG/DL — HIGH (ref 70–99)
GLUCOSE UR QL: 250 MG/DL
HCT VFR BLD CALC: 29.7 % — LOW (ref 39–50)
HGB BLD-MCNC: 9.6 G/DL — LOW (ref 13–17)
KETONES UR-MCNC: NEGATIVE MG/DL — SIGNIFICANT CHANGE UP
LEUKOCYTE ESTERASE UR-ACNC: NEGATIVE — SIGNIFICANT CHANGE UP
MCHC RBC-ENTMCNC: 28.4 PG — SIGNIFICANT CHANGE UP (ref 27–34)
MCHC RBC-ENTMCNC: 32.3 G/DL — SIGNIFICANT CHANGE UP (ref 32–36)
MCV RBC AUTO: 87.9 FL — SIGNIFICANT CHANGE UP (ref 80–100)
NITRITE UR-MCNC: NEGATIVE — SIGNIFICANT CHANGE UP
NRBC # BLD AUTO: 0 K/UL — SIGNIFICANT CHANGE UP (ref 0–0)
NRBC # FLD: 0 K/UL — SIGNIFICANT CHANGE UP (ref 0–0)
NRBC BLD AUTO-RTO: 0 /100 WBCS — SIGNIFICANT CHANGE UP (ref 0–0)
OSMOLALITY UR: 388 MOSM/KG — SIGNIFICANT CHANGE UP (ref 300–1000)
PH UR: 7 — SIGNIFICANT CHANGE UP (ref 5–8)
PLATELET # BLD AUTO: 319 K/UL — SIGNIFICANT CHANGE UP (ref 150–400)
PMV BLD: 9.2 FL — SIGNIFICANT CHANGE UP (ref 7–13)
POTASSIUM SERPL-MCNC: 4 MMOL/L — SIGNIFICANT CHANGE UP (ref 3.5–5.3)
POTASSIUM SERPL-SCNC: 4 MMOL/L — SIGNIFICANT CHANGE UP (ref 3.5–5.3)
POTASSIUM UR-SCNC: 29 MMOL/L — SIGNIFICANT CHANGE UP
PROT ?TM UR-MCNC: >400 MG/DL — HIGH (ref 0–12)
PROT UR-MCNC: >=1000 MG/DL
PROT/CREAT UR-RTO: >3.3 RATIO — HIGH
RBC # BLD: 3.38 M/UL — LOW (ref 4.2–5.8)
RBC # FLD: 14.1 % — SIGNIFICANT CHANGE UP (ref 10.3–14.5)
RBC CASTS # UR COMP ASSIST: 4 /HPF — SIGNIFICANT CHANGE UP (ref 0–4)
SODIUM SERPL-SCNC: 141 MMOL/L — SIGNIFICANT CHANGE UP (ref 135–145)
SODIUM UR-SCNC: 76 MMOL/L — SIGNIFICANT CHANGE UP
SP GR SPEC: 1.02 — SIGNIFICANT CHANGE UP (ref 1–1.03)
SQUAMOUS # UR AUTO: 3 /HPF — SIGNIFICANT CHANGE UP (ref 0–5)
UROBILINOGEN FLD QL: 1 MG/DL — SIGNIFICANT CHANGE UP (ref 0.2–1)
WBC # BLD: 7.13 K/UL — SIGNIFICANT CHANGE UP (ref 3.8–10.5)
WBC # FLD AUTO: 7.13 K/UL — SIGNIFICANT CHANGE UP (ref 3.8–10.5)
WBC UR QL: 1 /HPF — SIGNIFICANT CHANGE UP (ref 0–5)

## 2025-04-03 PROCEDURE — 99231 SBSQ HOSP IP/OBS SF/LOW 25: CPT

## 2025-04-03 PROCEDURE — 99232 SBSQ HOSP IP/OBS MODERATE 35: CPT

## 2025-04-03 RX ORDER — AMLODIPINE BESYLATE 10 MG/1
5 TABLET ORAL ONCE
Refills: 0 | Status: COMPLETED | OUTPATIENT
Start: 2025-04-03 | End: 2025-04-03

## 2025-04-03 RX ORDER — AMLODIPINE BESYLATE 10 MG/1
10 TABLET ORAL DAILY
Refills: 0 | Status: DISCONTINUED | OUTPATIENT
Start: 2025-04-04 | End: 2025-05-13

## 2025-04-03 RX ORDER — DESMOPRESSIN ACETATE 4 UG/ML
16 INJECTION INTRAVENOUS ONCE
Refills: 0 | Status: DISCONTINUED | OUTPATIENT
Start: 2025-04-03 | End: 2025-04-03

## 2025-04-03 RX ADMIN — Medication 650 MILLIGRAM(S): at 11:50

## 2025-04-03 RX ADMIN — ATORVASTATIN CALCIUM 40 MILLIGRAM(S): 80 TABLET, FILM COATED ORAL at 21:45

## 2025-04-03 RX ADMIN — AMLODIPINE BESYLATE 5 MILLIGRAM(S): 10 TABLET ORAL at 05:34

## 2025-04-03 RX ADMIN — Medication 2 DROP(S): at 21:47

## 2025-04-03 RX ADMIN — Medication 2 DROP(S): at 14:13

## 2025-04-03 RX ADMIN — DOLUTEGRAVIR SODIUM 50 MILLIGRAM(S): 5 TABLET, FOR SUSPENSION ORAL at 10:47

## 2025-04-03 RX ADMIN — AMLODIPINE BESYLATE 5 MILLIGRAM(S): 10 TABLET ORAL at 15:44

## 2025-04-03 RX ADMIN — Medication 3 MILLIGRAM(S): at 00:34

## 2025-04-03 RX ADMIN — INSULIN GLARGINE-YFGN 10 UNIT(S): 100 INJECTION, SOLUTION SUBCUTANEOUS at 21:48

## 2025-04-03 RX ADMIN — GABAPENTIN 100 MILLIGRAM(S): 400 CAPSULE ORAL at 05:34

## 2025-04-03 RX ADMIN — Medication 2 DROP(S): at 05:35

## 2025-04-03 RX ADMIN — Medication 650 MILLIGRAM(S): at 00:34

## 2025-04-03 RX ADMIN — POLYMYXIN B SULFATE AND TRIMETHOPRIM SULFATE 1 DROP(S): 10000; 1 SOLUTION/ DROPS OPHTHALMIC at 10:47

## 2025-04-03 RX ADMIN — Medication 1 APPLICATION(S): at 05:35

## 2025-04-03 RX ADMIN — Medication 650 MILLIGRAM(S): at 10:51

## 2025-04-03 RX ADMIN — GABAPENTIN 100 MILLIGRAM(S): 400 CAPSULE ORAL at 14:13

## 2025-04-03 RX ADMIN — GABAPENTIN 100 MILLIGRAM(S): 400 CAPSULE ORAL at 21:45

## 2025-04-03 RX ADMIN — LAMIVUDINE 100 MILLIGRAM(S): 10 SOLUTION ORAL at 10:48

## 2025-04-03 NOTE — PROGRESS NOTE ADULT - ASSESSMENT
Patient is a 64 year-old male admitted for syncope. IR following for renal biopsy. Patient presented to IR dept for biopsy today, however BP elevated (180s). Procedure was cancelled. Will need better BP control prior to performing biopsy    Please call extension [phone] with any questions, concerns or issues regarding above.
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
## 2025-04-03 NOTE — PROGRESS NOTE ADULT - SUBJECTIVE AND OBJECTIVE BOX
Blanca Faith M.D.    Patient is a 64y old  Male who presents with a chief complaint of Syncope and collapse     (01 Apr 2025 13:50)      SUBJECTIVE / OVERNIGHT EVENTS: reports eye discomfort (chronic since admission)    Patient denies chest pain, SOB, abd pain, N/V, fever, chills, dysuria or any other complaints. All remainder ROS negative.     MEDICATIONS  (STANDING):  amLODIPine   Tablet 5 milliGRAM(s) Oral daily  artificial tears (preservative free) Ophthalmic Solution 2 Drop(s) Both EYES every 8 hours  atorvastatin 40 milliGRAM(s) Oral at bedtime  chlorhexidine 2% Cloths 1 Application(s) Topical <User Schedule>  desmopressin IVPB 16 MICROGram(s) IV Intermittent once  dextrose 5%. 1000 milliLiter(s) (50 mL/Hr) IV Continuous <Continuous>  dextrose 5%. 1000 milliLiter(s) (100 mL/Hr) IV Continuous <Continuous>  dextrose 50% Injectable 25 Gram(s) IV Push once  dextrose 50% Injectable 12.5 Gram(s) IV Push once  dextrose 50% Injectable 25 Gram(s) IV Push once  dolutegravir 50 milliGRAM(s) Oral daily  gabapentin 100 milliGRAM(s) Oral three times a day  glucagon  Injectable 1 milliGRAM(s) IntraMuscular once  insulin glargine Injectable (LANTUS) 10 Unit(s) SubCutaneous at bedtime  insulin lispro (ADMELOG) corrective regimen sliding scale   SubCutaneous three times a day before meals  lamiVUDine- milliGRAM(s) Oral daily  trimethoprim/polymyxin Solution 1 Drop(s) Both EYES daily    MEDICATIONS  (PRN):  acetaminophen     Tablet .. 650 milliGRAM(s) Oral every 6 hours PRN Temp greater or equal to 38C (100.4F), Mild Pain (1 - 3)  aluminum hydroxide/magnesium hydroxide/simethicone Suspension 30 milliLiter(s) Oral every 4 hours PRN Dyspepsia  dextrose Oral Gel 15 Gram(s) Oral once PRN Blood Glucose LESS THAN 70 milliGRAM(s)/deciliter  melatonin 3 milliGRAM(s) Oral at bedtime PRN Insomnia  ondansetron Injectable 4 milliGRAM(s) IV Push every 8 hours PRN Nausea and/or Vomiting      I&O's Summary    02 Apr 2025 07:01  -  03 Apr 2025 07:00  --------------------------------------------------------  IN: 700 mL / OUT: 0 mL / NET: 700 mL        PHYSICAL EXAM:  Vital Signs Last 24 Hrs  T(C): 36.9 (03 Apr 2025 08:18), Max: 36.9 (02 Apr 2025 21:00)  T(F): 98.4 (03 Apr 2025 08:18), Max: 98.5 (02 Apr 2025 21:00)  HR: 94 (03 Apr 2025 08:18) (85 - 94)  BP: 157/86 (03 Apr 2025 08:18) (146/81 - 166/84)  BP(mean): --  RR: 17 (03 Apr 2025 08:18) (15 - 17)  SpO2: 94% (03 Apr 2025 08:18) (94% - 98%)    Parameters below as of 03 Apr 2025 08:18  Patient On (Oxygen Delivery Method): room air    CONSTITUTIONAL: NAD, well-groomed  RESPIRATORY: Normal respiratory effort; lungs are clear to auscultation bilaterally  CARDIOVASCULAR: Regular rate and rhythm, no LE edema  ABDOMEN: Nontender to palpation, normoactive bowel sounds    LABS:                        9.6    7.13  )-----------( 319      ( 03 Apr 2025 05:35 )             29.7     04-03    141  |  110[H]  |  34.5[H]  ----------------------------<  136[H]  4.0   |  20.0[L]  |  5.56[H]    Ca    8.2[L]      03 Apr 2025 05:35            Urinalysis Basic - ( 03 Apr 2025 05:35 )    Color: x / Appearance: x / SG: x / pH: x  Gluc: 136 mg/dL / Ketone: x  / Bili: x / Urobili: x   Blood: x / Protein: x / Nitrite: x   Leuk Esterase: x / RBC: x / WBC x   Sq Epi: x / Non Sq Epi: x / Bacteria: x        CAPILLARY BLOOD GLUCOSE      POCT Blood Glucose.: 115 mg/dL (03 Apr 2025 12:08)  POCT Blood Glucose.: 142 mg/dL (03 Apr 2025 08:01)  POCT Blood Glucose.: 192 mg/dL (02 Apr 2025 22:17)  POCT Blood Glucose.: 160 mg/dL (02 Apr 2025 16:57)  POCT Blood Glucose.: 224 mg/dL (02 Apr 2025 14:28)      RADIOLOGY & ADDITIONAL TESTS:  Results Reviewed:   Imaging Personally Reviewed:  Electrocardiogram Personally Reviewed:

## 2025-04-03 NOTE — PROGRESS NOTE ADULT - SUBJECTIVE AND OBJECTIVE BOX
Interventional Radiology Follow-Up Note    63 year-old male with a history of CKD, IDDM, HIV on HAART, HFrEF, recent bilateral cataract surgery presented to [facility] ED after a fall. in the ED patient found to have BRIDGETTE. Nephrology following and requesting renal biopsy.     IR following for renal biopsy. Patient presented to IR dept for biopsy today, however BP elevated (180s). procedure cancelled. will need better BP control prior to performing biopsy    Medication:  MEDICATIONS  (STANDING):  amLODIPine   Tablet 5 milliGRAM(s) Oral once  artificial tears (preservative free) Ophthalmic Solution 2 Drop(s) Both EYES every 8 hours  atorvastatin 40 milliGRAM(s) Oral at bedtime  chlorhexidine 2% Cloths 1 Application(s) Topical <User Schedule>  dextrose 5%. 1000 milliLiter(s) (50 mL/Hr) IV Continuous <Continuous>  dextrose 5%. 1000 milliLiter(s) (100 mL/Hr) IV Continuous <Continuous>  dextrose 50% Injectable 25 Gram(s) IV Push once  dextrose 50% Injectable 12.5 Gram(s) IV Push once  dextrose 50% Injectable 25 Gram(s) IV Push once  dolutegravir 50 milliGRAM(s) Oral daily  gabapentin 100 milliGRAM(s) Oral three times a day  glucagon  Injectable 1 milliGRAM(s) IntraMuscular once  insulin glargine Injectable (LANTUS) 10 Unit(s) SubCutaneous at bedtime  insulin lispro (ADMELOG) corrective regimen sliding scale   SubCutaneous three times a day before meals  lamiVUDine- milliGRAM(s) Oral daily  trimethoprim/polymyxin Solution 1 Drop(s) Both EYES daily    MEDICATIONS  (PRN):  acetaminophen     Tablet .. 650 milliGRAM(s) Oral every 6 hours PRN Temp greater or equal to 38C (100.4F), Mild Pain (1 - 3)  aluminum hydroxide/magnesium hydroxide/simethicone Suspension 30 milliLiter(s) Oral every 4 hours PRN Dyspepsia  dextrose Oral Gel 15 Gram(s) Oral once PRN Blood Glucose LESS THAN 70 milliGRAM(s)/deciliter  melatonin 3 milliGRAM(s) Oral at bedtime PRN Insomnia  ondansetron Injectable 4 milliGRAM(s) IV Push every 8 hours PRN Nausea and/or Vomiting      Vitals:  ICU Vital Signs Last 24 Hrs  T(C): 36.9 (03 Apr 2025 08:18), Max: 36.9 (02 Apr 2025 21:00)  T(F): 98.4 (03 Apr 2025 08:18), Max: 98.5 (02 Apr 2025 21:00)  HR: 94 (03 Apr 2025 08:18) (85 - 94)  BP: 157/86 (03 Apr 2025 08:18) (146/81 - 166/84)  BP(mean): --  ABP: --  ABP(mean): --  RR: 17 (03 Apr 2025 08:18) (15 - 17)  SpO2: 94% (03 Apr 2025 08:18) (94% - 98%)    O2 Parameters below as of 03 Apr 2025 08:18  Patient On (Oxygen Delivery Method): room air    I&O's Detail    02 Apr 2025 07:01  -  03 Apr 2025 07:00  --------------------------------------------------------  IN:    Oral Fluid: 700 mL  Total IN: 700 mL    OUT:  Total OUT: 0 mL    Total NET: 700 mL          LABS:                        9.6    7.13  )-----------( 319      ( 03 Apr 2025 05:35 )             29.7     04-03    141  |  110[H]  |  34.5[H]  ----------------------------<  136[H]  4.0   |  20.0[L]  |  5.56[H]    Ca    8.2[L]      03 Apr 2025 05:35        Urinalysis Basic - ( 03 Apr 2025 05:35 )    Color: x / Appearance: x / SG: x / pH: x  Gluc: 136 mg/dL / Ketone: x  / Bili: x / Urobili: x   Blood: x / Protein: x / Nitrite: x   Leuk Esterase: x / RBC: x / WBC x   Sq Epi: x / Non Sq Epi: x / Bacteria: x        Assessment/Plan:  64y Male admitted with Syncope and collapse    Pt most recently s/p ___________.     - Continue global management per primary team  - Monitor output/VS/Labs  - Flush drain with 5-10cc normal saline daily forward only; DO NOT ASPIRATE  - Change dressing q3 days or when dressing is saturated  - Patient informed that optimal time for drain check is when output is <10cc/24hours  -  For outpatient follow-up/questions and scheduling please call [phone]  - Regarding outpatient IR follow up, ______; recommend IR follow up in _____wks/months for tube evaluation.  - Greater than 50% of the encounter was spent counseling and/or coordination of care on drain management and follow up.   -they will benefit from home care services to help with drainage catheter care; they should continue same drainage catheter care as an outpatient.     Please call IR at extension [phone] with any questions, concerns, or issues regarding above.       Interventional Radiology Follow-Up Note    63 year-old male with a history of CKD, IDDM, HIV on HAART, HFrEF, recent bilateral cataract surgery presented to [facility] ED after a fall. in the ED patient found to have BRIDGETTE. Nephrology following and requesting renal biopsy.     IR following for renal biopsy. Patient presented to IR dept for biopsy today, however BP elevated (180s). procedure cancelled. will need better BP control prior to performing biopsy    Medication:  MEDICATIONS  (STANDING):  amLODIPine   Tablet 5 milliGRAM(s) Oral once  artificial tears (preservative free) Ophthalmic Solution 2 Drop(s) Both EYES every 8 hours  atorvastatin 40 milliGRAM(s) Oral at bedtime  chlorhexidine 2% Cloths 1 Application(s) Topical <User Schedule>  dextrose 5%. 1000 milliLiter(s) (50 mL/Hr) IV Continuous <Continuous>  dextrose 5%. 1000 milliLiter(s) (100 mL/Hr) IV Continuous <Continuous>  dextrose 50% Injectable 25 Gram(s) IV Push once  dextrose 50% Injectable 12.5 Gram(s) IV Push once  dextrose 50% Injectable 25 Gram(s) IV Push once  dolutegravir 50 milliGRAM(s) Oral daily  gabapentin 100 milliGRAM(s) Oral three times a day  glucagon  Injectable 1 milliGRAM(s) IntraMuscular once  insulin glargine Injectable (LANTUS) 10 Unit(s) SubCutaneous at bedtime  insulin lispro (ADMELOG) corrective regimen sliding scale   SubCutaneous three times a day before meals  lamiVUDine- milliGRAM(s) Oral daily  trimethoprim/polymyxin Solution 1 Drop(s) Both EYES daily    MEDICATIONS  (PRN):  acetaminophen     Tablet .. 650 milliGRAM(s) Oral every 6 hours PRN Temp greater or equal to 38C (100.4F), Mild Pain (1 - 3)  aluminum hydroxide/magnesium hydroxide/simethicone Suspension 30 milliLiter(s) Oral every 4 hours PRN Dyspepsia  dextrose Oral Gel 15 Gram(s) Oral once PRN Blood Glucose LESS THAN 70 milliGRAM(s)/deciliter  melatonin 3 milliGRAM(s) Oral at bedtime PRN Insomnia  ondansetron Injectable 4 milliGRAM(s) IV Push every 8 hours PRN Nausea and/or Vomiting      Vitals:  ICU Vital Signs Last 24 Hrs  T(C): 36.9 (03 Apr 2025 08:18), Max: 36.9 (02 Apr 2025 21:00)  T(F): 98.4 (03 Apr 2025 08:18), Max: 98.5 (02 Apr 2025 21:00)  HR: 94 (03 Apr 2025 08:18) (85 - 94)  BP: 157/86 (03 Apr 2025 08:18) (146/81 - 166/84)  BP(mean): --  ABP: --  ABP(mean): --  RR: 17 (03 Apr 2025 08:18) (15 - 17)  SpO2: 94% (03 Apr 2025 08:18) (94% - 98%)    O2 Parameters below as of 03 Apr 2025 08:18  Patient On (Oxygen Delivery Method): room air    I&O's Detail    02 Apr 2025 07:01  -  03 Apr 2025 07:00  --------------------------------------------------------  IN:    Oral Fluid: 700 mL  Total IN: 700 mL    OUT:  Total OUT: 0 mL    Total NET: 700 mL          LABS:                        9.6    7.13  )-----------( 319      ( 03 Apr 2025 05:35 )             29.7     04-03    141  |  110[H]  |  34.5[H]  ----------------------------<  136[H]  4.0   |  20.0[L]  |  5.56[H]    Ca    8.2[L]      03 Apr 2025 05:35        Urinalysis Basic - ( 03 Apr 2025 05:35 )    Color: x / Appearance: x / SG: x / pH: x  Gluc: 136 mg/dL / Ketone: x  / Bili: x / Urobili: x   Blood: x / Protein: x / Nitrite: x   Leuk Esterase: x / RBC: x / WBC x   Sq Epi: x / Non Sq Epi: x / Bacteria: x

## 2025-04-04 LAB
ANION GAP SERPL CALC-SCNC: 11 MMOL/L — SIGNIFICANT CHANGE UP (ref 5–17)
APPEARANCE UR: CLEAR — SIGNIFICANT CHANGE UP
BACTERIA # UR AUTO: NEGATIVE /HPF — SIGNIFICANT CHANGE UP
BILIRUB UR-MCNC: NEGATIVE — SIGNIFICANT CHANGE UP
BUN SERPL-MCNC: 39.7 MG/DL — HIGH (ref 8–20)
C3 SERPL-MCNC: 132 MG/DL — SIGNIFICANT CHANGE UP (ref 81–157)
C4 SERPL-MCNC: 47 MG/DL — HIGH (ref 13–39)
CALCIUM SERPL-MCNC: 8.3 MG/DL — LOW (ref 8.4–10.5)
CAST: 6 /LPF — HIGH (ref 0–4)
CHLORIDE SERPL-SCNC: 108 MMOL/L — SIGNIFICANT CHANGE UP (ref 96–108)
CO2 SERPL-SCNC: 21 MMOL/L — LOW (ref 22–29)
COLOR SPEC: YELLOW — SIGNIFICANT CHANGE UP
CREAT ?TM UR-MCNC: 115 MG/DL — SIGNIFICANT CHANGE UP
CREAT SERPL-MCNC: 6.04 MG/DL — HIGH (ref 0.5–1.3)
DIFF PNL FLD: ABNORMAL
DSDNA AB SER-ACNC: <1 IU/ML — SIGNIFICANT CHANGE UP
EGFR: 10 ML/MIN/1.73M2 — LOW
EGFR: 10 ML/MIN/1.73M2 — LOW
GBM IGG SER-ACNC: <0.2 AI — SIGNIFICANT CHANGE UP
GLOMERULAR BASEMENT MEMBRANE A INTERPRETATION: NEGATIVE — SIGNIFICANT CHANGE UP
GLUCOSE BLDC GLUCOMTR-MCNC: 165 MG/DL — HIGH (ref 70–99)
GLUCOSE BLDC GLUCOMTR-MCNC: 182 MG/DL — HIGH (ref 70–99)
GLUCOSE BLDC GLUCOMTR-MCNC: 305 MG/DL — HIGH (ref 70–99)
GLUCOSE SERPL-MCNC: 200 MG/DL — HIGH (ref 70–99)
GLUCOSE UR QL: 500 MG/DL
HCT VFR BLD CALC: 31.9 % — LOW (ref 39–50)
HGB BLD-MCNC: 10.4 G/DL — LOW (ref 13–17)
KAPPA LC SER QL IFE: 25.88 MG/DL — HIGH (ref 0.33–1.94)
KAPPA/LAMBDA FREE LIGHT CHAIN RATIO, SERUM: 1.24 RATIO — SIGNIFICANT CHANGE UP (ref 0.26–1.65)
KETONES UR-MCNC: NEGATIVE MG/DL — SIGNIFICANT CHANGE UP
LAMBDA LC SER QL IFE: 20.87 MG/DL — HIGH (ref 0.57–2.63)
LEUKOCYTE ESTERASE UR-ACNC: NEGATIVE — SIGNIFICANT CHANGE UP
MCHC RBC-ENTMCNC: 29 PG — SIGNIFICANT CHANGE UP (ref 27–34)
MCHC RBC-ENTMCNC: 32.6 G/DL — SIGNIFICANT CHANGE UP (ref 32–36)
MCV RBC AUTO: 88.9 FL — SIGNIFICANT CHANGE UP (ref 80–100)
NITRITE UR-MCNC: NEGATIVE — SIGNIFICANT CHANGE UP
NRBC # BLD AUTO: 0 K/UL — SIGNIFICANT CHANGE UP (ref 0–0)
NRBC # FLD: 0 K/UL — SIGNIFICANT CHANGE UP (ref 0–0)
NRBC BLD AUTO-RTO: 0 /100 WBCS — SIGNIFICANT CHANGE UP (ref 0–0)
PH UR: 7 — SIGNIFICANT CHANGE UP (ref 5–8)
PLATELET # BLD AUTO: 341 K/UL — SIGNIFICANT CHANGE UP (ref 150–400)
PMV BLD: 8.7 FL — SIGNIFICANT CHANGE UP (ref 7–13)
POTASSIUM SERPL-MCNC: 4.2 MMOL/L — SIGNIFICANT CHANGE UP (ref 3.5–5.3)
POTASSIUM SERPL-SCNC: 4.2 MMOL/L — SIGNIFICANT CHANGE UP (ref 3.5–5.3)
PROT ?TM UR-MCNC: >400 MG/DL — HIGH (ref 0–12)
PROT SERPL-MCNC: 6.4 G/DL — SIGNIFICANT CHANGE UP (ref 6–8.3)
PROT UR-MCNC: >=1000 MG/DL
PROT/CREAT UR-RTO: >3.5 RATIO — HIGH
RBC # BLD: 3.59 M/UL — LOW (ref 4.2–5.8)
RBC # FLD: 13.9 % — SIGNIFICANT CHANGE UP (ref 10.3–14.5)
RBC CASTS # UR COMP ASSIST: 4 /HPF — SIGNIFICANT CHANGE UP (ref 0–4)
SODIUM SERPL-SCNC: 139 MMOL/L — SIGNIFICANT CHANGE UP (ref 135–145)
SP GR SPEC: 1.02 — SIGNIFICANT CHANGE UP (ref 1–1.03)
SQUAMOUS # UR AUTO: 2 /HPF — SIGNIFICANT CHANGE UP (ref 0–5)
UROBILINOGEN FLD QL: 1 MG/DL — SIGNIFICANT CHANGE UP (ref 0.2–1)
UUN UR-MCNC: 427 MG/DL — SIGNIFICANT CHANGE UP
WBC # BLD: 8.11 K/UL — SIGNIFICANT CHANGE UP (ref 3.8–10.5)
WBC # FLD AUTO: 8.11 K/UL — SIGNIFICANT CHANGE UP (ref 3.8–10.5)
WBC UR QL: 1 /HPF — SIGNIFICANT CHANGE UP (ref 0–5)

## 2025-04-04 PROCEDURE — 99232 SBSQ HOSP IP/OBS MODERATE 35: CPT

## 2025-04-04 PROCEDURE — 99233 SBSQ HOSP IP/OBS HIGH 50: CPT

## 2025-04-04 RX ORDER — KETOROLAC TROMETHAMINE 30 MG/ML
15 INJECTION, SOLUTION INTRAMUSCULAR; INTRAVENOUS
Refills: 0 | Status: DISCONTINUED | OUTPATIENT
Start: 2025-04-04 | End: 2025-04-04

## 2025-04-04 RX ORDER — ACETAMINOPHEN 500 MG/5ML
975 LIQUID (ML) ORAL EVERY 8 HOURS
Refills: 0 | Status: DISCONTINUED | OUTPATIENT
Start: 2025-04-04 | End: 2025-05-13

## 2025-04-04 RX ADMIN — Medication 650 MILLIGRAM(S): at 00:47

## 2025-04-04 RX ADMIN — Medication 1 APPLICATION(S): at 06:21

## 2025-04-04 RX ADMIN — DOLUTEGRAVIR SODIUM 50 MILLIGRAM(S): 5 TABLET, FOR SUSPENSION ORAL at 13:35

## 2025-04-04 RX ADMIN — Medication 2 DROP(S): at 13:35

## 2025-04-04 RX ADMIN — Medication 975 MILLIGRAM(S): at 14:30

## 2025-04-04 RX ADMIN — GABAPENTIN 100 MILLIGRAM(S): 400 CAPSULE ORAL at 13:36

## 2025-04-04 RX ADMIN — Medication 1 DROP(S): at 13:34

## 2025-04-04 RX ADMIN — Medication 975 MILLIGRAM(S): at 13:35

## 2025-04-04 RX ADMIN — GABAPENTIN 100 MILLIGRAM(S): 400 CAPSULE ORAL at 06:20

## 2025-04-04 RX ADMIN — POLYMYXIN B SULFATE AND TRIMETHOPRIM SULFATE 1 DROP(S): 10000; 1 SOLUTION/ DROPS OPHTHALMIC at 13:35

## 2025-04-04 RX ADMIN — Medication 2 DROP(S): at 06:20

## 2025-04-04 RX ADMIN — Medication 2 DROP(S): at 21:24

## 2025-04-04 RX ADMIN — INSULIN LISPRO 4: 100 INJECTION, SOLUTION INTRAVENOUS; SUBCUTANEOUS at 18:12

## 2025-04-04 RX ADMIN — Medication 5 MILLIGRAM(S): at 09:01

## 2025-04-04 RX ADMIN — Medication 1 DROP(S): at 18:11

## 2025-04-04 RX ADMIN — LAMIVUDINE 100 MILLIGRAM(S): 10 SOLUTION ORAL at 13:35

## 2025-04-04 RX ADMIN — GABAPENTIN 100 MILLIGRAM(S): 400 CAPSULE ORAL at 21:24

## 2025-04-04 RX ADMIN — INSULIN LISPRO 1: 100 INJECTION, SOLUTION INTRAVENOUS; SUBCUTANEOUS at 12:18

## 2025-04-04 RX ADMIN — INSULIN GLARGINE-YFGN 10 UNIT(S): 100 INJECTION, SOLUTION SUBCUTANEOUS at 21:30

## 2025-04-04 RX ADMIN — Medication 975 MILLIGRAM(S): at 21:23

## 2025-04-04 RX ADMIN — ATORVASTATIN CALCIUM 40 MILLIGRAM(S): 80 TABLET, FILM COATED ORAL at 21:24

## 2025-04-04 RX ADMIN — AMLODIPINE BESYLATE 10 MILLIGRAM(S): 10 TABLET ORAL at 06:20
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
## 2025-04-04 NOTE — PROGRESS NOTE ADULT - SUBJECTIVE AND OBJECTIVE BOX
Blanca Faith M.D.    Patient is a 64y old  Male who presents with a chief complaint of Syncope and collapse     (01 Apr 2025 13:50)      SUBJECTIVE / OVERNIGHT EVENTS: still reports chronic eye pain, worse on the right     MEDICATIONS  (STANDING):  amLODIPine   Tablet 10 milliGRAM(s) Oral daily  artificial tears (preservative free) Ophthalmic Solution 2 Drop(s) Both EYES every 8 hours  atorvastatin 40 milliGRAM(s) Oral at bedtime  chlorhexidine 2% Cloths 1 Application(s) Topical <User Schedule>  dextrose 5%. 1000 milliLiter(s) (50 mL/Hr) IV Continuous <Continuous>  dextrose 5%. 1000 milliLiter(s) (100 mL/Hr) IV Continuous <Continuous>  dextrose 50% Injectable 12.5 Gram(s) IV Push once  dextrose 50% Injectable 25 Gram(s) IV Push once  dextrose 50% Injectable 25 Gram(s) IV Push once  dolutegravir 50 milliGRAM(s) Oral daily  gabapentin 100 milliGRAM(s) Oral three times a day  glucagon  Injectable 1 milliGRAM(s) IntraMuscular once  insulin glargine Injectable (LANTUS) 10 Unit(s) SubCutaneous at bedtime  insulin lispro (ADMELOG) corrective regimen sliding scale   SubCutaneous three times a day before meals  lamiVUDine- milliGRAM(s) Oral daily  trimethoprim/polymyxin Solution 1 Drop(s) Both EYES daily    MEDICATIONS  (PRN):  acetaminophen     Tablet .. 650 milliGRAM(s) Oral every 6 hours PRN Temp greater or equal to 38C (100.4F), Mild Pain (1 - 3)  aluminum hydroxide/magnesium hydroxide/simethicone Suspension 30 milliLiter(s) Oral every 4 hours PRN Dyspepsia  dextrose Oral Gel 15 Gram(s) Oral once PRN Blood Glucose LESS THAN 70 milliGRAM(s)/deciliter  hydrALAZINE Injectable 5 milliGRAM(s) IV Push every 6 hours PRN SBP>180  melatonin 3 milliGRAM(s) Oral at bedtime PRN Insomnia  ondansetron Injectable 4 milliGRAM(s) IV Push every 8 hours PRN Nausea and/or Vomiting      I&O's Summary    03 Apr 2025 07:01  -  04 Apr 2025 07:00  --------------------------------------------------------  IN: 900 mL / OUT: 400 mL / NET: 500 mL        PHYSICAL EXAM:  Vital Signs Last 24 Hrs  T(C): 36.8 (04 Apr 2025 08:00), Max: 36.8 (04 Apr 2025 05:20)  T(F): 98.2 (04 Apr 2025 08:00), Max: 98.2 (04 Apr 2025 05:20)  HR: 98 (04 Apr 2025 08:00) (89 - 102)  BP: 188/105 (04 Apr 2025 10:03) (122/77 - 199/94)  BP(mean): --  RR: 18 (04 Apr 2025 08:00) (17 - 18)  SpO2: 98% (04 Apr 2025 08:00) (96% - 98%)    Parameters below as of 04 Apr 2025 08:00  Patient On (Oxygen Delivery Method): room air    CONSTITUTIONAL: NAD, well-groomed. b/l conjunctivitis worse on the left side + Swollen, reports blurry vision bl   RESPIRATORY: Normal respiratory effort; lungs are clear to auscultation bilaterally  CARDIOVASCULAR: Regular rate and rhythm, no LE edema  ABDOMEN: Nontender to palpation, normoactive bowel sounds    LABS:                        10.4   8.11  )-----------( 341      ( 04 Apr 2025 05:54 )             31.9     04-04    139  |  108  |  39.7[H]  ----------------------------<  200[H]  4.2   |  21.0[L]  |  6.04[H]    Ca    8.3[L]      04 Apr 2025 05:54      Urinalysis Basic - ( 04 Apr 2025 05:54 )    Color: x / Appearance: x / SG: x / pH: x  Gluc: 200 mg/dL / Ketone: x  / Bili: x / Urobili: x   Blood: x / Protein: x / Nitrite: x   Leuk Esterase: x / RBC: x / WBC x   Sq Epi: x / Non Sq Epi: x / Bacteria: x      CAPILLARY BLOOD GLUCOSE      POCT Blood Glucose.: 227 mg/dL (03 Apr 2025 21:46)  POCT Blood Glucose.: 115 mg/dL (03 Apr 2025 12:08)      RADIOLOGY & ADDITIONAL TESTS:  Results Reviewed:   Imaging Personally Reviewed:  Electrocardiogram Personally Reviewed:

## 2025-04-04 NOTE — PROGRESS NOTE ADULT - ASSESSMENT
63 YOM DM, HIV on HAART admitted after fall.  Nephrology consulted for progressive worsening CKD.    Acute kidney injury on CKD stage III versus progressive CKD stage V  Nephrotic syndrome  pt has h.o long standing DM (about 4 decades- untreated- sugars in '300s' as per ex wife)  HIV- VL > 300 k  -Serum creatinine has slowly progressed now to 6.04  -UA with proteinuria more than 1000 g on last check in May 24-  -Repeat UA reviewed; heavy proteinuria and glucose- Tp/cr ratio ~3.3  _Renal ultrasound reviewed; renal medical disease  -Ddx- DMN vs  HIVAN vs Nephrotoxicity from tenofovir alafenamide (less likely)  Serological work up in progress  - pending kidney biopsy  -will need RRT in near future    Hypokalemia: Replete K+ as needed  Anemia: CKD versus RITESH.  Check iron anemia panel- LDH, haptoglobin  HTN: BP uncontrolled- continue amlodipine- start metoprolol  HIV on Bikatrvy- meds now being changed as per ID  DM: discontinued metformin (low GFR), place on Lantus + SSI ACHS

## 2025-04-04 NOTE — CHART NOTE - NSCHARTNOTEFT_GEN_A_CORE
Notified by RN, patient with /94, complaining of right eye pain and refusing all other care including glucose checks and telemetry.  B/L eye redness noted. Right eye > left eye. MD notified: prn hydralazine placed for sbp greater than 180. On call opthalmology conatcted. Awaiting callback for recommendations.    update. on repeat bp 188/ 105. MD notified. Notified by RN, patient with /94, complaining of right eye pain and refusing all other care including glucose checks and telemetry.  B/L eye redness noted. Right eye > left eye. MD notified: prn hydralazine placed for sbp greater than 180. On call opthalmology conatcted. Awaiting callback for recommendations.    update. on repeat bp 188/ 105. MD notified.     Sight MD: patient to follow up with own surgeon. Can be started on prednisolone 4 times a day and ketorolac 4 times a day. Orders placed.     Per MD: unable to start ketorolac, order Dc'ed due to renal function. Will start on Tylenol standing. RN to notify provider with any changes.

## 2025-04-05 LAB
4/8 RATIO: 0.55 RATIO — LOW (ref 0.9–3.6)
ABS CD8: 1205 CELLS/UL — HIGH (ref 142–740)
CD16+CD56+ CELLS NFR BLD: 6 % — SIGNIFICANT CHANGE UP (ref 5–23)
CD16+CD56+ CELLS NFR SPEC: 153 CELLS/UL — SIGNIFICANT CHANGE UP (ref 71–410)
CD19 BLASTS SPEC-ACNC: 18 % — SIGNIFICANT CHANGE UP (ref 6–24)
CD19 BLASTS SPEC-ACNC: 460 CELLS/UL — SIGNIFICANT CHANGE UP (ref 84–469)
CD3 BLASTS SPEC-ACNC: 1871 CELLS/UL — HIGH (ref 672–1870)
CD3 BLASTS SPEC-ACNC: 75 % — SIGNIFICANT CHANGE UP (ref 59–83)
CD4 %: 27 % — LOW (ref 30–62)
CD8 %: 48 % — HIGH (ref 12–36)
GLUCOSE BLDC GLUCOMTR-MCNC: 153 MG/DL — HIGH (ref 70–99)
GLUCOSE BLDC GLUCOMTR-MCNC: 171 MG/DL — HIGH (ref 70–99)
GLUCOSE BLDC GLUCOMTR-MCNC: 239 MG/DL — HIGH (ref 70–99)
GLUCOSE BLDC GLUCOMTR-MCNC: 263 MG/DL — HIGH (ref 70–99)
T-CELL CD4 SUBSET PNL BLD: 667 CELLS/UL — SIGNIFICANT CHANGE UP (ref 489–1457)
VIABLE CELLS NFR SPEC: SIGNIFICANT CHANGE UP

## 2025-04-05 PROCEDURE — 99233 SBSQ HOSP IP/OBS HIGH 50: CPT

## 2025-04-05 RX ORDER — METOPROLOL SUCCINATE 50 MG/1
50 TABLET, EXTENDED RELEASE ORAL
Refills: 0 | Status: DISCONTINUED | OUTPATIENT
Start: 2025-04-05 | End: 2025-05-13

## 2025-04-05 RX ADMIN — Medication 975 MILLIGRAM(S): at 22:32

## 2025-04-05 RX ADMIN — Medication 2 DROP(S): at 21:33

## 2025-04-05 RX ADMIN — Medication 1 APPLICATION(S): at 05:33

## 2025-04-05 RX ADMIN — GABAPENTIN 100 MILLIGRAM(S): 400 CAPSULE ORAL at 14:13

## 2025-04-05 RX ADMIN — Medication 2 DROP(S): at 05:28

## 2025-04-05 RX ADMIN — INSULIN LISPRO 1: 100 INJECTION, SOLUTION INTRAVENOUS; SUBCUTANEOUS at 17:38

## 2025-04-05 RX ADMIN — Medication 1 DROP(S): at 17:38

## 2025-04-05 RX ADMIN — Medication 1 DROP(S): at 12:16

## 2025-04-05 RX ADMIN — POLYMYXIN B SULFATE AND TRIMETHOPRIM SULFATE 1 DROP(S): 10000; 1 SOLUTION/ DROPS OPHTHALMIC at 12:15

## 2025-04-05 RX ADMIN — Medication 975 MILLIGRAM(S): at 14:13

## 2025-04-05 RX ADMIN — DOLUTEGRAVIR SODIUM 50 MILLIGRAM(S): 5 TABLET, FOR SUSPENSION ORAL at 08:34

## 2025-04-05 RX ADMIN — GABAPENTIN 100 MILLIGRAM(S): 400 CAPSULE ORAL at 21:33

## 2025-04-05 RX ADMIN — Medication 2 DROP(S): at 14:14

## 2025-04-05 RX ADMIN — Medication 1 DROP(S): at 07:39

## 2025-04-05 RX ADMIN — ATORVASTATIN CALCIUM 40 MILLIGRAM(S): 80 TABLET, FILM COATED ORAL at 21:33

## 2025-04-05 RX ADMIN — GABAPENTIN 100 MILLIGRAM(S): 400 CAPSULE ORAL at 05:28

## 2025-04-05 RX ADMIN — METOPROLOL SUCCINATE 50 MILLIGRAM(S): 50 TABLET, EXTENDED RELEASE ORAL at 17:37

## 2025-04-05 RX ADMIN — LAMIVUDINE 100 MILLIGRAM(S): 10 SOLUTION ORAL at 08:34

## 2025-04-05 RX ADMIN — Medication 975 MILLIGRAM(S): at 05:28

## 2025-04-05 RX ADMIN — INSULIN LISPRO 3: 100 INJECTION, SOLUTION INTRAVENOUS; SUBCUTANEOUS at 12:13

## 2025-04-05 RX ADMIN — AMLODIPINE BESYLATE 10 MILLIGRAM(S): 10 TABLET ORAL at 05:27

## 2025-04-05 RX ADMIN — Medication 975 MILLIGRAM(S): at 21:32

## 2025-04-05 RX ADMIN — INSULIN LISPRO 1: 100 INJECTION, SOLUTION INTRAVENOUS; SUBCUTANEOUS at 08:33

## 2025-04-05 RX ADMIN — INSULIN GLARGINE-YFGN 10 UNIT(S): 100 INJECTION, SOLUTION SUBCUTANEOUS at 21:32

## 2025-04-05 RX ADMIN — Medication 975 MILLIGRAM(S): at 14:40
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
## 2025-04-05 NOTE — PROGRESS NOTE ADULT - SUBJECTIVE AND OBJECTIVE BOX
[facility] Division of Hospital Medicine    INTERVAL HISTORY:  Overnight, no acute events.     Patient seen and examined at bedside this morning. Reports some headache. Requesting oxycodone. Reports tylenol does not work, does not want to try IV tylenol. Patient denies chest pain, SOB, abd pain, N/V, fever, chills, dysuria or any other complaints.     MEDICATIONS  (STANDING):  acetaminophen     Tablet .. 975 milliGRAM(s) Oral every 8 hours  amLODIPine   Tablet 10 milliGRAM(s) Oral daily  artificial tears (preservative free) Ophthalmic Solution 2 Drop(s) Both EYES every 8 hours  atorvastatin 40 milliGRAM(s) Oral at bedtime  chlorhexidine 2% Cloths 1 Application(s) Topical <User Schedule>  dextrose 5%. 1000 milliLiter(s) (50 mL/Hr) IV Continuous <Continuous>  dextrose 5%. 1000 milliLiter(s) (100 mL/Hr) IV Continuous <Continuous>  dextrose 50% Injectable 25 Gram(s) IV Push once  dextrose 50% Injectable 12.5 Gram(s) IV Push once  dextrose 50% Injectable 25 Gram(s) IV Push once  dolutegravir 50 milliGRAM(s) Oral daily  gabapentin 100 milliGRAM(s) Oral three times a day  glucagon  Injectable 1 milliGRAM(s) IntraMuscular once  insulin glargine Injectable (LANTUS) 10 Unit(s) SubCutaneous at bedtime  insulin lispro (ADMELOG) corrective regimen sliding scale   SubCutaneous three times a day before meals  lamiVUDine- milliGRAM(s) Oral daily  prednisoLONE acetate 1% Suspension 1 Drop(s) Both EYES four times a day  trimethoprim/polymyxin Solution 1 Drop(s) Both EYES daily    MEDICATIONS  (PRN):  aluminum hydroxide/magnesium hydroxide/simethicone Suspension 30 milliLiter(s) Oral every 4 hours PRN Dyspepsia  dextrose Oral Gel 15 Gram(s) Oral once PRN Blood Glucose LESS THAN 70 milliGRAM(s)/deciliter  hydrALAZINE Injectable 5 milliGRAM(s) IV Push every 6 hours PRN SBP>180  melatonin 3 milliGRAM(s) Oral at bedtime PRN Insomnia  ondansetron Injectable 4 milliGRAM(s) IV Push every 8 hours PRN Nausea and/or Vomiting        I&O's Summary    2025 07:01  -  2025 07:00  --------------------------------------------------------  IN: 210 mL / OUT: 800 mL / NET: -590 mL        PHYSICAL EXAM:  Vital Signs Last 24 Hrs  T(C): 36.4 (2025 12:17), Max: 36.8 (2025 19:30)  T(F): 97.6 (2025 12:17), Max: 98.2 (2025 19:30)  HR: 83 (:17) (65 - 91)  BP: 152/77 (2025 12:17) (139/79 - 155/75)  BP(mean): 102 (2025 12:17) (102 - 102)  RR: 17 (2025 12:17) (17 - 19)  SpO2: 94% (:17) (94% - 98%)    Parameters below as of 2025 12:17  Patient On (Oxygen Delivery Method): room air          CONSTITUTIONAL: No apparent distress  HEENT: Normocephalic, Atraumatic  RESPIRATORY:  lungs are clear to auscultation bilaterally, No crackles, rhonchi, wheezes  CARDIOVASCULAR: Regular rate and rhythm, No lower extremity edema  ABDOMEN: Soft, non-distended, nontender to palpation, +BS  MUSCLOSKELETAL: Normal gait  PSYCH: thoughts linear, affect appropriate  NEUROLOGY: Alert, Oriented x3    LABS:                        10.4   8.11  )-----------( 341      ( 2025 05:54 )             31.9     04-04    139  |  108  |  39.7[H]  ----------------------------<  200[H]  4.2   |  21.0[L]  |  6.04[H]    Ca    8.3[L]      2025 05:54    TPro  6.4  /  Alb  x   /  TBili  x   /  DBili  x   /  AST  x   /  ALT  x   /  AlkPhos  x   04-04          Urinalysis Basic - ( 2025 19:52 )    Color: Yellow / Appearance: Clear / S.019 / pH: x  Gluc: x / Ketone: Negative mg/dL  / Bili: Negative / Urobili: 1.0 mg/dL   Blood: x / Protein: >=1000 mg/dL / Nitrite: Negative   Leuk Esterase: Negative / RBC: 4 /HPF / WBC 1 /HPF   Sq Epi: x / Non Sq Epi: 2 /HPF / Bacteria: Negative /HPF        CAPILLARY BLOOD GLUCOSE      POCT Blood Glucose.: 263 mg/dL (2025 12:04)  POCT Blood Glucose.: 171 mg/dL (2025 07:57)  POCT Blood Glucose.: 165 mg/dL (2025 21:11)  POCT Blood Glucose.: 305 mg/dL (2025 17:14)        RADIOLOGY & ADDITIONAL TESTS:  Results Reviewed

## 2025-04-06 LAB
ANION GAP SERPL CALC-SCNC: 13 MMOL/L — SIGNIFICANT CHANGE UP (ref 5–17)
BUN SERPL-MCNC: 51.2 MG/DL — HIGH (ref 8–20)
CALCIUM SERPL-MCNC: 8.2 MG/DL — LOW (ref 8.4–10.5)
CHLORIDE SERPL-SCNC: 109 MMOL/L — HIGH (ref 96–108)
CO2 SERPL-SCNC: 17 MMOL/L — LOW (ref 22–29)
CREAT SERPL-MCNC: 5.46 MG/DL — HIGH (ref 0.5–1.3)
EGFR: 11 ML/MIN/1.73M2 — LOW
EGFR: 11 ML/MIN/1.73M2 — LOW
GLUCOSE BLDC GLUCOMTR-MCNC: 158 MG/DL — HIGH (ref 70–99)
GLUCOSE BLDC GLUCOMTR-MCNC: 167 MG/DL — HIGH (ref 70–99)
GLUCOSE BLDC GLUCOMTR-MCNC: 222 MG/DL — HIGH (ref 70–99)
GLUCOSE BLDC GLUCOMTR-MCNC: 274 MG/DL — HIGH (ref 70–99)
GLUCOSE SERPL-MCNC: 168 MG/DL — HIGH (ref 70–99)
HCT VFR BLD CALC: 29 % — LOW (ref 39–50)
HGB BLD-MCNC: 9.4 G/DL — LOW (ref 13–17)
MAGNESIUM SERPL-MCNC: 2 MG/DL — SIGNIFICANT CHANGE UP (ref 1.6–2.6)
MCHC RBC-ENTMCNC: 28.5 PG — SIGNIFICANT CHANGE UP (ref 27–34)
MCHC RBC-ENTMCNC: 32.4 G/DL — SIGNIFICANT CHANGE UP (ref 32–36)
MCV RBC AUTO: 87.9 FL — SIGNIFICANT CHANGE UP (ref 80–100)
NRBC # BLD AUTO: 0 K/UL — SIGNIFICANT CHANGE UP (ref 0–0)
NRBC # FLD: 0 K/UL — SIGNIFICANT CHANGE UP (ref 0–0)
NRBC BLD AUTO-RTO: 0 /100 WBCS — SIGNIFICANT CHANGE UP (ref 0–0)
PLATELET # BLD AUTO: 296 K/UL — SIGNIFICANT CHANGE UP (ref 150–400)
PMV BLD: 8.7 FL — SIGNIFICANT CHANGE UP (ref 7–13)
POTASSIUM SERPL-MCNC: 4.5 MMOL/L — SIGNIFICANT CHANGE UP (ref 3.5–5.3)
POTASSIUM SERPL-SCNC: 4.5 MMOL/L — SIGNIFICANT CHANGE UP (ref 3.5–5.3)
RBC # BLD: 3.3 M/UL — LOW (ref 4.2–5.8)
RBC # FLD: 14.2 % — SIGNIFICANT CHANGE UP (ref 10.3–14.5)
SODIUM SERPL-SCNC: 139 MMOL/L — SIGNIFICANT CHANGE UP (ref 135–145)
SSA-52 (RO52) (ENA) ANTIBODY, IGG: 0 AU/ML — SIGNIFICANT CHANGE UP (ref 0–40)
SSA-60 (RO60) (ENA) ANTIBODY, IGG: 0 AU/ML — SIGNIFICANT CHANGE UP (ref 0–40)
WBC # BLD: 8.48 K/UL — SIGNIFICANT CHANGE UP (ref 3.8–10.5)
WBC # FLD AUTO: 8.48 K/UL — SIGNIFICANT CHANGE UP (ref 3.8–10.5)

## 2025-04-06 PROCEDURE — 99233 SBSQ HOSP IP/OBS HIGH 50: CPT

## 2025-04-06 PROCEDURE — 99232 SBSQ HOSP IP/OBS MODERATE 35: CPT

## 2025-04-06 RX ADMIN — ATORVASTATIN CALCIUM 40 MILLIGRAM(S): 80 TABLET, FILM COATED ORAL at 22:15

## 2025-04-06 RX ADMIN — DOLUTEGRAVIR SODIUM 50 MILLIGRAM(S): 5 TABLET, FOR SUSPENSION ORAL at 12:10

## 2025-04-06 RX ADMIN — Medication 10 MILLIGRAM(S): at 12:10

## 2025-04-06 RX ADMIN — Medication 1 DROP(S): at 12:11

## 2025-04-06 RX ADMIN — LAMIVUDINE 100 MILLIGRAM(S): 10 SOLUTION ORAL at 12:10

## 2025-04-06 RX ADMIN — Medication 975 MILLIGRAM(S): at 13:58

## 2025-04-06 RX ADMIN — METOPROLOL SUCCINATE 50 MILLIGRAM(S): 50 TABLET, EXTENDED RELEASE ORAL at 05:34

## 2025-04-06 RX ADMIN — GABAPENTIN 100 MILLIGRAM(S): 400 CAPSULE ORAL at 14:02

## 2025-04-06 RX ADMIN — Medication 975 MILLIGRAM(S): at 06:33

## 2025-04-06 RX ADMIN — AMLODIPINE BESYLATE 10 MILLIGRAM(S): 10 TABLET ORAL at 05:34

## 2025-04-06 RX ADMIN — Medication 1 APPLICATION(S): at 09:00

## 2025-04-06 RX ADMIN — INSULIN LISPRO 2: 100 INJECTION, SOLUTION INTRAVENOUS; SUBCUTANEOUS at 12:10

## 2025-04-06 RX ADMIN — GABAPENTIN 100 MILLIGRAM(S): 400 CAPSULE ORAL at 05:34

## 2025-04-06 RX ADMIN — INSULIN LISPRO 1: 100 INJECTION, SOLUTION INTRAVENOUS; SUBCUTANEOUS at 08:59

## 2025-04-06 RX ADMIN — METOPROLOL SUCCINATE 50 MILLIGRAM(S): 50 TABLET, EXTENDED RELEASE ORAL at 17:24

## 2025-04-06 RX ADMIN — Medication 2 DROP(S): at 13:57

## 2025-04-06 RX ADMIN — Medication 2 DROP(S): at 05:34

## 2025-04-06 RX ADMIN — Medication 1 DROP(S): at 23:33

## 2025-04-06 RX ADMIN — Medication 975 MILLIGRAM(S): at 22:15

## 2025-04-06 RX ADMIN — Medication 2 DROP(S): at 22:15

## 2025-04-06 RX ADMIN — INSULIN LISPRO 1: 100 INJECTION, SOLUTION INTRAVENOUS; SUBCUTANEOUS at 17:23

## 2025-04-06 RX ADMIN — Medication 1 DROP(S): at 01:26

## 2025-04-06 RX ADMIN — Medication 1 DROP(S): at 05:33

## 2025-04-06 RX ADMIN — INSULIN GLARGINE-YFGN 10 UNIT(S): 100 INJECTION, SOLUTION SUBCUTANEOUS at 22:12

## 2025-04-06 RX ADMIN — GABAPENTIN 100 MILLIGRAM(S): 400 CAPSULE ORAL at 22:15

## 2025-04-06 RX ADMIN — Medication 10 MILLIGRAM(S): at 23:33

## 2025-04-06 RX ADMIN — POLYMYXIN B SULFATE AND TRIMETHOPRIM SULFATE 1 DROP(S): 10000; 1 SOLUTION/ DROPS OPHTHALMIC at 12:11

## 2025-04-06 RX ADMIN — Medication 1 DROP(S): at 17:24

## 2025-04-06 RX ADMIN — Medication 975 MILLIGRAM(S): at 05:33

## 2025-04-06 RX ADMIN — Medication 10 MILLIGRAM(S): at 17:23

## 2025-04-06 NOTE — PROGRESS NOTE ADULT - SUBJECTIVE AND OBJECTIVE BOX
[facility] DIVISION OF KIDNEY DISEASES AND HYPERTENSION -- FOLLOW UP NOTE  --------------------------------------------------------------------------------  Chief Complaint: Geoff    24 hour events/subjective:  no acute event noted  pt seen and examined; feels well  plan for kidney biopsy tomorrow    PAST HISTORY  --------------------------------------------------------------------------------  No significant changes to PMH, PSH, FHx, SHx, unless otherwise noted    ALLERGIES & MEDICATIONS  --------------------------------------------------------------------------------  Allergies  No Known Allergies    Standing Inpatient Medications  acetaminophen     Tablet .. 975 milliGRAM(s) Oral every 8 hours  amLODIPine   Tablet 10 milliGRAM(s) Oral daily  artificial tears (preservative free) Ophthalmic Solution 2 Drop(s) Both EYES every 8 hours  atorvastatin 40 milliGRAM(s) Oral at bedtime  chlorhexidine 2% Cloths 1 Application(s) Topical <User Schedule>  dextrose 5%. 1000 milliLiter(s) IV Continuous <Continuous>  dextrose 5%. 1000 milliLiter(s) IV Continuous <Continuous>  dextrose 50% Injectable 25 Gram(s) IV Push once  dextrose 50% Injectable 12.5 Gram(s) IV Push once  dextrose 50% Injectable 25 Gram(s) IV Push once  dolutegravir 50 milliGRAM(s) Oral daily  gabapentin 100 milliGRAM(s) Oral three times a day  glucagon  Injectable 1 milliGRAM(s) IntraMuscular once  hydrALAZINE 10 milliGRAM(s) Oral every 6 hours  insulin glargine Injectable (LANTUS) 10 Unit(s) SubCutaneous at bedtime  insulin lispro (ADMELOG) corrective regimen sliding scale   SubCutaneous three times a day before meals  lamiVUDine- milliGRAM(s) Oral daily  metoprolol tartrate 50 milliGRAM(s) Oral two times a day  prednisoLONE acetate 1% Suspension 1 Drop(s) Both EYES four times a day  trimethoprim/polymyxin Solution 1 Drop(s) Both EYES daily    PRN Inpatient Medications  aluminum hydroxide/magnesium hydroxide/simethicone Suspension 30 milliLiter(s) Oral every 4 hours PRN  dextrose Oral Gel 15 Gram(s) Oral once PRN  melatonin 3 milliGRAM(s) Oral at bedtime PRN  ondansetron Injectable 4 milliGRAM(s) IV Push every 8 hours PRN      REVIEW OF SYSTEMS  --------------------------------------------------------------------------------  Gen: No weight changes, fatigue, fevers/chills, weakness  Skin: No rashes  Head/Eyes/Ears/Mouth: No headache; Normal hearing; Normal vision w/o blurriness; No sinus pain/discomfort, sore throat  Respiratory: No dyspnea, cough, wheezing, hemoptysis  CV: No chest pain, PND, orthopnea  GI: No abdominal pain, diarrhea, constipation, nausea, vomiting, melena, hematochezia  : No increased frequency, dysuria, hematuria, nocturia  MSK: No joint pain/swelling; no back pain; no edema  Neuro: No dizziness/lightheadedness, weakness, seizures, numbness, tingling  Heme: No easy bruising or bleeding  Endo: No heat/cold intolerance  Psych: No significant nervousness, anxiety, stress, depression    All other systems were reviewed and are negative, except as noted.    VITALS/PHYSICAL EXAM  --------------------------------------------------------------------------------  T(C): 36.7 (04-06-25 @ 15:45), Max: 36.9 (04-06-25 @ 05:30)  HR: 76 (04-06-25 @ 15:45) (67 - 94)  BP: 119/64 (04-06-25 @ 15:45) (115/75 - 157/66)  RR: 17 (04-06-25 @ 15:45) (17 - 18)  SpO2: 97% (04-06-25 @ 15:45) (95% - 97%)  Wt(kg): --        04-05-25 @ 07:01  -  04-06-25 @ 07:00  --------------------------------------------------------  IN: 440 mL / OUT: 0 mL / NET: 440 mL      Physical Exam:  	Gen: NAD  	HEENT- Supple neck, clear oropharynx  	Pulm: CTA B/L  	CV: RRR, S1S2; no rub  	Back: No spinal or CVA tenderness; no sacral edema  	Abd: +BS, soft, nontender/nondistended  	: No suprapubic tenderness  	UE: Warm, no edema  	LE: Warm,  no edema  	Neuro: No focal deficit  	Psych: Normal affect and mood  	Skin: Warm    :    LABS/STUDIES  --------------------------------------------------------------------------------              9.4    8.48  >-----------<  296      [04-06-25 @ 04:30]              29.0     139  |  109  |  51.2  ----------------------------<  168      [04-06-25 @ 04:30]  4.5   |  17.0  |  5.46        Ca     8.2     [04-06-25 @ 04:30]      Mg     2.0     [04-06-25 @ 04:30]      Creatinine Trend:  SCr 5.46 [04-06 @ 04:30]  SCr 6.04 [04-04 @ 05:54]  SCr 5.56 [04-03 @ 05:35]  SCr 4.86 [04-02 @ 05:16]  SCr 4.83 [03-31 @ 04:28]    Urinalysis - [04-06-25 @ 04:30]      Color  / Appearance  / SG  / pH       Gluc 168 / Ketone   / Bili  / Urobili        Blood  / Protein  / Leuk Est  / Nitrite       RBC  / WBC  / Hyaline  / Gran  / Sq Epi  / Non Sq Epi  / Bacteria     Urine Creatinine 115      [04-04-25 @ 19:52]  Urine Protein >400.0      [04-04-25 @ 19:52]  Urine Sodium 76      [04-02-25 @ 11:34]  Urine Urea Nitrogen 427      [04-02-25 @ 11:34]  Urine Potassium 29      [04-02-25 @ 11:34]  Urine Osmolality 388      [04-02-25 @ 11:34]    Lipid: chol 100, , HDL 23, LDL --      [08-08-24 @ 06:35]      dsDNA <1      [04-04-25 @ 05:54]  C3 Complement 132      [04-04-25 @ 05:54]  C4 Complement 47      [04-04-25 @ 05:54]  anti-GBM <0.2      [04-04-25 @ 05:54]  Free Light Chains: kappa 25.88, lambda 20.87, ratio = 1.24      [04-04 @ 05:54]

## 2025-04-06 NOTE — PROGRESS NOTE ADULT - ASSESSMENT
63 YOM DM, HIV on HAART admitted after fall.  Nephrology consulted for progressive worsening CKD.    Acute kidney injury on CKD stage III versus progressive CKD stage V  Nephrotic syndrome  pt has h.o long standing DM (about 4 decades- untreated- sugars in '300s' as per ex wife)  HIV- VL > 300 k  -Serum creatinine has slowly progressed now to 6.04-> 5.46  -UA with proteinuria more than 1000 g on last check in May 24  -Repeat UA reviewed; heavy proteinuria and glucose- Tp/cr ratio ~3.3  _Renal ultrasound reviewed; renal medical disease  -Ddx- DMN vs  HIVAN vs Nephrotoxicity from tenofovir alafenamide (less likely)  Serological work up in progress  - pending kidney biopsy  -will need RRT in near future    Hypokalemia: Replete K+ as needed  Anemia: CKD versus RITESH.  Check iron anemia panel- LDH, haptoglobin  HTN: BP uncontrolled- continue amlodipine and  metoprolol- started on Hydralazine  Metabolic acidosis; start sodium bicarb   HIV on Bikatrvy- meds now being changed as per ID  DM: discontinued metformin (low GFR), place on Lantus + SSI ACHS

## 2025-04-06 NOTE — PROGRESS NOTE ADULT - SUBJECTIVE AND OBJECTIVE BOX
[facility] Division of Hospital Medicine    INTERVAL HISTORY:  Overnight, no acute events.     Patient seen and examined at bedside this morning. Patient denies chest pain, SOB, abd pain, N/V, fever, chills, dysuria or any other complaints.     MEDICATIONS  (STANDING):  acetaminophen     Tablet .. 975 milliGRAM(s) Oral every 8 hours  amLODIPine   Tablet 10 milliGRAM(s) Oral daily  artificial tears (preservative free) Ophthalmic Solution 2 Drop(s) Both EYES every 8 hours  atorvastatin 40 milliGRAM(s) Oral at bedtime  chlorhexidine 2% Cloths 1 Application(s) Topical <User Schedule>  dextrose 5%. 1000 milliLiter(s) (100 mL/Hr) IV Continuous <Continuous>  dextrose 5%. 1000 milliLiter(s) (50 mL/Hr) IV Continuous <Continuous>  dextrose 50% Injectable 25 Gram(s) IV Push once  dextrose 50% Injectable 12.5 Gram(s) IV Push once  dextrose 50% Injectable 25 Gram(s) IV Push once  dolutegravir 50 milliGRAM(s) Oral daily  gabapentin 100 milliGRAM(s) Oral three times a day  glucagon  Injectable 1 milliGRAM(s) IntraMuscular once  hydrALAZINE 10 milliGRAM(s) Oral every 6 hours  insulin glargine Injectable (LANTUS) 10 Unit(s) SubCutaneous at bedtime  insulin lispro (ADMELOG) corrective regimen sliding scale   SubCutaneous three times a day before meals  lamiVUDine- milliGRAM(s) Oral daily  metoprolol tartrate 50 milliGRAM(s) Oral two times a day  prednisoLONE acetate 1% Suspension 1 Drop(s) Both EYES four times a day  trimethoprim/polymyxin Solution 1 Drop(s) Both EYES daily    MEDICATIONS  (PRN):  aluminum hydroxide/magnesium hydroxide/simethicone Suspension 30 milliLiter(s) Oral every 4 hours PRN Dyspepsia  dextrose Oral Gel 15 Gram(s) Oral once PRN Blood Glucose LESS THAN 70 milliGRAM(s)/deciliter  melatonin 3 milliGRAM(s) Oral at bedtime PRN Insomnia  ondansetron Injectable 4 milliGRAM(s) IV Push every 8 hours PRN Nausea and/or Vomiting        I&O's Summary    05 Apr 2025 07:01  -  06 Apr 2025 07:00  --------------------------------------------------------  IN: 440 mL / OUT: 0 mL / NET: 440 mL        PHYSICAL EXAM:  Vital Signs Last 24 Hrs  T(C): 36.8 (06 Apr 2025 08:10), Max: 36.9 (06 Apr 2025 05:30)  T(F): 98.3 (06 Apr 2025 08:10), Max: 98.5 (06 Apr 2025 05:30)  HR: 67 (06 Apr 2025 08:10) (67 - 94)  BP: 115/75 (06 Apr 2025 08:10) (115/75 - 157/66)  BP(mean): 102 (05 Apr 2025 12:17) (102 - 102)  RR: 18 (06 Apr 2025 08:10) (17 - 18)  SpO2: 95% (06 Apr 2025 08:10) (94% - 97%)    Parameters below as of 06 Apr 2025 05:30  Patient On (Oxygen Delivery Method): room air      CONSTITUTIONAL: No apparent distress  HEENT: Normocephalic, Atraumatic  RESPIRATORY:  lungs are clear to auscultation bilaterally, No crackles, rhonchi, wheezes  CARDIOVASCULAR: Regular rate and rhythm, No lower extremity edema  ABDOMEN: Soft, non-distended, nontender to palpation, +BS  MUSCLOSKELETAL: Normal gait  PSYCH: thoughts linear, affect appropriate  NEUROLOGY: Alert, Oriented x3    LABS:                        9.4    8.48  )-----------( 296      ( 06 Apr 2025 04:30 )             29.0     04-06    139  |  109[H]  |  51.2[H]  ----------------------------<  168[H]  4.5   |  17.0[L]  |  5.46[H]    Ca    8.2[L]      06 Apr 2025 04:30  Mg     2.0     04-06            Urinalysis Basic - ( 06 Apr 2025 04:30 )    Color: x / Appearance: x / SG: x / pH: x  Gluc: 168 mg/dL / Ketone: x  / Bili: x / Urobili: x   Blood: x / Protein: x / Nitrite: x   Leuk Esterase: x / RBC: x / WBC x   Sq Epi: x / Non Sq Epi: x / Bacteria: x        CAPILLARY BLOOD GLUCOSE      POCT Blood Glucose.: 158 mg/dL (06 Apr 2025 08:15)  POCT Blood Glucose.: 239 mg/dL (05 Apr 2025 21:17)  POCT Blood Glucose.: 153 mg/dL (05 Apr 2025 16:59)  POCT Blood Glucose.: 263 mg/dL (05 Apr 2025 12:04)        RADIOLOGY & ADDITIONAL TESTS:  Results Reviewed
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
## 2025-04-07 ENCOUNTER — RESULT REVIEW (OUTPATIENT)
Age: 65
End: 2025-04-07

## 2025-04-07 LAB
ANION GAP SERPL CALC-SCNC: 13 MMOL/L — SIGNIFICANT CHANGE UP (ref 5–17)
BUN SERPL-MCNC: 55.6 MG/DL — HIGH (ref 8–20)
CALCIUM SERPL-MCNC: 8.4 MG/DL — SIGNIFICANT CHANGE UP (ref 8.4–10.5)
CHLORIDE SERPL-SCNC: 107 MMOL/L — SIGNIFICANT CHANGE UP (ref 96–108)
CO2 SERPL-SCNC: 17 MMOL/L — LOW (ref 22–29)
CREAT SERPL-MCNC: 5.61 MG/DL — HIGH (ref 0.5–1.3)
EGFR: 11 ML/MIN/1.73M2 — LOW
EGFR: 11 ML/MIN/1.73M2 — LOW
GLUCOSE BLDC GLUCOMTR-MCNC: 153 MG/DL — HIGH (ref 70–99)
GLUCOSE BLDC GLUCOMTR-MCNC: 160 MG/DL — HIGH (ref 70–99)
GLUCOSE BLDC GLUCOMTR-MCNC: 97 MG/DL — SIGNIFICANT CHANGE UP (ref 70–99)
GLUCOSE SERPL-MCNC: 168 MG/DL — HIGH (ref 70–99)
HCT VFR BLD CALC: 28.1 % — LOW (ref 39–50)
HGB BLD-MCNC: 9.1 G/DL — LOW (ref 13–17)
MCHC RBC-ENTMCNC: 28.9 PG — SIGNIFICANT CHANGE UP (ref 27–34)
MCHC RBC-ENTMCNC: 32.4 G/DL — SIGNIFICANT CHANGE UP (ref 32–36)
MCV RBC AUTO: 89.2 FL — SIGNIFICANT CHANGE UP (ref 80–100)
NRBC # BLD AUTO: 0 K/UL — SIGNIFICANT CHANGE UP (ref 0–0)
NRBC # FLD: 0 K/UL — SIGNIFICANT CHANGE UP (ref 0–0)
NRBC BLD AUTO-RTO: 0 /100 WBCS — SIGNIFICANT CHANGE UP (ref 0–0)
PLATELET # BLD AUTO: 303 K/UL — SIGNIFICANT CHANGE UP (ref 150–400)
PMV BLD: 9.1 FL — SIGNIFICANT CHANGE UP (ref 7–13)
POTASSIUM SERPL-MCNC: 4.8 MMOL/L — SIGNIFICANT CHANGE UP (ref 3.5–5.3)
POTASSIUM SERPL-SCNC: 4.8 MMOL/L — SIGNIFICANT CHANGE UP (ref 3.5–5.3)
RBC # BLD: 3.15 M/UL — LOW (ref 4.2–5.8)
RBC # FLD: 14.5 % — SIGNIFICANT CHANGE UP (ref 10.3–14.5)
SODIUM SERPL-SCNC: 137 MMOL/L — SIGNIFICANT CHANGE UP (ref 135–145)
WBC # BLD: 8.31 K/UL — SIGNIFICANT CHANGE UP (ref 3.8–10.5)
WBC # FLD AUTO: 8.31 K/UL — SIGNIFICANT CHANGE UP (ref 3.8–10.5)

## 2025-04-07 PROCEDURE — 77012 CT SCAN FOR NEEDLE BIOPSY: CPT | Mod: 26

## 2025-04-07 PROCEDURE — 88313 SPECIAL STAINS GROUP 2: CPT | Mod: 26

## 2025-04-07 PROCEDURE — 99233 SBSQ HOSP IP/OBS HIGH 50: CPT

## 2025-04-07 PROCEDURE — 88348 ELECTRON MICROSCOPY DX: CPT | Mod: 26

## 2025-04-07 PROCEDURE — 88350 IMFLUOR EA ADDL 1ANTB STN PX: CPT | Mod: 26

## 2025-04-07 PROCEDURE — 88346 IMFLUOR 1ST 1ANTB STAIN PX: CPT | Mod: 26

## 2025-04-07 PROCEDURE — 50200 RENAL BIOPSY PERQ: CPT | Mod: LT

## 2025-04-07 PROCEDURE — 88305 TISSUE EXAM BY PATHOLOGIST: CPT | Mod: 26

## 2025-04-07 RX ORDER — SODIUM BICARBONATE 1 MEQ/ML
650 SYRINGE (ML) INTRAVENOUS
Refills: 0 | Status: DISCONTINUED | OUTPATIENT
Start: 2025-04-07 | End: 2025-04-12

## 2025-04-07 RX ORDER — NALOXONE HYDROCHLORIDE 0.4 MG/ML
4 INJECTION, SOLUTION INTRAMUSCULAR; INTRAVENOUS; SUBCUTANEOUS ONCE
Refills: 0 | Status: COMPLETED | OUTPATIENT
Start: 2025-04-07 | End: 2025-04-07

## 2025-04-07 RX ADMIN — Medication 975 MILLIGRAM(S): at 22:23

## 2025-04-07 RX ADMIN — Medication 975 MILLIGRAM(S): at 21:23

## 2025-04-07 RX ADMIN — GABAPENTIN 100 MILLIGRAM(S): 400 CAPSULE ORAL at 21:23

## 2025-04-07 RX ADMIN — Medication 10 MILLIGRAM(S): at 05:17

## 2025-04-07 RX ADMIN — Medication 2 DROP(S): at 05:15

## 2025-04-07 RX ADMIN — METOPROLOL SUCCINATE 50 MILLIGRAM(S): 50 TABLET, EXTENDED RELEASE ORAL at 05:15

## 2025-04-07 RX ADMIN — INSULIN GLARGINE-YFGN 10 UNIT(S): 100 INJECTION, SOLUTION SUBCUTANEOUS at 21:24

## 2025-04-07 RX ADMIN — Medication 1 DROP(S): at 23:38

## 2025-04-07 RX ADMIN — Medication 1 DROP(S): at 05:16

## 2025-04-07 RX ADMIN — Medication 1 APPLICATION(S): at 05:16

## 2025-04-07 RX ADMIN — Medication 10 MILLIGRAM(S): at 23:36

## 2025-04-07 RX ADMIN — GABAPENTIN 100 MILLIGRAM(S): 400 CAPSULE ORAL at 05:15

## 2025-04-07 RX ADMIN — Medication 10 MILLIGRAM(S): at 17:21

## 2025-04-07 RX ADMIN — Medication 3 MILLIGRAM(S): at 23:36

## 2025-04-07 RX ADMIN — AMLODIPINE BESYLATE 10 MILLIGRAM(S): 10 TABLET ORAL at 05:15

## 2025-04-07 RX ADMIN — METOPROLOL SUCCINATE 50 MILLIGRAM(S): 50 TABLET, EXTENDED RELEASE ORAL at 17:21

## 2025-04-07 RX ADMIN — INSULIN LISPRO 1: 100 INJECTION, SOLUTION INTRAVENOUS; SUBCUTANEOUS at 08:07

## 2025-04-07 RX ADMIN — Medication 975 MILLIGRAM(S): at 05:15

## 2025-04-07 RX ADMIN — LAMIVUDINE 100 MILLIGRAM(S): 10 SOLUTION ORAL at 17:21

## 2025-04-07 RX ADMIN — Medication 2 DROP(S): at 21:23

## 2025-04-07 RX ADMIN — DOLUTEGRAVIR SODIUM 50 MILLIGRAM(S): 5 TABLET, FOR SUSPENSION ORAL at 17:20

## 2025-04-07 RX ADMIN — NALOXONE HYDROCHLORIDE 4 MILLIGRAM(S): 0.4 INJECTION, SOLUTION INTRAMUSCULAR; INTRAVENOUS; SUBCUTANEOUS at 15:38

## 2025-04-07 RX ADMIN — ATORVASTATIN CALCIUM 40 MILLIGRAM(S): 80 TABLET, FILM COATED ORAL at 21:23

## 2025-04-07 RX ADMIN — Medication 1 DROP(S): at 17:19

## 2025-04-07 NOTE — PROGRESS NOTE ADULT - ASSESSMENT
63 YOM DM, HIV on HAART admitted after fall.  Nephrology consulted for progressive worsening CKD.    Acute kidney injury on CKD stage III versus progressive CKD stage V  Nephrotic syndrome  pt has h.o long standing DM (about 4 decades- untreated- sugars in '300s' as per ex wife)  HIV- VL > 300 k  -Serum creatinine has slowly progressed now to 6.04-> 5.6  -UA with proteinuria more than 1000 g on last check in May 24  -Repeat UA reviewed; heavy proteinuria and glucose- Tp/cr ratio ~3.3  -Renal ultrasound reviewed; renal medical disease  -Ddx- DMN vs  HIVAN vs Nephrotoxicity from tenofovir alafenamide (less likely)  Serological work up in progress; WNL   -pending kidney biopsy  -will need RRT in near future    Hypokalemia: Replete K+ as needed  Anemia: CKD versus RITESH.  Check iron anemia panel- LDH, haptoglobin  HTN: BP controlled- continue amlodipine, metoprolol, Hydralazine  Metabolic acidosis; start on po sodium bicarb 1300 mg BID  HIV on Bikatrvy- meds now being changed as per ID  DM: discontinued metformin (low GFR), place on Lantus + SSI ACHS    Discussed in detail w/ patient

## 2025-04-07 NOTE — PROGRESS NOTE ADULT - SUBJECTIVE AND OBJECTIVE BOX
[facility] DIVISION OF KIDNEY DISEASES AND HYPERTENSION -- FOLLOW UP NOTE  --------------------------------------------------------------------------------  Chief Complaint: Geoff    24 hour events/subjective:  no acute event noted  pt seen and examined; feels well  plan for kidney biopsy later today    PAST HISTORY  --------------------------------------------------------------------------------  No significant changes to PMH, PSH, FHx, SHx, unless otherwise noted    ALLERGIES & MEDICATIONS  --------------------------------------------------------------------------------  Allergies  No Known Allergies    Standing Inpatient Medications  acetaminophen     Tablet .. 975 milliGRAM(s) Oral every 8 hours  amLODIPine   Tablet 10 milliGRAM(s) Oral daily  artificial tears (preservative free) Ophthalmic Solution 2 Drop(s) Both EYES every 8 hours  atorvastatin 40 milliGRAM(s) Oral at bedtime  chlorhexidine 2% Cloths 1 Application(s) Topical <User Schedule>  dextrose 5%. 1000 milliLiter(s) IV Continuous <Continuous>  dextrose 5%. 1000 milliLiter(s) IV Continuous <Continuous>  dextrose 50% Injectable 25 Gram(s) IV Push once  dextrose 50% Injectable 12.5 Gram(s) IV Push once  dextrose 50% Injectable 25 Gram(s) IV Push once  dolutegravir 50 milliGRAM(s) Oral daily  gabapentin 100 milliGRAM(s) Oral three times a day  glucagon  Injectable 1 milliGRAM(s) IntraMuscular once  hydrALAZINE 10 milliGRAM(s) Oral every 6 hours  insulin glargine Injectable (LANTUS) 10 Unit(s) SubCutaneous at bedtime  insulin lispro (ADMELOG) corrective regimen sliding scale   SubCutaneous three times a day before meals  lamiVUDine- milliGRAM(s) Oral daily  metoprolol tartrate 50 milliGRAM(s) Oral two times a day  prednisoLONE acetate 1% Suspension 1 Drop(s) Both EYES four times a day  trimethoprim/polymyxin Solution 1 Drop(s) Both EYES daily    PRN Inpatient Medications  aluminum hydroxide/magnesium hydroxide/simethicone Suspension 30 milliLiter(s) Oral every 4 hours PRN  dextrose Oral Gel 15 Gram(s) Oral once PRN  melatonin 3 milliGRAM(s) Oral at bedtime PRN  ondansetron Injectable 4 milliGRAM(s) IV Push every 8 hours PRN      REVIEW OF SYSTEMS  --------------------------------------------------------------------------------  Gen: No weight changes, fatigue, fevers/chills, weakness  Skin: No rashes  Head/Eyes/Ears/Mouth: No headache; Normal hearing; Normal vision w/o blurriness; No sinus pain/discomfort, sore throat  Respiratory: No dyspnea, cough, wheezing, hemoptysis  CV: No chest pain, PND, orthopnea  GI: No abdominal pain, diarrhea, constipation, nausea, vomiting, melena, hematochezia  : No increased frequency, dysuria, hematuria, nocturia  MSK: No joint pain/swelling; no back pain; no edema  Neuro: No dizziness/lightheadedness, weakness, seizures, numbness, tingling  Heme: No easy bruising or bleeding  Endo: No heat/cold intolerance  Psych: No significant nervousness, anxiety, stress, depression    All other systems were reviewed and are negative, except as noted.    VITALS/PHYSICAL EXAM  --------------------------------------------------------------------------------  Vital Signs Last 24 Hrs  T(C): 36.6 (07 Apr 2025 17:15), Max: 36.6 (06 Apr 2025 20:40)  T(F): 97.9 (07 Apr 2025 17:15), Max: 97.9 (06 Apr 2025 20:40)  HR: 73 (07 Apr 2025 17:15) (65 - 76)  BP: 147/67 (07 Apr 2025 17:15) (124/64 - 155/86)  BP(mean): 84 (06 Apr 2025 20:40) (84 - 84)  RR: 19 (07 Apr 2025 17:15) (17 - 20)  SpO2: 100% (07 Apr 2025 17:15) (97% - 100%)  Patient On (Oxygen Delivery Method): room air    I&O's Summary    Physical Exam:  	Gen: NAD  	HEENT- Supple neck, clear oropharynx  	Pulm: CTA B/L  	CV: RRR, S1S2; no rub  	Back: No spinal or CVA tenderness; no sacral edema  	Abd: +BS, soft, nontender/nondistended  	: No suprapubic tenderness  	UE: Warm, no edema  	LE: Warm,  no edema  	Neuro: No focal deficit  	Psych: Normal affect and mood  	Skin: Warm    :    LABS/STUDIES  --------------------------------------------------------------------------------  04-07    137  |  107  |  55.6[H]  ----------------------------<  168[H]  4.8   |  17.0[L]  |  5.61[H]    Ca    8.4      07 Apr 2025 04:30  Mg     2.0     04-06    Creatinine Trend:  SCr 5.46 [04-06 @ 04:30]  SCr 6.04 [04-04 @ 05:54]  SCr 5.56 [04-03 @ 05:35]  SCr 4.86 [04-02 @ 05:16]  SCr 4.83 [03-31 @ 04:28]                          9.1    8.31  )-----------( 303      ( 07 Apr 2025 04:30 )             28.1     Urine Creatinine 115      [04-04-25 @ 19:52]  Urine Protein >400.0      [04-04-25 @ 19:52]  Urine Sodium 76      [04-02-25 @ 11:34]  Urine Urea Nitrogen 427      [04-02-25 @ 11:34]  Urine Potassium 29      [04-02-25 @ 11:34]  Urine Osmolality 388      [04-02-25 @ 11:34]    Lipid: chol 100, , HDL 23, LDL --      [08-08-24 @ 06:35]      dsDNA <1      [04-04-25 @ 05:54]  C3 Complement 132      [04-04-25 @ 05:54]  C4 Complement 47      [04-04-25 @ 05:54]  anti-GBM <0.2      [04-04-25 @ 05:54]  Free Light Chains: kappa 25.88, lambda 20.87, ratio = 1.24      [04-04 @ 05:54]
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
## 2025-04-07 NOTE — CHART NOTE - NSCHARTNOTEFT_GEN_A_CORE
Notified by RN, patient received lethargic from renal biopsy where he recived narcan x1 dose. Vitals stable. Patient is very sleepy but arousable. Able to answer mentation questions. MD notified. RN to notify provider with any changes. Notified by RN, patient received lethargic from renal biopsy where he recived narcan x1 dose. /86, 65: . Patient is very sleepy but arousable. Able to answer mentation questions. MD notified. RN to notify provider with any changes. Notified by RN, patient received lethargic from renal biopsy where he recived narcan x1 dose. /86, 65: . Patient is very sleepy but arousable. Able to answer mentation questions. MD notified. STAT Narcan  intranasal x 1 dose ordered.  RN to notify provider with any changes.

## 2025-04-07 NOTE — PROGRESS NOTE ADULT - SUBJECTIVE AND OBJECTIVE BOX
[facility] Division of Hospital Medicine    INTERVAL HISTORY:  Overnight, placed on safety sit as pt wanting to leave due to hunger.     Patient seen and examined at bedside this morning. Patient reports feeling well but is very hungry. He reports vision is good, able to see. Patient denies chest pain, SOB, abd pain, N/V, fever, chills, dysuria or any other complaints.     MEDICATIONS  (STANDING):  acetaminophen     Tablet .. 975 milliGRAM(s) Oral every 8 hours  amLODIPine   Tablet 10 milliGRAM(s) Oral daily  artificial tears (preservative free) Ophthalmic Solution 2 Drop(s) Both EYES every 8 hours  atorvastatin 40 milliGRAM(s) Oral at bedtime  chlorhexidine 2% Cloths 1 Application(s) Topical <User Schedule>  dextrose 5%. 1000 milliLiter(s) (100 mL/Hr) IV Continuous <Continuous>  dextrose 5%. 1000 milliLiter(s) (50 mL/Hr) IV Continuous <Continuous>  dextrose 50% Injectable 25 Gram(s) IV Push once  dextrose 50% Injectable 12.5 Gram(s) IV Push once  dextrose 50% Injectable 25 Gram(s) IV Push once  dolutegravir 50 milliGRAM(s) Oral daily  gabapentin 100 milliGRAM(s) Oral three times a day  glucagon  Injectable 1 milliGRAM(s) IntraMuscular once  hydrALAZINE 10 milliGRAM(s) Oral every 6 hours  insulin glargine Injectable (LANTUS) 10 Unit(s) SubCutaneous at bedtime  insulin lispro (ADMELOG) corrective regimen sliding scale   SubCutaneous three times a day before meals  lamiVUDine- milliGRAM(s) Oral daily  metoprolol tartrate 50 milliGRAM(s) Oral two times a day  prednisoLONE acetate 1% Suspension 1 Drop(s) Both EYES four times a day  trimethoprim/polymyxin Solution 1 Drop(s) Both EYES daily    MEDICATIONS  (PRN):  aluminum hydroxide/magnesium hydroxide/simethicone Suspension 30 milliLiter(s) Oral every 4 hours PRN Dyspepsia  dextrose Oral Gel 15 Gram(s) Oral once PRN Blood Glucose LESS THAN 70 milliGRAM(s)/deciliter  melatonin 3 milliGRAM(s) Oral at bedtime PRN Insomnia  ondansetron Injectable 4 milliGRAM(s) IV Push every 8 hours PRN Nausea and/or Vomiting        I&O's Summary      PHYSICAL EXAM:  Vital Signs Last 24 Hrs  T(C): 36.4 (07 Apr 2025 08:03), Max: 36.7 (06 Apr 2025 15:45)  T(F): 97.6 (07 Apr 2025 08:03), Max: 98 (06 Apr 2025 15:45)  HR: 69 (07 Apr 2025 11:35) (68 - 76)  BP: 146/73 (07 Apr 2025 11:35) (119/64 - 146/80)  BP(mean): 84 (06 Apr 2025 20:40) (84 - 84)  RR: 19 (07 Apr 2025 11:35) (17 - 19)  SpO2: 97% (07 Apr 2025 11:35) (97% - 99%)    Parameters below as of 07 Apr 2025 11:35  Patient On (Oxygen Delivery Method): room air      CONSTITUTIONAL: No apparent distress  HEENT: Normocephalic, Atraumatic  RESPIRATORY:  lungs are clear to auscultation bilaterally, No crackles, rhonchi, wheezes  CARDIOVASCULAR: Regular rate and rhythm, No lower extremity edema  ABDOMEN: Soft, non-distended, nontender to palpation, +BS  MUSCLOSKELETAL: Normal gait  PSYCH: thoughts linear, affect appropriate  NEUROLOGY: Alert, Oriented x3    LABS:                        9.1    8.31  )-----------( 303      ( 07 Apr 2025 04:30 )             28.1     04-07    137  |  107  |  55.6[H]  ----------------------------<  168[H]  4.8   |  17.0[L]  |  5.61[H]    Ca    8.4      07 Apr 2025 04:30  Mg     2.0     04-06            Urinalysis Basic - ( 07 Apr 2025 04:30 )    Color: x / Appearance: x / SG: x / pH: x  Gluc: 168 mg/dL / Ketone: x  / Bili: x / Urobili: x   Blood: x / Protein: x / Nitrite: x   Leuk Esterase: x / RBC: x / WBC x   Sq Epi: x / Non Sq Epi: x / Bacteria: x        CAPILLARY BLOOD GLUCOSE      POCT Blood Glucose.: 153 mg/dL (07 Apr 2025 07:46)  POCT Blood Glucose.: 274 mg/dL (06 Apr 2025 21:25)  POCT Blood Glucose.: 167 mg/dL (06 Apr 2025 16:54)        RADIOLOGY & ADDITIONAL TESTS:  Results Reviewed

## 2025-04-08 LAB
ANA TITR SER: NEGATIVE — SIGNIFICANT CHANGE UP
ANION GAP SERPL CALC-SCNC: 12 MMOL/L — SIGNIFICANT CHANGE UP (ref 5–17)
AUTO DIFF PNL BLD: NEGATIVE — SIGNIFICANT CHANGE UP
BUN SERPL-MCNC: 58.6 MG/DL — HIGH (ref 8–20)
C-ANCA SER-ACNC: NEGATIVE — SIGNIFICANT CHANGE UP
CALCIUM SERPL-MCNC: 8 MG/DL — LOW (ref 8.4–10.5)
CHLORIDE SERPL-SCNC: 106 MMOL/L — SIGNIFICANT CHANGE UP (ref 96–108)
CO2 SERPL-SCNC: 19 MMOL/L — LOW (ref 22–29)
CREAT SERPL-MCNC: 5.48 MG/DL — HIGH (ref 0.5–1.3)
CRYOGLOB SERPL-MCNC: NEGATIVE — SIGNIFICANT CHANGE UP
EGFR: 11 ML/MIN/1.73M2 — LOW
EGFR: 11 ML/MIN/1.73M2 — LOW
GLUCOSE BLDC GLUCOMTR-MCNC: 165 MG/DL — HIGH (ref 70–99)
GLUCOSE BLDC GLUCOMTR-MCNC: 180 MG/DL — HIGH (ref 70–99)
GLUCOSE BLDC GLUCOMTR-MCNC: 222 MG/DL — HIGH (ref 70–99)
GLUCOSE BLDC GLUCOMTR-MCNC: 378 MG/DL — HIGH (ref 70–99)
GLUCOSE SERPL-MCNC: 301 MG/DL — HIGH (ref 70–99)
HCT VFR BLD CALC: 26.6 % — LOW (ref 39–50)
HGB BLD-MCNC: 8.7 G/DL — LOW (ref 13–17)
HISTONE AB SER-ACNC: 0.5 UNITS — SIGNIFICANT CHANGE UP (ref 0–0.9)
MCHC RBC-ENTMCNC: 29 PG — SIGNIFICANT CHANGE UP (ref 27–34)
MCHC RBC-ENTMCNC: 32.7 G/DL — SIGNIFICANT CHANGE UP (ref 32–36)
MCV RBC AUTO: 88.7 FL — SIGNIFICANT CHANGE UP (ref 80–100)
MPO AB + PR3 PNL SER: SIGNIFICANT CHANGE UP
NRBC # BLD AUTO: 0 K/UL — SIGNIFICANT CHANGE UP (ref 0–0)
NRBC # FLD: 0 K/UL — SIGNIFICANT CHANGE UP (ref 0–0)
NRBC BLD AUTO-RTO: 0 /100 WBCS — SIGNIFICANT CHANGE UP (ref 0–0)
P-ANCA SER-ACNC: NEGATIVE — SIGNIFICANT CHANGE UP
PLATELET # BLD AUTO: 288 K/UL — SIGNIFICANT CHANGE UP (ref 150–400)
PMV BLD: 9.4 FL — SIGNIFICANT CHANGE UP (ref 7–13)
POTASSIUM SERPL-MCNC: 4.8 MMOL/L — SIGNIFICANT CHANGE UP (ref 3.5–5.3)
POTASSIUM SERPL-SCNC: 4.8 MMOL/L — SIGNIFICANT CHANGE UP (ref 3.5–5.3)
RBC # BLD: 3 M/UL — LOW (ref 4.2–5.8)
RBC # FLD: 14.6 % — HIGH (ref 10.3–14.5)
SODIUM SERPL-SCNC: 137 MMOL/L — SIGNIFICANT CHANGE UP (ref 135–145)
WBC # BLD: 6.52 K/UL — SIGNIFICANT CHANGE UP (ref 3.8–10.5)
WBC # FLD AUTO: 6.52 K/UL — SIGNIFICANT CHANGE UP (ref 3.8–10.5)

## 2025-04-08 PROCEDURE — 99233 SBSQ HOSP IP/OBS HIGH 50: CPT

## 2025-04-08 RX ADMIN — INSULIN LISPRO 2: 100 INJECTION, SOLUTION INTRAVENOUS; SUBCUTANEOUS at 12:32

## 2025-04-08 RX ADMIN — Medication 1 DROP(S): at 05:14

## 2025-04-08 RX ADMIN — AMLODIPINE BESYLATE 10 MILLIGRAM(S): 10 TABLET ORAL at 05:15

## 2025-04-08 RX ADMIN — Medication 650 MILLIGRAM(S): at 05:14

## 2025-04-08 RX ADMIN — Medication 1 DROP(S): at 12:34

## 2025-04-08 RX ADMIN — METOPROLOL SUCCINATE 50 MILLIGRAM(S): 50 TABLET, EXTENDED RELEASE ORAL at 05:15

## 2025-04-08 RX ADMIN — Medication 2 DROP(S): at 21:42

## 2025-04-08 RX ADMIN — Medication 975 MILLIGRAM(S): at 22:42

## 2025-04-08 RX ADMIN — Medication 10 MILLIGRAM(S): at 23:51

## 2025-04-08 RX ADMIN — Medication 10 MILLIGRAM(S): at 17:40

## 2025-04-08 RX ADMIN — Medication 975 MILLIGRAM(S): at 13:30

## 2025-04-08 RX ADMIN — POLYMYXIN B SULFATE AND TRIMETHOPRIM SULFATE 1 DROP(S): 10000; 1 SOLUTION/ DROPS OPHTHALMIC at 08:28

## 2025-04-08 RX ADMIN — Medication 2 DROP(S): at 12:41

## 2025-04-08 RX ADMIN — Medication 975 MILLIGRAM(S): at 21:42

## 2025-04-08 RX ADMIN — INSULIN LISPRO 5: 100 INJECTION, SOLUTION INTRAVENOUS; SUBCUTANEOUS at 08:29

## 2025-04-08 RX ADMIN — INSULIN GLARGINE-YFGN 10 UNIT(S): 100 INJECTION, SOLUTION SUBCUTANEOUS at 22:10

## 2025-04-08 RX ADMIN — Medication 3 MILLIGRAM(S): at 21:43

## 2025-04-08 RX ADMIN — LAMIVUDINE 100 MILLIGRAM(S): 10 SOLUTION ORAL at 08:28

## 2025-04-08 RX ADMIN — Medication 1 APPLICATION(S): at 05:15

## 2025-04-08 RX ADMIN — Medication 975 MILLIGRAM(S): at 05:14

## 2025-04-08 RX ADMIN — ATORVASTATIN CALCIUM 40 MILLIGRAM(S): 80 TABLET, FILM COATED ORAL at 21:43

## 2025-04-08 RX ADMIN — INSULIN LISPRO 1: 100 INJECTION, SOLUTION INTRAVENOUS; SUBCUTANEOUS at 17:42

## 2025-04-08 RX ADMIN — Medication 1 DROP(S): at 17:42

## 2025-04-08 RX ADMIN — GABAPENTIN 100 MILLIGRAM(S): 400 CAPSULE ORAL at 05:15

## 2025-04-08 RX ADMIN — Medication 2 DROP(S): at 05:14

## 2025-04-08 RX ADMIN — GABAPENTIN 100 MILLIGRAM(S): 400 CAPSULE ORAL at 12:33

## 2025-04-08 RX ADMIN — Medication 975 MILLIGRAM(S): at 12:32

## 2025-04-08 RX ADMIN — Medication 650 MILLIGRAM(S): at 17:41

## 2025-04-08 RX ADMIN — GABAPENTIN 100 MILLIGRAM(S): 400 CAPSULE ORAL at 21:42

## 2025-04-08 RX ADMIN — Medication 975 MILLIGRAM(S): at 06:14

## 2025-04-08 RX ADMIN — METOPROLOL SUCCINATE 50 MILLIGRAM(S): 50 TABLET, EXTENDED RELEASE ORAL at 17:40

## 2025-04-08 RX ADMIN — Medication 10 MILLIGRAM(S): at 12:33

## 2025-04-08 RX ADMIN — Medication 1 DROP(S): at 23:52

## 2025-04-08 RX ADMIN — Medication 10 MILLIGRAM(S): at 05:15

## 2025-04-08 RX ADMIN — DOLUTEGRAVIR SODIUM 50 MILLIGRAM(S): 5 TABLET, FOR SUSPENSION ORAL at 08:28

## 2025-04-08 NOTE — CHART NOTE - NSCHARTNOTEFT_GEN_A_CORE
Sight MD called for medication recommendations. Called left voicemail. No call back, called again and left message. At this time still no callback.
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
## 2025-04-08 NOTE — PROGRESS NOTE ADULT - ASSESSMENT
63 YOM DM, HIV on HAART admitted after fall.  Nephrology consulted for progressive worsening CKD.    Acute kidney injury on CKD stage III versus progressive CKD stage V (likely later)  Nephrotic syndrome  pt has h.o long standing DM (about 4 decades- untreated- sugars in '300s' as per ex wife)  HIV- VL > 300 k  -Serum creatinine has slowly progressed now to ~5.5  -UA with proteinuria more than 1000 g on last check in May 24  -Repeat UA reviewed; heavy proteinuria and glucose- Tp/cr ratio ~3.3  -Renal ultrasound reviewed; renal medical disease  -Ddx- DMN vs  HIVAN vs Nephrotoxicity from tenofovir alafenamide (less likely)  Serological work up in progress; WNL   -post kidney biopsy pending report  -will need RRT in near future, patient want to wait for the biopsy report first to make any decision    Hypokalemia: Replete K+ as needed  Anemia: CKD versus RITESH.  Check iron anemia panel (ordered). will place on epo.  HTN: BP controlled- continue amlodipine, metoprolol, Hydralazine  Metabolic acidosis; started on po sodium bicarb 1300 mg BID  HIV on Bikatrvy- meds now being changed as per ID  DM: discontinued metformin (low GFR), place on Lantus + SSI ACHS    Discussed in detail w/ patient   63 YOM DM, HIV on HAART admitted after fall.  Nephrology consulted for progressive worsening CKD.    Acute kidney injury on CKD stage III versus progressive CKD stage V (likely later)  Nephrotic syndrome  pt has h.o long standing DM (about 4 decades- untreated- sugars in '300s' as per ex wife)  HIV- VL > 300 k  -Serum creatinine has slowly progressed now to ~5.5  -UA with proteinuria more than 1000 g on last check in May 24  -Repeat UA reviewed; heavy proteinuria and glucose- Tp/cr ratio ~3.3  -Renal ultrasound reviewed; renal medical disease  -Ddx- DMN vs  HIVAN vs Nephrotoxicity from tenofovir alafenamide (less likely)  Serological work up in progress; WNL   -post kidney biopsy pending report  -will need RRT in near future, patient want to wait for the biopsy report first to make any decision    Hypokalemia: Replete K+ as needed  Anemia: CKD versus RITESH.  Check iron anemia panel (ordered). will place on epo.  HTN: BP controlled- continue amlodipine, metoprolol, Hydralazine  Metabolic acidosis; started on po sodium bicarb 1300 mg BID  HIV on Bikatrvy- meds now being changed as per ID  DM: discontinued metformin (low GFR), place on Lantus + SSI ACHS    Discussed in detail w/ patient and Dr Ellis

## 2025-04-08 NOTE — PROGRESS NOTE ADULT - SUBJECTIVE AND OBJECTIVE BOX
[facility] DIVISION OF KIDNEY DISEASES AND HYPERTENSION -- FOLLOW UP NOTE  --------------------------------------------------------------------------------  Chief Complaint: Geoff    24 hour events/subjective:  no acute event noted  pt seen and examined; feels well  denies CP, SOB, N/V, urinary complaints.     PAST HISTORY  --------------------------------------------------------------------------------  No significant changes to PMH, PSH, FHx, SHx, unless otherwise noted    ALLERGIES & MEDICATIONS  --------------------------------------------------------------------------------  Allergies  No Known Allergies    Standing Inpatient Medications  acetaminophen     Tablet .. 975 milliGRAM(s) Oral every 8 hours  amLODIPine   Tablet 10 milliGRAM(s) Oral daily  artificial tears (preservative free) Ophthalmic Solution 2 Drop(s) Both EYES every 8 hours  atorvastatin 40 milliGRAM(s) Oral at bedtime  chlorhexidine 2% Cloths 1 Application(s) Topical <User Schedule>  dextrose 5%. 1000 milliLiter(s) IV Continuous <Continuous>  dextrose 5%. 1000 milliLiter(s) IV Continuous <Continuous>  dextrose 50% Injectable 25 Gram(s) IV Push once  dextrose 50% Injectable 12.5 Gram(s) IV Push once  dextrose 50% Injectable 25 Gram(s) IV Push once  dolutegravir 50 milliGRAM(s) Oral daily  gabapentin 100 milliGRAM(s) Oral three times a day  glucagon  Injectable 1 milliGRAM(s) IntraMuscular once  hydrALAZINE 10 milliGRAM(s) Oral every 6 hours  insulin glargine Injectable (LANTUS) 10 Unit(s) SubCutaneous at bedtime  insulin lispro (ADMELOG) corrective regimen sliding scale   SubCutaneous three times a day before meals  lamiVUDine- milliGRAM(s) Oral daily  metoprolol tartrate 50 milliGRAM(s) Oral two times a day  prednisoLONE acetate 1% Suspension 1 Drop(s) Both EYES four times a day  trimethoprim/polymyxin Solution 1 Drop(s) Both EYES daily    PRN Inpatient Medications  aluminum hydroxide/magnesium hydroxide/simethicone Suspension 30 milliLiter(s) Oral every 4 hours PRN  dextrose Oral Gel 15 Gram(s) Oral once PRN  melatonin 3 milliGRAM(s) Oral at bedtime PRN  ondansetron Injectable 4 milliGRAM(s) IV Push every 8 hours PRN      REVIEW OF SYSTEMS  --------------------------------------------------------------------------------  Gen: No weight changes, fatigue, fevers/chills, weakness  Skin: No rashes  Head/Eyes/Ears/Mouth: No headache; Normal hearing; Normal vision w/o blurriness; No sinus pain/discomfort, sore throat  Respiratory: No dyspnea, cough, wheezing, hemoptysis  CV: No chest pain, PND, orthopnea  GI: No abdominal pain, diarrhea, constipation, nausea, vomiting, melena, hematochezia  : No increased frequency, dysuria, hematuria, nocturia  MSK: No joint pain/swelling; no back pain; no edema  Neuro: No dizziness/lightheadedness, weakness, seizures, numbness, tingling  Heme: No easy bruising or bleeding  Endo: No heat/cold intolerance  Psych: No significant nervousness, anxiety, stress, depression    All other systems were reviewed and are negative, except as noted.    VITALS/PHYSICAL EXAM  --------------------------------------------------------------------------------  Vital Signs Last 24 Hrs  T(C): 36.6 (08 Apr 2025 16:30), Max: 36.9 (08 Apr 2025 07:00)  T(F): 97.9 (08 Apr 2025 16:30), Max: 98.4 (08 Apr 2025 07:00)  HR: 73 (08 Apr 2025 16:30) (65 - 79)  BP: 108/60 (08 Apr 2025 16:30) (108/60 - 147/70)  RR: 18 (08 Apr 2025 16:30) (18 - 19)  SpO2: 96% (08 Apr 2025 16:30) (96% - 100%)  Patient On (Oxygen Delivery Method): room air    Physical Exam:  	Gen: NAD  	HEENT- Supple neck, clear oropharynx  	Pulm: CTA B/L  	CV: RRR, S1S2; no rub  	Back: No spinal or CVA tenderness; no sacral edema  	Abd: +BS, soft, nontender/nondistended  	: No suprapubic tenderness  	UE: Warm, no edema  	LE: Warm,  no edema  	Neuro: No focal deficit  	Psych: Normal affect and mood  	Skin: Warm    :    LABS/STUDIES  --------------------------------------------------------------------------------  04-08    137  |  106  |  58.6[H]  ----------------------------<  301[H]  4.8   |  19.0[L]  |  5.48[H]    Ca    8.0[L]      08 Apr 2025 05:30    Creatinine Trend:  SCr 5.46 [04-06 @ 04:30]  SCr 6.04 [04-04 @ 05:54]  SCr 5.56 [04-03 @ 05:35]  SCr 4.86 [04-02 @ 05:16]  SCr 4.83 [03-31 @ 04:28]                          8.7    6.52  )-----------( 288      ( 08 Apr 2025 05:30 )             26.6       Urine Creatinine 115      [04-04-25 @ 19:52]  Urine Protein >400.0      [04-04-25 @ 19:52]  Urine Sodium 76      [04-02-25 @ 11:34]  Urine Urea Nitrogen 427      [04-02-25 @ 11:34]  Urine Potassium 29      [04-02-25 @ 11:34]  Urine Osmolality 388      [04-02-25 @ 11:34]    Lipid: chol 100, , HDL 23, LDL --      [08-08-24 @ 06:35]      dsDNA <1      [04-04-25 @ 05:54]  C3 Complement 132      [04-04-25 @ 05:54]  C4 Complement 47      [04-04-25 @ 05:54]  anti-GBM <0.2      [04-04-25 @ 05:54]  Free Light Chains: kappa 25.88, lambda 20.87, ratio = 1.24      [04-04 @ 05:54]

## 2025-04-08 NOTE — PROGRESS NOTE ADULT - SUBJECTIVE AND OBJECTIVE BOX
[facility] Division of Hospital Medicine    INTERVAL HISTORY:  Overnight, no acute events.     Patient seen and examined at bedside this morning. Reports feeling well, good vision. Reports some back pain from the bed. Ambulating to the bathroom and around the unit. Tolerating diet. Patient denies chest pain, SOB, abd pain, N/V, fever, chills, dysuria or any other complaints. All remainder ROS negative.     MEDICATIONS  (STANDING):  acetaminophen     Tablet .. 975 milliGRAM(s) Oral every 8 hours  amLODIPine   Tablet 10 milliGRAM(s) Oral daily  artificial tears (preservative free) Ophthalmic Solution 2 Drop(s) Both EYES every 8 hours  atorvastatin 40 milliGRAM(s) Oral at bedtime  chlorhexidine 2% Cloths 1 Application(s) Topical <User Schedule>  dextrose 5%. 1000 milliLiter(s) (50 mL/Hr) IV Continuous <Continuous>  dextrose 5%. 1000 milliLiter(s) (100 mL/Hr) IV Continuous <Continuous>  dextrose 50% Injectable 25 Gram(s) IV Push once  dextrose 50% Injectable 12.5 Gram(s) IV Push once  dextrose 50% Injectable 25 Gram(s) IV Push once  dolutegravir 50 milliGRAM(s) Oral daily  gabapentin 100 milliGRAM(s) Oral three times a day  glucagon  Injectable 1 milliGRAM(s) IntraMuscular once  hydrALAZINE 10 milliGRAM(s) Oral every 6 hours  insulin glargine Injectable (LANTUS) 10 Unit(s) SubCutaneous at bedtime  insulin lispro (ADMELOG) corrective regimen sliding scale   SubCutaneous three times a day before meals  lamiVUDine- milliGRAM(s) Oral daily  metoprolol tartrate 50 milliGRAM(s) Oral two times a day  prednisoLONE acetate 1% Suspension 1 Drop(s) Both EYES four times a day  sodium bicarbonate 650 milliGRAM(s) Oral two times a day  trimethoprim/polymyxin Solution 1 Drop(s) Both EYES daily    MEDICATIONS  (PRN):  aluminum hydroxide/magnesium hydroxide/simethicone Suspension 30 milliLiter(s) Oral every 4 hours PRN Dyspepsia  dextrose Oral Gel 15 Gram(s) Oral once PRN Blood Glucose LESS THAN 70 milliGRAM(s)/deciliter  melatonin 3 milliGRAM(s) Oral at bedtime PRN Insomnia  ondansetron Injectable 4 milliGRAM(s) IV Push every 8 hours PRN Nausea and/or Vomiting        I&O's Summary      PHYSICAL EXAM:  Vital Signs Last 24 Hrs  T(C): 36.9 (08 Apr 2025 07:00), Max: 36.9 (08 Apr 2025 07:00)  T(F): 98.4 (08 Apr 2025 07:00), Max: 98.4 (08 Apr 2025 07:00)  HR: 79 (08 Apr 2025 07:00) (65 - 79)  BP: 147/70 (08 Apr 2025 07:00) (125/66 - 155/86)  BP(mean): --  RR: 18 (08 Apr 2025 07:00) (17 - 20)  SpO2: 98% (08 Apr 2025 07:00) (97% - 100%)    Parameters below as of 08 Apr 2025 07:00  Patient On (Oxygen Delivery Method): room air      CONSTITUTIONAL: No apparent distress  HEENT: Normocephalic, Atraumatic  RESPIRATORY:  lungs are clear to auscultation bilaterally, No crackles, rhonchi, wheezes  CARDIOVASCULAR: Regular rate and rhythm, No lower extremity edema  ABDOMEN: Soft, non-distended, nontender to palpation, +BS  MUSCLOSKELETAL: Normal gait  PSYCH: thoughts linear, affect appropriate  NEUROLOGY: Alert, Oriented x3    LABS:                        8.7    6.52  )-----------( 288      ( 08 Apr 2025 05:30 )             26.6     04-08    137  |  106  |  58.6[H]  ----------------------------<  301[H]  4.8   |  19.0[L]  |  5.48[H]    Ca    8.0[L]      08 Apr 2025 05:30            Urinalysis Basic - ( 08 Apr 2025 05:30 )    Color: x / Appearance: x / SG: x / pH: x  Gluc: 301 mg/dL / Ketone: x  / Bili: x / Urobili: x   Blood: x / Protein: x / Nitrite: x   Leuk Esterase: x / RBC: x / WBC x   Sq Epi: x / Non Sq Epi: x / Bacteria: x        CAPILLARY BLOOD GLUCOSE      POCT Blood Glucose.: 378 mg/dL (08 Apr 2025 08:00)  POCT Blood Glucose.: 160 mg/dL (07 Apr 2025 21:17)  POCT Blood Glucose.: 97 mg/dL (07 Apr 2025 16:58)        RADIOLOGY & ADDITIONAL TESTS:  Results Reviewed

## 2025-04-09 LAB
% ALBUMIN: 40.4 % — SIGNIFICANT CHANGE UP
% ALPHA 1: 5.5 % — SIGNIFICANT CHANGE UP
% ALPHA 2: 14.7 % — SIGNIFICANT CHANGE UP
% BETA: 20 % — SIGNIFICANT CHANGE UP
% GAMMA: 19.4 % — SIGNIFICANT CHANGE UP
% M SPIKE: SIGNIFICANT CHANGE UP
ALBUMIN SERPL ELPH-MCNC: 2.6 G/DL — LOW (ref 3.6–5.5)
ALBUMIN/GLOB SERPL ELPH: 0.7 RATIO — SIGNIFICANT CHANGE UP
ALPHA1 GLOB SERPL ELPH-MCNC: 0.4 G/DL — SIGNIFICANT CHANGE UP (ref 0.1–0.4)
ALPHA2 GLOB SERPL ELPH-MCNC: 0.9 G/DL — SIGNIFICANT CHANGE UP (ref 0.5–1)
ANION GAP SERPL CALC-SCNC: 13 MMOL/L — SIGNIFICANT CHANGE UP (ref 5–17)
B-GLOBULIN SERPL ELPH-MCNC: 1.3 G/DL — HIGH (ref 0.5–1)
BUN SERPL-MCNC: 65.5 MG/DL — HIGH (ref 8–20)
CALCIUM SERPL-MCNC: 8.3 MG/DL — LOW (ref 8.4–10.5)
CHLORIDE SERPL-SCNC: 109 MMOL/L — HIGH (ref 96–108)
CO2 SERPL-SCNC: 17 MMOL/L — LOW (ref 22–29)
CREAT SERPL-MCNC: 5.87 MG/DL — HIGH (ref 0.5–1.3)
EGFR: 10 ML/MIN/1.73M2 — LOW
EGFR: 10 ML/MIN/1.73M2 — LOW
FERRITIN SERPL-MCNC: 148 NG/ML — SIGNIFICANT CHANGE UP (ref 30–400)
GAMMA GLOBULIN: 1.2 G/DL — SIGNIFICANT CHANGE UP (ref 0.6–1.6)
GLUCOSE BLDC GLUCOMTR-MCNC: 136 MG/DL — HIGH (ref 70–99)
GLUCOSE BLDC GLUCOMTR-MCNC: 193 MG/DL — HIGH (ref 70–99)
GLUCOSE BLDC GLUCOMTR-MCNC: 289 MG/DL — HIGH (ref 70–99)
GLUCOSE BLDC GLUCOMTR-MCNC: 305 MG/DL — HIGH (ref 70–99)
GLUCOSE SERPL-MCNC: 129 MG/DL — HIGH (ref 70–99)
HCT VFR BLD CALC: 27.5 % — LOW (ref 39–50)
HGB BLD-MCNC: 8.9 G/DL — LOW (ref 13–17)
INTERPRETATION SERPL IFE-IMP: SIGNIFICANT CHANGE UP
IRON SATN MFR SERPL: 37 UG/DL — LOW (ref 59–158)
M-SPIKE: SIGNIFICANT CHANGE UP (ref 0–0)
MCHC RBC-ENTMCNC: 28.7 PG — SIGNIFICANT CHANGE UP (ref 27–34)
MCHC RBC-ENTMCNC: 32.4 G/DL — SIGNIFICANT CHANGE UP (ref 32–36)
MCV RBC AUTO: 88.7 FL — SIGNIFICANT CHANGE UP (ref 80–100)
NRBC # BLD AUTO: 0 K/UL — SIGNIFICANT CHANGE UP (ref 0–0)
NRBC # FLD: 0 K/UL — SIGNIFICANT CHANGE UP (ref 0–0)
NRBC BLD AUTO-RTO: 0 /100 WBCS — SIGNIFICANT CHANGE UP (ref 0–0)
PHOSPHOLIPASE A2 RECEPTOR ELISA: <1.8 RU/ML — SIGNIFICANT CHANGE UP (ref 0–19.9)
PLATELET # BLD AUTO: 298 K/UL — SIGNIFICANT CHANGE UP (ref 150–400)
PMV BLD: 9.1 FL — SIGNIFICANT CHANGE UP (ref 7–13)
POTASSIUM SERPL-MCNC: 4.9 MMOL/L — SIGNIFICANT CHANGE UP (ref 3.5–5.3)
POTASSIUM SERPL-SCNC: 4.9 MMOL/L — SIGNIFICANT CHANGE UP (ref 3.5–5.3)
PROT PATTERN SERPL ELPH-IMP: SIGNIFICANT CHANGE UP
PROT SERPL-MCNC: 6.4 G/DL — SIGNIFICANT CHANGE UP (ref 6–8.3)
RBC # BLD: 3.1 M/UL — LOW (ref 4.2–5.8)
RBC # FLD: 14.8 % — HIGH (ref 10.3–14.5)
SODIUM SERPL-SCNC: 139 MMOL/L — SIGNIFICANT CHANGE UP (ref 135–145)
WBC # BLD: 7.52 K/UL — SIGNIFICANT CHANGE UP (ref 3.8–10.5)
WBC # FLD AUTO: 7.52 K/UL — SIGNIFICANT CHANGE UP (ref 3.8–10.5)

## 2025-04-09 PROCEDURE — 99233 SBSQ HOSP IP/OBS HIGH 50: CPT

## 2025-04-09 PROCEDURE — 99232 SBSQ HOSP IP/OBS MODERATE 35: CPT

## 2025-04-09 RX ORDER — IRON SUCROSE 20 MG/ML
200 INJECTION, SOLUTION INTRAVENOUS EVERY 24 HOURS
Refills: 0 | Status: COMPLETED | OUTPATIENT
Start: 2025-04-09 | End: 2025-04-12

## 2025-04-09 RX ADMIN — INSULIN LISPRO 4: 100 INJECTION, SOLUTION INTRAVENOUS; SUBCUTANEOUS at 12:29

## 2025-04-09 RX ADMIN — GABAPENTIN 100 MILLIGRAM(S): 400 CAPSULE ORAL at 12:29

## 2025-04-09 RX ADMIN — METOPROLOL SUCCINATE 50 MILLIGRAM(S): 50 TABLET, EXTENDED RELEASE ORAL at 17:39

## 2025-04-09 RX ADMIN — GABAPENTIN 100 MILLIGRAM(S): 400 CAPSULE ORAL at 05:30

## 2025-04-09 RX ADMIN — Medication 650 MILLIGRAM(S): at 05:29

## 2025-04-09 RX ADMIN — DOLUTEGRAVIR SODIUM 50 MILLIGRAM(S): 5 TABLET, FOR SUSPENSION ORAL at 12:29

## 2025-04-09 RX ADMIN — LAMIVUDINE 100 MILLIGRAM(S): 10 SOLUTION ORAL at 12:29

## 2025-04-09 RX ADMIN — INSULIN LISPRO 1: 100 INJECTION, SOLUTION INTRAVENOUS; SUBCUTANEOUS at 17:37

## 2025-04-09 RX ADMIN — Medication 1 DROP(S): at 17:38

## 2025-04-09 RX ADMIN — INSULIN GLARGINE-YFGN 10 UNIT(S): 100 INJECTION, SOLUTION SUBCUTANEOUS at 21:53

## 2025-04-09 RX ADMIN — IRON SUCROSE 110 MILLIGRAM(S): 20 INJECTION, SOLUTION INTRAVENOUS at 17:57

## 2025-04-09 RX ADMIN — Medication 1 DROP(S): at 12:28

## 2025-04-09 RX ADMIN — Medication 2 DROP(S): at 05:29

## 2025-04-09 RX ADMIN — AMLODIPINE BESYLATE 10 MILLIGRAM(S): 10 TABLET ORAL at 05:30

## 2025-04-09 RX ADMIN — Medication 1 DROP(S): at 23:29

## 2025-04-09 RX ADMIN — Medication 975 MILLIGRAM(S): at 21:05

## 2025-04-09 RX ADMIN — Medication 2 DROP(S): at 12:30

## 2025-04-09 RX ADMIN — ATORVASTATIN CALCIUM 40 MILLIGRAM(S): 80 TABLET, FILM COATED ORAL at 21:04

## 2025-04-09 RX ADMIN — Medication 1 DROP(S): at 05:29

## 2025-04-09 RX ADMIN — Medication 975 MILLIGRAM(S): at 22:05

## 2025-04-09 RX ADMIN — Medication 975 MILLIGRAM(S): at 12:30

## 2025-04-09 RX ADMIN — GABAPENTIN 100 MILLIGRAM(S): 400 CAPSULE ORAL at 21:04

## 2025-04-09 RX ADMIN — Medication 975 MILLIGRAM(S): at 14:02

## 2025-04-09 RX ADMIN — Medication 975 MILLIGRAM(S): at 05:30

## 2025-04-09 RX ADMIN — POLYMYXIN B SULFATE AND TRIMETHOPRIM SULFATE 1 DROP(S): 10000; 1 SOLUTION/ DROPS OPHTHALMIC at 12:28

## 2025-04-09 RX ADMIN — Medication 1 APPLICATION(S): at 05:30

## 2025-04-09 RX ADMIN — Medication 10 MILLIGRAM(S): at 17:39

## 2025-04-09 RX ADMIN — Medication 10 MILLIGRAM(S): at 12:29

## 2025-04-09 RX ADMIN — Medication 2 DROP(S): at 21:04

## 2025-04-09 RX ADMIN — Medication 650 MILLIGRAM(S): at 17:39

## 2025-04-09 RX ADMIN — Medication 10 MILLIGRAM(S): at 05:30

## 2025-04-09 NOTE — PROGRESS NOTE ADULT - ASSESSMENT
63 YOM DM, HIV on HAART admitted after fall.  Nephrology consulted for progressive worsening CKD.    Advanced progressive CKD stage V from biopsy proven diabetic nephropathy  Nephrotic syndrome  pt has h.o long standing DM (about 4 decades- untreated- sugars in '300s' as per ex wife)  HIV- VL > 300 k    -Serum creatinine has slowly progressed now to ~5.5  -UA with proteinuria, Tp/cr ratio ~3.3  -Renal ultrasound reviewed; renal medical disease  -Serological work up  WNL   -Provisional Bx result c/w diabetic nephropathy w/ advanced chronicity (confirmatory pathology report pending)  -Discussed findings with patient, offered renal replacement therapy.  All risk: Benefit: Alternative: Options were discussed in detail.  Post discussion patient wishes to proceed with renal replacement therapy via hemodialysis.  -Will involve IR to place her tunneled hemodialysis catheter for cessation of hemodialysis  -Will involve vascular for long-term access planning  -Will update case management to start planning for outpatient dialysis placement  Discussed with Dr. Foster     Anemia: CKD versus RITESH.  Will place on IV venofer. will place on epo.  HTN: BP controlled- continue amlodipine, metoprolol, Hydralazine  Metabolic acidosis; continue on po sodium bicarb 1300 mg BID  HIV on Bikatrvy- meds changed as per ID  DM: discontinued metformin (low GFR), place on Lantus + SSI ACHS    Discussed in detail w/ patient, vascular team, IR team and Dr Ellis
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
## 2025-04-09 NOTE — PROGRESS NOTE ADULT - SUBJECTIVE AND OBJECTIVE BOX
[facility] Division of Hospital Medicine    INTERVAL HISTORY:  Overnight, no acute events.     Patient seen and examined at bedside this morning. Reports feeling well. Tolerating diet. Patient denies chest pain, SOB, abd pain, N/V, fever, chills, dysuria or any other complaints. All remainder ROS negative.     MEDICATIONS  (STANDING):  acetaminophen     Tablet .. 975 milliGRAM(s) Oral every 8 hours  amLODIPine   Tablet 10 milliGRAM(s) Oral daily  artificial tears (preservative free) Ophthalmic Solution 2 Drop(s) Both EYES every 8 hours  atorvastatin 40 milliGRAM(s) Oral at bedtime  chlorhexidine 2% Cloths 1 Application(s) Topical <User Schedule>  dextrose 5%. 1000 milliLiter(s) (100 mL/Hr) IV Continuous <Continuous>  dextrose 5%. 1000 milliLiter(s) (50 mL/Hr) IV Continuous <Continuous>  dextrose 50% Injectable 25 Gram(s) IV Push once  dextrose 50% Injectable 12.5 Gram(s) IV Push once  dextrose 50% Injectable 25 Gram(s) IV Push once  dolutegravir 50 milliGRAM(s) Oral daily  gabapentin 100 milliGRAM(s) Oral three times a day  glucagon  Injectable 1 milliGRAM(s) IntraMuscular once  hydrALAZINE 10 milliGRAM(s) Oral every 6 hours  insulin glargine Injectable (LANTUS) 10 Unit(s) SubCutaneous at bedtime  insulin lispro (ADMELOG) corrective regimen sliding scale   SubCutaneous three times a day before meals  iron sucrose IVPB 200 milliGRAM(s) IV Intermittent every 24 hours  lamiVUDine- milliGRAM(s) Oral daily  metoprolol tartrate 50 milliGRAM(s) Oral two times a day  prednisoLONE acetate 1% Suspension 1 Drop(s) Both EYES four times a day  sodium bicarbonate 650 milliGRAM(s) Oral two times a day  trimethoprim/polymyxin Solution 1 Drop(s) Both EYES daily    MEDICATIONS  (PRN):  aluminum hydroxide/magnesium hydroxide/simethicone Suspension 30 milliLiter(s) Oral every 4 hours PRN Dyspepsia  dextrose Oral Gel 15 Gram(s) Oral once PRN Blood Glucose LESS THAN 70 milliGRAM(s)/deciliter  melatonin 3 milliGRAM(s) Oral at bedtime PRN Insomnia  ondansetron Injectable 4 milliGRAM(s) IV Push every 8 hours PRN Nausea and/or Vomiting        I&O's Summary    08 Apr 2025 07:01  -  09 Apr 2025 07:00  --------------------------------------------------------  IN: 720 mL / OUT: 0 mL / NET: 720 mL    09 Apr 2025 07:01  -  09 Apr 2025 15:28  --------------------------------------------------------  IN: 400 mL / OUT: 750 mL / NET: -350 mL        PHYSICAL EXAM:  Vital Signs Last 24 Hrs  T(C): 36.7 (09 Apr 2025 12:15), Max: 36.8 (08 Apr 2025 21:10)  T(F): 98.1 (09 Apr 2025 12:15), Max: 98.3 (08 Apr 2025 21:10)  HR: 77 (09 Apr 2025 12:15) (57 - 77)  BP: 134/67 (09 Apr 2025 12:15) (108/60 - 142/81)  BP(mean): --  RR: 18 (09 Apr 2025 12:15) (18 - 19)  SpO2: 100% (09 Apr 2025 12:15) (95% - 100%)    Parameters below as of 09 Apr 2025 12:15  Patient On (Oxygen Delivery Method): room air      CONSTITUTIONAL: No apparent distress  HEENT: Normocephalic, Atraumatic  RESPIRATORY:  lungs are clear to auscultation bilaterally, No crackles, rhonchi, wheezes  CARDIOVASCULAR: Regular rate and rhythm, No lower extremity edema  ABDOMEN: Soft, non-distended, nontender to palpation, +BS  MUSCLOSKELETAL: Normal gait  PSYCH: thoughts linear, affect appropriate  NEUROLOGY: Alert, Oriented x3    LABS:                        8.9    7.52  )-----------( 298      ( 09 Apr 2025 05:45 )             27.5     04-09    139  |  109[H]  |  65.5[H]  ----------------------------<  129[H]  4.9   |  17.0[L]  |  5.87[H]    Ca    8.3[L]      09 Apr 2025 05:45            Urinalysis Basic - ( 09 Apr 2025 05:45 )    Color: x / Appearance: x / SG: x / pH: x  Gluc: 129 mg/dL / Ketone: x  / Bili: x / Urobili: x   Blood: x / Protein: x / Nitrite: x   Leuk Esterase: x / RBC: x / WBC x   Sq Epi: x / Non Sq Epi: x / Bacteria: x        CAPILLARY BLOOD GLUCOSE      POCT Blood Glucose.: 305 mg/dL (09 Apr 2025 12:04)  POCT Blood Glucose.: 136 mg/dL (09 Apr 2025 08:01)  POCT Blood Glucose.: 180 mg/dL (08 Apr 2025 21:50)  POCT Blood Glucose.: 165 mg/dL (08 Apr 2025 17:05)        RADIOLOGY & ADDITIONAL TESTS:  Results Reviewed

## 2025-04-09 NOTE — PROGRESS NOTE ADULT - SUBJECTIVE AND OBJECTIVE BOX
Reason for visit: Elevated SCR/BUN, Advanced CKD    Subjective: No acute overnight event. Patient denied any cardiac or urinary complains. No fever/chills. Sitter in room.   ROS: All systems were reviewed in detail pertinent positive and negative mentioned above, rest are negative.    Physical Exam:  Gen: no acute distress  MS: alert, conversing normally  Eyes: EOMI, no icterus  HENT: NCAT, MMM  CV: rhythm reg reg, rate normal, no m/g/r  Chest: CTAB, no w/r/r,  Abd: soft, NT, ND  Extremities: No edema    =======================================================  Vital Signs Last 24 Hrs  T(C): 36.7 (09 Apr 2025 12:15), Max: 36.8 (08 Apr 2025 21:10)  T(F): 98.1 (09 Apr 2025 12:15), Max: 98.3 (08 Apr 2025 21:10)  HR: 77 (09 Apr 2025 12:15) (57 - 77)  BP: 134/67 (09 Apr 2025 12:15) (108/60 - 142/81)  BP(mean): --  RR: 18 (09 Apr 2025 12:15) (18 - 19)  SpO2: 100% (09 Apr 2025 12:15) (95% - 100%)    Parameters below as of 09 Apr 2025 12:15  Patient On (Oxygen Delivery Method): room air      I&O's Summary    08 Apr 2025 07:01  -  09 Apr 2025 07:00  --------------------------------------------------------  IN: 720 mL / OUT: 0 mL / NET: 720 mL    09 Apr 2025 07:01  -  09 Apr 2025 14:16  --------------------------------------------------------  IN: 400 mL / OUT: 750 mL / NET: -350 mL      =======================================================  Current Antibiotics:  dolutegravir 50 milliGRAM(s) Oral daily  lamiVUDine- milliGRAM(s) Oral daily    Other medications:  acetaminophen     Tablet .. 975 milliGRAM(s) Oral every 8 hours  amLODIPine   Tablet 10 milliGRAM(s) Oral daily  artificial tears (preservative free) Ophthalmic Solution 2 Drop(s) Both EYES every 8 hours  atorvastatin 40 milliGRAM(s) Oral at bedtime  chlorhexidine 2% Cloths 1 Application(s) Topical <User Schedule>  dextrose 5%. 1000 milliLiter(s) IV Continuous <Continuous>  dextrose 5%. 1000 milliLiter(s) IV Continuous <Continuous>  dextrose 50% Injectable 25 Gram(s) IV Push once  dextrose 50% Injectable 12.5 Gram(s) IV Push once  dextrose 50% Injectable 25 Gram(s) IV Push once  gabapentin 100 milliGRAM(s) Oral three times a day  glucagon  Injectable 1 milliGRAM(s) IntraMuscular once  hydrALAZINE 10 milliGRAM(s) Oral every 6 hours  insulin glargine Injectable (LANTUS) 10 Unit(s) SubCutaneous at bedtime  insulin lispro (ADMELOG) corrective regimen sliding scale   SubCutaneous three times a day before meals  metoprolol tartrate 50 milliGRAM(s) Oral two times a day  prednisoLONE acetate 1% Suspension 1 Drop(s) Both EYES four times a day  sodium bicarbonate 650 milliGRAM(s) Oral two times a day  trimethoprim/polymyxin Solution 1 Drop(s) Both EYES daily    =======================================================  04-09    139  |  109[H]  |  65.5[H]  ----------------------------<  129[H]  4.9   |  17.0[L]  |  5.87[H]    Ca    8.3[L]      09 Apr 2025 05:45      Creatinine: 5.87 mg/dL (04-09-25 @ 05:45)  Creatinine: 5.48 mg/dL (04-08-25 @ 05:30)  Creatinine: 5.61 mg/dL (04-07-25 @ 04:30)  Creatinine: 5.46 mg/dL (04-06-25 @ 04:30)    =======================================================

## 2025-04-09 NOTE — PROGRESS NOTE ADULT - SUBJECTIVE AND OBJECTIVE BOX
Interventional Radiology Follow-Up Note    63 year-old male with a history of CKD, IDDM, HIV on HAART, HFrEF, recent bilateral cataract surgery presented to [facility] ED after a fall. In the ED patient   found to have BRIDGETTE on CKD.     Patient underwent renal biopsy with IR on 4/7. Nephrology now requesting TDC placement    Medication:  MEDICATIONS  (STANDING):  acetaminophen     Tablet .. 975 milliGRAM(s) Oral every 8 hours  amLODIPine   Tablet 10 milliGRAM(s) Oral daily  artificial tears (preservative free) Ophthalmic Solution 2 Drop(s) Both EYES every 8 hours  atorvastatin 40 milliGRAM(s) Oral at bedtime  chlorhexidine 2% Cloths 1 Application(s) Topical <User Schedule>  dextrose 5%. 1000 milliLiter(s) (100 mL/Hr) IV Continuous <Continuous>  dextrose 5%. 1000 milliLiter(s) (50 mL/Hr) IV Continuous <Continuous>  dextrose 50% Injectable 25 Gram(s) IV Push once  dextrose 50% Injectable 12.5 Gram(s) IV Push once  dextrose 50% Injectable 25 Gram(s) IV Push once  dolutegravir 50 milliGRAM(s) Oral daily  gabapentin 100 milliGRAM(s) Oral three times a day  glucagon  Injectable 1 milliGRAM(s) IntraMuscular once  hydrALAZINE 10 milliGRAM(s) Oral every 6 hours  insulin glargine Injectable (LANTUS) 10 Unit(s) SubCutaneous at bedtime  insulin lispro (ADMELOG) corrective regimen sliding scale   SubCutaneous three times a day before meals  iron sucrose IVPB 200 milliGRAM(s) IV Intermittent every 24 hours  lamiVUDine- milliGRAM(s) Oral daily  metoprolol tartrate 50 milliGRAM(s) Oral two times a day  prednisoLONE acetate 1% Suspension 1 Drop(s) Both EYES four times a day  sodium bicarbonate 650 milliGRAM(s) Oral two times a day  trimethoprim/polymyxin Solution 1 Drop(s) Both EYES daily    MEDICATIONS  (PRN):  aluminum hydroxide/magnesium hydroxide/simethicone Suspension 30 milliLiter(s) Oral every 4 hours PRN Dyspepsia  dextrose Oral Gel 15 Gram(s) Oral once PRN Blood Glucose LESS THAN 70 milliGRAM(s)/deciliter  melatonin 3 milliGRAM(s) Oral at bedtime PRN Insomnia  ondansetron Injectable 4 milliGRAM(s) IV Push every 8 hours PRN Nausea and/or Vomiting      Vitals:  ICU Vital Signs Last 24 Hrs  T(C): 36.7 (09 Apr 2025 12:15), Max: 36.8 (08 Apr 2025 21:10)  T(F): 98.1 (09 Apr 2025 12:15), Max: 98.3 (08 Apr 2025 21:10)  HR: 77 (09 Apr 2025 12:15) (57 - 77)  BP: 134/67 (09 Apr 2025 12:15) (108/60 - 142/81)  BP(mean): --  ABP: --  ABP(mean): --  RR: 18 (09 Apr 2025 12:15) (18 - 19)  SpO2: 100% (09 Apr 2025 12:15) (95% - 100%)    O2 Parameters below as of 09 Apr 2025 12:15  Patient On (Oxygen Delivery Method): room air      I&O's Detail    08 Apr 2025 07:01  -  09 Apr 2025 07:00  --------------------------------------------------------  IN:    Oral Fluid: 720 mL  Total IN: 720 mL    OUT:  Total OUT: 0 mL    Total NET: 720 mL      09 Apr 2025 07:01  -  09 Apr 2025 14:51  --------------------------------------------------------  IN:    Oral Fluid: 400 mL  Total IN: 400 mL    OUT:    Voided (mL): 750 mL  Total OUT: 750 mL    Total NET: -350 mL          LABS:                        8.9    7.52  )-----------( 298      ( 09 Apr 2025 05:45 )             27.5     04-09    139  |  109[H]  |  65.5[H]  ----------------------------<  129[H]  4.9   |  17.0[L]  |  5.87[H]    Ca    8.3[L]      09 Apr 2025 05:45        Urinalysis Basic - ( 09 Apr 2025 05:45 )    Color: x / Appearance: x / SG: x / pH: x  Gluc: 129 mg/dL / Ketone: x  / Bili: x / Urobili: x   Blood: x / Protein: x / Nitrite: x   Leuk Esterase: x / RBC: x / WBC x   Sq Epi: x / Non Sq Epi: x / Bacteria: x

## 2025-04-09 NOTE — PROGRESS NOTE ADULT - ASSESSMENT
64-year-old male with advanced stage CKD. Patient underwent IR biopsy 4/7. IR reconsulted for permacath placement. Chart reviewed, we will tentatively plan for tunneled dialysis catheter placement on 4/11 will need to be kept n.p.o. after midnight prior to the procedure.    Please call extension [phone] with any questions, concerns or issues regarding above.

## 2025-04-09 NOTE — CONSULT NOTE ADULT - ASSESSMENT
Assessment: Patient is a 62 y/o male with BRIDGETTE on CKD, vascular surgery consulted for evaluation for possible AVF creation.     Plan:  -Unclear etiology of renal failure, may be autoimmune vs medication related. Patient to have PC placed by IR and HD initiated during this hospital stay. Patient can f/u with Dr. Crowe as an outpatient for evaluation for AVF vs graft creation.   -f/u kidney bx and nephrology recs  -Ordered vein mapping   -LUE precautions as patient is right hand dominant  -Remainder of care per primary team    Discussed with attending surgeon Dr. Crowe

## 2025-04-10 LAB
GLUCOSE BLDC GLUCOMTR-MCNC: 111 MG/DL — HIGH (ref 70–99)
GLUCOSE BLDC GLUCOMTR-MCNC: 189 MG/DL — HIGH (ref 70–99)
GLUCOSE BLDC GLUCOMTR-MCNC: 215 MG/DL — HIGH (ref 70–99)
GLUCOSE BLDC GLUCOMTR-MCNC: 241 MG/DL — HIGH (ref 70–99)
GLUCOSE BLDC GLUCOMTR-MCNC: 245 MG/DL — HIGH (ref 70–99)

## 2025-04-10 PROCEDURE — 99233 SBSQ HOSP IP/OBS HIGH 50: CPT

## 2025-04-10 PROCEDURE — 99232 SBSQ HOSP IP/OBS MODERATE 35: CPT

## 2025-04-10 PROCEDURE — 99222 1ST HOSP IP/OBS MODERATE 55: CPT | Mod: GC

## 2025-04-10 PROCEDURE — 93970 EXTREMITY STUDY: CPT | Mod: 26

## 2025-04-10 RX ADMIN — METOPROLOL SUCCINATE 50 MILLIGRAM(S): 50 TABLET, EXTENDED RELEASE ORAL at 17:51

## 2025-04-10 RX ADMIN — METOPROLOL SUCCINATE 50 MILLIGRAM(S): 50 TABLET, EXTENDED RELEASE ORAL at 06:15

## 2025-04-10 RX ADMIN — Medication 650 MILLIGRAM(S): at 17:51

## 2025-04-10 RX ADMIN — Medication 10 MILLIGRAM(S): at 12:23

## 2025-04-10 RX ADMIN — GABAPENTIN 100 MILLIGRAM(S): 400 CAPSULE ORAL at 23:17

## 2025-04-10 RX ADMIN — Medication 650 MILLIGRAM(S): at 06:14

## 2025-04-10 RX ADMIN — Medication 10 MILLIGRAM(S): at 07:00

## 2025-04-10 RX ADMIN — POLYMYXIN B SULFATE AND TRIMETHOPRIM SULFATE 1 DROP(S): 10000; 1 SOLUTION/ DROPS OPHTHALMIC at 12:22

## 2025-04-10 RX ADMIN — Medication 1 DROP(S): at 23:23

## 2025-04-10 RX ADMIN — Medication 2 DROP(S): at 06:57

## 2025-04-10 RX ADMIN — Medication 10 MILLIGRAM(S): at 23:24

## 2025-04-10 RX ADMIN — ATORVASTATIN CALCIUM 40 MILLIGRAM(S): 80 TABLET, FILM COATED ORAL at 23:18

## 2025-04-10 RX ADMIN — Medication 1 DROP(S): at 17:51

## 2025-04-10 RX ADMIN — INSULIN LISPRO 2: 100 INJECTION, SOLUTION INTRAVENOUS; SUBCUTANEOUS at 09:08

## 2025-04-10 RX ADMIN — Medication 1 APPLICATION(S): at 06:56

## 2025-04-10 RX ADMIN — INSULIN LISPRO 1: 100 INJECTION, SOLUTION INTRAVENOUS; SUBCUTANEOUS at 12:23

## 2025-04-10 RX ADMIN — Medication 975 MILLIGRAM(S): at 23:21

## 2025-04-10 RX ADMIN — INSULIN GLARGINE-YFGN 10 UNIT(S): 100 INJECTION, SOLUTION SUBCUTANEOUS at 23:14

## 2025-04-10 RX ADMIN — Medication 1 DROP(S): at 12:22

## 2025-04-10 RX ADMIN — Medication 3 MILLIGRAM(S): at 23:21

## 2025-04-10 RX ADMIN — Medication 975 MILLIGRAM(S): at 13:35

## 2025-04-10 RX ADMIN — GABAPENTIN 100 MILLIGRAM(S): 400 CAPSULE ORAL at 06:15

## 2025-04-10 RX ADMIN — AMLODIPINE BESYLATE 10 MILLIGRAM(S): 10 TABLET ORAL at 06:14

## 2025-04-10 RX ADMIN — Medication 975 MILLIGRAM(S): at 14:35

## 2025-04-10 RX ADMIN — GABAPENTIN 100 MILLIGRAM(S): 400 CAPSULE ORAL at 13:35

## 2025-04-10 RX ADMIN — Medication 2 DROP(S): at 23:18

## 2025-04-10 RX ADMIN — Medication 10 MILLIGRAM(S): at 17:52

## 2025-04-10 RX ADMIN — Medication 975 MILLIGRAM(S): at 06:14

## 2025-04-10 RX ADMIN — Medication 2 DROP(S): at 13:35

## 2025-04-10 RX ADMIN — Medication 10 MILLIGRAM(S): at 00:56

## 2025-04-10 RX ADMIN — IRON SUCROSE 110 MILLIGRAM(S): 20 INJECTION, SOLUTION INTRAVENOUS at 13:35

## 2025-04-10 RX ADMIN — Medication 1 DROP(S): at 06:16

## 2025-04-10 RX ADMIN — LAMIVUDINE 100 MILLIGRAM(S): 10 SOLUTION ORAL at 12:23

## 2025-04-10 RX ADMIN — DOLUTEGRAVIR SODIUM 50 MILLIGRAM(S): 5 TABLET, FOR SUSPENSION ORAL at 12:23

## 2025-04-10 NOTE — PROGRESS NOTE ADULT - SUBJECTIVE AND OBJECTIVE BOX
Reason for visit: Elevated SCR/BUN, Advanced CKD    Subjective: No acute overnight event. Patient denied any cardiac or urinary complains. No fever/chills. Sitter in room.   ROS: All systems were reviewed in detail pertinent positive and negative mentioned above, rest are negative.    Physical Exam:  Gen: no acute distress  MS: alert, conversing normally  Eyes: EOMI, no icterus  HENT: NCAT, MMM  CV: rhythm reg reg, rate normal, no m/g/r  Chest: CTAB, no w/r/r,  Abd: soft, NT, ND  Extremities: No edema    Vital Signs Last 24 Hrs  T(C): 36.4 (10 Apr 2025 16:48), Max: 36.9 (09 Apr 2025 23:30)  T(F): 97.5 (10 Apr 2025 16:48), Max: 98.5 (10 Apr 2025 08:01)  HR: 73 (10 Apr 2025 16:48) (65 - 78)  BP: 155/82 (10 Apr 2025 16:48) (130/65 - 157/79)  BP(mean): --  RR: 18 (10 Apr 2025 16:48) (17 - 18)  SpO2: 97% (10 Apr 2025 16:48) (95% - 97%)    Parameters below as of 10 Apr 2025 16:48  Patient On (Oxygen Delivery Method): room air      I&O's Summary    09 Apr 2025 07:01  -  10 Apr 2025 07:00  --------------------------------------------------------  IN: 600 mL / OUT: 1100 mL / NET: -500 mL    10 Apr 2025 07:01  -  10 Apr 2025 20:14  --------------------------------------------------------  IN: 900 mL / OUT: 0 mL / NET: 900 mL      Current Antibiotics:  dolutegravir 50 milliGRAM(s) Oral daily  lamiVUDine- milliGRAM(s) Oral daily    Other medications:  acetaminophen     Tablet .. 975 milliGRAM(s) Oral every 8 hours  amLODIPine   Tablet 10 milliGRAM(s) Oral daily  artificial tears (preservative free) Ophthalmic Solution 2 Drop(s) Both EYES every 8 hours  atorvastatin 40 milliGRAM(s) Oral at bedtime  chlorhexidine 2% Cloths 1 Application(s) Topical <User Schedule>  dextrose 5%. 1000 milliLiter(s) IV Continuous <Continuous>  dextrose 5%. 1000 milliLiter(s) IV Continuous <Continuous>  dextrose 50% Injectable 25 Gram(s) IV Push once  dextrose 50% Injectable 12.5 Gram(s) IV Push once  dextrose 50% Injectable 25 Gram(s) IV Push once  gabapentin 100 milliGRAM(s) Oral three times a day  glucagon  Injectable 1 milliGRAM(s) IntraMuscular once  hydrALAZINE 10 milliGRAM(s) Oral every 6 hours  insulin glargine Injectable (LANTUS) 10 Unit(s) SubCutaneous at bedtime  insulin lispro (ADMELOG) corrective regimen sliding scale   SubCutaneous three times a day before meals  iron sucrose IVPB 200 milliGRAM(s) IV Intermittent every 24 hours  metoprolol tartrate 50 milliGRAM(s) Oral two times a day  prednisoLONE acetate 1% Suspension 1 Drop(s) Both EYES four times a day  sodium bicarbonate 650 milliGRAM(s) Oral two times a day  trimethoprim/polymyxin Solution 1 Drop(s) Both EYES daily    04-09    139  |  109[H]  |  65.5[H]  ----------------------------<  129[H]  4.9   |  17.0[L]  |  5.87[H]    Ca    8.3[L]      09 Apr 2025 05:45      Creatinine: 5.87 mg/dL (04-09-25 @ 05:45)  Creatinine: 5.48 mg/dL (04-08-25 @ 05:30)  Creatinine: 5.61 mg/dL (04-07-25 @ 04:30)  Creatinine: 5.46 mg/dL (04-06-25 @ 04:30)                          8.9    7.52  )-----------( 298      ( 09 Apr 2025 05:45 )             27.5

## 2025-04-10 NOTE — PROGRESS NOTE ADULT - ASSESSMENT
63 YOM DM, HIV on HAART admitted after fall.  Nephrology consulted for progressive worsening CKD.    Advanced progressive CKD stage V from biopsy proven diabetic nephropathy and podopathy likely related to HIV  Nephrotic syndrome  pt has h.o long standing DM (about 4 decades- untreated- sugars in '300s' as per ex wife)  HIV- VL > 300 k      *** Biopsy report  Final Diagnosis  KIDNEY, LEFT: PERCUTANEOUS NEEDLE CORE BIOPSY  -Diabetic nephropathy, see comment  -Complete foot process effacement, superimposed diabetic nephropathy  -Arterionephrosclerosis, moderate  -Diffuse interstitial lymphoplasmacytic infiltrate in scar areas  -Interstitial fibrosis/tubular atrophy, 80%  -Global glomerulosclerosis, 19/38      -Serum creatinine has slowly progressed now to ~5.5  -UA with proteinuria, Tp/cr ratio ~3.3  -Renal ultrasound reviewed; renal medical disease  -Serological work up  WNL   -Biopsy w/ advanced chronicity   -HD was offered and plan to have a tunneled hemodialysis catheter placed tomorrow  -Vascular following for long-term access planning  -Case management to start planning for outpatient dialysis placement    Anemia: CKD versus RITESH.  Placed on IV venofer. will place on epo.  HTN: BP controlled- continue amlodipine, metoprolol, Hydralazine  Metabolic acidosis; continue on po sodium bicarb 1300 mg BID  HIV on Bikatrvy- meds changed as per ID  DM: discontinued metformin (low GFR), place on Lantus + SSI ACHS    Discussed in detail w/ patient and primary team

## 2025-04-10 NOTE — PROGRESS NOTE ADULT - SUBJECTIVE AND OBJECTIVE BOX
seen for esrd    no acute complaints/events  ros negative     MEDICATIONS  (STANDING):  acetaminophen     Tablet .. 975 milliGRAM(s) Oral every 8 hours  amLODIPine   Tablet 10 milliGRAM(s) Oral daily  artificial tears (preservative free) Ophthalmic Solution 2 Drop(s) Both EYES every 8 hours  atorvastatin 40 milliGRAM(s) Oral at bedtime  chlorhexidine 2% Cloths 1 Application(s) Topical <User Schedule>  dextrose 5%. 1000 milliLiter(s) (50 mL/Hr) IV Continuous <Continuous>  dextrose 5%. 1000 milliLiter(s) (100 mL/Hr) IV Continuous <Continuous>  dextrose 50% Injectable 25 Gram(s) IV Push once  dextrose 50% Injectable 12.5 Gram(s) IV Push once  dextrose 50% Injectable 25 Gram(s) IV Push once  dolutegravir 50 milliGRAM(s) Oral daily  gabapentin 100 milliGRAM(s) Oral three times a day  glucagon  Injectable 1 milliGRAM(s) IntraMuscular once  hydrALAZINE 10 milliGRAM(s) Oral every 6 hours  insulin glargine Injectable (LANTUS) 10 Unit(s) SubCutaneous at bedtime  insulin lispro (ADMELOG) corrective regimen sliding scale   SubCutaneous three times a day before meals  iron sucrose IVPB 200 milliGRAM(s) IV Intermittent every 24 hours  lamiVUDine- milliGRAM(s) Oral daily  metoprolol tartrate 50 milliGRAM(s) Oral two times a day  prednisoLONE acetate 1% Suspension 1 Drop(s) Both EYES four times a day  sodium bicarbonate 650 milliGRAM(s) Oral two times a day  trimethoprim/polymyxin Solution 1 Drop(s) Both EYES daily    MEDICATIONS  (PRN):  aluminum hydroxide/magnesium hydroxide/simethicone Suspension 30 milliLiter(s) Oral every 4 hours PRN Dyspepsia  dextrose Oral Gel 15 Gram(s) Oral once PRN Blood Glucose LESS THAN 70 milliGRAM(s)/deciliter  melatonin 3 milliGRAM(s) Oral at bedtime PRN Insomnia  ondansetron Injectable 4 milliGRAM(s) IV Push every 8 hours PRN Nausea and/or Vomiting      Allergies    No Known Allergies         Vital Signs Last 24 Hrs  T(C): 36.9 (10 Apr 2025 08:01), Max: 36.9 (09 Apr 2025 23:30)  T(F): 98.5 (10 Apr 2025 08:01), Max: 98.5 (10 Apr 2025 08:01)  HR: 65 (10 Apr 2025 08:01) (65 - 81)  BP: 130/65 (10 Apr 2025 08:01) (130/65 - 157/79)  BP(mean): --  RR: 17 (10 Apr 2025 08:01) (17 - 18)  SpO2: 96% (10 Apr 2025 08:01) (95% - 96%)    Parameters below as of 10 Apr 2025 08:01  Patient On (Oxygen Delivery Method): room air        PHYSICAL EXAM:    GENERAL: NAD,   CHEST/LUNG: Clear to ausculation bilaterally  HEART: Regular rate and rhythm; S1 S2;  ABDOMEN: Soft, Nontender, ; Bowel sounds present  EXTREMITIES:  no edema   NERVOUS SYSTEM:  Alert & Oriented X3, ; Motor Strength 5/5 B/L upper and lower extremities  PSYCH: normal mood, appropriate response    LABS:                        8.9    7.52  )-----------( 298      ( 09 Apr 2025 05:45 )             27.5     04-09    139  |  109[H]  |  65.5[H]  ----------------------------<  129[H]  4.9   |  17.0[L]  |  5.87[H]    Ca    8.3[L]      09 Apr 2025 05:45        Urinalysis Basic - ( 09 Apr 2025 05:45 )    Color: x / Appearance: x / SG: x / pH: x  Gluc: 129 mg/dL / Ketone: x  / Bili: x / Urobili: x   Blood: x / Protein: x / Nitrite: x   Leuk Esterase: x / RBC: x / WBC x   Sq Epi: x / Non Sq Epi: x / Bacteria: x        CAPILLARY BLOOD GLUCOSE      POCT Blood Glucose.: 189 mg/dL (10 Apr 2025 12:15)  POCT Blood Glucose.: 241 mg/dL (10 Apr 2025 08:17)  POCT Blood Glucose.: 289 mg/dL (09 Apr 2025 21:37)  POCT Blood Glucose.: 193 mg/dL (09 Apr 2025 17:14)        RADIOLOGY & ADDITIONAL TESTS:

## 2025-04-10 NOTE — CHART NOTE - NSCHARTNOTEFT_GEN_A_CORE
Discussed with patient dialysis requirements and process. Patient voiced understanding, even asking about alternatives. Patient verbalized willingness to comply with dialysis process in the community.
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
## 2025-04-11 LAB
ANION GAP SERPL CALC-SCNC: 12 MMOL/L — SIGNIFICANT CHANGE UP (ref 5–17)
BUN SERPL-MCNC: 63.4 MG/DL — HIGH (ref 8–20)
CALCIUM SERPL-MCNC: 8.3 MG/DL — LOW (ref 8.4–10.5)
CHLORIDE SERPL-SCNC: 110 MMOL/L — HIGH (ref 96–108)
CO2 SERPL-SCNC: 18 MMOL/L — LOW (ref 22–29)
CREAT SERPL-MCNC: 5.67 MG/DL — HIGH (ref 0.5–1.3)
EGFR: 11 ML/MIN/1.73M2 — LOW
EGFR: 11 ML/MIN/1.73M2 — LOW
GLUCOSE BLDC GLUCOMTR-MCNC: 122 MG/DL — HIGH (ref 70–99)
GLUCOSE BLDC GLUCOMTR-MCNC: 194 MG/DL — HIGH (ref 70–99)
GLUCOSE BLDC GLUCOMTR-MCNC: 211 MG/DL — HIGH (ref 70–99)
GLUCOSE BLDC GLUCOMTR-MCNC: 384 MG/DL — HIGH (ref 70–99)
GLUCOSE SERPL-MCNC: 114 MG/DL — HIGH (ref 70–99)
HCT VFR BLD CALC: 27.7 % — LOW (ref 39–50)
HGB BLD-MCNC: 8.9 G/DL — LOW (ref 13–17)
MAGNESIUM SERPL-MCNC: 2.2 MG/DL — SIGNIFICANT CHANGE UP (ref 1.6–2.6)
MCHC RBC-ENTMCNC: 28.8 PG — SIGNIFICANT CHANGE UP (ref 27–34)
MCHC RBC-ENTMCNC: 32.1 G/DL — SIGNIFICANT CHANGE UP (ref 32–36)
MCV RBC AUTO: 89.6 FL — SIGNIFICANT CHANGE UP (ref 80–100)
NRBC # BLD AUTO: 0 K/UL — SIGNIFICANT CHANGE UP (ref 0–0)
NRBC # FLD: 0 K/UL — SIGNIFICANT CHANGE UP (ref 0–0)
NRBC BLD AUTO-RTO: 0 /100 WBCS — SIGNIFICANT CHANGE UP (ref 0–0)
PHOSPHATE SERPL-MCNC: 3.7 MG/DL — SIGNIFICANT CHANGE UP (ref 2.4–4.7)
PLATELET # BLD AUTO: 255 K/UL — SIGNIFICANT CHANGE UP (ref 150–400)
PMV BLD: 9.2 FL — SIGNIFICANT CHANGE UP (ref 7–13)
POTASSIUM SERPL-MCNC: 4.7 MMOL/L — SIGNIFICANT CHANGE UP (ref 3.5–5.3)
POTASSIUM SERPL-SCNC: 4.7 MMOL/L — SIGNIFICANT CHANGE UP (ref 3.5–5.3)
RBC # BLD: 3.09 M/UL — LOW (ref 4.2–5.8)
RBC # FLD: 15.1 % — HIGH (ref 10.3–14.5)
SODIUM SERPL-SCNC: 140 MMOL/L — SIGNIFICANT CHANGE UP (ref 135–145)
WBC # BLD: 6.84 K/UL — SIGNIFICANT CHANGE UP (ref 3.8–10.5)
WBC # FLD AUTO: 6.84 K/UL — SIGNIFICANT CHANGE UP (ref 3.8–10.5)

## 2025-04-11 PROCEDURE — 76937 US GUIDE VASCULAR ACCESS: CPT | Mod: 26

## 2025-04-11 PROCEDURE — 77001 FLUOROGUIDE FOR VEIN DEVICE: CPT | Mod: 26

## 2025-04-11 PROCEDURE — 90935 HEMODIALYSIS ONE EVALUATION: CPT

## 2025-04-11 PROCEDURE — 36558 INSERT TUNNELED CV CATH: CPT | Mod: RT

## 2025-04-11 PROCEDURE — 99232 SBSQ HOSP IP/OBS MODERATE 35: CPT

## 2025-04-11 RX ORDER — INSULIN LISPRO 100 U/ML
5 INJECTION, SOLUTION INTRAVENOUS; SUBCUTANEOUS ONCE
Refills: 0 | Status: COMPLETED | OUTPATIENT
Start: 2025-04-11 | End: 2025-04-11

## 2025-04-11 RX ADMIN — LAMIVUDINE 100 MILLIGRAM(S): 10 SOLUTION ORAL at 13:01

## 2025-04-11 RX ADMIN — GABAPENTIN 100 MILLIGRAM(S): 400 CAPSULE ORAL at 13:01

## 2025-04-11 RX ADMIN — Medication 10 MILLIGRAM(S): at 13:01

## 2025-04-11 RX ADMIN — Medication 2 DROP(S): at 13:02

## 2025-04-11 RX ADMIN — IRON SUCROSE 110 MILLIGRAM(S): 20 INJECTION, SOLUTION INTRAVENOUS at 16:06

## 2025-04-11 RX ADMIN — Medication 975 MILLIGRAM(S): at 07:10

## 2025-04-11 RX ADMIN — GABAPENTIN 100 MILLIGRAM(S): 400 CAPSULE ORAL at 21:58

## 2025-04-11 RX ADMIN — GABAPENTIN 100 MILLIGRAM(S): 400 CAPSULE ORAL at 06:07

## 2025-04-11 RX ADMIN — Medication 3 MILLIGRAM(S): at 22:00

## 2025-04-11 RX ADMIN — AMLODIPINE BESYLATE 10 MILLIGRAM(S): 10 TABLET ORAL at 06:06

## 2025-04-11 RX ADMIN — Medication 650 MILLIGRAM(S): at 18:44

## 2025-04-11 RX ADMIN — DOLUTEGRAVIR SODIUM 50 MILLIGRAM(S): 5 TABLET, FOR SUSPENSION ORAL at 13:02

## 2025-04-11 RX ADMIN — Medication 2 DROP(S): at 21:58

## 2025-04-11 RX ADMIN — Medication 975 MILLIGRAM(S): at 13:01

## 2025-04-11 RX ADMIN — Medication 1 DROP(S): at 06:04

## 2025-04-11 RX ADMIN — Medication 1 DROP(S): at 13:01

## 2025-04-11 RX ADMIN — Medication 10 MILLIGRAM(S): at 06:07

## 2025-04-11 RX ADMIN — INSULIN LISPRO 1: 100 INJECTION, SOLUTION INTRAVENOUS; SUBCUTANEOUS at 13:02

## 2025-04-11 RX ADMIN — Medication 1 APPLICATION(S): at 06:06

## 2025-04-11 RX ADMIN — Medication 650 MILLIGRAM(S): at 06:07

## 2025-04-11 RX ADMIN — INSULIN GLARGINE-YFGN 10 UNIT(S): 100 INJECTION, SOLUTION SUBCUTANEOUS at 21:59

## 2025-04-11 RX ADMIN — Medication 10 MILLIGRAM(S): at 18:44

## 2025-04-11 RX ADMIN — Medication 10 MILLIGRAM(S): at 23:46

## 2025-04-11 RX ADMIN — METOPROLOL SUCCINATE 50 MILLIGRAM(S): 50 TABLET, EXTENDED RELEASE ORAL at 18:44

## 2025-04-11 RX ADMIN — Medication 1 APPLICATION(S): at 13:04

## 2025-04-11 RX ADMIN — Medication 1 DROP(S): at 23:46

## 2025-04-11 RX ADMIN — Medication 975 MILLIGRAM(S): at 14:01

## 2025-04-11 RX ADMIN — INSULIN LISPRO 5 UNIT(S): 100 INJECTION, SOLUTION INTRAVENOUS; SUBCUTANEOUS at 21:59

## 2025-04-11 RX ADMIN — POLYMYXIN B SULFATE AND TRIMETHOPRIM SULFATE 1 DROP(S): 10000; 1 SOLUTION/ DROPS OPHTHALMIC at 13:01

## 2025-04-11 RX ADMIN — METOPROLOL SUCCINATE 50 MILLIGRAM(S): 50 TABLET, EXTENDED RELEASE ORAL at 06:07

## 2025-04-11 RX ADMIN — Medication 1 DROP(S): at 18:44

## 2025-04-11 RX ADMIN — ATORVASTATIN CALCIUM 40 MILLIGRAM(S): 80 TABLET, FILM COATED ORAL at 21:58

## 2025-04-11 RX ADMIN — Medication 2 DROP(S): at 06:04

## 2025-04-11 NOTE — PROGRESS NOTE ADULT - SUBJECTIVE AND OBJECTIVE BOX
seen for new hd    feels well  no acute complaints/ros negative     MEDICATIONS  (STANDING):  acetaminophen     Tablet .. 975 milliGRAM(s) Oral every 8 hours  amLODIPine   Tablet 10 milliGRAM(s) Oral daily  artificial tears (preservative free) Ophthalmic Solution 2 Drop(s) Both EYES every 8 hours  atorvastatin 40 milliGRAM(s) Oral at bedtime  chlorhexidine 2% Cloths 1 Application(s) Topical <User Schedule>  chlorhexidine 4% Liquid 1 Application(s) Topical <User Schedule>  dextrose 5%. 1000 milliLiter(s) (100 mL/Hr) IV Continuous <Continuous>  dextrose 5%. 1000 milliLiter(s) (50 mL/Hr) IV Continuous <Continuous>  dextrose 50% Injectable 25 Gram(s) IV Push once  dextrose 50% Injectable 12.5 Gram(s) IV Push once  dextrose 50% Injectable 25 Gram(s) IV Push once  dolutegravir 50 milliGRAM(s) Oral daily  gabapentin 100 milliGRAM(s) Oral three times a day  glucagon  Injectable 1 milliGRAM(s) IntraMuscular once  hydrALAZINE 10 milliGRAM(s) Oral every 6 hours  insulin glargine Injectable (LANTUS) 10 Unit(s) SubCutaneous at bedtime  insulin lispro (ADMELOG) corrective regimen sliding scale   SubCutaneous three times a day before meals  iron sucrose IVPB 200 milliGRAM(s) IV Intermittent every 24 hours  lamiVUDine- milliGRAM(s) Oral daily  metoprolol tartrate 50 milliGRAM(s) Oral two times a day  prednisoLONE acetate 1% Suspension 1 Drop(s) Both EYES four times a day  sodium bicarbonate 650 milliGRAM(s) Oral two times a day  trimethoprim/polymyxin Solution 1 Drop(s) Both EYES daily    MEDICATIONS  (PRN):  aluminum hydroxide/magnesium hydroxide/simethicone Suspension 30 milliLiter(s) Oral every 4 hours PRN Dyspepsia  dextrose Oral Gel 15 Gram(s) Oral once PRN Blood Glucose LESS THAN 70 milliGRAM(s)/deciliter  melatonin 3 milliGRAM(s) Oral at bedtime PRN Insomnia  ondansetron Injectable 4 milliGRAM(s) IV Push every 8 hours PRN Nausea and/or Vomiting  sodium chloride 0.9% lock flush 10 milliLiter(s) IV Push every 1 hour PRN Pre/post blood products, medications, blood draw, and to maintain line patency      Allergies    No Known Allergies         Vital Signs Last 24 Hrs  T(C): 36.4 (11 Apr 2025 07:38), Max: 36.7 (10 Apr 2025 20:29)  T(F): 97.5 (11 Apr 2025 07:38), Max: 98 (10 Apr 2025 20:29)  HR: 66 (11 Apr 2025 07:38) (66 - 75)  BP: 130/75 (11 Apr 2025 07:38) (130/75 - 155/82)  BP(mean): --  RR: 17 (11 Apr 2025 07:38) (16 - 18)  SpO2: 98% (11 Apr 2025 07:38) (97% - 98%)    Parameters below as of 11 Apr 2025 07:38  Patient On (Oxygen Delivery Method): room air        PHYSICAL EXAM:    GENERAL: NAD  CHEST/LUNG: Clear to ausculation bilaterally  HEART: Regular rate and rhythm; S1 S2  ABDOMEN: Soft, Bowel sounds present  EXTREMITIES:  no edema     LABS:                        8.9    6.84  )-----------( 255      ( 11 Apr 2025 06:07 )             27.7     04-11    140  |  110[H]  |  63.4[H]  ----------------------------<  114[H]  4.7   |  18.0[L]  |  5.67[H]    Ca    8.3[L]      11 Apr 2025 06:07  Phos  3.7     04-11  Mg     2.2     04-11        Urinalysis Basic - ( 11 Apr 2025 06:07 )    Color: x / Appearance: x / SG: x / pH: x  Gluc: 114 mg/dL / Ketone: x  / Bili: x / Urobili: x   Blood: x / Protein: x / Nitrite: x   Leuk Esterase: x / RBC: x / WBC x   Sq Epi: x / Non Sq Epi: x / Bacteria: x        CAPILLARY BLOOD GLUCOSE      POCT Blood Glucose.: 194 mg/dL (11 Apr 2025 12:08)  POCT Blood Glucose.: 122 mg/dL (11 Apr 2025 10:30)  POCT Blood Glucose.: 215 mg/dL (10 Apr 2025 23:17)  POCT Blood Glucose.: 245 mg/dL (10 Apr 2025 21:09)  POCT Blood Glucose.: 111 mg/dL (10 Apr 2025 17:21)        RADIOLOGY & ADDITIONAL TESTS:
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
## 2025-04-11 NOTE — PROGRESS NOTE ADULT - SUBJECTIVE AND OBJECTIVE BOX
Reason for visit: Elevated SCR/BUN, Advanced CKD    Subjective: No acute overnight event. Patient denied any cardiac or urinary complains. No fever/chills. Got permcath. Seen on HD.   ROS: All systems were reviewed in detail pertinent positive and negative mentioned above, rest are negative.    Physical Exam:  Gen: no acute distress  MS: alert, conversing normally  Eyes: EOMI, no icterus  HENT: NCAT, MMM  CV: rhythm reg reg, rate normal, no m/g/r  Chest: CTAB, no w/r/r,  Abd: soft, NT, ND  Extremities: No edema    Vital Signs Last 24 Hrs  T(C): 36.6 (11 Apr 2025 17:33), Max: 36.8 (11 Apr 2025 15:10)  T(F): 97.9 (11 Apr 2025 17:33), Max: 98.2 (11 Apr 2025 15:10)  HR: 69 (11 Apr 2025 17:33) (66 - 69)  BP: 151/79 (11 Apr 2025 17:33) (130/72 - 151/79)  BP(mean): --  RR: 19 (11 Apr 2025 17:33) (17 - 19)  SpO2: 98% (11 Apr 2025 17:33) (97% - 98%)    Parameters below as of 11 Apr 2025 17:33  Patient On (Oxygen Delivery Method): room air      I&O's Summary    10 Apr 2025 07:01  -  11 Apr 2025 07:00  --------------------------------------------------------  IN: 1140 mL / OUT: 0 mL / NET: 1140 mL    11 Apr 2025 07:01  -  11 Apr 2025 20:45  --------------------------------------------------------  IN: 1280 mL / OUT: 1200 mL / NET: 80 mL      Current Antibiotics:  dolutegravir 50 milliGRAM(s) Oral daily  lamiVUDine- milliGRAM(s) Oral daily    Other medications:  acetaminophen     Tablet .. 975 milliGRAM(s) Oral every 8 hours  amLODIPine   Tablet 10 milliGRAM(s) Oral daily  artificial tears (preservative free) Ophthalmic Solution 2 Drop(s) Both EYES every 8 hours  atorvastatin 40 milliGRAM(s) Oral at bedtime  chlorhexidine 2% Cloths 1 Application(s) Topical <User Schedule>  chlorhexidine 4% Liquid 1 Application(s) Topical <User Schedule>  dextrose 5%. 1000 milliLiter(s) IV Continuous <Continuous>  dextrose 5%. 1000 milliLiter(s) IV Continuous <Continuous>  dextrose 50% Injectable 25 Gram(s) IV Push once  dextrose 50% Injectable 12.5 Gram(s) IV Push once  dextrose 50% Injectable 25 Gram(s) IV Push once  gabapentin 100 milliGRAM(s) Oral three times a day  glucagon  Injectable 1 milliGRAM(s) IntraMuscular once  hydrALAZINE 10 milliGRAM(s) Oral every 6 hours  insulin glargine Injectable (LANTUS) 10 Unit(s) SubCutaneous at bedtime  insulin lispro (ADMELOG) corrective regimen sliding scale   SubCutaneous three times a day before meals  iron sucrose IVPB 200 milliGRAM(s) IV Intermittent every 24 hours  metoprolol tartrate 50 milliGRAM(s) Oral two times a day  prednisoLONE acetate 1% Suspension 1 Drop(s) Both EYES four times a day  sodium bicarbonate 650 milliGRAM(s) Oral two times a day  trimethoprim/polymyxin Solution 1 Drop(s) Both EYES daily    04-11    140  |  110[H]  |  63.4[H]  ----------------------------<  114[H]  4.7   |  18.0[L]  |  5.67[H]    Ca    8.3[L]      11 Apr 2025 06:07  Phos  3.7     04-11  Mg     2.2     04-11      Creatinine: 5.67 mg/dL (04-11-25 @ 06:07)  Creatinine: 5.87 mg/dL (04-09-25 @ 05:45)  Creatinine: 5.48 mg/dL (04-08-25 @ 05:30)  Creatinine: 5.61 mg/dL (04-07-25 @ 04:30)                          8.9    6.84  )-----------( 255      ( 11 Apr 2025 06:07 )             27.7

## 2025-04-11 NOTE — PROGRESS NOTE ADULT - ASSESSMENT
63 YOM DM, HIV on HAART admitted after fall.  Nephrology consulted for progressive worsening CKD.    Advanced progressive CKD stage V from biopsy proven diabetic nephropathy and podopathy likely related to HIV  Nephrotic syndrome  pt has h.o long standing DM (about 4 decades- untreated- sugars in '300s' as per ex wife)  HIV- VL > 300 k      *** Biopsy report  Final Diagnosis  KIDNEY, LEFT: PERCUTANEOUS NEEDLE CORE BIOPSY  -Diabetic nephropathy, see comment  -Complete foot process effacement, superimposed diabetic nephropathy  -Arterionephrosclerosis, moderate  -Diffuse interstitial lymphoplasmacytic infiltrate in scar areas  -Interstitial fibrosis/tubular atrophy, 80%  -Global glomerulosclerosis, 19/38      -Serum creatinine has slowly progressed now to ~5.5  -UA with proteinuria, Tp/cr ratio ~3.3  -Renal ultrasound reviewed; renal medical disease  -Serological work up  WNL   -Biopsy w/ advanced chronicity   -HD was offered and patient had tunneled hemodialysis catheter placed today (4/11)  -Vascular following for long-term access planning  -Case management to start planning for outpatient dialysis placement    Anemia: CKD versus RITESH.  Placed on IV venofer. will place on epo.  HTN: BP controlled- continue amlodipine, metoprolol, Hydralazine  Metabolic acidosis; can discontinue po sodium bicarb 1300 mg BID now as on HD  HIV on Bikatrvy- meds changed as per ID  DM: discontinued metformin (low GFR), place on Lantus + SSI ACHS    Discussed in detail w/ patient and primary team

## 2025-04-12 LAB
GLUCOSE BLDC GLUCOMTR-MCNC: 169 MG/DL — HIGH (ref 70–99)
GLUCOSE BLDC GLUCOMTR-MCNC: 188 MG/DL — HIGH (ref 70–99)
GLUCOSE BLDC GLUCOMTR-MCNC: 229 MG/DL — HIGH (ref 70–99)
GLUCOSE BLDC GLUCOMTR-MCNC: 336 MG/DL — HIGH (ref 70–99)
HBV CORE AB SER-ACNC: SIGNIFICANT CHANGE UP
HBV CORE IGM SER-ACNC: SIGNIFICANT CHANGE UP
HBV SURFACE AB SER-ACNC: <3.3 MIU/ML — LOW
HBV SURFACE AG SER-ACNC: SIGNIFICANT CHANGE UP
HCV AB S/CO SERPL IA: 0.18 S/CO — SIGNIFICANT CHANGE UP (ref 0–0.79)
HCV AB SERPL-IMP: SIGNIFICANT CHANGE UP

## 2025-04-12 PROCEDURE — 99232 SBSQ HOSP IP/OBS MODERATE 35: CPT

## 2025-04-12 PROCEDURE — 90935 HEMODIALYSIS ONE EVALUATION: CPT

## 2025-04-12 RX ORDER — INSULIN LISPRO 100 U/ML
4 INJECTION, SOLUTION INTRAVENOUS; SUBCUTANEOUS ONCE
Refills: 0 | Status: COMPLETED | OUTPATIENT
Start: 2025-04-12 | End: 2025-04-12

## 2025-04-12 RX ADMIN — Medication 975 MILLIGRAM(S): at 21:51

## 2025-04-12 RX ADMIN — Medication 650 MILLIGRAM(S): at 05:30

## 2025-04-12 RX ADMIN — Medication 1 APPLICATION(S): at 05:33

## 2025-04-12 RX ADMIN — INSULIN LISPRO 4 UNIT(S): 100 INJECTION, SOLUTION INTRAVENOUS; SUBCUTANEOUS at 22:52

## 2025-04-12 RX ADMIN — LAMIVUDINE 100 MILLIGRAM(S): 10 SOLUTION ORAL at 11:33

## 2025-04-12 RX ADMIN — Medication 2 DROP(S): at 05:29

## 2025-04-12 RX ADMIN — ATORVASTATIN CALCIUM 40 MILLIGRAM(S): 80 TABLET, FILM COATED ORAL at 21:51

## 2025-04-12 RX ADMIN — GABAPENTIN 100 MILLIGRAM(S): 400 CAPSULE ORAL at 13:34

## 2025-04-12 RX ADMIN — Medication 10 MILLIGRAM(S): at 19:05

## 2025-04-12 RX ADMIN — POLYMYXIN B SULFATE AND TRIMETHOPRIM SULFATE 1 DROP(S): 10000; 1 SOLUTION/ DROPS OPHTHALMIC at 11:34

## 2025-04-12 RX ADMIN — DOLUTEGRAVIR SODIUM 50 MILLIGRAM(S): 5 TABLET, FOR SUSPENSION ORAL at 11:33

## 2025-04-12 RX ADMIN — Medication 1 DROP(S): at 19:05

## 2025-04-12 RX ADMIN — IRON SUCROSE 110 MILLIGRAM(S): 20 INJECTION, SOLUTION INTRAVENOUS at 13:50

## 2025-04-12 RX ADMIN — METOPROLOL SUCCINATE 50 MILLIGRAM(S): 50 TABLET, EXTENDED RELEASE ORAL at 19:05

## 2025-04-12 RX ADMIN — INSULIN GLARGINE-YFGN 10 UNIT(S): 100 INJECTION, SOLUTION SUBCUTANEOUS at 22:53

## 2025-04-12 RX ADMIN — Medication 10 MILLIGRAM(S): at 11:33

## 2025-04-12 RX ADMIN — INSULIN LISPRO 1: 100 INJECTION, SOLUTION INTRAVENOUS; SUBCUTANEOUS at 08:33

## 2025-04-12 RX ADMIN — Medication 10 MILLIGRAM(S): at 05:29

## 2025-04-12 RX ADMIN — Medication 975 MILLIGRAM(S): at 22:51

## 2025-04-12 RX ADMIN — METOPROLOL SUCCINATE 50 MILLIGRAM(S): 50 TABLET, EXTENDED RELEASE ORAL at 05:30

## 2025-04-12 RX ADMIN — Medication 975 MILLIGRAM(S): at 13:34

## 2025-04-12 RX ADMIN — Medication 2 DROP(S): at 21:51

## 2025-04-12 RX ADMIN — Medication 1 DROP(S): at 05:30

## 2025-04-12 RX ADMIN — AMLODIPINE BESYLATE 10 MILLIGRAM(S): 10 TABLET ORAL at 05:28

## 2025-04-12 RX ADMIN — Medication 1 DROP(S): at 11:34

## 2025-04-12 RX ADMIN — Medication 975 MILLIGRAM(S): at 14:34

## 2025-04-12 RX ADMIN — INSULIN LISPRO 2: 100 INJECTION, SOLUTION INTRAVENOUS; SUBCUTANEOUS at 12:48

## 2025-04-12 RX ADMIN — GABAPENTIN 100 MILLIGRAM(S): 400 CAPSULE ORAL at 21:50

## 2025-04-12 RX ADMIN — Medication 3 MILLIGRAM(S): at 21:51

## 2025-04-12 RX ADMIN — GABAPENTIN 100 MILLIGRAM(S): 400 CAPSULE ORAL at 05:29

## 2025-04-12 RX ADMIN — Medication 1 APPLICATION(S): at 05:29

## 2025-04-12 RX ADMIN — Medication 2 DROP(S): at 13:34
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
## 2025-04-12 NOTE — PROGRESS NOTE ADULT - SUBJECTIVE AND OBJECTIVE BOX
Reason for visit: Elevated SCR/BUN, Advanced CKD    Subjective: No acute overnight event. Patient denied any cardiac or urinary complains. No fever/chills. Got 1st session of HD yesterday.  ROS: All systems were reviewed in detail pertinent positive and negative mentioned above, rest are negative.    Physical Exam:  Gen: no acute distress  MS: alert, conversing normally  Eyes: EOMI, no icterus  HENT: NCAT, MMM  CV: rhythm reg reg, rate normal, no m/g/r  Chest: CTAB, no w/r/r,  Abd: soft, NT, ND  Extremities: No edema    Vital Signs Last 24 Hrs  T(C): 36.8 (12 Apr 2025 11:24), Max: 36.8 (11 Apr 2025 23:37)  T(F): 98.3 (12 Apr 2025 11:24), Max: 98.3 (11 Apr 2025 23:37)  HR: 77 (12 Apr 2025 11:24) (69 - 78)  BP: 139/89 (12 Apr 2025 11:24) (139/89 - 164/82)  BP(mean): --  RR: 18 (12 Apr 2025 11:24) (16 - 19)  SpO2: 95% (12 Apr 2025 11:24) (93% - 98%)    Parameters below as of 12 Apr 2025 11:24  Patient On (Oxygen Delivery Method): room air      I&O's Summary    11 Apr 2025 07:01  -  12 Apr 2025 07:00  --------------------------------------------------------  IN: 1520 mL / OUT: 1200 mL / NET: 320 mL      Current Antibiotics:  dolutegravir 50 milliGRAM(s) Oral daily  lamiVUDine- milliGRAM(s) Oral daily    Other medications:  acetaminophen     Tablet .. 975 milliGRAM(s) Oral every 8 hours  amLODIPine   Tablet 10 milliGRAM(s) Oral daily  artificial tears (preservative free) Ophthalmic Solution 2 Drop(s) Both EYES every 8 hours  atorvastatin 40 milliGRAM(s) Oral at bedtime  chlorhexidine 2% Cloths 1 Application(s) Topical <User Schedule>  chlorhexidine 4% Liquid 1 Application(s) Topical <User Schedule>  dextrose 5%. 1000 milliLiter(s) IV Continuous <Continuous>  dextrose 5%. 1000 milliLiter(s) IV Continuous <Continuous>  dextrose 50% Injectable 25 Gram(s) IV Push once  dextrose 50% Injectable 12.5 Gram(s) IV Push once  dextrose 50% Injectable 25 Gram(s) IV Push once  gabapentin 100 milliGRAM(s) Oral three times a day  glucagon  Injectable 1 milliGRAM(s) IntraMuscular once  hydrALAZINE 10 milliGRAM(s) Oral every 6 hours  insulin glargine Injectable (LANTUS) 10 Unit(s) SubCutaneous at bedtime  insulin lispro (ADMELOG) corrective regimen sliding scale   SubCutaneous three times a day before meals  metoprolol tartrate 50 milliGRAM(s) Oral two times a day  prednisoLONE acetate 1% Suspension 1 Drop(s) Both EYES four times a day  sodium bicarbonate 650 milliGRAM(s) Oral two times a day  trimethoprim/polymyxin Solution 1 Drop(s) Both EYES daily    04-11    140  |  110[H]  |  63.4[H]  ----------------------------<  114[H]  4.7   |  18.0[L]  |  5.67[H]    Ca    8.3[L]      11 Apr 2025 06:07  Phos  3.7     04-11  Mg     2.2     04-11      Creatinine: 5.67 mg/dL (04-11-25 @ 06:07)  Creatinine: 5.87 mg/dL (04-09-25 @ 05:45)  Creatinine: 5.48 mg/dL (04-08-25 @ 05:30)                          8.9    6.84  )-----------( 255      ( 11 Apr 2025 06:07 )             27.7

## 2025-04-12 NOTE — PROGRESS NOTE ADULT - SUBJECTIVE AND OBJECTIVE BOX
Elmer Bray DO  Division of Hospital Medicine  Available on [employer] TEAMS    Patient is a 64y old  Male who presents with a chief complaint of Syncope and collapse     (01 Apr 2025 13:50)      INTERVAL HPI/OVERNIGHT EVENTS: Patient seen and examined at bedside. No overnight events. Denies fever, chills, chest pain, shortness of breath, abdominal pain, nausea/vomiting, headache.    MEDICATIONS  (STANDING):  acetaminophen     Tablet .. 975 milliGRAM(s) Oral every 8 hours  amLODIPine   Tablet 10 milliGRAM(s) Oral daily  artificial tears (preservative free) Ophthalmic Solution 2 Drop(s) Both EYES every 8 hours  atorvastatin 40 milliGRAM(s) Oral at bedtime  chlorhexidine 2% Cloths 1 Application(s) Topical <User Schedule>  chlorhexidine 4% Liquid 1 Application(s) Topical <User Schedule>  dextrose 5%. 1000 milliLiter(s) (100 mL/Hr) IV Continuous <Continuous>  dextrose 5%. 1000 milliLiter(s) (50 mL/Hr) IV Continuous <Continuous>  dextrose 50% Injectable 25 Gram(s) IV Push once  dextrose 50% Injectable 12.5 Gram(s) IV Push once  dextrose 50% Injectable 25 Gram(s) IV Push once  dolutegravir 50 milliGRAM(s) Oral daily  gabapentin 100 milliGRAM(s) Oral three times a day  glucagon  Injectable 1 milliGRAM(s) IntraMuscular once  hydrALAZINE 10 milliGRAM(s) Oral every 6 hours  insulin glargine Injectable (LANTUS) 10 Unit(s) SubCutaneous at bedtime  insulin lispro (ADMELOG) corrective regimen sliding scale   SubCutaneous three times a day before meals  iron sucrose IVPB 200 milliGRAM(s) IV Intermittent every 24 hours  lamiVUDine- milliGRAM(s) Oral daily  metoprolol tartrate 50 milliGRAM(s) Oral two times a day  prednisoLONE acetate 1% Suspension 1 Drop(s) Both EYES four times a day  sodium bicarbonate 650 milliGRAM(s) Oral two times a day  trimethoprim/polymyxin Solution 1 Drop(s) Both EYES daily    MEDICATIONS  (PRN):  aluminum hydroxide/magnesium hydroxide/simethicone Suspension 30 milliLiter(s) Oral every 4 hours PRN Dyspepsia  dextrose Oral Gel 15 Gram(s) Oral once PRN Blood Glucose LESS THAN 70 milliGRAM(s)/deciliter  melatonin 3 milliGRAM(s) Oral at bedtime PRN Insomnia  ondansetron Injectable 4 milliGRAM(s) IV Push every 8 hours PRN Nausea and/or Vomiting  sodium chloride 0.9% lock flush 10 milliLiter(s) IV Push every 1 hour PRN Pre/post blood products, medications, blood draw, and to maintain line patency      Allergies    No Known Allergies    Intolerances        REVIEW OF SYSTEMS:  All other review of systems is negative unless indicated above      Vital Signs Last 24 Hrs  T(C): 36.8 (12 Apr 2025 11:24), Max: 36.8 (11 Apr 2025 15:10)  T(F): 98.3 (12 Apr 2025 11:24), Max: 98.3 (11 Apr 2025 23:37)  HR: 77 (12 Apr 2025 11:24) (67 - 78)  BP: 139/89 (12 Apr 2025 11:24) (130/72 - 164/82)  BP(mean): --  RR: 18 (12 Apr 2025 11:24) (16 - 19)  SpO2: 95% (12 Apr 2025 11:24) (93% - 98%)    Parameters below as of 12 Apr 2025 11:24  Patient On (Oxygen Delivery Method): room air        PHYSICAL EXAM:  GENERAL: NAD  HEENT:  anicteric, moist mucous membranes  CHEST/LUNG:  CTA b/l, no rales, wheezes, or rhonchi  HEART:  RRR, S1, S2  ABDOMEN:  BS+, soft, nontender, nondistended  EXTREMITIES: no edema, cyanosis, or calf tenderness  NERVOUS SYSTEM: answers questions and follows commands appropriately    LABS:    CBC Full  -  ( 11 Apr 2025 06:07 )  WBC Count : 6.84 K/uL  Hemoglobin : 8.9 g/dL  Hematocrit : 27.7 %  Platelet Count - Automated : 255 K/uL  Mean Cell Volume : 89.6 fl  Mean Cell Hemoglobin : 28.8 pg  Mean Cell Hemoglobin Concentration : 32.1 g/dL  Auto Neutrophil # : x  Auto Lymphocyte # : x  Auto Monocyte # : x  Auto Eosinophil # : x  Auto Basophil # : x  Auto Neutrophil % : x  Auto Lymphocyte % : x  Auto Monocyte % : x  Auto Eosinophil % : x  Auto Basophil % : x      Ca    8.3        11 Apr 2025 06:07        Urinalysis Basic - ( 11 Apr 2025 06:07 )    Color: x / Appearance: x / SG: x / pH: x  Gluc: 114 mg/dL / Ketone: x  / Bili: x / Urobili: x   Blood: x / Protein: x / Nitrite: x   Leuk Esterase: x / RBC: x / WBC x   Sq Epi: x / Non Sq Epi: x / Bacteria: x      CAPILLARY BLOOD GLUCOSE      POCT Blood Glucose.: 229 mg/dL (12 Apr 2025 12:13)  POCT Blood Glucose.: 188 mg/dL (12 Apr 2025 08:05)  POCT Blood Glucose.: 384 mg/dL (11 Apr 2025 21:13)  POCT Blood Glucose.: 211 mg/dL (11 Apr 2025 17:39)          RADIOLOGY & ADDITIONAL TESTS:    Personally reviewed.     Consultant(s) Notes Reviewed:  [x] YES  [ ] NO

## 2025-04-12 NOTE — PROGRESS NOTE ADULT - ASSESSMENT
63 YOM DM, HIV on HAART admitted after fall.  Nephrology consulted for progressive worsening CKD.    Advanced progressive CKD stage V from biopsy proven diabetic nephropathy and podopathy likely related to HIV  Nephrotic syndrome  pt has h.o long standing DM (about 4 decades- untreated- sugars in '300s' as per ex wife)  HIV- VL > 300 k      *** Biopsy report  Final Diagnosis  KIDNEY, LEFT: PERCUTANEOUS NEEDLE CORE BIOPSY  -Diabetic nephropathy, see comment  -Complete foot process effacement, superimposed diabetic nephropathy  -Arterionephrosclerosis, moderate  -Diffuse interstitial lymphoplasmacytic infiltrate in scar areas  -Interstitial fibrosis/tubular atrophy, 80%  -Global glomerulosclerosis, 19/38      -Serum creatinine has slowly progressed now to ~5.5  -UA with proteinuria, Tp/cr ratio ~3.3  -Renal ultrasound reviewed; renal medical disease  -Serological work up  WNL   -Biopsy w/ advanced chronicity   -HD was offered and patient had tunneled hemodialysis catheter placed  (4/11)  -First session of dialysis yesterday, second plan for today  -Vascular following for long-term access planning  -Case management to start planning for outpatient dialysis placement    Anemia: CKD versus RITESH.  Placed on IV venofer. will place on epo.  HTN: BP controlled- continue amlodipine, metoprolol, Hydralazine  Metabolic acidosis; will discontinue po sodium bicarb 1300 mg BID now as on HD  HIV on Bikatrvy- meds changed as per ID  DM: discontinued metformin (low GFR), place on Lantus + SSI ACHS    Discussed in detail w/ patient and primary team

## 2025-04-13 LAB
ANION GAP SERPL CALC-SCNC: 12 MMOL/L — SIGNIFICANT CHANGE UP (ref 5–17)
BUN SERPL-MCNC: 33.9 MG/DL — HIGH (ref 8–20)
CALCIUM SERPL-MCNC: 8.4 MG/DL — SIGNIFICANT CHANGE UP (ref 8.4–10.5)
CHLORIDE SERPL-SCNC: 103 MMOL/L — SIGNIFICANT CHANGE UP (ref 96–108)
CO2 SERPL-SCNC: 26 MMOL/L — SIGNIFICANT CHANGE UP (ref 22–29)
CREAT SERPL-MCNC: 4.12 MG/DL — HIGH (ref 0.5–1.3)
EGFR: 15 ML/MIN/1.73M2 — LOW
EGFR: 15 ML/MIN/1.73M2 — LOW
GLUCOSE BLDC GLUCOMTR-MCNC: 136 MG/DL — HIGH (ref 70–99)
GLUCOSE BLDC GLUCOMTR-MCNC: 174 MG/DL — HIGH (ref 70–99)
GLUCOSE BLDC GLUCOMTR-MCNC: 247 MG/DL — HIGH (ref 70–99)
GLUCOSE BLDC GLUCOMTR-MCNC: 283 MG/DL — HIGH (ref 70–99)
GLUCOSE SERPL-MCNC: 103 MG/DL — HIGH (ref 70–99)
HCT VFR BLD CALC: 28.9 % — LOW (ref 39–50)
HGB BLD-MCNC: 9.5 G/DL — LOW (ref 13–17)
MAGNESIUM SERPL-MCNC: 2 MG/DL — SIGNIFICANT CHANGE UP (ref 1.6–2.6)
MCHC RBC-ENTMCNC: 29 PG — SIGNIFICANT CHANGE UP (ref 27–34)
MCHC RBC-ENTMCNC: 32.9 G/DL — SIGNIFICANT CHANGE UP (ref 32–36)
MCV RBC AUTO: 88.1 FL — SIGNIFICANT CHANGE UP (ref 80–100)
NRBC # BLD AUTO: 0 K/UL — SIGNIFICANT CHANGE UP (ref 0–0)
NRBC # FLD: 0 K/UL — SIGNIFICANT CHANGE UP (ref 0–0)
NRBC BLD AUTO-RTO: 0 /100 WBCS — SIGNIFICANT CHANGE UP (ref 0–0)
PHOSPHATE SERPL-MCNC: 4.1 MG/DL — SIGNIFICANT CHANGE UP (ref 2.4–4.7)
PLATELET # BLD AUTO: 255 K/UL — SIGNIFICANT CHANGE UP (ref 150–400)
PMV BLD: 9.1 FL — SIGNIFICANT CHANGE UP (ref 7–13)
POTASSIUM SERPL-MCNC: 4.2 MMOL/L — SIGNIFICANT CHANGE UP (ref 3.5–5.3)
POTASSIUM SERPL-SCNC: 4.2 MMOL/L — SIGNIFICANT CHANGE UP (ref 3.5–5.3)
RBC # BLD: 3.28 M/UL — LOW (ref 4.2–5.8)
RBC # FLD: 14.7 % — HIGH (ref 10.3–14.5)
SODIUM SERPL-SCNC: 141 MMOL/L — SIGNIFICANT CHANGE UP (ref 135–145)
WBC # BLD: 8.4 K/UL — SIGNIFICANT CHANGE UP (ref 3.8–10.5)
WBC # FLD AUTO: 8.4 K/UL — SIGNIFICANT CHANGE UP (ref 3.8–10.5)

## 2025-04-13 PROCEDURE — 99232 SBSQ HOSP IP/OBS MODERATE 35: CPT

## 2025-04-13 PROCEDURE — 99233 SBSQ HOSP IP/OBS HIGH 50: CPT

## 2025-04-13 RX ADMIN — GABAPENTIN 100 MILLIGRAM(S): 400 CAPSULE ORAL at 06:01

## 2025-04-13 RX ADMIN — Medication 1 APPLICATION(S): at 06:01

## 2025-04-13 RX ADMIN — Medication 975 MILLIGRAM(S): at 22:06

## 2025-04-13 RX ADMIN — Medication 1 DROP(S): at 01:17

## 2025-04-13 RX ADMIN — POLYMYXIN B SULFATE AND TRIMETHOPRIM SULFATE 1 DROP(S): 10000; 1 SOLUTION/ DROPS OPHTHALMIC at 11:58

## 2025-04-13 RX ADMIN — Medication 10 MILLIGRAM(S): at 01:16

## 2025-04-13 RX ADMIN — Medication 10 MILLIGRAM(S): at 17:41

## 2025-04-13 RX ADMIN — Medication 975 MILLIGRAM(S): at 06:00

## 2025-04-13 RX ADMIN — GABAPENTIN 100 MILLIGRAM(S): 400 CAPSULE ORAL at 22:04

## 2025-04-13 RX ADMIN — Medication 1 DROP(S): at 17:43

## 2025-04-13 RX ADMIN — Medication 975 MILLIGRAM(S): at 14:57

## 2025-04-13 RX ADMIN — GABAPENTIN 100 MILLIGRAM(S): 400 CAPSULE ORAL at 13:57

## 2025-04-13 RX ADMIN — INSULIN LISPRO 1: 100 INJECTION, SOLUTION INTRAVENOUS; SUBCUTANEOUS at 17:42

## 2025-04-13 RX ADMIN — Medication 10 MILLIGRAM(S): at 06:02

## 2025-04-13 RX ADMIN — Medication 2 DROP(S): at 22:05

## 2025-04-13 RX ADMIN — Medication 1 DROP(S): at 11:58

## 2025-04-13 RX ADMIN — DOLUTEGRAVIR SODIUM 50 MILLIGRAM(S): 5 TABLET, FOR SUSPENSION ORAL at 11:56

## 2025-04-13 RX ADMIN — LAMIVUDINE 100 MILLIGRAM(S): 10 SOLUTION ORAL at 11:56

## 2025-04-13 RX ADMIN — INSULIN GLARGINE-YFGN 10 UNIT(S): 100 INJECTION, SOLUTION SUBCUTANEOUS at 22:05

## 2025-04-13 RX ADMIN — Medication 10 MILLIGRAM(S): at 11:57

## 2025-04-13 RX ADMIN — Medication 2 DROP(S): at 06:01

## 2025-04-13 RX ADMIN — AMLODIPINE BESYLATE 10 MILLIGRAM(S): 10 TABLET ORAL at 06:01

## 2025-04-13 RX ADMIN — Medication 1 DROP(S): at 06:02

## 2025-04-13 RX ADMIN — METOPROLOL SUCCINATE 50 MILLIGRAM(S): 50 TABLET, EXTENDED RELEASE ORAL at 17:41

## 2025-04-13 RX ADMIN — ATORVASTATIN CALCIUM 40 MILLIGRAM(S): 80 TABLET, FILM COATED ORAL at 22:05

## 2025-04-13 RX ADMIN — Medication 2 DROP(S): at 13:57

## 2025-04-13 RX ADMIN — Medication 975 MILLIGRAM(S): at 13:57

## 2025-04-13 RX ADMIN — Medication 975 MILLIGRAM(S): at 23:06

## 2025-04-13 RX ADMIN — METOPROLOL SUCCINATE 50 MILLIGRAM(S): 50 TABLET, EXTENDED RELEASE ORAL at 06:02

## 2025-04-13 RX ADMIN — INSULIN LISPRO 2: 100 INJECTION, SOLUTION INTRAVENOUS; SUBCUTANEOUS at 13:05

## 2025-04-13 NOTE — PROGRESS NOTE ADULT - SUBJECTIVE AND OBJECTIVE BOX
Patient is a 64y old  Male who presents with a chief complaint of Syncope and collapse     (01 Apr 2025 13:50)      INTERVAL History of Present Illness/OVERNIGHT EVENTS: no new issues.      MEDICATIONS  (STANDING):  acetaminophen     Tablet .. 975 milliGRAM(s) Oral every 8 hours  amLODIPine   Tablet 10 milliGRAM(s) Oral daily  artificial tears (preservative free) Ophthalmic Solution 2 Drop(s) Both EYES every 8 hours  atorvastatin 40 milliGRAM(s) Oral at bedtime  chlorhexidine 2% Cloths 1 Application(s) Topical <User Schedule>  chlorhexidine 4% Liquid 1 Application(s) Topical <User Schedule>  dextrose 5%. 1000 milliLiter(s) (100 mL/Hr) IV Continuous <Continuous>  dextrose 5%. 1000 milliLiter(s) (50 mL/Hr) IV Continuous <Continuous>  dextrose 50% Injectable 25 Gram(s) IV Push once  dextrose 50% Injectable 12.5 Gram(s) IV Push once  dextrose 50% Injectable 25 Gram(s) IV Push once  dolutegravir 50 milliGRAM(s) Oral daily  gabapentin 100 milliGRAM(s) Oral three times a day  glucagon  Injectable 1 milliGRAM(s) IntraMuscular once  hydrALAZINE 10 milliGRAM(s) Oral every 6 hours  insulin glargine Injectable (LANTUS) 10 Unit(s) SubCutaneous at bedtime  insulin lispro (ADMELOG) corrective regimen sliding scale   SubCutaneous three times a day before meals  lamiVUDine- milliGRAM(s) Oral daily  metoprolol tartrate 50 milliGRAM(s) Oral two times a day  prednisoLONE acetate 1% Suspension 1 Drop(s) Both EYES four times a day  trimethoprim/polymyxin Solution 1 Drop(s) Both EYES daily    MEDICATIONS  (PRN):  aluminum hydroxide/magnesium hydroxide/simethicone Suspension 30 milliLiter(s) Oral every 4 hours PRN Dyspepsia  dextrose Oral Gel 15 Gram(s) Oral once PRN Blood Glucose LESS THAN 70 milliGRAM(s)/deciliter  melatonin 3 milliGRAM(s) Oral at bedtime PRN Insomnia  ondansetron Injectable 4 milliGRAM(s) IV Push every 8 hours PRN Nausea and/or Vomiting  sodium chloride 0.9% lock flush 10 milliLiter(s) IV Push every 1 hour PRN Pre/post blood products, medications, blood draw, and to maintain line patency      Allergies    No Known Allergies    Intolerances        REVIEW OF SYSTEMS:  Other systems currently negative unless otherwise specified above.    Vital Signs Last 24 Hrs  T(C): 36.8 (13 Apr 2025 07:30), Max: 36.8 (12 Apr 2025 11:24)  T(F): 98.3 (13 Apr 2025 07:30), Max: 98.3 (12 Apr 2025 11:24)  HR: 69 (13 Apr 2025 07:30) (68 - 87)  BP: 139/68 (13 Apr 2025 07:30) (117/65 - 157/74)  BP(mean): --  RR: 17 (13 Apr 2025 07:30) (17 - 19)  SpO2: 97% (13 Apr 2025 07:30) (95% - 98%)    Parameters below as of 13 Apr 2025 07:30  Patient On (Oxygen Delivery Method): room air            PHYSICAL EXAM:  Comfortable  Right chest HD cath  Lungs clear  RRR  Abd soft  No leg edema      LABS:                        9.5    8.40  )-----------( 255      ( 13 Apr 2025 05:09 )             28.9     13 Apr 2025 05:09    141    |  103    |  33.9   ----------------------------<  103    4.2     |  26.0   |  4.12     Ca    8.4        13 Apr 2025 05:09  Phos  4.1       13 Apr 2025 05:09  Mg     2.0       13 Apr 2025 05:09        Urinalysis Basic - ( 13 Apr 2025 05:09 )    Color: x / Appearance: x / SG: x / pH: x  Gluc: 103 mg/dL / Ketone: x  / Bili: x / Urobili: x   Blood: x / Protein: x / Nitrite: x   Leuk Esterase: x / RBC: x / WBC x   Sq Epi: x / Non Sq Epi: x / Bacteria: x      CAPILLARY BLOOD GLUCOSE      POCT Blood Glucose.: 136 mg/dL (13 Apr 2025 08:45)  POCT Blood Glucose.: 336 mg/dL (12 Apr 2025 22:29)  POCT Blood Glucose.: 169 mg/dL (12 Apr 2025 17:08)  POCT Blood Glucose.: 229 mg/dL (12 Apr 2025 12:13)        RADIOLOGY & ADDITIONAL TESTS:      Images reviewed personally    Consultant Notes Reviewed and Care Discussed with relevant Consultants.
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
## 2025-04-13 NOTE — PROGRESS NOTE ADULT - SUBJECTIVE AND OBJECTIVE BOX
Reason for visit: Elevated SCR/BUN, Advanced CKD    Subjective: No acute overnight event. Patient denied any cardiac or urinary complains. No fever/chills. Got 2nd session of HD yesterday.  ROS: All systems were reviewed in detail pertinent positive and negative mentioned above, rest are negative.    Physical Exam:  Gen: no acute distress  MS: alert, conversing normally  Eyes: EOMI, no icterus  HENT: NCAT, MMM  CV: rhythm reg reg, rate normal, no m/g/r  Chest: CTAB, no w/r/r,  Abd: soft, NT, ND  Extremities: No edema    Vital Signs Last 24 Hrs  T(C): 36.9 (13 Apr 2025 11:50), Max: 36.9 (13 Apr 2025 11:50)  T(F): 98.4 (13 Apr 2025 11:50), Max: 98.4 (13 Apr 2025 11:50)  HR: 71 (13 Apr 2025 11:50) (68 - 87)  BP: 135/73 (13 Apr 2025 11:50) (117/65 - 157/74)  BP(mean): --  RR: 18 (13 Apr 2025 11:50) (17 - 19)  SpO2: 97% (13 Apr 2025 11:50) (95% - 98%)    Parameters below as of 13 Apr 2025 11:50  Patient On (Oxygen Delivery Method): room air      I&O's Summary    12 Apr 2025 07:01  -  13 Apr 2025 07:00  --------------------------------------------------------  IN: 720 mL / OUT: 500 mL / NET: 220 mL      Current Antibiotics:  dolutegravir 50 milliGRAM(s) Oral daily  lamiVUDine- milliGRAM(s) Oral daily    Other medications:  acetaminophen     Tablet .. 975 milliGRAM(s) Oral every 8 hours  amLODIPine   Tablet 10 milliGRAM(s) Oral daily  artificial tears (preservative free) Ophthalmic Solution 2 Drop(s) Both EYES every 8 hours  atorvastatin 40 milliGRAM(s) Oral at bedtime  chlorhexidine 2% Cloths 1 Application(s) Topical <User Schedule>  chlorhexidine 4% Liquid 1 Application(s) Topical <User Schedule>  dextrose 5%. 1000 milliLiter(s) IV Continuous <Continuous>  dextrose 5%. 1000 milliLiter(s) IV Continuous <Continuous>  dextrose 50% Injectable 25 Gram(s) IV Push once  dextrose 50% Injectable 12.5 Gram(s) IV Push once  dextrose 50% Injectable 25 Gram(s) IV Push once  gabapentin 100 milliGRAM(s) Oral three times a day  glucagon  Injectable 1 milliGRAM(s) IntraMuscular once  hydrALAZINE 10 milliGRAM(s) Oral every 6 hours  insulin glargine Injectable (LANTUS) 10 Unit(s) SubCutaneous at bedtime  insulin lispro (ADMELOG) corrective regimen sliding scale   SubCutaneous three times a day before meals  metoprolol tartrate 50 milliGRAM(s) Oral two times a day  prednisoLONE acetate 1% Suspension 1 Drop(s) Both EYES four times a day  trimethoprim/polymyxin Solution 1 Drop(s) Both EYES daily    04-13    141  |  103  |  33.9[H]  ----------------------------<  103[H]  4.2   |  26.0  |  4.12[H]    Ca    8.4      13 Apr 2025 05:09  Phos  4.1     04-13  Mg     2.0     04-13      Creatinine: 4.12 mg/dL (04-13-25 @ 05:09)  Creatinine: 5.67 mg/dL (04-11-25 @ 06:07)  Creatinine: 5.87 mg/dL (04-09-25 @ 05:45)                          9.5    8.40  )-----------( 255      ( 13 Apr 2025 05:09 )             28.9

## 2025-04-13 NOTE — PROGRESS NOTE ADULT - ASSESSMENT
63 YOM DM, HIV on HAART admitted after fall.  Nephrology consulted for progressive worsening CKD.    Advanced progressive CKD stage V from biopsy proven diabetic nephropathy and podopathy likely related to HIV  Nephrotic syndrome  pt has h.o long standing DM (about 4 decades- untreated- sugars in '300s' as per ex wife)  HIV- VL > 300 k      *** Biopsy report  Final Diagnosis  KIDNEY, LEFT: PERCUTANEOUS NEEDLE CORE BIOPSY  -Diabetic nephropathy, see comment  -Complete foot process effacement, superimposed diabetic nephropathy  -Arterionephrosclerosis, moderate  -Diffuse interstitial lymphoplasmacytic infiltrate in scar areas  -Interstitial fibrosis/tubular atrophy, 80%  -Global glomerulosclerosis, 19/38      -Serum creatinine has slowly progressed now to ~5.5  -UA with proteinuria, Tp/cr ratio ~3.3  -Renal ultrasound reviewed; renal medical disease  -Serological work up  WNL   -Biopsy w/ advanced chronicity   -HD was offered and patient had tunneled hemodialysis catheter placed  (4/11)  -Second session of dialysis yesterday, next planned for tomorrow  -Vascular following for long-term access planning  -Case management to start planning for outpatient dialysis placement    Anemia: CKD and RITESH.  Placed on IV venofer. epo.  HTN: BP controlled- continue amlodipine, metoprolol, Hydralazine  HIV on Bikatrvy- meds changed as per ID  DM: discontinued metformin (low GFR), placed on Lantus + SSI ACHS    Discussed w/ dr Murillo

## 2025-04-14 LAB
ANION GAP SERPL CALC-SCNC: 11 MMOL/L — SIGNIFICANT CHANGE UP (ref 5–17)
ANION GAP SERPL CALC-SCNC: 13 MMOL/L — SIGNIFICANT CHANGE UP (ref 5–17)
BUN SERPL-MCNC: 31.3 MG/DL — HIGH (ref 8–20)
BUN SERPL-MCNC: 52.6 MG/DL — HIGH (ref 8–20)
CALCIUM SERPL-MCNC: 8.2 MG/DL — LOW (ref 8.4–10.5)
CALCIUM SERPL-MCNC: 8.4 MG/DL — SIGNIFICANT CHANGE UP (ref 8.4–10.5)
CHLORIDE SERPL-SCNC: 102 MMOL/L — SIGNIFICANT CHANGE UP (ref 96–108)
CHLORIDE SERPL-SCNC: 96 MMOL/L — SIGNIFICANT CHANGE UP (ref 96–108)
CO2 SERPL-SCNC: 23 MMOL/L — SIGNIFICANT CHANGE UP (ref 22–29)
CO2 SERPL-SCNC: 26 MMOL/L — SIGNIFICANT CHANGE UP (ref 22–29)
CREAT SERPL-MCNC: 3.3 MG/DL — HIGH (ref 0.5–1.3)
CREAT SERPL-MCNC: 5.06 MG/DL — HIGH (ref 0.5–1.3)
EGFR: 12 ML/MIN/1.73M2 — LOW
EGFR: 12 ML/MIN/1.73M2 — LOW
EGFR: 20 ML/MIN/1.73M2 — LOW
EGFR: 20 ML/MIN/1.73M2 — LOW
GLUCOSE BLDC GLUCOMTR-MCNC: 151 MG/DL — HIGH (ref 70–99)
GLUCOSE BLDC GLUCOMTR-MCNC: 170 MG/DL — HIGH (ref 70–99)
GLUCOSE BLDC GLUCOMTR-MCNC: 248 MG/DL — HIGH (ref 70–99)
GLUCOSE BLDC GLUCOMTR-MCNC: 315 MG/DL — HIGH (ref 70–99)
GLUCOSE SERPL-MCNC: 206 MG/DL — HIGH (ref 70–99)
GLUCOSE SERPL-MCNC: 321 MG/DL — HIGH (ref 70–99)
HCT VFR BLD CALC: 27 % — LOW (ref 39–50)
HGB BLD-MCNC: 8.7 G/DL — LOW (ref 13–17)
MCHC RBC-ENTMCNC: 29.4 PG — SIGNIFICANT CHANGE UP (ref 27–34)
MCHC RBC-ENTMCNC: 32.2 G/DL — SIGNIFICANT CHANGE UP (ref 32–36)
MCV RBC AUTO: 91.2 FL — SIGNIFICANT CHANGE UP (ref 80–100)
NRBC # BLD AUTO: 0 K/UL — SIGNIFICANT CHANGE UP (ref 0–0)
NRBC # FLD: 0 K/UL — SIGNIFICANT CHANGE UP (ref 0–0)
NRBC BLD AUTO-RTO: 0 /100 WBCS — SIGNIFICANT CHANGE UP (ref 0–0)
PHOSPHATE SERPL-MCNC: 4.3 MG/DL — SIGNIFICANT CHANGE UP (ref 2.4–4.7)
PLATELET # BLD AUTO: 239 K/UL — SIGNIFICANT CHANGE UP (ref 150–400)
PMV BLD: 9.6 FL — SIGNIFICANT CHANGE UP (ref 7–13)
POTASSIUM SERPL-MCNC: 5.3 MMOL/L — SIGNIFICANT CHANGE UP (ref 3.5–5.3)
POTASSIUM SERPL-MCNC: 6.2 MMOL/L — CRITICAL HIGH (ref 3.5–5.3)
POTASSIUM SERPL-SCNC: 5.3 MMOL/L — SIGNIFICANT CHANGE UP (ref 3.5–5.3)
POTASSIUM SERPL-SCNC: 6.2 MMOL/L — CRITICAL HIGH (ref 3.5–5.3)
RBC # BLD: 2.96 M/UL — LOW (ref 4.2–5.8)
RBC # FLD: 15 % — HIGH (ref 10.3–14.5)
SODIUM SERPL-SCNC: 133 MMOL/L — LOW (ref 135–145)
SODIUM SERPL-SCNC: 138 MMOL/L — SIGNIFICANT CHANGE UP (ref 135–145)
WBC # BLD: 8.92 K/UL — SIGNIFICANT CHANGE UP (ref 3.8–10.5)
WBC # FLD AUTO: 8.92 K/UL — SIGNIFICANT CHANGE UP (ref 3.8–10.5)

## 2025-04-14 PROCEDURE — 99232 SBSQ HOSP IP/OBS MODERATE 35: CPT

## 2025-04-14 PROCEDURE — 90937 HEMODIALYSIS REPEATED EVAL: CPT

## 2025-04-14 RX ORDER — OXYCODONE HYDROCHLORIDE 30 MG/1
10 TABLET ORAL EVERY 6 HOURS
Refills: 0 | Status: DISCONTINUED | OUTPATIENT
Start: 2025-04-14 | End: 2025-04-21

## 2025-04-14 RX ORDER — OXYCODONE HYDROCHLORIDE 30 MG/1
5 TABLET ORAL EVERY 6 HOURS
Refills: 0 | Status: DISCONTINUED | OUTPATIENT
Start: 2025-04-14 | End: 2025-04-21

## 2025-04-14 RX ADMIN — Medication 2 DROP(S): at 21:33

## 2025-04-14 RX ADMIN — POLYMYXIN B SULFATE AND TRIMETHOPRIM SULFATE 1 DROP(S): 10000; 1 SOLUTION/ DROPS OPHTHALMIC at 12:00

## 2025-04-14 RX ADMIN — Medication 2 MILLIGRAM(S): at 10:47

## 2025-04-14 RX ADMIN — Medication 1 APPLICATION(S): at 06:10

## 2025-04-14 RX ADMIN — DOLUTEGRAVIR SODIUM 50 MILLIGRAM(S): 5 TABLET, FOR SUSPENSION ORAL at 13:48

## 2025-04-14 RX ADMIN — Medication 1 DROP(S): at 17:20

## 2025-04-14 RX ADMIN — METOPROLOL SUCCINATE 50 MILLIGRAM(S): 50 TABLET, EXTENDED RELEASE ORAL at 06:07

## 2025-04-14 RX ADMIN — INSULIN LISPRO 1: 100 INJECTION, SOLUTION INTRAVENOUS; SUBCUTANEOUS at 17:19

## 2025-04-14 RX ADMIN — Medication 2 MILLIGRAM(S): at 11:05

## 2025-04-14 RX ADMIN — Medication 10 MILLIGRAM(S): at 00:27

## 2025-04-14 RX ADMIN — Medication 2 DROP(S): at 06:08

## 2025-04-14 RX ADMIN — INSULIN LISPRO 1: 100 INJECTION, SOLUTION INTRAVENOUS; SUBCUTANEOUS at 08:47

## 2025-04-14 RX ADMIN — Medication 975 MILLIGRAM(S): at 07:08

## 2025-04-14 RX ADMIN — Medication 1 DROP(S): at 06:08

## 2025-04-14 RX ADMIN — Medication 10 MILLIGRAM(S): at 17:19

## 2025-04-14 RX ADMIN — Medication 10 MILLIGRAM(S): at 06:07

## 2025-04-14 RX ADMIN — METOPROLOL SUCCINATE 50 MILLIGRAM(S): 50 TABLET, EXTENDED RELEASE ORAL at 17:20

## 2025-04-14 RX ADMIN — GABAPENTIN 100 MILLIGRAM(S): 400 CAPSULE ORAL at 21:33

## 2025-04-14 RX ADMIN — Medication 3 MILLIGRAM(S): at 21:33

## 2025-04-14 RX ADMIN — Medication 975 MILLIGRAM(S): at 21:34

## 2025-04-14 RX ADMIN — GABAPENTIN 100 MILLIGRAM(S): 400 CAPSULE ORAL at 06:08

## 2025-04-14 RX ADMIN — Medication 1 APPLICATION(S): at 06:08

## 2025-04-14 RX ADMIN — Medication 10 MILLIGRAM(S): at 13:48

## 2025-04-14 RX ADMIN — AMLODIPINE BESYLATE 10 MILLIGRAM(S): 10 TABLET ORAL at 06:08

## 2025-04-14 RX ADMIN — LAMIVUDINE 100 MILLIGRAM(S): 10 SOLUTION ORAL at 12:01

## 2025-04-14 RX ADMIN — ATORVASTATIN CALCIUM 40 MILLIGRAM(S): 80 TABLET, FILM COATED ORAL at 21:33

## 2025-04-14 RX ADMIN — Medication 975 MILLIGRAM(S): at 06:08

## 2025-04-14 RX ADMIN — Medication 1 DROP(S): at 00:27

## 2025-04-14 RX ADMIN — INSULIN GLARGINE-YFGN 10 UNIT(S): 100 INJECTION, SOLUTION SUBCUTANEOUS at 21:34

## 2025-04-14 RX ADMIN — Medication 975 MILLIGRAM(S): at 22:34

## 2025-04-14 RX ADMIN — Medication 1 DROP(S): at 11:59

## 2025-04-14 NOTE — PROGRESS NOTE ADULT - SUBJECTIVE AND OBJECTIVE BOX
Hemodialysis:    TIME: 3 hours  Dialyzer: Revaclear 300   Dialysate:2 K/2.5 Calcium   Dialysate flow:  500 ML/MIN  Blood flow: 350ML/MIN   UF Goal (Liters) : 1L    Comments: BP is stable

## 2025-04-14 NOTE — PROGRESS NOTE ADULT - SUBJECTIVE AND OBJECTIVE BOX
[facility] DIVISION OF KIDNEY DISEASES AND HYPERTENSION -- PROGRESS NOTE    24 hour events/subjective:  Seen today   Doing well   BP is stable   For HD today     MEDICATIONS    Standing Inpatient Medications  acetaminophen     Tablet .. 975 milliGRAM(s) Oral every 8 hours  amLODIPine   Tablet 10 milliGRAM(s) Oral daily  artificial tears (preservative free) Ophthalmic Solution 2 Drop(s) Both EYES every 8 hours  atorvastatin 40 milliGRAM(s) Oral at bedtime  chlorhexidine 2% Cloths 1 Application(s) Topical <User Schedule>  chlorhexidine 4% Liquid 1 Application(s) Topical <User Schedule>  dextrose 5%. 1000 milliLiter(s) IV Continuous <Continuous>  dextrose 5%. 1000 milliLiter(s) IV Continuous <Continuous>  dextrose 50% Injectable 25 Gram(s) IV Push once  dextrose 50% Injectable 12.5 Gram(s) IV Push once  dextrose 50% Injectable 25 Gram(s) IV Push once  dolutegravir 50 milliGRAM(s) Oral daily  gabapentin 100 milliGRAM(s) Oral three times a day  glucagon  Injectable 1 milliGRAM(s) IntraMuscular once  hydrALAZINE 10 milliGRAM(s) Oral every 6 hours  insulin glargine Injectable (LANTUS) 10 Unit(s) SubCutaneous at bedtime  insulin lispro (ADMELOG) corrective regimen sliding scale   SubCutaneous three times a day before meals  lamiVUDine- milliGRAM(s) Oral daily  metoprolol tartrate 50 milliGRAM(s) Oral two times a day  prednisoLONE acetate 1% Suspension 1 Drop(s) Both EYES four times a day  trimethoprim/polymyxin Solution 1 Drop(s) Both EYES daily    PRN Inpatient Medications  aluminum hydroxide/magnesium hydroxide/simethicone Suspension 30 milliLiter(s) Oral every 4 hours PRN  dextrose Oral Gel 15 Gram(s) Oral once PRN  melatonin 3 milliGRAM(s) Oral at bedtime PRN  ondansetron Injectable 4 milliGRAM(s) IV Push every 8 hours PRN  oxyCODONE    IR 10 milliGRAM(s) Oral every 6 hours PRN  oxyCODONE    IR 5 milliGRAM(s) Oral every 6 hours PRN  sodium chloride 0.9% lock flush 10 milliLiter(s) IV Push every 1 hour PRN      VITALS/PHYSICAL EXAM  --------------------------------------------------------------------------------  T(C): 37.2 (04-14-25 @ 12:18), Max: 37.2 (04-14-25 @ 12:18)  HR: 74 (04-14-25 @ 12:18) (71 - 87)  BP: 140/71 (04-14-25 @ 12:18) (120/66 - 157/85)  RR: 18 (04-14-25 @ 12:18) (16 - 19)  SpO2: 95% (04-14-25 @ 12:18) (94% - 100%)  Wt(kg): --        04-13-25 @ 07:01  -  04-14-25 @ 07:00  --------------------------------------------------------  IN: 1200 mL / OUT: 0 mL / NET: 1200 mL      Physical Exam:  Gen: NAD, well-appearing  HEENT: normal  Pulm: CTA B/L  CV: normal S1S2; no rub, no edema  Abd: soft, non-tender  MSK no deformities   Neuro: Alert and oriented x3     LABS/STUDIES  --------------------------------------------------------------------------------  138  |  102  |  52.6  ----------------------------<  206      [04-14-25 @ 12:30]  6.2   |  23.0  |  5.06        Ca     8.4     [04-14-25 @ 12:30]      Mg     2.0     [04-13-25 @ 05:09]      Phos  4.3     [04-14-25 @ 12:30]            Creatinine Trend:  SCr 5.06 [04-14 @ 12:30]  SCr 4.12 [04-13 @ 05:09]  SCr 5.67 [04-11 @ 06:07]  SCr 5.87 [04-09 @ 05:45]  SCr 5.48 [04-08 @ 05:30]

## 2025-04-14 NOTE — PROGRESS NOTE ADULT - SUBJECTIVE AND OBJECTIVE BOX
[facility] Division of Hospital Medicine    Chief Complaint:      SUBJECTIVE / OVERNIGHT EVENTS:    Patient seen and examined at bedside, no acute events overnight, patient denies any new complaints. Initially refused HD today due to pain, however eventually agreed. noted hyperkalemia, with HD should correct, will repeat in evening. Patient otherwise appears well, needs outpatient HD set up.     MEDICATIONS  (STANDING):  acetaminophen     Tablet .. 975 milliGRAM(s) Oral every 8 hours  amLODIPine   Tablet 10 milliGRAM(s) Oral daily  artificial tears (preservative free) Ophthalmic Solution 2 Drop(s) Both EYES every 8 hours  atorvastatin 40 milliGRAM(s) Oral at bedtime  chlorhexidine 2% Cloths 1 Application(s) Topical <User Schedule>  chlorhexidine 4% Liquid 1 Application(s) Topical <User Schedule>  dextrose 5%. 1000 milliLiter(s) (100 mL/Hr) IV Continuous <Continuous>  dextrose 5%. 1000 milliLiter(s) (50 mL/Hr) IV Continuous <Continuous>  dextrose 50% Injectable 25 Gram(s) IV Push once  dextrose 50% Injectable 12.5 Gram(s) IV Push once  dextrose 50% Injectable 25 Gram(s) IV Push once  dolutegravir 50 milliGRAM(s) Oral daily  gabapentin 100 milliGRAM(s) Oral three times a day  glucagon  Injectable 1 milliGRAM(s) IntraMuscular once  hydrALAZINE 10 milliGRAM(s) Oral every 6 hours  insulin glargine Injectable (LANTUS) 10 Unit(s) SubCutaneous at bedtime  insulin lispro (ADMELOG) corrective regimen sliding scale   SubCutaneous three times a day before meals  lamiVUDine- milliGRAM(s) Oral daily  metoprolol tartrate 50 milliGRAM(s) Oral two times a day  prednisoLONE acetate 1% Suspension 1 Drop(s) Both EYES four times a day  trimethoprim/polymyxin Solution 1 Drop(s) Both EYES daily    MEDICATIONS  (PRN):  aluminum hydroxide/magnesium hydroxide/simethicone Suspension 30 milliLiter(s) Oral every 4 hours PRN Dyspepsia  dextrose Oral Gel 15 Gram(s) Oral once PRN Blood Glucose LESS THAN 70 milliGRAM(s)/deciliter  melatonin 3 milliGRAM(s) Oral at bedtime PRN Insomnia  ondansetron Injectable 4 milliGRAM(s) IV Push every 8 hours PRN Nausea and/or Vomiting  oxyCODONE    IR 10 milliGRAM(s) Oral every 6 hours PRN Severe Pain (7 - 10)  oxyCODONE    IR 5 milliGRAM(s) Oral every 6 hours PRN Moderate Pain (4 - 6)  sodium chloride 0.9% lock flush 10 milliLiter(s) IV Push every 1 hour PRN Pre/post blood products, medications, blood draw, and to maintain line patency        I&O's Summary    13 Apr 2025 07:01  -  14 Apr 2025 07:00  --------------------------------------------------------  IN: 1200 mL / OUT: 0 mL / NET: 1200 mL    14 Apr 2025 07:01  -  14 Apr 2025 16:00  --------------------------------------------------------  IN: 0 mL / OUT: 1000 mL / NET: -1000 mL        PHYSICAL EXAM:  Vital Signs Last 24 Hrs  T(C): 36.9 (14 Apr 2025 15:45), Max: 37.2 (14 Apr 2025 12:18)  T(F): 98.5 (14 Apr 2025 15:45), Max: 98.9 (14 Apr 2025 12:18)  HR: 73 (14 Apr 2025 15:45) (71 - 87)  BP: 125/64 (14 Apr 2025 15:45) (120/66 - 157/85)  BP(mean): --  RR: 18 (14 Apr 2025 15:45) (16 - 19)  SpO2: 97% (14 Apr 2025 15:45) (94% - 100%)    Parameters below as of 14 Apr 2025 15:45  Patient On (Oxygen Delivery Method): room air      GENERAL: NAD  CHEST/LUNG: Clear to ausculation bilaterally  HEART: Regular rate and rhythm; S1 S2  ABDOMEN: Soft, Bowel sounds present  EXTREMITIES:  no edema       LABS:                        8.7    8.92  )-----------( 239      ( 14 Apr 2025 12:30 )             27.0     04-14    138  |  102  |  52.6[H]  ----------------------------<  206[H]  6.2[HH]   |  23.0  |  5.06[H]    Ca    8.4      14 Apr 2025 12:30  Phos  4.3     04-14  Mg     2.0     04-13            Urinalysis Basic - ( 14 Apr 2025 12:30 )    Color: x / Appearance: x / SG: x / pH: x  Gluc: 206 mg/dL / Ketone: x  / Bili: x / Urobili: x   Blood: x / Protein: x / Nitrite: x   Leuk Esterase: x / RBC: x / WBC x   Sq Epi: x / Non Sq Epi: x / Bacteria: x        CAPILLARY BLOOD GLUCOSE      POCT Blood Glucose.: 248 mg/dL (14 Apr 2025 11:57)  POCT Blood Glucose.: 170 mg/dL (14 Apr 2025 08:25)  POCT Blood Glucose.: 283 mg/dL (13 Apr 2025 21:32)  POCT Blood Glucose.: 174 mg/dL (13 Apr 2025 17:07)        RADIOLOGY & ADDITIONAL TESTS:  Results Reviewed:   Imaging Personally Reviewed:  Electrocardiogram Personally Reviewed:

## 2025-04-14 NOTE — PROGRESS NOTE ADULT - ASSESSMENT
63yoM w/ PMHx of CKD, IDDM, HIV on HAART, HFrEF, recent bilateral cataract surgery at [facility] presenting after fall from vehicle. Patient without evidence of acute traumatic injury to head or c-spine. Patient found to have elevated troponin, chest pain seems to be more so from fall rather than cardiac in nature. Continues on HD as per nephro 3rd session this evening, requires outpatient HD placement.      #BRIDGETTE on CKD3B  - Renal on board, connor recs  - s/p renal biopsy 4/7   - renal biopsy showing diabetic nephropathy   - Renally dose meds  - Avoid nephrotoxins  - s/p IR permacath  outpatient vasc eval for AVF vs graft  requires outpatient HD seat placement   hyperkalemia noted 4/14 should correct with HD, repeat ordered in evening     #recent bilateral cataract surgery  - ct with chronic lens dislocation vs vitreous hemorrhage   - optho contacted by ED - as per optho: IOP will be chronically elevated iso vitreous hemorrhage  - given pilocarpine, dorzolamide, brimonidine, timolol in ED -- as per optho, not to continue as IOP chronically elevated  - pt to continue with polytrim drops  - artifical tears  - had scheduled procedure with his outside optho on 04/01, will need new appt  - pt continues to report bad eye pain especially on the right, NP reached to ophth on call regarding recs: Sight MD: patient to follow up with own surgeon. Can be started on prednisolone 4 times a day and ketorolac 4 times a day.      #HTN  - partly due to med noncompliance;     - Amlodipine 10mg QD, metoprolol 50mg bid  - hydralazine po added     #Fall  - ct head and cspine without evidence of traumatic injury  - tylenol prn for pain control  - PT recommending outpatient PT    #Elevated troponin  - Cardiology on board, recs noted  - Holding heparin and asa iso head trauma  - TTE Reviewed  - CT coronary done 4/1 noted with calcium score of 13  - NST w/o ischemia/infarct.       #HIV  - holding bikrtarvy due to poor renal fxn at this time, can resume if bridgette improves  - ID Consulted, recs noted  - Started on Dolutegrair and epivir  continue until follows up outpatient with ID   - VL >300k    #hfref  - tte noted  - c/w metoprolol  - no ace or arb due to bridgette  - not on lasix at home, appears to be euvolemic     dvt ppx: scd  dispo: When outpatient HD seat confirmed/  electrolytes stable

## 2025-04-14 NOTE — PROVIDER CONTACT NOTE (CRITICAL VALUE NOTIFICATION) - BACKGROUND
Patient is a dialysis patient and is on the dialysis machine for at least 30 mins on 3 potassium bath

## 2025-04-14 NOTE — PROGRESS NOTE ADULT - ASSESSMENT
63 YOM DM, HIV on HAART admitted after fall.  Nephrology consulted for progressive worsening CKD.    Advanced progressive CKD stage V from biopsy proven diabetic nephropathy and podopathy likely related to HIV  Nephrotic syndrome  pt has h.o long standing DM (about 4 decades- untreated- sugars in '300s' as per ex wife)  HIV- VL > 300 k      *** Biopsy report  Final Diagnosis  KIDNEY, LEFT: PERCUTANEOUS NEEDLE CORE BIOPSY  -Diabetic nephropathy, see comment  -Complete foot process effacement, superimposed diabetic nephropathy  -Arterionephrosclerosis, moderate  -Diffuse interstitial lymphoplasmacytic infiltrate in scar areas  -Interstitial fibrosis/tubular atrophy, 80%  -Global glomerulosclerosis, 19/38      -Serum creatinine has slowly progressed now to ~5.5  -UA with proteinuria, Tp/cr ratio ~3.3  -Renal ultrasound reviewed; renal medical disease  -Serological work up  WNL   -Biopsy w/ advanced chronicity   -HD was offered and patient had tunneled hemodialysis catheter placed  (4/11)  -Second session of dialysis yesterday, next planned for tomorrow  -Vascular following for long-term access planning  -Case management to start planning for outpatient dialysis placement    Anemia: CKD and RITESH.  Placed on IV venofer. epo.  HTN: BP controlled- continue amlodipine, metoprolol, Hydralazine  HIV on Bikatrvy- meds changed as per ID  DM: discontinued metformin (low GFR), placed on Lantus + SSI ACHS    Plan   HD today 3 hrs 2k bath Goal UF 1-2 L as tolerated by BP   Will need HD placement   Daily labs   Will follow   Thank you

## 2025-04-15 LAB
ANION GAP SERPL CALC-SCNC: 7 MMOL/L — SIGNIFICANT CHANGE UP (ref 5–17)
BUN SERPL-MCNC: 35.5 MG/DL — HIGH (ref 8–20)
CALCIUM SERPL-MCNC: 8.6 MG/DL — SIGNIFICANT CHANGE UP (ref 8.4–10.5)
CHLORIDE SERPL-SCNC: 101 MMOL/L — SIGNIFICANT CHANGE UP (ref 96–108)
CO2 SERPL-SCNC: 29 MMOL/L — SIGNIFICANT CHANGE UP (ref 22–29)
CREAT SERPL-MCNC: 4.08 MG/DL — HIGH (ref 0.5–1.3)
EGFR: 16 ML/MIN/1.73M2 — LOW
EGFR: 16 ML/MIN/1.73M2 — LOW
GLUCOSE BLDC GLUCOMTR-MCNC: 140 MG/DL — HIGH (ref 70–99)
GLUCOSE BLDC GLUCOMTR-MCNC: 196 MG/DL — HIGH (ref 70–99)
GLUCOSE BLDC GLUCOMTR-MCNC: 266 MG/DL — HIGH (ref 70–99)
GLUCOSE BLDC GLUCOMTR-MCNC: 295 MG/DL — HIGH (ref 70–99)
GLUCOSE SERPL-MCNC: 157 MG/DL — HIGH (ref 70–99)
POTASSIUM SERPL-MCNC: 5.5 MMOL/L — HIGH (ref 3.5–5.3)
POTASSIUM SERPL-SCNC: 5.5 MMOL/L — HIGH (ref 3.5–5.3)
SODIUM SERPL-SCNC: 137 MMOL/L — SIGNIFICANT CHANGE UP (ref 135–145)

## 2025-04-15 PROCEDURE — 99232 SBSQ HOSP IP/OBS MODERATE 35: CPT

## 2025-04-15 RX ORDER — LIDOCAINE HYDROCHLORIDE 20 MG/ML
1 JELLY TOPICAL EVERY 24 HOURS
Refills: 0 | Status: DISCONTINUED | OUTPATIENT
Start: 2025-04-15 | End: 2025-05-13

## 2025-04-15 RX ORDER — SODIUM POLYSTYRENE SULFONATE 15 G/60ML
30 SUSPENSION ORAL; RECTAL ONCE
Refills: 0 | Status: COMPLETED | OUTPATIENT
Start: 2025-04-15 | End: 2025-04-15

## 2025-04-15 RX ADMIN — Medication 975 MILLIGRAM(S): at 14:50

## 2025-04-15 RX ADMIN — Medication 975 MILLIGRAM(S): at 05:27

## 2025-04-15 RX ADMIN — Medication 975 MILLIGRAM(S): at 21:59

## 2025-04-15 RX ADMIN — DOLUTEGRAVIR SODIUM 50 MILLIGRAM(S): 5 TABLET, FOR SUSPENSION ORAL at 11:57

## 2025-04-15 RX ADMIN — METOPROLOL SUCCINATE 50 MILLIGRAM(S): 50 TABLET, EXTENDED RELEASE ORAL at 05:27

## 2025-04-15 RX ADMIN — GABAPENTIN 100 MILLIGRAM(S): 400 CAPSULE ORAL at 13:51

## 2025-04-15 RX ADMIN — SODIUM POLYSTYRENE SULFONATE 30 GRAM(S): 15 SUSPENSION ORAL; RECTAL at 15:09

## 2025-04-15 RX ADMIN — Medication 1 DROP(S): at 11:58

## 2025-04-15 RX ADMIN — Medication 2 DROP(S): at 05:26

## 2025-04-15 RX ADMIN — INSULIN LISPRO 1: 100 INJECTION, SOLUTION INTRAVENOUS; SUBCUTANEOUS at 12:31

## 2025-04-15 RX ADMIN — METOPROLOL SUCCINATE 50 MILLIGRAM(S): 50 TABLET, EXTENDED RELEASE ORAL at 17:32

## 2025-04-15 RX ADMIN — AMLODIPINE BESYLATE 10 MILLIGRAM(S): 10 TABLET ORAL at 05:27

## 2025-04-15 RX ADMIN — Medication 1 APPLICATION(S): at 05:27

## 2025-04-15 RX ADMIN — Medication 1 DROP(S): at 00:44

## 2025-04-15 RX ADMIN — Medication 10 MILLIGRAM(S): at 11:57

## 2025-04-15 RX ADMIN — Medication 975 MILLIGRAM(S): at 22:59

## 2025-04-15 RX ADMIN — Medication 10 MILLIGRAM(S): at 17:32

## 2025-04-15 RX ADMIN — GABAPENTIN 100 MILLIGRAM(S): 400 CAPSULE ORAL at 21:59

## 2025-04-15 RX ADMIN — Medication 975 MILLIGRAM(S): at 06:27

## 2025-04-15 RX ADMIN — Medication 10 MILLIGRAM(S): at 05:27

## 2025-04-15 RX ADMIN — Medication 10 MILLIGRAM(S): at 00:43

## 2025-04-15 RX ADMIN — Medication 2 DROP(S): at 21:59

## 2025-04-15 RX ADMIN — Medication 975 MILLIGRAM(S): at 13:51

## 2025-04-15 RX ADMIN — LAMIVUDINE 100 MILLIGRAM(S): 10 SOLUTION ORAL at 11:57

## 2025-04-15 RX ADMIN — Medication 2 DROP(S): at 13:51

## 2025-04-15 RX ADMIN — Medication 1 DROP(S): at 05:26

## 2025-04-15 RX ADMIN — GABAPENTIN 100 MILLIGRAM(S): 400 CAPSULE ORAL at 05:27

## 2025-04-15 RX ADMIN — INSULIN LISPRO 3: 100 INJECTION, SOLUTION INTRAVENOUS; SUBCUTANEOUS at 17:31

## 2025-04-15 RX ADMIN — POLYMYXIN B SULFATE AND TRIMETHOPRIM SULFATE 1 DROP(S): 10000; 1 SOLUTION/ DROPS OPHTHALMIC at 11:58

## 2025-04-15 RX ADMIN — LIDOCAINE HYDROCHLORIDE 1 PATCH: 20 JELLY TOPICAL at 21:58

## 2025-04-15 RX ADMIN — ATORVASTATIN CALCIUM 40 MILLIGRAM(S): 80 TABLET, FILM COATED ORAL at 21:59

## 2025-04-15 RX ADMIN — INSULIN GLARGINE-YFGN 10 UNIT(S): 100 INJECTION, SOLUTION SUBCUTANEOUS at 21:59

## 2025-04-15 RX ADMIN — Medication 1 DROP(S): at 17:32

## 2025-04-15 NOTE — PROGRESS NOTE ADULT - SUBJECTIVE AND OBJECTIVE BOX
[facility] DIVISION OF KIDNEY DISEASES AND HYPERTENSION -- PROGRESS NOTE    24 hour events/subjective:  Seen today   Denies any complaints   S/p HD yesterday     MEDICATIONS    Standing Inpatient Medications  acetaminophen     Tablet .. 975 milliGRAM(s) Oral every 8 hours  amLODIPine   Tablet 10 milliGRAM(s) Oral daily  artificial tears (preservative free) Ophthalmic Solution 2 Drop(s) Both EYES every 8 hours  atorvastatin 40 milliGRAM(s) Oral at bedtime  chlorhexidine 2% Cloths 1 Application(s) Topical <User Schedule>  chlorhexidine 4% Liquid 1 Application(s) Topical <User Schedule>  dextrose 5%. 1000 milliLiter(s) IV Continuous <Continuous>  dextrose 5%. 1000 milliLiter(s) IV Continuous <Continuous>  dextrose 50% Injectable 25 Gram(s) IV Push once  dextrose 50% Injectable 12.5 Gram(s) IV Push once  dextrose 50% Injectable 25 Gram(s) IV Push once  dolutegravir 50 milliGRAM(s) Oral daily  gabapentin 100 milliGRAM(s) Oral three times a day  glucagon  Injectable 1 milliGRAM(s) IntraMuscular once  hydrALAZINE 10 milliGRAM(s) Oral every 6 hours  insulin glargine Injectable (LANTUS) 10 Unit(s) SubCutaneous at bedtime  insulin lispro (ADMELOG) corrective regimen sliding scale   SubCutaneous three times a day before meals  lamiVUDine- milliGRAM(s) Oral daily  lidocaine   4% Patch 1 Patch Transdermal every 24 hours  metoprolol tartrate 50 milliGRAM(s) Oral two times a day  prednisoLONE acetate 1% Suspension 1 Drop(s) Both EYES four times a day  trimethoprim/polymyxin Solution 1 Drop(s) Both EYES daily    PRN Inpatient Medications  aluminum hydroxide/magnesium hydroxide/simethicone Suspension 30 milliLiter(s) Oral every 4 hours PRN  dextrose Oral Gel 15 Gram(s) Oral once PRN  melatonin 3 milliGRAM(s) Oral at bedtime PRN  ondansetron Injectable 4 milliGRAM(s) IV Push every 8 hours PRN  oxyCODONE    IR 10 milliGRAM(s) Oral every 6 hours PRN  oxyCODONE    IR 5 milliGRAM(s) Oral every 6 hours PRN  sodium chloride 0.9% lock flush 10 milliLiter(s) IV Push every 1 hour PRN      VITALS/PHYSICAL EXAM  --------------------------------------------------------------------------------  T(C): 36.6 (04-15-25 @ 07:32), Max: 36.9 (04-14-25 @ 15:45)  HR: 76 (04-15-25 @ 11:55) (68 - 88)  BP: 124/67 (04-15-25 @ 11:55) (124/67 - 158/75)  RR: 18 (04-15-25 @ 07:32) (16 - 18)  SpO2: 96% (04-15-25 @ 07:32) (96% - 98%)  Wt(kg): --        04-14-25 @ 07:01  -  04-15-25 @ 07:00  --------------------------------------------------------  IN: 720 mL / OUT: 1000 mL / NET: -280 mL    04-15-25 @ 07:01  -  04-15-25 @ 15:26  --------------------------------------------------------  IN: 240 mL / OUT: 0 mL / NET: 240 mL      Physical Exam:  Gen: NAD, well-appearing  HEENT: normal  Pulm: CTA B/L  CV: normal S1S2; no rub, no edema  Abd: soft, non-tender  MSK no deformities   Neuro: Alert and oriented x3     LABS/STUDIES  --------------------------------------------------------------------------------  137  |  101  |  35.5  ----------------------------<  157      [04-15-25 @ 07:03]  5.5   |  29.0  |  4.08        Ca     8.6     [04-15-25 @ 07:03]      Phos  4.3     [04-14-25 @ 12:30]            Creatinine Trend:  SCr 4.08 [04-15 @ 07:03]  SCr 3.30 [04-14 @ 21:53]  SCr 5.06 [04-14 @ 12:30]  SCr 4.12 [04-13 @ 05:09]  SCr 5.67 [04-11 @ 06:07]

## 2025-04-15 NOTE — PROGRESS NOTE ADULT - SUBJECTIVE AND OBJECTIVE BOX
HPI  Pt is a 65yo M admitted to [facility] for BRIDGETTE on CKD and new HD.   Pt was seen and examined at bedside. Pt has lower back pain and also has problem sleeping. Pending HD today     Vital Signs Last 24 Hrs  T(C): 36.6 (15 Apr 2025 07:32), Max: 36.9 (14 Apr 2025 15:45)  T(F): 97.9 (15 Apr 2025 07:32), Max: 98.5 (14 Apr 2025 15:45)  HR: 76 (15 Apr 2025 11:55) (68 - 88)  BP: 124/67 (15 Apr 2025 11:55) (124/67 - 158/75)  BP(mean): --  RR: 18 (15 Apr 2025 07:32) (16 - 18)  SpO2: 96% (15 Apr 2025 07:32) (96% - 98%)    Parameters below as of 15 Apr 2025 07:32  Patient On (Oxygen Delivery Method): room air        I&O's Summary    14 Apr 2025 07:01  -  15 Apr 2025 07:00  --------------------------------------------------------  IN: 720 mL / OUT: 1000 mL / NET: -280 mL    15 Apr 2025 07:01  -  15 Apr 2025 13:29  --------------------------------------------------------  IN: 240 mL / OUT: 0 mL / NET: 240 mL        CAPILLARY BLOOD GLUCOSE      POCT Blood Glucose.: 196 mg/dL (15 Apr 2025 12:09)  POCT Blood Glucose.: 140 mg/dL (15 Apr 2025 08:02)  POCT Blood Glucose.: 315 mg/dL (14 Apr 2025 21:17)  POCT Blood Glucose.: 151 mg/dL (14 Apr 2025 17:11)      PHYSICAL EXAM:    Constitutional: NAD,    HEENT: PERR, EOMI,   Neck: Soft and supple, right shiley noted   Respiratory: Breath sounds are clear bilaterally,   Cardiovascular: S1 and S2,    Gastrointestinal: Bowel Sounds present, soft,   Extremities: No peripheral edema  Vascular: 2+ peripheral pulses  Neurological: A/O x 3   Musculoskeletal: 5/5 strength b/l upper and lower extremities  Skin: No rashes    MEDICATIONS:  MEDICATIONS  (STANDING):  acetaminophen     Tablet .. 975 milliGRAM(s) Oral every 8 hours  amLODIPine   Tablet 10 milliGRAM(s) Oral daily  artificial tears (preservative free) Ophthalmic Solution 2 Drop(s) Both EYES every 8 hours  atorvastatin 40 milliGRAM(s) Oral at bedtime  chlorhexidine 2% Cloths 1 Application(s) Topical <User Schedule>  chlorhexidine 4% Liquid 1 Application(s) Topical <User Schedule>  dextrose 5%. 1000 milliLiter(s) (100 mL/Hr) IV Continuous <Continuous>  dextrose 5%. 1000 milliLiter(s) (50 mL/Hr) IV Continuous <Continuous>  dextrose 50% Injectable 25 Gram(s) IV Push once  dextrose 50% Injectable 12.5 Gram(s) IV Push once  dextrose 50% Injectable 25 Gram(s) IV Push once  dolutegravir 50 milliGRAM(s) Oral daily  gabapentin 100 milliGRAM(s) Oral three times a day  glucagon  Injectable 1 milliGRAM(s) IntraMuscular once  hydrALAZINE 10 milliGRAM(s) Oral every 6 hours  insulin glargine Injectable (LANTUS) 10 Unit(s) SubCutaneous at bedtime  insulin lispro (ADMELOG) corrective regimen sliding scale   SubCutaneous three times a day before meals  lamiVUDine- milliGRAM(s) Oral daily  metoprolol tartrate 50 milliGRAM(s) Oral two times a day  prednisoLONE acetate 1% Suspension 1 Drop(s) Both EYES four times a day  trimethoprim/polymyxin Solution 1 Drop(s) Both EYES daily      LABS: All Labs Reviewed:                        8.7    8.92  )-----------( 239      ( 14 Apr 2025 12:30 )             27.0     04-15    137  |  101  |  35.5[H]  ----------------------------<  157[H]  5.5[H]   |  29.0  |  4.08[H]    Ca    8.6      15 Apr 2025 07:03  Phos  4.3     04-14            Blood Culture:     RADIOLOGY/EKG:    DVT PPX:    ADVANCED DIRECTIVE:    DISPOSITION:

## 2025-04-15 NOTE — PROGRESS NOTE ADULT - ASSESSMENT
63 YOM DM, HIV on HAART admitted after fall.  Nephrology consulted for progressive worsening CKD.    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  Placed on IV venofer. epo.  HTN: BP controlled- continue amlodipine, metoprolol, Hydralazine  HIV on Bikatrvy- meds changed as per ID  DM: discontinued metformin (low GFR), placed on Lantus + SSI ACHS    Plan   No indications for HD today   HD tomorrow 3 hrs 2k bath Goal UF 1-2 L as tolerated by BP   Will need HD placement   Daily labs   Will follow   Thank you

## 2025-04-15 NOTE — PROGRESS NOTE ADULT - ASSESSMENT
63yoM w/ PMHx of CKD, IDDM, HIV on HAART, HFrEF, recent bilateral cataract surgery at [facility] presenting after fall from vehicle. Patient without evidence of acute traumatic injury to head or c-spine. Patient found to have elevated troponin, chest pain seems to be more so from fall rather than cardiac in nature. Continues on HD as per nephro 3rd session this evening, requires outpatient HD placement.      #BRIDGETTE on CKD3B  Renal on board, connor recs  s/p renal biopsy 4/7   renal biopsy showing diabetic nephropathy   Renally dose meds  Avoid nephrotoxins  s/p IR permacath  outpatient vasc eval for AVF vs graft  waiting outpatient HD seat placement   hyperkalemia noted 4/15   pending HD today per nephro note yesterday     *recent bilateral cataract surgery  ct with chronic lens dislocation vs vitreous hemorrhage   optho contacted by ED - as per optho: IOP will be chronically elevated iso vitreous hemorrhage  given pilocarpine, dorzolamide, brimonidine, timolol in ED -- as per optho, not to continue as IOP chronically elevated  pt to continue with polytrim drops  artifical tears  had scheduled procedure with his outside optho on 04/01, will need new appt  pt continues to report bad eye pain especially on the right, NP reached to ophth on call regarding recs: Sight MD: patient to follow up with own surgeon. Can be started on prednisolone 4 times a day and ketorolac 4 times a day.      *HTN  partly due to med noncompliance;     Amlodipine 10mg QD, metoprolol 50mg bid  hydralazine po added     *Fall  ct head and cspine without evidence of traumatic injury  tylenol prn for pain control  PT recommending outpatient PT    *Elevated troponin  Cardiology on board, recs noted  Holding heparin and asa iso head trauma  TTE Reviewed  CT coronary done 4/1 noted with calcium score of 13  NST w/o ischemia/infarct.       *HIV  Cont meds    ID Consulted, recs noted  Started on Dolutegrair and epivir  continue until follows up outpatient with ID   VL >300k    *hfref  tte noted  c/w metoprolol  no ace or arb due to bridgette  not on lasix at home, appears to be euvolemic     dvt ppx: scd  dispo: When outpatient HD seat confirmed/  electrolytes stable

## 2025-04-16 LAB
ANION GAP SERPL CALC-SCNC: 12 MMOL/L — SIGNIFICANT CHANGE UP (ref 5–17)
BASOPHILS # BLD AUTO: 0.05 K/UL — SIGNIFICANT CHANGE UP (ref 0–0.2)
BASOPHILS NFR BLD AUTO: 0.6 % — SIGNIFICANT CHANGE UP (ref 0–2)
BUN SERPL-MCNC: 41.3 MG/DL — HIGH (ref 8–20)
CALCIUM SERPL-MCNC: 8.2 MG/DL — LOW (ref 8.4–10.5)
CHLORIDE SERPL-SCNC: 101 MMOL/L — SIGNIFICANT CHANGE UP (ref 96–108)
CO2 SERPL-SCNC: 23 MMOL/L — SIGNIFICANT CHANGE UP (ref 22–29)
CREAT SERPL-MCNC: 4.33 MG/DL — HIGH (ref 0.5–1.3)
EGFR: 15 ML/MIN/1.73M2 — LOW
EGFR: 15 ML/MIN/1.73M2 — LOW
EOSINOPHIL # BLD AUTO: 0.24 K/UL — SIGNIFICANT CHANGE UP (ref 0–0.5)
EOSINOPHIL NFR BLD AUTO: 2.8 % — SIGNIFICANT CHANGE UP (ref 0–6)
GLUCOSE BLDC GLUCOMTR-MCNC: 152 MG/DL — HIGH (ref 70–99)
GLUCOSE BLDC GLUCOMTR-MCNC: 187 MG/DL — HIGH (ref 70–99)
GLUCOSE BLDC GLUCOMTR-MCNC: 349 MG/DL — HIGH (ref 70–99)
GLUCOSE SERPL-MCNC: 204 MG/DL — HIGH (ref 70–99)
HCT VFR BLD CALC: 26.9 % — LOW (ref 39–50)
HCT VFR BLD CALC: 29.5 % — LOW (ref 39–50)
HGB BLD-MCNC: 8.6 G/DL — LOW (ref 13–17)
HGB BLD-MCNC: 9.4 G/DL — LOW (ref 13–17)
IMM GRANULOCYTES # BLD AUTO: 0.05 K/UL — SIGNIFICANT CHANGE UP (ref 0–0.07)
IMM GRANULOCYTES NFR BLD AUTO: 0.6 % — SIGNIFICANT CHANGE UP (ref 0–0.9)
LYMPHOCYTES # BLD AUTO: 1.94 K/UL — SIGNIFICANT CHANGE UP (ref 1–3.3)
LYMPHOCYTES NFR BLD AUTO: 22.7 % — SIGNIFICANT CHANGE UP (ref 13–44)
MCHC RBC-ENTMCNC: 28.8 PG — SIGNIFICANT CHANGE UP (ref 27–34)
MCHC RBC-ENTMCNC: 29.1 PG — SIGNIFICANT CHANGE UP (ref 27–34)
MCHC RBC-ENTMCNC: 31.9 G/DL — LOW (ref 32–36)
MCHC RBC-ENTMCNC: 32 G/DL — SIGNIFICANT CHANGE UP (ref 32–36)
MCV RBC AUTO: 90.5 FL — SIGNIFICANT CHANGE UP (ref 80–100)
MCV RBC AUTO: 90.9 FL — SIGNIFICANT CHANGE UP (ref 80–100)
MONOCYTES # BLD AUTO: 0.71 K/UL — SIGNIFICANT CHANGE UP (ref 0–0.9)
MONOCYTES NFR BLD AUTO: 8.3 % — SIGNIFICANT CHANGE UP (ref 2–14)
NEUTROPHILS # BLD AUTO: 5.54 K/UL — SIGNIFICANT CHANGE UP (ref 1.8–7.4)
NEUTROPHILS NFR BLD AUTO: 65 % — SIGNIFICANT CHANGE UP (ref 43–77)
NRBC # BLD AUTO: 0 K/UL — SIGNIFICANT CHANGE UP (ref 0–0)
NRBC # BLD AUTO: 0 K/UL — SIGNIFICANT CHANGE UP (ref 0–0)
NRBC # FLD: 0 K/UL — SIGNIFICANT CHANGE UP (ref 0–0)
NRBC # FLD: 0 K/UL — SIGNIFICANT CHANGE UP (ref 0–0)
NRBC BLD AUTO-RTO: 0 /100 WBCS — SIGNIFICANT CHANGE UP (ref 0–0)
NRBC BLD AUTO-RTO: 0 /100 WBCS — SIGNIFICANT CHANGE UP (ref 0–0)
PLATELET # BLD AUTO: 226 K/UL — SIGNIFICANT CHANGE UP (ref 150–400)
PLATELET # BLD AUTO: SIGNIFICANT CHANGE UP K/UL (ref 150–400)
PMV BLD: 9.4 FL — SIGNIFICANT CHANGE UP (ref 7–13)
POTASSIUM SERPL-MCNC: 4.9 MMOL/L — SIGNIFICANT CHANGE UP (ref 3.5–5.3)
POTASSIUM SERPL-SCNC: 4.9 MMOL/L — SIGNIFICANT CHANGE UP (ref 3.5–5.3)
RBC # BLD: 2.96 M/UL — LOW (ref 4.2–5.8)
RBC # BLD: 3.26 M/UL — LOW (ref 4.2–5.8)
RBC # FLD: 14.7 % — HIGH (ref 10.3–14.5)
RBC # FLD: 14.7 % — HIGH (ref 10.3–14.5)
SODIUM SERPL-SCNC: 136 MMOL/L — SIGNIFICANT CHANGE UP (ref 135–145)
WBC # BLD: 7.14 K/UL — SIGNIFICANT CHANGE UP (ref 3.8–10.5)
WBC # BLD: 8.53 K/UL — SIGNIFICANT CHANGE UP (ref 3.8–10.5)
WBC # FLD AUTO: 7.14 K/UL — SIGNIFICANT CHANGE UP (ref 3.8–10.5)
WBC # FLD AUTO: 8.53 K/UL — SIGNIFICANT CHANGE UP (ref 3.8–10.5)

## 2025-04-16 PROCEDURE — 90937 HEMODIALYSIS REPEATED EVAL: CPT

## 2025-04-16 PROCEDURE — 99232 SBSQ HOSP IP/OBS MODERATE 35: CPT

## 2025-04-16 RX ORDER — INSULIN LISPRO 100 U/ML
4 INJECTION, SOLUTION INTRAVENOUS; SUBCUTANEOUS ONCE
Refills: 0 | Status: COMPLETED | OUTPATIENT
Start: 2025-04-16 | End: 2025-04-16

## 2025-04-16 RX ADMIN — GABAPENTIN 100 MILLIGRAM(S): 400 CAPSULE ORAL at 05:59

## 2025-04-16 RX ADMIN — INSULIN GLARGINE-YFGN 10 UNIT(S): 100 INJECTION, SOLUTION SUBCUTANEOUS at 21:40

## 2025-04-16 RX ADMIN — LIDOCAINE HYDROCHLORIDE 1 PATCH: 20 JELLY TOPICAL at 09:38

## 2025-04-16 RX ADMIN — Medication 1 APPLICATION(S): at 06:15

## 2025-04-16 RX ADMIN — Medication 975 MILLIGRAM(S): at 07:03

## 2025-04-16 RX ADMIN — INSULIN LISPRO 1: 100 INJECTION, SOLUTION INTRAVENOUS; SUBCUTANEOUS at 08:35

## 2025-04-16 RX ADMIN — ATORVASTATIN CALCIUM 40 MILLIGRAM(S): 80 TABLET, FILM COATED ORAL at 21:40

## 2025-04-16 RX ADMIN — LIDOCAINE HYDROCHLORIDE 1 PATCH: 20 JELLY TOPICAL at 07:03

## 2025-04-16 RX ADMIN — Medication 2 DROP(S): at 15:49

## 2025-04-16 RX ADMIN — Medication 1 DROP(S): at 00:08

## 2025-04-16 RX ADMIN — Medication 975 MILLIGRAM(S): at 15:49

## 2025-04-16 RX ADMIN — Medication 1 DROP(S): at 17:10

## 2025-04-16 RX ADMIN — Medication 10 MILLIGRAM(S): at 17:08

## 2025-04-16 RX ADMIN — Medication 2 DROP(S): at 21:39

## 2025-04-16 RX ADMIN — LAMIVUDINE 100 MILLIGRAM(S): 10 SOLUTION ORAL at 15:49

## 2025-04-16 RX ADMIN — Medication 1 DROP(S): at 05:59

## 2025-04-16 RX ADMIN — INSULIN LISPRO 1: 100 INJECTION, SOLUTION INTRAVENOUS; SUBCUTANEOUS at 17:08

## 2025-04-16 RX ADMIN — DOLUTEGRAVIR SODIUM 50 MILLIGRAM(S): 5 TABLET, FOR SUSPENSION ORAL at 15:49

## 2025-04-16 RX ADMIN — Medication 975 MILLIGRAM(S): at 22:39

## 2025-04-16 RX ADMIN — Medication 2 DROP(S): at 05:59

## 2025-04-16 RX ADMIN — INSULIN LISPRO 4 UNIT(S): 100 INJECTION, SOLUTION INTRAVENOUS; SUBCUTANEOUS at 21:40

## 2025-04-16 RX ADMIN — Medication 975 MILLIGRAM(S): at 05:59

## 2025-04-16 RX ADMIN — METOPROLOL SUCCINATE 50 MILLIGRAM(S): 50 TABLET, EXTENDED RELEASE ORAL at 17:08

## 2025-04-16 RX ADMIN — Medication 975 MILLIGRAM(S): at 21:39

## 2025-04-16 RX ADMIN — Medication 1 APPLICATION(S): at 06:00

## 2025-04-16 RX ADMIN — Medication 10 MILLIGRAM(S): at 00:08

## 2025-04-16 RX ADMIN — GABAPENTIN 100 MILLIGRAM(S): 400 CAPSULE ORAL at 21:39

## 2025-04-16 RX ADMIN — Medication 3 MILLIGRAM(S): at 00:54

## 2025-04-16 NOTE — CHART NOTE - NSCHARTNOTEFT_GEN_A_CORE
" I woke up this morning with a migraine headache and few hours later around 9 am I started having pain on my left lower abdomen" No vomiting No diarrhaea No dysuria Source: Patient, chart    Current Diet: Diet, Consistent Carbohydrate w/Evening Snack:   DASH/TLC {Sodium & Cholesterol Restricted} (DASH) (04-02-25 @ 12:00)    PO intake:  %     Current Weight:   4/4 174.6 lbs  4/10 189.3 lbs  4/13 184.5 lbs  4/15 181.4 lbs  4/16 179.8 lbs    Pertinent Medications: MEDICATIONS  (STANDING):  amLODIPine   Tablet 10 milliGRAM(s) Oral daily  dextrose 5%. 1000 milliLiter(s) (50 mL/Hr) IV Continuous <Continuous>  dextrose 50% Injectable 25 Gram(s) IV Push once  dolutegravir 50 milliGRAM(s) Oral daily  glucagon  Injectable 1 milliGRAM(s) IntraMuscular once  hydrALAZINE 10 milliGRAM(s) Oral every 6 hours  insulin glargine Injectable (LANTUS) 10 Unit(s) SubCutaneous at bedtime  insulin lispro (ADMELOG) corrective regimen sliding scale   SubCutaneous three times a day before meals  lamiVUDine- milliGRAM(s) Oral daily  metoprolol tartrate 50 milliGRAM(s) Oral two times a day    MEDICATIONS  (PRN):  dextrose Oral Gel 15 Gram(s) Oral once PRN Blood Glucose LESS THAN 70 milliGRAM(s)/deciliter  ondansetron Injectable 4 milliGRAM(s) IV Push every 8 hours PRN Nausea and/or Vomiting  oxyCODONE    IR 5 milliGRAM(s) Oral every 6 hours PRN Moderate Pain (4 - 6)    Pertinent Labs: 04-16 Na136 mmol/L Glu 204 mg/dL[H] K+ 4.9 mmol/L Cr  4.33 mg/dL[H] BUN 41.3 mg/dL[H]     Estimated Needs:   8822-9968 kcal (25-30 kcal/kg 81.6kg)  82-98g protein (1-1.2g/kg 81.6kg)    Hospital Course: Per chart "63yoM w/ PMHx of CKD, IDDM, HIV on HAART, HFrEF, recent bilateral cataract surgery at [facility] presenting after fall from vehicle. Patient without evidence of acute traumatic injury to head or c-spine. Patient found to have elevated troponin, chest pain seems to be more so from fall rather than cardiac in nature. Continues on HD as per nephro 3rd session this evening, requires outpatient HD placement."    Current Nutrition Diagnosis: Altered Nutrition Related Lab Values related to hx of DM as evidenced by HgbA1c 8.2% and elevated POCT glucose.    Patient continuing to tolerate current diet well with good appetite/PO intake. Pt eating 100% of all meals provided and asking for more food. Suggested addition of double protein - pt receptive of the same. Discussed with Kitchen staff as well. Pt with no c/o N/V/C/D at this time, reports his last BM was 4/15. Pt with no further questions at this time. Will continue to monitor and follow up as needed. RD remains available.     Recommendations:   1) Add renal diet restriction as pt with CKD on HD.   2) Encourage po intake, monitor diet tolerance, and provide assistance at meals as needed.   3) Monitor BG levels, correct prn   4) Obtain weekly weights to monitor trends.     Monitoring and Evaluation:   [x] PO intake [x] Tolerance to diet prescription [X] Weights  [X] Follow up per protocol [X] Labs: chem 8, POCT glucose, renal labs

## 2025-04-16 NOTE — PROGRESS NOTE ADULT - SUBJECTIVE AND OBJECTIVE BOX
HPI  Pt is a 65yo M admitted to [facility] for BRIDGETTE on CKD and new HD.   Pt was seen and examined at bedside. no overnight complaints.     Vital Signs Last 24 Hrs  T(C): 36.5 (16 Apr 2025 11:00), Max: 36.9 (15 Apr 2025 16:59)  T(F): 97.7 (16 Apr 2025 11:00), Max: 98.4 (15 Apr 2025 16:59)  HR: 70 (16 Apr 2025 11:00) (69 - 81)  BP: 136/76 (16 Apr 2025 11:00) (115/67 - 146/74)  BP(mean): 83 (16 Apr 2025 05:54) (83 - 96)  RR: 18 (16 Apr 2025 11:00) (17 - 18)  SpO2: 98% (16 Apr 2025 11:00) (93% - 98%)    Parameters below as of 16 Apr 2025 11:00  Patient On (Oxygen Delivery Method): room air        I&O's Summary    15 Apr 2025 07:01  -  16 Apr 2025 07:00  --------------------------------------------------------  IN: 1200 mL / OUT: 0 mL / NET: 1200 mL    16 Apr 2025 07:01  -  16 Apr 2025 13:02  --------------------------------------------------------  IN: 240 mL / OUT: 0 mL / NET: 240 mL        CAPILLARY BLOOD GLUCOSE      POCT Blood Glucose.: 152 mg/dL (16 Apr 2025 07:45)  POCT Blood Glucose.: 295 mg/dL (15 Apr 2025 21:25)  POCT Blood Glucose.: 266 mg/dL (15 Apr 2025 17:24)      PHYSICAL EXAM:    Constitutional: NAD,    HEENT: pt is blind   Neck: Soft and supple, right shiley noted   Respiratory: Breath sounds are clear bilaterally,   Cardiovascular: S1 and S2,    Gastrointestinal: Bowel Sounds present, soft,   Extremities: No peripheral edema  Vascular: 2+ peripheral pulses  Neurological: A/O x 3   Musculoskeletal: 5/5 strength b/l upper and lower extremities  Skin: No rashes    MEDICATIONS:  MEDICATIONS  (STANDING):  acetaminophen     Tablet .. 975 milliGRAM(s) Oral every 8 hours  amLODIPine   Tablet 10 milliGRAM(s) Oral daily  artificial tears (preservative free) Ophthalmic Solution 2 Drop(s) Both EYES every 8 hours  atorvastatin 40 milliGRAM(s) Oral at bedtime  chlorhexidine 2% Cloths 1 Application(s) Topical <User Schedule>  chlorhexidine 4% Liquid 1 Application(s) Topical <User Schedule>  dextrose 5%. 1000 milliLiter(s) (50 mL/Hr) IV Continuous <Continuous>  dextrose 5%. 1000 milliLiter(s) (100 mL/Hr) IV Continuous <Continuous>  dextrose 50% Injectable 25 Gram(s) IV Push once  dextrose 50% Injectable 12.5 Gram(s) IV Push once  dextrose 50% Injectable 25 Gram(s) IV Push once  dolutegravir 50 milliGRAM(s) Oral daily  gabapentin 100 milliGRAM(s) Oral three times a day  glucagon  Injectable 1 milliGRAM(s) IntraMuscular once  hydrALAZINE 10 milliGRAM(s) Oral every 6 hours  insulin glargine Injectable (LANTUS) 10 Unit(s) SubCutaneous at bedtime  insulin lispro (ADMELOG) corrective regimen sliding scale   SubCutaneous three times a day before meals  lamiVUDine- milliGRAM(s) Oral daily  lidocaine   4% Patch 1 Patch Transdermal every 24 hours  metoprolol tartrate 50 milliGRAM(s) Oral two times a day  prednisoLONE acetate 1% Suspension 1 Drop(s) Both EYES four times a day  trimethoprim/polymyxin Solution 1 Drop(s) Both EYES daily      LABS: All Labs Reviewed:                        8.6    8.53  )-----------( 226      ( 16 Apr 2025 11:00 )             26.9     04-16    136  |  101  |  41.3[H]  ----------------------------<  204[H]  4.9   |  23.0  |  4.33[H]    Ca    8.2[L]      16 Apr 2025 05:08            Blood Culture:     RADIOLOGY/EKG:    DVT PPX:    ADVANCED DIRECTIVE:    DISPOSITION:
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
## 2025-04-16 NOTE — PROGRESS NOTE ADULT - SUBJECTIVE AND OBJECTIVE BOX
Hemodialysis:    TIME: 3 hours  Dialyzer: Revaclear 300   Dialysate:3 K/2.5 Calcium   Dialysate flow:  500 ML/MIN  Blood flow: 400 ML/MIN   UF Goal (Liters) : 1    Comments: BP is stable

## 2025-04-16 NOTE — PROGRESS NOTE ADULT - SUBJECTIVE AND OBJECTIVE BOX
[facility] DIVISION OF KIDNEY DISEASES AND HYPERTENSION -- PROGRESS NOTE    24 hour events/subjective:  Seen today   Doing well   BP is stable   For hemodialysis     MEDICATIONS    Standing Inpatient Medications  acetaminophen     Tablet .. 975 milliGRAM(s) Oral every 8 hours  amLODIPine   Tablet 10 milliGRAM(s) Oral daily  artificial tears (preservative free) Ophthalmic Solution 2 Drop(s) Both EYES every 8 hours  atorvastatin 40 milliGRAM(s) Oral at bedtime  chlorhexidine 2% Cloths 1 Application(s) Topical <User Schedule>  chlorhexidine 4% Liquid 1 Application(s) Topical <User Schedule>  dextrose 5%. 1000 milliLiter(s) IV Continuous <Continuous>  dextrose 5%. 1000 milliLiter(s) IV Continuous <Continuous>  dextrose 50% Injectable 25 Gram(s) IV Push once  dextrose 50% Injectable 12.5 Gram(s) IV Push once  dextrose 50% Injectable 25 Gram(s) IV Push once  dolutegravir 50 milliGRAM(s) Oral daily  gabapentin 100 milliGRAM(s) Oral three times a day  glucagon  Injectable 1 milliGRAM(s) IntraMuscular once  hydrALAZINE 10 milliGRAM(s) Oral every 6 hours  insulin glargine Injectable (LANTUS) 10 Unit(s) SubCutaneous at bedtime  insulin lispro (ADMELOG) corrective regimen sliding scale   SubCutaneous three times a day before meals  lamiVUDine- milliGRAM(s) Oral daily  lidocaine   4% Patch 1 Patch Transdermal every 24 hours  metoprolol tartrate 50 milliGRAM(s) Oral two times a day  prednisoLONE acetate 1% Suspension 1 Drop(s) Both EYES four times a day  trimethoprim/polymyxin Solution 1 Drop(s) Both EYES daily    PRN Inpatient Medications  aluminum hydroxide/magnesium hydroxide/simethicone Suspension 30 milliLiter(s) Oral every 4 hours PRN  dextrose Oral Gel 15 Gram(s) Oral once PRN  melatonin 3 milliGRAM(s) Oral at bedtime PRN  ondansetron Injectable 4 milliGRAM(s) IV Push every 8 hours PRN  oxyCODONE    IR 10 milliGRAM(s) Oral every 6 hours PRN  oxyCODONE    IR 5 milliGRAM(s) Oral every 6 hours PRN  sodium chloride 0.9% lock flush 10 milliLiter(s) IV Push every 1 hour PRN      VITALS/PHYSICAL EXAM  --------------------------------------------------------------------------------  T(C): 36.5 (04-16-25 @ 11:00), Max: 36.9 (04-15-25 @ 16:59)  HR: 70 (04-16-25 @ 11:00) (69 - 81)  BP: 136/76 (04-16-25 @ 11:00) (115/67 - 146/74)  RR: 18 (04-16-25 @ 11:00) (17 - 18)  SpO2: 98% (04-16-25 @ 11:00) (93% - 98%)  Wt(kg): --        04-15-25 @ 07:01  -  04-16-25 @ 07:00  --------------------------------------------------------  IN: 1200 mL / OUT: 0 mL / NET: 1200 mL    04-16-25 @ 07:01  -  04-16-25 @ 13:16  --------------------------------------------------------  IN: 240 mL / OUT: 0 mL / NET: 240 mL      Physical Exam:  Gen: NAD, well-appearing  HEENT: normal  Pulm: CTA B/L  CV: normal S1S2; no rub, no edema  Abd: soft, non-tender  MSK no deformities   Neuro: Alert and oriented x3     LABS/STUDIES  --------------------------------------------------------------------------------  136  |  101  |  41.3  ----------------------------<  204      [04-16-25 @ 05:08]  4.9   |  23.0  |  4.33        Ca     8.2     [04-16-25 @ 05:08]            Creatinine Trend:  SCr 4.33 [04-16 @ 05:08]  SCr 4.08 [04-15 @ 07:03]  SCr 3.30 [04-14 @ 21:53]  SCr 5.06 [04-14 @ 12:30]  SCr 4.12 [04-13 @ 05:09]

## 2025-04-17 LAB
GLUCOSE BLDC GLUCOMTR-MCNC: 195 MG/DL — HIGH (ref 70–99)
GLUCOSE BLDC GLUCOMTR-MCNC: 196 MG/DL — HIGH (ref 70–99)
GLUCOSE BLDC GLUCOMTR-MCNC: 221 MG/DL — HIGH (ref 70–99)
GLUCOSE BLDC GLUCOMTR-MCNC: 222 MG/DL — HIGH (ref 70–99)
GLUCOSE BLDC GLUCOMTR-MCNC: 296 MG/DL — HIGH (ref 70–99)

## 2025-04-17 PROCEDURE — 99232 SBSQ HOSP IP/OBS MODERATE 35: CPT

## 2025-04-17 RX ADMIN — INSULIN LISPRO 2: 100 INJECTION, SOLUTION INTRAVENOUS; SUBCUTANEOUS at 17:07

## 2025-04-17 RX ADMIN — Medication 10 MILLIGRAM(S): at 05:25

## 2025-04-17 RX ADMIN — GABAPENTIN 100 MILLIGRAM(S): 400 CAPSULE ORAL at 13:07

## 2025-04-17 RX ADMIN — Medication 1 APPLICATION(S): at 05:28

## 2025-04-17 RX ADMIN — Medication 1 DROP(S): at 00:32

## 2025-04-17 RX ADMIN — GABAPENTIN 100 MILLIGRAM(S): 400 CAPSULE ORAL at 05:25

## 2025-04-17 RX ADMIN — ATORVASTATIN CALCIUM 40 MILLIGRAM(S): 80 TABLET, FILM COATED ORAL at 21:40

## 2025-04-17 RX ADMIN — METOPROLOL SUCCINATE 50 MILLIGRAM(S): 50 TABLET, EXTENDED RELEASE ORAL at 05:25

## 2025-04-17 RX ADMIN — GABAPENTIN 100 MILLIGRAM(S): 400 CAPSULE ORAL at 21:41

## 2025-04-17 RX ADMIN — Medication 975 MILLIGRAM(S): at 14:10

## 2025-04-17 RX ADMIN — Medication 975 MILLIGRAM(S): at 13:07

## 2025-04-17 RX ADMIN — Medication 10 MILLIGRAM(S): at 00:32

## 2025-04-17 RX ADMIN — LAMIVUDINE 100 MILLIGRAM(S): 10 SOLUTION ORAL at 11:44

## 2025-04-17 RX ADMIN — Medication 2 DROP(S): at 13:07

## 2025-04-17 RX ADMIN — INSULIN LISPRO 1: 100 INJECTION, SOLUTION INTRAVENOUS; SUBCUTANEOUS at 13:07

## 2025-04-17 RX ADMIN — Medication 975 MILLIGRAM(S): at 06:19

## 2025-04-17 RX ADMIN — METOPROLOL SUCCINATE 50 MILLIGRAM(S): 50 TABLET, EXTENDED RELEASE ORAL at 17:06

## 2025-04-17 RX ADMIN — OXYCODONE HYDROCHLORIDE 10 MILLIGRAM(S): 30 TABLET ORAL at 06:32

## 2025-04-17 RX ADMIN — Medication 80 MILLIGRAM(S): at 13:08

## 2025-04-17 RX ADMIN — Medication 975 MILLIGRAM(S): at 22:40

## 2025-04-17 RX ADMIN — INSULIN LISPRO 2: 100 INJECTION, SOLUTION INTRAVENOUS; SUBCUTANEOUS at 08:12

## 2025-04-17 RX ADMIN — Medication 975 MILLIGRAM(S): at 05:25

## 2025-04-17 RX ADMIN — Medication 975 MILLIGRAM(S): at 21:40

## 2025-04-17 RX ADMIN — Medication 3 MILLIGRAM(S): at 21:40

## 2025-04-17 RX ADMIN — POLYMYXIN B SULFATE AND TRIMETHOPRIM SULFATE 1 DROP(S): 10000; 1 SOLUTION/ DROPS OPHTHALMIC at 11:44

## 2025-04-17 RX ADMIN — Medication 1 DROP(S): at 22:43

## 2025-04-17 RX ADMIN — INSULIN GLARGINE-YFGN 10 UNIT(S): 100 INJECTION, SOLUTION SUBCUTANEOUS at 21:41

## 2025-04-17 RX ADMIN — Medication 1 APPLICATION(S): at 05:25

## 2025-04-17 RX ADMIN — Medication 1 DROP(S): at 05:25

## 2025-04-17 RX ADMIN — DOLUTEGRAVIR SODIUM 50 MILLIGRAM(S): 5 TABLET, FOR SUSPENSION ORAL at 11:44

## 2025-04-17 RX ADMIN — Medication 2 DROP(S): at 05:25

## 2025-04-17 RX ADMIN — Medication 2 DROP(S): at 21:40

## 2025-04-17 RX ADMIN — AMLODIPINE BESYLATE 10 MILLIGRAM(S): 10 TABLET ORAL at 05:25
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
## 2025-04-17 NOTE — PROGRESS NOTE ADULT - SUBJECTIVE AND OBJECTIVE BOX
HPI  Pt is a 65yo M admitted to [facility] for BRIDGETTE on CKD and new HD.   Pt was seen and examined at bedside. no overnight complaints.     Vital Signs Last 24 Hrs  T(C): 36.6 (17 Apr 2025 12:28), Max: 36.8 (16 Apr 2025 21:34)  T(F): 97.8 (17 Apr 2025 12:28), Max: 98.2 (16 Apr 2025 21:34)  HR: 77 (17 Apr 2025 12:28) (68 - 83)  BP: 134/72 (17 Apr 2025 12:28) (134/63 - 150/82)  BP(mean): 87 (16 Apr 2025 21:34) (87 - 87)  RR: 18 (17 Apr 2025 12:28) (18 - 18)  SpO2: 98% (17 Apr 2025 12:28) (97% - 98%)    Parameters below as of 17 Apr 2025 12:28  Patient On (Oxygen Delivery Method): room air        I&O's Summary    16 Apr 2025 07:01  -  17 Apr 2025 07:00  --------------------------------------------------------  IN: 1760 mL / OUT: 1800 mL / NET: -40 mL        CAPILLARY BLOOD GLUCOSE      POCT Blood Glucose.: 195 mg/dL (17 Apr 2025 13:06)  POCT Blood Glucose.: 196 mg/dL (17 Apr 2025 11:48)  POCT Blood Glucose.: 222 mg/dL (17 Apr 2025 08:04)  POCT Blood Glucose.: 349 mg/dL (16 Apr 2025 21:13)  POCT Blood Glucose.: 187 mg/dL (16 Apr 2025 16:57)      PHYSICAL EXAM:  Constitutional: NAD,    HEENT: pt is blind   Neck: Soft and supple, right shiley noted   Respiratory: Breath sounds are clear bilaterally,   Cardiovascular: S1 and S2,    Gastrointestinal: Bowel Sounds present, soft,   Extremities: No peripheral edema  Vascular: 2+ peripheral pulses  Neurological: A/O x 3   Musculoskeletal: 5/5 strength b/l upper and lower extremities  Skin: No rashes      MEDICATIONS:  MEDICATIONS  (STANDING):  acetaminophen     Tablet .. 975 milliGRAM(s) Oral every 8 hours  amLODIPine   Tablet 10 milliGRAM(s) Oral daily  artificial tears (preservative free) Ophthalmic Solution 2 Drop(s) Both EYES every 8 hours  atorvastatin 40 milliGRAM(s) Oral at bedtime  chlorhexidine 2% Cloths 1 Application(s) Topical <User Schedule>  chlorhexidine 4% Liquid 1 Application(s) Topical <User Schedule>  dextrose 5%. 1000 milliLiter(s) (100 mL/Hr) IV Continuous <Continuous>  dextrose 5%. 1000 milliLiter(s) (50 mL/Hr) IV Continuous <Continuous>  dextrose 50% Injectable 25 Gram(s) IV Push once  dextrose 50% Injectable 12.5 Gram(s) IV Push once  dextrose 50% Injectable 25 Gram(s) IV Push once  dolutegravir 50 milliGRAM(s) Oral daily  gabapentin 100 milliGRAM(s) Oral three times a day  glucagon  Injectable 1 milliGRAM(s) IntraMuscular once  insulin glargine Injectable (LANTUS) 10 Unit(s) SubCutaneous at bedtime  insulin lispro (ADMELOG) corrective regimen sliding scale   SubCutaneous three times a day before meals  lamiVUDine- milliGRAM(s) Oral daily  lidocaine   4% Patch 1 Patch Transdermal every 24 hours  metoprolol tartrate 50 milliGRAM(s) Oral two times a day  prednisoLONE acetate 1% Suspension 1 Drop(s) Both EYES four times a day  trimethoprim/polymyxin Solution 1 Drop(s) Both EYES daily  valsartan 80 milliGRAM(s) Oral daily      LABS: All Labs Reviewed:                        8.6    8.53  )-----------( 226      ( 16 Apr 2025 11:00 )             26.9     04-16    136  |  101  |  41.3[H]  ----------------------------<  204[H]  4.9   |  23.0  |  4.33[H]    Ca    8.2[L]      16 Apr 2025 05:08            Blood Culture:     RADIOLOGY/EKG:    DVT PPX:    ADVANCED DIRECTIVE:    DISPOSITION:

## 2025-04-17 NOTE — PROGRESS NOTE ADULT - SUBJECTIVE AND OBJECTIVE BOX
[facility] DIVISION OF KIDNEY DISEASES AND HYPERTENSION -- PROGRESS NOTE    24 hour events/subjective:  Seen today   Doing well   S/p HD yesterday     MEDICATIONS    Standing Inpatient Medications  acetaminophen     Tablet .. 975 milliGRAM(s) Oral every 8 hours  amLODIPine   Tablet 10 milliGRAM(s) Oral daily  artificial tears (preservative free) Ophthalmic Solution 2 Drop(s) Both EYES every 8 hours  atorvastatin 40 milliGRAM(s) Oral at bedtime  chlorhexidine 2% Cloths 1 Application(s) Topical <User Schedule>  chlorhexidine 4% Liquid 1 Application(s) Topical <User Schedule>  dextrose 5%. 1000 milliLiter(s) IV Continuous <Continuous>  dextrose 5%. 1000 milliLiter(s) IV Continuous <Continuous>  dextrose 50% Injectable 25 Gram(s) IV Push once  dextrose 50% Injectable 12.5 Gram(s) IV Push once  dextrose 50% Injectable 25 Gram(s) IV Push once  dolutegravir 50 milliGRAM(s) Oral daily  gabapentin 100 milliGRAM(s) Oral three times a day  glucagon  Injectable 1 milliGRAM(s) IntraMuscular once  insulin glargine Injectable (LANTUS) 10 Unit(s) SubCutaneous at bedtime  insulin lispro (ADMELOG) corrective regimen sliding scale   SubCutaneous three times a day before meals  lamiVUDine- milliGRAM(s) Oral daily  lidocaine   4% Patch 1 Patch Transdermal every 24 hours  metoprolol tartrate 50 milliGRAM(s) Oral two times a day  prednisoLONE acetate 1% Suspension 1 Drop(s) Both EYES four times a day  trimethoprim/polymyxin Solution 1 Drop(s) Both EYES daily  valsartan 80 milliGRAM(s) Oral daily    PRN Inpatient Medications  aluminum hydroxide/magnesium hydroxide/simethicone Suspension 30 milliLiter(s) Oral every 4 hours PRN  dextrose Oral Gel 15 Gram(s) Oral once PRN  melatonin 3 milliGRAM(s) Oral at bedtime PRN  ondansetron Injectable 4 milliGRAM(s) IV Push every 8 hours PRN  oxyCODONE    IR 10 milliGRAM(s) Oral every 6 hours PRN  oxyCODONE    IR 5 milliGRAM(s) Oral every 6 hours PRN  sodium chloride 0.9% lock flush 10 milliLiter(s) IV Push every 1 hour PRN      VITALS/PHYSICAL EXAM  --------------------------------------------------------------------------------  T(C): 36.6 (04-17-25 @ 12:28), Max: 36.8 (04-16-25 @ 21:34)  HR: 77 (04-17-25 @ 12:28) (68 - 96)  BP: 134/72 (04-17-25 @ 12:28) (129/74 - 150/82)  RR: 18 (04-17-25 @ 12:28) (18 - 18)  SpO2: 98% (04-17-25 @ 12:28) (96% - 99%)  Wt(kg): --        04-16-25 @ 07:01  -  04-17-25 @ 07:00  --------------------------------------------------------  IN: 1760 mL / OUT: 1800 mL / NET: -40 mL      Physical Exam:  Gen: NAD, well-appearing  HEENT: normal  Pulm: CTA B/L  CV: normal S1S2; no rub, no edema  Abd: soft, non-tender  MSK no deformities   Neuro: Alert and oriented x3     LABS/STUDIES  --------------------------------------------------------------------------------  136  |  101  |  41.3  ----------------------------<  204      [04-16-25 @ 05:08]  4.9   |  23.0  |  4.33        Ca     8.2     [04-16-25 @ 05:08]            Creatinine Trend:  SCr 4.33 [04-16 @ 05:08]  SCr 4.08 [04-15 @ 07:03]  SCr 3.30 [04-14 @ 21:53]  SCr 5.06 [04-14 @ 12:30]  SCr 4.12 [04-13 @ 05:09]

## 2025-04-17 NOTE — PROGRESS NOTE ADULT - ASSESSMENT
63 YOM DM, HIV on HAART admitted after fall.  Nephrology consulted for progressive worsening CKD.    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  Placed on IV venofer. epo.  HTN: BP controlled- continue amlodipine, metoprolol, Hydralazine  HIV on Bikatrvy- meds changed as per ID  DM: discontinued metformin (low GFR), placed on Lantus + SSI ACHS    Plan   HD tomorrow 3 hrs 2k bath Goal UF 1-2 L as tolerated by BP   Stop hydralazine   Start valsartan 80 mg daily   Will need HD placement   Daily labs   Will follow   Thank you

## 2025-04-18 LAB
ANION GAP SERPL CALC-SCNC: 12 MMOL/L — SIGNIFICANT CHANGE UP (ref 5–17)
BUN SERPL-MCNC: 45.1 MG/DL — HIGH (ref 8–20)
CALCIUM SERPL-MCNC: 8.4 MG/DL — SIGNIFICANT CHANGE UP (ref 8.4–10.5)
CHLORIDE SERPL-SCNC: 103 MMOL/L — SIGNIFICANT CHANGE UP (ref 96–108)
CO2 SERPL-SCNC: 23 MMOL/L — SIGNIFICANT CHANGE UP (ref 22–29)
CREAT SERPL-MCNC: 4.9 MG/DL — HIGH (ref 0.5–1.3)
EGFR: 13 ML/MIN/1.73M2 — LOW
EGFR: 13 ML/MIN/1.73M2 — LOW
GLUCOSE BLDC GLUCOMTR-MCNC: 158 MG/DL — HIGH (ref 70–99)
GLUCOSE BLDC GLUCOMTR-MCNC: 222 MG/DL — HIGH (ref 70–99)
GLUCOSE BLDC GLUCOMTR-MCNC: 235 MG/DL — HIGH (ref 70–99)
GLUCOSE BLDC GLUCOMTR-MCNC: 237 MG/DL — HIGH (ref 70–99)
GLUCOSE SERPL-MCNC: 131 MG/DL — HIGH (ref 70–99)
HCT VFR BLD CALC: 27.2 % — LOW (ref 39–50)
HGB BLD-MCNC: 8.9 G/DL — LOW (ref 13–17)
MAGNESIUM SERPL-MCNC: 1.9 MG/DL — SIGNIFICANT CHANGE UP (ref 1.6–2.6)
MCHC RBC-ENTMCNC: 29.4 PG — SIGNIFICANT CHANGE UP (ref 27–34)
MCHC RBC-ENTMCNC: 32.7 G/DL — SIGNIFICANT CHANGE UP (ref 32–36)
MCV RBC AUTO: 89.8 FL — SIGNIFICANT CHANGE UP (ref 80–100)
NRBC # BLD AUTO: 0 K/UL — SIGNIFICANT CHANGE UP (ref 0–0)
NRBC # FLD: 0 K/UL — SIGNIFICANT CHANGE UP (ref 0–0)
NRBC BLD AUTO-RTO: 0 /100 WBCS — SIGNIFICANT CHANGE UP (ref 0–0)
PHOSPHATE SERPL-MCNC: 4.4 MG/DL — SIGNIFICANT CHANGE UP (ref 2.4–4.7)
PLATELET # BLD AUTO: 233 K/UL — SIGNIFICANT CHANGE UP (ref 150–400)
PMV BLD: 9.6 FL — SIGNIFICANT CHANGE UP (ref 7–13)
POTASSIUM SERPL-MCNC: 5.1 MMOL/L — SIGNIFICANT CHANGE UP (ref 3.5–5.3)
POTASSIUM SERPL-SCNC: 5.1 MMOL/L — SIGNIFICANT CHANGE UP (ref 3.5–5.3)
RBC # BLD: 3.03 M/UL — LOW (ref 4.2–5.8)
RBC # FLD: 14.6 % — HIGH (ref 10.3–14.5)
SODIUM SERPL-SCNC: 138 MMOL/L — SIGNIFICANT CHANGE UP (ref 135–145)
WBC # BLD: 7.34 K/UL — SIGNIFICANT CHANGE UP (ref 3.8–10.5)
WBC # FLD AUTO: 7.34 K/UL — SIGNIFICANT CHANGE UP (ref 3.8–10.5)

## 2025-04-18 PROCEDURE — 99232 SBSQ HOSP IP/OBS MODERATE 35: CPT

## 2025-04-18 RX ORDER — TRAMADOL HYDROCHLORIDE 50 MG/1
25 TABLET, FILM COATED ORAL EVERY 8 HOURS
Refills: 0 | Status: DISCONTINUED | OUTPATIENT
Start: 2025-04-18 | End: 2025-04-24

## 2025-04-18 RX ORDER — SODIUM ZIRCONIUM CYCLOSILICATE 5 G/5G
10 POWDER, FOR SUSPENSION ORAL ONCE
Refills: 0 | Status: COMPLETED | OUTPATIENT
Start: 2025-04-18 | End: 2025-04-18

## 2025-04-18 RX ADMIN — GABAPENTIN 100 MILLIGRAM(S): 400 CAPSULE ORAL at 05:31

## 2025-04-18 RX ADMIN — Medication 1 DROP(S): at 13:30

## 2025-04-18 RX ADMIN — Medication 80 MILLIGRAM(S): at 05:31

## 2025-04-18 RX ADMIN — INSULIN LISPRO 2: 100 INJECTION, SOLUTION INTRAVENOUS; SUBCUTANEOUS at 17:18

## 2025-04-18 RX ADMIN — OXYCODONE HYDROCHLORIDE 10 MILLIGRAM(S): 30 TABLET ORAL at 17:11

## 2025-04-18 RX ADMIN — INSULIN LISPRO 2: 100 INJECTION, SOLUTION INTRAVENOUS; SUBCUTANEOUS at 12:13

## 2025-04-18 RX ADMIN — Medication 1 DROP(S): at 05:32

## 2025-04-18 RX ADMIN — OXYCODONE HYDROCHLORIDE 10 MILLIGRAM(S): 30 TABLET ORAL at 10:43

## 2025-04-18 RX ADMIN — Medication 1 DROP(S): at 21:05

## 2025-04-18 RX ADMIN — METOPROLOL SUCCINATE 50 MILLIGRAM(S): 50 TABLET, EXTENDED RELEASE ORAL at 17:06

## 2025-04-18 RX ADMIN — POLYMYXIN B SULFATE AND TRIMETHOPRIM SULFATE 1 DROP(S): 10000; 1 SOLUTION/ DROPS OPHTHALMIC at 13:31

## 2025-04-18 RX ADMIN — INSULIN GLARGINE-YFGN 10 UNIT(S): 100 INJECTION, SOLUTION SUBCUTANEOUS at 21:04

## 2025-04-18 RX ADMIN — GABAPENTIN 100 MILLIGRAM(S): 400 CAPSULE ORAL at 21:05

## 2025-04-18 RX ADMIN — Medication 975 MILLIGRAM(S): at 21:06

## 2025-04-18 RX ADMIN — METOPROLOL SUCCINATE 50 MILLIGRAM(S): 50 TABLET, EXTENDED RELEASE ORAL at 05:31

## 2025-04-18 RX ADMIN — Medication 1 APPLICATION(S): at 05:31

## 2025-04-18 RX ADMIN — LAMIVUDINE 100 MILLIGRAM(S): 10 SOLUTION ORAL at 14:54

## 2025-04-18 RX ADMIN — GABAPENTIN 100 MILLIGRAM(S): 400 CAPSULE ORAL at 14:58

## 2025-04-18 RX ADMIN — Medication 3 MILLIGRAM(S): at 21:05

## 2025-04-18 RX ADMIN — Medication 2 DROP(S): at 14:58

## 2025-04-18 RX ADMIN — SODIUM ZIRCONIUM CYCLOSILICATE 10 GRAM(S): 5 POWDER, FOR SUSPENSION ORAL at 14:37

## 2025-04-18 RX ADMIN — AMLODIPINE BESYLATE 10 MILLIGRAM(S): 10 TABLET ORAL at 05:30

## 2025-04-18 RX ADMIN — Medication 2 DROP(S): at 21:05

## 2025-04-18 RX ADMIN — Medication 1 APPLICATION(S): at 05:30

## 2025-04-18 RX ADMIN — DOLUTEGRAVIR SODIUM 50 MILLIGRAM(S): 5 TABLET, FOR SUSPENSION ORAL at 14:54

## 2025-04-18 RX ADMIN — Medication 975 MILLIGRAM(S): at 05:29

## 2025-04-18 RX ADMIN — Medication 975 MILLIGRAM(S): at 14:57

## 2025-04-18 RX ADMIN — INSULIN LISPRO 1: 100 INJECTION, SOLUTION INTRAVENOUS; SUBCUTANEOUS at 08:06

## 2025-04-18 RX ADMIN — ATORVASTATIN CALCIUM 40 MILLIGRAM(S): 80 TABLET, FILM COATED ORAL at 21:05

## 2025-04-18 RX ADMIN — Medication 1 DROP(S): at 17:05

## 2025-04-18 RX ADMIN — Medication 975 MILLIGRAM(S): at 22:06

## 2025-04-18 RX ADMIN — Medication 2 DROP(S): at 05:30
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
## 2025-04-18 NOTE — PROGRESS NOTE ADULT - ASSESSMENT
63 YOM DM, HIV on HAART admitted after fall.  Nephrology consulted for progressive worsening CKD.    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  Placed on IV venofer. epo.  HTN: BP controlled- continue amlodipine, metoprolol, Hydralazine  HIV on Bikatrvy- meds changed as per ID  DM: discontinued metformin (low GFR), placed on Lantus + SSI ACHS    Plan   Patient refused HD today   Will schedule for HD tomorrow if patient agrees  HD tomorrow 3 hrs 2k bath Goal UF 1-2 L as tolerated by BP   Continue valsartan 80 mg daily   Pending HD placement   Daily labs   Will follow   Discussed with primary team   Discussed with

## 2025-04-18 NOTE — PROGRESS NOTE ADULT - SUBJECTIVE AND OBJECTIVE BOX
[facility] DIVISION OF KIDNEY DISEASES AND HYPERTENSION -- PROGRESS NOTE    24 hour events/subjective:  Seen today   Doing well   BP is stable   He is complaining of back pain   HD chair placement still pending   Refusing HD today     MEDICATIONS    Standing Inpatient Medications  acetaminophen     Tablet .. 975 milliGRAM(s) Oral every 8 hours  amLODIPine   Tablet 10 milliGRAM(s) Oral daily  artificial tears (preservative free) Ophthalmic Solution 2 Drop(s) Both EYES every 8 hours  atorvastatin 40 milliGRAM(s) Oral at bedtime  chlorhexidine 2% Cloths 1 Application(s) Topical <User Schedule>  chlorhexidine 4% Liquid 1 Application(s) Topical <User Schedule>  dextrose 5%. 1000 milliLiter(s) IV Continuous <Continuous>  dextrose 5%. 1000 milliLiter(s) IV Continuous <Continuous>  dextrose 50% Injectable 25 Gram(s) IV Push once  dextrose 50% Injectable 12.5 Gram(s) IV Push once  dextrose 50% Injectable 25 Gram(s) IV Push once  dolutegravir 50 milliGRAM(s) Oral daily  gabapentin 100 milliGRAM(s) Oral three times a day  glucagon  Injectable 1 milliGRAM(s) IntraMuscular once  insulin glargine Injectable (LANTUS) 10 Unit(s) SubCutaneous at bedtime  insulin lispro (ADMELOG) corrective regimen sliding scale   SubCutaneous three times a day before meals  lamiVUDine- milliGRAM(s) Oral daily  lidocaine   4% Patch 1 Patch Transdermal every 24 hours  metoprolol tartrate 50 milliGRAM(s) Oral two times a day  prednisoLONE acetate 1% Suspension 1 Drop(s) Both EYES four times a day  trimethoprim/polymyxin Solution 1 Drop(s) Both EYES daily  valsartan 80 milliGRAM(s) Oral daily    PRN Inpatient Medications  aluminum hydroxide/magnesium hydroxide/simethicone Suspension 30 milliLiter(s) Oral every 4 hours PRN  dextrose Oral Gel 15 Gram(s) Oral once PRN  melatonin 3 milliGRAM(s) Oral at bedtime PRN  ondansetron Injectable 4 milliGRAM(s) IV Push every 8 hours PRN  oxyCODONE    IR 10 milliGRAM(s) Oral every 6 hours PRN  oxyCODONE    IR 5 milliGRAM(s) Oral every 6 hours PRN  sodium chloride 0.9% lock flush 10 milliLiter(s) IV Push every 1 hour PRN  traMADol 25 milliGRAM(s) Oral every 8 hours PRN      VITALS/PHYSICAL EXAM  --------------------------------------------------------------------------------  T(C): 36.9 (04-18-25 @ 08:00), Max: 36.9 (04-18-25 @ 08:00)  HR: 70 (04-18-25 @ 08:00) (64 - 73)  BP: 151/73 (04-18-25 @ 08:00) (133/79 - 151/73)  RR: 18 (04-18-25 @ 08:00) (18 - 19)  SpO2: 98% (04-18-25 @ 08:00) (94% - 98%)  Wt(kg): --        04-17-25 @ 07:01  -  04-18-25 @ 07:00  --------------------------------------------------------  IN: 960 mL / OUT: 0 mL / NET: 960 mL    04-18-25 @ 07:01  -  04-18-25 @ 14:51  --------------------------------------------------------  IN: 450 mL / OUT: 0 mL / NET: 450 mL      Physical Exam:  Gen: NAD, well-appearing  HEENT: normal  Pulm: CTA B/L  CV: normal S1S2; no rub, no edema  Abd: soft, non-tender  MSK no deformities   Neuro: Alert and oriented x3     LABS/STUDIES  --------------------------------------------------------------------------------  138  |  103  |  45.1  ----------------------------<  131      [04-18-25 @ 05:25]  5.1   |  23.0  |  4.90        Ca     8.4     [04-18-25 @ 05:25]      Mg     1.9     [04-18-25 @ 05:25]      Phos  4.4     [04-18-25 @ 05:25]            Creatinine Trend:  SCr 4.90 [04-18 @ 05:25]  SCr 4.33 [04-16 @ 05:08]  SCr 4.08 [04-15 @ 07:03]  SCr 3.30 [04-14 @ 21:53]  SCr 5.06 [04-14 @ 12:30]

## 2025-04-18 NOTE — PROGRESS NOTE ADULT - SUBJECTIVE AND OBJECTIVE BOX
HPI  Pt is a 63yo M admitted to [facility] for BRIDGETTE on CKD and new HD.   Pt was seen and examined at bedside. Pt has back pain. Refused HD today     Vital Signs Last 24 Hrs  T(C): 36.9 (18 Apr 2025 08:00), Max: 36.9 (18 Apr 2025 08:00)  T(F): 98.4 (18 Apr 2025 08:00), Max: 98.4 (18 Apr 2025 08:00)  HR: 70 (18 Apr 2025 08:00) (64 - 77)  BP: 151/73 (18 Apr 2025 08:00) (133/79 - 151/73)  BP(mean): --  RR: 18 (18 Apr 2025 08:00) (18 - 19)  SpO2: 98% (18 Apr 2025 08:00) (94% - 98%)    Parameters below as of 18 Apr 2025 08:00  Patient On (Oxygen Delivery Method): room air        I&O's Summary    17 Apr 2025 07:01  -  18 Apr 2025 07:00  --------------------------------------------------------  IN: 960 mL / OUT: 0 mL / NET: 960 mL    18 Apr 2025 07:01  -  18 Apr 2025 12:01  --------------------------------------------------------  IN: 450 mL / OUT: 0 mL / NET: 450 mL        CAPILLARY BLOOD GLUCOSE      POCT Blood Glucose.: 158 mg/dL (18 Apr 2025 08:01)  POCT Blood Glucose.: 296 mg/dL (17 Apr 2025 21:22)  POCT Blood Glucose.: 221 mg/dL (17 Apr 2025 16:44)  POCT Blood Glucose.: 195 mg/dL (17 Apr 2025 13:06)    Constitutional: NAD,    HEENT: pt is blind   Neck: Soft and supple, right shiley noted   Respiratory: Breath sounds are clear bilaterally,   Cardiovascular: S1 and S2,    Gastrointestinal: Bowel Sounds present, soft,   Extremities: No peripheral edema  Vascular: 2+ peripheral pulses  Neurological: A/O x 3   Musculoskeletal: 5/5 strength b/l upper and lower extremities  Skin: No rashes    MEDICATIONS:  MEDICATIONS  (STANDING):  acetaminophen     Tablet .. 975 milliGRAM(s) Oral every 8 hours  amLODIPine   Tablet 10 milliGRAM(s) Oral daily  artificial tears (preservative free) Ophthalmic Solution 2 Drop(s) Both EYES every 8 hours  atorvastatin 40 milliGRAM(s) Oral at bedtime  chlorhexidine 2% Cloths 1 Application(s) Topical <User Schedule>  chlorhexidine 4% Liquid 1 Application(s) Topical <User Schedule>  dextrose 5%. 1000 milliLiter(s) (100 mL/Hr) IV Continuous <Continuous>  dextrose 5%. 1000 milliLiter(s) (50 mL/Hr) IV Continuous <Continuous>  dextrose 50% Injectable 25 Gram(s) IV Push once  dextrose 50% Injectable 12.5 Gram(s) IV Push once  dextrose 50% Injectable 25 Gram(s) IV Push once  dolutegravir 50 milliGRAM(s) Oral daily  gabapentin 100 milliGRAM(s) Oral three times a day  glucagon  Injectable 1 milliGRAM(s) IntraMuscular once  insulin glargine Injectable (LANTUS) 10 Unit(s) SubCutaneous at bedtime  insulin lispro (ADMELOG) corrective regimen sliding scale   SubCutaneous three times a day before meals  lamiVUDine- milliGRAM(s) Oral daily  lidocaine   4% Patch 1 Patch Transdermal every 24 hours  metoprolol tartrate 50 milliGRAM(s) Oral two times a day  prednisoLONE acetate 1% Suspension 1 Drop(s) Both EYES four times a day  trimethoprim/polymyxin Solution 1 Drop(s) Both EYES daily  valsartan 80 milliGRAM(s) Oral daily      LABS: All Labs Reviewed:                        8.9    7.34  )-----------( 233      ( 18 Apr 2025 05:25 )             27.2     04-18    138  |  103  |  45.1[H]  ----------------------------<  131[H]  5.1   |  23.0  |  4.90[H]    Ca    8.4      18 Apr 2025 05:25  Phos  4.4     04-18  Mg     1.9     04-18            Blood Culture:     RADIOLOGY/EKG:    DVT PPX:    ADVANCED DIRECTIVE:    DISPOSITION:

## 2025-04-19 LAB
ANION GAP SERPL CALC-SCNC: 13 MMOL/L — SIGNIFICANT CHANGE UP (ref 5–17)
BUN SERPL-MCNC: 48.7 MG/DL — HIGH (ref 8–20)
CALCIUM SERPL-MCNC: 8.5 MG/DL — SIGNIFICANT CHANGE UP (ref 8.4–10.5)
CHLORIDE SERPL-SCNC: 103 MMOL/L — SIGNIFICANT CHANGE UP (ref 96–108)
CO2 SERPL-SCNC: 22 MMOL/L — SIGNIFICANT CHANGE UP (ref 22–29)
CREAT SERPL-MCNC: 4.98 MG/DL — HIGH (ref 0.5–1.3)
EGFR: 12 ML/MIN/1.73M2 — LOW
EGFR: 12 ML/MIN/1.73M2 — LOW
GLUCOSE BLDC GLUCOMTR-MCNC: 204 MG/DL — HIGH (ref 70–99)
GLUCOSE BLDC GLUCOMTR-MCNC: 208 MG/DL — HIGH (ref 70–99)
GLUCOSE BLDC GLUCOMTR-MCNC: 240 MG/DL — HIGH (ref 70–99)
GLUCOSE BLDC GLUCOMTR-MCNC: 241 MG/DL — HIGH (ref 70–99)
GLUCOSE SERPL-MCNC: 294 MG/DL — HIGH (ref 70–99)
HCT VFR BLD CALC: 27.8 % — LOW (ref 39–50)
HGB BLD-MCNC: 9.2 G/DL — LOW (ref 13–17)
MAGNESIUM SERPL-MCNC: 2.1 MG/DL — SIGNIFICANT CHANGE UP (ref 1.6–2.6)
MCHC RBC-ENTMCNC: 30 PG — SIGNIFICANT CHANGE UP (ref 27–34)
MCHC RBC-ENTMCNC: 33.1 G/DL — SIGNIFICANT CHANGE UP (ref 32–36)
MCV RBC AUTO: 90.6 FL — SIGNIFICANT CHANGE UP (ref 80–100)
NRBC # BLD AUTO: 0 K/UL — SIGNIFICANT CHANGE UP (ref 0–0)
NRBC # FLD: 0 K/UL — SIGNIFICANT CHANGE UP (ref 0–0)
NRBC BLD AUTO-RTO: 0 /100 WBCS — SIGNIFICANT CHANGE UP (ref 0–0)
PHOSPHATE SERPL-MCNC: 4.7 MG/DL — SIGNIFICANT CHANGE UP (ref 2.4–4.7)
PLATELET # BLD AUTO: 222 K/UL — SIGNIFICANT CHANGE UP (ref 150–400)
PMV BLD: 9.6 FL — SIGNIFICANT CHANGE UP (ref 7–13)
POTASSIUM SERPL-MCNC: 5.2 MMOL/L — SIGNIFICANT CHANGE UP (ref 3.5–5.3)
POTASSIUM SERPL-SCNC: 5.2 MMOL/L — SIGNIFICANT CHANGE UP (ref 3.5–5.3)
RBC # BLD: 3.07 M/UL — LOW (ref 4.2–5.8)
RBC # FLD: 14.4 % — SIGNIFICANT CHANGE UP (ref 10.3–14.5)
SODIUM SERPL-SCNC: 138 MMOL/L — SIGNIFICANT CHANGE UP (ref 135–145)
WBC # BLD: 7.4 K/UL — SIGNIFICANT CHANGE UP (ref 3.8–10.5)
WBC # FLD AUTO: 7.4 K/UL — SIGNIFICANT CHANGE UP (ref 3.8–10.5)

## 2025-04-19 PROCEDURE — 99232 SBSQ HOSP IP/OBS MODERATE 35: CPT

## 2025-04-19 RX ORDER — KETOROLAC TROMETHAMINE 5 MG/ML
1 SOLUTION/ DROPS OPHTHALMIC
Refills: 0 | Status: DISCONTINUED | OUTPATIENT
Start: 2025-04-19 | End: 2025-04-22

## 2025-04-19 RX ORDER — SODIUM ZIRCONIUM CYCLOSILICATE 5 G/5G
10 POWDER, FOR SUSPENSION ORAL ONCE
Refills: 0 | Status: COMPLETED | OUTPATIENT
Start: 2025-04-19 | End: 2025-04-19

## 2025-04-19 RX ADMIN — Medication 1 DROP(S): at 05:33

## 2025-04-19 RX ADMIN — INSULIN LISPRO 2: 100 INJECTION, SOLUTION INTRAVENOUS; SUBCUTANEOUS at 12:05

## 2025-04-19 RX ADMIN — GABAPENTIN 100 MILLIGRAM(S): 400 CAPSULE ORAL at 05:34

## 2025-04-19 RX ADMIN — INSULIN LISPRO 2: 100 INJECTION, SOLUTION INTRAVENOUS; SUBCUTANEOUS at 18:01

## 2025-04-19 RX ADMIN — OXYCODONE HYDROCHLORIDE 10 MILLIGRAM(S): 30 TABLET ORAL at 09:57

## 2025-04-19 RX ADMIN — METOPROLOL SUCCINATE 50 MILLIGRAM(S): 50 TABLET, EXTENDED RELEASE ORAL at 05:33

## 2025-04-19 RX ADMIN — Medication 975 MILLIGRAM(S): at 22:17

## 2025-04-19 RX ADMIN — Medication 3 MILLIGRAM(S): at 22:18

## 2025-04-19 RX ADMIN — INSULIN LISPRO 2: 100 INJECTION, SOLUTION INTRAVENOUS; SUBCUTANEOUS at 07:41

## 2025-04-19 RX ADMIN — AMLODIPINE BESYLATE 10 MILLIGRAM(S): 10 TABLET ORAL at 05:34

## 2025-04-19 RX ADMIN — GABAPENTIN 100 MILLIGRAM(S): 400 CAPSULE ORAL at 22:17

## 2025-04-19 RX ADMIN — Medication 2 DROP(S): at 22:17

## 2025-04-19 RX ADMIN — Medication 1 DROP(S): at 13:04

## 2025-04-19 RX ADMIN — Medication 1 APPLICATION(S): at 05:34

## 2025-04-19 RX ADMIN — DOLUTEGRAVIR SODIUM 50 MILLIGRAM(S): 5 TABLET, FOR SUSPENSION ORAL at 13:02

## 2025-04-19 RX ADMIN — SODIUM ZIRCONIUM CYCLOSILICATE 10 GRAM(S): 5 POWDER, FOR SUSPENSION ORAL at 13:04

## 2025-04-19 RX ADMIN — GABAPENTIN 100 MILLIGRAM(S): 400 CAPSULE ORAL at 13:07

## 2025-04-19 RX ADMIN — KETOROLAC TROMETHAMINE 1 DROP(S): 5 SOLUTION/ DROPS OPHTHALMIC at 13:03

## 2025-04-19 RX ADMIN — INSULIN GLARGINE-YFGN 10 UNIT(S): 100 INJECTION, SOLUTION SUBCUTANEOUS at 22:19

## 2025-04-19 RX ADMIN — METOPROLOL SUCCINATE 50 MILLIGRAM(S): 50 TABLET, EXTENDED RELEASE ORAL at 18:03

## 2025-04-19 RX ADMIN — Medication 2 DROP(S): at 12:57

## 2025-04-19 RX ADMIN — LAMIVUDINE 100 MILLIGRAM(S): 10 SOLUTION ORAL at 13:02

## 2025-04-19 RX ADMIN — Medication 1 DROP(S): at 17:34

## 2025-04-19 RX ADMIN — Medication 975 MILLIGRAM(S): at 23:17

## 2025-04-19 RX ADMIN — POLYMYXIN B SULFATE AND TRIMETHOPRIM SULFATE 1 DROP(S): 10000; 1 SOLUTION/ DROPS OPHTHALMIC at 13:04

## 2025-04-19 RX ADMIN — Medication 80 MILLIGRAM(S): at 05:33

## 2025-04-19 RX ADMIN — Medication 975 MILLIGRAM(S): at 13:05

## 2025-04-19 RX ADMIN — KETOROLAC TROMETHAMINE 1 DROP(S): 5 SOLUTION/ DROPS OPHTHALMIC at 17:34

## 2025-04-19 RX ADMIN — Medication 2 DROP(S): at 05:33

## 2025-04-19 RX ADMIN — ATORVASTATIN CALCIUM 40 MILLIGRAM(S): 80 TABLET, FILM COATED ORAL at 22:17

## 2025-04-19 NOTE — PROGRESS NOTE ADULT - ASSESSMENT
63yoM w/ PMHx of CKD, IDDM, HIV on HAART, HFrEF, recent bilateral cataract surgery at [facility] presenting after fall from vehicle. Patient without evidence of acute traumatic injury to head or c-spine. Patient found to have elevated troponin, chest pain seems to be more so from fall rather than cardiac in nature. Continues on HD as per nephro 3rd session this evening, requires outpatient HD placement.      #BRIDGETTE on CKD3B  #Now ESRD requiring HD  #Hyperkalemia  Renal on board, connor recs  s/p renal biopsy 4/7   renal biopsy showing diabetic nephropathy   Renally dose meds  Avoid nephrotoxins  s/p IR permacath  outpatient vasc eval for AVF vs graft  waiting outpatient HD seat placement   HD per renal and if patient agrees    #recent bilateral cataract surgery  ct with chronic lens dislocation vs vitreous hemorrhage   optho contacted by ED - as per optho: IOP will be chronically elevated iso vitreous hemorrhage  given pilocarpine, dorzolamide, brimonidine, timolol in ED -- as per optho, not to continue as IOP chronically elevated  pt to continue with polytrim drops  artifical tears  had scheduled procedure with his outside optho on 04/01, will need new appt  pt continues to report bad eye pain especially on the right, NP reached to ophth on call regarding recs: Sight MD: patient to follow up with own surgeon. Can be started on prednisolone 4 times a day and ketorolac 4 times a day.    On prednisolone and Ketorolac drops are ordered  Patient will need to follow up with his own ophthalmologist     #HTN  partly due to med noncompliance;     Amlodipine 10mg QD, metoprolol 50mg bid, Valsartan 80mg daily  hydralazine po added     #Fall  ct head and cspine without evidence of traumatic injury  tylenol prn for pain control  PT recommending outpatient PT    #Elevated troponin  Cardiology on board, recs noted  Holding heparin and asa iso head trauma  TTE Reviewed  CT coronary done 4/1 noted with calcium score of 13  NST w/o ischemia/infarct.     #HIV  Cont meds    ID Consulted, recs noted  Started on Dolutegrair and epivir  continue until follows up outpatient with ID   VL >300k    #hfref  tte noted  c/w metoprolol  On Valsartan  not on lasix at home, appears to be euvolemic    #Back pain  Lidoderm patch   cont Oxy  Ultram ordered      dvt ppx: scd  dispo: Pending outpatient HD Seat for DC

## 2025-04-19 NOTE — PROGRESS NOTE ADULT - ASSESSMENT
63 YOM DM, HIV on HAART admitted after fall.  Nephrology consulted for progressive worsening CKD.    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  Placed on IV venofer. epo.  HTN: BP controlled- continue amlodipine, metoprolol, Hydralazine  HIV on Bikatrvy- meds changed as per ID  DM: discontinued metformin (low GFR), placed on Lantus + SSI ACHS    Plan   Risks and benefits of missing hemodialysis were discussed with the patient, however, he continues to refuse hemodialysis   Continue valsartan 80 mg daily   Pending HD placement   Daily labs   Will follow   Discussed with primary team

## 2025-04-19 NOTE — PROGRESS NOTE ADULT - SUBJECTIVE AND OBJECTIVE BOX
Hospitalist Daily Progress Note    Chief Complaint:  Patient is a 64y old  Male who presents with a chief complaint of syncope (13 Apr 2025 11:15)      SUBJECTIVE / OVERNIGHT EVENTS:  Patient was seen and examined at bedside. Complains of right eye pain but no problems with vision. States vision is at baseline.  Requesting pain meds   Refused HD  No other complaints.   Patient denies chest pain, SOB, abd pain, N/V, fever, chills, dysuria or any other complaints. All remainder ROS negative.     MEDICATIONS  (STANDING):  acetaminophen     Tablet .. 975 milliGRAM(s) Oral every 8 hours  amLODIPine   Tablet 10 milliGRAM(s) Oral daily  artificial tears (preservative free) Ophthalmic Solution 2 Drop(s) Both EYES every 8 hours  atorvastatin 40 milliGRAM(s) Oral at bedtime  chlorhexidine 2% Cloths 1 Application(s) Topical <User Schedule>  chlorhexidine 4% Liquid 1 Application(s) Topical <User Schedule>  dextrose 5%. 1000 milliLiter(s) (100 mL/Hr) IV Continuous <Continuous>  dextrose 5%. 1000 milliLiter(s) (50 mL/Hr) IV Continuous <Continuous>  dextrose 50% Injectable 25 Gram(s) IV Push once  dextrose 50% Injectable 12.5 Gram(s) IV Push once  dextrose 50% Injectable 25 Gram(s) IV Push once  dolutegravir 50 milliGRAM(s) Oral daily  gabapentin 100 milliGRAM(s) Oral three times a day  glucagon  Injectable 1 milliGRAM(s) IntraMuscular once  insulin glargine Injectable (LANTUS) 10 Unit(s) SubCutaneous at bedtime  insulin lispro (ADMELOG) corrective regimen sliding scale   SubCutaneous three times a day before meals  ketorolac 0.5% Ophthalmic Solution 1 Drop(s) Both EYES four times a day  lamiVUDine- milliGRAM(s) Oral daily  lidocaine   4% Patch 1 Patch Transdermal every 24 hours  metoprolol tartrate 50 milliGRAM(s) Oral two times a day  prednisoLONE acetate 1% Suspension 1 Drop(s) Both EYES four times a day  trimethoprim/polymyxin Solution 1 Drop(s) Both EYES daily  valsartan 80 milliGRAM(s) Oral daily    MEDICATIONS  (PRN):  aluminum hydroxide/magnesium hydroxide/simethicone Suspension 30 milliLiter(s) Oral every 4 hours PRN Dyspepsia  dextrose Oral Gel 15 Gram(s) Oral once PRN Blood Glucose LESS THAN 70 milliGRAM(s)/deciliter  melatonin 3 milliGRAM(s) Oral at bedtime PRN Insomnia  ondansetron Injectable 4 milliGRAM(s) IV Push every 8 hours PRN Nausea and/or Vomiting  oxyCODONE    IR 10 milliGRAM(s) Oral every 6 hours PRN Severe Pain (7 - 10)  oxyCODONE    IR 5 milliGRAM(s) Oral every 6 hours PRN Moderate Pain (4 - 6)  sodium chloride 0.9% lock flush 10 milliLiter(s) IV Push every 1 hour PRN Pre/post blood products, medications, blood draw, and to maintain line patency  traMADol 25 milliGRAM(s) Oral every 8 hours PRN Mild Pain (1 - 3)        I&O's Summary    18 Apr 2025 07:01  -  19 Apr 2025 07:00  --------------------------------------------------------  IN: 1595 mL / OUT: 850 mL / NET: 745 mL    19 Apr 2025 07:01  -  19 Apr 2025 11:08  --------------------------------------------------------  IN: 225 mL / OUT: 300 mL / NET: -75 mL        PHYSICAL EXAM:  Vital Signs Last 24 Hrs  T(C): 36.9 (19 Apr 2025 09:01), Max: 36.9 (19 Apr 2025 09:01)  T(F): 98.4 (19 Apr 2025 09:01), Max: 98.4 (19 Apr 2025 09:01)  HR: 70 (19 Apr 2025 09:01) (64 - 75)  BP: 136/64 (19 Apr 2025 09:01) (133/55 - 156/83)  BP(mean): --  RR: 17 (19 Apr 2025 09:01) (17 - 18)  SpO2: 96% (19 Apr 2025 09:01) (94% - 96%)    Parameters below as of 19 Apr 2025 09:01  Patient On (Oxygen Delivery Method): room air      Constitutional: NAD, Resting  ENT: Supple, No JVD  Lungs: CTA B/L, Non-labored breathing, Right chest wall HD cath  Cardio: RRR, S1/S2, No murmur  Abdomen: Soft, Nontender, Nondistended; Bowel sounds present  Extremities: No calf tenderness, No pitting edema  Musculoskeletal:   No joint swelling  Psych: Calm, cooperative affect appropriate  Neuro: Awake and alert, oriented x 4  Skin: No rashes; no palpable lesions    LABS:                        9.2    7.40  )-----------( 222      ( 19 Apr 2025 05:33 )             27.8     04-19    138  |  103  |  48.7[H]  ----------------------------<  294[H]  5.2   |  22.0  |  4.98[H]    Ca    8.5      19 Apr 2025 05:33  Phos  4.7     04-19  Mg     2.1     04-19            Urinalysis Basic - ( 19 Apr 2025 05:33 )    Color: x / Appearance: x / SG: x / pH: x  Gluc: 294 mg/dL / Ketone: x  / Bili: x / Urobili: x   Blood: x / Protein: x / Nitrite: x   Leuk Esterase: x / RBC: x / WBC x   Sq Epi: x / Non Sq Epi: x / Bacteria: x        CAPILLARY BLOOD GLUCOSE      POCT Blood Glucose.: 240 mg/dL (19 Apr 2025 07:38)  POCT Blood Glucose.: 222 mg/dL (18 Apr 2025 21:00)  POCT Blood Glucose.: 237 mg/dL (18 Apr 2025 17:15)  POCT Blood Glucose.: 235 mg/dL (18 Apr 2025 12:11)        RADIOLOGY REVIEWED

## 2025-04-19 NOTE — PROGRESS NOTE ADULT - SUBJECTIVE AND OBJECTIVE BOX
[facility] DIVISION OF KIDNEY DISEASES AND HYPERTENSION -- PROGRESS NOTE    24 hour events/subjective:  Seen today  Doing well   BP is stable   Refused HD yesterday, continues to refuse today   COmplaining of pain in his eyes, and asking to get sleeping pill before hemodialysis     MEDICATIONS    Standing Inpatient Medications  acetaminophen     Tablet .. 975 milliGRAM(s) Oral every 8 hours  amLODIPine   Tablet 10 milliGRAM(s) Oral daily  artificial tears (preservative free) Ophthalmic Solution 2 Drop(s) Both EYES every 8 hours  atorvastatin 40 milliGRAM(s) Oral at bedtime  chlorhexidine 2% Cloths 1 Application(s) Topical <User Schedule>  chlorhexidine 4% Liquid 1 Application(s) Topical <User Schedule>  dextrose 5%. 1000 milliLiter(s) IV Continuous <Continuous>  dextrose 5%. 1000 milliLiter(s) IV Continuous <Continuous>  dextrose 50% Injectable 25 Gram(s) IV Push once  dextrose 50% Injectable 12.5 Gram(s) IV Push once  dextrose 50% Injectable 25 Gram(s) IV Push once  dolutegravir 50 milliGRAM(s) Oral daily  gabapentin 100 milliGRAM(s) Oral three times a day  glucagon  Injectable 1 milliGRAM(s) IntraMuscular once  insulin glargine Injectable (LANTUS) 10 Unit(s) SubCutaneous at bedtime  insulin lispro (ADMELOG) corrective regimen sliding scale   SubCutaneous three times a day before meals  ketorolac 0.5% Ophthalmic Solution 1 Drop(s) Both EYES four times a day  lamiVUDine- milliGRAM(s) Oral daily  lidocaine   4% Patch 1 Patch Transdermal every 24 hours  metoprolol tartrate 50 milliGRAM(s) Oral two times a day  prednisoLONE acetate 1% Suspension 1 Drop(s) Both EYES four times a day  sodium zirconium cyclosilicate 10 Gram(s) Oral once  trimethoprim/polymyxin Solution 1 Drop(s) Both EYES daily  valsartan 80 milliGRAM(s) Oral daily    PRN Inpatient Medications  aluminum hydroxide/magnesium hydroxide/simethicone Suspension 30 milliLiter(s) Oral every 4 hours PRN  dextrose Oral Gel 15 Gram(s) Oral once PRN  melatonin 3 milliGRAM(s) Oral at bedtime PRN  ondansetron Injectable 4 milliGRAM(s) IV Push every 8 hours PRN  oxyCODONE    IR 10 milliGRAM(s) Oral every 6 hours PRN  oxyCODONE    IR 5 milliGRAM(s) Oral every 6 hours PRN  sodium chloride 0.9% lock flush 10 milliLiter(s) IV Push every 1 hour PRN  traMADol 25 milliGRAM(s) Oral every 8 hours PRN      VITALS/PHYSICAL EXAM  --------------------------------------------------------------------------------  T(C): 36.9 (04-19-25 @ 09:01), Max: 36.9 (04-19-25 @ 09:01)  HR: 70 (04-19-25 @ 09:01) (64 - 75)  BP: 136/64 (04-19-25 @ 09:01) (133/55 - 156/83)  RR: 17 (04-19-25 @ 09:01) (17 - 18)  SpO2: 96% (04-19-25 @ 09:01) (94% - 96%)  Wt(kg): --        04-18-25 @ 07:01  -  04-19-25 @ 07:00  --------------------------------------------------------  IN: 1595 mL / OUT: 850 mL / NET: 745 mL    04-19-25 @ 07:01  -  04-19-25 @ 12:49  --------------------------------------------------------  IN: 450 mL / OUT: 700 mL / NET: -250 mL      Physical Exam:  Gen: NAD, well-appearing  HEENT: normal  Pulm: CTA B/L  CV: normal S1S2; no rub, no edema  Abd: soft, non-tender  MSK no deformities   Neuro: Alert and oriented x3     LABS/STUDIES  --------------------------------------------------------------------------------  138  |  103  |  48.7  ----------------------------<  294      [04-19-25 @ 05:33]  5.2   |  22.0  |  4.98        Ca     8.5     [04-19-25 @ 05:33]      Mg     2.1     [04-19-25 @ 05:33]      Phos  4.7     [04-19-25 @ 05:33]            Creatinine Trend:  SCr 4.98 [04-19 @ 05:33]  SCr 4.90 [04-18 @ 05:25]  SCr 4.33 [04-16 @ 05:08]  SCr 4.08 [04-15 @ 07:03]  SCr 3.30 [04-14 @ 21:53]

## 2025-04-20 LAB
ANION GAP SERPL CALC-SCNC: 15 MMOL/L — SIGNIFICANT CHANGE UP (ref 5–17)
BASOPHILS # BLD AUTO: 0.05 K/UL — SIGNIFICANT CHANGE UP (ref 0–0.2)
BASOPHILS NFR BLD AUTO: 0.6 % — SIGNIFICANT CHANGE UP (ref 0–2)
BUN SERPL-MCNC: 52.7 MG/DL — HIGH (ref 8–20)
CALCIUM SERPL-MCNC: 8.9 MG/DL — SIGNIFICANT CHANGE UP (ref 8.4–10.5)
CHLORIDE SERPL-SCNC: 103 MMOL/L — SIGNIFICANT CHANGE UP (ref 96–108)
CO2 SERPL-SCNC: 20 MMOL/L — LOW (ref 22–29)
CREAT SERPL-MCNC: 5.2 MG/DL — HIGH (ref 0.5–1.3)
EGFR: 12 ML/MIN/1.73M2 — LOW
EGFR: 12 ML/MIN/1.73M2 — LOW
EOSINOPHIL # BLD AUTO: 0.26 K/UL — SIGNIFICANT CHANGE UP (ref 0–0.5)
EOSINOPHIL NFR BLD AUTO: 2.9 % — SIGNIFICANT CHANGE UP (ref 0–6)
GLUCOSE BLDC GLUCOMTR-MCNC: 217 MG/DL — HIGH (ref 70–99)
GLUCOSE BLDC GLUCOMTR-MCNC: 239 MG/DL — HIGH (ref 70–99)
GLUCOSE BLDC GLUCOMTR-MCNC: 244 MG/DL — HIGH (ref 70–99)
GLUCOSE BLDC GLUCOMTR-MCNC: 253 MG/DL — HIGH (ref 70–99)
GLUCOSE SERPL-MCNC: 217 MG/DL — HIGH (ref 70–99)
HCT VFR BLD CALC: 32.1 % — LOW (ref 39–50)
HGB BLD-MCNC: 10 G/DL — LOW (ref 13–17)
IMM GRANULOCYTES # BLD AUTO: 0.05 K/UL — SIGNIFICANT CHANGE UP (ref 0–0.07)
IMM GRANULOCYTES NFR BLD AUTO: 0.6 % — SIGNIFICANT CHANGE UP (ref 0–0.9)
LYMPHOCYTES # BLD AUTO: 2.44 K/UL — SIGNIFICANT CHANGE UP (ref 1–3.3)
LYMPHOCYTES NFR BLD AUTO: 27.4 % — SIGNIFICANT CHANGE UP (ref 13–44)
MCHC RBC-ENTMCNC: 28.7 PG — SIGNIFICANT CHANGE UP (ref 27–34)
MCHC RBC-ENTMCNC: 31.2 G/DL — LOW (ref 32–36)
MCV RBC AUTO: 92.2 FL — SIGNIFICANT CHANGE UP (ref 80–100)
MONOCYTES # BLD AUTO: 0.71 K/UL — SIGNIFICANT CHANGE UP (ref 0–0.9)
MONOCYTES NFR BLD AUTO: 8 % — SIGNIFICANT CHANGE UP (ref 2–14)
NEUTROPHILS # BLD AUTO: 5.4 K/UL — SIGNIFICANT CHANGE UP (ref 1.8–7.4)
NEUTROPHILS NFR BLD AUTO: 60.5 % — SIGNIFICANT CHANGE UP (ref 43–77)
NRBC # BLD AUTO: 0 K/UL — SIGNIFICANT CHANGE UP (ref 0–0)
NRBC # FLD: 0 K/UL — SIGNIFICANT CHANGE UP (ref 0–0)
NRBC BLD AUTO-RTO: 0 /100 WBCS — SIGNIFICANT CHANGE UP (ref 0–0)
PLATELET # BLD AUTO: 256 K/UL — SIGNIFICANT CHANGE UP (ref 150–400)
PMV BLD: 9.1 FL — SIGNIFICANT CHANGE UP (ref 7–13)
POTASSIUM SERPL-MCNC: 5.2 MMOL/L — SIGNIFICANT CHANGE UP (ref 3.5–5.3)
POTASSIUM SERPL-SCNC: 5.2 MMOL/L — SIGNIFICANT CHANGE UP (ref 3.5–5.3)
RBC # BLD: 3.48 M/UL — LOW (ref 4.2–5.8)
RBC # FLD: 14.6 % — HIGH (ref 10.3–14.5)
SODIUM SERPL-SCNC: 138 MMOL/L — SIGNIFICANT CHANGE UP (ref 135–145)
WBC # BLD: 8.91 K/UL — SIGNIFICANT CHANGE UP (ref 3.8–10.5)
WBC # FLD AUTO: 8.91 K/UL — SIGNIFICANT CHANGE UP (ref 3.8–10.5)

## 2025-04-20 PROCEDURE — 99232 SBSQ HOSP IP/OBS MODERATE 35: CPT

## 2025-04-20 RX ORDER — EPOETIN ALFA 10000 [IU]/ML
4000 SOLUTION INTRAVENOUS; SUBCUTANEOUS
Refills: 0 | Status: DISCONTINUED | OUTPATIENT
Start: 2025-04-20 | End: 2025-04-20

## 2025-04-20 RX ORDER — SODIUM ZIRCONIUM CYCLOSILICATE 5 G/5G
10 POWDER, FOR SUSPENSION ORAL ONCE
Refills: 0 | Status: COMPLETED | OUTPATIENT
Start: 2025-04-20 | End: 2025-04-20

## 2025-04-20 RX ORDER — EPOETIN ALFA 10000 [IU]/ML
4000 SOLUTION INTRAVENOUS; SUBCUTANEOUS
Refills: 0 | Status: DISCONTINUED | OUTPATIENT
Start: 2025-04-20 | End: 2025-05-13

## 2025-04-20 RX ADMIN — INSULIN LISPRO 2: 100 INJECTION, SOLUTION INTRAVENOUS; SUBCUTANEOUS at 08:39

## 2025-04-20 RX ADMIN — KETOROLAC TROMETHAMINE 1 DROP(S): 5 SOLUTION/ DROPS OPHTHALMIC at 13:46

## 2025-04-20 RX ADMIN — Medication 1 APPLICATION(S): at 05:20

## 2025-04-20 RX ADMIN — Medication 975 MILLIGRAM(S): at 13:51

## 2025-04-20 RX ADMIN — Medication 2 DROP(S): at 05:20

## 2025-04-20 RX ADMIN — ATORVASTATIN CALCIUM 40 MILLIGRAM(S): 80 TABLET, FILM COATED ORAL at 21:31

## 2025-04-20 RX ADMIN — Medication 1 DROP(S): at 17:22

## 2025-04-20 RX ADMIN — Medication 975 MILLIGRAM(S): at 06:19

## 2025-04-20 RX ADMIN — Medication 1 DROP(S): at 05:23

## 2025-04-20 RX ADMIN — LAMIVUDINE 100 MILLIGRAM(S): 10 SOLUTION ORAL at 13:49

## 2025-04-20 RX ADMIN — Medication 975 MILLIGRAM(S): at 21:32

## 2025-04-20 RX ADMIN — Medication 975 MILLIGRAM(S): at 14:20

## 2025-04-20 RX ADMIN — KETOROLAC TROMETHAMINE 1 DROP(S): 5 SOLUTION/ DROPS OPHTHALMIC at 00:02

## 2025-04-20 RX ADMIN — OXYCODONE HYDROCHLORIDE 10 MILLIGRAM(S): 30 TABLET ORAL at 01:02

## 2025-04-20 RX ADMIN — INSULIN GLARGINE-YFGN 10 UNIT(S): 100 INJECTION, SOLUTION SUBCUTANEOUS at 22:21

## 2025-04-20 RX ADMIN — Medication 2 DROP(S): at 21:31

## 2025-04-20 RX ADMIN — OXYCODONE HYDROCHLORIDE 10 MILLIGRAM(S): 30 TABLET ORAL at 00:02

## 2025-04-20 RX ADMIN — DOLUTEGRAVIR SODIUM 50 MILLIGRAM(S): 5 TABLET, FOR SUSPENSION ORAL at 13:49

## 2025-04-20 RX ADMIN — Medication 1 DROP(S): at 00:02

## 2025-04-20 RX ADMIN — KETOROLAC TROMETHAMINE 1 DROP(S): 5 SOLUTION/ DROPS OPHTHALMIC at 17:22

## 2025-04-20 RX ADMIN — GABAPENTIN 100 MILLIGRAM(S): 400 CAPSULE ORAL at 13:51

## 2025-04-20 RX ADMIN — METOPROLOL SUCCINATE 50 MILLIGRAM(S): 50 TABLET, EXTENDED RELEASE ORAL at 05:22

## 2025-04-20 RX ADMIN — SODIUM ZIRCONIUM CYCLOSILICATE 10 GRAM(S): 5 POWDER, FOR SUSPENSION ORAL at 13:49

## 2025-04-20 RX ADMIN — GABAPENTIN 100 MILLIGRAM(S): 400 CAPSULE ORAL at 05:21

## 2025-04-20 RX ADMIN — AMLODIPINE BESYLATE 10 MILLIGRAM(S): 10 TABLET ORAL at 05:20

## 2025-04-20 RX ADMIN — INSULIN LISPRO 2: 100 INJECTION, SOLUTION INTRAVENOUS; SUBCUTANEOUS at 12:34

## 2025-04-20 RX ADMIN — LIDOCAINE HYDROCHLORIDE 1 PATCH: 20 JELLY TOPICAL at 21:31

## 2025-04-20 RX ADMIN — Medication 2 DROP(S): at 13:46

## 2025-04-20 RX ADMIN — GABAPENTIN 100 MILLIGRAM(S): 400 CAPSULE ORAL at 21:31

## 2025-04-20 RX ADMIN — POLYMYXIN B SULFATE AND TRIMETHOPRIM SULFATE 1 DROP(S): 10000; 1 SOLUTION/ DROPS OPHTHALMIC at 13:46

## 2025-04-20 RX ADMIN — Medication 1 APPLICATION(S): at 05:21

## 2025-04-20 RX ADMIN — Medication 1 DROP(S): at 13:46

## 2025-04-20 RX ADMIN — INSULIN LISPRO 3: 100 INJECTION, SOLUTION INTRAVENOUS; SUBCUTANEOUS at 18:12

## 2025-04-20 RX ADMIN — Medication 80 MILLIGRAM(S): at 05:23

## 2025-04-20 RX ADMIN — METOPROLOL SUCCINATE 50 MILLIGRAM(S): 50 TABLET, EXTENDED RELEASE ORAL at 18:12

## 2025-04-20 RX ADMIN — Medication 975 MILLIGRAM(S): at 05:19

## 2025-04-20 RX ADMIN — KETOROLAC TROMETHAMINE 1 DROP(S): 5 SOLUTION/ DROPS OPHTHALMIC at 05:22

## 2025-04-20 NOTE — PROGRESS NOTE ADULT - ASSESSMENT
63 YOM DM, HIV on HAART admitted after fall.  Nephrology consulted for progressive worsening CKD.    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  Placed on IV venofer. epo.  HTN: BP controlled- continue amlodipine, metoprolol, Hydralazine  HIV on Bikatrvy- meds changed as per ID  DM: discontinued metformin (low GFR), placed on Lantus + SSI ACHS    Plan   HD tomorrow 3 hrs 2k bath Goal UF 2L as tolerated by BP   Continue valsartan 80 mg daily   Start epogen 4000 IU tiw   Pending HD chair placement   Daily labs   Will follow

## 2025-04-20 NOTE — PROGRESS NOTE ADULT - SUBJECTIVE AND OBJECTIVE BOX
Hospitalist Daily Progress Note    Chief Complaint:  Patient is a 64y old  Male who presents with a chief complaint of syncope (13 Apr 2025 11:15)      SUBJECTIVE / OVERNIGHT EVENTS:  Patient was seen and examined at bedside. Reports to have no more eye pain. States to feel better today.   Patient denies chest pain, SOB, abd pain, N/V, fever, chills, dysuria or any other complaints. All remainder ROS negative.     MEDICATIONS  (STANDING):  acetaminophen     Tablet .. 975 milliGRAM(s) Oral every 8 hours  amLODIPine   Tablet 10 milliGRAM(s) Oral daily  artificial tears (preservative free) Ophthalmic Solution 2 Drop(s) Both EYES every 8 hours  atorvastatin 40 milliGRAM(s) Oral at bedtime  chlorhexidine 2% Cloths 1 Application(s) Topical <User Schedule>  chlorhexidine 4% Liquid 1 Application(s) Topical <User Schedule>  dextrose 5%. 1000 milliLiter(s) (100 mL/Hr) IV Continuous <Continuous>  dextrose 5%. 1000 milliLiter(s) (50 mL/Hr) IV Continuous <Continuous>  dextrose 50% Injectable 25 Gram(s) IV Push once  dextrose 50% Injectable 12.5 Gram(s) IV Push once  dextrose 50% Injectable 25 Gram(s) IV Push once  dolutegravir 50 milliGRAM(s) Oral daily  gabapentin 100 milliGRAM(s) Oral three times a day  glucagon  Injectable 1 milliGRAM(s) IntraMuscular once  insulin glargine Injectable (LANTUS) 10 Unit(s) SubCutaneous at bedtime  insulin lispro (ADMELOG) corrective regimen sliding scale   SubCutaneous three times a day before meals  ketorolac 0.5% Ophthalmic Solution 1 Drop(s) Both EYES four times a day  lamiVUDine- milliGRAM(s) Oral daily  lidocaine   4% Patch 1 Patch Transdermal every 24 hours  metoprolol tartrate 50 milliGRAM(s) Oral two times a day  prednisoLONE acetate 1% Suspension 1 Drop(s) Both EYES four times a day  sodium zirconium cyclosilicate 10 Gram(s) Oral once  trimethoprim/polymyxin Solution 1 Drop(s) Both EYES daily  valsartan 80 milliGRAM(s) Oral daily    MEDICATIONS  (PRN):  aluminum hydroxide/magnesium hydroxide/simethicone Suspension 30 milliLiter(s) Oral every 4 hours PRN Dyspepsia  dextrose Oral Gel 15 Gram(s) Oral once PRN Blood Glucose LESS THAN 70 milliGRAM(s)/deciliter  melatonin 3 milliGRAM(s) Oral at bedtime PRN Insomnia  ondansetron Injectable 4 milliGRAM(s) IV Push every 8 hours PRN Nausea and/or Vomiting  oxyCODONE    IR 10 milliGRAM(s) Oral every 6 hours PRN Severe Pain (7 - 10)  oxyCODONE    IR 5 milliGRAM(s) Oral every 6 hours PRN Moderate Pain (4 - 6)  sodium chloride 0.9% lock flush 10 milliLiter(s) IV Push every 1 hour PRN Pre/post blood products, medications, blood draw, and to maintain line patency  traMADol 25 milliGRAM(s) Oral every 8 hours PRN Mild Pain (1 - 3)        I&O's Summary    19 Apr 2025 07:01  -  20 Apr 2025 07:00  --------------------------------------------------------  IN: 915 mL / OUT: 700 mL / NET: 215 mL    20 Apr 2025 07:01  -  20 Apr 2025 10:02  --------------------------------------------------------  IN: 225 mL / OUT: 0 mL / NET: 225 mL        PHYSICAL EXAM:  Vital Signs Last 24 Hrs  T(C): 36.6 (20 Apr 2025 07:41), Max: 36.9 (19 Apr 2025 22:09)  T(F): 97.9 (20 Apr 2025 07:41), Max: 98.4 (19 Apr 2025 22:09)  HR: 77 (20 Apr 2025 07:41) (74 - 796)  BP: 121/62 (20 Apr 2025 07:41) (121/62 - 166/77)  BP(mean): --  RR: 18 (20 Apr 2025 07:41) (17 - 18)  SpO2: 97% (20 Apr 2025 07:41) (94% - 97%)    Parameters below as of 20 Apr 2025 07:41  Patient On (Oxygen Delivery Method): room air    Constitutional: NAD, Resting  ENT: Supple, No JVD  Lungs: CTA B/L, Non-labored breathing, Right chest wall HD cath  Cardio: RRR, S1/S2, No murmur  Abdomen: Soft, Nontender, Nondistended; Bowel sounds present  Extremities: No calf tenderness, No pitting edema  Musculoskeletal:   No joint swelling  Psych: Calm, cooperative affect appropriate  Neuro: Awake and alert, oriented x 4  Skin: No rashes; no palpable lesions      LABS:                        10.0   8.91  )-----------( 256      ( 20 Apr 2025 08:40 )             32.1     04-20    138  |  103  |  52.7[H]  ----------------------------<  217[H]  5.2   |  20.0[L]  |  5.20[H]    Ca    8.9      20 Apr 2025 08:40  Phos  4.7     04-19  Mg     2.1     04-19            Urinalysis Basic - ( 20 Apr 2025 08:40 )    Color: x / Appearance: x / SG: x / pH: x  Gluc: 217 mg/dL / Ketone: x  / Bili: x / Urobili: x   Blood: x / Protein: x / Nitrite: x   Leuk Esterase: x / RBC: x / WBC x   Sq Epi: x / Non Sq Epi: x / Bacteria: x        CAPILLARY BLOOD GLUCOSE      POCT Blood Glucose.: 244 mg/dL (20 Apr 2025 08:27)  POCT Blood Glucose.: 204 mg/dL (19 Apr 2025 22:14)  POCT Blood Glucose.: 208 mg/dL (19 Apr 2025 17:26)  POCT Blood Glucose.: 241 mg/dL (19 Apr 2025 11:46)        RADIOLOGY REVIEWED
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
## 2025-04-20 NOTE — PROGRESS NOTE ADULT - SUBJECTIVE AND OBJECTIVE BOX
[facility] DIVISION OF KIDNEY DISEASES AND HYPERTENSION -- PROGRESS NOTE    24 hour events/subjective:  Seen today   Doing well   BP is stable   His pain is better controlled   Agreeable for for hemodialysis for tomorrow     MEDICATIONS    Standing Inpatient Medications  acetaminophen     Tablet .. 975 milliGRAM(s) Oral every 8 hours  amLODIPine   Tablet 10 milliGRAM(s) Oral daily  artificial tears (preservative free) Ophthalmic Solution 2 Drop(s) Both EYES every 8 hours  atorvastatin 40 milliGRAM(s) Oral at bedtime  chlorhexidine 2% Cloths 1 Application(s) Topical <User Schedule>  chlorhexidine 4% Liquid 1 Application(s) Topical <User Schedule>  dextrose 5%. 1000 milliLiter(s) IV Continuous <Continuous>  dextrose 5%. 1000 milliLiter(s) IV Continuous <Continuous>  dextrose 50% Injectable 25 Gram(s) IV Push once  dextrose 50% Injectable 12.5 Gram(s) IV Push once  dextrose 50% Injectable 25 Gram(s) IV Push once  dolutegravir 50 milliGRAM(s) Oral daily  gabapentin 100 milliGRAM(s) Oral three times a day  glucagon  Injectable 1 milliGRAM(s) IntraMuscular once  insulin glargine Injectable (LANTUS) 10 Unit(s) SubCutaneous at bedtime  insulin lispro (ADMELOG) corrective regimen sliding scale   SubCutaneous three times a day before meals  ketorolac 0.5% Ophthalmic Solution 1 Drop(s) Both EYES four times a day  lamiVUDine- milliGRAM(s) Oral daily  lidocaine   4% Patch 1 Patch Transdermal every 24 hours  metoprolol tartrate 50 milliGRAM(s) Oral two times a day  prednisoLONE acetate 1% Suspension 1 Drop(s) Both EYES four times a day  sodium zirconium cyclosilicate 10 Gram(s) Oral once  trimethoprim/polymyxin Solution 1 Drop(s) Both EYES daily  valsartan 80 milliGRAM(s) Oral daily    PRN Inpatient Medications  aluminum hydroxide/magnesium hydroxide/simethicone Suspension 30 milliLiter(s) Oral every 4 hours PRN  dextrose Oral Gel 15 Gram(s) Oral once PRN  melatonin 3 milliGRAM(s) Oral at bedtime PRN  ondansetron Injectable 4 milliGRAM(s) IV Push every 8 hours PRN  oxyCODONE    IR 10 milliGRAM(s) Oral every 6 hours PRN  oxyCODONE    IR 5 milliGRAM(s) Oral every 6 hours PRN  sodium chloride 0.9% lock flush 10 milliLiter(s) IV Push every 1 hour PRN  traMADol 25 milliGRAM(s) Oral every 8 hours PRN      VITALS/PHYSICAL EXAM  --------------------------------------------------------------------------------  T(C): 36.6 (04-20-25 @ 07:41), Max: 36.9 (04-19-25 @ 22:09)  HR: 77 (04-20-25 @ 07:41) (74 - 796)  BP: 121/62 (04-20-25 @ 07:41) (121/62 - 166/77)  RR: 18 (04-20-25 @ 07:41) (17 - 18)  SpO2: 97% (04-20-25 @ 07:41) (94% - 97%)  Wt(kg): --        04-19-25 @ 07:01  -  04-20-25 @ 07:00  --------------------------------------------------------  IN: 915 mL / OUT: 700 mL / NET: 215 mL    04-20-25 @ 07:01  -  04-20-25 @ 13:30  --------------------------------------------------------  IN: 225 mL / OUT: 0 mL / NET: 225 mL      Physical Exam:  Gen: NAD, well-appearing  HEENT: normal  Pulm: CTA B/L  CV: normal S1S2; no rub, no edema  Abd: soft, non-tender  MSK no deformities   Neuro: Alert and oriented x3     LABS/STUDIES  --------------------------------------------------------------------------------  138  |  103  |  52.7  ----------------------------<  217      [04-20-25 @ 08:40]  5.2   |  20.0  |  5.20        Ca     8.9     [04-20-25 @ 08:40]      Mg     2.1     [04-19-25 @ 05:33]      Phos  4.7     [04-19-25 @ 05:33]            Creatinine Trend:  SCr 5.20 [04-20 @ 08:40]  SCr 4.98 [04-19 @ 05:33]  SCr 4.90 [04-18 @ 05:25]  SCr 4.33 [04-16 @ 05:08]  SCr 4.08 [04-15 @ 07:03]

## 2025-04-21 DIAGNOSIS — B20 HUMAN IMMUNODEFICIENCY VIRUS [HIV] DISEASE: ICD-10-CM

## 2025-04-21 DIAGNOSIS — I10 ESSENTIAL (PRIMARY) HYPERTENSION: ICD-10-CM

## 2025-04-21 DIAGNOSIS — H26.9 UNSPECIFIED CATARACT: ICD-10-CM

## 2025-04-21 DIAGNOSIS — E78.5 HYPERLIPIDEMIA, UNSPECIFIED: ICD-10-CM

## 2025-04-21 DIAGNOSIS — S09.90XA UNSPECIFIED INJURY OF HEAD, INITIAL ENCOUNTER: ICD-10-CM

## 2025-04-21 DIAGNOSIS — N18.6 END STAGE RENAL DISEASE: ICD-10-CM

## 2025-04-21 LAB
ANION GAP SERPL CALC-SCNC: 12 MMOL/L — SIGNIFICANT CHANGE UP (ref 5–17)
BASOPHILS # BLD AUTO: 0.04 K/UL — SIGNIFICANT CHANGE UP (ref 0–0.2)
BASOPHILS NFR BLD AUTO: 0.5 % — SIGNIFICANT CHANGE UP (ref 0–2)
BUN SERPL-MCNC: 61 MG/DL — HIGH (ref 8–20)
CALCIUM SERPL-MCNC: 8.6 MG/DL — SIGNIFICANT CHANGE UP (ref 8.4–10.5)
CHLORIDE SERPL-SCNC: 104 MMOL/L — SIGNIFICANT CHANGE UP (ref 96–108)
CO2 SERPL-SCNC: 22 MMOL/L — SIGNIFICANT CHANGE UP (ref 22–29)
CREAT SERPL-MCNC: 5.39 MG/DL — HIGH (ref 0.5–1.3)
EGFR: 11 ML/MIN/1.73M2 — LOW
EGFR: 11 ML/MIN/1.73M2 — LOW
EOSINOPHIL # BLD AUTO: 0.25 K/UL — SIGNIFICANT CHANGE UP (ref 0–0.5)
EOSINOPHIL NFR BLD AUTO: 3.2 % — SIGNIFICANT CHANGE UP (ref 0–6)
GLUCOSE BLDC GLUCOMTR-MCNC: 170 MG/DL — HIGH (ref 70–99)
GLUCOSE BLDC GLUCOMTR-MCNC: 180 MG/DL — HIGH (ref 70–99)
GLUCOSE BLDC GLUCOMTR-MCNC: 216 MG/DL — HIGH (ref 70–99)
GLUCOSE BLDC GLUCOMTR-MCNC: 216 MG/DL — HIGH (ref 70–99)
GLUCOSE SERPL-MCNC: 149 MG/DL — HIGH (ref 70–99)
HCT VFR BLD CALC: 29.2 % — LOW (ref 39–50)
HGB BLD-MCNC: 9.4 G/DL — LOW (ref 13–17)
IMM GRANULOCYTES # BLD AUTO: 0.05 K/UL — SIGNIFICANT CHANGE UP (ref 0–0.07)
IMM GRANULOCYTES NFR BLD AUTO: 0.6 % — SIGNIFICANT CHANGE UP (ref 0–0.9)
LYMPHOCYTES # BLD AUTO: 2.2 K/UL — SIGNIFICANT CHANGE UP (ref 1–3.3)
LYMPHOCYTES NFR BLD AUTO: 27.7 % — SIGNIFICANT CHANGE UP (ref 13–44)
MCHC RBC-ENTMCNC: 29 PG — SIGNIFICANT CHANGE UP (ref 27–34)
MCHC RBC-ENTMCNC: 32.2 G/DL — SIGNIFICANT CHANGE UP (ref 32–36)
MCV RBC AUTO: 90.1 FL — SIGNIFICANT CHANGE UP (ref 80–100)
MONOCYTES # BLD AUTO: 0.68 K/UL — SIGNIFICANT CHANGE UP (ref 0–0.9)
MONOCYTES NFR BLD AUTO: 8.6 % — SIGNIFICANT CHANGE UP (ref 2–14)
NEUTROPHILS # BLD AUTO: 4.71 K/UL — SIGNIFICANT CHANGE UP (ref 1.8–7.4)
NEUTROPHILS NFR BLD AUTO: 59.4 % — SIGNIFICANT CHANGE UP (ref 43–77)
NRBC # BLD AUTO: 0 K/UL — SIGNIFICANT CHANGE UP (ref 0–0)
NRBC # FLD: 0 K/UL — SIGNIFICANT CHANGE UP (ref 0–0)
NRBC BLD AUTO-RTO: 0 /100 WBCS — SIGNIFICANT CHANGE UP (ref 0–0)
PLATELET # BLD AUTO: 264 K/UL — SIGNIFICANT CHANGE UP (ref 150–400)
PMV BLD: 9.4 FL — SIGNIFICANT CHANGE UP (ref 7–13)
POTASSIUM SERPL-MCNC: 5.6 MMOL/L — HIGH (ref 3.5–5.3)
POTASSIUM SERPL-MCNC: 5.9 MMOL/L — HIGH (ref 3.5–5.3)
POTASSIUM SERPL-SCNC: 5.6 MMOL/L — HIGH (ref 3.5–5.3)
POTASSIUM SERPL-SCNC: 5.9 MMOL/L — HIGH (ref 3.5–5.3)
RBC # BLD: 3.24 M/UL — LOW (ref 4.2–5.8)
RBC # FLD: 14.4 % — SIGNIFICANT CHANGE UP (ref 10.3–14.5)
SODIUM SERPL-SCNC: 138 MMOL/L — SIGNIFICANT CHANGE UP (ref 135–145)
WBC # BLD: 7.93 K/UL — SIGNIFICANT CHANGE UP (ref 3.8–10.5)
WBC # FLD AUTO: 7.93 K/UL — SIGNIFICANT CHANGE UP (ref 3.8–10.5)

## 2025-04-21 PROCEDURE — 99233 SBSQ HOSP IP/OBS HIGH 50: CPT

## 2025-04-21 PROCEDURE — 99232 SBSQ HOSP IP/OBS MODERATE 35: CPT

## 2025-04-21 RX ORDER — ACETAMINOPHEN 500 MG/5ML
650 LIQUID (ML) ORAL ONCE
Refills: 0 | Status: COMPLETED | OUTPATIENT
Start: 2025-04-21 | End: 2025-04-21

## 2025-04-21 RX ORDER — SODIUM ZIRCONIUM CYCLOSILICATE 5 G/5G
5 POWDER, FOR SUSPENSION ORAL ONCE
Refills: 0 | Status: COMPLETED | OUTPATIENT
Start: 2025-04-21 | End: 2025-04-21

## 2025-04-21 RX ADMIN — INSULIN LISPRO 1: 100 INJECTION, SOLUTION INTRAVENOUS; SUBCUTANEOUS at 08:37

## 2025-04-21 RX ADMIN — KETOROLAC TROMETHAMINE 1 DROP(S): 5 SOLUTION/ DROPS OPHTHALMIC at 10:48

## 2025-04-21 RX ADMIN — METOPROLOL SUCCINATE 50 MILLIGRAM(S): 50 TABLET, EXTENDED RELEASE ORAL at 05:48

## 2025-04-21 RX ADMIN — Medication 2 DROP(S): at 21:16

## 2025-04-21 RX ADMIN — GABAPENTIN 100 MILLIGRAM(S): 400 CAPSULE ORAL at 22:27

## 2025-04-21 RX ADMIN — OXYCODONE HYDROCHLORIDE 10 MILLIGRAM(S): 30 TABLET ORAL at 11:49

## 2025-04-21 RX ADMIN — Medication 2 DROP(S): at 05:48

## 2025-04-21 RX ADMIN — AMLODIPINE BESYLATE 10 MILLIGRAM(S): 10 TABLET ORAL at 05:48

## 2025-04-21 RX ADMIN — SODIUM ZIRCONIUM CYCLOSILICATE 5 GRAM(S): 5 POWDER, FOR SUSPENSION ORAL at 22:27

## 2025-04-21 RX ADMIN — KETOROLAC TROMETHAMINE 1 DROP(S): 5 SOLUTION/ DROPS OPHTHALMIC at 17:26

## 2025-04-21 RX ADMIN — Medication 1 DROP(S): at 00:10

## 2025-04-21 RX ADMIN — Medication 1 DROP(S): at 06:02

## 2025-04-21 RX ADMIN — INSULIN LISPRO 2: 100 INJECTION, SOLUTION INTRAVENOUS; SUBCUTANEOUS at 17:23

## 2025-04-21 RX ADMIN — LIDOCAINE HYDROCHLORIDE 1 PATCH: 20 JELLY TOPICAL at 22:28

## 2025-04-21 RX ADMIN — Medication 80 MILLIGRAM(S): at 05:48

## 2025-04-21 RX ADMIN — LIDOCAINE HYDROCHLORIDE 1 PATCH: 20 JELLY TOPICAL at 07:30

## 2025-04-21 RX ADMIN — Medication 975 MILLIGRAM(S): at 15:41

## 2025-04-21 RX ADMIN — METOPROLOL SUCCINATE 50 MILLIGRAM(S): 50 TABLET, EXTENDED RELEASE ORAL at 17:23

## 2025-04-21 RX ADMIN — KETOROLAC TROMETHAMINE 1 DROP(S): 5 SOLUTION/ DROPS OPHTHALMIC at 05:50

## 2025-04-21 RX ADMIN — DOLUTEGRAVIR SODIUM 50 MILLIGRAM(S): 5 TABLET, FOR SUSPENSION ORAL at 10:48

## 2025-04-21 RX ADMIN — LAMIVUDINE 100 MILLIGRAM(S): 10 SOLUTION ORAL at 10:49

## 2025-04-21 RX ADMIN — LIDOCAINE HYDROCHLORIDE 1 PATCH: 20 JELLY TOPICAL at 10:31

## 2025-04-21 RX ADMIN — Medication 1 DROP(S): at 17:24

## 2025-04-21 RX ADMIN — GABAPENTIN 100 MILLIGRAM(S): 400 CAPSULE ORAL at 14:41

## 2025-04-21 RX ADMIN — POLYMYXIN B SULFATE AND TRIMETHOPRIM SULFATE 1 DROP(S): 10000; 1 SOLUTION/ DROPS OPHTHALMIC at 10:48

## 2025-04-21 RX ADMIN — Medication 1 APPLICATION(S): at 06:02

## 2025-04-21 RX ADMIN — Medication 2 DROP(S): at 14:44

## 2025-04-21 RX ADMIN — KETOROLAC TROMETHAMINE 1 DROP(S): 5 SOLUTION/ DROPS OPHTHALMIC at 00:10

## 2025-04-21 RX ADMIN — INSULIN GLARGINE-YFGN 10 UNIT(S): 100 INJECTION, SOLUTION SUBCUTANEOUS at 22:29

## 2025-04-21 RX ADMIN — OXYCODONE HYDROCHLORIDE 10 MILLIGRAM(S): 30 TABLET ORAL at 21:16

## 2025-04-21 RX ADMIN — Medication 1 DROP(S): at 10:48

## 2025-04-21 RX ADMIN — INSULIN LISPRO 2: 100 INJECTION, SOLUTION INTRAVENOUS; SUBCUTANEOUS at 12:16

## 2025-04-21 RX ADMIN — Medication 975 MILLIGRAM(S): at 14:41

## 2025-04-21 RX ADMIN — ATORVASTATIN CALCIUM 40 MILLIGRAM(S): 80 TABLET, FILM COATED ORAL at 21:17

## 2025-04-21 RX ADMIN — SODIUM ZIRCONIUM CYCLOSILICATE 5 GRAM(S): 5 POWDER, FOR SUSPENSION ORAL at 10:48

## 2025-04-21 RX ADMIN — OXYCODONE HYDROCHLORIDE 10 MILLIGRAM(S): 30 TABLET ORAL at 10:49

## 2025-04-21 RX ADMIN — OXYCODONE HYDROCHLORIDE 5 MILLIGRAM(S): 30 TABLET ORAL at 03:03

## 2025-04-21 RX ADMIN — GABAPENTIN 100 MILLIGRAM(S): 400 CAPSULE ORAL at 05:57

## 2025-04-21 RX ADMIN — Medication 975 MILLIGRAM(S): at 05:48
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
## 2025-04-21 NOTE — PROGRESS NOTE ADULT - ASSESSMENT
1. CKD stage V from biopsy proven diabetic nephropathy and podopathy, initiated on HD during this hospitalization. hyperkalemic. refused HD on sat. d/ww patient about risks of refusing HD. patient agreeable for HD. dispo pending outpatient HD spot. SW working on placement.    2. HIV- VL > 300 k  3. Anemia: CKD and RITESH. on IV venofer. epo.  4. HTN: BP overall controlled

## 2025-04-21 NOTE — PROGRESS NOTE ADULT - SUBJECTIVE AND OBJECTIVE BOX
< from: TTE W or WO Ultrasound Enhancing Agent (03.30.25 @ 15:33) >     CONCLUSIONS:      1. Left ventricular systolic function is low normal with an ejection fraction visually estimated at 50 to 55 %.   2. Normal right ventricular cavity size and normal right ventricular systolic function.   3. No pericardial effusion seen.   4. No prior echocardiogram is available for comparison.    < end of copied text >  < from: US Renal (04.02.25 @ 10:43) >  IMPRESSION:  Minimally increased renal cortical echotexture, without obstructive   uropathy, c/w medical renal disease.    --- End of Report ---    < end of copied text >  < from: CT Head No Cont (03.30.25 @ 10:09) >  IMPRESSION:    No CT evidence of acute traumatic injury to the head and cervical spine.    < end of copied text >  < from: CT Heart Calcium Score (04.01.25 @ 10:02) >  IMPRESSION:    1.  The calculated Agatston score is 13.    < end of copied text >       Patient is a 64y old  Male who presents with a chief complaint of syncope (13 Apr 2025 11:15)      pt denies cp,sob,eyes pain  REVIEW OF SYSTEMS: All systems are reviewed and found to be negative except above    MEDICATIONS  (STANDING):  acetaminophen     Tablet .. 975 milliGRAM(s) Oral every 8 hours  amLODIPine   Tablet 10 milliGRAM(s) Oral daily  artificial tears (preservative free) Ophthalmic Solution 2 Drop(s) Both EYES every 8 hours  atorvastatin 40 milliGRAM(s) Oral at bedtime  chlorhexidine 2% Cloths 1 Application(s) Topical <User Schedule>  chlorhexidine 4% Liquid 1 Application(s) Topical <User Schedule>  dextrose 5%. 1000 milliLiter(s) (50 mL/Hr) IV Continuous <Continuous>  dextrose 5%. 1000 milliLiter(s) (100 mL/Hr) IV Continuous <Continuous>  dextrose 50% Injectable 25 Gram(s) IV Push once  dextrose 50% Injectable 12.5 Gram(s) IV Push once  dextrose 50% Injectable 25 Gram(s) IV Push once  dolutegravir 50 milliGRAM(s) Oral daily  epoetin landry-epbx (RETACRIT) Injectable 4000 Unit(s) IV Push <User Schedule>  gabapentin 100 milliGRAM(s) Oral three times a day  glucagon  Injectable 1 milliGRAM(s) IntraMuscular once  insulin glargine Injectable (LANTUS) 10 Unit(s) SubCutaneous at bedtime  insulin lispro (ADMELOG) corrective regimen sliding scale   SubCutaneous three times a day before meals  ketorolac 0.5% Ophthalmic Solution 1 Drop(s) Both EYES four times a day  lamiVUDine- milliGRAM(s) Oral daily  lidocaine   4% Patch 1 Patch Transdermal every 24 hours  metoprolol tartrate 50 milliGRAM(s) Oral two times a day  prednisoLONE acetate 1% Suspension 1 Drop(s) Both EYES four times a day  trimethoprim/polymyxin Solution 1 Drop(s) Both EYES daily  valsartan 80 milliGRAM(s) Oral daily    MEDICATIONS  (PRN):  aluminum hydroxide/magnesium hydroxide/simethicone Suspension 30 milliLiter(s) Oral every 4 hours PRN Dyspepsia  dextrose Oral Gel 15 Gram(s) Oral once PRN Blood Glucose LESS THAN 70 milliGRAM(s)/deciliter  melatonin 3 milliGRAM(s) Oral at bedtime PRN Insomnia  ondansetron Injectable 4 milliGRAM(s) IV Push every 8 hours PRN Nausea and/or Vomiting  oxyCODONE    IR 10 milliGRAM(s) Oral every 6 hours PRN Severe Pain (7 - 10)  oxyCODONE    IR 5 milliGRAM(s) Oral every 6 hours PRN Moderate Pain (4 - 6)  sodium chloride 0.9% lock flush 10 milliLiter(s) IV Push every 1 hour PRN Pre/post blood products, medications, blood draw, and to maintain line patency  traMADol 25 milliGRAM(s) Oral every 8 hours PRN Mild Pain (1 - 3)      CAPILLARY BLOOD GLUCOSE      POCT Blood Glucose.: 216 mg/dL (21 Apr 2025 11:56)  POCT Blood Glucose.: 170 mg/dL (21 Apr 2025 07:44)  POCT Blood Glucose.: 217 mg/dL (20 Apr 2025 21:27)  POCT Blood Glucose.: 253 mg/dL (20 Apr 2025 17:28)    I&O's Summary    20 Apr 2025 07:01  -  21 Apr 2025 07:00  --------------------------------------------------------  IN: 1100 mL / OUT: 300 mL / NET: 800 mL        PHYSICAL EXAM:  Vital Signs Last 24 Hrs  T(C): 36.9 (21 Apr 2025 08:08), Max: 37 (20 Apr 2025 23:36)  T(F): 98.5 (21 Apr 2025 08:08), Max: 98.6 (20 Apr 2025 23:36)  HR: 73 (21 Apr 2025 08:08) (65 - 82)  BP: 149/81 (21 Apr 2025 08:08) (132/62 - 164/75)  BP(mean): --  RR: 18 (21 Apr 2025 08:08) (16 - 19)  SpO2: 97% (21 Apr 2025 08:08) (97% - 99%)    Parameters below as of 21 Apr 2025 08:08  Patient On (Oxygen Delivery Method): room air        CONSTITUTIONAL: NAD,  EYES: PERRLA; conjunctiva and sclera clear  ENMT: Moist oral mucosa,   RESPIRATORY: Normal respiratory effort; lungs are clear to auscultation bilaterally  CARDIOVASCULAR: Regular rate and rhythm, normal S1 and S2, no murmur   EXTS: No lower extremity edema; Peripheral pulses are 2+ bilaterally  ABDOMEN: Nontender to palpation, normoactive bowel sounds, no rebound/guarding;   MUSCLOSKELETAL:   no joint swelling or tenderness to palpation  PSYCH: calm  NEUROLOGY: A+O to person, place, and time; CN 2-12 are intact and symmetric; no gross sensory deficits;       LABS:                        9.4    7.93  )-----------( 264      ( 21 Apr 2025 05:40 )             29.2     04-21    138  |  104  |  61.0[H]  ----------------------------<  149[H]  5.9[H]   |  22.0  |  5.39[H]    Ca    8.6      21 Apr 2025 05:40            Urinalysis Basic - ( 21 Apr 2025 05:40 )    Color: x / Appearance: x / SG: x / pH: x  Gluc: 149 mg/dL / Ketone: x  / Bili: x / Urobili: x   Blood: x / Protein: x / Nitrite: x   Leuk Esterase: x / RBC: x / WBC x   Sq Epi: x / Non Sq Epi: x / Bacteria: x          RADIOLOGY & ADDITIONAL TESTS:  Results Reviewed:   Imaging Personally Reviewed:           from: TTE W or WO Ultrasound Enhancing Agent (03.30.25 @ 15:33) >     CONCLUSIONS:      1. Left ventricular systolic function is low normal with an ejection fraction visually estimated at 50 to 55 %.   2. Normal right ventricular cavity size and normal right ventricular systolic function.   3. No pericardial effusion seen.   4. No prior echocardiogram is available for comparison.    < end of copied text >  < from: US Renal (04.02.25 @ 10:43) >  IMPRESSION:  Minimally increased renal cortical echotexture, without obstructive   uropathy, c/w medical renal disease.    --- End of Report ---    < end of copied text >  < from: CT Head No Cont (03.30.25 @ 10:09) >  IMPRESSION:    No CT evidence of acute traumatic injury to the head and cervical spine.    < end of copied text >  < from: CT Heart Calcium Score (04.01.25 @ 10:02) >  IMPRESSION:    1.  The calculated Agatston score is 13.    < end of copied text >

## 2025-04-21 NOTE — PROGRESS NOTE ADULT - SUBJECTIVE AND OBJECTIVE BOX
[facility] DIVISION OF KIDNEY DISEASES AND HYPERTENSION -- PROGRESS NOTE    SUBJECTIVE:   complains of headache. initially refusing HD    MEDICATIONS    Standing Inpatient Medications  acetaminophen     Tablet .. 975 milliGRAM(s) Oral every 8 hours  amLODIPine   Tablet 10 milliGRAM(s) Oral daily  artificial tears (preservative free) Ophthalmic Solution 2 Drop(s) Both EYES every 8 hours  atorvastatin 40 milliGRAM(s) Oral at bedtime  chlorhexidine 2% Cloths 1 Application(s) Topical <User Schedule>  chlorhexidine 4% Liquid 1 Application(s) Topical <User Schedule>  dextrose 5%. 1000 milliLiter(s) IV Continuous <Continuous>  dextrose 5%. 1000 milliLiter(s) IV Continuous <Continuous>  dextrose 50% Injectable 25 Gram(s) IV Push once  dextrose 50% Injectable 12.5 Gram(s) IV Push once  dextrose 50% Injectable 25 Gram(s) IV Push once  dolutegravir 50 milliGRAM(s) Oral daily  epoetin landry-epbx (RETACRIT) Injectable 4000 Unit(s) IV Push <User Schedule>  gabapentin 100 milliGRAM(s) Oral three times a day  glucagon  Injectable 1 milliGRAM(s) IntraMuscular once  insulin glargine Injectable (LANTUS) 10 Unit(s) SubCutaneous at bedtime  insulin lispro (ADMELOG) corrective regimen sliding scale   SubCutaneous three times a day before meals  ketorolac 0.5% Ophthalmic Solution 1 Drop(s) Both EYES four times a day  lamiVUDine- milliGRAM(s) Oral daily  lidocaine   4% Patch 1 Patch Transdermal every 24 hours  metoprolol tartrate 50 milliGRAM(s) Oral two times a day  prednisoLONE acetate 1% Suspension 1 Drop(s) Both EYES four times a day  trimethoprim/polymyxin Solution 1 Drop(s) Both EYES daily  valsartan 80 milliGRAM(s) Oral daily    PRN Inpatient Medications  aluminum hydroxide/magnesium hydroxide/simethicone Suspension 30 milliLiter(s) Oral every 4 hours PRN  dextrose Oral Gel 15 Gram(s) Oral once PRN  melatonin 3 milliGRAM(s) Oral at bedtime PRN  ondansetron Injectable 4 milliGRAM(s) IV Push every 8 hours PRN  oxyCODONE    IR 10 milliGRAM(s) Oral every 6 hours PRN  oxyCODONE    IR 5 milliGRAM(s) Oral every 6 hours PRN  sodium chloride 0.9% lock flush 10 milliLiter(s) IV Push every 1 hour PRN  traMADol 25 milliGRAM(s) Oral every 8 hours PRN      VITALS/PHYSICAL EXAM  --------------------------------------------------------------------------------  T(C): 36.9 (04-21-25 @ 08:08), Max: 37 (04-20-25 @ 23:36)  HR: 73 (04-21-25 @ 08:08) (65 - 82)  BP: 149/81 (04-21-25 @ 08:08) (132/62 - 164/75)  RR: 18 (04-21-25 @ 08:08) (16 - 19)  SpO2: 97% (04-21-25 @ 08:08) (97% - 99%)  Wt(kg): --        04-20-25 @ 07:01  -  04-21-25 @ 07:00  --------------------------------------------------------  IN: 1100 mL / OUT: 300 mL / NET: 800 mL      Physical Exam:  Alert and oriented x3   HEENT: normal  Pulm: CTA B/L  CV: normal S1S2; no murmur  Abd: soft, non-tender  Extremities- no edema    LABS/STUDIES  --------------------------------------------------------------------------------  x   |  x   |  x   ----------------------------<  x       [04-21-25 @ 12:27]  5.6   |  x   |  x         Ca     8.6     [04-21-25 @ 05:40]            Creatinine Trend:  SCr 5.39 [04-21 @ 05:40]  SCr 5.20 [04-20 @ 08:40]  SCr 4.98 [04-19 @ 05:33]  SCr 4.90 [04-18 @ 05:25]  SCr 4.33 [04-16 @ 05:08]

## 2025-04-22 LAB
ANION GAP SERPL CALC-SCNC: 14 MMOL/L — SIGNIFICANT CHANGE UP (ref 5–17)
BUN SERPL-MCNC: 66.9 MG/DL — HIGH (ref 8–20)
CALCIUM SERPL-MCNC: 8.5 MG/DL — SIGNIFICANT CHANGE UP (ref 8.4–10.5)
CHLORIDE SERPL-SCNC: 107 MMOL/L — SIGNIFICANT CHANGE UP (ref 96–108)
CO2 SERPL-SCNC: 22 MMOL/L — SIGNIFICANT CHANGE UP (ref 22–29)
CREAT SERPL-MCNC: 5.79 MG/DL — HIGH (ref 0.5–1.3)
EGFR: 10 ML/MIN/1.73M2 — LOW
EGFR: 10 ML/MIN/1.73M2 — LOW
GLUCOSE BLDC GLUCOMTR-MCNC: 100 MG/DL — HIGH (ref 70–99)
GLUCOSE BLDC GLUCOMTR-MCNC: 105 MG/DL — HIGH (ref 70–99)
GLUCOSE BLDC GLUCOMTR-MCNC: 196 MG/DL — HIGH (ref 70–99)
GLUCOSE BLDC GLUCOMTR-MCNC: 230 MG/DL — HIGH (ref 70–99)
GLUCOSE BLDC GLUCOMTR-MCNC: 242 MG/DL — HIGH (ref 70–99)
GLUCOSE SERPL-MCNC: 91 MG/DL — SIGNIFICANT CHANGE UP (ref 70–99)
HCT VFR BLD CALC: 27.6 % — LOW (ref 39–50)
HGB BLD-MCNC: 8.6 G/DL — LOW (ref 13–17)
MCHC RBC-ENTMCNC: 28.5 PG — SIGNIFICANT CHANGE UP (ref 27–34)
MCHC RBC-ENTMCNC: 31.2 G/DL — LOW (ref 32–36)
MCV RBC AUTO: 91.4 FL — SIGNIFICANT CHANGE UP (ref 80–100)
NRBC # BLD AUTO: 0 K/UL — SIGNIFICANT CHANGE UP (ref 0–0)
NRBC # FLD: 0 K/UL — SIGNIFICANT CHANGE UP (ref 0–0)
NRBC BLD AUTO-RTO: 0 /100 WBCS — SIGNIFICANT CHANGE UP (ref 0–0)
PLATELET # BLD AUTO: 243 K/UL — SIGNIFICANT CHANGE UP (ref 150–400)
PMV BLD: 9.5 FL — SIGNIFICANT CHANGE UP (ref 7–13)
POTASSIUM SERPL-MCNC: 5.5 MMOL/L — HIGH (ref 3.5–5.3)
POTASSIUM SERPL-MCNC: 6.4 MMOL/L — CRITICAL HIGH (ref 3.5–5.3)
POTASSIUM SERPL-SCNC: 5.5 MMOL/L — HIGH (ref 3.5–5.3)
POTASSIUM SERPL-SCNC: 6.4 MMOL/L — CRITICAL HIGH (ref 3.5–5.3)
RBC # BLD: 3.02 M/UL — LOW (ref 4.2–5.8)
RBC # FLD: 14.8 % — HIGH (ref 10.3–14.5)
SODIUM SERPL-SCNC: 143 MMOL/L — SIGNIFICANT CHANGE UP (ref 135–145)
WBC # BLD: 7.66 K/UL — SIGNIFICANT CHANGE UP (ref 3.8–10.5)
WBC # FLD AUTO: 7.66 K/UL — SIGNIFICANT CHANGE UP (ref 3.8–10.5)

## 2025-04-22 PROCEDURE — 99233 SBSQ HOSP IP/OBS HIGH 50: CPT

## 2025-04-22 PROCEDURE — 93010 ELECTROCARDIOGRAM REPORT: CPT

## 2025-04-22 PROCEDURE — 99232 SBSQ HOSP IP/OBS MODERATE 35: CPT

## 2025-04-22 RX ORDER — ERYTHROMYCIN 5 MG/G
1 OINTMENT OPHTHALMIC
Refills: 0 | Status: DISCONTINUED | OUTPATIENT
Start: 2025-04-22 | End: 2025-04-24

## 2025-04-22 RX ORDER — SODIUM ZIRCONIUM CYCLOSILICATE 5 G/5G
10 POWDER, FOR SUSPENSION ORAL ONCE
Refills: 0 | Status: COMPLETED | OUTPATIENT
Start: 2025-04-22 | End: 2025-04-22

## 2025-04-22 RX ORDER — SODIUM ZIRCONIUM CYCLOSILICATE 5 G/5G
5 POWDER, FOR SUSPENSION ORAL ONCE
Refills: 0 | Status: COMPLETED | OUTPATIENT
Start: 2025-04-22 | End: 2025-04-22

## 2025-04-22 RX ORDER — OXYCODONE HYDROCHLORIDE 30 MG/1
10 TABLET ORAL EVERY 6 HOURS
Refills: 0 | Status: DISCONTINUED | OUTPATIENT
Start: 2025-04-22 | End: 2025-04-29

## 2025-04-22 RX ORDER — OXYCODONE HYDROCHLORIDE 30 MG/1
5 TABLET ORAL EVERY 6 HOURS
Refills: 0 | Status: DISCONTINUED | OUTPATIENT
Start: 2025-04-22 | End: 2025-04-23

## 2025-04-22 RX ADMIN — METOPROLOL SUCCINATE 50 MILLIGRAM(S): 50 TABLET, EXTENDED RELEASE ORAL at 17:07

## 2025-04-22 RX ADMIN — METOPROLOL SUCCINATE 50 MILLIGRAM(S): 50 TABLET, EXTENDED RELEASE ORAL at 05:09

## 2025-04-22 RX ADMIN — INSULIN LISPRO 1: 100 INJECTION, SOLUTION INTRAVENOUS; SUBCUTANEOUS at 11:43

## 2025-04-22 RX ADMIN — Medication 975 MILLIGRAM(S): at 14:14

## 2025-04-22 RX ADMIN — GABAPENTIN 100 MILLIGRAM(S): 400 CAPSULE ORAL at 21:54

## 2025-04-22 RX ADMIN — GABAPENTIN 100 MILLIGRAM(S): 400 CAPSULE ORAL at 05:03

## 2025-04-22 RX ADMIN — ATORVASTATIN CALCIUM 40 MILLIGRAM(S): 80 TABLET, FILM COATED ORAL at 21:55

## 2025-04-22 RX ADMIN — POLYMYXIN B SULFATE AND TRIMETHOPRIM SULFATE 1 DROP(S): 10000; 1 SOLUTION/ DROPS OPHTHALMIC at 14:11

## 2025-04-22 RX ADMIN — GABAPENTIN 100 MILLIGRAM(S): 400 CAPSULE ORAL at 14:14

## 2025-04-22 RX ADMIN — Medication 1 DROP(S): at 00:46

## 2025-04-22 RX ADMIN — Medication 2 DROP(S): at 05:02

## 2025-04-22 RX ADMIN — LIDOCAINE HYDROCHLORIDE 1 PATCH: 20 JELLY TOPICAL at 21:55

## 2025-04-22 RX ADMIN — Medication 975 MILLIGRAM(S): at 21:54

## 2025-04-22 RX ADMIN — TRAMADOL HYDROCHLORIDE 25 MILLIGRAM(S): 50 TABLET, FILM COATED ORAL at 08:07

## 2025-04-22 RX ADMIN — SODIUM ZIRCONIUM CYCLOSILICATE 5 GRAM(S): 5 POWDER, FOR SUSPENSION ORAL at 14:11

## 2025-04-22 RX ADMIN — OXYCODONE HYDROCHLORIDE 10 MILLIGRAM(S): 30 TABLET ORAL at 20:51

## 2025-04-22 RX ADMIN — INSULIN LISPRO 2: 100 INJECTION, SOLUTION INTRAVENOUS; SUBCUTANEOUS at 17:06

## 2025-04-22 RX ADMIN — LAMIVUDINE 100 MILLIGRAM(S): 10 SOLUTION ORAL at 14:12

## 2025-04-22 RX ADMIN — Medication 1 APPLICATION(S): at 05:06

## 2025-04-22 RX ADMIN — Medication 2 DROP(S): at 21:55

## 2025-04-22 RX ADMIN — OXYCODONE HYDROCHLORIDE 10 MILLIGRAM(S): 30 TABLET ORAL at 11:44

## 2025-04-22 RX ADMIN — Medication 975 MILLIGRAM(S): at 05:02

## 2025-04-22 RX ADMIN — AMLODIPINE BESYLATE 10 MILLIGRAM(S): 10 TABLET ORAL at 05:03

## 2025-04-22 RX ADMIN — DOLUTEGRAVIR SODIUM 50 MILLIGRAM(S): 5 TABLET, FOR SUSPENSION ORAL at 14:12

## 2025-04-22 RX ADMIN — KETOROLAC TROMETHAMINE 1 DROP(S): 5 SOLUTION/ DROPS OPHTHALMIC at 00:46

## 2025-04-22 RX ADMIN — Medication 80 MILLIGRAM(S): at 05:01

## 2025-04-22 RX ADMIN — ERYTHROMYCIN 1 APPLICATION(S): 5 OINTMENT OPHTHALMIC at 17:08

## 2025-04-22 RX ADMIN — KETOROLAC TROMETHAMINE 1 DROP(S): 5 SOLUTION/ DROPS OPHTHALMIC at 05:02

## 2025-04-22 RX ADMIN — INSULIN GLARGINE-YFGN 10 UNIT(S): 100 INJECTION, SOLUTION SUBCUTANEOUS at 21:54

## 2025-04-22 RX ADMIN — Medication 2 DROP(S): at 14:14

## 2025-04-22 RX ADMIN — Medication 5 MILLIGRAM(S): at 22:29

## 2025-04-22 RX ADMIN — SODIUM ZIRCONIUM CYCLOSILICATE 10 GRAM(S): 5 POWDER, FOR SUSPENSION ORAL at 08:54

## 2025-04-22 RX ADMIN — Medication 1 DROP(S): at 05:11

## 2025-04-22 NOTE — CHART NOTE - NSCHARTNOTEFT_GEN_A_CORE
called On call Ophth again by Dr. Avalos - on call-refused to come and eval pt. Per Ophthal continue erythomycine drop and cut down prednisone drop once a day.  Called Dr.Khurram Rasmussen (ophthalmologist) office [place] [phone]. gave my cell phone number for call back

## 2025-04-22 NOTE — PROGRESS NOTE ADULT - ASSESSMENT
1. CKD stage V from biopsy proven diabetic nephropathy and podopathy, initiated on HD during this hospitalization. hyperkalemic. refused HD- last HD on 4/16. Psychiatry evaluating for mental capacity. refusing HD again today.   2. HIV- VL > 300 k. per ID  3. Anemia: CKD and RITESH. on IV venofer. epo.  4. HTN: BP overall controlled  5. Hyperkalemia- on lokelma. stop valsartan. low K diet. patient refusing HD

## 2025-04-22 NOTE — CHART NOTE - NSCHARTNOTEFT_GEN_A_CORE
Notified by RN, patient with eye pain and states " I don't want to live like this anymore." Patient was seen and assessed at bedside: Right eye is reddened and inflamed. Patient again states " I do no want to live like this."  Patient states he has no plan. RN to administer scheduled pain medications.     MD notified. Consult for  placed.   Patient placed on 1:1 for suicidal ideation.     Update: Patient refusing dialyisis, cardiac monitor and  consult. MD notified.
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
## 2025-04-22 NOTE — CONSULT NOTE ADULT - SUBJECTIVE AND OBJECTIVE BOX
Attempted eval today requested for depression following transitioning to dialysis, however Pt would not open eyes and c/o severe headache.  Pt offered a later time for eval and is agreeable.  Pt asking for Tylenol for headache which was reported to RN and NP on unit.  Discussed with provider shortly after who stated Pt is refusing his dialysis.  Suggested consult be for capacity in medical decisions in context of refusing life saying treatment.  Discussed with SUKHJINDER Penn

## 2025-04-22 NOTE — PROGRESS NOTE ADULT - SUBJECTIVE AND OBJECTIVE BOX
Patient is a 64y old  Male who presents with a chief complaint of syncope (13 Apr 2025 11:15)    pt is non coop. yelling, refusing hd     REVIEW OF SYSTEMS: unable -non coop    MEDICATIONS  (STANDING):  acetaminophen     Tablet .. 975 milliGRAM(s) Oral every 8 hours  amLODIPine   Tablet 10 milliGRAM(s) Oral daily  artificial tears (preservative free) Ophthalmic Solution 2 Drop(s) Both EYES every 8 hours  atorvastatin 40 milliGRAM(s) Oral at bedtime  chlorhexidine 2% Cloths 1 Application(s) Topical <User Schedule>  chlorhexidine 4% Liquid 1 Application(s) Topical <User Schedule>  dextrose 5%. 1000 milliLiter(s) (50 mL/Hr) IV Continuous <Continuous>  dextrose 5%. 1000 milliLiter(s) (100 mL/Hr) IV Continuous <Continuous>  dextrose 50% Injectable 25 Gram(s) IV Push once  dextrose 50% Injectable 25 Gram(s) IV Push once  dextrose 50% Injectable 12.5 Gram(s) IV Push once  dolutegravir 50 milliGRAM(s) Oral daily  epoetin landry-epbx (RETACRIT) Injectable 4000 Unit(s) IV Push <User Schedule>  gabapentin 100 milliGRAM(s) Oral three times a day  glucagon  Injectable 1 milliGRAM(s) IntraMuscular once  insulin glargine Injectable (LANTUS) 10 Unit(s) SubCutaneous at bedtime  insulin lispro (ADMELOG) corrective regimen sliding scale   SubCutaneous three times a day before meals  ketorolac 0.5% Ophthalmic Solution 1 Drop(s) Both EYES four times a day  lamiVUDine- milliGRAM(s) Oral daily  lidocaine   4% Patch 1 Patch Transdermal every 24 hours  metoprolol tartrate 50 milliGRAM(s) Oral two times a day  prednisoLONE acetate 1% Suspension 1 Drop(s) Both EYES four times a day  sodium zirconium cyclosilicate 5 Gram(s) Oral once  trimethoprim/polymyxin Solution 1 Drop(s) Both EYES daily    MEDICATIONS  (PRN):  aluminum hydroxide/magnesium hydroxide/simethicone Suspension 30 milliLiter(s) Oral every 4 hours PRN Dyspepsia  dextrose Oral Gel 15 Gram(s) Oral once PRN Blood Glucose LESS THAN 70 milliGRAM(s)/deciliter  melatonin 3 milliGRAM(s) Oral at bedtime PRN Insomnia  ondansetron Injectable 4 milliGRAM(s) IV Push every 8 hours PRN Nausea and/or Vomiting  oxyCODONE    IR 10 milliGRAM(s) Oral every 6 hours PRN Severe Pain (7 - 10)  oxyCODONE    IR 5 milliGRAM(s) Oral every 6 hours PRN Moderate Pain (4 - 6)  sodium chloride 0.9% lock flush 10 milliLiter(s) IV Push every 1 hour PRN Pre/post blood products, medications, blood draw, and to maintain line patency  traMADol 25 milliGRAM(s) Oral every 8 hours PRN Mild Pain (1 - 3)      CAPILLARY BLOOD GLUCOSE      POCT Blood Glucose.: 196 mg/dL (22 Apr 2025 11:39)  POCT Blood Glucose.: 105 mg/dL (22 Apr 2025 08:05)  POCT Blood Glucose.: 100 mg/dL (22 Apr 2025 07:42)  POCT Blood Glucose.: 180 mg/dL (21 Apr 2025 21:59)  POCT Blood Glucose.: 216 mg/dL (21 Apr 2025 16:56)  POCT Blood Glucose.: 216 mg/dL (21 Apr 2025 11:56)    I&O's Summary    21 Apr 2025 07:01  -  22 Apr 2025 07:00  --------------------------------------------------------  IN: 225 mL / OUT: 350 mL / NET: -125 mL    22 Apr 2025 07:01  -  22 Apr 2025 11:51  --------------------------------------------------------  IN: 450 mL / OUT: 0 mL / NET: 450 mL        PHYSICAL EXAM:  Vital Signs Last 24 Hrs  T(C): 36.6 (22 Apr 2025 08:22), Max: 36.7 (21 Apr 2025 21:14)  T(F): 97.9 (22 Apr 2025 08:22), Max: 98 (21 Apr 2025 21:14)  HR: 70 (22 Apr 2025 09:12) (64 - 90)  BP: 159/74 (22 Apr 2025 09:12) (129/64 - 170/65)  BP(mean): --  RR: 18 (22 Apr 2025 08:22) (18 - 19)  SpO2: 94% (22 Apr 2025 08:22) (94% - 97%)    Parameters below as of 22 Apr 2025 08:22  Patient On (Oxygen Delivery Method): room air        CONSTITUTIONAL: NAD,  EYES: PERRLA; rt conjunctiva mild erythema  ENMT: Moist oral mucosa,   RESPIRATORY: Normal respiratory effort; lungs are clear to auscultation bilaterally  CARDIOVASCULAR: Regular rate and rhythm, normal S1 and S2, no murmur   EXTS: No lower extremity edema; Peripheral pulses are 2+ bilaterally  ABDOMEN: Nontender to palpation, normoactive bowel sounds, no rebound/guarding;   MUSCLOSKELETAL:   no joint swelling or tenderness to palpation  PSYCH: affect appropriate  NEUROLOGY: A+O to person, place, and time; CN 2-12 are intact and symmetric; no gross sensory deficits;       LABS:                        8.6    7.66  )-----------( 243      ( 22 Apr 2025 06:00 )             27.6     04-22    x   |  x   |  x   ----------------------------<  x   5.5[H]   |  x   |  x     Ca    8.5      22 Apr 2025 06:00            Urinalysis Basic - ( 22 Apr 2025 06:00 )    Color: x / Appearance: x / SG: x / pH: x  Gluc: 91 mg/dL / Ketone: x  / Bili: x / Urobili: x   Blood: x / Protein: x / Nitrite: x   Leuk Esterase: x / RBC: x / WBC x   Sq Epi: x / Non Sq Epi: x / Bacteria: x          RADIOLOGY & ADDITIONAL TESTS:  Results Reviewed:

## 2025-04-22 NOTE — CHART NOTE - NSCHARTNOTEFT_GEN_A_CORE
On Call Opthalmology contacted. Spoke to [facility] MD, Dr. Das concerning patient's eye pain and need for inpatient evaluation.  Recommendations made for Prednisolone to be decreased to once daily, ketorolac drops to be dc'ed and patient to be started on erythromycin ointment to be applied  in am and pm. Orders placed. MD notified.   Also attempted to contact performing surgeon. Patient states procedure was completed at [facility].  Per joseph gordon, patient had pre-op on 4/24/24 and never followed up. MD notified. Will investigate further. Rn to notify provider with any worsening symptoms.

## 2025-04-22 NOTE — CHART NOTE - NSCHARTNOTEFT_GEN_A_CORE
Notified by RN, patient with verbalization. " I do not want to live. " Patient was assessed at bedside: Again states "I don't want to live."

## 2025-04-22 NOTE — PROGRESS NOTE ADULT - SUBJECTIVE AND OBJECTIVE BOX
[facility] DIVISION OF KIDNEY DISEASES AND HYPERTENSION -- PROGRESS NOTE    SUBJECTIVE:   refusing HD    MEDICATIONS    Standing Inpatient Medications  acetaminophen     Tablet .. 975 milliGRAM(s) Oral every 8 hours  amLODIPine   Tablet 10 milliGRAM(s) Oral daily  artificial tears (preservative free) Ophthalmic Solution 2 Drop(s) Both EYES every 8 hours  atorvastatin 40 milliGRAM(s) Oral at bedtime  chlorhexidine 2% Cloths 1 Application(s) Topical <User Schedule>  chlorhexidine 4% Liquid 1 Application(s) Topical <User Schedule>  dextrose 5%. 1000 milliLiter(s) IV Continuous <Continuous>  dextrose 5%. 1000 milliLiter(s) IV Continuous <Continuous>  dextrose 50% Injectable 25 Gram(s) IV Push once  dextrose 50% Injectable 25 Gram(s) IV Push once  dextrose 50% Injectable 12.5 Gram(s) IV Push once  dolutegravir 50 milliGRAM(s) Oral daily  epoetin landry-epbx (RETACRIT) Injectable 4000 Unit(s) IV Push <User Schedule>  erythromycin   Ointment 1 Application(s) Both EYES two times a day  gabapentin 100 milliGRAM(s) Oral three times a day  glucagon  Injectable 1 milliGRAM(s) IntraMuscular once  insulin glargine Injectable (LANTUS) 10 Unit(s) SubCutaneous at bedtime  insulin lispro (ADMELOG) corrective regimen sliding scale   SubCutaneous three times a day before meals  lamiVUDine- milliGRAM(s) Oral daily  lidocaine   4% Patch 1 Patch Transdermal every 24 hours  metoprolol tartrate 50 milliGRAM(s) Oral two times a day  prednisoLONE acetate 1% Suspension 1 Drop(s) Both EYES daily  sodium zirconium cyclosilicate 5 Gram(s) Oral once  trimethoprim/polymyxin Solution 1 Drop(s) Both EYES daily    PRN Inpatient Medications  aluminum hydroxide/magnesium hydroxide/simethicone Suspension 30 milliLiter(s) Oral every 4 hours PRN  dextrose Oral Gel 15 Gram(s) Oral once PRN  melatonin 3 milliGRAM(s) Oral at bedtime PRN  ondansetron Injectable 4 milliGRAM(s) IV Push every 8 hours PRN  oxyCODONE    IR 10 milliGRAM(s) Oral every 6 hours PRN  oxyCODONE    IR 5 milliGRAM(s) Oral every 6 hours PRN  sodium chloride 0.9% lock flush 10 milliLiter(s) IV Push every 1 hour PRN  traMADol 25 milliGRAM(s) Oral every 8 hours PRN      VITALS/PHYSICAL EXAM  --------------------------------------------------------------------------------  T(C): 36.6 (04-22-25 @ 08:22), Max: 36.7 (04-21-25 @ 21:14)  HR: 70 (04-22-25 @ 09:12) (64 - 90)  BP: 159/74 (04-22-25 @ 09:12) (129/64 - 170/65)  RR: 18 (04-22-25 @ 08:22) (18 - 19)  SpO2: 94% (04-22-25 @ 08:22) (94% - 97%)  Wt(kg): --        04-21-25 @ 07:01  -  04-22-25 @ 07:00  --------------------------------------------------------  IN: 225 mL / OUT: 350 mL / NET: -125 mL    04-22-25 @ 07:01  -  04-22-25 @ 13:10  --------------------------------------------------------  IN: 450 mL / OUT: 0 mL / NET: 450 mL      Physical Exam:  Alert and oriented x3   HEENT: normal  Pulm: CTA B/L  CV: normal S1S2; no murmur  Abd: soft, non-tender  Extremities- no edema  [facility]    LABS/STUDIES  --------------------------------------------------------------------------------  x   |  x   |  x   ----------------------------<  x       [04-22-25 @ 10:30]  5.5   |  x   |  x         Ca     8.5     [04-22-25 @ 06:00]            Creatinine Trend:  SCr 5.79 [04-22 @ 06:00]  SCr 5.39 [04-21 @ 05:40]  SCr 5.20 [04-20 @ 08:40]  SCr 4.98 [04-19 @ 05:33]  SCr 4.90 [04-18 @ 05:25]
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
## 2025-04-23 LAB
ANION GAP SERPL CALC-SCNC: 16 MMOL/L — SIGNIFICANT CHANGE UP (ref 5–17)
BUN SERPL-MCNC: 68.1 MG/DL — HIGH (ref 8–20)
CALCIUM SERPL-MCNC: 8.8 MG/DL — SIGNIFICANT CHANGE UP (ref 8.4–10.5)
CHLORIDE SERPL-SCNC: 106 MMOL/L — SIGNIFICANT CHANGE UP (ref 96–108)
CO2 SERPL-SCNC: 19 MMOL/L — LOW (ref 22–29)
CREAT SERPL-MCNC: 5.89 MG/DL — HIGH (ref 0.5–1.3)
EGFR: 10 ML/MIN/1.73M2 — LOW
EGFR: 10 ML/MIN/1.73M2 — LOW
GLUCOSE BLDC GLUCOMTR-MCNC: 120 MG/DL — HIGH (ref 70–99)
GLUCOSE BLDC GLUCOMTR-MCNC: 142 MG/DL — HIGH (ref 70–99)
GLUCOSE BLDC GLUCOMTR-MCNC: 156 MG/DL — HIGH (ref 70–99)
GLUCOSE BLDC GLUCOMTR-MCNC: 167 MG/DL — HIGH (ref 70–99)
GLUCOSE SERPL-MCNC: 122 MG/DL — HIGH (ref 70–99)
HCT VFR BLD CALC: 27.2 % — LOW (ref 39–50)
HGB BLD-MCNC: 8.8 G/DL — LOW (ref 13–17)
MCHC RBC-ENTMCNC: 29.2 PG — SIGNIFICANT CHANGE UP (ref 27–34)
MCHC RBC-ENTMCNC: 32.4 G/DL — SIGNIFICANT CHANGE UP (ref 32–36)
MCV RBC AUTO: 90.4 FL — SIGNIFICANT CHANGE UP (ref 80–100)
NRBC # BLD AUTO: 0 K/UL — SIGNIFICANT CHANGE UP (ref 0–0)
NRBC # FLD: 0 K/UL — SIGNIFICANT CHANGE UP (ref 0–0)
NRBC BLD AUTO-RTO: 0 /100 WBCS — SIGNIFICANT CHANGE UP (ref 0–0)
PLATELET # BLD AUTO: 270 K/UL — SIGNIFICANT CHANGE UP (ref 150–400)
PMV BLD: 9.3 FL — SIGNIFICANT CHANGE UP (ref 7–13)
POTASSIUM SERPL-MCNC: 5.5 MMOL/L — HIGH (ref 3.5–5.3)
POTASSIUM SERPL-SCNC: 5.5 MMOL/L — HIGH (ref 3.5–5.3)
RBC # BLD: 3.01 M/UL — LOW (ref 4.2–5.8)
RBC # FLD: 14.7 % — HIGH (ref 10.3–14.5)
SODIUM SERPL-SCNC: 141 MMOL/L — SIGNIFICANT CHANGE UP (ref 135–145)
WBC # BLD: 9.1 K/UL — SIGNIFICANT CHANGE UP (ref 3.8–10.5)
WBC # FLD AUTO: 9.1 K/UL — SIGNIFICANT CHANGE UP (ref 3.8–10.5)

## 2025-04-23 PROCEDURE — 99222 1ST HOSP IP/OBS MODERATE 55: CPT

## 2025-04-23 PROCEDURE — 99233 SBSQ HOSP IP/OBS HIGH 50: CPT

## 2025-04-23 PROCEDURE — 70450 CT HEAD/BRAIN W/O DYE: CPT | Mod: 26

## 2025-04-23 PROCEDURE — 99232 SBSQ HOSP IP/OBS MODERATE 35: CPT

## 2025-04-23 RX ORDER — SODIUM ZIRCONIUM CYCLOSILICATE 5 G/5G
5 POWDER, FOR SUSPENSION ORAL ONCE
Refills: 0 | Status: COMPLETED | OUTPATIENT
Start: 2025-04-23 | End: 2025-04-23

## 2025-04-23 RX ORDER — TROPICAMIDE
1 POWDER (GRAM) MISCELLANEOUS ONCE
Refills: 0 | Status: DISCONTINUED | OUTPATIENT
Start: 2025-04-23 | End: 2025-04-24

## 2025-04-23 RX ORDER — PHENYLEPHRINE HYDROCHLORIDE 25 MG/ML
1 SOLUTION OPHTHALMIC ONCE
Refills: 0 | Status: DISCONTINUED | OUTPATIENT
Start: 2025-04-23 | End: 2025-04-24

## 2025-04-23 RX ADMIN — Medication 1 APPLICATION(S): at 05:35

## 2025-04-23 RX ADMIN — DOLUTEGRAVIR SODIUM 50 MILLIGRAM(S): 5 TABLET, FOR SUSPENSION ORAL at 13:12

## 2025-04-23 RX ADMIN — INSULIN LISPRO 1: 100 INJECTION, SOLUTION INTRAVENOUS; SUBCUTANEOUS at 09:43

## 2025-04-23 RX ADMIN — Medication 2 DROP(S): at 05:34

## 2025-04-23 RX ADMIN — Medication 2 DROP(S): at 13:10

## 2025-04-23 RX ADMIN — LAMIVUDINE 100 MILLIGRAM(S): 10 SOLUTION ORAL at 13:11

## 2025-04-23 RX ADMIN — LIDOCAINE HYDROCHLORIDE 1 PATCH: 20 JELLY TOPICAL at 21:20

## 2025-04-23 RX ADMIN — INSULIN GLARGINE-YFGN 10 UNIT(S): 100 INJECTION, SOLUTION SUBCUTANEOUS at 22:12

## 2025-04-23 RX ADMIN — METOPROLOL SUCCINATE 50 MILLIGRAM(S): 50 TABLET, EXTENDED RELEASE ORAL at 05:34

## 2025-04-23 RX ADMIN — OXYCODONE HYDROCHLORIDE 10 MILLIGRAM(S): 30 TABLET ORAL at 10:43

## 2025-04-23 RX ADMIN — GABAPENTIN 100 MILLIGRAM(S): 400 CAPSULE ORAL at 13:10

## 2025-04-23 RX ADMIN — GABAPENTIN 100 MILLIGRAM(S): 400 CAPSULE ORAL at 21:19

## 2025-04-23 RX ADMIN — Medication 975 MILLIGRAM(S): at 05:35

## 2025-04-23 RX ADMIN — OXYCODONE HYDROCHLORIDE 10 MILLIGRAM(S): 30 TABLET ORAL at 09:43

## 2025-04-23 RX ADMIN — GABAPENTIN 100 MILLIGRAM(S): 400 CAPSULE ORAL at 05:34

## 2025-04-23 RX ADMIN — AMLODIPINE BESYLATE 10 MILLIGRAM(S): 10 TABLET ORAL at 05:34

## 2025-04-23 RX ADMIN — Medication 975 MILLIGRAM(S): at 21:18

## 2025-04-23 RX ADMIN — Medication 975 MILLIGRAM(S): at 14:09

## 2025-04-23 RX ADMIN — ERYTHROMYCIN 1 APPLICATION(S): 5 OINTMENT OPHTHALMIC at 18:29

## 2025-04-23 RX ADMIN — Medication 1 APPLICATION(S): at 05:37

## 2025-04-23 RX ADMIN — EPOETIN ALFA 4000 UNIT(S): 10000 SOLUTION INTRAVENOUS; SUBCUTANEOUS at 17:12

## 2025-04-23 RX ADMIN — SODIUM ZIRCONIUM CYCLOSILICATE 5 GRAM(S): 5 POWDER, FOR SUSPENSION ORAL at 18:45

## 2025-04-23 RX ADMIN — ATORVASTATIN CALCIUM 40 MILLIGRAM(S): 80 TABLET, FILM COATED ORAL at 21:19

## 2025-04-23 RX ADMIN — Medication 975 MILLIGRAM(S): at 13:09

## 2025-04-23 RX ADMIN — Medication 1 DROP(S): at 13:10

## 2025-04-23 RX ADMIN — POLYMYXIN B SULFATE AND TRIMETHOPRIM SULFATE 1 DROP(S): 10000; 1 SOLUTION/ DROPS OPHTHALMIC at 13:13

## 2025-04-23 RX ADMIN — METOPROLOL SUCCINATE 50 MILLIGRAM(S): 50 TABLET, EXTENDED RELEASE ORAL at 18:40

## 2025-04-23 RX ADMIN — OXYCODONE HYDROCHLORIDE 5 MILLIGRAM(S): 30 TABLET ORAL at 17:25

## 2025-04-23 NOTE — PROGRESS NOTE ADULT - ASSESSMENT
1. CKD stage V from biopsy proven diabetic nephropathy and podopathy, initiated on HD during this hospitalization. hyperkalemic. refused HD- last HD on 4/16. patient does not want HD- wants to die. psychiatric evaluation ongoing.   2. HIV- VL > 300 k. per ID  3. Anemia: CKD and RITESH. on IV venofer. epo.  4. HTN: BP overall controlled  5. Hyperkalemia- on lokelma. low K diet. patient refusing HD. refued labs this AM- off ARB
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
## 2025-04-23 NOTE — BH CONSULTATION LIAISON ASSESSMENT NOTE - NSBHCHARTREVIEWVS_PSY_A_CORE FT
Vital Signs Last 24 Hrs  T(C): 36.9 (23 Apr 2025 08:00), Max: 36.9 (23 Apr 2025 08:00)  T(F): 98.5 (23 Apr 2025 08:00), Max: 98.5 (23 Apr 2025 08:00)  HR: 89 (23 Apr 2025 08:00) (78 - 98)  BP: 146/86 (23 Apr 2025 08:00) (146/86 - 194/88)  BP(mean): --  RR: 18 (23 Apr 2025 08:00) (16 - 18)  SpO2: 96% (23 Apr 2025 08:00) (96% - 98%)    Parameters below as of 23 Apr 2025 08:00  Patient On (Oxygen Delivery Method): room air
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
## 2025-04-23 NOTE — CHART NOTE - NSCHARTNOTEFT_GEN_A_CORE
Called by RN for patient with 1:1 requiring renewal  Patient remains in need for constant observation at this time  Will continue to monitor   RN to notify provider with any changes in patient status.
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
## 2025-04-23 NOTE — PROGRESS NOTE ADULT - SUBJECTIVE AND OBJECTIVE BOX
[facility] DIVISION OF KIDNEY DISEASES AND HYPERTENSION -- PROGRESS NOTE    SUBJECTIVE:   refused labs this AM    MEDICATIONS    Standing Inpatient Medications  acetaminophen     Tablet .. 975 milliGRAM(s) Oral every 8 hours  amLODIPine   Tablet 10 milliGRAM(s) Oral daily  artificial tears (preservative free) Ophthalmic Solution 2 Drop(s) Both EYES every 8 hours  atorvastatin 40 milliGRAM(s) Oral at bedtime  chlorhexidine 2% Cloths 1 Application(s) Topical <User Schedule>  chlorhexidine 4% Liquid 1 Application(s) Topical <User Schedule>  dextrose 5%. 1000 milliLiter(s) IV Continuous <Continuous>  dextrose 5%. 1000 milliLiter(s) IV Continuous <Continuous>  dextrose 50% Injectable 25 Gram(s) IV Push once  dextrose 50% Injectable 12.5 Gram(s) IV Push once  dextrose 50% Injectable 25 Gram(s) IV Push once  dolutegravir 50 milliGRAM(s) Oral daily  epoetin landry-epbx (RETACRIT) Injectable 4000 Unit(s) IV Push <User Schedule>  erythromycin   Ointment 1 Application(s) Both EYES two times a day  gabapentin 100 milliGRAM(s) Oral three times a day  glucagon  Injectable 1 milliGRAM(s) IntraMuscular once  insulin glargine Injectable (LANTUS) 10 Unit(s) SubCutaneous at bedtime  insulin lispro (ADMELOG) corrective regimen sliding scale   SubCutaneous three times a day before meals  lamiVUDine- milliGRAM(s) Oral daily  lidocaine   4% Patch 1 Patch Transdermal every 24 hours  metoprolol tartrate 50 milliGRAM(s) Oral two times a day  prednisoLONE acetate 1% Suspension 1 Drop(s) Both EYES daily  trimethoprim/polymyxin Solution 1 Drop(s) Both EYES daily    PRN Inpatient Medications  aluminum hydroxide/magnesium hydroxide/simethicone Suspension 30 milliLiter(s) Oral every 4 hours PRN  dextrose Oral Gel 15 Gram(s) Oral once PRN  melatonin 3 milliGRAM(s) Oral at bedtime PRN  ondansetron Injectable 4 milliGRAM(s) IV Push every 8 hours PRN  oxyCODONE    IR 5 milliGRAM(s) Oral every 6 hours PRN  oxyCODONE    IR 10 milliGRAM(s) Oral every 6 hours PRN  sodium chloride 0.9% lock flush 10 milliLiter(s) IV Push every 1 hour PRN  traMADol 25 milliGRAM(s) Oral every 8 hours PRN      VITALS/PHYSICAL EXAM  --------------------------------------------------------------------------------  T(C): 36.9 (04-23-25 @ 08:00), Max: 36.9 (04-23-25 @ 08:00)  HR: 89 (04-23-25 @ 08:00) (78 - 98)  BP: 146/86 (04-23-25 @ 08:00) (146/86 - 194/88)  RR: 18 (04-23-25 @ 08:00) (16 - 18)  SpO2: 96% (04-23-25 @ 08:00) (96% - 98%)  Wt(kg): --        04-22-25 @ 07:01  -  04-23-25 @ 07:00  --------------------------------------------------------  IN: 810 mL / OUT: 800 mL / NET: 10 mL    04-23-25 @ 07:01  -  04-23-25 @ 13:07  --------------------------------------------------------  IN: 200 mL / OUT: 0 mL / NET: 200 mL      Physical Exam:  Alert and oriented x3   HEENT: normal  Pulm: CTA B/L  CV: normal S1S2; no murmur  Abd: soft, non-tender  Extremities- no edema    LABS/STUDIES  --------------------------------------------------------------------------------  x   |  x   |  x   ----------------------------<  x       [04-22-25 @ 10:30]  5.5   |  x   |  x         Ca     8.5     [04-22-25 @ 06:00]            Creatinine Trend:  SCr 5.79 [04-22 @ 06:00]  SCr 5.39 [04-21 @ 05:40]  SCr 5.20 [04-20 @ 08:40]  SCr 4.98 [04-19 @ 05:33]  SCr 4.90 [04-18 @ 05:25]

## 2025-04-23 NOTE — CHART NOTE - NSCHARTNOTEFT_GEN_A_CORE
Spoke with .Dr.Khurram Rasmussen (ophthalmologist) at [place]. Pt had procedure on 01/16 by  but pt never f/u post op . Rec to f/u with ophthalmologist. Unable to give any rec as they did not see the pt for while. called sight MD -spoke with Júnior Hernandez .She will not except the consult as it was called yesterday and give me Dr Das cell number to contact [phone].I called him,unable to reach left voice msg and text. Spoke with .Dr.Khurram Rasmussen (ophthalmologist) at [place]. Pt had procedure on 01/16 by  but pt never f/u post op . Rec to f/u with ophthalmologist. Unable to give any rec as they did not see the pt for while. called sight MD -spoke with Júnior Hernandez .She will not except the consult as it was called yesterday and give me Dr Das cell number to contact [phone], and she will also contact him about the pt.I called him,unable to reach left voice msg and text. Spoke with .Dr.Khurram Rasmussen (ophthalmologist) at [place]. Pt had procedure on 01/16 by  but pt never f/u post op . Rec to f/u with ophthalmologist. Unable to give any rec as they did not see the pt for while. called sight MD -spoke with Júnior Hernandez .She will not except the consult as it was called yesterday and give me Dr Das cell number to contact [phone], and she will also contact him about the pt.I called him,unable to reach left voice msg and text. DW . Spoke with .Dr.Khurram Rasmussen (ophthalmologist) at [place]. Pt had procedure on 01/16 by  but pt never f/u post op . Rec to f/u with ophthalmologist. Unable to give any rec as they did not see the pt for while. called sight MD -spoke with Júnior Hernandez .She will not except the consult as it was called yesterday and give me Dr Das cell number to contact [phone], and she will also contact him about the pt.I called him,unable to reach left voice msg and text for call back. DW .

## 2025-04-23 NOTE — PROGRESS NOTE ADULT - SUBJECTIVE AND OBJECTIVE BOX
Patient is a 64y old  Male who presents with a chief complaint of ESRD (22 Apr 2025 12:54)      pt is aaox3 am. pt states his eyes pain much better. He refused hd, understood risk. Pt states " I am not doing HD or blood work. Find a place for me if you want to help".   sitter placed as pt states I want to die multiple times yesterday and today mrdavion."  Pt denies to me now ,he said I want to live.   Per RN, Pt has intermittent mood change.   REVIEW OF SYSTEMS: All systems are reviewed and found to be negative except above    MEDICATIONS  (STANDING):  acetaminophen     Tablet .. 975 milliGRAM(s) Oral every 8 hours  amLODIPine   Tablet 10 milliGRAM(s) Oral daily  artificial tears (preservative free) Ophthalmic Solution 2 Drop(s) Both EYES every 8 hours  atorvastatin 40 milliGRAM(s) Oral at bedtime  chlorhexidine 2% Cloths 1 Application(s) Topical <User Schedule>  chlorhexidine 4% Liquid 1 Application(s) Topical <User Schedule>  dextrose 5%. 1000 milliLiter(s) (50 mL/Hr) IV Continuous <Continuous>  dextrose 5%. 1000 milliLiter(s) (100 mL/Hr) IV Continuous <Continuous>  dextrose 50% Injectable 12.5 Gram(s) IV Push once  dextrose 50% Injectable 25 Gram(s) IV Push once  dextrose 50% Injectable 25 Gram(s) IV Push once  dolutegravir 50 milliGRAM(s) Oral daily  epoetin landry-epbx (RETACRIT) Injectable 4000 Unit(s) IV Push <User Schedule>  erythromycin   Ointment 1 Application(s) Both EYES two times a day  gabapentin 100 milliGRAM(s) Oral three times a day  glucagon  Injectable 1 milliGRAM(s) IntraMuscular once  insulin glargine Injectable (LANTUS) 10 Unit(s) SubCutaneous at bedtime  insulin lispro (ADMELOG) corrective regimen sliding scale   SubCutaneous three times a day before meals  lamiVUDine- milliGRAM(s) Oral daily  lidocaine   4% Patch 1 Patch Transdermal every 24 hours  metoprolol tartrate 50 milliGRAM(s) Oral two times a day  prednisoLONE acetate 1% Suspension 1 Drop(s) Both EYES daily  sodium zirconium cyclosilicate 5 Gram(s) Oral once  trimethoprim/polymyxin Solution 1 Drop(s) Both EYES daily    MEDICATIONS  (PRN):  aluminum hydroxide/magnesium hydroxide/simethicone Suspension 30 milliLiter(s) Oral every 4 hours PRN Dyspepsia  dextrose Oral Gel 15 Gram(s) Oral once PRN Blood Glucose LESS THAN 70 milliGRAM(s)/deciliter  melatonin 3 milliGRAM(s) Oral at bedtime PRN Insomnia  ondansetron Injectable 4 milliGRAM(s) IV Push every 8 hours PRN Nausea and/or Vomiting  oxyCODONE    IR 10 milliGRAM(s) Oral every 6 hours PRN Severe Pain (7 - 10)  oxyCODONE    IR 5 milliGRAM(s) Oral every 6 hours PRN Moderate Pain (4 - 6)  sodium chloride 0.9% lock flush 10 milliLiter(s) IV Push every 1 hour PRN Pre/post blood products, medications, blood draw, and to maintain line patency  traMADol 25 milliGRAM(s) Oral every 8 hours PRN Mild Pain (1 - 3)      CAPILLARY BLOOD GLUCOSE      POCT Blood Glucose.: 120 mg/dL (23 Apr 2025 12:12)  POCT Blood Glucose.: 167 mg/dL (23 Apr 2025 08:53)  POCT Blood Glucose.: 230 mg/dL (22 Apr 2025 21:28)  POCT Blood Glucose.: 242 mg/dL (22 Apr 2025 17:04)    I&O's Summary    22 Apr 2025 07:01  -  23 Apr 2025 07:00  --------------------------------------------------------  IN: 810 mL / OUT: 800 mL / NET: 10 mL    23 Apr 2025 07:01  -  23 Apr 2025 14:02  --------------------------------------------------------  IN: 200 mL / OUT: 0 mL / NET: 200 mL        PHYSICAL EXAM:  Vital Signs Last 24 Hrs  T(C): 36.9 (23 Apr 2025 08:00), Max: 36.9 (23 Apr 2025 08:00)  T(F): 98.5 (23 Apr 2025 08:00), Max: 98.5 (23 Apr 2025 08:00)  HR: 89 (23 Apr 2025 08:00) (78 - 98)  BP: 146/86 (23 Apr 2025 08:00) (146/86 - 194/88)  BP(mean): --  RR: 18 (23 Apr 2025 08:00) (16 - 18)  SpO2: 96% (23 Apr 2025 08:00) (96% - 98%)    Parameters below as of 23 Apr 2025 08:00  Patient On (Oxygen Delivery Method): room air        CONSTITUTIONAL: NAD,  EYES: PERRLA; rt eye: conjunctiva redness  ENMT: Moist oral mucosa,   RESPIRATORY: Normal respiratory effort; lungs are clear to auscultation bilaterally  CARDIOVASCULAR: Regular rate and rhythm, normal S1 and S2, no murmur   EXTS: No lower extremity edema; Peripheral pulses are 2+ bilaterally  ABDOMEN: Nontender to palpation, normoactive bowel sounds, no rebound/guarding;   MUSCLOSKELETAL:    no joint swelling or tenderness to palpation  PSYCH: non coop  NEUROLOGY: A+O to person, place, and time; CN 2-12 are intact and symmetric; no gross sensory deficits;       LABS:                        8.8    9.10  )-----------( 270      ( 23 Apr 2025 13:05 )             27.2     04-23    141  |  106  |  68.1[H]  ----------------------------<  122[H]  5.5[H]   |  19.0[L]  |  5.89[H]    Ca    8.8      23 Apr 2025 13:05            Urinalysis Basic - ( 23 Apr 2025 13:05 )    Color: x / Appearance: x / SG: x / pH: x  Gluc: 122 mg/dL / Ketone: x  / Bili: x / Urobili: x   Blood: x / Protein: x / Nitrite: x   Leuk Esterase: x / RBC: x / WBC x   Sq Epi: x / Non Sq Epi: x / Bacteria: x          RADIOLOGY & ADDITIONAL TESTS:  Results Reviewed:

## 2025-04-23 NOTE — BH CONSULTATION LIAISON ASSESSMENT NOTE - NSBHATTESTAPPBILLTIME_PSY_A_CORE
I attest my time as MAINOR is greater than 50% of the total combined time spent on qualifying patient care activities. I have reviewed and verified the documentation.

## 2025-04-23 NOTE — BH CONSULTATION LIAISON ASSESSMENT NOTE - CURRENT MEDICATION
MEDICATIONS  (STANDING):  acetaminophen     Tablet .. 975 milliGRAM(s) Oral every 8 hours  amLODIPine   Tablet 10 milliGRAM(s) Oral daily  artificial tears (preservative free) Ophthalmic Solution 2 Drop(s) Both EYES every 8 hours  atorvastatin 40 milliGRAM(s) Oral at bedtime  chlorhexidine 2% Cloths 1 Application(s) Topical <User Schedule>  chlorhexidine 4% Liquid 1 Application(s) Topical <User Schedule>  dextrose 5%. 1000 milliLiter(s) (100 mL/Hr) IV Continuous <Continuous>  dextrose 5%. 1000 milliLiter(s) (50 mL/Hr) IV Continuous <Continuous>  dextrose 50% Injectable 25 Gram(s) IV Push once  dextrose 50% Injectable 12.5 Gram(s) IV Push once  dextrose 50% Injectable 25 Gram(s) IV Push once  dolutegravir 50 milliGRAM(s) Oral daily  epoetin landry-epbx (RETACRIT) Injectable 4000 Unit(s) IV Push <User Schedule>  erythromycin   Ointment 1 Application(s) Both EYES two times a day  gabapentin 100 milliGRAM(s) Oral three times a day  glucagon  Injectable 1 milliGRAM(s) IntraMuscular once  insulin glargine Injectable (LANTUS) 10 Unit(s) SubCutaneous at bedtime  insulin lispro (ADMELOG) corrective regimen sliding scale   SubCutaneous three times a day before meals  lamiVUDine- milliGRAM(s) Oral daily  lidocaine   4% Patch 1 Patch Transdermal every 24 hours  metoprolol tartrate 50 milliGRAM(s) Oral two times a day  prednisoLONE acetate 1% Suspension 1 Drop(s) Both EYES daily  trimethoprim/polymyxin Solution 1 Drop(s) Both EYES daily    MEDICATIONS  (PRN):  aluminum hydroxide/magnesium hydroxide/simethicone Suspension 30 milliLiter(s) Oral every 4 hours PRN Dyspepsia  dextrose Oral Gel 15 Gram(s) Oral once PRN Blood Glucose LESS THAN 70 milliGRAM(s)/deciliter  melatonin 3 milliGRAM(s) Oral at bedtime PRN Insomnia  ondansetron Injectable 4 milliGRAM(s) IV Push every 8 hours PRN Nausea and/or Vomiting  oxyCODONE    IR 5 milliGRAM(s) Oral every 6 hours PRN Moderate Pain (4 - 6)  oxyCODONE    IR 10 milliGRAM(s) Oral every 6 hours PRN Severe Pain (7 - 10)  sodium chloride 0.9% lock flush 10 milliLiter(s) IV Push every 1 hour PRN Pre/post blood products, medications, blood draw, and to maintain line patency  traMADol 25 milliGRAM(s) Oral every 8 hours PRN Mild Pain (1 - 3)

## 2025-04-23 NOTE — BH CONSULTATION LIAISON ASSESSMENT NOTE - NSICDXBHSECONDARYDX_PSY_ALL_CORE
Elevated troponin   R79.89  Chronic progressive renal failure   N18.9  ESRD on dialysis   N18.6  Diabetic nephropathy   E11.21  HTN (hypertension)   I10  Hyperlipidemia   E78.5  Head trauma   S09.90XA  Primary HIV infection   B20  Cataract, bilateral   H26.9
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
## 2025-04-24 LAB
ANION GAP SERPL CALC-SCNC: 15 MMOL/L — SIGNIFICANT CHANGE UP (ref 5–17)
BUN SERPL-MCNC: 32.5 MG/DL — HIGH (ref 8–20)
CALCIUM SERPL-MCNC: 8.7 MG/DL — SIGNIFICANT CHANGE UP (ref 8.4–10.5)
CHLORIDE SERPL-SCNC: 99 MMOL/L — SIGNIFICANT CHANGE UP (ref 96–108)
CO2 SERPL-SCNC: 24 MMOL/L — SIGNIFICANT CHANGE UP (ref 22–29)
CREAT SERPL-MCNC: 3.66 MG/DL — HIGH (ref 0.5–1.3)
EGFR: 18 ML/MIN/1.73M2 — LOW
EGFR: 18 ML/MIN/1.73M2 — LOW
GLUCOSE BLDC GLUCOMTR-MCNC: 142 MG/DL — HIGH (ref 70–99)
GLUCOSE BLDC GLUCOMTR-MCNC: 161 MG/DL — HIGH (ref 70–99)
GLUCOSE BLDC GLUCOMTR-MCNC: 163 MG/DL — HIGH (ref 70–99)
GLUCOSE BLDC GLUCOMTR-MCNC: 170 MG/DL — HIGH (ref 70–99)
GLUCOSE SERPL-MCNC: 149 MG/DL — HIGH (ref 70–99)
HCT VFR BLD CALC: 30.1 % — LOW (ref 39–50)
HGB BLD-MCNC: 9.9 G/DL — LOW (ref 13–17)
MCHC RBC-ENTMCNC: 29 PG — SIGNIFICANT CHANGE UP (ref 27–34)
MCHC RBC-ENTMCNC: 32.9 G/DL — SIGNIFICANT CHANGE UP (ref 32–36)
MCV RBC AUTO: 88.3 FL — SIGNIFICANT CHANGE UP (ref 80–100)
NRBC # BLD AUTO: 0 K/UL — SIGNIFICANT CHANGE UP (ref 0–0)
NRBC # FLD: 0 K/UL — SIGNIFICANT CHANGE UP (ref 0–0)
NRBC BLD AUTO-RTO: 0 /100 WBCS — SIGNIFICANT CHANGE UP (ref 0–0)
PLATELET # BLD AUTO: 291 K/UL — SIGNIFICANT CHANGE UP (ref 150–400)
PMV BLD: 9.4 FL — SIGNIFICANT CHANGE UP (ref 7–13)
POTASSIUM SERPL-MCNC: 4.1 MMOL/L — SIGNIFICANT CHANGE UP (ref 3.5–5.3)
POTASSIUM SERPL-SCNC: 4.1 MMOL/L — SIGNIFICANT CHANGE UP (ref 3.5–5.3)
RBC # BLD: 3.41 M/UL — LOW (ref 4.2–5.8)
RBC # FLD: 14.5 % — SIGNIFICANT CHANGE UP (ref 10.3–14.5)
SODIUM SERPL-SCNC: 138 MMOL/L — SIGNIFICANT CHANGE UP (ref 135–145)
WBC # BLD: 8.08 K/UL — SIGNIFICANT CHANGE UP (ref 3.8–10.5)
WBC # FLD AUTO: 8.08 K/UL — SIGNIFICANT CHANGE UP (ref 3.8–10.5)

## 2025-04-24 PROCEDURE — 99233 SBSQ HOSP IP/OBS HIGH 50: CPT

## 2025-04-24 RX ORDER — BRIMONIDINE TARTRATE 1.5 MG/ML
1 SOLUTION/ DROPS OPHTHALMIC
Refills: 0 | Status: DISCONTINUED | OUTPATIENT
Start: 2025-04-24 | End: 2025-05-13

## 2025-04-24 RX ORDER — DORZOLAMIDE 20 MG/ML
1 SOLUTION/ DROPS OPHTHALMIC
Refills: 0 | Status: DISCONTINUED | OUTPATIENT
Start: 2025-04-24 | End: 2025-05-13

## 2025-04-24 RX ORDER — LATANOPROST PF 0.05 MG/ML
1 SOLUTION/ DROPS OPHTHALMIC AT BEDTIME
Refills: 0 | Status: DISCONTINUED | OUTPATIENT
Start: 2025-04-24 | End: 2025-05-13

## 2025-04-24 RX ORDER — TIMOLOL MALEATE 6.8 MG/ML
1 SOLUTION OPHTHALMIC
Refills: 0 | Status: DISCONTINUED | OUTPATIENT
Start: 2025-04-24 | End: 2025-05-13

## 2025-04-24 RX ADMIN — GABAPENTIN 100 MILLIGRAM(S): 400 CAPSULE ORAL at 22:00

## 2025-04-24 RX ADMIN — Medication 1 APPLICATION(S): at 05:35

## 2025-04-24 RX ADMIN — ERYTHROMYCIN 1 APPLICATION(S): 5 OINTMENT OPHTHALMIC at 05:34

## 2025-04-24 RX ADMIN — Medication 975 MILLIGRAM(S): at 22:01

## 2025-04-24 RX ADMIN — LIDOCAINE HYDROCHLORIDE 1 PATCH: 20 JELLY TOPICAL at 06:35

## 2025-04-24 RX ADMIN — Medication 975 MILLIGRAM(S): at 05:33

## 2025-04-24 RX ADMIN — DORZOLAMIDE 1 DROP(S): 20 SOLUTION/ DROPS OPHTHALMIC at 20:30

## 2025-04-24 RX ADMIN — LATANOPROST PF 1 DROP(S): 0.05 SOLUTION/ DROPS OPHTHALMIC at 22:01

## 2025-04-24 RX ADMIN — Medication 2 DROP(S): at 22:02

## 2025-04-24 RX ADMIN — GABAPENTIN 100 MILLIGRAM(S): 400 CAPSULE ORAL at 05:34

## 2025-04-24 RX ADMIN — Medication 3 MILLIGRAM(S): at 22:01

## 2025-04-24 RX ADMIN — Medication 2 DROP(S): at 05:34

## 2025-04-24 RX ADMIN — LAMIVUDINE 100 MILLIGRAM(S): 10 SOLUTION ORAL at 16:17

## 2025-04-24 RX ADMIN — OXYCODONE HYDROCHLORIDE 10 MILLIGRAM(S): 30 TABLET ORAL at 03:17

## 2025-04-24 RX ADMIN — Medication 975 MILLIGRAM(S): at 23:00

## 2025-04-24 RX ADMIN — TRAMADOL HYDROCHLORIDE 25 MILLIGRAM(S): 50 TABLET, FILM COATED ORAL at 23:00

## 2025-04-24 RX ADMIN — AMLODIPINE BESYLATE 10 MILLIGRAM(S): 10 TABLET ORAL at 05:34

## 2025-04-24 RX ADMIN — ATORVASTATIN CALCIUM 40 MILLIGRAM(S): 80 TABLET, FILM COATED ORAL at 22:00

## 2025-04-24 RX ADMIN — DOLUTEGRAVIR SODIUM 50 MILLIGRAM(S): 5 TABLET, FOR SUSPENSION ORAL at 16:17

## 2025-04-24 RX ADMIN — TRAMADOL HYDROCHLORIDE 25 MILLIGRAM(S): 50 TABLET, FILM COATED ORAL at 22:01

## 2025-04-24 RX ADMIN — Medication 1 APPLICATION(S): at 05:34

## 2025-04-24 RX ADMIN — Medication 10 MILLIGRAM(S): at 22:01

## 2025-04-24 RX ADMIN — METOPROLOL SUCCINATE 50 MILLIGRAM(S): 50 TABLET, EXTENDED RELEASE ORAL at 05:34

## 2025-04-24 RX ADMIN — OXYCODONE HYDROCHLORIDE 10 MILLIGRAM(S): 30 TABLET ORAL at 04:17

## 2025-04-24 RX ADMIN — INSULIN GLARGINE-YFGN 10 UNIT(S): 100 INJECTION, SOLUTION SUBCUTANEOUS at 22:00

## 2025-04-24 NOTE — PHYSICAL THERAPY INITIAL EVALUATION ADULT - GAIT DEVIATIONS NOTED, PT EVAL
decreased abbey/decreased step length/decreased stride length
decreased abbey/decreased velocity of limb motion

## 2025-04-24 NOTE — PHYSICAL THERAPY INITIAL EVALUATION ADULT - GENERAL OBSERVATIONS, REHAB EVAL
Pt received seated at edge of bed + IV loc + 1:1 present, breathing on RA in NAD, pleasantly confused, agreeable to PT eval
Pt received in the semi-Martini position, NAD.
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
## 2025-04-24 NOTE — PROGRESS NOTE ADULT - SUBJECTIVE AND OBJECTIVE BOX
Patient is a 64y old  Male who presents with a chief complaint of ESRD (22 Apr 2025 12:54)      pt is having on/off confusion  c/o headache, eyes pain not present now. Pt is aaox3 now. pt states he does not have any family. he is  from his wife Nelli, she was planning to move [place].He does not have any contact with her.   Pt denies cp,sob,abd pain  REVIEW OF SYSTEMS: All systems are reviewed and found to be negative except above--not relaible    MEDICATIONS  (STANDING):  acetaminophen     Tablet .. 975 milliGRAM(s) Oral every 8 hours  amLODIPine   Tablet 10 milliGRAM(s) Oral daily  artificial tears (preservative free) Ophthalmic Solution 2 Drop(s) Both EYES every 8 hours  atorvastatin 40 milliGRAM(s) Oral at bedtime  brimonidine 0.2% Ophthalmic Solution 1 Drop(s) Both EYES two times a day  chlorhexidine 2% Cloths 1 Application(s) Topical <User Schedule>  chlorhexidine 4% Liquid 1 Application(s) Topical <User Schedule>  dextrose 5%. 1000 milliLiter(s) (50 mL/Hr) IV Continuous <Continuous>  dextrose 5%. 1000 milliLiter(s) (100 mL/Hr) IV Continuous <Continuous>  dextrose 50% Injectable 12.5 Gram(s) IV Push once  dextrose 50% Injectable 25 Gram(s) IV Push once  dextrose 50% Injectable 25 Gram(s) IV Push once  dolutegravir 50 milliGRAM(s) Oral daily  dorzolamide 2% Ophthalmic Solution 1 Drop(s) Both EYES <User Schedule>  epoetin landry-epbx (RETACRIT) Injectable 4000 Unit(s) IV Push <User Schedule>  gabapentin 100 milliGRAM(s) Oral three times a day  glucagon  Injectable 1 milliGRAM(s) IntraMuscular once  insulin glargine Injectable (LANTUS) 10 Unit(s) SubCutaneous at bedtime  insulin lispro (ADMELOG) corrective regimen sliding scale   SubCutaneous three times a day before meals  lamiVUDine- milliGRAM(s) Oral daily  latanoprost 0.005% Ophthalmic Solution 1 Drop(s) Both EYES at bedtime  lidocaine   4% Patch 1 Patch Transdermal every 24 hours  metoprolol tartrate 50 milliGRAM(s) Oral two times a day  timolol 0.5% Solution 1 Drop(s) Both EYES two times a day  trimethoprim/polymyxin Solution 1 Drop(s) Both EYES daily    MEDICATIONS  (PRN):  aluminum hydroxide/magnesium hydroxide/simethicone Suspension 30 milliLiter(s) Oral every 4 hours PRN Dyspepsia  dextrose Oral Gel 15 Gram(s) Oral once PRN Blood Glucose LESS THAN 70 milliGRAM(s)/deciliter  hydrALAZINE Injectable 10 milliGRAM(s) IV Push every 6 hours PRN sbp>159  melatonin 3 milliGRAM(s) Oral at bedtime PRN Insomnia  ondansetron Injectable 4 milliGRAM(s) IV Push every 8 hours PRN Nausea and/or Vomiting  oxyCODONE    IR 10 milliGRAM(s) Oral every 6 hours PRN Severe Pain (7 - 10)  oxyCODONE    IR 5 milliGRAM(s) Oral every 6 hours PRN Moderate Pain (4 - 6)  sodium chloride 0.9% lock flush 10 milliLiter(s) IV Push every 1 hour PRN Pre/post blood products, medications, blood draw, and to maintain line patency  traMADol 25 milliGRAM(s) Oral every 8 hours PRN Mild Pain (1 - 3)      CAPILLARY BLOOD GLUCOSE      POCT Blood Glucose.: 163 mg/dL (24 Apr 2025 11:43)  POCT Blood Glucose.: 142 mg/dL (24 Apr 2025 07:45)  POCT Blood Glucose.: 156 mg/dL (23 Apr 2025 22:04)  POCT Blood Glucose.: 142 mg/dL (23 Apr 2025 17:06)    I&O's Summary    23 Apr 2025 07:01  -  24 Apr 2025 07:00  --------------------------------------------------------  IN: 980 mL / OUT: 800 mL / NET: 180 mL        PHYSICAL EXAM:  Vital Signs Last 24 Hrs  T(C): 36.7 (24 Apr 2025 12:46), Max: 37.1 (23 Apr 2025 20:45)  T(F): 98.1 (24 Apr 2025 12:46), Max: 98.7 (23 Apr 2025 20:45)  HR: 81 (24 Apr 2025 12:46) (71 - 94)  BP: 132/79 (24 Apr 2025 12:46) (119/62 - 181/66)  BP(mean): 86 (23 Apr 2025 19:00) (86 - 86)  RR: 18 (24 Apr 2025 12:46) (17 - 20)  SpO2: 92% (24 Apr 2025 12:46) (92% - 100%)    Parameters below as of 24 Apr 2025 12:46  Patient On (Oxygen Delivery Method): room air        CONSTITUTIONAL: NAD,  EYES: PERRLA; conjunctiva rt red-unable to do proper exam pt is non coop  ENMT: Moist oral mucosa,   RESPIRATORY: Normal respiratory effort; lungs are clear to auscultation bilaterally  CARDIOVASCULAR: Regular rate and rhythm, normal S1 and S2, no murmur   EXTS: No lower extremity edema; Peripheral pulses are 2+ bilaterally  ABDOMEN: Nontender to palpation, normoactive bowel sounds, no rebound/guarding;   MUSCLOSKELETAL:    no joint swelling or tenderness to palpation  PSYCH: non coop.no hallucitation present now  NEUROLOGY: A+O to person, place, and time; moving exam.limited exam,non coop      LABS:                        9.9    8.08  )-----------( 291      ( 24 Apr 2025 05:20 )             30.1     04-24    138  |  99  |  32.5[H]  ----------------------------<  149[H]  4.1   |  24.0  |  3.66[H]    Ca    8.7      24 Apr 2025 05:20            Urinalysis Basic - ( 24 Apr 2025 05:20 )    Color: x / Appearance: x / SG: x / pH: x  Gluc: 149 mg/dL / Ketone: x  / Bili: x / Urobili: x   Blood: x / Protein: x / Nitrite: x   Leuk Esterase: x / RBC: x / WBC x   Sq Epi: x / Non Sq Epi: x / Bacteria: x          RADIOLOGY & ADDITIONAL TESTS:  Results Reviewed:   < from: CT Head No Cont (04.23.25 @ 15:11) >  IMPRESSION: No evidence of acute hemorrhage, mass or mass effect.    < end of copied text >

## 2025-04-24 NOTE — PHYSICAL THERAPY INITIAL EVALUATION ADULT - ADDITIONAL COMMENTS
as per pt: resides in the apartment one level, no steps to enter, PTA ambulation as needed with use of SAC, pt reports loosing SAC, recently.
pt a poor hx at this time secondary to confusion, per previous PT eval on 4/1: as per pt: resides in the apartment, alone, one level, no steps to enter, PTA ambulation as needed with use of SAC, pt reports loosing SAC, recently.

## 2025-04-24 NOTE — PHYSICAL THERAPY INITIAL EVALUATION ADULT - IMPAIRMENTS CONTRIBUTING TO GAIT DEVIATIONS, PT EVAL
lost  of balance times one to the left in frontal plane, self recover with stepping strategy; impaired vision pt requesting pain meds due to pain I nBil eyes level not given/impaired balance
impaired balance/cognition/decreased strength

## 2025-04-24 NOTE — PHYSICAL THERAPY INITIAL EVALUATION ADULT - NSACTIVITYREC_GEN_A_PT
continue OOB and ambulation with assistance as tolerated
daily OOB by staff to prevent functional decline
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
## 2025-04-24 NOTE — PROGRESS NOTE ADULT - SUBJECTIVE AND OBJECTIVE BOX
Ethics consult initiated.    Discussion with PITER NAVA RN, MINA (Ethics Fellow) and FERNANDO Villasenor NP ( NP). Case discussed in detail. FERNANDO Villasenor NP informed the fellow that the patient reportedly has no known family other than an ex-spouse who he has indicated he does not want involved in his care. The patient is also reportedly refusing hemodialysis and other recommended medical treatments/interventions.    Chart reviewed. Ethics noted in Social Work notes that in prior admissions, the patient had named his ex-spouse as his emergency contact. Social Work previously engaged with he ex-spouse for discharge planning in prior admissions and noted that there may be other family residing in [place].    Discussion with PITER NAVA RN, MINA (Ethics Fellow) and FERNANDO Villasenor NP ( NP). FERNANDO Villasenor NP spoke with patient's , Basia Ansari, who informed her that the patient's ex-spouse previously reported she was the patient's Power of  (POA), but never provided paperwork. Furthermore, she shared that the patient had previously indicated he does not want health care personnel to speak with his ex-spouse.    Ethics attempted to call Nelli Edwards (patient's ex-spouse, [phone]) on 4/24/2025 at 1647. The fellow was unable to reach Ms. Edwards or leave a voicemail. Will attempt to reach out again on 4/25/25.

## 2025-04-24 NOTE — PHYSICAL THERAPY INITIAL EVALUATION ADULT - PLANNED THERAPY INTERVENTIONS, PT EVAL
balance training/bed mobility training/gait training/ROM/strengthening/transfer training
gait training

## 2025-04-24 NOTE — PROGRESS NOTE ADULT - ASSESSMENT
Ethics will attempt to reach out to Nelli Edwards to determine if the family may have other family who may want to be involved in his care and medical decision making.     It has been relayed to Ethics that the patient has been intermittently refusing medical interventions, specifically hemodialysis and blood draws. In this case, if a medical treatment or intervention is deemed to be emergent to save life or limb, it may be appropriate to proceed with that intervention against the patient’s objection. To compel any intervention absent this patient’s assent is ethically permissible in cases of life-threatening emergency. Treatment decisions should be made in this light.     Consult in progress.    Liberty NAVA RN, MBE  Ethics Fellow  [phone] Ethics will attempt to reach out to Nelli Edwards to determine if the patient has other family who may want to be involved in his care and medical decision making.     It has been relayed to Ethics that the patient has been intermittently refusing medical interventions, specifically hemodialysis and blood draws. In this case, if a medical treatment or intervention is deemed to be emergent to save life or limb, it may be appropriate to proceed with that intervention against the patient’s objection. To compel any intervention absent this patient’s assent is ethically permissible in cases of life-threatening emergency. Treatment decisions should be made in this light.     Consult in progress.    Liberty NAVA RN, MBE  Ethics Fellow  [phone]
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
## 2025-04-25 LAB
ANION GAP SERPL CALC-SCNC: 17 MMOL/L — SIGNIFICANT CHANGE UP (ref 5–17)
BUN SERPL-MCNC: 38.2 MG/DL — HIGH (ref 8–20)
CALCIUM SERPL-MCNC: 8.7 MG/DL — SIGNIFICANT CHANGE UP (ref 8.4–10.5)
CHLORIDE SERPL-SCNC: 101 MMOL/L — SIGNIFICANT CHANGE UP (ref 96–108)
CO2 SERPL-SCNC: 22 MMOL/L — SIGNIFICANT CHANGE UP (ref 22–29)
CREAT SERPL-MCNC: 4.92 MG/DL — HIGH (ref 0.5–1.3)
EGFR: 12 ML/MIN/1.73M2 — LOW
EGFR: 12 ML/MIN/1.73M2 — LOW
GLUCOSE BLDC GLUCOMTR-MCNC: 109 MG/DL — HIGH (ref 70–99)
GLUCOSE BLDC GLUCOMTR-MCNC: 139 MG/DL — HIGH (ref 70–99)
GLUCOSE BLDC GLUCOMTR-MCNC: 199 MG/DL — HIGH (ref 70–99)
GLUCOSE BLDC GLUCOMTR-MCNC: 240 MG/DL — HIGH (ref 70–99)
GLUCOSE SERPL-MCNC: 147 MG/DL — HIGH (ref 70–99)
HCT VFR BLD CALC: 31.2 % — LOW (ref 39–50)
HGB BLD-MCNC: 10.3 G/DL — LOW (ref 13–17)
MCHC RBC-ENTMCNC: 29 PG — SIGNIFICANT CHANGE UP (ref 27–34)
MCHC RBC-ENTMCNC: 33 G/DL — SIGNIFICANT CHANGE UP (ref 32–36)
MCV RBC AUTO: 87.9 FL — SIGNIFICANT CHANGE UP (ref 80–100)
NRBC # BLD AUTO: 0 K/UL — SIGNIFICANT CHANGE UP (ref 0–0)
NRBC # FLD: 0 K/UL — SIGNIFICANT CHANGE UP (ref 0–0)
NRBC BLD AUTO-RTO: 0 /100 WBCS — SIGNIFICANT CHANGE UP (ref 0–0)
PLATELET # BLD AUTO: 310 K/UL — SIGNIFICANT CHANGE UP (ref 150–400)
PMV BLD: 9.2 FL — SIGNIFICANT CHANGE UP (ref 7–13)
POTASSIUM SERPL-MCNC: 3.9 MMOL/L — SIGNIFICANT CHANGE UP (ref 3.5–5.3)
POTASSIUM SERPL-SCNC: 3.9 MMOL/L — SIGNIFICANT CHANGE UP (ref 3.5–5.3)
RBC # BLD: 3.55 M/UL — LOW (ref 4.2–5.8)
RBC # FLD: 14.3 % — SIGNIFICANT CHANGE UP (ref 10.3–14.5)
SODIUM SERPL-SCNC: 140 MMOL/L — SIGNIFICANT CHANGE UP (ref 135–145)
WBC # BLD: 8.52 K/UL — SIGNIFICANT CHANGE UP (ref 3.8–10.5)
WBC # FLD AUTO: 8.52 K/UL — SIGNIFICANT CHANGE UP (ref 3.8–10.5)

## 2025-04-25 PROCEDURE — 99232 SBSQ HOSP IP/OBS MODERATE 35: CPT

## 2025-04-25 PROCEDURE — 99233 SBSQ HOSP IP/OBS HIGH 50: CPT

## 2025-04-25 RX ORDER — OLANZAPINE 10 MG/1
5 TABLET ORAL EVERY 6 HOURS
Refills: 0 | Status: DISCONTINUED | OUTPATIENT
Start: 2025-04-25 | End: 2025-05-02

## 2025-04-25 RX ADMIN — METOPROLOL SUCCINATE 50 MILLIGRAM(S): 50 TABLET, EXTENDED RELEASE ORAL at 17:30

## 2025-04-25 RX ADMIN — LAMIVUDINE 100 MILLIGRAM(S): 10 SOLUTION ORAL at 12:34

## 2025-04-25 RX ADMIN — Medication 2 DROP(S): at 13:36

## 2025-04-25 RX ADMIN — DOLUTEGRAVIR SODIUM 50 MILLIGRAM(S): 5 TABLET, FOR SUSPENSION ORAL at 12:34

## 2025-04-25 RX ADMIN — Medication 975 MILLIGRAM(S): at 21:34

## 2025-04-25 RX ADMIN — INSULIN GLARGINE-YFGN 10 UNIT(S): 100 INJECTION, SOLUTION SUBCUTANEOUS at 21:34

## 2025-04-25 RX ADMIN — BRIMONIDINE TARTRATE 1 DROP(S): 1.5 SOLUTION/ DROPS OPHTHALMIC at 17:27

## 2025-04-25 RX ADMIN — GABAPENTIN 100 MILLIGRAM(S): 400 CAPSULE ORAL at 13:34

## 2025-04-25 RX ADMIN — Medication 975 MILLIGRAM(S): at 13:34

## 2025-04-25 RX ADMIN — OXYCODONE HYDROCHLORIDE 10 MILLIGRAM(S): 30 TABLET ORAL at 12:39

## 2025-04-25 RX ADMIN — Medication 975 MILLIGRAM(S): at 00:50

## 2025-04-25 RX ADMIN — POLYMYXIN B SULFATE AND TRIMETHOPRIM SULFATE 1 DROP(S): 10000; 1 SOLUTION/ DROPS OPHTHALMIC at 12:34

## 2025-04-25 RX ADMIN — EPOETIN ALFA 4000 UNIT(S): 10000 SOLUTION INTRAVENOUS; SUBCUTANEOUS at 13:33

## 2025-04-25 RX ADMIN — METOPROLOL SUCCINATE 50 MILLIGRAM(S): 50 TABLET, EXTENDED RELEASE ORAL at 05:09

## 2025-04-25 RX ADMIN — AMLODIPINE BESYLATE 10 MILLIGRAM(S): 10 TABLET ORAL at 05:09

## 2025-04-25 RX ADMIN — INSULIN LISPRO 1: 100 INJECTION, SOLUTION INTRAVENOUS; SUBCUTANEOUS at 12:35

## 2025-04-25 RX ADMIN — TIMOLOL MALEATE 1 DROP(S): 6.8 SOLUTION OPHTHALMIC at 17:27

## 2025-04-25 RX ADMIN — GABAPENTIN 100 MILLIGRAM(S): 400 CAPSULE ORAL at 05:09

## 2025-04-25 RX ADMIN — DORZOLAMIDE 1 DROP(S): 20 SOLUTION/ DROPS OPHTHALMIC at 08:45

## 2025-04-25 RX ADMIN — Medication 975 MILLIGRAM(S): at 05:09

## 2025-04-25 RX ADMIN — Medication 1 APPLICATION(S): at 05:09

## 2025-04-25 RX ADMIN — OXYCODONE HYDROCHLORIDE 10 MILLIGRAM(S): 30 TABLET ORAL at 13:09

## 2025-04-25 RX ADMIN — Medication 3 MILLIGRAM(S): at 21:35

## 2025-04-25 RX ADMIN — GABAPENTIN 100 MILLIGRAM(S): 400 CAPSULE ORAL at 21:35

## 2025-04-25 RX ADMIN — ATORVASTATIN CALCIUM 40 MILLIGRAM(S): 80 TABLET, FILM COATED ORAL at 21:35

## 2025-04-25 NOTE — CONSULT NOTE ADULT - ASSESSMENT
Conclusion: At this time, surrogacy for Mr. Edwards is unclear. Here[facility] to determine the appropriate surrogate decision maker for the patient, particularly given his waxing and waning capacity. However, it is currently unclear who could potentially serve in the role of surrogate for this patient as it is unclear if he remains legally  to Nelli Edwards. Additionally, he has been emphatic in his desire not to involve her in any medical decision making during this hospitalization. Furthermore, other potential surrogates have yet to be successfully identified.      Regardless of a surrogate, Mr. Edwards retains the right to refuse medical interventions, including hemodialysis, even if his capacity for medical decision making is impaired. IF a medical treatment or intervention is deemed to be emergent to save life or limb, it may be appropriate to proceed with that intervention against the patient’s objection. To compel any intervention absent this patient’s assent is ethically permissible in cases of life-threatening emergency. Treatment decisions should be made in this light. Previously, the patient has undergone hemodialysis with his assent in medically urgent situations, maintaining his care in line with his previously expressed wishes, given his consent to placement of a peritoneal catheter for long-term hemodialysis.      IF the patient is refusing and his condition is emergent, two physician consent for treatment is appropriate.      Specific processes must be followed for any non-urgent major medical decisions. Ethics can assist with this if necessary.     Continued efforts to engage the patient in discussions about his goals of care may facilitate a clearer understanding of his treatment preferences, especially considering his variable decision-making capacity and changing treatment objectives.     Thank you for including the Ethics Consultation Service. Ethics commends the team for their virtue in the care and support for Mr. Omari Edwards. Ethics will remain available for any discussions and decision points that may occur.  Conclusion: At this time, surrogacy for Mr. Edwards is unclear. [facility] appropriate surrogate decision maker for the patient, particularly given his waxing and waning capacity. However, it is currently unclear who could potentially serve in the role of surrogate for this patient as it is unclear if he remains legally  to Nelli Edwards. Additionally, he has been emphatic in his desire not to involve her in any medical decision making during this hospitalization. Furthermore, other potential surrogates have yet to be successfully identified.      Regardless of a surrogate, Mr. Edwards retains the right to refuse medical interventions, including hemodialysis, even if his capacity for medical decision making is impaired. IF a medical treatment or intervention is deemed to be emergent to save life or limb, it may be appropriate to proceed with that intervention against the patient’s objection. To compel any intervention absent this patient’s assent is ethically permissible in cases of life-threatening emergency. Treatment decisions should be made in this light. Previously, the patient has undergone hemodialysis with his assent in medically urgent situations, maintaining his care in line with his previously expressed wishes, given his consent to placement of a peritoneal catheter for long-term hemodialysis.      IF the patient is refusing and his condition is emergent, two physician consent for treatment is appropriate.      Specific processes must be followed for any non-urgent major medical decisions. Ethics can assist with this if necessary.     Continued efforts to engage the patient in discussions about his goals of care may facilitate a clearer understanding of his treatment preferences, especially considering his variable decision-making capacity and changing treatment objectives.     Thank you for including the Ethics Consultation Service. Ethics commends the team for their virtue in the care and support for Mr. Omari Edwards. Ethics will remain available for any discussions and decision points that may occur.       Case discussed with Ethics Attending: Chayito Lee MD, MS, MPH, [facility] ( of Medical Ethics)     More than 50% of the time of this consultation was spent in coordination of Care of Patient       References   (i) BALTAZAR TL & JASON JF. PRINCIPLES OF BIOMEDICAL ETHICS. [place] [employer]; 2019.   (ii) MHL § 80.01 ET SEQ. (2020); 14 NYCRR 27.9 (2020).   (iii) SW[place] PU[place] HEALTH LAW (PHL) ART. 29-CC (MICHELLE 2010); [place] FAMILY HEALTH CARE DEC[place], [place] STATE [facility] ASS[facility]. J., JUN. 2010, AT 18.   (iv) Family Health Care Decisions Act, [place] Public Health Law § 2994-g (2010).   (v) 14 [place]CRR 27.9(F).   (vi) 14 NYCRR 27.8 (2020)   (vii) 14 NYCRR 27.8(B)(I); 14 NYCRR 27.9(B).   (viii) 14 NYCRR 27.8(B)(I); 14 NYCRR 27.9(B).   (ix) 14 NYCRR 27.9(B).   (x) JOSHUA Allan. (1972). The tactics and ethics of persuasion. Attitudes, conflict, and social change, 81-99.??   (xi) JOSE ENRIQUE Byrd., & SURESH Up (2002). Treatment over Objection: Minds, Bodies and Beneficence. International Journal of Mental??Health Law, 7, 105-118. https://doi.org/10.47563/ijcl.v0i7.345   (xii) INFORMED CONSENT (INCLUDING MEDICAL DECISION MAKING FOR PATIENTS WHO LACK CAPACITY AND MINORS), [facility] POLICY 100.23, SYSTEM APPROVAL DATE 7.17.20.   (xiii) GIOVANI KUMAR (2001). A GLOSSARY FOR SOCIAL EPIDEMIOLOGY. J EPIDEMIOL COMMUNITY HEALTH, 55(10): 693–700  Conclusion: At this time, surrogacy for Mr. Edwards is unclear. Here, [facility] to determine the appropriate surrogate decision maker for the patient, particularly given his waxing and waning capacity. However, it is currently unclear who could potentially serve in the role of surrogate for this patient as it is unclear if he remains legally  to Nelli Edwards. Additionally, he has been emphatic in his desire not to involve her in any medical decision making during this hospitalization. Furthermore, other potential surrogates have yet to be successfully identified.      Regardless of a surrogate, Mr. Edwards retains the right to refuse medical interventions, including hemodialysis, even if his capacity for medical decision making is impaired. IF a medical treatment or intervention is deemed to be emergent to save life or limb, it may be appropriate to proceed with that intervention against the patient’s objection. To compel any intervention absent this patient’s assent is ethically permissible in cases of life-threatening emergency. Treatment decisions should be made in this light. Previously, the patient has undergone hemodialysis with his assent in medically urgent situations, maintaining his care in line with his previously expressed wishes, given his consent to placement of a peritoneal catheter for long-term hemodialysis.      IF the patient is refusing and his condition is emergent, two physician consent for treatment is appropriate.      Specific processes must be followed for any non-urgent major medical decisions. Ethics can assist with this if necessary.     Continued efforts to engage the patient in discussions about his goals of care may facilitate a clearer understanding of his treatment preferences, especially considering his variable decision-making capacity and changing treatment objectives.     Thank you for including the Ethics Consultation Service. Ethics commends the team for their virtue in the care and support for Mr. Omari Edwards. Ethics will remain available for any discussions and decision points that may occur.       Case discussed with Ethics Attending: Chayito Lee MD, MS, MPH, [facility] ( of Medical Ethics)     More than 50% of the time of this consultation was spent in coordination of Care of Patient       References   (i) BALTAZAR TL & JASON JF. PRINCIPLES OF BIOMEDICAL ETHICS. [place] [employer]; 2019.   (ii) MHL § 80.01 ET SEQ. (2020); 14 NYCRR 27.9 (2020).   (iii) [place] PU[place] HEALTH LAW (PHL) ART. 29-CC (MICHELLE 2010); [place] HEALTH [place] DEC[place], [place] [facility] ASSOC. J., JUN. 2010, AT 18.   (iv) Family Health Care Decisions Act, [place] Public Health [place] § 2994-g (2010).   (v) 14 [place]CRR 27.9(F).   (vi) JOSHUA Allan (1972). The tactics and ethics of persuasion. Attitudes, conflict, and social change, 81-99.??   (vii) JOSE ENRIQUE Byrd, & SURESH Up (2002). Treatment over Objection: Minds, Bodies and Beneficence. International Journal of Mental??Health Law, 7, 105-118. https://doi.org/10.03551/ijcl.v0i7.345   (viii) GIOVANI KUMAR (2001). A GLOSSARY FOR SOCIAL EPIDEMIOLOGY. J EPIDEMIOL COMMUNITY HEALTH, 55(10): 693–700  Conclusion: At this time, surrogacy for Mr. Edwards is unclear. [facility] to determine the appropriate surrogate decision maker for the patient, particularly given his waxing and waning capacity. However, it is currently unclear who could potentially serve in the role of surrogate for this patient as it is unclear if he remains legally  to Nelli Edwards. Additionally, he has been emphatic in his desire not to involve her in any medical decision making during this hospitalization. Furthermore, other potential surrogates have yet to be successfully identified.      Regardless of a surrogate, Mr. Edwards retains the right to refuse medical interventions, including hemodialysis, even if his capacity for medical decision making is impaired. IF a medical treatment or intervention is deemed to be emergent to save life or limb, it may be appropriate to proceed with that intervention against the patient’s objection. To compel any intervention absent this patient’s assent is ethically permissible in cases of life-threatening emergency. Treatment decisions should be made in this light. Previously, the patient has undergone hemodialysis with his assent in medically urgent situations, maintaining his care in line with his previously expressed wishes, given his consent to placement of a peritoneal catheter for long-term hemodialysis.      IF the patient is refusing and his condition is emergent, two physician consent for treatment is appropriate.      Specific processes must be followed for any non-urgent major medical decisions. Ethics can assist with this if necessary.     Continued efforts to engage the patient in discussions about his goals of care may facilitate a clearer understanding of his treatment preferences, especially considering his variable decision-making capacity and changing treatment objectives.     Thank you for including the Ethics Consultation Service. Ethics commends the team for their virtue in the care and support for Mr. Omari Edwards. Ethics will remain available for any discussions and decision points that may occur.     Case discussed with Ethics Attending: Chayito Lee MD, MS, MPH, [facility] ( of Medical Ethics)     More than 50% of the time of this consultation was spent in coordination of Care of Patient     References   (i) BALTAZAR TL & JASON JF. PRINCIPLES OF BIOMEDICAL ETHICS. [place] [employer]; 2019.   (ii) MHL § 80.01 ET SEQ. (2020); 14 [place]CRR 27.9 (2020).   (iii) SW[place]. [place] PUB[place] HEALTH LAW (PHL) ART. 29-CC (MICHELLE 2010); [place] HEALTH CARE DECIS[place], [place] [facility] ASSOC. J., JUN. 2010, AT 18.   (iv) Family Health Care Decisions Act, [place] Public Health [place] § 2994-g (2010).   (v) 14 [place]CRR 27.9(F).   (vi) JOSHUA Allan (1972). The tactics and ethics of persuasion. Attitudes, conflict, and social change, 81-99.??   (vii) JOSE ENRIQUE Byrd, & SURESH Up (2002). Treatment over Objection: Minds, Bodies and Beneficence. International Journal of Mental??Health Law, 7, 105-118. https://doi.org/10.29518/ijcl.v0i7.345   (viii) GIOVANI KUMAR (2001). A GLOSSARY FOR SOCIAL EPIDEMIOLOGY. J EPIDEMIOL COMMUNITY HEALTH, 55(10): 693–700

## 2025-04-25 NOTE — BH CONSULTATION LIAISON PROGRESS NOTE - NSBHFUPINTERVALHXFT_PSY_A_CORE
63M hx of CKD, IDDM, HIV on HAART, HFrEF, recent bilateral cataract surgery at [facility] presenting after fall from vehicle.63M hx of CKD, IDDM, HIV on HAART, HFrEF, recent bilateral cataract surgery at [facility] presenting after fall from vehicle. Started on hemodialysis for advanced CKD on 4/11/25.  Now refusing treatment and reportedly made suicidal statements to providers.    Psychiatry consulted for evaluation of suicidality.     Per EMR: VSS     Patient found resting in bed and responded to his name. Patient reports mood as "good". Patient states sleep as been poor citing some insomnia. Patient reports improving appetite. Patient does not endorse anxiety or feelings of depression. Per nursing, patient has not disclosed suicidal ideations and had not acute episodes of agitation since last assessment. Patient denies SI, HI, self injurious urges, AVH, and paranoia. Patient received PRN Melatonin 3 mg PO for insomnia 4/24/25 @ 22:01.  63M hx of CKD, IDDM, HIV on HAART, HFrEF, recent bilateral cataract surgery at [facility] presenting after fall from vehicle.63M hx of CKD, IDDM, HIV on HAART, HFrEF, recent bilateral cataract surgery at [facility] presenting after fall from vehicle. Started on hemodialysis for advanced CKD on 4/11/25.  Now refusing treatment and reportedly made suicidal statements to providers.    Psychiatry consulted for evaluation of suicidality.     Per EMR: VSS     Patient found resting in bed and responded to his name. Patient reports mood as "good". Patient states sleep as been poor citing some insomnia. Patient reports improving appetite. Patient does not endorse anxiety or feelings of depression. Per nursing, patient has not disclosed suicidal ideations and had no acute episodes of agitation since last assessment. Patient denies SI, HI, self injurious urges, AVH, and paranoia. Patient received PRN Melatonin 3 mg PO for insomnia 4/24/25 @ 22:01.

## 2025-04-25 NOTE — PROGRESS NOTE ADULT - SUBJECTIVE AND OBJECTIVE BOX
[facility] DIVISION OF KIDNEY DISEASES AND HYPERTENSION -- PROGRESS NOTE    SUBJECTIVE:   had HD on 4/23.refusing HD again today     MEDICATIONS    Standing Inpatient Medications  acetaminophen     Tablet .. 975 milliGRAM(s) Oral every 8 hours  amLODIPine   Tablet 10 milliGRAM(s) Oral daily  artificial tears (preservative free) Ophthalmic Solution 2 Drop(s) Both EYES every 8 hours  atorvastatin 40 milliGRAM(s) Oral at bedtime  brimonidine 0.2% Ophthalmic Solution 1 Drop(s) Both EYES two times a day  chlorhexidine 2% Cloths 1 Application(s) Topical <User Schedule>  chlorhexidine 4% Liquid 1 Application(s) Topical <User Schedule>  dextrose 5%. 1000 milliLiter(s) IV Continuous <Continuous>  dextrose 5%. 1000 milliLiter(s) IV Continuous <Continuous>  dextrose 50% Injectable 25 Gram(s) IV Push once  dextrose 50% Injectable 12.5 Gram(s) IV Push once  dextrose 50% Injectable 25 Gram(s) IV Push once  dolutegravir 50 milliGRAM(s) Oral daily  dorzolamide 2% Ophthalmic Solution 1 Drop(s) Both EYES <User Schedule>  epoetin landry-epbx (RETACRIT) Injectable 4000 Unit(s) IV Push <User Schedule>  gabapentin 100 milliGRAM(s) Oral three times a day  glucagon  Injectable 1 milliGRAM(s) IntraMuscular once  insulin glargine Injectable (LANTUS) 10 Unit(s) SubCutaneous at bedtime  insulin lispro (ADMELOG) corrective regimen sliding scale   SubCutaneous three times a day before meals  lamiVUDine- milliGRAM(s) Oral daily  latanoprost 0.005% Ophthalmic Solution 1 Drop(s) Both EYES at bedtime  lidocaine   4% Patch 1 Patch Transdermal every 24 hours  metoprolol tartrate 50 milliGRAM(s) Oral two times a day  timolol 0.5% Solution 1 Drop(s) Both EYES two times a day  trimethoprim/polymyxin Solution 1 Drop(s) Both EYES daily    PRN Inpatient Medications  aluminum hydroxide/magnesium hydroxide/simethicone Suspension 30 milliLiter(s) Oral every 4 hours PRN  dextrose Oral Gel 15 Gram(s) Oral once PRN  hydrALAZINE Injectable 10 milliGRAM(s) IV Push every 6 hours PRN  melatonin 3 milliGRAM(s) Oral at bedtime PRN  OLANZapine Injectable 5 milliGRAM(s) IntraMuscular every 6 hours PRN  ondansetron Injectable 4 milliGRAM(s) IV Push every 8 hours PRN  oxyCODONE    IR 10 milliGRAM(s) Oral every 6 hours PRN  oxyCODONE    IR 5 milliGRAM(s) Oral every 6 hours PRN  sodium chloride 0.9% lock flush 10 milliLiter(s) IV Push every 1 hour PRN  traMADol 25 milliGRAM(s) Oral every 8 hours PRN      VITALS/PHYSICAL EXAM  --------------------------------------------------------------------------------  T(C): 36.4 (04-25-25 @ 04:55), Max: 37 (04-24-25 @ 17:30)  HR: 73 (04-25-25 @ 04:55) (73 - 87)  BP: 152/69 (04-25-25 @ 04:55) (152/69 - 176/79)  RR: 18 (04-25-25 @ 04:55) (18 - 18)  SpO2: 95% (04-25-25 @ 04:55) (93% - 97%)  Wt(kg): --        04-24-25 @ 07:01  -  04-25-25 @ 07:00  --------------------------------------------------------  IN: 400 mL / OUT: 0 mL / NET: 400 mL      Physical Exam:  Alert and oriented x3   HEENT: normal  Pulm: CTA B/L  CV: normal S1S2; no murmur  Abd: soft, non-tender  Extremities- no edema    LABS/STUDIES  --------------------------------------------------------------------------------  140  |  101  |  38.2  ----------------------------<  147      [04-25-25 @ 04:38]  3.9   |  22.0  |  4.92        Ca     8.7     [04-25-25 @ 04:38]            Creatinine Trend:  SCr 4.92 [04-25 @ 04:38]  SCr 3.66 [04-24 @ 05:20]  SCr 5.89 [04-23 @ 13:05]  SCr 5.79 [04-22 @ 06:00]  SCr 5.39 [04-21 @ 05:40]

## 2025-04-25 NOTE — BH CONSULTATION LIAISON PROGRESS NOTE - NSBHCONSULTFOLLOWAFTERCARE_PSY_A_CORE FT
Continue current medication regimen while receiving care from primary medical team. Re-consult psychiatry as needed.  Per primary team, patient may be pending BRANDON; to follow-up with [facility]

## 2025-04-25 NOTE — CONSULT NOTE ADULT - CONSULT REQUESTED DATE/TIME
24-Apr-2025 09:00
01-Apr-2025
02-Apr-2025 14:45
30-Mar-2025
09-Apr-2025 13:37
22-Apr-2025 12:56
31-Mar-2025

## 2025-04-25 NOTE — PROGRESS NOTE ADULT - SUBJECTIVE AND OBJECTIVE BOX
Patient is a 64y old  Male who presents with a chief complaint of ESRD (22 Apr 2025 12:54)      pt is aaox3 now. pt states eyes pain much better. denies HD  REVIEW OF SYSTEMS: All systems are reviewed and found to be negative except above    MEDICATIONS  (STANDING):  acetaminophen     Tablet .. 975 milliGRAM(s) Oral every 8 hours  amLODIPine   Tablet 10 milliGRAM(s) Oral daily  artificial tears (preservative free) Ophthalmic Solution 2 Drop(s) Both EYES every 8 hours  atorvastatin 40 milliGRAM(s) Oral at bedtime  brimonidine 0.2% Ophthalmic Solution 1 Drop(s) Both EYES two times a day  chlorhexidine 2% Cloths 1 Application(s) Topical <User Schedule>  chlorhexidine 4% Liquid 1 Application(s) Topical <User Schedule>  dextrose 5%. 1000 milliLiter(s) (100 mL/Hr) IV Continuous <Continuous>  dextrose 5%. 1000 milliLiter(s) (50 mL/Hr) IV Continuous <Continuous>  dextrose 50% Injectable 25 Gram(s) IV Push once  dextrose 50% Injectable 12.5 Gram(s) IV Push once  dextrose 50% Injectable 25 Gram(s) IV Push once  dolutegravir 50 milliGRAM(s) Oral daily  dorzolamide 2% Ophthalmic Solution 1 Drop(s) Both EYES <User Schedule>  epoetin landry-epbx (RETACRIT) Injectable 4000 Unit(s) IV Push <User Schedule>  gabapentin 100 milliGRAM(s) Oral three times a day  glucagon  Injectable 1 milliGRAM(s) IntraMuscular once  insulin glargine Injectable (LANTUS) 10 Unit(s) SubCutaneous at bedtime  insulin lispro (ADMELOG) corrective regimen sliding scale   SubCutaneous three times a day before meals  lamiVUDine- milliGRAM(s) Oral daily  latanoprost 0.005% Ophthalmic Solution 1 Drop(s) Both EYES at bedtime  lidocaine   4% Patch 1 Patch Transdermal every 24 hours  metoprolol tartrate 50 milliGRAM(s) Oral two times a day  timolol 0.5% Solution 1 Drop(s) Both EYES two times a day  trimethoprim/polymyxin Solution 1 Drop(s) Both EYES daily    MEDICATIONS  (PRN):  aluminum hydroxide/magnesium hydroxide/simethicone Suspension 30 milliLiter(s) Oral every 4 hours PRN Dyspepsia  dextrose Oral Gel 15 Gram(s) Oral once PRN Blood Glucose LESS THAN 70 milliGRAM(s)/deciliter  hydrALAZINE Injectable 10 milliGRAM(s) IV Push every 6 hours PRN [id number]  melatonin 3 milliGRAM(s) Oral at bedtime PRN Insomnia  OLANZapine Injectable 5 milliGRAM(s) IntraMuscular every 6 hours PRN Agitation  ondansetron Injectable 4 milliGRAM(s) IV Push every 8 hours PRN Nausea and/or Vomiting  oxyCODONE    IR 10 milliGRAM(s) Oral every 6 hours PRN Severe Pain (7 - 10)  oxyCODONE    IR 5 milliGRAM(s) Oral every 6 hours PRN Moderate Pain (4 - 6)  sodium chloride 0.9% lock flush 10 milliLiter(s) IV Push every 1 hour PRN Pre/post blood products, medications, blood draw, and to maintain line patency  traMADol 25 milliGRAM(s) Oral every 8 hours PRN Mild Pain (1 - 3)      CAPILLARY BLOOD GLUCOSE      POCT Blood Glucose.: 199 mg/dL (25 Apr 2025 12:11)  POCT Blood Glucose.: 139 mg/dL (25 Apr 2025 08:58)  POCT Blood Glucose.: 161 mg/dL (24 Apr 2025 21:17)  POCT Blood Glucose.: 170 mg/dL (24 Apr 2025 17:16)    I&O's Summary    24 Apr 2025 07:01  -  25 Apr 2025 07:00  --------------------------------------------------------  IN: 400 mL / OUT: 0 mL / NET: 400 mL        PHYSICAL EXAM:  Vital Signs Last 24 Hrs  T(C): 36.4 (25 Apr 2025 04:55), Max: 37 (24 Apr 2025 17:30)  T(F): 97.5 (25 Apr 2025 04:55), Max: 98.6 (24 Apr 2025 17:30)  HR: 73 (25 Apr 2025 04:55) (73 - 87)  BP: 152/69 (25 Apr 2025 04:55) (152/69 - 176/79)  BP(mean): --  RR: 18 (25 Apr 2025 04:55) (18 - 18)  SpO2: 95% (25 Apr 2025 04:55) (93% - 97%)    Parameters below as of 25 Apr 2025 04:55  Patient On (Oxygen Delivery Method): room air        CONSTITUTIONAL: NAD,  EYES: -REFUSE EXAM  ENMT: Moist oral mucosa,   RESPIRATORY: Normal respiratory effort; lungs are clear to auscultation bilaterally  CARDIOVASCULAR: Regular rate and rhythm, normal S1 and S2, no murmur   EXTS: No lower extremity edema; Peripheral pulses are 2+ bilaterally  ABDOMEN: Nontender to palpation, normoactive bowel sounds, no rebound/guarding;   MUSCLOSKELETAL: no joint swelling or tenderness to palpation  PSYCH: CALM ,NON COOP  NEUROLOGY: A+O to person, place, and time; CN 2-12 are intact and symmetric; no gross sensory deficits;       LABS:                        10.3   8.52  )-----------( 310      ( 25 Apr 2025 04:38 )             31.2     04-25    140  |  101  |  38.2[H]  ----------------------------<  147[H]  3.9   |  22.0  |  4.92[H]    Ca    8.7      25 Apr 2025 04:38            Urinalysis Basic - ( 25 Apr 2025 04:38 )    Color: x / Appearance: x / SG: x / pH: x  Gluc: 147 mg/dL / Ketone: x  / Bili: x / Urobili: x   Blood: x / Protein: x / Nitrite: x   Leuk Esterase: x / RBC: x / WBC x   Sq Epi: x / Non Sq Epi: x / Bacteria: x          RADIOLOGY & ADDITIONAL TESTS:  Results Reviewed:

## 2025-04-25 NOTE — CONSULT NOTE ADULT - CONSULT REASON
To assist in the ethical dilemma posed by a 63-year-old male with significant cardiac and nephrological history, admitted after a fall, regarding surrogacy and refusal of medical interventions.
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
## 2025-04-25 NOTE — PROGRESS NOTE ADULT - ASSESSMENT
63yoM w/ PMHx of CKD, IDDM, HIV on HAART, HFrEF, recent bilateral cataract surgery at [facility] presenting after fall from vehicle. Patient without evidence of acute traumatic injury to head or c-spine. Patient found to have elevated troponin, chest pain seems to be more so from fall rather than cardiac in nature. seen by cardiology s/p NST neg,Cardiac CT with calcium score of 13. Continues on HD as per nephro, requires outpatient HD placement.On 04/07 renal biopsy showing diabetic nephropathy, s/p IR permacath. outpatient vasc eval for AVF vs graft. recent bilateral cataract surgery. 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 Seen by ophthalmologist, started eye drops per rec.. Patient will need to follow up with his own ophthalmologist. f/u cardiology,  Pt is refusing HD. Seen by Ethics, seen by psych for hallucination,ams whic is improved now. F/U ID out pt.      #BRIDGETTE on CKD3B  #Now ESRD requiring HD  #Hyperkalemia  Last HD 04/23  K stable today  s/p IR perma cath  s/p renal biopsy 4/7   renal biopsy showing diabetic nephropathy   outpatient vasc eval for AVF vs graft  HD per renal      #recent bilateral cataract surgery  ct with chronic lens dislocation vs vitreous hemorrhage   optho contacted by ED - as per optho: IOP will be chronically elevated iso vitreous hemorrhage  given pilocarpine, dorzolamide, brimonidine, timolol in ED -- as per optho, not to continue as IOP chronically elevated  pt to continue with polytrim drops  artifical tears  had scheduled procedure with his outside optho on 04/01, will need new appt  pt continues to report bad eye pain especially on the right, NP reached to ophth on call regarding recs: Sight MD: patient to follow up with own surgeon.   sight MD rec (04/22)reduce  prednisolone once a day and dc ketorolac 4 times a day.  start erythromycin   [facility] Dr[facility]Khurram Rasmussen (ophthalmologist) office [place] [phone]. gave my cell phone number for call back.(04/22).  DW Dr. Lockhart on 4/23, pt has procedure 01/16 with , never f/u rec ophthal eval   seen by Dr Das on 04/23, rec combigan gtt bid,latanoprost gtt qhs,Dorzolamide bid.rec f/u out pt ophthalmology     #HTN  partly due to med noncompliance;     Amlodipine 10mg QD, metoprolol 50mg bid, Valsartan 80mg daily  hydralazine po added     #Fall  ct head and cspine without evidence of traumatic injury  tylenol prn for pain control  PT recommending outpatient PT    #Elevated troponin  Cardiology on board, recs noted  Holding heparin and asa iso head trauma  TTE Reviewed  CT coronary done 4/1 noted with calcium score of 13  NST w/o ischemia/infarct.     #HIV  Cont meds    ID Consulted, recs noted  Started on Dolutegrair and epivir  continue until follows up outpatient with ID   VL >300k    #chronic hfpef  tte noted ef 50-55%  c/w metoprolol  On Valsartan  not on lasix at home, appears to be euvolemic    #Back pain  Lidoderm patch   cont Oxy  Ultram ordered     ams Likely delirium  acute metabolic encephalopathy  -pt stated that  he wants to die  -1:1observation. dc now per psych  -psych is following  -prn zyprexa for agitation  -cth no acute change     dvt ppx: scd  dispo: Pending mary placement  plan of care dw pt  DW nephrology, psych   63yoM w/ PMHx of CKD, IDDM, HIV on HAART, HFrEF, recent bilateral cataract surgery at [facility] presenting after fall from vehicle. Patient without evidence of acute traumatic injury to head or c-spine. Patient found to have elevated troponin, chest pain seems to be more so from fall rather than cardiac in nature. seen by cardiology s/p NST neg,Cardiac CT with calcium score of 13. Continues on HD as per nephro, requires outpatient HD placement.On 04/07 renal biopsy showing diabetic nephropathy, s/p IR permacath. outpatient vasc eval for AVF vs graft. recent bilateral cataract surgery. 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 Seen by ophthalmologist, started eye drops per rec.. Patient will need to follow up with his own ophthalmologist. f/u cardiology,  Pt is refusing HD. Seen by Ethics, seen by psych for hallucination,ams whic is improved now. F/U ID out pt.      #BRIDGETTE on CKD3B  #Now ESRD requiring HD  #Hyperkalemia  Last HD 04/23  K stable today  s/p IR perma cath  s/p renal biopsy 4/7   renal biopsy showing diabetic nephropathy   outpatient vasc eval for AVF vs graft  HD per renal      #recent bilateral cataract surgery  ct with chronic lens dislocation vs vitreous hemorrhage   optho contacted by ED - as per optho: IOP will be chronically elevated iso vitreous hemorrhage  given pilocarpine, dorzolamide, brimonidine, timolol in ED -- as per optho, not to continue as IOP chronically elevated  pt to continue with polytrim drops  artifical tears  had scheduled procedure with his outside optho on 04/01, will need new appt  pt continues to report bad eye pain especially on the right, NP reached to ophth on call regarding recs: Sight MD: patient to follow up with own surgeon.   sight MD rec (04/22)reduce  prednisolone once a day and dc ketorolac 4 times a day.  start erythromycin   [facility] Dr.Khurram Rasmussen (ophthalmologist) office [place] [phone]. gave my cell phone number for call back.(04/22).  DW Dr. Lockhart on 4/23, pt has procedure 01/16 with , never f/u rec ophthal eval   seen by Dr Das on 04/23, rec combigan gtt bid,latanoprost gtt qhs,Dorzolamide bid.rec f/u out pt ophthalmology     #HTN  partly due to med noncompliance;     Amlodipine 10mg QD, metoprolol 50mg bid, Valsartan 80mg daily  hydralazine po added     #Fall  ct head and cspine without evidence of traumatic injury  tylenol prn for pain control  PT recommending outpatient PT    #Elevated troponin  Cardiology on board, recs noted  Holding heparin and asa iso head trauma  TTE Reviewed  CT coronary done 4/1 noted with calcium score of 13  NST w/o ischemia/infarct.     #HIV  Cont meds    ID Consulted, recs noted  Started on Dolutegrair and epivir  continue until follows up outpatient with ID   VL >300k    #chronic hfpef  tte noted ef 50-55%  c/w metoprolol  On Valsartan  not on lasix at home, appears to be euvolemic    #Back pain  Lidoderm patch   cont Oxy  Ultram ordered     ams Likely delirium  acute metabolic encephalopathy  -pt stated that  he wants to die  -1:1observation. dc now per psych  -psych is following  -prn zyprexa for agitation  -cth no acute change     dvt ppx: scd  dispo: Pending mary placement  plan of care dw pt. Pt does not want Nelli(wife-separatedto involve his care  DW Ethics. will f/u rec  DW nephrology, psych

## 2025-04-25 NOTE — BH CONSULTATION LIAISON PROGRESS NOTE - NSBHASSESSMENTFT_PSY_ALL_CORE
63M hx of CKD, IDDM, HIV on HAART, HFrEF, recent bilateral cataract surgery at [facility] presenting after fall from vehicle.63M hx of CKD, IDDM, HIV on HAART, HFrEF, recent bilateral cataract surgery at [facility] presenting after fall from vehicle. Started on hemodialysis for advanced CKD on 4/11/25.  Now refusing treatment and reportedly made suicidal statements to providers.    Psychiatry consulted for evaluation of suicidality.     Patient seen by resident doctor and PA student, with 1:1 staff present. Patient found resting in bed and responded to his name. Patient reports mood as "good". Irritable affect and labile at times, not congruent with reported mood. Patient states sleep as been poor citing some insomnia. Patient reports improving appetite. Patient does not endorse anxiety or feelings of depression. Per nursing, patient has not disclosed suicidal ideations and had not acute episodes of agitation since last assessment. Patient intermittently refusing treatment and lab work, expressing poor insight and judgment. Patient oriented to person, place, situation, and partially to time (year only). Patient denies SI, HI, self injurious urges, AVH, and paranoia. Patient received PRN Melatonin 3 mg PO for insomnia 4/24/25 @ 22:01.     Recommend discontinuation of constant observation as the patient has not endorsed SI, HI, or urges for NSSIB. Psych to sign off, re-consult if necessary.     RECS  - DISCONTINUE 1:1 constant observation  - can utilize Zyprexa 5mg IM q6hrs PRN for agitation   - Continue Melatonin 3 mg PO at bedtime PRN for insomnia   - Maintain delirium precautions -- attempt to utilize lowest dosages possible of delirium causing meds (tramadol, oxycodone, etc)  	-Avoid anticholinergic agents, benzos, opioid as they can further perpetuate confusion   	-Frequent re orientation, hydration, try to avoid restraints and if possible, mobilize patient and PT involvement   	-promote adequate sleep and provide open window shades during daytime to assist with normalization of circadian rhythm 63M hx of CKD, IDDM, HIV on HAART, HFrEF, recent bilateral cataract surgery at [facility] presenting after fall from vehicle.63M hx of CKD, IDDM, HIV on HAART, HFrEF, recent bilateral cataract surgery at [facility] presenting after fall from vehicle. Started on hemodialysis for advanced CKD on 4/11/25.  Now refusing treatment and reportedly made suicidal statements to providers.    Psychiatry consulted for evaluation of suicidality.     Patient seen by resident doctor and PA student, with 1:1 staff present. Patient found resting in bed and responded to his name. Patient reports mood as "good". Irritable affect and labile at times, not congruent with reported mood. Patient states sleep as been poor citing some insomnia. Patient reports improving appetite. Patient does not endorse anxiety or feelings of depression. Per nursing, patient has not disclosed suicidal ideations and had not acute episodes of agitation since last assessment. Patient intermittently refusing treatment i.e. dialysis and lab work, expressing poor insight and judgment. Patient oriented to person, place, situation, and partially to time (year only); mental status does continue to (at times) wax and wane. Patient currently denies SI, HI, self injurious urges, AVH; no paranoia elicited. At this time, recommend discontinuation of constant 1:1 observation as the patient is not a risk of harm/danger to self or others.     RECS:  - DISCONTINUE 1:1 constant observation  - Recommend considering upgrade in supervision i.e. safety sitter during periods of sundowning and agitation as per primary team's discretion.  - ADD PRN Zyprexa 5mg IM q6hrs PRN for agitation   - Continue Melatonin 3mg PO at bedtime PRN for insomnia   - Maintain delirium precautions -- attempt to utilize lowest dosages possible of delirium causing meds (tramadol, oxycodone, etc)  	-Avoid anticholinergic agents, benzos, opioid as they can further perpetuate confusion   	-Frequent re orientation, hydration, try to avoid restraints and if possible, mobilize patient and PT involvement   	-promote adequate sleep and provide open window shades during daytime to assist with normalization of circadian rhythm 63M hx of CKD, IDDM, HIV on HAART, HFrEF, recent bilateral cataract surgery at [facility] presenting after fall from vehicle.63M hx of CKD, IDDM, HIV on HAART, HFrEF, recent bilateral cataract surgery at [facility] presenting after fall from vehicle. Started on hemodialysis for advanced CKD on 4/11/25.  Now refusing treatment and reportedly made suicidal statements to providers.    Psychiatry consulted for evaluation of suicidality.     Patient seen by resident doctor and PA student, with 1:1 staff present. Patient found resting in bed and responded to his name. Patient reports mood as "good". Irritable affect and labile at times, not congruent with reported mood. Patient states sleep as been poor citing some insomnia. Patient reports improving appetite. Patient does not endorse anxiety or feelings of depression. Per nursing, patient has not disclosed suicidal ideations and had no acute episodes of agitation since last assessment. Patient intermittently refusing treatment i.e. dialysis and lab work, expressing poor insight and judgment. Patient oriented to person, place, situation, and partially to time (year only); mental status does continue to (at times) wax and wane. Patient currently denies SI, HI, self injurious urges, AVH; no paranoia elicited. At this time, recommend discontinuation of constant 1:1 observation as the patient is not a risk of harm/danger to self or others.     RECS:  - DISCONTINUE 1:1 constant observation  - Recommend considering upgrade in supervision i.e. safety sitter during periods of sundowning and agitation as per primary team's discretion.  - ADD PRN Zyprexa 5mg IM q6hrs PRN for agitation   - Continue Melatonin 3mg PO at bedtime PRN for insomnia   - Maintain delirium precautions -- attempt to utilize lowest dosages possible of delirium causing meds (tramadol, oxycodone, etc)  	-Avoid anticholinergic agents, benzos, opioid as they can further perpetuate confusion   	-Frequent re orientation, hydration, try to avoid restraints and if possible, mobilize patient and PT involvement   	-promote adequate sleep and provide open window shades during daytime to assist with normalization of circadian rhythm

## 2025-04-25 NOTE — PROGRESS NOTE ADULT - ASSESSMENT
1. CKD stage V from biopsy proven diabetic nephropathy and podopathy, initiated on HD during this hospitalization. refused HD- last HD on 4/16 and then on 4/23. Appreciate psychiatric and ethics evaluation  2. HIV- VL > 300 k. per ID  3. Anemia: CKD and RITESH. on IV venofer. epo.  4. HTN: BP overall controlled  5. Hyperkalemia- better- off ARB

## 2025-04-25 NOTE — BH CONSULTATION LIAISON PROGRESS NOTE - OTHER
Patient legally blind  Right sided permacath  [place] thinking in context of treatment decisions

## 2025-04-26 LAB
GLUCOSE BLDC GLUCOMTR-MCNC: 104 MG/DL — HIGH (ref 70–99)
GLUCOSE BLDC GLUCOMTR-MCNC: 171 MG/DL — HIGH (ref 70–99)
GLUCOSE BLDC GLUCOMTR-MCNC: 190 MG/DL — HIGH (ref 70–99)
GLUCOSE BLDC GLUCOMTR-MCNC: 238 MG/DL — HIGH (ref 70–99)
GLUCOSE BLDC GLUCOMTR-MCNC: 261 MG/DL — HIGH (ref 70–99)
MAGNESIUM SERPL-MCNC: 2 MG/DL — SIGNIFICANT CHANGE UP (ref 1.6–2.6)
PHOSPHATE SERPL-MCNC: 4.9 MG/DL — HIGH (ref 2.4–4.7)

## 2025-04-26 PROCEDURE — 99232 SBSQ HOSP IP/OBS MODERATE 35: CPT

## 2025-04-26 RX ADMIN — GABAPENTIN 100 MILLIGRAM(S): 400 CAPSULE ORAL at 14:34

## 2025-04-26 RX ADMIN — Medication 975 MILLIGRAM(S): at 05:18

## 2025-04-26 RX ADMIN — ATORVASTATIN CALCIUM 40 MILLIGRAM(S): 80 TABLET, FILM COATED ORAL at 21:17

## 2025-04-26 RX ADMIN — OXYCODONE HYDROCHLORIDE 10 MILLIGRAM(S): 30 TABLET ORAL at 17:00

## 2025-04-26 RX ADMIN — OXYCODONE HYDROCHLORIDE 10 MILLIGRAM(S): 30 TABLET ORAL at 09:51

## 2025-04-26 RX ADMIN — DOLUTEGRAVIR SODIUM 50 MILLIGRAM(S): 5 TABLET, FOR SUSPENSION ORAL at 11:34

## 2025-04-26 RX ADMIN — OXYCODONE HYDROCHLORIDE 10 MILLIGRAM(S): 30 TABLET ORAL at 10:50

## 2025-04-26 RX ADMIN — Medication 975 MILLIGRAM(S): at 15:30

## 2025-04-26 RX ADMIN — GABAPENTIN 100 MILLIGRAM(S): 400 CAPSULE ORAL at 21:17

## 2025-04-26 RX ADMIN — Medication 1 APPLICATION(S): at 05:19

## 2025-04-26 RX ADMIN — METOPROLOL SUCCINATE 50 MILLIGRAM(S): 50 TABLET, EXTENDED RELEASE ORAL at 17:00

## 2025-04-26 RX ADMIN — LAMIVUDINE 100 MILLIGRAM(S): 10 SOLUTION ORAL at 11:34

## 2025-04-26 RX ADMIN — BRIMONIDINE TARTRATE 1 DROP(S): 1.5 SOLUTION/ DROPS OPHTHALMIC at 05:21

## 2025-04-26 RX ADMIN — INSULIN GLARGINE-YFGN 10 UNIT(S): 100 INJECTION, SOLUTION SUBCUTANEOUS at 21:16

## 2025-04-26 RX ADMIN — Medication 975 MILLIGRAM(S): at 21:16

## 2025-04-26 RX ADMIN — Medication 2 DROP(S): at 05:20

## 2025-04-26 RX ADMIN — INSULIN LISPRO 2: 100 INJECTION, SOLUTION INTRAVENOUS; SUBCUTANEOUS at 09:31

## 2025-04-26 RX ADMIN — Medication 3 MILLIGRAM(S): at 21:17

## 2025-04-26 RX ADMIN — METOPROLOL SUCCINATE 50 MILLIGRAM(S): 50 TABLET, EXTENDED RELEASE ORAL at 05:19

## 2025-04-26 RX ADMIN — GABAPENTIN 100 MILLIGRAM(S): 400 CAPSULE ORAL at 05:19

## 2025-04-26 RX ADMIN — Medication 975 MILLIGRAM(S): at 14:34

## 2025-04-26 RX ADMIN — INSULIN LISPRO 1: 100 INJECTION, SOLUTION INTRAVENOUS; SUBCUTANEOUS at 17:42

## 2025-04-26 RX ADMIN — AMLODIPINE BESYLATE 10 MILLIGRAM(S): 10 TABLET ORAL at 05:19

## 2025-04-26 RX ADMIN — TIMOLOL MALEATE 1 DROP(S): 6.8 SOLUTION OPHTHALMIC at 05:20

## 2025-04-26 RX ADMIN — OXYCODONE HYDROCHLORIDE 10 MILLIGRAM(S): 30 TABLET ORAL at 17:55

## 2025-04-26 RX ADMIN — POLYMYXIN B SULFATE AND TRIMETHOPRIM SULFATE 1 DROP(S): 10000; 1 SOLUTION/ DROPS OPHTHALMIC at 11:35

## 2025-04-26 NOTE — PROGRESS NOTE ADULT - ASSESSMENT
Further clarification of surrogate as well patient's goals of care is warranted and being investigated.     Please call with questions.   Chayito Lee MD  Ethics Attending  [phone]

## 2025-04-26 NOTE — PROGRESS NOTE ADULT - SUBJECTIVE AND OBJECTIVE BOX
RAFAEL KIRBYVIKTORIYA    [id number]    64y      Male    CC: ESRD    INTERVAL HPI/OVERNIGHT EVENTS: Pt seen and examined. No acute events reported o/n     REVIEW OF SYSTEMS:    CONSTITUTIONAL: No fever, weight loss  RESPIRATORY: No cough, wheezing, hemoptysis; No shortness of breath  CARDIOVASCULAR: No chest pain, palpitations  GASTROINTESTINAL: No abdominal or epigastric pain. No nausea, vomiting    Vital Signs Last 24 Hrs  T(C): 36.8 (26 Apr 2025 08:20), Max: 36.9 (25 Apr 2025 20:57)  T(F): 98.2 (26 Apr 2025 08:20), Max: 98.4 (25 Apr 2025 20:57)  HR: 80 (26 Apr 2025 08:20) (65 - 80)  BP: 135/67 (26 Apr 2025 08:20) (130/73 - 157/70)  BP(mean): --  RR: 18 (26 Apr 2025 08:20) (17 - 18)  SpO2: 94% (26 Apr 2025 08:20) (92% - 95%)    Parameters below as of 26 Apr 2025 08:20  Patient On (Oxygen Delivery Method): room air        PHYSICAL EXAM:    GENERAL: NAD  CHEST/LUNG: Clear to auscultation bilaterally  HEART: S1S2+, Regular rate and rhythm  ABDOMEN: Soft, Nontender, Nondistended  SKIN: warm, dry   NEURO: Awake, alert       LABS:                        10.3   8.52  )-----------( 310      ( 25 Apr 2025 04:38 )             31.2     04-25    140  |  101  |  38.2[H]  ----------------------------<  147[H]  3.9   |  22.0  |  4.92[H]    Ca    8.7      25 Apr 2025 04:38  Phos  4.9     04-26  Mg     2.0     04-26        Urinalysis Basic - ( 25 Apr 2025 04:38 )    Color: x / Appearance: x / SG: x / pH: x  Gluc: 147 mg/dL / Ketone: x  / Bili: x / Urobili: x   Blood: x / Protein: x / Nitrite: x   Leuk Esterase: x / RBC: x / WBC x   Sq Epi: x / Non Sq Epi: x / Bacteria: x          MEDICATIONS  (STANDING):  acetaminophen     Tablet .. 975 milliGRAM(s) Oral every 8 hours  amLODIPine   Tablet 10 milliGRAM(s) Oral daily  artificial tears (preservative free) Ophthalmic Solution 2 Drop(s) Both EYES every 8 hours  atorvastatin 40 milliGRAM(s) Oral at bedtime  brimonidine 0.2% Ophthalmic Solution 1 Drop(s) Both EYES two times a day  chlorhexidine 2% Cloths 1 Application(s) Topical <User Schedule>  chlorhexidine 4% Liquid 1 Application(s) Topical <User Schedule>  dextrose 5%. 1000 milliLiter(s) (50 mL/Hr) IV Continuous <Continuous>  dextrose 5%. 1000 milliLiter(s) (100 mL/Hr) IV Continuous <Continuous>  dextrose 50% Injectable 12.5 Gram(s) IV Push once  dextrose 50% Injectable 25 Gram(s) IV Push once  dextrose 50% Injectable 25 Gram(s) IV Push once  dolutegravir 50 milliGRAM(s) Oral daily  dorzolamide 2% Ophthalmic Solution 1 Drop(s) Both EYES <User Schedule>  epoetin landry-epbx (RETACRIT) Injectable 4000 Unit(s) IV Push <User Schedule>  gabapentin 100 milliGRAM(s) Oral three times a day  glucagon  Injectable 1 milliGRAM(s) IntraMuscular once  insulin glargine Injectable (LANTUS) 10 Unit(s) SubCutaneous at bedtime  insulin lispro (ADMELOG) corrective regimen sliding scale   SubCutaneous three times a day before meals  lamiVUDine- milliGRAM(s) Oral daily  latanoprost 0.005% Ophthalmic Solution 1 Drop(s) Both EYES at bedtime  lidocaine   4% Patch 1 Patch Transdermal every 24 hours  metoprolol tartrate 50 milliGRAM(s) Oral two times a day  timolol 0.5% Solution 1 Drop(s) Both EYES two times a day  trimethoprim/polymyxin Solution 1 Drop(s) Both EYES daily    MEDICATIONS  (PRN):  aluminum hydroxide/magnesium hydroxide/simethicone Suspension 30 milliLiter(s) Oral every 4 hours PRN Dyspepsia  dextrose Oral Gel 15 Gram(s) Oral once PRN Blood Glucose LESS THAN 70 milliGRAM(s)/deciliter  hydrALAZINE Injectable 10 milliGRAM(s) IV Push every 6 hours PRN sbp>159  melatonin 3 milliGRAM(s) Oral at bedtime PRN Insomnia  OLANZapine Injectable 5 milliGRAM(s) IntraMuscular every 6 hours PRN Agitation  ondansetron Injectable 4 milliGRAM(s) IV Push every 8 hours PRN Nausea and/or Vomiting  oxyCODONE    IR 10 milliGRAM(s) Oral every 6 hours PRN Severe Pain (7 - 10)  oxyCODONE    IR 5 milliGRAM(s) Oral every 6 hours PRN Moderate Pain (4 - 6)  sodium chloride 0.9% lock flush 10 milliLiter(s) IV Push every 1 hour PRN Pre/post blood products, medications, blood draw, and to maintain line patency      RADIOLOGY & ADDITIONAL TESTS:

## 2025-04-26 NOTE — PROGRESS NOTE ADULT - SUBJECTIVE AND OBJECTIVE BOX
[facility] DIVISION OF KIDNEY DISEASES AND HYPERTENSION -- PROGRESS NOTE    24 hour events/subjective:  Seen today   Doing well   Refusing HD today would like to get dialyzed tomorrow. Was informed that HD unit was closed tomorrow, agreeable to be dialyzed on Monday    MEDICATIONS    Standing Inpatient Medications  acetaminophen     Tablet .. 975 milliGRAM(s) Oral every 8 hours  amLODIPine   Tablet 10 milliGRAM(s) Oral daily  artificial tears (preservative free) Ophthalmic Solution 2 Drop(s) Both EYES every 8 hours  atorvastatin 40 milliGRAM(s) Oral at bedtime  brimonidine 0.2% Ophthalmic Solution 1 Drop(s) Both EYES two times a day  chlorhexidine 2% Cloths 1 Application(s) Topical <User Schedule>  chlorhexidine 4% Liquid 1 Application(s) Topical <User Schedule>  dextrose 5%. 1000 milliLiter(s) IV Continuous <Continuous>  dextrose 5%. 1000 milliLiter(s) IV Continuous <Continuous>  dextrose 50% Injectable 12.5 Gram(s) IV Push once  dextrose 50% Injectable 25 Gram(s) IV Push once  dextrose 50% Injectable 25 Gram(s) IV Push once  dolutegravir 50 milliGRAM(s) Oral daily  dorzolamide 2% Ophthalmic Solution 1 Drop(s) Both EYES <User Schedule>  epoetin landry-epbx (RETACRIT) Injectable 4000 Unit(s) IV Push <User Schedule>  gabapentin 100 milliGRAM(s) Oral three times a day  glucagon  Injectable 1 milliGRAM(s) IntraMuscular once  insulin glargine Injectable (LANTUS) 10 Unit(s) SubCutaneous at bedtime  insulin lispro (ADMELOG) corrective regimen sliding scale   SubCutaneous three times a day before meals  lamiVUDine- milliGRAM(s) Oral daily  latanoprost 0.005% Ophthalmic Solution 1 Drop(s) Both EYES at bedtime  lidocaine   4% Patch 1 Patch Transdermal every 24 hours  metoprolol tartrate 50 milliGRAM(s) Oral two times a day  timolol 0.5% Solution 1 Drop(s) Both EYES two times a day  trimethoprim/polymyxin Solution 1 Drop(s) Both EYES daily    PRN Inpatient Medications  aluminum hydroxide/magnesium hydroxide/simethicone Suspension 30 milliLiter(s) Oral every 4 hours PRN  dextrose Oral Gel 15 Gram(s) Oral once PRN  hydrALAZINE Injectable 10 milliGRAM(s) IV Push every 6 hours PRN  melatonin 3 milliGRAM(s) Oral at bedtime PRN  OLANZapine Injectable 5 milliGRAM(s) IntraMuscular every 6 hours PRN  ondansetron Injectable 4 milliGRAM(s) IV Push every 8 hours PRN  oxyCODONE    IR 10 milliGRAM(s) Oral every 6 hours PRN  oxyCODONE    IR 5 milliGRAM(s) Oral every 6 hours PRN  sodium chloride 0.9% lock flush 10 milliLiter(s) IV Push every 1 hour PRN      VITALS/PHYSICAL EXAM  --------------------------------------------------------------------------------  T(C): 36.8 (04-26-25 @ 08:20), Max: 36.9 (04-25-25 @ 20:57)  HR: 80 (04-26-25 @ 08:20) (65 - 80)  BP: 135/67 (04-26-25 @ 08:20) (130/73 - 157/70)  RR: 18 (04-26-25 @ 08:20) (17 - 18)  SpO2: 94% (04-26-25 @ 08:20) (92% - 95%)  Wt(kg): --        04-25-25 @ 07:01  -  04-26-25 @ 07:00  --------------------------------------------------------  IN: 600 mL / OUT: 0 mL / NET: 600 mL      Physical Exam:  Gen: NAD, well-appearing  HEENT: normal  Pulm: CTA B/L  CV: normal S1S2; no rub, no edema  Abd: soft, non-tender  MSK no deformities   Neuro: Alert and oriented x3     LABS/STUDIES  --------------------------------------------------------------------------------  140  |  101  |  38.2  ----------------------------<  147      [04-25-25 @ 04:38]  3.9   |  22.0  |  4.92        Ca     8.7     [04-25-25 @ 04:38]      Mg     2.0     [04-26-25 @ 10:00]      Phos  4.9     [04-26-25 @ 10:00]            Creatinine Trend:  SCr 4.92 [04-25 @ 04:38]  SCr 3.66 [04-24 @ 05:20]  SCr 5.89 [04-23 @ 13:05]  SCr 5.79 [04-22 @ 06:00]  SCr 5.39 [04-21 @ 05:40]

## 2025-04-26 NOTE — PROGRESS NOTE ADULT - ASSESSMENT
63y/oM PMH CKD, IDDM, HIV on HAART, HFrEF, recent bilateral cataract surgery at [facility] presenting after fall from vehicle. Patient without evidence of acute traumatic injury to head or c-spine. Patient found to have elevated troponin, chest pain seems to be more so from fall rather than cardiac in nature. seen by cardiology s/p negative NST, Cardiac CT with calcium score of 13. On 04/07 renal biopsy showing diabetic nephropathy, s/p IR permacath and started on HD. outpatient vasc eval for AVF vs graft. 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 Seen by ophthalmologist, started eye drops per recs. Patient will need to follow up with his own ophthalmologist. Hospital course complicated by pt refusing HD. Ethics following. Pt also seen by psych for hallucinations, ams now improved.      BRIDGETTE on CKD3B  Now ESRD requiring HD  Hyperkalemia  -Last HD 04/23  -s/p IR perma cath  -s/p renal biopsy 4/7; showing diabetic nephropathy   -outpatient vasc eval for AVF vs graft  -HD per renal, pt now intermittently refusing    Recent bilateral cataract surgery  -Ct with chronic lens dislocation vs vitreous hemorrhage   -Optho contacted by ED - as per optho: IOP will be chronically elevated iso vitreous hemorrhage  -Given pilocarpine, dorzolamide, brimonidine, timolol in ED -- as per optho, not to continue as IOP chronically elevated  -completed course polytrim drops  -cont artifical tears  -had scheduled procedure with his outside optho on 04/01, will need new appt  -pt continues to report bad eye pain especially on the right, NP reached to ophth on call regarding recs: Tanisha MCCLURE: patient to follow up with own surgeon.   -Tanisha MCCLURE rec (04/22)reduce prednisolone once a day and dc ketorolac 4 times a day. started on erythromycin , now completed  [facility] Dr[facility]hurram [facility]asm[facility] (ophthalmologist) office [place] [phone]. D/w Dr. Lockhart on 4/23, pt hds procedure 01/16 with , never f/u rec ophthal eval   -seen by Dr Das on 04/23, rec combigan gtt bid, latanoprost gtt qhs, Dorzolamide bid. rec f/u out pt ophthalmology     HTN  -partly due to med noncompliance  -cont Amlodipine 10mg QD, metoprolol 50mg bid, Valsartan 80mg daily  -hydralazine po added     s/p Fall  -CT head and cspine without evidence of traumatic injury  -pain control  -PT recommending outpatient PT    Elevated troponin  -Cardiology on board, recs noted  -Holding heparin and asa iso head trauma  -TTE Reviewed  -CT coronary done 4/1 noted with calcium score of 13  -NST w/o ischemia/infarct.     HIV  -ID Consulted, recs noted  -Dolutegrair and epivir  -continue until follows up outpatient with ID   -VL >300k    Chronic HFpEF  -TTE: EF 50-55%  -c/w metoprolol, Valsartan  -not on lasix at home    Back pain  -Lidoderm patch, pain control    Acute metabolic encephalopathy  Delirium  SI  -s/p 1:1 observation   -psych recs appreciated   -prn zyprexa for agitation  -cth no acute change     vte ppx: scds    dispo: eventual mary placement    Pt does not want Nelli (wife- involved in his care)    Ethics following

## 2025-04-26 NOTE — PROGRESS NOTE ADULT - ASSESSMENT
63 YOM DM, HIV on HAART admitted after fall.  Nephrology consulted for progressive worsening CKD.    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  Placed on IV venofer. epo.  HTN: BP controlled- continue amlodipine, metoprolol, Hydralazine  HIV on Bikatrvy- meds changed as per ID  DM: discontinued metformin (low GFR), placed on Lantus + SSI ACHS    Plan   Patient is refusing HD today   He has no capacity   Will continue to monitor hemodialysis needs daily   Daily labs   Will follow

## 2025-04-26 NOTE — PROGRESS NOTE ADULT - SUBJECTIVE AND OBJECTIVE BOX
Ethics continues to follow. See consult on 4/25/25.     Case discussed in detail including social complexities with Dr. Fuentes (Medicine Attending) and PITER RAUSCHE (Ethics Fellow). At present, Ethics is attempting to clarify surrogacy as chart review has documented that the patient has always noted that Nelli, his ex/estranged spouse was his emergency contact as well as support person. Investigating if a HCP form exists at [facility].

## 2025-04-27 LAB
ANION GAP SERPL CALC-SCNC: 13 MMOL/L — SIGNIFICANT CHANGE UP (ref 5–17)
BUN SERPL-MCNC: 47.7 MG/DL — HIGH (ref 8–20)
CALCIUM SERPL-MCNC: 8.2 MG/DL — LOW (ref 8.4–10.5)
CHLORIDE SERPL-SCNC: 104 MMOL/L — SIGNIFICANT CHANGE UP (ref 96–108)
CO2 SERPL-SCNC: 22 MMOL/L — SIGNIFICANT CHANGE UP (ref 22–29)
CREAT SERPL-MCNC: 5.82 MG/DL — HIGH (ref 0.5–1.3)
EGFR: 10 ML/MIN/1.73M2 — LOW
EGFR: 10 ML/MIN/1.73M2 — LOW
GLUCOSE BLDC GLUCOMTR-MCNC: 136 MG/DL — HIGH (ref 70–99)
GLUCOSE BLDC GLUCOMTR-MCNC: 204 MG/DL — HIGH (ref 70–99)
GLUCOSE BLDC GLUCOMTR-MCNC: 289 MG/DL — HIGH (ref 70–99)
GLUCOSE SERPL-MCNC: 144 MG/DL — HIGH (ref 70–99)
HCT VFR BLD CALC: 31 % — LOW (ref 39–50)
HGB BLD-MCNC: 10 G/DL — LOW (ref 13–17)
MAGNESIUM SERPL-MCNC: 1.9 MG/DL — SIGNIFICANT CHANGE UP (ref 1.6–2.6)
MCHC RBC-ENTMCNC: 29.2 PG — SIGNIFICANT CHANGE UP (ref 27–34)
MCHC RBC-ENTMCNC: 32.3 G/DL — SIGNIFICANT CHANGE UP (ref 32–36)
MCV RBC AUTO: 90.4 FL — SIGNIFICANT CHANGE UP (ref 80–100)
NRBC # BLD AUTO: 0 K/UL — SIGNIFICANT CHANGE UP (ref 0–0)
NRBC # FLD: 0 K/UL — SIGNIFICANT CHANGE UP (ref 0–0)
NRBC BLD AUTO-RTO: 0 /100 WBCS — SIGNIFICANT CHANGE UP (ref 0–0)
PHOSPHATE SERPL-MCNC: 5.1 MG/DL — HIGH (ref 2.4–4.7)
PLATELET # BLD AUTO: 281 K/UL — SIGNIFICANT CHANGE UP (ref 150–400)
PMV BLD: 8.7 FL — SIGNIFICANT CHANGE UP (ref 7–13)
POTASSIUM SERPL-MCNC: 4.3 MMOL/L — SIGNIFICANT CHANGE UP (ref 3.5–5.3)
POTASSIUM SERPL-SCNC: 4.3 MMOL/L — SIGNIFICANT CHANGE UP (ref 3.5–5.3)
RBC # BLD: 3.43 M/UL — LOW (ref 4.2–5.8)
RBC # FLD: 14.7 % — HIGH (ref 10.3–14.5)
SODIUM SERPL-SCNC: 138 MMOL/L — SIGNIFICANT CHANGE UP (ref 135–145)
WBC # BLD: 8.69 K/UL — SIGNIFICANT CHANGE UP (ref 3.8–10.5)
WBC # FLD AUTO: 8.69 K/UL — SIGNIFICANT CHANGE UP (ref 3.8–10.5)

## 2025-04-27 PROCEDURE — 99232 SBSQ HOSP IP/OBS MODERATE 35: CPT

## 2025-04-27 RX ADMIN — INSULIN LISPRO 3: 100 INJECTION, SOLUTION INTRAVENOUS; SUBCUTANEOUS at 13:04

## 2025-04-27 RX ADMIN — METOPROLOL SUCCINATE 50 MILLIGRAM(S): 50 TABLET, EXTENDED RELEASE ORAL at 06:19

## 2025-04-27 RX ADMIN — Medication 975 MILLIGRAM(S): at 13:06

## 2025-04-27 RX ADMIN — AMLODIPINE BESYLATE 10 MILLIGRAM(S): 10 TABLET ORAL at 06:19

## 2025-04-27 RX ADMIN — OXYCODONE HYDROCHLORIDE 10 MILLIGRAM(S): 30 TABLET ORAL at 03:05

## 2025-04-27 RX ADMIN — DORZOLAMIDE 1 DROP(S): 20 SOLUTION/ DROPS OPHTHALMIC at 09:26

## 2025-04-27 RX ADMIN — TIMOLOL MALEATE 1 DROP(S): 6.8 SOLUTION OPHTHALMIC at 17:32

## 2025-04-27 RX ADMIN — Medication 975 MILLIGRAM(S): at 06:19

## 2025-04-27 RX ADMIN — GABAPENTIN 100 MILLIGRAM(S): 400 CAPSULE ORAL at 06:19

## 2025-04-27 RX ADMIN — GABAPENTIN 100 MILLIGRAM(S): 400 CAPSULE ORAL at 13:06

## 2025-04-27 RX ADMIN — OXYCODONE HYDROCHLORIDE 10 MILLIGRAM(S): 30 TABLET ORAL at 17:41

## 2025-04-27 RX ADMIN — DOLUTEGRAVIR SODIUM 50 MILLIGRAM(S): 5 TABLET, FOR SUSPENSION ORAL at 13:12

## 2025-04-27 RX ADMIN — Medication 1 APPLICATION(S): at 06:19

## 2025-04-27 RX ADMIN — POLYMYXIN B SULFATE AND TRIMETHOPRIM SULFATE 1 DROP(S): 10000; 1 SOLUTION/ DROPS OPHTHALMIC at 13:07

## 2025-04-27 RX ADMIN — INSULIN LISPRO 2: 100 INJECTION, SOLUTION INTRAVENOUS; SUBCUTANEOUS at 17:29

## 2025-04-27 RX ADMIN — DORZOLAMIDE 1 DROP(S): 20 SOLUTION/ DROPS OPHTHALMIC at 20:19

## 2025-04-27 RX ADMIN — BRIMONIDINE TARTRATE 1 DROP(S): 1.5 SOLUTION/ DROPS OPHTHALMIC at 17:32

## 2025-04-27 RX ADMIN — LAMIVUDINE 100 MILLIGRAM(S): 10 SOLUTION ORAL at 13:12

## 2025-04-27 RX ADMIN — METOPROLOL SUCCINATE 50 MILLIGRAM(S): 50 TABLET, EXTENDED RELEASE ORAL at 17:32

## 2025-04-27 RX ADMIN — Medication 2 DROP(S): at 13:09

## 2025-04-27 RX ADMIN — OXYCODONE HYDROCHLORIDE 10 MILLIGRAM(S): 30 TABLET ORAL at 04:05

## 2025-04-27 NOTE — PROGRESS NOTE ADULT - ASSESSMENT
63y/oM PMH CKD, IDDM, HIV on HAART, HFrEF, recent bilateral cataract surgery at [facility] presenting after fall from vehicle. Patient without evidence of acute traumatic injury to head or c-spine. Patient found to have elevated troponin, chest pain seems to be more so from fall rather than cardiac in nature. seen by cardiology s/p negative NST, Cardiac CT with calcium score of 13. On 04/07 renal biopsy showing diabetic nephropathy, s/p IR permacath and started on HD. outpatient vasc eval for AVF vs graft. 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 Seen by ophthalmologist, started eye drops per recs. Patient will need to follow up with his own ophthalmologist. Hospital course complicated by pt refusing HD. Ethics following. Pt also seen by psych for hallucinations, ams now improved.      BRIDGETTE on CKD3B  Now ESRD requiring HD  Hyperkalemia  -Last HD 04/23  -s/p IR perma cath  -s/p renal biopsy 4/7; showing diabetic nephropathy   -outpatient vasc eval for AVF vs graft  -HD per renal, pt now intermittently refusing    Recent bilateral cataract surgery  -Ct with chronic lens dislocation vs vitreous hemorrhage   -Optho contacted by ED - as per optho: IOP will be chronically elevated iso vitreous hemorrhage  -Given pilocarpine, dorzolamide, brimonidine, timolol in ED -- as per optho, not to continue as IOP chronically elevated  -completed course polytrim drops  -cont artifical tears  -had scheduled procedure with his outside optho on 04/01, will need new appt  -pt continues to report bad eye pain especially on the right, NP reached to ophth on call regarding recs: Tanisha MCCLURE: patient to follow up with own surgeon.   -Tanisha MCCLURE rec (04/22)reduce prednisolone once a day and dc ketorolac 4 times a day. started on erythromycin , now completed  [facility]hurram [facility]asmussen (ophthalmologist) office [place] [phone]. D/w Dr. Lockhart on 4/23, pt hds procedure 01/16 with , never f/u rec ophthal eval   -seen by Dr Das on 04/23, rec combigan gtt bid, latanoprost gtt qhs, Dorzolamide bid. rec f/u out pt ophthalmology     HTN  -partly due to med noncompliance  -cont Amlodipine 10mg QD, metoprolol 50mg bid, Valsartan 80mg daily  -hydralazine po added     s/p Fall  -CT head and cspine without evidence of traumatic injury  -pain control  -PT recommending outpatient PT    Elevated troponin  -Cardiology on board, recs noted  -Holding heparin and asa iso head trauma  -TTE Reviewed  -CT coronary done 4/1 noted with calcium score of 13  -NST w/o ischemia/infarct.     HIV  -ID Consulted, recs noted  -Dolutegrair and epivir  -continue until follows up outpatient with ID   -VL >300k    Chronic HFpEF  -TTE: EF 50-55%  -c/w metoprolol, Valsartan  -not on lasix at home    Back pain  -Lidoderm patch, pain control    Acute metabolic encephalopathy  Delirium  SI  -s/p 1:1 observation   -psych recs appreciated   -prn zyprexa for agitation  -cth no acute change     vte ppx: scds    dispo: eventual mary placement    Pt does not want Nelli (wife- involved in his care)    Ethics following

## 2025-04-27 NOTE — PROGRESS NOTE ADULT - SUBJECTIVE AND OBJECTIVE BOX
[facility] DIVISION OF KIDNEY DISEASES AND HYPERTENSION -- PROGRESS NOTE    24 hour events/subjective:  Seen today   Labs reviewed, stable   Denies any complaints     MEDICATIONS    Standing Inpatient Medications  acetaminophen     Tablet .. 975 milliGRAM(s) Oral every 8 hours  amLODIPine   Tablet 10 milliGRAM(s) Oral daily  artificial tears (preservative free) Ophthalmic Solution 2 Drop(s) Both EYES every 8 hours  atorvastatin 40 milliGRAM(s) Oral at bedtime  brimonidine 0.2% Ophthalmic Solution 1 Drop(s) Both EYES two times a day  chlorhexidine 2% Cloths 1 Application(s) Topical <User Schedule>  chlorhexidine 4% Liquid 1 Application(s) Topical <User Schedule>  dextrose 5%. 1000 milliLiter(s) IV Continuous <Continuous>  dextrose 5%. 1000 milliLiter(s) IV Continuous <Continuous>  dextrose 50% Injectable 25 Gram(s) IV Push once  dextrose 50% Injectable 12.5 Gram(s) IV Push once  dextrose 50% Injectable 25 Gram(s) IV Push once  dolutegravir 50 milliGRAM(s) Oral daily  dorzolamide 2% Ophthalmic Solution 1 Drop(s) Both EYES <User Schedule>  epoetin landry-epbx (RETACRIT) Injectable 4000 Unit(s) IV Push <User Schedule>  gabapentin 100 milliGRAM(s) Oral three times a day  glucagon  Injectable 1 milliGRAM(s) IntraMuscular once  insulin glargine Injectable (LANTUS) 10 Unit(s) SubCutaneous at bedtime  insulin lispro (ADMELOG) corrective regimen sliding scale   SubCutaneous three times a day before meals  lamiVUDine- milliGRAM(s) Oral daily  latanoprost 0.005% Ophthalmic Solution 1 Drop(s) Both EYES at bedtime  lidocaine   4% Patch 1 Patch Transdermal every 24 hours  metoprolol tartrate 50 milliGRAM(s) Oral two times a day  timolol 0.5% Solution 1 Drop(s) Both EYES two times a day  trimethoprim/polymyxin Solution 1 Drop(s) Both EYES daily    PRN Inpatient Medications  aluminum hydroxide/magnesium hydroxide/simethicone Suspension 30 milliLiter(s) Oral every 4 hours PRN  dextrose Oral Gel 15 Gram(s) Oral once PRN  hydrALAZINE Injectable 10 milliGRAM(s) IV Push every 6 hours PRN  melatonin 3 milliGRAM(s) Oral at bedtime PRN  OLANZapine Injectable 5 milliGRAM(s) IntraMuscular every 6 hours PRN  ondansetron Injectable 4 milliGRAM(s) IV Push every 8 hours PRN  oxyCODONE    IR 10 milliGRAM(s) Oral every 6 hours PRN  oxyCODONE    IR 5 milliGRAM(s) Oral every 6 hours PRN  sodium chloride 0.9% lock flush 10 milliLiter(s) IV Push every 1 hour PRN      VITALS/PHYSICAL EXAM  --------------------------------------------------------------------------------  T(C): 36.8 (04-27-25 @ 08:14), Max: 36.8 (04-26-25 @ 17:05)  HR: 76 (04-27-25 @ 12:55) (64 - 78)  BP: 134/75 (04-27-25 @ 12:55) (124/71 - 156/72)  RR: 18 (04-27-25 @ 12:55) (17 - 18)  SpO2: 96% (04-27-25 @ 12:55) (92% - 98%)  Wt(kg): --        04-26-25 @ 07:01  -  04-27-25 @ 07:00  --------------------------------------------------------  IN: 200 mL / OUT: 700 mL / NET: -500 mL    04-27-25 @ 07:01  -  04-27-25 @ 15:05  --------------------------------------------------------  IN: 240 mL / OUT: 0 mL / NET: 240 mL      Physical Exam:  Gen: NAD, well-appearing  HEENT: normal  Pulm: CTA B/L  CV: normal S1S2; no rub, no edema  Abd: soft, non-tender  MSK no deformities   Neuro: Alert     LABS/STUDIES  --------------------------------------------------------------------------------  138  |  104  |  47.7  ----------------------------<  144      [04-27-25 @ 07:50]  4.3   |  22.0  |  5.82        Ca     8.2     [04-27-25 @ 07:50]      Mg     1.9     [04-27-25 @ 07:50]      Phos  5.1     [04-27-25 @ 07:50]            Creatinine Trend:  SCr 5.82 [04-27 @ 07:50]  SCr 4.92 [04-25 @ 04:38]  SCr 3.66 [04-24 @ 05:20]  SCr 5.89 [04-23 @ 13:05]  SCr 5.79 [04-22 @ 06:00]

## 2025-04-27 NOTE — PROGRESS NOTE ADULT - ASSESSMENT
63 YOM DM, HIV on HAART admitted after fall.  Nephrology consulted for progressive worsening CKD.    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  Placed on IV venofer. epo.  HTN: BP controlled- continue amlodipine, metoprolol, Hydralazine  HIV on Bikatrvy- meds changed as per ID  DM: discontinued metformin (low GFR), placed on Lantus + SSI ACHS    Plan   HD tomorrow 3 hrs  ml/min 2k bath Goal UF 1L if patient agrees   He has no capacity   Will continue to monitor hemodialysis needs daily   Daily labs   Will follow

## 2025-04-27 NOTE — PROGRESS NOTE ADULT - SUBJECTIVE AND OBJECTIVE BOX
RAFAEL GORDON    [id number]    64y      Male    CC:     INTERVAL HPI/OVERNIGHT EVENTS:    REVIEW OF SYSTEMS:    CONSTITUTIONAL: No fever, weight loss, or fatigue  RESPIRATORY: No cough, wheezing, hemoptysis; No shortness of breath  CARDIOVASCULAR: No chest pain, palpitations  GASTROINTESTINAL: No abdominal or epigastric pain. No nausea, vomiting  NEUROLOGICAL: No headaches, memory loss, loss of strength.    Vital Signs Last 24 Hrs  T(C): 36.8 (27 Apr 2025 08:14), Max: 36.8 (26 Apr 2025 17:05)  T(F): 98.2 (27 Apr 2025 08:14), Max: 98.2 (26 Apr 2025 17:05)  HR: 66 (27 Apr 2025 08:14) (64 - 78)  BP: 124/71 (27 Apr 2025 08:14) (124/71 - 156/72)  BP(mean): 84 (26 Apr 2025 17:05) (84 - 84)  RR: 18 (27 Apr 2025 08:14) (17 - 18)  SpO2: 95% (27 Apr 2025 08:14) (92% - 98%)    Parameters below as of 27 Apr 2025 05:58  Patient On (Oxygen Delivery Method): room air        PHYSICAL EXAM:    GENERAL: NAD  HEENT: PERRL, +EOMI  NECK: soft, supple  CHEST/LUNG: Clear to auscultation bilaterally; No wheezing  HEART: S1S2+, Regular rate and rhythm; No murmurs, rubs, or gallops  ABDOMEN: Soft, Nontender, Nondistended; Bowel sounds present  SKIN: No rashes or lesions  NEURO: AAOX3, no focal deficits, no motor or sensory loss  PSYCH: normal mood    LABS:                        10.0   8.69  )-----------( 281      ( 27 Apr 2025 07:50 )             31.0     04-27    138  |  104  |  47.7[H]  ----------------------------<  144[H]  4.3   |  22.0  |  5.82[H]    Ca    8.2[L]      27 Apr 2025 07:50  Phos  5.1     04-27  Mg     1.9     04-27        Urinalysis Basic - ( 27 Apr 2025 07:50 )    Color: x / Appearance: x / SG: x / pH: x  Gluc: 144 mg/dL / Ketone: x  / Bili: x / Urobili: x   Blood: x / Protein: x / Nitrite: x   Leuk Esterase: x / RBC: x / WBC x   Sq Epi: x / Non Sq Epi: x / Bacteria: x          MEDICATIONS  (STANDING):  acetaminophen     Tablet .. 975 milliGRAM(s) Oral every 8 hours  amLODIPine   Tablet 10 milliGRAM(s) Oral daily  artificial tears (preservative free) Ophthalmic Solution 2 Drop(s) Both EYES every 8 hours  atorvastatin 40 milliGRAM(s) Oral at bedtime  brimonidine 0.2% Ophthalmic Solution 1 Drop(s) Both EYES two times a day  chlorhexidine 2% Cloths 1 Application(s) Topical <User Schedule>  chlorhexidine 4% Liquid 1 Application(s) Topical <User Schedule>  dextrose 5%. 1000 milliLiter(s) (100 mL/Hr) IV Continuous <Continuous>  dextrose 5%. 1000 milliLiter(s) (50 mL/Hr) IV Continuous <Continuous>  dextrose 50% Injectable 25 Gram(s) IV Push once  dextrose 50% Injectable 12.5 Gram(s) IV Push once  dextrose 50% Injectable 25 Gram(s) IV Push once  dolutegravir 50 milliGRAM(s) Oral daily  dorzolamide 2% Ophthalmic Solution 1 Drop(s) Both EYES <User Schedule>  epoetin landry-epbx (RETACRIT) Injectable 4000 Unit(s) IV Push <User Schedule>  gabapentin 100 milliGRAM(s) Oral three times a day  glucagon  Injectable 1 milliGRAM(s) IntraMuscular once  insulin glargine Injectable (LANTUS) 10 Unit(s) SubCutaneous at bedtime  insulin lispro (ADMELOG) corrective regimen sliding scale   SubCutaneous three times a day before meals  lamiVUDine- milliGRAM(s) Oral daily  latanoprost 0.005% Ophthalmic Solution 1 Drop(s) Both EYES at bedtime  lidocaine   4% Patch 1 Patch Transdermal every 24 hours  metoprolol tartrate 50 milliGRAM(s) Oral two times a day  timolol 0.5% Solution 1 Drop(s) Both EYES two times a day  trimethoprim/polymyxin Solution 1 Drop(s) Both EYES daily    MEDICATIONS  (PRN):  aluminum hydroxide/magnesium hydroxide/simethicone Suspension 30 milliLiter(s) Oral every 4 hours PRN Dyspepsia  dextrose Oral Gel 15 Gram(s) Oral once PRN Blood Glucose LESS THAN 70 milliGRAM(s)/deciliter  hydrALAZINE Injectable 10 milliGRAM(s) IV Push every 6 hours PRN sbp>159  melatonin 3 milliGRAM(s) Oral at bedtime PRN Insomnia  OLANZapine Injectable 5 milliGRAM(s) IntraMuscular every 6 hours PRN Agitation  ondansetron Injectable 4 milliGRAM(s) IV Push every 8 hours PRN Nausea and/or Vomiting  oxyCODONE    IR 10 milliGRAM(s) Oral every 6 hours PRN Severe Pain (7 - 10)  oxyCODONE    IR 5 milliGRAM(s) Oral every 6 hours PRN Moderate Pain (4 - 6)  sodium chloride 0.9% lock flush 10 milliLiter(s) IV Push every 1 hour PRN Pre/post blood products, medications, blood draw, and to maintain line patency      RADIOLOGY & ADDITIONAL TESTS:   RAFAEL BEEBEROGERIO    [id number]    64y      Male    CC: ESRD    INTERVAL HPI/OVERNIGHT EVENTS: Pt seen and examined. No acute events reported o/n     REVIEW OF SYSTEMS:    RESPIRATORY: No cough, wheezing, hemoptysis; No shortness of breath  CARDIOVASCULAR: No chest pain, palpitations  GASTROINTESTINAL: No abdominal or epigastric pain. No nausea, vomiting  NEUROLOGICAL: No headaches    Vital Signs Last 24 Hrs  T(C): 36.8 (27 Apr 2025 08:14), Max: 36.8 (26 Apr 2025 17:05)  T(F): 98.2 (27 Apr 2025 08:14), Max: 98.2 (26 Apr 2025 17:05)  HR: 66 (27 Apr 2025 08:14) (64 - 78)  BP: 124/71 (27 Apr 2025 08:14) (124/71 - 156/72)  BP(mean): 84 (26 Apr 2025 17:05) (84 - 84)  RR: 18 (27 Apr 2025 08:14) (17 - 18)  SpO2: 95% (27 Apr 2025 08:14) (92% - 98%)    Parameters below as of 27 Apr 2025 05:58  Patient On (Oxygen Delivery Method): room air        PHYSICAL EXAM:    GENERAL: NAD  CHEST/LUNG: Clear to auscultation bilaterally  HEART: S1S2+, Regular rate and rhythm  ABDOMEN: Soft, Nontender, Nondistended  SKIN: warm, dry   NEURO: Awake, alert     LABS:                        10.0   8.69  )-----------( 281      ( 27 Apr 2025 07:50 )             31.0     04-27    138  |  104  |  47.7[H]  ----------------------------<  144[H]  4.3   |  22.0  |  5.82[H]    Ca    8.2[L]      27 Apr 2025 07:50  Phos  5.1     04-27  Mg     1.9     04-27        Urinalysis Basic - ( 27 Apr 2025 07:50 )    Color: x / Appearance: x / SG: x / pH: x  Gluc: 144 mg/dL / Ketone: x  / Bili: x / Urobili: x   Blood: x / Protein: x / Nitrite: x   Leuk Esterase: x / RBC: x / WBC x   Sq Epi: x / Non Sq Epi: x / Bacteria: x          MEDICATIONS  (STANDING):  acetaminophen     Tablet .. 975 milliGRAM(s) Oral every 8 hours  amLODIPine   Tablet 10 milliGRAM(s) Oral daily  artificial tears (preservative free) Ophthalmic Solution 2 Drop(s) Both EYES every 8 hours  atorvastatin 40 milliGRAM(s) Oral at bedtime  brimonidine 0.2% Ophthalmic Solution 1 Drop(s) Both EYES two times a day  chlorhexidine 2% Cloths 1 Application(s) Topical <User Schedule>  chlorhexidine 4% Liquid 1 Application(s) Topical <User Schedule>  dextrose 5%. 1000 milliLiter(s) (100 mL/Hr) IV Continuous <Continuous>  dextrose 5%. 1000 milliLiter(s) (50 mL/Hr) IV Continuous <Continuous>  dextrose 50% Injectable 25 Gram(s) IV Push once  dextrose 50% Injectable 12.5 Gram(s) IV Push once  dextrose 50% Injectable 25 Gram(s) IV Push once  dolutegravir 50 milliGRAM(s) Oral daily  dorzolamide 2% Ophthalmic Solution 1 Drop(s) Both EYES <User Schedule>  epoetin landry-epbx (RETACRIT) Injectable 4000 Unit(s) IV Push <User Schedule>  gabapentin 100 milliGRAM(s) Oral three times a day  glucagon  Injectable 1 milliGRAM(s) IntraMuscular once  insulin glargine Injectable (LANTUS) 10 Unit(s) SubCutaneous at bedtime  insulin lispro (ADMELOG) corrective regimen sliding scale   SubCutaneous three times a day before meals  lamiVUDine- milliGRAM(s) Oral daily  latanoprost 0.005% Ophthalmic Solution 1 Drop(s) Both EYES at bedtime  lidocaine   4% Patch 1 Patch Transdermal every 24 hours  metoprolol tartrate 50 milliGRAM(s) Oral two times a day  timolol 0.5% Solution 1 Drop(s) Both EYES two times a day  trimethoprim/polymyxin Solution 1 Drop(s) Both EYES daily    MEDICATIONS  (PRN):  aluminum hydroxide/magnesium hydroxide/simethicone Suspension 30 milliLiter(s) Oral every 4 hours PRN Dyspepsia  dextrose Oral Gel 15 Gram(s) Oral once PRN Blood Glucose LESS THAN 70 milliGRAM(s)/deciliter  hydrALAZINE Injectable 10 milliGRAM(s) IV Push every 6 hours PRN sbp>159  melatonin 3 milliGRAM(s) Oral at bedtime PRN Insomnia  OLANZapine Injectable 5 milliGRAM(s) IntraMuscular every 6 hours PRN Agitation  ondansetron Injectable 4 milliGRAM(s) IV Push every 8 hours PRN Nausea and/or Vomiting  oxyCODONE    IR 10 milliGRAM(s) Oral every 6 hours PRN Severe Pain (7 - 10)  oxyCODONE    IR 5 milliGRAM(s) Oral every 6 hours PRN Moderate Pain (4 - 6)  sodium chloride 0.9% lock flush 10 milliLiter(s) IV Push every 1 hour PRN Pre/post blood products, medications, blood draw, and to maintain line patency      RADIOLOGY & ADDITIONAL TESTS:

## 2025-04-28 LAB
ANION GAP SERPL CALC-SCNC: 14 MMOL/L — SIGNIFICANT CHANGE UP (ref 5–17)
BUN SERPL-MCNC: 48.7 MG/DL — HIGH (ref 8–20)
CALCIUM SERPL-MCNC: 8.3 MG/DL — LOW (ref 8.4–10.5)
CHLORIDE SERPL-SCNC: 102 MMOL/L — SIGNIFICANT CHANGE UP (ref 96–108)
CO2 SERPL-SCNC: 22 MMOL/L — SIGNIFICANT CHANGE UP (ref 22–29)
CREAT SERPL-MCNC: 5.68 MG/DL — HIGH (ref 0.5–1.3)
EGFR: 11 ML/MIN/1.73M2 — LOW
EGFR: 11 ML/MIN/1.73M2 — LOW
GLUCOSE BLDC GLUCOMTR-MCNC: 247 MG/DL — HIGH (ref 70–99)
GLUCOSE BLDC GLUCOMTR-MCNC: 274 MG/DL — HIGH (ref 70–99)
GLUCOSE SERPL-MCNC: 268 MG/DL — HIGH (ref 70–99)
HCT VFR BLD CALC: 30.1 % — LOW (ref 39–50)
HGB BLD-MCNC: 9.9 G/DL — LOW (ref 13–17)
MAGNESIUM SERPL-MCNC: 1.9 MG/DL — SIGNIFICANT CHANGE UP (ref 1.6–2.6)
MCHC RBC-ENTMCNC: 29.5 PG — SIGNIFICANT CHANGE UP (ref 27–34)
MCHC RBC-ENTMCNC: 32.9 G/DL — SIGNIFICANT CHANGE UP (ref 32–36)
MCV RBC AUTO: 89.6 FL — SIGNIFICANT CHANGE UP (ref 80–100)
NRBC # BLD AUTO: 0 K/UL — SIGNIFICANT CHANGE UP (ref 0–0)
NRBC # FLD: 0 K/UL — SIGNIFICANT CHANGE UP (ref 0–0)
NRBC BLD AUTO-RTO: 0 /100 WBCS — SIGNIFICANT CHANGE UP (ref 0–0)
PHOSPHATE SERPL-MCNC: 4.6 MG/DL — SIGNIFICANT CHANGE UP (ref 2.4–4.7)
PLATELET # BLD AUTO: 302 K/UL — SIGNIFICANT CHANGE UP (ref 150–400)
PMV BLD: 9 FL — SIGNIFICANT CHANGE UP (ref 7–13)
POTASSIUM SERPL-MCNC: 4.7 MMOL/L — SIGNIFICANT CHANGE UP (ref 3.5–5.3)
POTASSIUM SERPL-SCNC: 4.7 MMOL/L — SIGNIFICANT CHANGE UP (ref 3.5–5.3)
RBC # BLD: 3.36 M/UL — LOW (ref 4.2–5.8)
RBC # FLD: 14.5 % — SIGNIFICANT CHANGE UP (ref 10.3–14.5)
SODIUM SERPL-SCNC: 138 MMOL/L — SIGNIFICANT CHANGE UP (ref 135–145)
WBC # BLD: 7.89 K/UL — SIGNIFICANT CHANGE UP (ref 3.8–10.5)
WBC # FLD AUTO: 7.89 K/UL — SIGNIFICANT CHANGE UP (ref 3.8–10.5)

## 2025-04-28 PROCEDURE — 99232 SBSQ HOSP IP/OBS MODERATE 35: CPT

## 2025-04-28 PROCEDURE — 99233 SBSQ HOSP IP/OBS HIGH 50: CPT

## 2025-04-28 RX ADMIN — ATORVASTATIN CALCIUM 40 MILLIGRAM(S): 80 TABLET, FILM COATED ORAL at 21:51

## 2025-04-28 RX ADMIN — METOPROLOL SUCCINATE 50 MILLIGRAM(S): 50 TABLET, EXTENDED RELEASE ORAL at 18:08

## 2025-04-28 RX ADMIN — Medication 975 MILLIGRAM(S): at 14:24

## 2025-04-28 RX ADMIN — DORZOLAMIDE 1 DROP(S): 20 SOLUTION/ DROPS OPHTHALMIC at 08:26

## 2025-04-28 RX ADMIN — Medication 975 MILLIGRAM(S): at 22:50

## 2025-04-28 RX ADMIN — BRIMONIDINE TARTRATE 1 DROP(S): 1.5 SOLUTION/ DROPS OPHTHALMIC at 08:25

## 2025-04-28 RX ADMIN — DORZOLAMIDE 1 DROP(S): 20 SOLUTION/ DROPS OPHTHALMIC at 20:06

## 2025-04-28 RX ADMIN — GABAPENTIN 100 MILLIGRAM(S): 400 CAPSULE ORAL at 06:28

## 2025-04-28 RX ADMIN — BRIMONIDINE TARTRATE 1 DROP(S): 1.5 SOLUTION/ DROPS OPHTHALMIC at 18:10

## 2025-04-28 RX ADMIN — Medication 2 DROP(S): at 14:28

## 2025-04-28 RX ADMIN — Medication 1 APPLICATION(S): at 06:45

## 2025-04-28 RX ADMIN — GABAPENTIN 100 MILLIGRAM(S): 400 CAPSULE ORAL at 14:25

## 2025-04-28 RX ADMIN — INSULIN LISPRO 2: 100 INJECTION, SOLUTION INTRAVENOUS; SUBCUTANEOUS at 18:16

## 2025-04-28 RX ADMIN — Medication 2 DROP(S): at 06:28

## 2025-04-28 RX ADMIN — LAMIVUDINE 100 MILLIGRAM(S): 10 SOLUTION ORAL at 14:32

## 2025-04-28 RX ADMIN — INSULIN GLARGINE-YFGN 10 UNIT(S): 100 INJECTION, SOLUTION SUBCUTANEOUS at 21:52

## 2025-04-28 RX ADMIN — GABAPENTIN 100 MILLIGRAM(S): 400 CAPSULE ORAL at 21:52

## 2025-04-28 RX ADMIN — Medication 1 APPLICATION(S): at 14:34

## 2025-04-28 RX ADMIN — AMLODIPINE BESYLATE 10 MILLIGRAM(S): 10 TABLET ORAL at 06:29

## 2025-04-28 RX ADMIN — TIMOLOL MALEATE 1 DROP(S): 6.8 SOLUTION OPHTHALMIC at 18:09

## 2025-04-28 RX ADMIN — POLYMYXIN B SULFATE AND TRIMETHOPRIM SULFATE 1 DROP(S): 10000; 1 SOLUTION/ DROPS OPHTHALMIC at 14:26

## 2025-04-28 RX ADMIN — Medication 975 MILLIGRAM(S): at 21:52

## 2025-04-28 RX ADMIN — DOLUTEGRAVIR SODIUM 50 MILLIGRAM(S): 5 TABLET, FOR SUSPENSION ORAL at 14:24

## 2025-04-28 RX ADMIN — METOPROLOL SUCCINATE 50 MILLIGRAM(S): 50 TABLET, EXTENDED RELEASE ORAL at 06:29

## 2025-04-28 RX ADMIN — Medication 2 DROP(S): at 21:52

## 2025-04-28 RX ADMIN — TIMOLOL MALEATE 1 DROP(S): 6.8 SOLUTION OPHTHALMIC at 08:25

## 2025-04-28 RX ADMIN — LATANOPROST PF 1 DROP(S): 0.05 SOLUTION/ DROPS OPHTHALMIC at 21:52

## 2025-04-28 RX ADMIN — Medication 975 MILLIGRAM(S): at 06:28

## 2025-04-28 NOTE — PROGRESS NOTE ADULT - ASSESSMENT
Ethics to follow up with the patient this week. Will attempt to continue discussions regarding the patient's goals of care and determine if there is an available surrogate decision maker he wants involved in his care.    Mr. Edwards retains the right to refuse medical interventions, including hemodialysis, even if his capacity for medical decision making is impaired. IF a medical treatment or intervention is deemed to be emergent to save life or limb, it may be appropriate to proceed with that intervention against the patient’s objection. To compel any intervention absent this patient’s assent is ethically permissible in cases of life-threatening emergency. Treatment decisions should be made in this light.     IF the patient is refusing and his condition is emergent, two physician consent for treatment is appropriate.      Specific processes must be followed for any non-urgent major medical decisions. Ethics can assist with this if necessary.     Continued efforts to engage the patient in discussions about his goals of care may facilitate a clearer understanding of his treatment preferences, especially considering his variable decision-making capacity and changing treatment objectives.     Liberty NAVA RN, MBE  Ethics Fellow  [phone]

## 2025-04-28 NOTE — PROGRESS NOTE ADULT - ASSESSMENT
63y/oM PMH CKD, IDDM, HIV on HAART, HFrEF, recent bilateral cataract surgery at [facility] presenting after fall from vehicle. Patient without evidence of acute traumatic injury to head or c-spine. Patient found to have elevated troponin, chest pain seems to be more so from fall rather than cardiac in nature. seen by cardiology s/p negative NST, Cardiac CT with calcium score of 13. On 04/07 renal biopsy showing diabetic nephropathy, s/p IR permacath and started on HD. outpatient vasc eval for AVF vs graft. 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 Seen by ophthalmologist, started eye drops per recs. Patient will need to follow up with his own ophthalmologist. Hospital course complicated by pt refusing HD. Ethics following. Pt also seen by psych for hallucinations, ams now improved.      BRIDGETTE on CKD3B  Now ESRD requiring HD  Hyperkalemia  -Last HD 04/23  -s/p IR perma cath  -s/p renal biopsy 4/7; showing diabetic nephropathy   -outpatient vasc eval for AVF vs graft  -HD per renal, pt now intermittently refusing    Recent bilateral cataract surgery  -Ct with chronic lens dislocation vs vitreous hemorrhage   -Optho contacted by ED - as per optho: IOP will be chronically elevated iso vitreous hemorrhage  -Given pilocarpine, dorzolamide, brimonidine, timolol in ED -- as per optho, not to continue as IOP chronically elevated  -completed course polytrim drops  -cont artifical tears  -had scheduled procedure with his outside optho on 04/01, will need new appt  -pt continues to report bad eye pain especially on the right, NP reached to ophth on call regarding recs: Tanisha MCCLURE: patient to follow up with own surgeon.   -Tanisha MCCLURE rec (04/22)reduce prednisolone once a day and dc ketorolac 4 times a day. started on erythromycin , now completed  [facility] (ophthalmologist) office [place] [phone]. D/w Dr. Lockhart on 4/23, pt hds procedure 01/16 with , never f/u rec ophthal eval   -seen by Dr Das on 04/23, rec combigan gtt bid, latanoprost gtt qhs, Dorzolamide bid. rec f/u out pt ophthalmology     HTN  -partly due to med noncompliance  -cont Amlodipine 10mg QD, metoprolol 50mg bid, Valsartan 80mg daily  -hydralazine po added     s/p Fall  -CT head and cspine without evidence of traumatic injury  -pain control  -PT recommending outpatient PT    Elevated troponin  -Cardiology on board, recs noted  -Holding heparin and asa iso head trauma  -TTE Reviewed  -CT coronary done 4/1 noted with calcium score of 13  -NST w/o ischemia/infarct.     HIV  -ID Consulted, recs noted  -Dolutegrair and epivir  -continue until follows up outpatient with ID   -VL >300k    Chronic HFpEF  -TTE: EF 50-55%  -c/w metoprolol, Valsartan  -not on lasix at home    Back pain  -Lidoderm patch, pain control    Acute metabolic encephalopathy  Delirium  SI  -s/p 1:1 observation   -psych recs appreciated   -prn zyprexa for agitation  -cth no acute change     vte ppx: scds    dispo: eventual mary placement    Pt does not want Nelli (wife- involved in his care)    Ethics following

## 2025-04-28 NOTE — PROGRESS NOTE ADULT - SUBJECTIVE AND OBJECTIVE BOX
Ethics continues to follow. See consult on 4/25/25.    Case discussed in detail with STACY Carpenter LCSW (Complex ). Social complexities discussed. Patient is intermittently declining medical care/interventions/treatments, capacity waxing and waning, no identified surrogate decision maker at this time as patient has indicated he does not want his former wife, Nelli, to be involved.    Discussion with Caroline Ansari ( [facility], [phone]). Ms. Ansari informed the fellow that prior to this admission, the patient was residing in an apartment that [facility] assisted him in acquiring. Per her report, [facility] helps pay for a portion of the patient's rent, not [facility]. Ms. Ansari was recently informed that the patient may behind on his rent, but she made sure the organization is aware of his admission to the hospital. Regarding the patient's social complexities, Ms. Ansari stated that she is not positive the patient is legally , only that he has always referred to Nelli as his ex-wife and he does not want her involved in his life. In reference to his refusal, Ms. Ansari made the fellow aware that the patient has a long history of refusing care, though he is intermittently amenable, particularly if he has a strong, trusting relationship with one individual who can be present and support him throughout each treatment/intervention.

## 2025-04-28 NOTE — PROGRESS NOTE ADULT - ASSESSMENT
63 YOM DM, HIV on HAART admitted after fall.  Nephrology consulted for progressive worsening CKD.    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  Placed on IV venofer. epo.  HTN: BP controlled- continue amlodipine, metoprolol, Hydralazine  HIV on Bikatrvy- meds changed as per ID  DM: discontinued metformin (low GFR), placed on Lantus + SSI ACHS    Plan   HD today; pt however has been refusing treatments  dw pt importance of compliance with HD regimen  he says he will ' think about it'  monitor labs  HD as per prescription if pt agreeable.

## 2025-04-28 NOTE — PROGRESS NOTE ADULT - SUBJECTIVE AND OBJECTIVE BOX
RAFAEL GORDON    [id number]    64y      Male    CC: esrd    INTERVAL HPI/OVERNIGHT EVENTS: Pt seen and examined. no acute events reported o/n     REVIEW OF SYSTEMS:    CONSTITUTIONAL: No fever, weight loss  RESPIRATORY: No cough, wheezing, hemoptysis; No shortness of breath  CARDIOVASCULAR: No chest pain, palpitations  GASTROINTESTINAL: No abdominal or epigastric pain. No nausea, vomiting  NEUROLOGICAL: No headaches    Vital Signs Last 24 Hrs  T(C): 36.7 (28 Apr 2025 12:00), Max: 37.2 (28 Apr 2025 00:29)  T(F): 98.1 (28 Apr 2025 12:00), Max: 99 (28 Apr 2025 00:29)  HR: 73 (28 Apr 2025 12:00) (67 - 82)  BP: 142/69 (28 Apr 2025 12:00) (131/63 - 176/86)  BP(mean): 93 (27 Apr 2025 16:49) (93 - 93)  RR: 18 (28 Apr 2025 12:00) (17 - 18)  SpO2: 95% (28 Apr 2025 12:00) (94% - 96%)    Parameters below as of 28 Apr 2025 12:00  Patient On (Oxygen Delivery Method): room air        PHYSICAL EXAM:    GENERAL: NAD  CHEST/LUNG: Clear to auscultation bilaterally  HEART: S1S2+, Regular rate and rhythm  ABDOMEN: Soft, Nontender, Nondistended  SKIN: warm, dry   NEURO: Awake, alert     LABS:                        9.9    7.89  )-----------( 302      ( 28 Apr 2025 04:18 )             30.1     04-28    138  |  102  |  48.7[H]  ----------------------------<  268[H]  4.7   |  22.0  |  5.68[H]    Ca    8.3[L]      28 Apr 2025 04:18  Phos  4.6     04-28  Mg     1.9     04-28        Urinalysis Basic - ( 28 Apr 2025 04:18 )    Color: x / Appearance: x / SG: x / pH: x  Gluc: 268 mg/dL / Ketone: x  / Bili: x / Urobili: x   Blood: x / Protein: x / Nitrite: x   Leuk Esterase: x / RBC: x / WBC x   Sq Epi: x / Non Sq Epi: x / Bacteria: x          MEDICATIONS  (STANDING):  acetaminophen     Tablet .. 975 milliGRAM(s) Oral every 8 hours  amLODIPine   Tablet 10 milliGRAM(s) Oral daily  artificial tears (preservative free) Ophthalmic Solution 2 Drop(s) Both EYES every 8 hours  atorvastatin 40 milliGRAM(s) Oral at bedtime  brimonidine 0.2% Ophthalmic Solution 1 Drop(s) Both EYES two times a day  chlorhexidine 2% Cloths 1 Application(s) Topical <User Schedule>  chlorhexidine 4% Liquid 1 Application(s) Topical <User Schedule>  dextrose 5%. 1000 milliLiter(s) (100 mL/Hr) IV Continuous <Continuous>  dextrose 5%. 1000 milliLiter(s) (50 mL/Hr) IV Continuous <Continuous>  dextrose 50% Injectable 25 Gram(s) IV Push once  dextrose 50% Injectable 12.5 Gram(s) IV Push once  dextrose 50% Injectable 25 Gram(s) IV Push once  dolutegravir 50 milliGRAM(s) Oral daily  dorzolamide 2% Ophthalmic Solution 1 Drop(s) Both EYES <User Schedule>  epoetin landry-epbx (RETACRIT) Injectable 4000 Unit(s) IV Push <User Schedule>  gabapentin 100 milliGRAM(s) Oral three times a day  glucagon  Injectable 1 milliGRAM(s) IntraMuscular once  insulin glargine Injectable (LANTUS) 10 Unit(s) SubCutaneous at bedtime  insulin lispro (ADMELOG) corrective regimen sliding scale   SubCutaneous three times a day before meals  lamiVUDine- milliGRAM(s) Oral daily  latanoprost 0.005% Ophthalmic Solution 1 Drop(s) Both EYES at bedtime  lidocaine   4% Patch 1 Patch Transdermal every 24 hours  metoprolol tartrate 50 milliGRAM(s) Oral two times a day  timolol 0.5% Solution 1 Drop(s) Both EYES two times a day  trimethoprim/polymyxin Solution 1 Drop(s) Both EYES daily    MEDICATIONS  (PRN):  aluminum hydroxide/magnesium hydroxide/simethicone Suspension 30 milliLiter(s) Oral every 4 hours PRN Dyspepsia  dextrose Oral Gel 15 Gram(s) Oral once PRN Blood Glucose LESS THAN 70 milliGRAM(s)/deciliter  hydrALAZINE Injectable 10 milliGRAM(s) IV Push every 6 hours PRN sbp>159  melatonin 3 milliGRAM(s) Oral at bedtime PRN Insomnia  OLANZapine Injectable 5 milliGRAM(s) IntraMuscular every 6 hours PRN Agitation  ondansetron Injectable 4 milliGRAM(s) IV Push every 8 hours PRN Nausea and/or Vomiting  oxyCODONE    IR 10 milliGRAM(s) Oral every 6 hours PRN Severe Pain (7 - 10)  oxyCODONE    IR 5 milliGRAM(s) Oral every 6 hours PRN Moderate Pain (4 - 6)  sodium chloride 0.9% lock flush 10 milliLiter(s) IV Push every 1 hour PRN Pre/post blood products, medications, blood draw, and to maintain line patency      RADIOLOGY & ADDITIONAL TESTS:

## 2025-04-28 NOTE — PROGRESS NOTE ADULT - SUBJECTIVE AND OBJECTIVE BOX
[facility] DIVISION OF KIDNEY DISEASES AND HYPERTENSION -- HEMODIALYSIS NOTE  --------------------------------------------------------------------------------  Chief Complaint: ESRD/Ongoing hemodialysis requirement    24 hour events/subjective:  no acute event noted  pt seen and examined; feels well      PAST HISTORY  --------------------------------------------------------------------------------  No significant changes to PMH, PSH, FHx, SHx, unless otherwise noted    ALLERGIES & MEDICATIONS  --------------------------------------------------------------------------------  Allergies    No Known Allergies        Standing Inpatient Medications  acetaminophen     Tablet .. 975 milliGRAM(s) Oral every 8 hours  amLODIPine   Tablet 10 milliGRAM(s) Oral daily  artificial tears (preservative free) Ophthalmic Solution 2 Drop(s) Both EYES every 8 hours  atorvastatin 40 milliGRAM(s) Oral at bedtime  brimonidine 0.2% Ophthalmic Solution 1 Drop(s) Both EYES two times a day  chlorhexidine 2% Cloths 1 Application(s) Topical <User Schedule>  chlorhexidine 4% Liquid 1 Application(s) Topical <User Schedule>  dextrose 5%. 1000 milliLiter(s) IV Continuous <Continuous>  dextrose 5%. 1000 milliLiter(s) IV Continuous <Continuous>  dextrose 50% Injectable 25 Gram(s) IV Push once  dextrose 50% Injectable 12.5 Gram(s) IV Push once  dextrose 50% Injectable 25 Gram(s) IV Push once  dolutegravir 50 milliGRAM(s) Oral daily  dorzolamide 2% Ophthalmic Solution 1 Drop(s) Both EYES <User Schedule>  epoetin landry-epbx (RETACRIT) Injectable 4000 Unit(s) IV Push <User Schedule>  gabapentin 100 milliGRAM(s) Oral three times a day  glucagon  Injectable 1 milliGRAM(s) IntraMuscular once  insulin glargine Injectable (LANTUS) 10 Unit(s) SubCutaneous at bedtime  insulin lispro (ADMELOG) corrective regimen sliding scale   SubCutaneous three times a day before meals  lamiVUDine- milliGRAM(s) Oral daily  latanoprost 0.005% Ophthalmic Solution 1 Drop(s) Both EYES at bedtime  lidocaine   4% Patch 1 Patch Transdermal every 24 hours  metoprolol tartrate 50 milliGRAM(s) Oral two times a day  timolol 0.5% Solution 1 Drop(s) Both EYES two times a day  trimethoprim/polymyxin Solution 1 Drop(s) Both EYES daily    PRN Inpatient Medications  aluminum hydroxide/magnesium hydroxide/simethicone Suspension 30 milliLiter(s) Oral every 4 hours PRN  dextrose Oral Gel 15 Gram(s) Oral once PRN  hydrALAZINE Injectable 10 milliGRAM(s) IV Push every 6 hours PRN  melatonin 3 milliGRAM(s) Oral at bedtime PRN  OLANZapine Injectable 5 milliGRAM(s) IntraMuscular every 6 hours PRN  ondansetron Injectable 4 milliGRAM(s) IV Push every 8 hours PRN  oxyCODONE    IR 10 milliGRAM(s) Oral every 6 hours PRN  oxyCODONE    IR 5 milliGRAM(s) Oral every 6 hours PRN  sodium chloride 0.9% lock flush 10 milliLiter(s) IV Push every 1 hour PRN      REVIEW OF SYSTEMS  --------------------------------------------------------------------------------  Gen: No weight changes, fatigue, fevers/chills, weakness  Skin: No rashes  Head/Eyes/Ears/Mouth: No headache  Respiratory: No dyspnea, cough,  CV: No chest pain, orthopnea  GI: No abdominal pain, diarrhea, constipation, nausea, vomiting,  MSK: No joint pain  Neuro: No dizziness/lightheadedness, weakness  Heme: No bleeding  Psych: No significant nervousness, anxiety, stress, depression    All other systems were reviewed and are negative, except as noted.    VITALS/PHYSICAL EXAM  --------------------------------------------------------------------------------  T(C): 36.7 (04-28-25 @ 12:00), Max: 37.2 (04-28-25 @ 00:29)  HR: 73 (04-28-25 @ 12:00) (67 - 82)  BP: 142/69 (04-28-25 @ 12:00) (131/63 - 176/86)  RR: 18 (04-28-25 @ 12:00) (17 - 18)  SpO2: 95% (04-28-25 @ 12:00) (94% - 96%)  Wt(kg): --        04-27-25 @ 07:01  -  04-28-25 @ 07:00  --------------------------------------------------------  IN: 775 mL / OUT: 0 mL / NET: 775 mL      Physical Exam:  	Gen: NAD, well-appearing  	HEENT: PERRL, supple neck,  	Pulm: CTA B/L  	CV: RRR, S1S2; no rub  	Abd: +BS, soft, nontender  	UE: Warm, intact strength  	LE: Warm, + edema  	Neuro: No focal deficits  	Psych: Normal affect and mood  	Skin: Warm, without rashes  	Vascular access:    LABS/STUDIES  --------------------------------------------------------------------------------              9.9    7.89  >-----------<  302      [04-28-25 @ 04:18]              30.1     138  |  102  |  48.7  ----------------------------<  268      [04-28-25 @ 04:18]  4.7   |  22.0  |  5.68        Ca     8.3     [04-28-25 @ 04:18]      Mg     1.9     [04-28-25 @ 04:18]      Phos  4.6     [04-28-25 @ 04:18]            Iron 37, TIBC --, %sat --      [04-09-25 @ 05:45]  Ferritin 148      [04-09-25 @ 05:45]  Lipid: chol 100, , HDL 23, LDL --      [08-08-24 @ 06:35]    HBsAb <3.3      [04-11-25 @ 15:25]  HBsAg Nonreact      [04-11-25 @ 15:25]  HBcAb Nonreact      [04-11-25 @ 15:25]  HCV 0.18, Nonreact      [04-11-25 @ 15:25]

## 2025-04-29 LAB
ANION GAP SERPL CALC-SCNC: 13 MMOL/L — SIGNIFICANT CHANGE UP (ref 5–17)
BUN SERPL-MCNC: 52.6 MG/DL — HIGH (ref 8–20)
CALCIUM SERPL-MCNC: 8.1 MG/DL — LOW (ref 8.4–10.5)
CHLORIDE SERPL-SCNC: 102 MMOL/L — SIGNIFICANT CHANGE UP (ref 96–108)
CO2 SERPL-SCNC: 23 MMOL/L — SIGNIFICANT CHANGE UP (ref 22–29)
CREAT SERPL-MCNC: 5.74 MG/DL — HIGH (ref 0.5–1.3)
EGFR: 10 ML/MIN/1.73M2 — LOW
EGFR: 10 ML/MIN/1.73M2 — LOW
GLUCOSE BLDC GLUCOMTR-MCNC: 145 MG/DL — HIGH (ref 70–99)
GLUCOSE BLDC GLUCOMTR-MCNC: 192 MG/DL — HIGH (ref 70–99)
GLUCOSE BLDC GLUCOMTR-MCNC: 207 MG/DL — HIGH (ref 70–99)
GLUCOSE BLDC GLUCOMTR-MCNC: 240 MG/DL — HIGH (ref 70–99)
GLUCOSE SERPL-MCNC: 290 MG/DL — HIGH (ref 70–99)
HCT VFR BLD CALC: 30.1 % — LOW (ref 39–50)
HGB BLD-MCNC: 9.8 G/DL — LOW (ref 13–17)
MAGNESIUM SERPL-MCNC: 2.1 MG/DL — SIGNIFICANT CHANGE UP (ref 1.6–2.6)
MCHC RBC-ENTMCNC: 28.8 PG — SIGNIFICANT CHANGE UP (ref 27–34)
MCHC RBC-ENTMCNC: 32.6 G/DL — SIGNIFICANT CHANGE UP (ref 32–36)
MCV RBC AUTO: 88.5 FL — SIGNIFICANT CHANGE UP (ref 80–100)
NRBC # BLD AUTO: 0 K/UL — SIGNIFICANT CHANGE UP (ref 0–0)
NRBC # FLD: 0 K/UL — SIGNIFICANT CHANGE UP (ref 0–0)
NRBC BLD AUTO-RTO: 0 /100 WBCS — SIGNIFICANT CHANGE UP (ref 0–0)
PHOSPHATE SERPL-MCNC: 4.7 MG/DL — SIGNIFICANT CHANGE UP (ref 2.4–4.7)
PLATELET # BLD AUTO: 280 K/UL — SIGNIFICANT CHANGE UP (ref 150–400)
PMV BLD: 9 FL — SIGNIFICANT CHANGE UP (ref 7–13)
POTASSIUM SERPL-MCNC: 4.9 MMOL/L — SIGNIFICANT CHANGE UP (ref 3.5–5.3)
POTASSIUM SERPL-SCNC: 4.9 MMOL/L — SIGNIFICANT CHANGE UP (ref 3.5–5.3)
RBC # BLD: 3.4 M/UL — LOW (ref 4.2–5.8)
RBC # FLD: 14.6 % — HIGH (ref 10.3–14.5)
SODIUM SERPL-SCNC: 138 MMOL/L — SIGNIFICANT CHANGE UP (ref 135–145)
WBC # BLD: 8.41 K/UL — SIGNIFICANT CHANGE UP (ref 3.8–10.5)
WBC # FLD AUTO: 8.41 K/UL — SIGNIFICANT CHANGE UP (ref 3.8–10.5)

## 2025-04-29 PROCEDURE — 99232 SBSQ HOSP IP/OBS MODERATE 35: CPT

## 2025-04-29 RX ADMIN — Medication 1 APPLICATION(S): at 06:08

## 2025-04-29 RX ADMIN — INSULIN LISPRO 2: 100 INJECTION, SOLUTION INTRAVENOUS; SUBCUTANEOUS at 08:43

## 2025-04-29 RX ADMIN — BRIMONIDINE TARTRATE 1 DROP(S): 1.5 SOLUTION/ DROPS OPHTHALMIC at 06:03

## 2025-04-29 RX ADMIN — AMLODIPINE BESYLATE 10 MILLIGRAM(S): 10 TABLET ORAL at 06:04

## 2025-04-29 RX ADMIN — DORZOLAMIDE 1 DROP(S): 20 SOLUTION/ DROPS OPHTHALMIC at 20:11

## 2025-04-29 RX ADMIN — ATORVASTATIN CALCIUM 40 MILLIGRAM(S): 80 TABLET, FILM COATED ORAL at 21:57

## 2025-04-29 RX ADMIN — GABAPENTIN 100 MILLIGRAM(S): 400 CAPSULE ORAL at 21:57

## 2025-04-29 RX ADMIN — POLYMYXIN B SULFATE AND TRIMETHOPRIM SULFATE 1 DROP(S): 10000; 1 SOLUTION/ DROPS OPHTHALMIC at 10:54

## 2025-04-29 RX ADMIN — Medication 975 MILLIGRAM(S): at 06:03

## 2025-04-29 RX ADMIN — INSULIN GLARGINE-YFGN 10 UNIT(S): 100 INJECTION, SOLUTION SUBCUTANEOUS at 21:57

## 2025-04-29 RX ADMIN — OXYCODONE HYDROCHLORIDE 10 MILLIGRAM(S): 30 TABLET ORAL at 18:35

## 2025-04-29 RX ADMIN — LAMIVUDINE 100 MILLIGRAM(S): 10 SOLUTION ORAL at 10:53

## 2025-04-29 RX ADMIN — Medication 975 MILLIGRAM(S): at 14:07

## 2025-04-29 RX ADMIN — METOPROLOL SUCCINATE 50 MILLIGRAM(S): 50 TABLET, EXTENDED RELEASE ORAL at 20:12

## 2025-04-29 RX ADMIN — DORZOLAMIDE 1 DROP(S): 20 SOLUTION/ DROPS OPHTHALMIC at 08:44

## 2025-04-29 RX ADMIN — INSULIN LISPRO 1: 100 INJECTION, SOLUTION INTRAVENOUS; SUBCUTANEOUS at 11:59

## 2025-04-29 RX ADMIN — Medication 975 MILLIGRAM(S): at 21:57

## 2025-04-29 RX ADMIN — DOLUTEGRAVIR SODIUM 50 MILLIGRAM(S): 5 TABLET, FOR SUSPENSION ORAL at 10:53

## 2025-04-29 RX ADMIN — METOPROLOL SUCCINATE 50 MILLIGRAM(S): 50 TABLET, EXTENDED RELEASE ORAL at 06:06

## 2025-04-29 RX ADMIN — OXYCODONE HYDROCHLORIDE 10 MILLIGRAM(S): 30 TABLET ORAL at 17:47

## 2025-04-29 RX ADMIN — Medication 975 MILLIGRAM(S): at 15:09

## 2025-04-29 RX ADMIN — GABAPENTIN 100 MILLIGRAM(S): 400 CAPSULE ORAL at 06:04

## 2025-04-29 RX ADMIN — Medication 1 APPLICATION(S): at 08:06

## 2025-04-29 RX ADMIN — LATANOPROST PF 1 DROP(S): 0.05 SOLUTION/ DROPS OPHTHALMIC at 21:56

## 2025-04-29 NOTE — PROGRESS NOTE ADULT - ASSESSMENT
63 YOM DM, HIV on HAART admitted after fall.  Nephrology consulted for progressive worsening CKD.    - Advanced progressive CKD stage V from biopsy proven diabetic nephropathy and podopathy, initiated on HD during this hospitalization   sp renal biopsy with Diabetic nephropathy+ Complete foot process effacement, 80% IFTA    Nephrotic syndrome  HIV- VL > 300 k  Anemia: CKD and RITESH.  Placed on IV venofer. epo.  HTN: BP controlled- continue amlodipine, metoprolol, Hydralazine  HIV on Bikatrvy- meds changed as per ID  DM: discontinued metformin (low GFR), placed on Lantus + SSI ACHS    Plan   HD today; pt however has been refusing treatments  dw pt importance of compliance with HD regimen  monitor labs  HD as per prescription if pt agreeable.

## 2025-04-29 NOTE — CHART NOTE - NSCHARTNOTEFT_GEN_A_CORE
Source: Patient [ ]  Family [ ]   other [ x]    Current Diet: Diet, Renal Restrictions:   For patients receiving Renal Replacement - No Protein Restr, No Conc K, No Conc Phos, Low Sodium  Consistent Carbohydrate {Evening Snack} (CSTCHOSN) (04-22-25 @ 13:54)    PO intake:  < 50% [ ]   50-75%  [ ]   %  [ x]  other :    Source for PO intake [ ] Patient [ ] family [ x] chart [ ] staff [ ] other    Current Weight:   (4/26) 176.5 lbs  (4/24) 178.3 lbs  (4/23) 161.3 lbs  (4/21) 161.3 lbs  (4/19) 187.1 lbs  (4/18) 187.8 lbs  (4/17) 181.1 lbs  (4/12) 182.3 lbs  (4/9) 185.1 lbs   ? accuracy of weights, noted with fluctuations likely due to fluid shifts, continue to trend and maintain strict I&Os     Pertinent Medications: MEDICATIONS  (STANDING):  amLODIPine   Tablet 10 milliGRAM(s) Oral daily  atorvastatin 40 milliGRAM(s) Oral at bedtime  dextrose 5%. 1000 milliLiter(s) (50 mL/Hr) IV Continuous <Continuous>  dextrose 5%. 1000 milliLiter(s) (100 mL/Hr) IV Continuous <Continuous>  epoetin landry-epbx (RETACRIT) Injectable 4000 Unit(s) IV Push <User Schedule>  gabapentin 100 milliGRAM(s) Oral three times a day  insulin glargine Injectable (LANTUS) 10 Unit(s) SubCutaneous at bedtime  insulin lispro (ADMELOG) corrective regimen sliding scale   SubCutaneous three times a day before meals  lamiVUDine- milliGRAM(s) Oral daily  metoprolol tartrate 50 milliGRAM(s) Oral two times a day      MEDICATIONS  (PRN):  aluminum hydroxide/magnesium hydroxide/simethicone Suspension 30 milliLiter(s) Oral every 4 hours PRN Dyspepsia  hydrALAZINE Injectable 10 milliGRAM(s) IV Push every 6 hours PRN sbp>159  OLANZapine Injectable 5 milliGRAM(s) IntraMuscular every 6 hours PRN Agitation  ondansetron Injectable 4 milliGRAM(s) IV Push every 8 hours PRN Nausea and/or Vomiting  oxyCODONE    IR 10 milliGRAM(s) Oral every 6 hours PRN Severe Pain (7 - 10)  oxyCODONE    IR 5 milliGRAM(s) Oral every 6 hours PRN Moderate Pain (4 - 6)    Pertinent Labs: CBC Full  -  ( 29 Apr 2025 04:15 )  WBC Count : 8.41 K/uL  RBC Count : 3.40 M/uL  Hemoglobin : 9.8 g/dL  Hematocrit : 30.1 %    04-29 Na138 mmol/L Glu 290 mg/dL[H] K+ 4.9 mmol/L Cr  5.74 mg/dL[H] BUN 52.6 mg/dL[H] Phos 4.7 mg/dL Alb n/a   PAB n/a       Skin: No pressure injuries documented  Austyn: 18    Nutrition focused physical exam not conducted at this time- found signs of malnutrition [ ]absent [ ]present    Subcutaneous fat loss: [ ] Orbital fat pads region, [ ]Buccal fat region, [ ]Triceps region,  [ ]Ribs region    Muscle wasting: [ ]Temples region, [ ]Clavicle region, [ ]Shoulder region, [ ]Scapula region, [ ]Interosseous region,  [ ]thigh region, [ ]Calf region    Estimated Needs:   [x ] no change since previous assessment  [ ] recalculated:     Current Nutrition Diagnosis: Pt remains at nutrition risk secondary to altered nutrition related lab values related to renal and endocrine dysfunction as evidenced by HgA1c 8.2%, elevated glucose, Cr, BUN, and decreased GFR. Pt sleeping soundly at time of interview, unarousable when name called. Pt currently on a renal, CCHO diet with evening snack. Noted pt intermittently refusing HD. Good po intake per flowsheet. RD to follow up as feasible.     Recommendations:   1) Continue diet as tolerated.   2) Encourage po intake, monitor diet tolerance, and provide assistance at meals as needed.   3) Monitor BG levels, correct prn.  4) Obtain daily weights to monitor trends.     Monitoring and Evaluation:   [x] PO intake [x] Tolerance to diet prescription [X] Weights  [X] Follow up per protocol [X] Labs: chem 8, mg, phos, H/H, GFR

## 2025-04-29 NOTE — PROGRESS NOTE ADULT - SUBJECTIVE AND OBJECTIVE BOX
[facility] DIVISION OF KIDNEY DISEASES AND HYPERTENSION -- HEMODIALYSIS NOTE  --------------------------------------------------------------------------------  Chief Complaint:  HD    24 hour events/subjective:  pt refused HD again yesterday  seen and examined this am; c/o headache  says ' he will think about going to dialysis'      PAST HISTORY  --------------------------------------------------------------------------------  No significant changes to PMH, PSH, FHx, SHx, unless otherwise noted    ALLERGIES & MEDICATIONS  --------------------------------------------------------------------------------  Allergies    No Known Allergies    Intolerances      Standing Inpatient Medications  acetaminophen     Tablet .. 975 milliGRAM(s) Oral every 8 hours  amLODIPine   Tablet 10 milliGRAM(s) Oral daily  artificial tears (preservative free) Ophthalmic Solution 2 Drop(s) Both EYES every 8 hours  atorvastatin 40 milliGRAM(s) Oral at bedtime  brimonidine 0.2% Ophthalmic Solution 1 Drop(s) Both EYES two times a day  chlorhexidine 2% Cloths 1 Application(s) Topical <User Schedule>  chlorhexidine 4% Liquid 1 Application(s) Topical <User Schedule>  dextrose 5%. 1000 milliLiter(s) IV Continuous <Continuous>  dextrose 5%. 1000 milliLiter(s) IV Continuous <Continuous>  dextrose 50% Injectable 25 Gram(s) IV Push once  dextrose 50% Injectable 12.5 Gram(s) IV Push once  dextrose 50% Injectable 25 Gram(s) IV Push once  dolutegravir 50 milliGRAM(s) Oral daily  dorzolamide 2% Ophthalmic Solution 1 Drop(s) Both EYES <User Schedule>  epoetin landry-epbx (RETACRIT) Injectable 4000 Unit(s) IV Push <User Schedule>  gabapentin 100 milliGRAM(s) Oral three times a day  glucagon  Injectable 1 milliGRAM(s) IntraMuscular once  insulin glargine Injectable (LANTUS) 10 Unit(s) SubCutaneous at bedtime  insulin lispro (ADMELOG) corrective regimen sliding scale   SubCutaneous three times a day before meals  lamiVUDine- milliGRAM(s) Oral daily  latanoprost 0.005% Ophthalmic Solution 1 Drop(s) Both EYES at bedtime  lidocaine   4% Patch 1 Patch Transdermal every 24 hours  metoprolol tartrate 50 milliGRAM(s) Oral two times a day  timolol 0.5% Solution 1 Drop(s) Both EYES two times a day  trimethoprim/polymyxin Solution 1 Drop(s) Both EYES daily    PRN Inpatient Medications  aluminum hydroxide/magnesium hydroxide/simethicone Suspension 30 milliLiter(s) Oral every 4 hours PRN  dextrose Oral Gel 15 Gram(s) Oral once PRN  hydrALAZINE Injectable 10 milliGRAM(s) IV Push every 6 hours PRN  melatonin 3 milliGRAM(s) Oral at bedtime PRN  OLANZapine Injectable 5 milliGRAM(s) IntraMuscular every 6 hours PRN  ondansetron Injectable 4 milliGRAM(s) IV Push every 8 hours PRN  oxyCODONE    IR 10 milliGRAM(s) Oral every 6 hours PRN  oxyCODONE    IR 5 milliGRAM(s) Oral every 6 hours PRN  sodium chloride 0.9% lock flush 10 milliLiter(s) IV Push every 1 hour PRN      REVIEW OF SYSTEMS  --------------------------------------------------------------------------------  Gen: No weight changes, fatigue, fevers/chills, weakness  Skin: No rashes  Head/Eyes/Ears/Mouth: No headache  Respiratory: No dyspnea, cough,  CV: No chest pain, orthopnea  GI: No abdominal pain, diarrhea, constipation, nausea, vomiting,  MSK: No joint pain  Neuro: No dizziness/lightheadedness, weakness  Heme: No bleeding  Psych: No significant nervousness, anxiety, stress, depression    All other systems were reviewed and are negative, except as noted.    VITALS/PHYSICAL EXAM  --------------------------------------------------------------------------------  T(C): 36.8 (04-29-25 @ 08:40), Max: 36.8 (04-28-25 @ 16:03)  HR: 62 (04-29-25 @ 08:40) (62 - 67)  BP: 158/81 (04-29-25 @ 08:40) (135/67 - 162/79)  RR: 18 (04-29-25 @ 08:40) (15 - 18)  SpO2: 98% (04-29-25 @ 08:40) (94% - 98%)  Wt(kg): --        04-28-25 @ 07:01  -  04-29-25 @ 07:00  --------------------------------------------------------  IN: 725 mL / OUT: 800 mL / NET: -75 mL    04-29-25 @ 07:01  -  04-29-25 @ 14:05  --------------------------------------------------------  IN: 400 mL / OUT: 0 mL / NET: 400 mL      Physical Exam:  	Gen: NAD, well-appearing  	HEENT: PERRL, supple neck,  	Pulm: CTA B/L  	CV: RRR, S1S2; no rub  	Abd: +BS, soft, nontender  	UE: Warm, intact strength; no asterixis  	LE: Warm, + edema  	Neuro: No focal deficits  	Psych: Normal affect and mood  	Skin: Warm, without rashes  	Vascular access: [facility]    LABS/STUDIES  --------------------------------------------------------------------------------              9.8    8.41  >-----------<  280      [04-29-25 @ 04:15]              30.1     138  |  102  |  52.6  ----------------------------<  290      [04-29-25 @ 04:15]  4.9   |  23.0  |  5.74        Ca     8.1     [04-29-25 @ 04:15]      Mg     2.1     [04-29-25 @ 04:15]      Phos  4.7     [04-29-25 @ 04:15]            Iron 37, TIBC --, %sat --      [04-09-25 @ 05:45]  Ferritin 148      [04-09-25 @ 05:45]  Lipid: chol 100, , HDL 23, LDL --      [08-08-24 @ 06:35]    HBsAb <3.3      [04-11-25 @ 15:25]  HBsAg Nonreact      [04-11-25 @ 15:25]  HBcAb Nonreact      [04-11-25 @ 15:25]  HCV 0.18, Nonreact      [04-11-25 @ 15:25]
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
## 2025-04-29 NOTE — PROGRESS NOTE ADULT - SUBJECTIVE AND OBJECTIVE BOX
Ethics continues to follow. See consult on 4/25/25.    Discussion with PITER NAVA RN, MBE (Ethics Fellow), STACY Carpenter LCSW (Complex SW), and the patient at the bedside. The team members re-introduced themselves and their respective roles. The patient was lying in bed with his eyes closed but would respond to questions when asked. The patient was unable to elaborate on the reason he was admitted to [facility], though he acknowledged that he is hospitalized because he is "sick". He acknowledged his need for hemodialysis and indicated that it may make him feel better. At the time of the discussion the patient indicated he was agreeable to blood work and hemodialysis. The fellow was present when staff came to take the patient to HD.

## 2025-04-29 NOTE — PROGRESS NOTE ADULT - ASSESSMENT
Ethics will follow up with the patient tomorrow to further determine his desires for his care moving forward. Additionally, Ethics will continue efforts to locate a HCP form the patient may have completed in the past.    Liberty NAVA RN, MBE  Ethics Fellow  [phone]

## 2025-04-30 LAB
ANION GAP SERPL CALC-SCNC: 14 MMOL/L — SIGNIFICANT CHANGE UP (ref 5–17)
BUN SERPL-MCNC: 33.9 MG/DL — HIGH (ref 8–20)
CALCIUM SERPL-MCNC: 8.7 MG/DL — SIGNIFICANT CHANGE UP (ref 8.4–10.5)
CHLORIDE SERPL-SCNC: 100 MMOL/L — SIGNIFICANT CHANGE UP (ref 96–108)
CO2 SERPL-SCNC: 22 MMOL/L — SIGNIFICANT CHANGE UP (ref 22–29)
CREAT SERPL-MCNC: 4.1 MG/DL — HIGH (ref 0.5–1.3)
EGFR: 16 ML/MIN/1.73M2 — LOW
EGFR: 16 ML/MIN/1.73M2 — LOW
GLUCOSE BLDC GLUCOMTR-MCNC: 161 MG/DL — HIGH (ref 70–99)
GLUCOSE BLDC GLUCOMTR-MCNC: 205 MG/DL — HIGH (ref 70–99)
GLUCOSE BLDC GLUCOMTR-MCNC: 211 MG/DL — HIGH (ref 70–99)
GLUCOSE BLDC GLUCOMTR-MCNC: 216 MG/DL — HIGH (ref 70–99)
GLUCOSE BLDC GLUCOMTR-MCNC: 221 MG/DL — HIGH (ref 70–99)
GLUCOSE SERPL-MCNC: 176 MG/DL — HIGH (ref 70–99)
HCT VFR BLD CALC: 30.9 % — LOW (ref 39–50)
HGB BLD-MCNC: 10.3 G/DL — LOW (ref 13–17)
MAGNESIUM SERPL-MCNC: 2.1 MG/DL — SIGNIFICANT CHANGE UP (ref 1.6–2.6)
MCHC RBC-ENTMCNC: 28.9 PG — SIGNIFICANT CHANGE UP (ref 27–34)
MCHC RBC-ENTMCNC: 33.3 G/DL — SIGNIFICANT CHANGE UP (ref 32–36)
MCV RBC AUTO: 86.8 FL — SIGNIFICANT CHANGE UP (ref 80–100)
NRBC # BLD AUTO: 0 K/UL — SIGNIFICANT CHANGE UP (ref 0–0)
NRBC # FLD: 0 K/UL — SIGNIFICANT CHANGE UP (ref 0–0)
NRBC BLD AUTO-RTO: 0 /100 WBCS — SIGNIFICANT CHANGE UP (ref 0–0)
PHOSPHATE SERPL-MCNC: 4.2 MG/DL — SIGNIFICANT CHANGE UP (ref 2.4–4.7)
PLATELET # BLD AUTO: 290 K/UL — SIGNIFICANT CHANGE UP (ref 150–400)
PMV BLD: 8.9 FL — SIGNIFICANT CHANGE UP (ref 7–13)
POTASSIUM SERPL-MCNC: 4 MMOL/L — SIGNIFICANT CHANGE UP (ref 3.5–5.3)
POTASSIUM SERPL-SCNC: 4 MMOL/L — SIGNIFICANT CHANGE UP (ref 3.5–5.3)
RBC # BLD: 3.56 M/UL — LOW (ref 4.2–5.8)
RBC # FLD: 14.4 % — SIGNIFICANT CHANGE UP (ref 10.3–14.5)
SODIUM SERPL-SCNC: 136 MMOL/L — SIGNIFICANT CHANGE UP (ref 135–145)
WBC # BLD: 8.67 K/UL — SIGNIFICANT CHANGE UP (ref 3.8–10.5)
WBC # FLD AUTO: 8.67 K/UL — SIGNIFICANT CHANGE UP (ref 3.8–10.5)

## 2025-04-30 PROCEDURE — 99232 SBSQ HOSP IP/OBS MODERATE 35: CPT

## 2025-04-30 RX ADMIN — LAMIVUDINE 100 MILLIGRAM(S): 10 SOLUTION ORAL at 08:18

## 2025-04-30 RX ADMIN — Medication 2 DROP(S): at 21:14

## 2025-04-30 RX ADMIN — Medication 975 MILLIGRAM(S): at 06:20

## 2025-04-30 RX ADMIN — Medication 975 MILLIGRAM(S): at 13:01

## 2025-04-30 RX ADMIN — METOPROLOL SUCCINATE 50 MILLIGRAM(S): 50 TABLET, EXTENDED RELEASE ORAL at 05:35

## 2025-04-30 RX ADMIN — INSULIN LISPRO 2: 100 INJECTION, SOLUTION INTRAVENOUS; SUBCUTANEOUS at 12:14

## 2025-04-30 RX ADMIN — Medication 975 MILLIGRAM(S): at 21:09

## 2025-04-30 RX ADMIN — Medication 975 MILLIGRAM(S): at 12:13

## 2025-04-30 RX ADMIN — DORZOLAMIDE 1 DROP(S): 20 SOLUTION/ DROPS OPHTHALMIC at 08:19

## 2025-04-30 RX ADMIN — BRIMONIDINE TARTRATE 1 DROP(S): 1.5 SOLUTION/ DROPS OPHTHALMIC at 17:13

## 2025-04-30 RX ADMIN — AMLODIPINE BESYLATE 10 MILLIGRAM(S): 10 TABLET ORAL at 05:36

## 2025-04-30 RX ADMIN — POLYMYXIN B SULFATE AND TRIMETHOPRIM SULFATE 1 DROP(S): 10000; 1 SOLUTION/ DROPS OPHTHALMIC at 08:20

## 2025-04-30 RX ADMIN — Medication 975 MILLIGRAM(S): at 05:35

## 2025-04-30 RX ADMIN — GABAPENTIN 100 MILLIGRAM(S): 400 CAPSULE ORAL at 21:08

## 2025-04-30 RX ADMIN — LATANOPROST PF 1 DROP(S): 0.05 SOLUTION/ DROPS OPHTHALMIC at 21:09

## 2025-04-30 RX ADMIN — Medication 2 DROP(S): at 12:13

## 2025-04-30 RX ADMIN — Medication 1 APPLICATION(S): at 08:30

## 2025-04-30 RX ADMIN — ATORVASTATIN CALCIUM 40 MILLIGRAM(S): 80 TABLET, FILM COATED ORAL at 21:09

## 2025-04-30 RX ADMIN — INSULIN LISPRO 1: 100 INJECTION, SOLUTION INTRAVENOUS; SUBCUTANEOUS at 08:17

## 2025-04-30 RX ADMIN — GABAPENTIN 100 MILLIGRAM(S): 400 CAPSULE ORAL at 05:35

## 2025-04-30 RX ADMIN — METOPROLOL SUCCINATE 50 MILLIGRAM(S): 50 TABLET, EXTENDED RELEASE ORAL at 17:14

## 2025-04-30 RX ADMIN — Medication 1 APPLICATION(S): at 05:39

## 2025-04-30 RX ADMIN — DORZOLAMIDE 1 DROP(S): 20 SOLUTION/ DROPS OPHTHALMIC at 21:09

## 2025-04-30 RX ADMIN — Medication 10 MILLIGRAM(S): at 00:05

## 2025-04-30 RX ADMIN — BRIMONIDINE TARTRATE 1 DROP(S): 1.5 SOLUTION/ DROPS OPHTHALMIC at 05:37

## 2025-04-30 RX ADMIN — TIMOLOL MALEATE 1 DROP(S): 6.8 SOLUTION OPHTHALMIC at 05:37

## 2025-04-30 RX ADMIN — INSULIN GLARGINE-YFGN 10 UNIT(S): 100 INJECTION, SOLUTION SUBCUTANEOUS at 21:41

## 2025-04-30 RX ADMIN — DOLUTEGRAVIR SODIUM 50 MILLIGRAM(S): 5 TABLET, FOR SUSPENSION ORAL at 08:18

## 2025-04-30 RX ADMIN — GABAPENTIN 100 MILLIGRAM(S): 400 CAPSULE ORAL at 12:13

## 2025-04-30 RX ADMIN — INSULIN LISPRO 2: 100 INJECTION, SOLUTION INTRAVENOUS; SUBCUTANEOUS at 17:15

## 2025-04-30 RX ADMIN — TIMOLOL MALEATE 1 DROP(S): 6.8 SOLUTION OPHTHALMIC at 17:13
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
## 2025-04-30 NOTE — PROGRESS NOTE ADULT - ASSESSMENT
63 YOM DM, HIV on HAART admitted after fall.  Nephrology consulted for progressive worsening CKD.    - Advanced progressive CKD stage V from biopsy proven diabetic nephropathy and podopathy, initiated on HD during this hospitalization   sp renal biopsy with Diabetic nephropathy+ Complete foot process effacement, 80% IFTA    Nephrotic syndrome  HIV- VL > 300 k  Anemia: CKD and RITESH.  Placed on IV venofer. epo.  HTN: BP ubcontrolled- continue amlodipine, metoprolol, Hydralazine  HIV on Bikatrvy- meds changed as per ID  DM: discontinued metformin (low GFR), placed on Lantus + SSI ACHS    Plan   HD tomorrow  GILBERTO with HD

## 2025-04-30 NOTE — PROGRESS NOTE ADULT - SUBJECTIVE AND OBJECTIVE BOX
Ethics continues to follow. See consult on 4/25/25 and prior notes.    Discussion with STACY Carpenter LCSW (Complex SW). Per SW, the patient is willing to continue HD and discharge to [facility] when appropriate.    Discussion with the patient at the bedside. The patient confirmed that he received dialysis yesterday, 4/29/25, and indicated that he remains amenable to pursuing further dialysis sessions. The fellow inquired about the patient completing a HCP in the past naming Nelli Edwards. The patient could not demonstrate understanding of the role or purpose of a HCP and did not recall completing a form naming her. He again could not specify if he is  from [place], but continued to adamantly refuse to have her involved in any medical decision making. The fellow inquired about the fact that Nelli has been listed as his emergency contact for years, and even involved in handling some of his finances. He confirmed that she had been more involved in the past, but as of late she has not been assisting him as much and as a result he does not want her continued support. The fellow confirmed her understanding and assured the patient that she will remain available to support him as necessary. He expressed his gratitude.

## 2025-04-30 NOTE — PROGRESS NOTE ADULT - SUBJECTIVE AND OBJECTIVE BOX
RAFAEL BEEBEROGERIO    [id number]    64y      Male    CC: ESRD     INTERVAL HPI/OVERNIGHT EVENTS: Pt seen and examined. agreeable to HD yesterday     REVIEW OF SYSTEMS:    RESPIRATORY: No cough, wheezing, hemoptysis; No shortness of breath  CARDIOVASCULAR: No chest pain, palpitations  GASTROINTESTINAL: No abdominal or epigastric pain. No nausea, vomiting    Vital Signs Last 24 Hrs  T(C): 37.3 (30 Apr 2025 12:29), Max: 37.3 (30 Apr 2025 12:29)  T(F): 99.1 (30 Apr 2025 12:29), Max: 99.1 (30 Apr 2025 12:29)  HR: 61 (30 Apr 2025 12:29) (57 - 78)  BP: 137/67 (30 Apr 2025 12:29) (115/71 - 207/89)  BP(mean): 90 (30 Apr 2025 12:29) (90 - 90)  RR: 18 (30 Apr 2025 12:29) (16 - 18)  SpO2: 98% (30 Apr 2025 12:29) (94% - 100%)    Parameters below as of 30 Apr 2025 12:29  Patient On (Oxygen Delivery Method): room air        PHYSICAL EXAM:    GENERAL: NAD  CHEST/LUNG: Clear to auscultation bilaterally  HEART: S1S2+, Regular rate and rhythm  ABDOMEN: Soft, Nontender, Nondistended  SKIN: warm, dry   NEURO: Awake, alert     LABS:                        10.3   8.67  )-----------( 290      ( 30 Apr 2025 05:25 )             30.9     04-30    136  |  100  |  33.9[H]  ----------------------------<  176[H]  4.0   |  22.0  |  4.10[H]    Ca    8.7      30 Apr 2025 05:25  Phos  4.2     04-30  Mg     2.1     04-30        Urinalysis Basic - ( 30 Apr 2025 05:25 )    Color: x / Appearance: x / SG: x / pH: x  Gluc: 176 mg/dL / Ketone: x  / Bili: x / Urobili: x   Blood: x / Protein: x / Nitrite: x   Leuk Esterase: x / RBC: x / WBC x   Sq Epi: x / Non Sq Epi: x / Bacteria: x          MEDICATIONS  (STANDING):  acetaminophen     Tablet .. 975 milliGRAM(s) Oral every 8 hours  amLODIPine   Tablet 10 milliGRAM(s) Oral daily  artificial tears (preservative free) Ophthalmic Solution 2 Drop(s) Both EYES every 8 hours  atorvastatin 40 milliGRAM(s) Oral at bedtime  brimonidine 0.2% Ophthalmic Solution 1 Drop(s) Both EYES two times a day  chlorhexidine 2% Cloths 1 Application(s) Topical <User Schedule>  chlorhexidine 4% Liquid 1 Application(s) Topical <User Schedule>  dextrose 5%. 1000 milliLiter(s) (50 mL/Hr) IV Continuous <Continuous>  dextrose 5%. 1000 milliLiter(s) (100 mL/Hr) IV Continuous <Continuous>  dextrose 50% Injectable 25 Gram(s) IV Push once  dextrose 50% Injectable 12.5 Gram(s) IV Push once  dextrose 50% Injectable 25 Gram(s) IV Push once  dolutegravir 50 milliGRAM(s) Oral daily  dorzolamide 2% Ophthalmic Solution 1 Drop(s) Both EYES <User Schedule>  epoetin landry-epbx (RETACRIT) Injectable 4000 Unit(s) IV Push <User Schedule>  gabapentin 100 milliGRAM(s) Oral three times a day  glucagon  Injectable 1 milliGRAM(s) IntraMuscular once  insulin glargine Injectable (LANTUS) 10 Unit(s) SubCutaneous at bedtime  insulin lispro (ADMELOG) corrective regimen sliding scale   SubCutaneous three times a day before meals  lamiVUDine- milliGRAM(s) Oral daily  latanoprost 0.005% Ophthalmic Solution 1 Drop(s) Both EYES at bedtime  lidocaine   4% Patch 1 Patch Transdermal every 24 hours  metoprolol tartrate 50 milliGRAM(s) Oral two times a day  timolol 0.5% Solution 1 Drop(s) Both EYES two times a day  trimethoprim/polymyxin Solution 1 Drop(s) Both EYES daily    MEDICATIONS  (PRN):  aluminum hydroxide/magnesium hydroxide/simethicone Suspension 30 milliLiter(s) Oral every 4 hours PRN Dyspepsia  dextrose Oral Gel 15 Gram(s) Oral once PRN Blood Glucose LESS THAN 70 milliGRAM(s)/deciliter  hydrALAZINE Injectable 10 milliGRAM(s) IV Push every 6 hours PRN sbp>159  melatonin 3 milliGRAM(s) Oral at bedtime PRN Insomnia  OLANZapine Injectable 5 milliGRAM(s) IntraMuscular every 6 hours PRN Agitation  ondansetron Injectable 4 milliGRAM(s) IV Push every 8 hours PRN Nausea and/or Vomiting  sodium chloride 0.9% lock flush 10 milliLiter(s) IV Push every 1 hour PRN Pre/post blood products, medications, blood draw, and to maintain line patency      RADIOLOGY & ADDITIONAL TESTS:

## 2025-04-30 NOTE — PROGRESS NOTE ADULT - SUBJECTIVE AND OBJECTIVE BOX
[facility] DIVISION OF KIDNEY DISEASES AND HYPERTENSION -- HEMODIALYSIS NOTE  --------------------------------------------------------------------------------  Chief Complaint: ESRD/Ongoing hemodialysis requirement    24 hour events/subjective:  sp hd yesterday      PAST HISTORY  --------------------------------------------------------------------------------  No significant changes to PMH, PSH, FHx, SHx, unless otherwise noted    ALLERGIES & MEDICATIONS  --------------------------------------------------------------------------------  Allergies  No Known Allergies      Standing Inpatient Medications  acetaminophen     Tablet .. 975 milliGRAM(s) Oral every 8 hours  amLODIPine   Tablet 10 milliGRAM(s) Oral daily  artificial tears (preservative free) Ophthalmic Solution 2 Drop(s) Both EYES every 8 hours  atorvastatin 40 milliGRAM(s) Oral at bedtime  brimonidine 0.2% Ophthalmic Solution 1 Drop(s) Both EYES two times a day  chlorhexidine 2% Cloths 1 Application(s) Topical <User Schedule>  chlorhexidine 4% Liquid 1 Application(s) Topical <User Schedule>  dextrose 5%. 1000 milliLiter(s) IV Continuous <Continuous>  dextrose 5%. 1000 milliLiter(s) IV Continuous <Continuous>  dextrose 50% Injectable 25 Gram(s) IV Push once  dextrose 50% Injectable 12.5 Gram(s) IV Push once  dextrose 50% Injectable 25 Gram(s) IV Push once  dolutegravir 50 milliGRAM(s) Oral daily  dorzolamide 2% Ophthalmic Solution 1 Drop(s) Both EYES <User Schedule>  epoetin landry-epbx (RETACRIT) Injectable 4000 Unit(s) IV Push <User Schedule>  gabapentin 100 milliGRAM(s) Oral three times a day  glucagon  Injectable 1 milliGRAM(s) IntraMuscular once  insulin glargine Injectable (LANTUS) 10 Unit(s) SubCutaneous at bedtime  insulin lispro (ADMELOG) corrective regimen sliding scale   SubCutaneous three times a day before meals  lamiVUDine- milliGRAM(s) Oral daily  latanoprost 0.005% Ophthalmic Solution 1 Drop(s) Both EYES at bedtime  lidocaine   4% Patch 1 Patch Transdermal every 24 hours  metoprolol tartrate 50 milliGRAM(s) Oral two times a day  timolol 0.5% Solution 1 Drop(s) Both EYES two times a day  trimethoprim/polymyxin Solution 1 Drop(s) Both EYES daily    PRN Inpatient Medications  aluminum hydroxide/magnesium hydroxide/simethicone Suspension 30 milliLiter(s) Oral every 4 hours PRN  dextrose Oral Gel 15 Gram(s) Oral once PRN  hydrALAZINE Injectable 10 milliGRAM(s) IV Push every 6 hours PRN  melatonin 3 milliGRAM(s) Oral at bedtime PRN  OLANZapine Injectable 5 milliGRAM(s) IntraMuscular every 6 hours PRN  ondansetron Injectable 4 milliGRAM(s) IV Push every 8 hours PRN  sodium chloride 0.9% lock flush 10 milliLiter(s) IV Push every 1 hour PRN      REVIEW OF SYSTEMS  --------------------------------------------------------------------------------  Gen: No weight changes, fatigue, fevers/chills, weakness  Skin: No rashes  Head/Eyes/Ears/Mouth: No headache  Respiratory: No dyspnea, cough,  CV: No chest pain, orthopnea  GI: No abdominal pain, diarrhea, constipation, nausea, vomiting,  MSK: No joint pain  Neuro: No dizziness/lightheadedness, weakness  Heme: No bleeding  Psych: No significant nervousness, anxiety, stress, depression    All other systems were reviewed and are negative, except as noted.    VITALS/PHYSICAL EXAM  --------------------------------------------------------------------------------  T(C): 37.3 (04-30-25 @ 12:29), Max: 37.3 (04-30-25 @ 12:29)  HR: 61 (04-30-25 @ 12:29) (57 - 78)  BP: 137/67 (04-30-25 @ 12:29) (115/71 - 207/89)  RR: 18 (04-30-25 @ 12:29) (16 - 18)  SpO2: 98% (04-30-25 @ 12:29) (94% - 100%)  Wt(kg): --      04-29-25 @ 07:01  -  04-30-25 @ 07:00  --------------------------------------------------------  IN: 1000 mL / OUT: 200 mL / NET: 800 mL      Physical Exam:  	Gen: NAD, well-appearing  	HEENT: supple neck  	Pulm: CTA B/L  	CV: RRR, S1S2; no rub  	Abd: +BS, soft, nontender  	UE: Warm  	LE: Warm, no edema  	Neuro: No focal deficits  	Psych: Normal affect and mood  	Skin: Warm  	Vascular access: [facility]    LABS/STUDIES  --------------------------------------------------------------------------------              10.3   8.67  >-----------<  290      [04-30-25 @ 05:25]              30.9     136  |  100  |  33.9  ----------------------------<  176      [04-30-25 @ 05:25]  4.0   |  22.0  |  4.10        Ca     8.7     [04-30-25 @ 05:25]      Mg     2.1     [04-30-25 @ 05:25]      Phos  4.2     [04-30-25 @ 05:25]            Iron 37, TIBC --, %sat --      [04-09-25 @ 05:45]  Ferritin 148      [04-09-25 @ 05:45]  Lipid: chol 100, , HDL 23, LDL --      [08-08-24 @ 06:35]    HBsAb <3.3      [04-11-25 @ 15:25]  HBsAg Nonreact      [04-11-25 @ 15:25]  HBcAb Nonreact      [04-11-25 @ 15:25]  HCV 0.18, Nonreact      [04-11-25 @ 15:25]

## 2025-04-30 NOTE — PROGRESS NOTE ADULT - ASSESSMENT
Ethics will continue to follow and be available for assistance as needed. Please do not hesitate to contact us.    Liberty NAVA RN, MBE  Ethics Fellow  [phone]   Ethics will continue to follow and be available for assistance as needed. Please do not hesitate to contact us.    Liberty NAVA RN, MBE  Ethics Fellow  [phone]    Agree with above.     Obtained copy of HCP from [facility] naming Nelli Edwards as primary HCP. No alernate named. Dated 1/7/2011.     Provided copy to JEAN PAUL Chne LMSW (Director SW Operations).     Chayito Lee MD  Ethics Attending  [phone]

## 2025-05-01 LAB
ANION GAP SERPL CALC-SCNC: 13 MMOL/L — SIGNIFICANT CHANGE UP (ref 5–17)
BUN SERPL-MCNC: 44.5 MG/DL — HIGH (ref 8–20)
CALCIUM SERPL-MCNC: 8.5 MG/DL — SIGNIFICANT CHANGE UP (ref 8.4–10.5)
CHLORIDE SERPL-SCNC: 100 MMOL/L — SIGNIFICANT CHANGE UP (ref 96–108)
CO2 SERPL-SCNC: 23 MMOL/L — SIGNIFICANT CHANGE UP (ref 22–29)
CREAT SERPL-MCNC: 5.35 MG/DL — HIGH (ref 0.5–1.3)
EGFR: 11 ML/MIN/1.73M2 — LOW
EGFR: 11 ML/MIN/1.73M2 — LOW
GLUCOSE BLDC GLUCOMTR-MCNC: 181 MG/DL — HIGH (ref 70–99)
GLUCOSE BLDC GLUCOMTR-MCNC: 195 MG/DL — HIGH (ref 70–99)
GLUCOSE BLDC GLUCOMTR-MCNC: 200 MG/DL — HIGH (ref 70–99)
GLUCOSE BLDC GLUCOMTR-MCNC: 210 MG/DL — HIGH (ref 70–99)
GLUCOSE SERPL-MCNC: 119 MG/DL — HIGH (ref 70–99)
HCT VFR BLD CALC: 30.1 % — LOW (ref 39–50)
HGB BLD-MCNC: 9.9 G/DL — LOW (ref 13–17)
MAGNESIUM SERPL-MCNC: 2 MG/DL — SIGNIFICANT CHANGE UP (ref 1.6–2.6)
MCHC RBC-ENTMCNC: 28.9 PG — SIGNIFICANT CHANGE UP (ref 27–34)
MCHC RBC-ENTMCNC: 32.9 G/DL — SIGNIFICANT CHANGE UP (ref 32–36)
MCV RBC AUTO: 88 FL — SIGNIFICANT CHANGE UP (ref 80–100)
NRBC # BLD AUTO: 0 K/UL — SIGNIFICANT CHANGE UP (ref 0–0)
NRBC # FLD: 0 K/UL — SIGNIFICANT CHANGE UP (ref 0–0)
NRBC BLD AUTO-RTO: 0 /100 WBCS — SIGNIFICANT CHANGE UP (ref 0–0)
PHOSPHATE SERPL-MCNC: 4.8 MG/DL — HIGH (ref 2.4–4.7)
PLATELET # BLD AUTO: 276 K/UL — SIGNIFICANT CHANGE UP (ref 150–400)
PMV BLD: 8.8 FL — SIGNIFICANT CHANGE UP (ref 7–13)
POTASSIUM SERPL-MCNC: 4.3 MMOL/L — SIGNIFICANT CHANGE UP (ref 3.5–5.3)
POTASSIUM SERPL-SCNC: 4.3 MMOL/L — SIGNIFICANT CHANGE UP (ref 3.5–5.3)
RBC # BLD: 3.42 M/UL — LOW (ref 4.2–5.8)
RBC # FLD: 14.6 % — HIGH (ref 10.3–14.5)
SODIUM SERPL-SCNC: 136 MMOL/L — SIGNIFICANT CHANGE UP (ref 135–145)
WBC # BLD: 8.26 K/UL — SIGNIFICANT CHANGE UP (ref 3.8–10.5)
WBC # FLD AUTO: 8.26 K/UL — SIGNIFICANT CHANGE UP (ref 3.8–10.5)

## 2025-05-01 PROCEDURE — 99232 SBSQ HOSP IP/OBS MODERATE 35: CPT

## 2025-05-01 RX ADMIN — BRIMONIDINE TARTRATE 1 DROP(S): 1.5 SOLUTION/ DROPS OPHTHALMIC at 17:44

## 2025-05-01 RX ADMIN — INSULIN LISPRO 1: 100 INJECTION, SOLUTION INTRAVENOUS; SUBCUTANEOUS at 08:22

## 2025-05-01 RX ADMIN — Medication 975 MILLIGRAM(S): at 22:11

## 2025-05-01 RX ADMIN — BRIMONIDINE TARTRATE 1 DROP(S): 1.5 SOLUTION/ DROPS OPHTHALMIC at 05:28

## 2025-05-01 RX ADMIN — GABAPENTIN 100 MILLIGRAM(S): 400 CAPSULE ORAL at 05:24

## 2025-05-01 RX ADMIN — TIMOLOL MALEATE 1 DROP(S): 6.8 SOLUTION OPHTHALMIC at 17:44

## 2025-05-01 RX ADMIN — INSULIN GLARGINE-YFGN 10 UNIT(S): 100 INJECTION, SOLUTION SUBCUTANEOUS at 22:11

## 2025-05-01 RX ADMIN — Medication 975 MILLIGRAM(S): at 13:15

## 2025-05-01 RX ADMIN — DOLUTEGRAVIR SODIUM 50 MILLIGRAM(S): 5 TABLET, FOR SUSPENSION ORAL at 11:35

## 2025-05-01 RX ADMIN — Medication 1 APPLICATION(S): at 05:27

## 2025-05-01 RX ADMIN — DORZOLAMIDE 1 DROP(S): 20 SOLUTION/ DROPS OPHTHALMIC at 20:32

## 2025-05-01 RX ADMIN — Medication 1 APPLICATION(S): at 05:25

## 2025-05-01 RX ADMIN — AMLODIPINE BESYLATE 10 MILLIGRAM(S): 10 TABLET ORAL at 05:26

## 2025-05-01 RX ADMIN — INSULIN LISPRO 2: 100 INJECTION, SOLUTION INTRAVENOUS; SUBCUTANEOUS at 17:44

## 2025-05-01 RX ADMIN — Medication 2 DROP(S): at 13:16

## 2025-05-01 RX ADMIN — INSULIN LISPRO 1: 100 INJECTION, SOLUTION INTRAVENOUS; SUBCUTANEOUS at 12:36

## 2025-05-01 RX ADMIN — METOPROLOL SUCCINATE 50 MILLIGRAM(S): 50 TABLET, EXTENDED RELEASE ORAL at 17:44

## 2025-05-01 RX ADMIN — Medication 2 DROP(S): at 05:24

## 2025-05-01 RX ADMIN — Medication 2 DROP(S): at 22:12

## 2025-05-01 RX ADMIN — METOPROLOL SUCCINATE 50 MILLIGRAM(S): 50 TABLET, EXTENDED RELEASE ORAL at 05:26

## 2025-05-01 RX ADMIN — GABAPENTIN 100 MILLIGRAM(S): 400 CAPSULE ORAL at 22:12

## 2025-05-01 RX ADMIN — LATANOPROST PF 1 DROP(S): 0.05 SOLUTION/ DROPS OPHTHALMIC at 22:11

## 2025-05-01 RX ADMIN — LAMIVUDINE 100 MILLIGRAM(S): 10 SOLUTION ORAL at 11:35

## 2025-05-01 RX ADMIN — Medication 975 MILLIGRAM(S): at 05:24

## 2025-05-01 RX ADMIN — TIMOLOL MALEATE 1 DROP(S): 6.8 SOLUTION OPHTHALMIC at 05:28

## 2025-05-01 RX ADMIN — POLYMYXIN B SULFATE AND TRIMETHOPRIM SULFATE 1 DROP(S): 10000; 1 SOLUTION/ DROPS OPHTHALMIC at 11:36

## 2025-05-01 RX ADMIN — DORZOLAMIDE 1 DROP(S): 20 SOLUTION/ DROPS OPHTHALMIC at 10:02

## 2025-05-01 RX ADMIN — GABAPENTIN 100 MILLIGRAM(S): 400 CAPSULE ORAL at 13:15

## 2025-05-01 RX ADMIN — ATORVASTATIN CALCIUM 40 MILLIGRAM(S): 80 TABLET, FILM COATED ORAL at 22:12

## 2025-05-01 NOTE — PROGRESS NOTE ADULT - SUBJECTIVE AND OBJECTIVE BOX
Ethics continues to follow. See consult on 4/25/25 and prior notes.    Discussion with the patient at the bedside. Patient reported he was feeling well today. He stated that he was looking forward to receiving his lunch, though he shared with the fellow that he was really craving a vanilla ice cream and an iced tea. The fellow assured the patient that she could request that for him and he expressed his gratitude. He shared that he is willing to undergo hemodialysis again today and is ready to participate in any care necessary to "help me feel better." The fellow thanked the patient for sharing and left him to rest.    Discussion with ANGEL Santizo RN (Floor Nurse). Informed her that the patient is willing to attend dialysis today and is requesting ice cream and an iced tea. ANGEL Santizo RN confirmed that she would get the patient the requested items.    Ethics was informed by ANGEL Santizo RN that the patient did not receive hemodialysis today as the dialysis team was informed that he declined to participate. Plan is to resume hemodialysis tomorrow, 5/2/2025. Ethics continues to follow. See consult on 4/25/25 and prior notes.    Discussion with the patient at the bedside. Patient reported he was feeling well today. He stated that he was looking forward to receiving his lunch, though he shared with the fellow that he was really craving a vanilla ice cream and an iced tea. The fellow assured the patient that she could request that for him and he expressed his gratitude. He shared that he is willing to undergo hemodialysis again today and is ready to participate in any care necessary to "help me feel better." The fellow thanked the patient for sharing and left him to rest.    Discussion with ANGEL Santizo RN (Floor Nurse). Informed her that the patient is willing to attend dialysis today and is requesting ice cream and an iced tea. ANGEL Santizo RN confirmed that she would get the patient the requested items.    Ethics was informed by ANGEL Santizo RN that the patient did not receive hemodialysis today as it was reported the patient refused. Plan is to resume hemodialysis tomorrow, 5/2/2025.
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
## 2025-05-01 NOTE — PROGRESS NOTE ADULT - ASSESSMENT
63 YOM DM, HIV on HAART admitted after fall.  Nephrology consulted for progressive worsening CKD.    - Advanced progressive CKD stage V from biopsy proven diabetic nephropathy and podopathy, initiated on HD during this hospitalization   sp renal biopsy with Diabetic nephropathy+ Complete foot process effacement, 80% IFTA  HIV- VL > 300 k  Anemia: CKD and RITESH.  Placed on IV venofer. epo  HTN: BP better controlled at this time; continue amlodipine, metoprolol, Hydralazine  HIV on Bikatrvy- meds changed as per ID  DM: discontinued metformin (low GFR), placed on Lantus + SSI ACHS    Pt has been refusing HD intermittently  HD tomorrow if pt agreeable.    GILBERTO with HD

## 2025-05-01 NOTE — PROGRESS NOTE ADULT - SUBJECTIVE AND OBJECTIVE BOX
[facility] DIVISION OF KIDNEY DISEASES AND HYPERTENSION -- HEMODIALYSIS NOTE  --------------------------------------------------------------------------------  Chief Complaint: ESRD/Ongoing hemodialysis requirement    24 hour events/subjective:  pt refused HD again today          PAST HISTORY  --------------------------------------------------------------------------------  No significant changes to PMH, PSH, FHx, SHx, unless otherwise noted    ALLERGIES & MEDICATIONS  --------------------------------------------------------------------------------  Allergies  No Known Allergies        Standing Inpatient Medications  acetaminophen     Tablet .. 975 milliGRAM(s) Oral every 8 hours  amLODIPine   Tablet 10 milliGRAM(s) Oral daily  artificial tears (preservative free) Ophthalmic Solution 2 Drop(s) Both EYES every 8 hours  atorvastatin 40 milliGRAM(s) Oral at bedtime  brimonidine 0.2% Ophthalmic Solution 1 Drop(s) Both EYES two times a day  chlorhexidine 2% Cloths 1 Application(s) Topical <User Schedule>  chlorhexidine 4% Liquid 1 Application(s) Topical <User Schedule>  dextrose 5%. 1000 milliLiter(s) IV Continuous <Continuous>  dextrose 5%. 1000 milliLiter(s) IV Continuous <Continuous>  dextrose 50% Injectable 25 Gram(s) IV Push once  dextrose 50% Injectable 12.5 Gram(s) IV Push once  dextrose 50% Injectable 25 Gram(s) IV Push once  dolutegravir 50 milliGRAM(s) Oral daily  dorzolamide 2% Ophthalmic Solution 1 Drop(s) Both EYES <User Schedule>  epoetin landry-epbx (RETACRIT) Injectable 4000 Unit(s) IV Push <User Schedule>  gabapentin 100 milliGRAM(s) Oral three times a day  glucagon  Injectable 1 milliGRAM(s) IntraMuscular once  insulin glargine Injectable (LANTUS) 10 Unit(s) SubCutaneous at bedtime  insulin lispro (ADMELOG) corrective regimen sliding scale   SubCutaneous three times a day before meals  lamiVUDine- milliGRAM(s) Oral daily  latanoprost 0.005% Ophthalmic Solution 1 Drop(s) Both EYES at bedtime  lidocaine   4% Patch 1 Patch Transdermal every 24 hours  metoprolol tartrate 50 milliGRAM(s) Oral two times a day  timolol 0.5% Solution 1 Drop(s) Both EYES two times a day  trimethoprim/polymyxin Solution 1 Drop(s) Both EYES daily    PRN Inpatient Medications  aluminum hydroxide/magnesium hydroxide/simethicone Suspension 30 milliLiter(s) Oral every 4 hours PRN  dextrose Oral Gel 15 Gram(s) Oral once PRN  hydrALAZINE Injectable 10 milliGRAM(s) IV Push every 6 hours PRN  melatonin 3 milliGRAM(s) Oral at bedtime PRN  OLANZapine Injectable 5 milliGRAM(s) IntraMuscular every 6 hours PRN  ondansetron Injectable 4 milliGRAM(s) IV Push every 8 hours PRN  sodium chloride 0.9% lock flush 10 milliLiter(s) IV Push every 1 hour PRN      REVIEW OF SYSTEMS  --------------------------------------------------------------------------------  Gen: No weight changes, fatigue, fevers/chills, weakness  Skin: No rashes  Head/Eyes/Ears/Mouth: No headache  Respiratory: No dyspnea, cough,  CV: No chest pain, orthopnea  GI: No abdominal pain, diarrhea, constipation, nausea, vomiting,  MSK: No joint pain  Neuro: No dizziness/lightheadedness, weakness  Heme: No bleeding  Psych: No significant nervousness, anxiety, stress, depression    All other systems were reviewed and are negative, except as noted.    VITALS/PHYSICAL EXAM  --------------------------------------------------------------------------------  T(C): 36.7 (05-01-25 @ 12:45), Max: 36.9 (05-01-25 @ 00:00)  HR: 61 (05-01-25 @ 12:45) (56 - 61)  BP: 132/65 (05-01-25 @ 12:45) (132/65 - 157/78)  RR: 17 (05-01-25 @ 12:45) (17 - 18)  SpO2: 95% (05-01-25 @ 12:45) (94% - 100%)  Wt(kg): --        04-30-25 @ 07:01  -  05-01-25 @ 07:00  --------------------------------------------------------  IN: 350 mL / OUT: 600 mL / NET: -250 mL    05-01-25 @ 07:01  -  05-01-25 @ 16:32  --------------------------------------------------------  IN: 540 mL / OUT: 0 mL / NET: 540 mL      Physical Exam:  	Gen: NAD, well-appearing  	HEENT: PERRL, supple neck,  	Pulm: CTA B/L  	CV: RRR, S1S2; no rub  	Abd: +BS, soft, nontender  	UE: Warm,  	LE: Warm, no edema  	Neuro: No focal deficits  	Psych: Normal affect and mood  	Skin: Warm  	[facility]ascular access: [facility]    LABS/STUDIES  --------------------------------------------------------------------------------              9.9    8.26  >-----------<  276      [05-01-25 @ 05:10]              30.1     136  |  100  |  44.5  ----------------------------<  119      [05-01-25 @ 05:10]  4.3   |  23.0  |  5.35        Ca     8.5     [05-01-25 @ 05:10]      Mg     2.0     [05-01-25 @ 05:10]      Phos  4.8     [05-01-25 @ 05:10]            Iron 37, TIBC --, %sat --      [04-09-25 @ 05:45]  Ferritin 148      [04-09-25 @ 05:45]  Lipid: chol 100, , HDL 23, LDL --      [08-08-24 @ 06:35]    HBsAb <3.3      [04-11-25 @ 15:25]  HBsAg Nonreact      [04-11-25 @ 15:25]  HBcAb Nonreact      [04-11-25 @ 15:25]  HCV 0.18, Nonreact      [04-11-25 @ 15:25]
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
## 2025-05-01 NOTE — PROGRESS NOTE ADULT - ASSESSMENT
Ethics will continue to follow and be available for assistance as needed. Please do not hesitate to contact us.    Liberty NAVA RN, MBE  Ethics Fellow  [phone]

## 2025-05-01 NOTE — PROGRESS NOTE ADULT - SUBJECTIVE AND OBJECTIVE BOX
RAFAEL BEEBEROGERIO    [id number]    64y      Male    CC: ESRD     INTERVAL HPI/OVERNIGHT EVENTS: Pt seen and examined. no acute events reported o/n     REVIEW OF SYSTEMS:    CONSTITUTIONAL: No fever, weight loss  RESPIRATORY: No cough, wheezing, hemoptysis  CARDIOVASCULAR: No chest pain, palpitations  GASTROINTESTINAL: No abdominal or epigastric pain. No nausea, vomiting  NEUROLOGICAL: No headaches    Vital Signs Last 24 Hrs  T(C): 36.4 (01 May 2025 08:00), Max: 37.3 (30 Apr 2025 12:29)  T(F): 97.5 (01 May 2025 08:00), Max: 99.1 (30 Apr 2025 12:29)  HR: 56 (01 May 2025 08:00) (56 - 62)  BP: 145/72 (01 May 2025 08:00) (137/67 - 157/78)  BP(mean): 90 (30 Apr 2025 12:29) (90 - 90)  RR: 18 (01 May 2025 08:00) (18 - 18)  SpO2: 95% (01 May 2025 08:00) (94% - 100%)    Parameters below as of 01 May 2025 08:00  Patient On (Oxygen Delivery Method): room air        PHYSICAL EXAM:    GENERAL: NAD  CHEST/LUNG: respirations unlabored   HEART: S1S2+, Regular rate and rhythm  ABDOMEN: Soft, Nontender, Nondistended  SKIN: warm, dry   NEURO: Awake, alert     LABS:                        9.9    8.26  )-----------( 276      ( 01 May 2025 05:10 )             30.1     05-01    136  |  100  |  44.5[H]  ----------------------------<  119[H]  4.3   |  23.0  |  5.35[H]    Ca    8.5      01 May 2025 05:10  Phos  4.8     05-01  Mg     2.0     05-01        Urinalysis Basic - ( 01 May 2025 05:10 )    Color: x / Appearance: x / SG: x / pH: x  Gluc: 119 mg/dL / Ketone: x  / Bili: x / Urobili: x   Blood: x / Protein: x / Nitrite: x   Leuk Esterase: x / RBC: x / WBC x   Sq Epi: x / Non Sq Epi: x / Bacteria: x          MEDICATIONS  (STANDING):  acetaminophen     Tablet .. 975 milliGRAM(s) Oral every 8 hours  amLODIPine   Tablet 10 milliGRAM(s) Oral daily  artificial tears (preservative free) Ophthalmic Solution 2 Drop(s) Both EYES every 8 hours  atorvastatin 40 milliGRAM(s) Oral at bedtime  brimonidine 0.2% Ophthalmic Solution 1 Drop(s) Both EYES two times a day  chlorhexidine 2% Cloths 1 Application(s) Topical <User Schedule>  chlorhexidine 4% Liquid 1 Application(s) Topical <User Schedule>  dextrose 5%. 1000 milliLiter(s) (50 mL/Hr) IV Continuous <Continuous>  dextrose 5%. 1000 milliLiter(s) (100 mL/Hr) IV Continuous <Continuous>  dextrose 50% Injectable 25 Gram(s) IV Push once  dextrose 50% Injectable 12.5 Gram(s) IV Push once  dextrose 50% Injectable 25 Gram(s) IV Push once  dolutegravir 50 milliGRAM(s) Oral daily  dorzolamide 2% Ophthalmic Solution 1 Drop(s) Both EYES <User Schedule>  epoetin landry-epbx (RETACRIT) Injectable 4000 Unit(s) IV Push <User Schedule>  gabapentin 100 milliGRAM(s) Oral three times a day  glucagon  Injectable 1 milliGRAM(s) IntraMuscular once  insulin glargine Injectable (LANTUS) 10 Unit(s) SubCutaneous at bedtime  insulin lispro (ADMELOG) corrective regimen sliding scale   SubCutaneous three times a day before meals  lamiVUDine- milliGRAM(s) Oral daily  latanoprost 0.005% Ophthalmic Solution 1 Drop(s) Both EYES at bedtime  lidocaine   4% Patch 1 Patch Transdermal every 24 hours  metoprolol tartrate 50 milliGRAM(s) Oral two times a day  timolol 0.5% Solution 1 Drop(s) Both EYES two times a day  trimethoprim/polymyxin Solution 1 Drop(s) Both EYES daily    MEDICATIONS  (PRN):  aluminum hydroxide/magnesium hydroxide/simethicone Suspension 30 milliLiter(s) Oral every 4 hours PRN Dyspepsia  dextrose Oral Gel 15 Gram(s) Oral once PRN Blood Glucose LESS THAN 70 milliGRAM(s)/deciliter  hydrALAZINE Injectable 10 milliGRAM(s) IV Push every 6 hours PRN sbp>159  melatonin 3 milliGRAM(s) Oral at bedtime PRN Insomnia  OLANZapine Injectable 5 milliGRAM(s) IntraMuscular every 6 hours PRN Agitation  ondansetron Injectable 4 milliGRAM(s) IV Push every 8 hours PRN Nausea and/or Vomiting  sodium chloride 0.9% lock flush 10 milliLiter(s) IV Push every 1 hour PRN Pre/post blood products, medications, blood draw, and to maintain line patency      RADIOLOGY & ADDITIONAL TESTS:

## 2025-05-02 LAB
ANION GAP SERPL CALC-SCNC: 14 MMOL/L — SIGNIFICANT CHANGE UP (ref 5–17)
BUN SERPL-MCNC: 50.5 MG/DL — HIGH (ref 8–20)
CALCIUM SERPL-MCNC: 8.3 MG/DL — LOW (ref 8.4–10.5)
CHLORIDE SERPL-SCNC: 101 MMOL/L — SIGNIFICANT CHANGE UP (ref 96–108)
CO2 SERPL-SCNC: 21 MMOL/L — LOW (ref 22–29)
CREAT SERPL-MCNC: 5.38 MG/DL — HIGH (ref 0.5–1.3)
EGFR: 11 ML/MIN/1.73M2 — LOW
EGFR: 11 ML/MIN/1.73M2 — LOW
GLUCOSE BLDC GLUCOMTR-MCNC: 153 MG/DL — HIGH (ref 70–99)
GLUCOSE BLDC GLUCOMTR-MCNC: 310 MG/DL — HIGH (ref 70–99)
GLUCOSE BLDC GLUCOMTR-MCNC: 314 MG/DL — HIGH (ref 70–99)
GLUCOSE BLDC GLUCOMTR-MCNC: 331 MG/DL — HIGH (ref 70–99)
GLUCOSE SERPL-MCNC: 244 MG/DL — HIGH (ref 70–99)
HCT VFR BLD CALC: 29.5 % — LOW (ref 39–50)
HGB BLD-MCNC: 9.7 G/DL — LOW (ref 13–17)
MAGNESIUM SERPL-MCNC: 2.2 MG/DL — SIGNIFICANT CHANGE UP (ref 1.6–2.6)
MCHC RBC-ENTMCNC: 29.2 PG — SIGNIFICANT CHANGE UP (ref 27–34)
MCHC RBC-ENTMCNC: 32.9 G/DL — SIGNIFICANT CHANGE UP (ref 32–36)
MCV RBC AUTO: 88.9 FL — SIGNIFICANT CHANGE UP (ref 80–100)
NRBC # BLD AUTO: 0 K/UL — SIGNIFICANT CHANGE UP (ref 0–0)
NRBC # FLD: 0 K/UL — SIGNIFICANT CHANGE UP (ref 0–0)
NRBC BLD AUTO-RTO: 0 /100 WBCS — SIGNIFICANT CHANGE UP (ref 0–0)
PHOSPHATE SERPL-MCNC: 4.2 MG/DL — SIGNIFICANT CHANGE UP (ref 2.4–4.7)
PLATELET # BLD AUTO: 268 K/UL — SIGNIFICANT CHANGE UP (ref 150–400)
PMV BLD: 9.3 FL — SIGNIFICANT CHANGE UP (ref 7–13)
POTASSIUM SERPL-MCNC: 4.8 MMOL/L — SIGNIFICANT CHANGE UP (ref 3.5–5.3)
POTASSIUM SERPL-SCNC: 4.8 MMOL/L — SIGNIFICANT CHANGE UP (ref 3.5–5.3)
RBC # BLD: 3.32 M/UL — LOW (ref 4.2–5.8)
RBC # FLD: 14.6 % — HIGH (ref 10.3–14.5)
SODIUM SERPL-SCNC: 135 MMOL/L — SIGNIFICANT CHANGE UP (ref 135–145)
WBC # BLD: 7.53 K/UL — SIGNIFICANT CHANGE UP (ref 3.8–10.5)
WBC # FLD AUTO: 7.53 K/UL — SIGNIFICANT CHANGE UP (ref 3.8–10.5)

## 2025-05-02 PROCEDURE — 99233 SBSQ HOSP IP/OBS HIGH 50: CPT

## 2025-05-02 PROCEDURE — 99232 SBSQ HOSP IP/OBS MODERATE 35: CPT

## 2025-05-02 RX ADMIN — Medication 975 MILLIGRAM(S): at 05:21

## 2025-05-02 RX ADMIN — DOLUTEGRAVIR SODIUM 50 MILLIGRAM(S): 5 TABLET, FOR SUSPENSION ORAL at 08:44

## 2025-05-02 RX ADMIN — TIMOLOL MALEATE 1 DROP(S): 6.8 SOLUTION OPHTHALMIC at 17:02

## 2025-05-02 RX ADMIN — GABAPENTIN 100 MILLIGRAM(S): 400 CAPSULE ORAL at 05:21

## 2025-05-02 RX ADMIN — Medication 1 APPLICATION(S): at 05:22

## 2025-05-02 RX ADMIN — AMLODIPINE BESYLATE 10 MILLIGRAM(S): 10 TABLET ORAL at 05:21

## 2025-05-02 RX ADMIN — METOPROLOL SUCCINATE 50 MILLIGRAM(S): 50 TABLET, EXTENDED RELEASE ORAL at 17:02

## 2025-05-02 RX ADMIN — DORZOLAMIDE 1 DROP(S): 20 SOLUTION/ DROPS OPHTHALMIC at 21:52

## 2025-05-02 RX ADMIN — INSULIN LISPRO 4: 100 INJECTION, SOLUTION INTRAVENOUS; SUBCUTANEOUS at 17:28

## 2025-05-02 RX ADMIN — INSULIN LISPRO 1: 100 INJECTION, SOLUTION INTRAVENOUS; SUBCUTANEOUS at 08:43

## 2025-05-02 RX ADMIN — GABAPENTIN 100 MILLIGRAM(S): 400 CAPSULE ORAL at 13:45

## 2025-05-02 RX ADMIN — LAMIVUDINE 100 MILLIGRAM(S): 10 SOLUTION ORAL at 08:44

## 2025-05-02 RX ADMIN — Medication 975 MILLIGRAM(S): at 13:44

## 2025-05-02 RX ADMIN — BRIMONIDINE TARTRATE 1 DROP(S): 1.5 SOLUTION/ DROPS OPHTHALMIC at 05:21

## 2025-05-02 RX ADMIN — TIMOLOL MALEATE 1 DROP(S): 6.8 SOLUTION OPHTHALMIC at 05:21

## 2025-05-02 RX ADMIN — POLYMYXIN B SULFATE AND TRIMETHOPRIM SULFATE 1 DROP(S): 10000; 1 SOLUTION/ DROPS OPHTHALMIC at 08:44

## 2025-05-02 RX ADMIN — METOPROLOL SUCCINATE 50 MILLIGRAM(S): 50 TABLET, EXTENDED RELEASE ORAL at 05:21

## 2025-05-02 RX ADMIN — GABAPENTIN 100 MILLIGRAM(S): 400 CAPSULE ORAL at 21:52

## 2025-05-02 RX ADMIN — Medication 975 MILLIGRAM(S): at 22:53

## 2025-05-02 RX ADMIN — INSULIN GLARGINE-YFGN 10 UNIT(S): 100 INJECTION, SOLUTION SUBCUTANEOUS at 21:53

## 2025-05-02 RX ADMIN — ATORVASTATIN CALCIUM 40 MILLIGRAM(S): 80 TABLET, FILM COATED ORAL at 21:52

## 2025-05-02 RX ADMIN — Medication 2 DROP(S): at 21:52

## 2025-05-02 RX ADMIN — Medication 975 MILLIGRAM(S): at 21:53

## 2025-05-02 RX ADMIN — DORZOLAMIDE 1 DROP(S): 20 SOLUTION/ DROPS OPHTHALMIC at 08:44

## 2025-05-02 RX ADMIN — BRIMONIDINE TARTRATE 1 DROP(S): 1.5 SOLUTION/ DROPS OPHTHALMIC at 17:02

## 2025-05-02 RX ADMIN — Medication 2 DROP(S): at 05:22

## 2025-05-02 NOTE — PROGRESS NOTE ADULT - SUBJECTIVE AND OBJECTIVE BOX
[facility] Division of Hospital Medicine  Rolf Boyce MD   I'm reachable on [employer] Teams      Patient is a 64y old  Male who presents with a chief complaint of Geoff (01 May 2025 16:32)      SUBJECTIVE / OVERNIGHT EVENTS:   Patient was seen and examined during rounds    REports feeling same. Reports eye pain.  Refused HD today.  Offered pain meds before HD. Patient refused    12 points ROS negative except above    MEDICATIONS  (STANDING):  acetaminophen     Tablet .. 975 milliGRAM(s) Oral every 8 hours  amLODIPine   Tablet 10 milliGRAM(s) Oral daily  artificial tears (preservative free) Ophthalmic Solution 2 Drop(s) Both EYES every 8 hours  atorvastatin 40 milliGRAM(s) Oral at bedtime  brimonidine 0.2% Ophthalmic Solution 1 Drop(s) Both EYES two times a day  chlorhexidine 2% Cloths 1 Application(s) Topical <User Schedule>  chlorhexidine 4% Liquid 1 Application(s) Topical <User Schedule>  dextrose 5%. 1000 milliLiter(s) (50 mL/Hr) IV Continuous <Continuous>  dextrose 5%. 1000 milliLiter(s) (100 mL/Hr) IV Continuous <Continuous>  dextrose 50% Injectable 25 Gram(s) IV Push once  dextrose 50% Injectable 12.5 Gram(s) IV Push once  dextrose 50% Injectable 25 Gram(s) IV Push once  dolutegravir 50 milliGRAM(s) Oral daily  dorzolamide 2% Ophthalmic Solution 1 Drop(s) Both EYES <User Schedule>  epoetin landry-epbx (RETACRIT) Injectable 4000 Unit(s) IV Push <User Schedule>  gabapentin 100 milliGRAM(s) Oral three times a day  glucagon  Injectable 1 milliGRAM(s) IntraMuscular once  insulin glargine Injectable (LANTUS) 10 Unit(s) SubCutaneous at bedtime  insulin lispro (ADMELOG) corrective regimen sliding scale   SubCutaneous three times a day before meals  lamiVUDine- milliGRAM(s) Oral daily  latanoprost 0.005% Ophthalmic Solution 1 Drop(s) Both EYES at bedtime  lidocaine   4% Patch 1 Patch Transdermal every 24 hours  metoprolol tartrate 50 milliGRAM(s) Oral two times a day  timolol 0.5% Solution 1 Drop(s) Both EYES two times a day  trimethoprim/polymyxin Solution 1 Drop(s) Both EYES daily    MEDICATIONS  (PRN):  aluminum hydroxide/magnesium hydroxide/simethicone Suspension 30 milliLiter(s) Oral every 4 hours PRN Dyspepsia  dextrose Oral Gel 15 Gram(s) Oral once PRN Blood Glucose LESS THAN 70 milliGRAM(s)/deciliter  hydrALAZINE Injectable 10 milliGRAM(s) IV Push every 6 hours PRN sbp>159  melatonin 3 milliGRAM(s) Oral at bedtime PRN Insomnia  ondansetron Injectable 4 milliGRAM(s) IV Push every 8 hours PRN Nausea and/or Vomiting  sodium chloride 0.9% lock flush 10 milliLiter(s) IV Push every 1 hour PRN Pre/post blood products, medications, blood draw, and to maintain line patency        I&O's Summary    01 May 2025 07:01  -  02 May 2025 07:00  --------------------------------------------------------  IN: 1280 mL / OUT: 0 mL / NET: 1280 mL    02 May 2025 07:01  -  02 May 2025 16:38  --------------------------------------------------------  IN: 480 mL / OUT: 0 mL / NET: 480 mL        PHYSICAL EXAM:  Vital Signs Last 24 Hrs  T(C): 36.9 (02 May 2025 16:36), Max: 36.9 (02 May 2025 00:00)  T(F): 98.4 (02 May 2025 16:36), Max: 98.4 (02 May 2025 00:00)  HR: 73 (02 May 2025 16:36) (64 - 73)  BP: 151/73 (02 May 2025 16:36) (140/73 - 166/83)  BP(mean): --  RR: 16 (02 May 2025 16:36) (16 - 18)  SpO2: 94% (02 May 2025 16:36) (94% - 97%)    Parameters below as of 02 May 2025 16:36  Patient On (Oxygen Delivery Method): room air        GENERAL: NAD  CHEST/LUNG: respirations unlabored   HEART: S1S2+, Regular rate and rhythm  ABDOMEN: Soft, Nontender, Nondistended  SKIN: warm, dry   NEURO: Awake, alert   LABS:                        9.7    7.53  )-----------( 268      ( 02 May 2025 05:11 )             29.5     05-02    135  |  101  |  50.5[H]  ----------------------------<  244[H]  4.8   |  21.0[L]  |  5.38[H]    Ca    8.3[L]      02 May 2025 05:11  Phos  4.2     05-02  Mg     2.2     05-02            Urinalysis Basic - ( 02 May 2025 05:11 )    Color: x / Appearance: x / SG: x / pH: x  Gluc: 244 mg/dL / Ketone: x  / Bili: x / Urobili: x   Blood: x / Protein: x / Nitrite: x   Leuk Esterase: x / RBC: x / WBC x   Sq Epi: x / Non Sq Epi: x / Bacteria: x        CAPILLARY BLOOD GLUCOSE      POCT Blood Glucose.: 153 mg/dL (02 May 2025 08:18)  POCT Blood Glucose.: 210 mg/dL (01 May 2025 17:05)      Labs reviewed:    RADIOLOGY & ADDITIONAL TESTS:  Imaging Personally Reviewed:  Electrocardiogram Personally Reviewed:

## 2025-05-02 NOTE — PROGRESS NOTE ADULT - SUBJECTIVE AND OBJECTIVE BOX
[facility] DIVISION OF KIDNEY DISEASES AND HYPERTENSION -- HEMODIALYSIS NOTE  --------------------------------------------------------------------------------  Chief Complaint: ESRD/Ongoing hemodialysis requirement    24 hour events/subjective:  refused HD again today      PAST HISTORY  --------------------------------------------------------------------------------  No significant changes to PMH, PSH, FHx, SHx, unless otherwise noted    ALLERGIES & MEDICATIONS  --------------------------------------------------------------------------------  Allergies    No Known Allergies      Standing Inpatient Medications  acetaminophen     Tablet .. 975 milliGRAM(s) Oral every 8 hours  amLODIPine   Tablet 10 milliGRAM(s) Oral daily  artificial tears (preservative free) Ophthalmic Solution 2 Drop(s) Both EYES every 8 hours  atorvastatin 40 milliGRAM(s) Oral at bedtime  brimonidine 0.2% Ophthalmic Solution 1 Drop(s) Both EYES two times a day  chlorhexidine 2% Cloths 1 Application(s) Topical <User Schedule>  chlorhexidine 4% Liquid 1 Application(s) Topical <User Schedule>  dextrose 5%. 1000 milliLiter(s) IV Continuous <Continuous>  dextrose 5%. 1000 milliLiter(s) IV Continuous <Continuous>  dextrose 50% Injectable 25 Gram(s) IV Push once  dextrose 50% Injectable 12.5 Gram(s) IV Push once  dextrose 50% Injectable 25 Gram(s) IV Push once  dolutegravir 50 milliGRAM(s) Oral daily  dorzolamide 2% Ophthalmic Solution 1 Drop(s) Both EYES <User Schedule>  epoetin landry-epbx (RETACRIT) Injectable 4000 Unit(s) IV Push <User Schedule>  gabapentin 100 milliGRAM(s) Oral three times a day  glucagon  Injectable 1 milliGRAM(s) IntraMuscular once  insulin glargine Injectable (LANTUS) 10 Unit(s) SubCutaneous at bedtime  insulin lispro (ADMELOG) corrective regimen sliding scale   SubCutaneous three times a day before meals  lamiVUDine- milliGRAM(s) Oral daily  latanoprost 0.005% Ophthalmic Solution 1 Drop(s) Both EYES at bedtime  lidocaine   4% Patch 1 Patch Transdermal every 24 hours  metoprolol tartrate 50 milliGRAM(s) Oral two times a day  timolol 0.5% Solution 1 Drop(s) Both EYES two times a day  trimethoprim/polymyxin Solution 1 Drop(s) Both EYES daily    PRN Inpatient Medications  aluminum hydroxide/magnesium hydroxide/simethicone Suspension 30 milliLiter(s) Oral every 4 hours PRN  dextrose Oral Gel 15 Gram(s) Oral once PRN  hydrALAZINE Injectable 10 milliGRAM(s) IV Push every 6 hours PRN  melatonin 3 milliGRAM(s) Oral at bedtime PRN  ondansetron Injectable 4 milliGRAM(s) IV Push every 8 hours PRN  sodium chloride 0.9% lock flush 10 milliLiter(s) IV Push every 1 hour PRN      REVIEW OF SYSTEMS  --------------------------------------------------------------------------------  Gen: No weight changes, fatigue, fevers/chills, weakness  Skin: No rashes  Head/Eyes/Ears/Mouth: No headache  Respiratory: No dyspnea, cough,  CV: No chest pain, orthopnea  GI: No abdominal pain, diarrhea, constipation, nausea, vomiting,  MSK: No joint pain  Neuro: No dizziness/lightheadedness, weakness  Heme: No bleeding  Psych: No significant nervousness, anxiety, stress, depression    All other systems were reviewed and are negative, except as noted.    VITALS/PHYSICAL EXAM  --------------------------------------------------------------------------------  T(C): 36.6 (05-02-25 @ 08:01), Max: 36.9 (05-02-25 @ 00:00)  HR: 64 (05-02-25 @ 08:01) (64 - 72)  BP: 148/73 (05-02-25 @ 08:01) (140/73 - 166/83)  RR: 16 (05-02-25 @ 08:01) (16 - 18)  SpO2: 94% (05-02-25 @ 08:01) (94% - 97%)  Wt(kg): --        05-01-25 @ 07:01  -  05-02-25 @ 07:00  --------------------------------------------------------  IN: 1280 mL / OUT: 0 mL / NET: 1280 mL    05-02-25 @ 07:01  -  05-02-25 @ 15:15  --------------------------------------------------------  IN: 480 mL / OUT: 0 mL / NET: 480 mL      Physical Exam:  	Gen: NAD  	HEENT: supple neck  	Pulm: CTA B/L  	CV: RRR, S1S2; no rub  	Abd: +BS, soft, nontender  	UE: Warm  	LE: Warm, no edema  	Neuro: No focal deficits  	Psych: Normal affect and mood  	Skin: Warm  	Vascular access: [facility]    LABS/STUDIES  --------------------------------------------------------------------------------              9.7    7.53  >-----------<  268      [05-02-25 @ 05:11]              29.5     135  |  101  |  50.5  ----------------------------<  244      [05-02-25 @ 05:11]  4.8   |  21.0  |  5.38        Ca     8.3     [05-02-25 @ 05:11]      Mg     2.2     [05-02-25 @ 05:11]      Phos  4.2     [05-02-25 @ 05:11]            Iron 37, TIBC --, %sat --      [04-09-25 @ 05:45]  Ferritin 148      [04-09-25 @ 05:45]  Lipid: chol 100, , HDL 23, LDL --      [08-08-24 @ 06:35]    HBsAb <3.3      [04-11-25 @ 15:25]  HBsAg Nonreact      [04-11-25 @ 15:25]  HBcAb Nonreact      [04-11-25 @ 15:25]  HCV 0.18, Nonreact      [04-11-25 @ 15:25]

## 2025-05-02 NOTE — PROGRESS NOTE ADULT - SUBJECTIVE AND OBJECTIVE BOX
Ethics continues to follow. Discussion with Unit Clerk on 4TWR and Bedside RN - patient's estranged wife called asking for information. Patient stating he does not want her to receive information. Bedside RN spoke with Nelli, who stated they are estranged not .     HCP form from 2011 that Ethics obtained is valid IF the patient does not have capacity.     INCOMPLETE Ethics continues to follow. Discussion with Unit Clerk on 4TWR and Bedside RN - patient's estranged wife called asking for information. Patient stating he does not want her to receive information. Bedside RN spoke with Nelli, who stated they are estranged not .     HCP form from 2011 that Ethics obtained is valid IF the patient does not have capacity. Patient named Nelli as his HCP in 2011.

## 2025-05-02 NOTE — PROGRESS NOTE ADULT - ASSESSMENT
63y/oM PMH CKD, IDDM, HIV on HAART, HFrEF, recent bilateral cataract surgery at [facility], [place] presenting after fall from vehicle. Patient without evidence of acute traumatic injury to head or c-spine. Patient found to have elevated troponin, chest pain seems to be more so from fall rather than cardiac in nature. seen by cardiology s/p negative NST, Cardiac CT with calcium score of 13. On 04/07 renal biopsy showing diabetic nephropathy, s/p IR permacath and started on HD. outpatient vasc eval for AVF vs graft. 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 Seen by ophthalmologist, started eye drops per recs. Patient will need to follow up with his own ophthalmologist. Hospital course complicated by pt intermittently refusing HD. Ethics following. Pt also seen by psych for hallucinations, ams now improved.      #BRIDGETTE on CKD3B  #Now ESRD requiring HD  #Hyperkalemia  -Last HD 04/29  -s/p IR perma cath  -s/p renal biopsy 4/7; showing diabetic nephropathy   -outpatient vasc eval for AVF vs graft  -HD per renal, pt now intermittently refusing; due for HD today 5/1    #Recent bilateral cataract surgery  # Vision Loss  -Ct with chronic lens dislocation vs vitreous hemorrhage   -Optho contacted by ED - as per optho: IOP will be chronically elevated iso vitreous hemorrhage  -Given pilocarpine, dorzolamide, brimonidine, timolol in ED -- as per optho, not to continue as IOP chronically elevated  -completed course polytrim drops  -cont artifical tears  -had scheduled procedure with his outside optho on 04/01, will need new appt  -pt continues to report bad eye pain especially on the right, NP reached to ophth on call regarding recs: Sight MD: patient to follow up with own surgeon.   -Tanisha MCCLURE rec (04/22)reduce prednisolone once a day and dc ketorolac 4 times a day. started on erythromycin , now completed  -called [facility] for Dr.Khurram Rasmussen (ophthalmologist) office [facility] [phone]. D/w Dr. Lockhart on 4/23, pt hds procedure 01/16 with , never f/u rec ophthal eval   -seen by Dr Das on 04/23, rec combigan gtt bid, latanoprost gtt qhs, Dorzolamide bid. rec f/u out pt ophthalmology     HTN  -partly due to med noncompliance  -cont Amlodipine 10mg QD, metoprolol 50mg bid, Valsartan 80mg daily, hydralazine    s/p Fall  -CT head and cspine without evidence of traumatic injury  -pain control  -PT following, rec [facility]ARY     Elevated troponin  -Cardiology on board, recs noted  -Holding heparin and asa iso head trauma  -TTE Reviewed  -CT coronary done 4/1 noted with calcium score of 13  -NST w/o ischemia/infarct.     HIV  -ID Consulted, recs noted  -Dolutegrair and epivir  -continue until follows up outpatient with ID   -VL >300k    Chronic HFpEF  -TTE: EF 50-55%  -c/w metoprolol, Valsartan  -not on lasix at home    Back pain  -Lidoderm patch, pain control    Acute metabolic encephalopathy  Delirium  -psych recs appreciated   -prn zyprexa for agitation. Not used so far  -cth no acute change     vte ppx: scds    dispo: eventual [facility]ary placement once accept HD in hospital    Pt currently does not want Nelli (wife-/?) involved in his care, though had previously been HCP    Ethics following

## 2025-05-03 LAB
ALBUMIN SERPL ELPH-MCNC: 3 G/DL — LOW (ref 3.3–5.2)
ALP SERPL-CCNC: 59 U/L — SIGNIFICANT CHANGE UP (ref 40–120)
ALT FLD-CCNC: 14 U/L — SIGNIFICANT CHANGE UP
ANION GAP SERPL CALC-SCNC: 13 MMOL/L — SIGNIFICANT CHANGE UP (ref 5–17)
AST SERPL-CCNC: 10 U/L — SIGNIFICANT CHANGE UP
BILIRUB SERPL-MCNC: <0.2 MG/DL — LOW (ref 0.4–2)
BUN SERPL-MCNC: 47.4 MG/DL — HIGH (ref 8–20)
CALCIUM SERPL-MCNC: 8.3 MG/DL — LOW (ref 8.4–10.5)
CHLORIDE SERPL-SCNC: 100 MMOL/L — SIGNIFICANT CHANGE UP (ref 96–108)
CO2 SERPL-SCNC: 20 MMOL/L — LOW (ref 22–29)
CREAT SERPL-MCNC: 4.95 MG/DL — HIGH (ref 0.5–1.3)
EGFR: 12 ML/MIN/1.73M2 — LOW
EGFR: 12 ML/MIN/1.73M2 — LOW
GLUCOSE BLDC GLUCOMTR-MCNC: 192 MG/DL — HIGH (ref 70–99)
GLUCOSE BLDC GLUCOMTR-MCNC: 211 MG/DL — HIGH (ref 70–99)
GLUCOSE BLDC GLUCOMTR-MCNC: 241 MG/DL — HIGH (ref 70–99)
GLUCOSE BLDC GLUCOMTR-MCNC: 311 MG/DL — HIGH (ref 70–99)
GLUCOSE BLDC GLUCOMTR-MCNC: 359 MG/DL — HIGH (ref 70–99)
GLUCOSE SERPL-MCNC: 283 MG/DL — HIGH (ref 70–99)
HCT VFR BLD CALC: 29.4 % — LOW (ref 39–50)
HGB BLD-MCNC: 9.7 G/DL — LOW (ref 13–17)
MAGNESIUM SERPL-MCNC: 2 MG/DL — SIGNIFICANT CHANGE UP (ref 1.6–2.6)
MCHC RBC-ENTMCNC: 28.9 PG — SIGNIFICANT CHANGE UP (ref 27–34)
MCHC RBC-ENTMCNC: 33 G/DL — SIGNIFICANT CHANGE UP (ref 32–36)
MCV RBC AUTO: 87.5 FL — SIGNIFICANT CHANGE UP (ref 80–100)
NRBC # BLD AUTO: 0 K/UL — SIGNIFICANT CHANGE UP (ref 0–0)
NRBC # FLD: 0 K/UL — SIGNIFICANT CHANGE UP (ref 0–0)
NRBC BLD AUTO-RTO: 0 /100 WBCS — SIGNIFICANT CHANGE UP (ref 0–0)
PHOSPHATE SERPL-MCNC: 4.3 MG/DL — SIGNIFICANT CHANGE UP (ref 2.4–4.7)
PLATELET # BLD AUTO: 257 K/UL — SIGNIFICANT CHANGE UP (ref 150–400)
PMV BLD: 9.1 FL — SIGNIFICANT CHANGE UP (ref 7–13)
POTASSIUM SERPL-MCNC: 4.6 MMOL/L — SIGNIFICANT CHANGE UP (ref 3.5–5.3)
POTASSIUM SERPL-SCNC: 4.6 MMOL/L — SIGNIFICANT CHANGE UP (ref 3.5–5.3)
PROT SERPL-MCNC: 6.3 G/DL — LOW (ref 6.6–8.7)
RBC # BLD: 3.36 M/UL — LOW (ref 4.2–5.8)
RBC # FLD: 14.2 % — SIGNIFICANT CHANGE UP (ref 10.3–14.5)
SODIUM SERPL-SCNC: 133 MMOL/L — LOW (ref 135–145)
WBC # BLD: 7.78 K/UL — SIGNIFICANT CHANGE UP (ref 3.8–10.5)
WBC # FLD AUTO: 7.78 K/UL — SIGNIFICANT CHANGE UP (ref 3.8–10.5)

## 2025-05-03 PROCEDURE — 99232 SBSQ HOSP IP/OBS MODERATE 35: CPT

## 2025-05-03 RX ORDER — BISACODYL 5 MG
10 TABLET, DELAYED RELEASE (ENTERIC COATED) ORAL ONCE
Refills: 0 | Status: DISCONTINUED | OUTPATIENT
Start: 2025-05-03 | End: 2025-05-13

## 2025-05-03 RX ORDER — POLYETHYLENE GLYCOL 3350 17 G/17G
17 POWDER, FOR SOLUTION ORAL
Refills: 0 | Status: DISCONTINUED | OUTPATIENT
Start: 2025-05-03 | End: 2025-05-13

## 2025-05-03 RX ORDER — OXYCODONE HYDROCHLORIDE 30 MG/1
5 TABLET ORAL EVERY 6 HOURS
Refills: 0 | Status: DISCONTINUED | OUTPATIENT
Start: 2025-05-03 | End: 2025-05-05

## 2025-05-03 RX ORDER — SENNA 187 MG
2 TABLET ORAL AT BEDTIME
Refills: 0 | Status: DISCONTINUED | OUTPATIENT
Start: 2025-05-03 | End: 2025-05-13

## 2025-05-03 RX ADMIN — POLYETHYLENE GLYCOL 3350 17 GRAM(S): 17 POWDER, FOR SOLUTION ORAL at 13:18

## 2025-05-03 RX ADMIN — TIMOLOL MALEATE 1 DROP(S): 6.8 SOLUTION OPHTHALMIC at 17:41

## 2025-05-03 RX ADMIN — DORZOLAMIDE 1 DROP(S): 20 SOLUTION/ DROPS OPHTHALMIC at 09:23

## 2025-05-03 RX ADMIN — Medication 2 DROP(S): at 22:12

## 2025-05-03 RX ADMIN — INSULIN GLARGINE-YFGN 10 UNIT(S): 100 INJECTION, SOLUTION SUBCUTANEOUS at 22:17

## 2025-05-03 RX ADMIN — GABAPENTIN 100 MILLIGRAM(S): 400 CAPSULE ORAL at 22:12

## 2025-05-03 RX ADMIN — LATANOPROST PF 1 DROP(S): 0.05 SOLUTION/ DROPS OPHTHALMIC at 22:13

## 2025-05-03 RX ADMIN — BRIMONIDINE TARTRATE 1 DROP(S): 1.5 SOLUTION/ DROPS OPHTHALMIC at 17:42

## 2025-05-03 RX ADMIN — METOPROLOL SUCCINATE 50 MILLIGRAM(S): 50 TABLET, EXTENDED RELEASE ORAL at 17:42

## 2025-05-03 RX ADMIN — Medication 2 TABLET(S): at 22:12

## 2025-05-03 RX ADMIN — INSULIN LISPRO 1: 100 INJECTION, SOLUTION INTRAVENOUS; SUBCUTANEOUS at 12:25

## 2025-05-03 RX ADMIN — Medication 1 APPLICATION(S): at 05:19

## 2025-05-03 RX ADMIN — Medication 975 MILLIGRAM(S): at 22:12

## 2025-05-03 RX ADMIN — DOLUTEGRAVIR SODIUM 50 MILLIGRAM(S): 5 TABLET, FOR SUSPENSION ORAL at 09:24

## 2025-05-03 RX ADMIN — Medication 10 MILLIGRAM(S): at 22:13

## 2025-05-03 RX ADMIN — Medication 2 DROP(S): at 05:17

## 2025-05-03 RX ADMIN — Medication 975 MILLIGRAM(S): at 13:18

## 2025-05-03 RX ADMIN — GABAPENTIN 100 MILLIGRAM(S): 400 CAPSULE ORAL at 13:18

## 2025-05-03 RX ADMIN — ATORVASTATIN CALCIUM 40 MILLIGRAM(S): 80 TABLET, FILM COATED ORAL at 22:12

## 2025-05-03 RX ADMIN — POLYMYXIN B SULFATE AND TRIMETHOPRIM SULFATE 1 DROP(S): 10000; 1 SOLUTION/ DROPS OPHTHALMIC at 09:23

## 2025-05-03 RX ADMIN — Medication 975 MILLIGRAM(S): at 05:16

## 2025-05-03 RX ADMIN — OXYCODONE HYDROCHLORIDE 5 MILLIGRAM(S): 30 TABLET ORAL at 09:23

## 2025-05-03 RX ADMIN — Medication 975 MILLIGRAM(S): at 23:12

## 2025-05-03 RX ADMIN — METOPROLOL SUCCINATE 50 MILLIGRAM(S): 50 TABLET, EXTENDED RELEASE ORAL at 05:17

## 2025-05-03 RX ADMIN — LAMIVUDINE 100 MILLIGRAM(S): 10 SOLUTION ORAL at 17:42

## 2025-05-03 RX ADMIN — Medication 975 MILLIGRAM(S): at 06:16

## 2025-05-03 RX ADMIN — Medication 2 DROP(S): at 13:28

## 2025-05-03 RX ADMIN — INSULIN LISPRO 2: 100 INJECTION, SOLUTION INTRAVENOUS; SUBCUTANEOUS at 08:22

## 2025-05-03 RX ADMIN — AMLODIPINE BESYLATE 10 MILLIGRAM(S): 10 TABLET ORAL at 05:17

## 2025-05-03 RX ADMIN — INSULIN LISPRO 2: 100 INJECTION, SOLUTION INTRAVENOUS; SUBCUTANEOUS at 17:42

## 2025-05-03 RX ADMIN — GABAPENTIN 100 MILLIGRAM(S): 400 CAPSULE ORAL at 05:17

## 2025-05-03 RX ADMIN — DORZOLAMIDE 1 DROP(S): 20 SOLUTION/ DROPS OPHTHALMIC at 22:12

## 2025-05-03 RX ADMIN — BRIMONIDINE TARTRATE 1 DROP(S): 1.5 SOLUTION/ DROPS OPHTHALMIC at 05:17

## 2025-05-03 NOTE — PROGRESS NOTE ADULT - ASSESSMENT
63 YOM DM, HIV on HAART admitted after fall.  Nephrology consulted for progressive worsening CKD.    - Advanced progressive CKD stage V from biopsy proven diabetic nephropathy and podopathy, initiated on HD during this hospitalization   sp renal biopsy with Diabetic nephropathy+ Complete foot process effacement, 80% IFTA  HIV- VL > 300 k  Anemia: CKD and RITESH.  Placed on IV venofer. epo  HTN: BP better controlled at this time; continue amlodipine, metoprolol, Hydralazine  HIV on Bikaty- meds changed as per ID  DM: discontinued metformin (low GFR), placed on Lantus + SSI ACHS    Pt has been refusing HD intermittently  Last HD treatment was on 04/29  SCr 4.9; GFr 12 ml/min  pt has good uop  pt can be monitored off HD provided there is close follow up with nephrologist  doubt pt would be compliant with outpt follow up

## 2025-05-03 NOTE — PROGRESS NOTE ADULT - SUBJECTIVE AND OBJECTIVE BOX
[facility] Division of Hospital Medicine  Rolf Boyce MD   I'm reachable on [employer] Teams      Patient is a 64y old  Male who presents with a chief complaint of Geoff (01 May 2025 16:32)      SUBJECTIVE / OVERNIGHT EVENTS:   Patient was seen and examined during rounds  Reports generalized pain and constipation. Ordered oxycodon and laxative  Still refuse dialysis  Vision not improved      12 points ROS negative except above    MEDICATIONS  (STANDING):  acetaminophen     Tablet .. 975 milliGRAM(s) Oral every 8 hours  amLODIPine   Tablet 10 milliGRAM(s) Oral daily  artificial tears (preservative free) Ophthalmic Solution 2 Drop(s) Both EYES every 8 hours  atorvastatin 40 milliGRAM(s) Oral at bedtime  bisacodyl Suppository 10 milliGRAM(s) Rectal once  brimonidine 0.2% Ophthalmic Solution 1 Drop(s) Both EYES two times a day  chlorhexidine 2% Cloths 1 Application(s) Topical <User Schedule>  chlorhexidine 4% Liquid 1 Application(s) Topical <User Schedule>  dextrose 5%. 1000 milliLiter(s) (50 mL/Hr) IV Continuous <Continuous>  dextrose 5%. 1000 milliLiter(s) (100 mL/Hr) IV Continuous <Continuous>  dextrose 50% Injectable 25 Gram(s) IV Push once  dextrose 50% Injectable 12.5 Gram(s) IV Push once  dextrose 50% Injectable 25 Gram(s) IV Push once  dolutegravir 50 milliGRAM(s) Oral daily  dorzolamide 2% Ophthalmic Solution 1 Drop(s) Both EYES <User Schedule>  epoetin landry-epbx (RETACRIT) Injectable 4000 Unit(s) IV Push <User Schedule>  gabapentin 100 milliGRAM(s) Oral three times a day  glucagon  Injectable 1 milliGRAM(s) IntraMuscular once  insulin glargine Injectable (LANTUS) 10 Unit(s) SubCutaneous at bedtime  insulin lispro (ADMELOG) corrective regimen sliding scale   SubCutaneous three times a day before meals  lamiVUDine- milliGRAM(s) Oral daily  latanoprost 0.005% Ophthalmic Solution 1 Drop(s) Both EYES at bedtime  lidocaine   4% Patch 1 Patch Transdermal every 24 hours  metoprolol tartrate 50 milliGRAM(s) Oral two times a day  polyethylene glycol 3350 17 Gram(s) Oral two times a day  senna 2 Tablet(s) Oral at bedtime  timolol 0.5% Solution 1 Drop(s) Both EYES two times a day  trimethoprim/polymyxin Solution 1 Drop(s) Both EYES daily    MEDICATIONS  (PRN):  aluminum hydroxide/magnesium hydroxide/simethicone Suspension 30 milliLiter(s) Oral every 4 hours PRN Dyspepsia  dextrose Oral Gel 15 Gram(s) Oral once PRN Blood Glucose LESS THAN 70 milliGRAM(s)/deciliter  hydrALAZINE Injectable 10 milliGRAM(s) IV Push every 6 hours PRN sbp>159  melatonin 3 milliGRAM(s) Oral at bedtime PRN Insomnia  ondansetron Injectable 4 milliGRAM(s) IV Push every 8 hours PRN Nausea and/or Vomiting  oxyCODONE    IR 5 milliGRAM(s) Oral every 6 hours PRN Severe Pain (7 - 10)  sodium chloride 0.9% lock flush 10 milliLiter(s) IV Push every 1 hour PRN Pre/post blood products, medications, blood draw, and to maintain line patency        I&O's Summary    02 May 2025 07:01  -  03 May 2025 07:00  --------------------------------------------------------  IN: 480 mL / OUT: 200 mL / NET: 280 mL    03 May 2025 07:01  -  03 May 2025 14:27  --------------------------------------------------------  IN: 480 mL / OUT: 0 mL / NET: 480 mL        PHYSICAL EXAM:  Vital Signs Last 24 Hrs  T(C): 36.3 (03 May 2025 08:01), Max: 36.9 (02 May 2025 16:36)  T(F): 97.4 (03 May 2025 08:01), Max: 98.4 (02 May 2025 16:36)  HR: 59 (03 May 2025 08:01) (59 - 73)  BP: 177/81 (03 May 2025 08:01) (144/71 - 177/81)  BP(mean): --  RR: 17 (03 May 2025 08:01) (16 - 18)  SpO2: 98% (03 May 2025 08:01) (94% - 98%)    Parameters below as of 03 May 2025 08:01  Patient On (Oxygen Delivery Method): room air        CONSTITUTIONAL: NAD, Not in acute distress  EYES:  Refuse eye evaluation  ENMT: , neck supple , non-tender  RESPIRATORY: Normal respiratory effort; lungs are clear to auscultation bilaterally  CARDIOVASCULAR: S1S2 present  ABDOMEN: soft. bowel sound present  MUSCLOSKELETAL: ; no clubbing or cyanosis of digits;   PSYCH: A+O to person, place, and time; affect appropriate  NEUROLOGY: CN, motor and sensory intact   SKIN: No rashes; no palpable lesions  Extremity: No bilateral edema      LABS:                        9.7    7.78  )-----------( 257      ( 03 May 2025 05:32 )             29.4     05-03    133[L]  |  100  |  47.4[H]  ----------------------------<  283[H]  4.6   |  20.0[L]  |  4.95[H]    Ca    8.3[L]      03 May 2025 05:32  Phos  4.3     05-03  Mg     2.0     05-03    TPro  6.3[L]  /  Alb  3.0[L]  /  TBili  <0.2[L]  /  DBili  x   /  AST  10  /  ALT  14  /  AlkPhos  59  05-03          Urinalysis Basic - ( 03 May 2025 05:32 )    Color: x / Appearance: x / SG: x / pH: x  Gluc: 283 mg/dL / Ketone: x  / Bili: x / Urobili: x   Blood: x / Protein: x / Nitrite: x   Leuk Esterase: x / RBC: x / WBC x   Sq Epi: x / Non Sq Epi: x / Bacteria: x        CAPILLARY BLOOD GLUCOSE      POCT Blood Glucose.: 192 mg/dL (03 May 2025 12:16)  POCT Blood Glucose.: 211 mg/dL (03 May 2025 08:11)  POCT Blood Glucose.: 331 mg/dL (02 May 2025 21:51)  POCT Blood Glucose.: 314 mg/dL (02 May 2025 21:10)  POCT Blood Glucose.: 310 mg/dL (02 May 2025 17:16)      Labs reviewed:    RADIOLOGY & ADDITIONAL TESTS:  Imaging Personally Reviewed:  Electrocardiogram Personally Reviewed:

## 2025-05-03 NOTE — PROGRESS NOTE ADULT - SUBJECTIVE AND OBJECTIVE BOX
[facility] DIVISION OF KIDNEY DISEASES AND HYPERTENSION -- HEMODIALYSIS NOTE  --------------------------------------------------------------------------------  Chief Complaint: ESRD/Ongoing hemodialysis requirement    24 hour events/subjective:  pt refusing HD again today      PAST HISTORY  --------------------------------------------------------------------------------  No significant changes to PMH, PSH, FHx, SHx, unless otherwise noted    ALLERGIES & MEDICATIONS  --------------------------------------------------------------------------------  Allergies    No Known Allergies    Standing Inpatient Medications  acetaminophen     Tablet .. 975 milliGRAM(s) Oral every 8 hours  amLODIPine   Tablet 10 milliGRAM(s) Oral daily  artificial tears (preservative free) Ophthalmic Solution 2 Drop(s) Both EYES every 8 hours  atorvastatin 40 milliGRAM(s) Oral at bedtime  bisacodyl Suppository 10 milliGRAM(s) Rectal once  brimonidine 0.2% Ophthalmic Solution 1 Drop(s) Both EYES two times a day  chlorhexidine 2% Cloths 1 Application(s) Topical <User Schedule>  chlorhexidine 4% Liquid 1 Application(s) Topical <User Schedule>  dextrose 5%. 1000 milliLiter(s) IV Continuous <Continuous>  dextrose 5%. 1000 milliLiter(s) IV Continuous <Continuous>  dextrose 50% Injectable 25 Gram(s) IV Push once  dextrose 50% Injectable 12.5 Gram(s) IV Push once  dextrose 50% Injectable 25 Gram(s) IV Push once  dolutegravir 50 milliGRAM(s) Oral daily  dorzolamide 2% Ophthalmic Solution 1 Drop(s) Both EYES <User Schedule>  epoetin landry-epbx (RETACRIT) Injectable 4000 Unit(s) IV Push <User Schedule>  gabapentin 100 milliGRAM(s) Oral three times a day  glucagon  Injectable 1 milliGRAM(s) IntraMuscular once  insulin glargine Injectable (LANTUS) 10 Unit(s) SubCutaneous at bedtime  insulin lispro (ADMELOG) corrective regimen sliding scale   SubCutaneous three times a day before meals  lamiVUDine- milliGRAM(s) Oral daily  latanoprost 0.005% Ophthalmic Solution 1 Drop(s) Both EYES at bedtime  lidocaine   4% Patch 1 Patch Transdermal every 24 hours  metoprolol tartrate 50 milliGRAM(s) Oral two times a day  polyethylene glycol 3350 17 Gram(s) Oral two times a day  senna 2 Tablet(s) Oral at bedtime  timolol 0.5% Solution 1 Drop(s) Both EYES two times a day  trimethoprim/polymyxin Solution 1 Drop(s) Both EYES daily    PRN Inpatient Medications  aluminum hydroxide/magnesium hydroxide/simethicone Suspension 30 milliLiter(s) Oral every 4 hours PRN  dextrose Oral Gel 15 Gram(s) Oral once PRN  hydrALAZINE Injectable 10 milliGRAM(s) IV Push every 6 hours PRN  melatonin 3 milliGRAM(s) Oral at bedtime PRN  ondansetron Injectable 4 milliGRAM(s) IV Push every 8 hours PRN  oxyCODONE    IR 5 milliGRAM(s) Oral every 6 hours PRN  sodium chloride 0.9% lock flush 10 milliLiter(s) IV Push every 1 hour PRN      REVIEW OF SYSTEMS  --------------------------------------------------------------------------------  Gen: No weight changes, fatigue, fevers/chills, weakness  Skin: No rashes  Head/Eyes/Ears/Mouth: No headache  Respiratory: No dyspnea, cough,  CV: No chest pain, orthopnea  GI: No abdominal pain, diarrhea, constipation, nausea, vomiting,  MSK: No joint pain  Neuro: No dizziness/lightheadedness, weakness  Heme: No bleeding  Psych: No significant nervousness, anxiety, stress, depression    All other systems were reviewed and are negative, except as noted.    VITALS/PHYSICAL EXAM  --------------------------------------------------------------------------------  T(C): 36.3 (05-03-25 @ 08:01), Max: 36.9 (05-02-25 @ 16:36)  HR: 59 (05-03-25 @ 08:01) (59 - 73)  BP: 177/81 (05-03-25 @ 08:01) (144/71 - 177/81)  RR: 17 (05-03-25 @ 08:01) (16 - 18)  SpO2: 98% (05-03-25 @ 08:01) (94% - 98%)  Wt(kg): --        05-02-25 @ 07:01  -  05-03-25 @ 07:00  --------------------------------------------------------  IN: 480 mL / OUT: 200 mL / NET: 280 mL      Physical Exam:  	Gen: NAD  	HEENT: supple neck  	Pulm: CTA B/L  	CV: RRR, S1S2; no rub  	Abd: +BS, soft, nontender  	UE: Warm  	LE: Warm, edema  	Vascular access: [facility]  LABS/STUDIES  --------------------------------------------------------------------------------              9.7    7.78  >-----------<  257      [05-03-25 @ 05:32]              29.4     133  |  100  |  47.4  ----------------------------<  283      [05-03-25 @ 05:32]  4.6   |  20.0  |  4.95        Ca     8.3     [05-03-25 @ 05:32]      Mg     2.0     [05-03-25 @ 05:32]      Phos  4.3     [05-03-25 @ 05:32]    TPro  6.3  /  Alb  3.0  /  TBili  <0.2  /  DBili  x   /  AST  10  /  ALT  14  /  AlkPhos  59  [05-03-25 @ 05:32]          Iron 37, TIBC --, %sat --      [04-09-25 @ 05:45]  Ferritin 148      [04-09-25 @ 05:45]  Lipid: chol 100, , HDL 23, LDL --      [08-08-24 @ 06:35]    HBsAb <3.3      [04-11-25 @ 15:25]  HBsAg Nonreact      [04-11-25 @ 15:25]  HBcAb Nonreact      [04-11-25 @ 15:25]  HCV 0.18, Nonreact      [04-11-25 @ 15:25]
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
## 2025-05-04 LAB
GLUCOSE BLDC GLUCOMTR-MCNC: 218 MG/DL — HIGH (ref 70–99)
GLUCOSE BLDC GLUCOMTR-MCNC: 278 MG/DL — HIGH (ref 70–99)
GLUCOSE BLDC GLUCOMTR-MCNC: 376 MG/DL — HIGH (ref 70–99)

## 2025-05-04 PROCEDURE — 99233 SBSQ HOSP IP/OBS HIGH 50: CPT

## 2025-05-04 PROCEDURE — 99232 SBSQ HOSP IP/OBS MODERATE 35: CPT

## 2025-05-04 RX ADMIN — TIMOLOL MALEATE 1 DROP(S): 6.8 SOLUTION OPHTHALMIC at 06:09

## 2025-05-04 RX ADMIN — POLYETHYLENE GLYCOL 3350 17 GRAM(S): 17 POWDER, FOR SOLUTION ORAL at 17:17

## 2025-05-04 RX ADMIN — TIMOLOL MALEATE 1 DROP(S): 6.8 SOLUTION OPHTHALMIC at 17:17

## 2025-05-04 RX ADMIN — Medication 975 MILLIGRAM(S): at 15:30

## 2025-05-04 RX ADMIN — Medication 975 MILLIGRAM(S): at 06:09

## 2025-05-04 RX ADMIN — LATANOPROST PF 1 DROP(S): 0.05 SOLUTION/ DROPS OPHTHALMIC at 22:15

## 2025-05-04 RX ADMIN — Medication 2 TABLET(S): at 22:16

## 2025-05-04 RX ADMIN — Medication 1 APPLICATION(S): at 06:15

## 2025-05-04 RX ADMIN — GABAPENTIN 100 MILLIGRAM(S): 400 CAPSULE ORAL at 15:30

## 2025-05-04 RX ADMIN — METOPROLOL SUCCINATE 50 MILLIGRAM(S): 50 TABLET, EXTENDED RELEASE ORAL at 06:09

## 2025-05-04 RX ADMIN — Medication 2 DROP(S): at 06:09

## 2025-05-04 RX ADMIN — DOLUTEGRAVIR SODIUM 50 MILLIGRAM(S): 5 TABLET, FOR SUSPENSION ORAL at 11:46

## 2025-05-04 RX ADMIN — METOPROLOL SUCCINATE 50 MILLIGRAM(S): 50 TABLET, EXTENDED RELEASE ORAL at 17:17

## 2025-05-04 RX ADMIN — POLYMYXIN B SULFATE AND TRIMETHOPRIM SULFATE 1 DROP(S): 10000; 1 SOLUTION/ DROPS OPHTHALMIC at 11:47

## 2025-05-04 RX ADMIN — OXYCODONE HYDROCHLORIDE 5 MILLIGRAM(S): 30 TABLET ORAL at 11:46

## 2025-05-04 RX ADMIN — AMLODIPINE BESYLATE 10 MILLIGRAM(S): 10 TABLET ORAL at 06:09

## 2025-05-04 RX ADMIN — LAMIVUDINE 100 MILLIGRAM(S): 10 SOLUTION ORAL at 11:46

## 2025-05-04 RX ADMIN — INSULIN LISPRO 5: 100 INJECTION, SOLUTION INTRAVENOUS; SUBCUTANEOUS at 17:17

## 2025-05-04 RX ADMIN — INSULIN GLARGINE-YFGN 10 UNIT(S): 100 INJECTION, SOLUTION SUBCUTANEOUS at 22:15

## 2025-05-04 RX ADMIN — DORZOLAMIDE 1 DROP(S): 20 SOLUTION/ DROPS OPHTHALMIC at 11:47

## 2025-05-04 RX ADMIN — Medication 975 MILLIGRAM(S): at 22:20

## 2025-05-04 RX ADMIN — GABAPENTIN 100 MILLIGRAM(S): 400 CAPSULE ORAL at 06:09

## 2025-05-04 RX ADMIN — INSULIN LISPRO 2: 100 INJECTION, SOLUTION INTRAVENOUS; SUBCUTANEOUS at 08:35

## 2025-05-04 RX ADMIN — GABAPENTIN 100 MILLIGRAM(S): 400 CAPSULE ORAL at 22:16

## 2025-05-04 RX ADMIN — OXYCODONE HYDROCHLORIDE 5 MILLIGRAM(S): 30 TABLET ORAL at 19:51

## 2025-05-04 RX ADMIN — ATORVASTATIN CALCIUM 40 MILLIGRAM(S): 80 TABLET, FILM COATED ORAL at 22:16

## 2025-05-04 RX ADMIN — BRIMONIDINE TARTRATE 1 DROP(S): 1.5 SOLUTION/ DROPS OPHTHALMIC at 17:16

## 2025-05-04 RX ADMIN — Medication 975 MILLIGRAM(S): at 22:16

## 2025-05-04 RX ADMIN — Medication 2 DROP(S): at 15:30

## 2025-05-04 RX ADMIN — Medication 2 DROP(S): at 22:15

## 2025-05-04 RX ADMIN — DORZOLAMIDE 1 DROP(S): 20 SOLUTION/ DROPS OPHTHALMIC at 22:15

## 2025-05-04 RX ADMIN — Medication 1 APPLICATION(S): at 06:16
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
## 2025-05-04 NOTE — PROGRESS NOTE ADULT - ASSESSMENT
63 YOM DM, HIV on HAART admitted after fall.  Nephrology consulted for progressive worsening CKD.    - Advanced progressive CKD stage V from biopsy proven diabetic nephropathy and podopathy, initiated on HD during this hospitalization   sp renal biopsy with Diabetic nephropathy+ Complete foot process effacement, 80% IFTA  HIV- VL > 300 k  Anemia: CKD and RITESH.  Placed on IV venofer. epo  HTN: BP better controlled at this time; continue amlodipine, metoprolol, Hydralazine  HIV on Bikatrvy- meds changed as per ID  DM: discontinued metformin (low GFR), placed on Lantus + SSI ACHS    Pt has been refusing HD intermittently  Last HD treatment was on 04/29  SCr 4.9; GFr 12 ml/min yesterday  No labs available for review today  pt has good uop  will schedule for HD tomorrow  If he continues to refuse, he can be monitored off HD provided there is close follow up with nephrologist- compliance will remain a concern

## 2025-05-04 NOTE — PROGRESS NOTE ADULT - SUBJECTIVE AND OBJECTIVE BOX
[facility] DIVISION OF KIDNEY DISEASES AND HYPERTENSION --FOLLOW UP NOTE  --------------------------------------------------------------------------------  Chief Complaint: ESRD/Ongoing hemodialysis requirement    24 hour events/subjective:  Pt refused HD again today  No acute compaint-  states he 'is fine' and 'does not need dialysis'    PAST HISTORY  --------------------------------------------------------------------------------  No significant changes to PMH, PSH, FHx, SHx, unless otherwise noted    ALLERGIES & MEDICATIONS  --------------------------------------------------------------------------------  Allergies    No Known Allergies          Standing Inpatient Medications  acetaminophen     Tablet .. 975 milliGRAM(s) Oral every 8 hours  amLODIPine   Tablet 10 milliGRAM(s) Oral daily  artificial tears (preservative free) Ophthalmic Solution 2 Drop(s) Both EYES every 8 hours  atorvastatin 40 milliGRAM(s) Oral at bedtime  bisacodyl Suppository 10 milliGRAM(s) Rectal once  brimonidine 0.2% Ophthalmic Solution 1 Drop(s) Both EYES two times a day  chlorhexidine 2% Cloths 1 Application(s) Topical <User Schedule>  chlorhexidine 4% Liquid 1 Application(s) Topical <User Schedule>  dextrose 5%. 1000 milliLiter(s) IV Continuous <Continuous>  dextrose 5%. 1000 milliLiter(s) IV Continuous <Continuous>  dextrose 50% Injectable 25 Gram(s) IV Push once  dextrose 50% Injectable 12.5 Gram(s) IV Push once  dextrose 50% Injectable 25 Gram(s) IV Push once  dolutegravir 50 milliGRAM(s) Oral daily  dorzolamide 2% Ophthalmic Solution 1 Drop(s) Both EYES <User Schedule>  epoetin landry-epbx (RETACRIT) Injectable 4000 Unit(s) IV Push <User Schedule>  gabapentin 100 milliGRAM(s) Oral three times a day  glucagon  Injectable 1 milliGRAM(s) IntraMuscular once  insulin glargine Injectable (LANTUS) 10 Unit(s) SubCutaneous at bedtime  insulin lispro (ADMELOG) corrective regimen sliding scale   SubCutaneous three times a day before meals  lamiVUDine- milliGRAM(s) Oral daily  latanoprost 0.005% Ophthalmic Solution 1 Drop(s) Both EYES at bedtime  lidocaine   4% Patch 1 Patch Transdermal every 24 hours  metoprolol tartrate 50 milliGRAM(s) Oral two times a day  polyethylene glycol 3350 17 Gram(s) Oral two times a day  senna 2 Tablet(s) Oral at bedtime  timolol 0.5% Solution 1 Drop(s) Both EYES two times a day  trimethoprim/polymyxin Solution 1 Drop(s) Both EYES daily    PRN Inpatient Medications  aluminum hydroxide/magnesium hydroxide/simethicone Suspension 30 milliLiter(s) Oral every 4 hours PRN  dextrose Oral Gel 15 Gram(s) Oral once PRN  hydrALAZINE Injectable 10 milliGRAM(s) IV Push every 6 hours PRN  melatonin 3 milliGRAM(s) Oral at bedtime PRN  ondansetron Injectable 4 milliGRAM(s) IV Push every 8 hours PRN  oxyCODONE    IR 5 milliGRAM(s) Oral every 6 hours PRN  sodium chloride 0.9% lock flush 10 milliLiter(s) IV Push every 1 hour PRN      REVIEW OF SYSTEMS  --------------------------------------------------------------------------------  Gen: No weight changes, fatigue, fevers/chills, weakness  Skin: No rashes  Head/Eyes/Ears/Mouth: No headache  Respiratory: No dyspnea, cough,  CV: No chest pain, orthopnea  GI: No abdominal pain, diarrhea, constipation, nausea, vomiting,  MSK: No joint pain  Neuro: No dizziness/lightheadedness, weakness  Heme: No bleeding  Psych: No significant nervousness, anxiety, stress, depression    All other systems were reviewed and are negative, except as noted.    VITALS/PHYSICAL EXAM  --------------------------------------------------------------------------------  T(C): 36.6 (05-04-25 @ 08:01), Max: 37 (05-03-25 @ 23:51)  HR: 68 (05-04-25 @ 08:01) (67 - 72)  BP: 153/65 (05-04-25 @ 08:01) (141/63 - 170/62)  RR: 17 (05-04-25 @ 08:01) (17 - 19)  SpO2: 96% (05-04-25 @ 08:01) (95% - 97%)  Wt(kg): --        05-03-25 @ 07:01  -  05-04-25 @ 07:00  --------------------------------------------------------  IN: 720 mL / OUT: 0 mL / NET: 720 mL    05-04-25 @ 07:01  -  05-04-25 @ 17:29  --------------------------------------------------------  IN: 480 mL / OUT: 0 mL / NET: 480 mL      Physical Exam:  	Gen: NAD, well-appearing  	HEENT: PERRL, supple neck,  	Pulm: CTA B/L  	CV: RRR, S1S2; no rub  	Abd: +BS, soft, nontender  	UE: Warm  	LE: Warm, no edema  	Neuro: No focal deficits  	Psych: Normal affect and mood  	Skin: Warm  	Vascular access: [facility]    LABS/STUDIES  --------------------------------------------------------------------------------              9.7    7.78  >-----------<  257      [05-03-25 @ 05:32]              29.4     133  |  100  |  47.4  ----------------------------<  283      [05-03-25 @ 05:32]  4.6   |  20.0  |  4.95        Ca     8.3     [05-03-25 @ 05:32]      Mg     2.0     [05-03-25 @ 05:32]      Phos  4.3     [05-03-25 @ 05:32]    TPro  6.3  /  Alb  3.0  /  TBili  <0.2  /  DBili  x   /  AST  10  /  ALT  14  /  AlkPhos  59  [05-03-25 @ 05:32]          Iron 37, TIBC --, %sat --      [04-09-25 @ 05:45]  Ferritin 148      [04-09-25 @ 05:45]  Lipid: chol 100, , HDL 23, LDL --      [08-08-24 @ 06:35]    HBsAb <3.3      [04-11-25 @ 15:25]  HBsAg Nonreact      [04-11-25 @ 15:25]  HBcAb Nonreact      [04-11-25 @ 15:25]  HCV 0.18, Nonreact      [04-11-25 @ 15:25]

## 2025-05-04 NOTE — PROGRESS NOTE ADULT - SUBJECTIVE AND OBJECTIVE BOX
[facility] Division of Hospital Medicine  Rolf Boyce MD   I'm reachable on [employer] Teams      Patient is a 64y old  Male who presents with a chief complaint of Geoff (01 May 2025 16:32)      SUBJECTIVE / OVERNIGHT EVENTS:   Patient was seen and examined during rounds    Paitent was aggresive. Reports continous pain despite pain medication on board. Still refusing HD    12 points ROS negative except above    MEDICATIONS  (STANDING):  acetaminophen     Tablet .. 975 milliGRAM(s) Oral every 8 hours  amLODIPine   Tablet 10 milliGRAM(s) Oral daily  artificial tears (preservative free) Ophthalmic Solution 2 Drop(s) Both EYES every 8 hours  atorvastatin 40 milliGRAM(s) Oral at bedtime  bisacodyl Suppository 10 milliGRAM(s) Rectal once  brimonidine 0.2% Ophthalmic Solution 1 Drop(s) Both EYES two times a day  chlorhexidine 2% Cloths 1 Application(s) Topical <User Schedule>  chlorhexidine 4% Liquid 1 Application(s) Topical <User Schedule>  dextrose 5%. 1000 milliLiter(s) (50 mL/Hr) IV Continuous <Continuous>  dextrose 5%. 1000 milliLiter(s) (100 mL/Hr) IV Continuous <Continuous>  dextrose 50% Injectable 25 Gram(s) IV Push once  dextrose 50% Injectable 12.5 Gram(s) IV Push once  dextrose 50% Injectable 25 Gram(s) IV Push once  dolutegravir 50 milliGRAM(s) Oral daily  dorzolamide 2% Ophthalmic Solution 1 Drop(s) Both EYES <User Schedule>  epoetin landry-epbx (RETACRIT) Injectable 4000 Unit(s) IV Push <User Schedule>  gabapentin 100 milliGRAM(s) Oral three times a day  glucagon  Injectable 1 milliGRAM(s) IntraMuscular once  insulin glargine Injectable (LANTUS) 10 Unit(s) SubCutaneous at bedtime  insulin lispro (ADMELOG) corrective regimen sliding scale   SubCutaneous three times a day before meals  lamiVUDine- milliGRAM(s) Oral daily  latanoprost 0.005% Ophthalmic Solution 1 Drop(s) Both EYES at bedtime  lidocaine   4% Patch 1 Patch Transdermal every 24 hours  metoprolol tartrate 50 milliGRAM(s) Oral two times a day  polyethylene glycol 3350 17 Gram(s) Oral two times a day  senna 2 Tablet(s) Oral at bedtime  timolol 0.5% Solution 1 Drop(s) Both EYES two times a day  trimethoprim/polymyxin Solution 1 Drop(s) Both EYES daily    MEDICATIONS  (PRN):  aluminum hydroxide/magnesium hydroxide/simethicone Suspension 30 milliLiter(s) Oral every 4 hours PRN Dyspepsia  dextrose Oral Gel 15 Gram(s) Oral once PRN Blood Glucose LESS THAN 70 milliGRAM(s)/deciliter  hydrALAZINE Injectable 10 milliGRAM(s) IV Push every 6 hours PRN [id number]>159  melatonin 3 milliGRAM(s) Oral at bedtime PRN Insomnia  ondansetron Injectable 4 milliGRAM(s) IV Push every 8 hours PRN Nausea and/or Vomiting  oxyCODONE    IR 5 milliGRAM(s) Oral every 6 hours PRN Severe Pain (7 - 10)  sodium chloride 0.9% lock flush 10 milliLiter(s) IV Push every 1 hour PRN Pre/post blood products, medications, blood draw, and to maintain line patency        I&O's Summary    03 May 2025 07:01  -  04 May 2025 07:00  --------------------------------------------------------  IN: 720 mL / OUT: 0 mL / NET: 720 mL    04 May 2025 07:01  -  04 May 2025 14:50  --------------------------------------------------------  IN: 480 mL / OUT: 0 mL / NET: 480 mL        PHYSICAL EXAM:  Vital Signs Last 24 Hrs  T(C): 36.6 (04 May 2025 08:01), Max: 37 (03 May 2025 23:51)  T(F): 97.8 (04 May 2025 08:01), Max: 98.6 (03 May 2025 23:51)  HR: 68 (04 May 2025 08:01) (64 - 72)  BP: 153/65 (04 May 2025 08:01) (141/63 - 170/62)  BP(mean): --  RR: 17 (04 May 2025 08:01) (17 - 19)  SpO2: 96% (04 May 2025 08:01) (95% - 97%)    Parameters below as of 04 May 2025 08:01  Patient On (Oxygen Delivery Method): room air        Unable to examination    LABS:                        9.7    7.78  )-----------( 257      ( 03 May 2025 05:32 )             29.4     05-03    133[L]  |  100  |  47.4[H]  ----------------------------<  283[H]  4.6   |  20.0[L]  |  4.95[H]    Ca    8.3[L]      03 May 2025 05:32  Phos  4.3     05-03  Mg     2.0     05-03    TPro  6.3[L]  /  Alb  3.0[L]  /  TBili  <0.2[L]  /  DBili  x   /  AST  10  /  ALT  14  /  AlkPhos  59  05-03          Urinalysis Basic - ( 03 May 2025 05:32 )    Color: x / Appearance: x / SG: x / pH: x  Gluc: 283 mg/dL / Ketone: x  / Bili: x / Urobili: x   Blood: x / Protein: x / Nitrite: x   Leuk Esterase: x / RBC: x / WBC x   Sq Epi: x / Non Sq Epi: x / Bacteria: x        CAPILLARY BLOOD GLUCOSE      POCT Blood Glucose.: 218 mg/dL (04 May 2025 08:15)  POCT Blood Glucose.: 359 mg/dL (03 May 2025 22:15)  POCT Blood Glucose.: 311 mg/dL (03 May 2025 21:03)  POCT Blood Glucose.: 241 mg/dL (03 May 2025 17:21)      Labs reviewed:    RADIOLOGY & ADDITIONAL TESTS:  Imaging Personally Reviewed:  Electrocardiogram Personally Reviewed:

## 2025-05-05 LAB
ALBUMIN SERPL ELPH-MCNC: 3.1 G/DL — LOW (ref 3.3–5.2)
ALP SERPL-CCNC: 59 U/L — SIGNIFICANT CHANGE UP (ref 40–120)
ALT FLD-CCNC: 14 U/L — SIGNIFICANT CHANGE UP
ANION GAP SERPL CALC-SCNC: 15 MMOL/L — SIGNIFICANT CHANGE UP (ref 5–17)
AST SERPL-CCNC: 11 U/L — SIGNIFICANT CHANGE UP
BILIRUB SERPL-MCNC: <0.2 MG/DL — LOW (ref 0.4–2)
BUN SERPL-MCNC: 52.7 MG/DL — HIGH (ref 8–20)
CALCIUM SERPL-MCNC: 8.6 MG/DL — SIGNIFICANT CHANGE UP (ref 8.4–10.5)
CHLORIDE SERPL-SCNC: 104 MMOL/L — SIGNIFICANT CHANGE UP (ref 96–108)
CO2 SERPL-SCNC: 19 MMOL/L — LOW (ref 22–29)
CREAT SERPL-MCNC: 5.17 MG/DL — HIGH (ref 0.5–1.3)
EGFR: 12 ML/MIN/1.73M2 — LOW
EGFR: 12 ML/MIN/1.73M2 — LOW
GLUCOSE BLDC GLUCOMTR-MCNC: 169 MG/DL — HIGH (ref 70–99)
GLUCOSE BLDC GLUCOMTR-MCNC: 184 MG/DL — HIGH (ref 70–99)
GLUCOSE BLDC GLUCOMTR-MCNC: 284 MG/DL — HIGH (ref 70–99)
GLUCOSE BLDC GLUCOMTR-MCNC: 307 MG/DL — HIGH (ref 70–99)
GLUCOSE SERPL-MCNC: 174 MG/DL — HIGH (ref 70–99)
HCT VFR BLD CALC: 31 % — LOW (ref 39–50)
HGB BLD-MCNC: 10.3 G/DL — LOW (ref 13–17)
MAGNESIUM SERPL-MCNC: 2.1 MG/DL — SIGNIFICANT CHANGE UP (ref 1.6–2.6)
MCHC RBC-ENTMCNC: 29.3 PG — SIGNIFICANT CHANGE UP (ref 27–34)
MCHC RBC-ENTMCNC: 33.2 G/DL — SIGNIFICANT CHANGE UP (ref 32–36)
MCV RBC AUTO: 88.3 FL — SIGNIFICANT CHANGE UP (ref 80–100)
NRBC # BLD AUTO: 0 K/UL — SIGNIFICANT CHANGE UP (ref 0–0)
NRBC # FLD: 0 K/UL — SIGNIFICANT CHANGE UP (ref 0–0)
NRBC BLD AUTO-RTO: 0 /100 WBCS — SIGNIFICANT CHANGE UP (ref 0–0)
PHOSPHATE SERPL-MCNC: 4.6 MG/DL — SIGNIFICANT CHANGE UP (ref 2.4–4.7)
PLATELET # BLD AUTO: 289 K/UL — SIGNIFICANT CHANGE UP (ref 150–400)
PMV BLD: 9.3 FL — SIGNIFICANT CHANGE UP (ref 7–13)
POTASSIUM SERPL-MCNC: 5 MMOL/L — SIGNIFICANT CHANGE UP (ref 3.5–5.3)
POTASSIUM SERPL-SCNC: 5 MMOL/L — SIGNIFICANT CHANGE UP (ref 3.5–5.3)
PROT SERPL-MCNC: 6.7 G/DL — SIGNIFICANT CHANGE UP (ref 6.6–8.7)
RBC # BLD: 3.51 M/UL — LOW (ref 4.2–5.8)
RBC # FLD: 14.7 % — HIGH (ref 10.3–14.5)
SODIUM SERPL-SCNC: 138 MMOL/L — SIGNIFICANT CHANGE UP (ref 135–145)
WBC # BLD: 9.16 K/UL — SIGNIFICANT CHANGE UP (ref 3.8–10.5)
WBC # FLD AUTO: 9.16 K/UL — SIGNIFICANT CHANGE UP (ref 3.8–10.5)

## 2025-05-05 PROCEDURE — 99233 SBSQ HOSP IP/OBS HIGH 50: CPT

## 2025-05-05 PROCEDURE — 99232 SBSQ HOSP IP/OBS MODERATE 35: CPT

## 2025-05-05 RX ADMIN — POLYMYXIN B SULFATE AND TRIMETHOPRIM SULFATE 1 DROP(S): 10000; 1 SOLUTION/ DROPS OPHTHALMIC at 11:15

## 2025-05-05 RX ADMIN — LAMIVUDINE 100 MILLIGRAM(S): 10 SOLUTION ORAL at 09:07

## 2025-05-05 RX ADMIN — ATORVASTATIN CALCIUM 40 MILLIGRAM(S): 80 TABLET, FILM COATED ORAL at 21:56

## 2025-05-05 RX ADMIN — TIMOLOL MALEATE 1 DROP(S): 6.8 SOLUTION OPHTHALMIC at 17:26

## 2025-05-05 RX ADMIN — Medication 975 MILLIGRAM(S): at 22:40

## 2025-05-05 RX ADMIN — Medication 2 DROP(S): at 05:40

## 2025-05-05 RX ADMIN — Medication 975 MILLIGRAM(S): at 21:56

## 2025-05-05 RX ADMIN — GABAPENTIN 100 MILLIGRAM(S): 400 CAPSULE ORAL at 05:39

## 2025-05-05 RX ADMIN — AMLODIPINE BESYLATE 10 MILLIGRAM(S): 10 TABLET ORAL at 05:40

## 2025-05-05 RX ADMIN — BRIMONIDINE TARTRATE 1 DROP(S): 1.5 SOLUTION/ DROPS OPHTHALMIC at 18:22

## 2025-05-05 RX ADMIN — OXYCODONE HYDROCHLORIDE 5 MILLIGRAM(S): 30 TABLET ORAL at 05:40

## 2025-05-05 RX ADMIN — Medication 2 TABLET(S): at 21:56

## 2025-05-05 RX ADMIN — Medication 2 DROP(S): at 17:26

## 2025-05-05 RX ADMIN — DORZOLAMIDE 1 DROP(S): 20 SOLUTION/ DROPS OPHTHALMIC at 11:15

## 2025-05-05 RX ADMIN — Medication 1 APPLICATION(S): at 05:43

## 2025-05-05 RX ADMIN — INSULIN LISPRO 1: 100 INJECTION, SOLUTION INTRAVENOUS; SUBCUTANEOUS at 09:06

## 2025-05-05 RX ADMIN — INSULIN GLARGINE-YFGN 10 UNIT(S): 100 INJECTION, SOLUTION SUBCUTANEOUS at 21:57

## 2025-05-05 RX ADMIN — GABAPENTIN 100 MILLIGRAM(S): 400 CAPSULE ORAL at 18:22

## 2025-05-05 RX ADMIN — METOPROLOL SUCCINATE 50 MILLIGRAM(S): 50 TABLET, EXTENDED RELEASE ORAL at 17:26

## 2025-05-05 RX ADMIN — GABAPENTIN 100 MILLIGRAM(S): 400 CAPSULE ORAL at 21:55

## 2025-05-05 RX ADMIN — Medication 2 DROP(S): at 21:57

## 2025-05-05 RX ADMIN — METOPROLOL SUCCINATE 50 MILLIGRAM(S): 50 TABLET, EXTENDED RELEASE ORAL at 05:44

## 2025-05-05 RX ADMIN — DORZOLAMIDE 1 DROP(S): 20 SOLUTION/ DROPS OPHTHALMIC at 20:56

## 2025-05-05 RX ADMIN — DOLUTEGRAVIR SODIUM 50 MILLIGRAM(S): 5 TABLET, FOR SUSPENSION ORAL at 09:07

## 2025-05-05 RX ADMIN — POLYETHYLENE GLYCOL 3350 17 GRAM(S): 17 POWDER, FOR SOLUTION ORAL at 17:26

## 2025-05-05 RX ADMIN — OXYCODONE HYDROCHLORIDE 5 MILLIGRAM(S): 30 TABLET ORAL at 05:54

## 2025-05-05 RX ADMIN — Medication 975 MILLIGRAM(S): at 13:12

## 2025-05-05 RX ADMIN — INSULIN LISPRO 1: 100 INJECTION, SOLUTION INTRAVENOUS; SUBCUTANEOUS at 17:25

## 2025-05-05 RX ADMIN — Medication 975 MILLIGRAM(S): at 05:42

## 2025-05-05 RX ADMIN — POLYETHYLENE GLYCOL 3350 17 GRAM(S): 17 POWDER, FOR SOLUTION ORAL at 05:40

## 2025-05-05 RX ADMIN — Medication 975 MILLIGRAM(S): at 05:39

## 2025-05-05 RX ADMIN — INSULIN LISPRO 4: 100 INJECTION, SOLUTION INTRAVENOUS; SUBCUTANEOUS at 13:11

## 2025-05-05 RX ADMIN — Medication 1 APPLICATION(S): at 05:42

## 2025-05-05 NOTE — BH CONSULTATION LIAISON PROGRESS NOTE - CURRENT MEDICATION
MEDICATIONS  (STANDING):  acetaminophen     Tablet .. 975 milliGRAM(s) Oral every 8 hours  amLODIPine   Tablet 10 milliGRAM(s) Oral daily  artificial tears (preservative free) Ophthalmic Solution 2 Drop(s) Both EYES every 8 hours  atorvastatin 40 milliGRAM(s) Oral at bedtime  bisacodyl Suppository 10 milliGRAM(s) Rectal once  brimonidine 0.2% Ophthalmic Solution 1 Drop(s) Both EYES two times a day  chlorhexidine 2% Cloths 1 Application(s) Topical <User Schedule>  chlorhexidine 4% Liquid 1 Application(s) Topical <User Schedule>  dextrose 5%. 1000 milliLiter(s) (50 mL/Hr) IV Continuous <Continuous>  dextrose 5%. 1000 milliLiter(s) (100 mL/Hr) IV Continuous <Continuous>  dextrose 50% Injectable 25 Gram(s) IV Push once  dextrose 50% Injectable 12.5 Gram(s) IV Push once  dextrose 50% Injectable 25 Gram(s) IV Push once  dolutegravir 50 milliGRAM(s) Oral daily  dorzolamide 2% Ophthalmic Solution 1 Drop(s) Both EYES <User Schedule>  epoetin landry-epbx (RETACRIT) Injectable 4000 Unit(s) IV Push <User Schedule>  gabapentin 100 milliGRAM(s) Oral three times a day  glucagon  Injectable 1 milliGRAM(s) IntraMuscular once  insulin glargine Injectable (LANTUS) 10 Unit(s) SubCutaneous at bedtime  insulin lispro (ADMELOG) corrective regimen sliding scale   SubCutaneous three times a day before meals  lamiVUDine- milliGRAM(s) Oral daily  latanoprost 0.005% Ophthalmic Solution 1 Drop(s) Both EYES at bedtime  lidocaine   4% Patch 1 Patch Transdermal every 24 hours  metoprolol tartrate 50 milliGRAM(s) Oral two times a day  polyethylene glycol 3350 17 Gram(s) Oral two times a day  senna 2 Tablet(s) Oral at bedtime  timolol 0.5% Solution 1 Drop(s) Both EYES two times a day  trimethoprim/polymyxin Solution 1 Drop(s) Both EYES daily    MEDICATIONS  (PRN):  aluminum hydroxide/magnesium hydroxide/simethicone Suspension 30 milliLiter(s) Oral every 4 hours PRN Dyspepsia  dextrose Oral Gel 15 Gram(s) Oral once PRN Blood Glucose LESS THAN 70 milliGRAM(s)/deciliter  hydrALAZINE Injectable 10 milliGRAM(s) IV Push every 6 hours PRN sbp>159  melatonin 3 milliGRAM(s) Oral at bedtime PRN Insomnia  ondansetron Injectable 4 milliGRAM(s) IV Push every 8 hours PRN Nausea and/or Vomiting  sodium chloride 0.9% lock flush 10 milliLiter(s) IV Push every 1 hour PRN Pre/post blood products, medications, blood draw, and to maintain line patency  
MEDICATIONS  (STANDING):  acetaminophen     Tablet .. 975 milliGRAM(s) Oral every 8 hours  amLODIPine   Tablet 10 milliGRAM(s) Oral daily  artificial tears (preservative free) Ophthalmic Solution 2 Drop(s) Both EYES every 8 hours  atorvastatin 40 milliGRAM(s) Oral at bedtime  brimonidine 0.2% Ophthalmic Solution 1 Drop(s) Both EYES two times a day  chlorhexidine 2% Cloths 1 Application(s) Topical <User Schedule>  chlorhexidine 4% Liquid 1 Application(s) Topical <User Schedule>  dextrose 5%. 1000 milliLiter(s) (50 mL/Hr) IV Continuous <Continuous>  dextrose 5%. 1000 milliLiter(s) (100 mL/Hr) IV Continuous <Continuous>  dextrose 50% Injectable 25 Gram(s) IV Push once  dextrose 50% Injectable 12.5 Gram(s) IV Push once  dextrose 50% Injectable 25 Gram(s) IV Push once  dolutegravir 50 milliGRAM(s) Oral daily  dorzolamide 2% Ophthalmic Solution 1 Drop(s) Both EYES <User Schedule>  epoetin landry-epbx (RETACRIT) Injectable 4000 Unit(s) IV Push <User Schedule>  gabapentin 100 milliGRAM(s) Oral three times a day  glucagon  Injectable 1 milliGRAM(s) IntraMuscular once  insulin glargine Injectable (LANTUS) 10 Unit(s) SubCutaneous at bedtime  insulin lispro (ADMELOG) corrective regimen sliding scale   SubCutaneous three times a day before meals  lamiVUDine- milliGRAM(s) Oral daily  latanoprost 0.005% Ophthalmic Solution 1 Drop(s) Both EYES at bedtime  lidocaine   4% Patch 1 Patch Transdermal every 24 hours  metoprolol tartrate 50 milliGRAM(s) Oral two times a day  timolol 0.5% Solution 1 Drop(s) Both EYES two times a day  trimethoprim/polymyxin Solution 1 Drop(s) Both EYES daily    MEDICATIONS  (PRN):  aluminum hydroxide/magnesium hydroxide/simethicone Suspension 30 milliLiter(s) Oral every 4 hours PRN Dyspepsia  dextrose Oral Gel 15 Gram(s) Oral once PRN Blood Glucose LESS THAN 70 milliGRAM(s)/deciliter  hydrALAZINE Injectable 10 milliGRAM(s) IV Push every 6 hours PRN sbp>159  melatonin 3 milliGRAM(s) Oral at bedtime PRN Insomnia  ondansetron Injectable 4 milliGRAM(s) IV Push every 8 hours PRN Nausea and/or Vomiting  oxyCODONE    IR 10 milliGRAM(s) Oral every 6 hours PRN Severe Pain (7 - 10)  oxyCODONE    IR 5 milliGRAM(s) Oral every 6 hours PRN Moderate Pain (4 - 6)  sodium chloride 0.9% lock flush 10 milliLiter(s) IV Push every 1 hour PRN Pre/post blood products, medications, blood draw, and to maintain line patency  traMADol 25 milliGRAM(s) Oral every 8 hours PRN Mild Pain (1 - 3)  
MEDICATIONS  (STANDING):  acetaminophen     Tablet .. 975 milliGRAM(s) Oral every 8 hours  amLODIPine   Tablet 10 milliGRAM(s) Oral daily  artificial tears (preservative free) Ophthalmic Solution 2 Drop(s) Both EYES every 8 hours  atorvastatin 40 milliGRAM(s) Oral at bedtime  brimonidine 0.2% Ophthalmic Solution 1 Drop(s) Both EYES two times a day  chlorhexidine 2% Cloths 1 Application(s) Topical <User Schedule>  chlorhexidine 4% Liquid 1 Application(s) Topical <User Schedule>  dextrose 5%. 1000 milliLiter(s) (50 mL/Hr) IV Continuous <Continuous>  dextrose 5%. 1000 milliLiter(s) (100 mL/Hr) IV Continuous <Continuous>  dextrose 50% Injectable 25 Gram(s) IV Push once  dextrose 50% Injectable 12.5 Gram(s) IV Push once  dextrose 50% Injectable 25 Gram(s) IV Push once  dolutegravir 50 milliGRAM(s) Oral daily  dorzolamide 2% Ophthalmic Solution 1 Drop(s) Both EYES <User Schedule>  epoetin landry-epbx (RETACRIT) Injectable 4000 Unit(s) IV Push <User Schedule>  gabapentin 100 milliGRAM(s) Oral three times a day  glucagon  Injectable 1 milliGRAM(s) IntraMuscular once  insulin glargine Injectable (LANTUS) 10 Unit(s) SubCutaneous at bedtime  insulin lispro (ADMELOG) corrective regimen sliding scale   SubCutaneous three times a day before meals  lamiVUDine- milliGRAM(s) Oral daily  latanoprost 0.005% Ophthalmic Solution 1 Drop(s) Both EYES at bedtime  lidocaine   4% Patch 1 Patch Transdermal every 24 hours  metoprolol tartrate 50 milliGRAM(s) Oral two times a day  timolol 0.5% Solution 1 Drop(s) Both EYES two times a day  trimethoprim/polymyxin Solution 1 Drop(s) Both EYES daily    MEDICATIONS  (PRN):  aluminum hydroxide/magnesium hydroxide/simethicone Suspension 30 milliLiter(s) Oral every 4 hours PRN Dyspepsia  dextrose Oral Gel 15 Gram(s) Oral once PRN Blood Glucose LESS THAN 70 milliGRAM(s)/deciliter  hydrALAZINE Injectable 10 milliGRAM(s) IV Push every 6 hours PRN sbp>159  melatonin 3 milliGRAM(s) Oral at bedtime PRN Insomnia  OLANZapine Injectable 5 milliGRAM(s) IntraMuscular every 6 hours PRN Agitation  ondansetron Injectable 4 milliGRAM(s) IV Push every 8 hours PRN Nausea and/or Vomiting  sodium chloride 0.9% lock flush 10 milliLiter(s) IV Push every 1 hour PRN Pre/post blood products, medications, blood draw, and to maintain line patency  
MEDICATIONS  (STANDING):  acetaminophen     Tablet .. 975 milliGRAM(s) Oral every 8 hours  amLODIPine   Tablet 10 milliGRAM(s) Oral daily  artificial tears (preservative free) Ophthalmic Solution 2 Drop(s) Both EYES every 8 hours  atorvastatin 40 milliGRAM(s) Oral at bedtime  brimonidine 0.2% Ophthalmic Solution 1 Drop(s) Both EYES two times a day  chlorhexidine 2% Cloths 1 Application(s) Topical <User Schedule>  chlorhexidine 4% Liquid 1 Application(s) Topical <User Schedule>  dextrose 5%. 1000 milliLiter(s) (100 mL/Hr) IV Continuous <Continuous>  dextrose 5%. 1000 milliLiter(s) (50 mL/Hr) IV Continuous <Continuous>  dextrose 50% Injectable 25 Gram(s) IV Push once  dextrose 50% Injectable 12.5 Gram(s) IV Push once  dextrose 50% Injectable 25 Gram(s) IV Push once  dolutegravir 50 milliGRAM(s) Oral daily  dorzolamide 2% Ophthalmic Solution 1 Drop(s) Both EYES <User Schedule>  epoetin landry-epbx (RETACRIT) Injectable 4000 Unit(s) IV Push <User Schedule>  gabapentin 100 milliGRAM(s) Oral three times a day  glucagon  Injectable 1 milliGRAM(s) IntraMuscular once  insulin glargine Injectable (LANTUS) 10 Unit(s) SubCutaneous at bedtime  insulin lispro (ADMELOG) corrective regimen sliding scale   SubCutaneous three times a day before meals  lamiVUDine- milliGRAM(s) Oral daily  latanoprost 0.005% Ophthalmic Solution 1 Drop(s) Both EYES at bedtime  lidocaine   4% Patch 1 Patch Transdermal every 24 hours  metoprolol tartrate 50 milliGRAM(s) Oral two times a day  timolol 0.5% Solution 1 Drop(s) Both EYES two times a day  trimethoprim/polymyxin Solution 1 Drop(s) Both EYES daily    MEDICATIONS  (PRN):  aluminum hydroxide/magnesium hydroxide/simethicone Suspension 30 milliLiter(s) Oral every 4 hours PRN Dyspepsia  dextrose Oral Gel 15 Gram(s) Oral once PRN Blood Glucose LESS THAN 70 milliGRAM(s)/deciliter  hydrALAZINE Injectable 10 milliGRAM(s) IV Push every 6 hours PRN sbp>159  melatonin 3 milliGRAM(s) Oral at bedtime PRN Insomnia  ondansetron Injectable 4 milliGRAM(s) IV Push every 8 hours PRN Nausea and/or Vomiting  oxyCODONE    IR 10 milliGRAM(s) Oral every 6 hours PRN Severe Pain (7 - 10)  oxyCODONE    IR 5 milliGRAM(s) Oral every 6 hours PRN Moderate Pain (4 - 6)  sodium chloride 0.9% lock flush 10 milliLiter(s) IV Push every 1 hour PRN Pre/post blood products, medications, blood draw, and to maintain line patency  traMADol 25 milliGRAM(s) Oral every 8 hours PRN Mild Pain (1 - 3)

## 2025-05-05 NOTE — BH CONSULTATION LIAISON PROGRESS NOTE - NSBHMSEINTELL_PSY_A_CORE
Last office visit for this medication: 11/23/22  Next office visit: none scheduled at this time    Medication(s) Requested:   amphetamine-dextroamphetamine (Adderall XR) 30 MG 24 hr capsule 30 capsule 0 2/22/2023 3/25/2023    Sig - Route: Take 1 capsule by mouth daily          Provider Plan of Care: Attention deficit hyperactivity disorder (ADHD), predominantly inattentive type  Continue on amphetamine-dextroamphetamine (Adderall XR) 30 MG 24 hr capsule daily.    Can not refill without MD authorization     Amphetamine-Dextroamphetamine  30MG / Capsule Extended Release 24 Hour  Rx# [id number] Stimulant Qty: 30  Days: 30  Refills: 0 Prescribed: 2/22/2023  Dispensed: 2/23/2023  Sold: 2/23/2023 GILBERTO MARIN  [address] [place] [id number] [facility]  [address] [place] [id number]         
Unable to assess
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
## 2025-05-05 NOTE — BH CONSULTATION LIAISON PROGRESS NOTE - NSBHFUPINTERVALCCFT_PSY_A_CORE
"Mood? It's good" 
"I'm fine, my kidneys are good!"
"I'm good!"
"I have no money, I have no place to go."

## 2025-05-05 NOTE — BH CONSULTATION LIAISON PROGRESS NOTE - NSBHCHARTREVIEWVS_PSY_A_CORE FT
Vital Signs Last 24 Hrs  T(C): 36.7 (24 Apr 2025 07:33), Max: 37.1 (23 Apr 2025 20:45)  T(F): 98.1 (24 Apr 2025 07:33), Max: 98.7 (23 Apr 2025 20:45)  HR: 85 (24 Apr 2025 07:33) (71 - 94)  BP: 181/66 (24 Apr 2025 07:33) (119/62 - 181/66)  BP(mean): 86 (23 Apr 2025 19:00) (86 - 86)  RR: 18 (24 Apr 2025 07:33) (17 - 20)  SpO2: 95% (24 Apr 2025 07:33) (94% - 100%)    Parameters below as of 24 Apr 2025 07:33  Patient On (Oxygen Delivery Method): room air    
Vital Signs Last 24 Hrs  T(C): 36.4 (25 Apr 2025 04:55), Max: 37 (24 Apr 2025 17:30)  T(F): 97.5 (25 Apr 2025 04:55), Max: 98.6 (24 Apr 2025 17:30)  HR: 73 (25 Apr 2025 04:55) (73 - 87)  BP: 152/69 (25 Apr 2025 04:55) (132/79 - 176/79)  BP(mean): --  RR: 18 (25 Apr 2025 04:55) (18 - 18)  SpO2: 95% (25 Apr 2025 04:55) (92% - 97%)    Parameters below as of 25 Apr 2025 04:55  Patient On (Oxygen Delivery Method): room air    
home
Vital Signs Last 24 Hrs  T(C): 36.8 (05 May 2025 08:00), Max: 36.8 (05 May 2025 08:00)  T(F): 98.3 (05 May 2025 08:00), Max: 98.3 (05 May 2025 08:00)  HR: 72 (05 May 2025 08:00) (67 - 80)  BP: 113/78 (05 May 2025 08:00) (113/78 - 149/84)  BP(mean): --  RR: 18 (05 May 2025 08:00) (18 - 18)  SpO2: 95% (05 May 2025 08:00) (95% - 98%)    Parameters below as of 05 May 2025 08:00  Patient On (Oxygen Delivery Method): room air    
Vital Signs Last 24 Hrs  T(C): 36.4 (01 May 2025 08:00), Max: 37.3 (30 Apr 2025 12:29)  T(F): 97.5 (01 May 2025 08:00), Max: 99.1 (30 Apr 2025 12:29)  HR: 56 (01 May 2025 08:00) (56 - 62)  BP: 145/72 (01 May 2025 08:00) (137/67 - 157/78)  BP(mean): 90 (30 Apr 2025 12:29) (90 - 90)  RR: 18 (01 May 2025 08:00) (18 - 18)  SpO2: 95% (01 May 2025 08:00) (94% - 100%)    Parameters below as of 01 May 2025 08:00  Patient On (Oxygen Delivery Method): room air

## 2025-05-05 NOTE — BH CONSULTATION LIAISON PROGRESS NOTE - NSBHCHARTREVIEWINVESTIGATE_PSY_A_CORE FT
< from: 12 Lead ECG (04.22.25 @ 08:54) >    Ventricular Rate 71 BPM    Atrial Rate 71 BPM    P-R Interval 184 ms    QRS Duration 84 ms    Q-T Interval 366 ms    QTC Calculation(Bazett) 397 ms    P Axis 72 degrees    R Axis 65 degrees    T Axis 77 degrees    Diagnosis Line Normal sinus rhythm  Normal ECG    Confirmed by NINO DILL ([id number]) on 4/22/2025 12:13:51 PM

## 2025-05-05 NOTE — BH CONSULTATION LIAISON PROGRESS NOTE - NSBHMSESPABN_PSY_A_CORE
Loud volume/Pressured rate
Loud volume/Increased productivity
Pressured rate
Loud volume/Increased productivity
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
## 2025-05-05 NOTE — BH CONSULTATION LIAISON PROGRESS NOTE - NSBHPSYCHOLCOGORIENT_PSY_A_CORE
Orientation to year only for time/Not fully oriented...
Oriented to time, place, person, situation
Orientation to year only for time/Oriented to time, place, person, situation
Orientation to year only for time/Oriented to time, place, person, situation

## 2025-05-05 NOTE — BH CONSULTATION LIAISON PROGRESS NOTE - NSBHCONSFOLLOWNEEDS_PSY_ALL_CORE
No psychiatric contraindications to discharge
Needs further psych safety assessment prior to discharge
No psychiatric contraindications to discharge
No psychiatric contraindications to discharge

## 2025-05-05 NOTE — BH CONSULTATION LIAISON PROGRESS NOTE - NSBHCONSULTPRIMARYDISCUSSYES_PSY_A_CORE FT
Discussed discontinuation of 1:1 and psychiatry clearance with NP Nathan Ramirez on the floor.   Teams message sent to NP for recommendation of Zyprexa 5 mg IM q6hrs PRN for agitation. 
Dr. Ramirez
Dr. Boyce and Nathan MARTINEZ
Discussed discontinuation of 1:1 and psychiatry clearance with NP Nathan Ramirez on the floor.   Teams message sent to NP for recommendation of Zyprexa 5 mg IM q6hrs PRN for agitation.

## 2025-05-05 NOTE — BH CONSULTATION LIAISON PROGRESS NOTE - NSBHCHARTREVIEWLAB_PSY_A_CORE FT
Basic Metabolic Panel in AM (05.01.25 @ 05:10)   Sodium: 136 mmol/L  Potassium: 4.3 mmol/L  Chloride: 100: Chloride reference range changed from ..10/26/2022 mmol/L  Carbon Dioxide: 23.0 mmol/L  Anion Gap: 13 mmol/L  Blood Urea Nitrogen: 44.5 mg/dL  Creatinine: 5.35 mg/dL  Glucose: 119 mg/dL  Calcium: 8.5 mg/dL  eGFR: 11: The estimated glomerular filtration rate (eGFR) calculation is based on   the 2021 CKD-EPI creatinine equation, which is validated in male and   female population 18 years of age and older (N Engl J Med 2021;   [id number]:7543-8150). mL/min/1.73m2
Basic Metabolic Panel in AM (04.24.25 @ 05:20)   Sodium: 138 mmol/L  Potassium: 4.1 mmol/L  Chloride: 99: Chloride reference range changed from ..10/26/2022 mmol/L  Carbon Dioxide: 24.0 mmol/L  Anion Gap: 15 mmol/L  Blood Urea Nitrogen: 32.5 mg/dL  Creatinine: 3.66 mg/dL  Glucose: 149 mg/dL  Calcium: 8.7 mg/dL  eGFR: 18: The estimated glomerular filtration rate (eGFR) calculation is based on   the 2021 CKD-EPI creatinine equation, which is validated in male and   female population 18 years of age and older (N Engl J Med 2021;   385:[id number]). mL/min/1.73m2
POCT Blood Glucose.: 139 mg/dL (04.25.25 @ 08:58)     Complete Blood Count in AM (04.25.25 @ 04:38)   Auto NRBC: 0 /100 WBCs  WBC Count: 8.52 K/uL  RBC Count: 3.55 M/uL  Hemoglobin: 10.3 g/dL  Hematocrit: 31.2 %  Mean Cell Volume: 87.9 fl  Mean Cell Hemoglobin: 29.0 pg  Mean Cell Hemoglobin Conc: 33.0 g/dL  Red Cell Distrib Width: 14.3 %  Platelet Count - Automated: 310 K/uL  MPV: 9.2 fL  Auto Nucleated RBC #: 0.00 K/uL    Basic Metabolic Panel in AM (04.25.25 @ 04:38)   Sodium: 140 mmol/L  Potassium: 3.9 mmol/L  Chloride: 101: Chloride reference range changed from ..10/26/2022 mmol/L  Carbon Dioxide: 22.0 mmol/L  Anion Gap: 17 mmol/L  Blood Urea Nitrogen: 38.2 mg/dL  Creatinine: 4.92 mg/dL  Glucose: 147 mg/dL  Calcium: 8.7 mg/dL  eGFR: 12: The estimated glomerular filtration rate (eGFR) calculation is based on   the 2021 CKD-EPI creatinine equation, which is validated in male and   female population 18 years of age and older (N Engl J Med 2021;   [id number]). mL/min/1.73m2
Comprehensive Metabolic Panel in AM (05.05.25 @ 05:30)   Sodium: 138 mmol/L  Potassium: 5.0 mmol/L  Chloride: 104: Chloride reference range changed from ..10/26/2022 mmol/L  Carbon Dioxide: 19.0 mmol/L  Anion Gap: 15 mmol/L  Blood Urea Nitrogen: 52.7 mg/dL  Creatinine: 5.17 mg/dL  Glucose: 174 mg/dL  Calcium: 8.6 mg/dL  Protein Total: 6.7 g/dL  Albumin: 3.1 g/dL  Bilirubin Total: <0.2 mg/dL  Alkaline Phosphatase: 59 U/L  Aspartate Aminotransferase (AST/SGOT): 11 U/L  Alanine Aminotransferase (ALT/SGPT): 14 U/L  eGFR: 12: The estimated glomerular filtration rate (eGFR) calculation is based on   the 2021 CKD-EPI creatinine equation, which is validated in male and   female population 18 years of age and older (N Engl J Med 2021;   [id number]). mL/min/1.73m2

## 2025-05-05 NOTE — BH CONSULTATION LIAISON PROGRESS NOTE - OTHER
eyes not toward writer, legally blind cooperative but irritable when talking about dialysis [place] thinking in context of treatment decisions  Right sided permacath

## 2025-05-06 LAB
ANION GAP SERPL CALC-SCNC: 13 MMOL/L — SIGNIFICANT CHANGE UP (ref 5–17)
BUN SERPL-MCNC: 57.7 MG/DL — HIGH (ref 8–20)
CALCIUM SERPL-MCNC: 8.3 MG/DL — LOW (ref 8.4–10.5)
CHLORIDE SERPL-SCNC: 107 MMOL/L — SIGNIFICANT CHANGE UP (ref 96–108)
CO2 SERPL-SCNC: 20 MMOL/L — LOW (ref 22–29)
CREAT SERPL-MCNC: 5.25 MG/DL — HIGH (ref 0.5–1.3)
EGFR: 12 ML/MIN/1.73M2 — LOW
EGFR: 12 ML/MIN/1.73M2 — LOW
GLUCOSE BLDC GLUCOMTR-MCNC: 191 MG/DL — HIGH (ref 70–99)
GLUCOSE BLDC GLUCOMTR-MCNC: 208 MG/DL — HIGH (ref 70–99)
GLUCOSE BLDC GLUCOMTR-MCNC: 219 MG/DL — HIGH (ref 70–99)
GLUCOSE BLDC GLUCOMTR-MCNC: 263 MG/DL — HIGH (ref 70–99)
GLUCOSE SERPL-MCNC: 230 MG/DL — HIGH (ref 70–99)
MAGNESIUM SERPL-MCNC: 2.2 MG/DL — SIGNIFICANT CHANGE UP (ref 1.6–2.6)
PHOSPHATE SERPL-MCNC: 4.3 MG/DL — SIGNIFICANT CHANGE UP (ref 2.4–4.7)
POTASSIUM SERPL-MCNC: 5.1 MMOL/L — SIGNIFICANT CHANGE UP (ref 3.5–5.3)
POTASSIUM SERPL-SCNC: 5.1 MMOL/L — SIGNIFICANT CHANGE UP (ref 3.5–5.3)
SODIUM SERPL-SCNC: 140 MMOL/L — SIGNIFICANT CHANGE UP (ref 135–145)

## 2025-05-06 PROCEDURE — 99232 SBSQ HOSP IP/OBS MODERATE 35: CPT

## 2025-05-06 PROCEDURE — 99233 SBSQ HOSP IP/OBS HIGH 50: CPT

## 2025-05-06 RX ORDER — OXYCODONE HYDROCHLORIDE 30 MG/1
5 TABLET ORAL ONCE
Refills: 0 | Status: DISCONTINUED | OUTPATIENT
Start: 2025-05-06 | End: 2025-05-06

## 2025-05-06 RX ORDER — SODIUM CHLORIDE 0.65 %
1 AEROSOL, SPRAY (ML) NASAL
Refills: 0 | Status: DISCONTINUED | OUTPATIENT
Start: 2025-05-06 | End: 2025-05-13

## 2025-05-06 RX ADMIN — METOPROLOL SUCCINATE 50 MILLIGRAM(S): 50 TABLET, EXTENDED RELEASE ORAL at 18:11

## 2025-05-06 RX ADMIN — OXYCODONE HYDROCHLORIDE 5 MILLIGRAM(S): 30 TABLET ORAL at 03:46

## 2025-05-06 RX ADMIN — Medication 10 MILLIGRAM(S): at 00:45

## 2025-05-06 RX ADMIN — Medication 975 MILLIGRAM(S): at 21:42

## 2025-05-06 RX ADMIN — LAMIVUDINE 100 MILLIGRAM(S): 10 SOLUTION ORAL at 13:17

## 2025-05-06 RX ADMIN — INSULIN GLARGINE-YFGN 10 UNIT(S): 100 INJECTION, SOLUTION SUBCUTANEOUS at 21:43

## 2025-05-06 RX ADMIN — Medication 975 MILLIGRAM(S): at 14:10

## 2025-05-06 RX ADMIN — Medication 975 MILLIGRAM(S): at 13:16

## 2025-05-06 RX ADMIN — Medication 975 MILLIGRAM(S): at 22:42

## 2025-05-06 RX ADMIN — DOLUTEGRAVIR SODIUM 50 MILLIGRAM(S): 5 TABLET, FOR SUSPENSION ORAL at 13:16

## 2025-05-06 RX ADMIN — Medication 1 APPLICATION(S): at 10:55

## 2025-05-06 RX ADMIN — OXYCODONE HYDROCHLORIDE 5 MILLIGRAM(S): 30 TABLET ORAL at 04:30

## 2025-05-06 RX ADMIN — Medication 2 TABLET(S): at 21:42

## 2025-05-06 RX ADMIN — POLYETHYLENE GLYCOL 3350 17 GRAM(S): 17 POWDER, FOR SOLUTION ORAL at 05:47

## 2025-05-06 RX ADMIN — GABAPENTIN 100 MILLIGRAM(S): 400 CAPSULE ORAL at 21:43

## 2025-05-06 RX ADMIN — INSULIN LISPRO 2: 100 INJECTION, SOLUTION INTRAVENOUS; SUBCUTANEOUS at 17:44

## 2025-05-06 RX ADMIN — AMLODIPINE BESYLATE 10 MILLIGRAM(S): 10 TABLET ORAL at 05:47

## 2025-05-06 RX ADMIN — Medication 975 MILLIGRAM(S): at 05:47

## 2025-05-06 RX ADMIN — INSULIN LISPRO 3: 100 INJECTION, SOLUTION INTRAVENOUS; SUBCUTANEOUS at 12:38

## 2025-05-06 RX ADMIN — GABAPENTIN 100 MILLIGRAM(S): 400 CAPSULE ORAL at 13:21

## 2025-05-06 RX ADMIN — GABAPENTIN 100 MILLIGRAM(S): 400 CAPSULE ORAL at 05:47

## 2025-05-06 RX ADMIN — Medication 2 DROP(S): at 13:17

## 2025-05-06 RX ADMIN — Medication 1 APPLICATION(S): at 06:49

## 2025-05-06 RX ADMIN — ATORVASTATIN CALCIUM 40 MILLIGRAM(S): 80 TABLET, FILM COATED ORAL at 21:42

## 2025-05-06 RX ADMIN — DORZOLAMIDE 1 DROP(S): 20 SOLUTION/ DROPS OPHTHALMIC at 10:53

## 2025-05-06 RX ADMIN — POLYMYXIN B SULFATE AND TRIMETHOPRIM SULFATE 1 DROP(S): 10000; 1 SOLUTION/ DROPS OPHTHALMIC at 10:53

## 2025-05-06 RX ADMIN — METOPROLOL SUCCINATE 50 MILLIGRAM(S): 50 TABLET, EXTENDED RELEASE ORAL at 05:48

## 2025-05-06 NOTE — PROGRESS NOTE ADULT - ASSESSMENT
63 YOM DM, HIV on HAART admitted after fall.  Nephrology consulted for progressive worsening CKD.    - Advanced progressive CKD stage V from biopsy proven diabetic nephropathy and podopathy, initiated on HD during this hospitalization   sp renal biopsy with Diabetic nephropathy+ Complete foot process effacement, 80% IFTA  HIV- VL > 300 k  Anemia: CKD and RITESH.  Placed on IV venofer. epo  HTN: BP better controlled at this time; continue amlodipine, metoprolol, Hydralazine  HIV on Bikaty- meds changed as per ID  DM: discontinued metformin (low GFR), placed on Lantus + SSI ACHS    Pt has been refusing HD intermittently  Last HD treatment was on 04/29  SCr 5.25; GFr 12 ml/min   pt has good uop  Maintaining potassium  Offered dialysis again today, patient refused.  No emergent indication today.  Behavioral health on board. Will continue to follow there recommendations but in current condition it would not be possible to manage him at an outpatient hemodialysis unit.    Discussed with Dr. Boyce

## 2025-05-06 NOTE — PROGRESS NOTE ADULT - ASSESSMENT
Ethics will continue to follow and be available for assistance as needed. Please do not hesitate to contact us.    Liberty NAVA RN, MBE  Ethics Fellow  [phone]   Ethics to follow up with primary team tomorrow, 5/7/25.     Will continue to follow and be available for assistance as needed. Please do not hesitate to contact us.    Liberty NAVA RN, MBE  Ethics Fellow  [phone]

## 2025-05-06 NOTE — PROGRESS NOTE ADULT - SUBJECTIVE AND OBJECTIVE BOX
[facility] Division of Hospital Medicine  Rolf Boyce MD   I'm reachable on [employer] Teams      Patient is a 64y old  Male who presents with a chief complaint of Geoff (01 May 2025 16:32)      SUBJECTIVE / OVERNIGHT EVENTS:   Patient was seen and examined during rounds    Still refusing HD. creatinine is trending up.     12 points ROS negative except above    MEDICATIONS  (STANDING):  acetaminophen     Tablet .. 975 milliGRAM(s) Oral every 8 hours  amLODIPine   Tablet 10 milliGRAM(s) Oral daily  artificial tears (preservative free) Ophthalmic Solution 2 Drop(s) Both EYES every 8 hours  atorvastatin 40 milliGRAM(s) Oral at bedtime  bisacodyl Suppository 10 milliGRAM(s) Rectal once  brimonidine 0.2% Ophthalmic Solution 1 Drop(s) Both EYES two times a day  chlorhexidine 2% Cloths 1 Application(s) Topical <User Schedule>  chlorhexidine 4% Liquid 1 Application(s) Topical <User Schedule>  dextrose 5%. 1000 milliLiter(s) (50 mL/Hr) IV Continuous <Continuous>  dextrose 5%. 1000 milliLiter(s) (100 mL/Hr) IV Continuous <Continuous>  dextrose 50% Injectable 25 Gram(s) IV Push once  dextrose 50% Injectable 12.5 Gram(s) IV Push once  dextrose 50% Injectable 25 Gram(s) IV Push once  dolutegravir 50 milliGRAM(s) Oral daily  dorzolamide 2% Ophthalmic Solution 1 Drop(s) Both EYES <User Schedule>  epoetin landry-epbx (RETACRIT) Injectable 4000 Unit(s) IV Push <User Schedule>  gabapentin 100 milliGRAM(s) Oral three times a day  glucagon  Injectable 1 milliGRAM(s) IntraMuscular once  insulin glargine Injectable (LANTUS) 10 Unit(s) SubCutaneous at bedtime  insulin lispro (ADMELOG) corrective regimen sliding scale   SubCutaneous three times a day before meals  lamiVUDine- milliGRAM(s) Oral daily  latanoprost 0.005% Ophthalmic Solution 1 Drop(s) Both EYES at bedtime  lidocaine   4% Patch 1 Patch Transdermal every 24 hours  metoprolol tartrate 50 milliGRAM(s) Oral two times a day  polyethylene glycol 3350 17 Gram(s) Oral two times a day  senna 2 Tablet(s) Oral at bedtime  timolol 0.5% Solution 1 Drop(s) Both EYES two times a day  trimethoprim/polymyxin Solution 1 Drop(s) Both EYES daily    MEDICATIONS  (PRN):  aluminum hydroxide/magnesium hydroxide/simethicone Suspension 30 milliLiter(s) Oral every 4 hours PRN Dyspepsia  dextrose Oral Gel 15 Gram(s) Oral once PRN Blood Glucose LESS THAN 70 milliGRAM(s)/deciliter  hydrALAZINE Injectable 10 milliGRAM(s) IV Push every 6 hours PRN sbp>159  melatonin 3 milliGRAM(s) Oral at bedtime PRN Insomnia  ondansetron Injectable 4 milliGRAM(s) IV Push every 8 hours PRN Nausea and/or Vomiting  sodium chloride 0.9% lock flush 10 milliLiter(s) IV Push every 1 hour PRN Pre/post blood products, medications, blood draw, and to maintain line patency        I&O's Summary    05 May 2025 07:01  -  06 May 2025 07:00  --------------------------------------------------------  IN: 500 mL / OUT: 0 mL / NET: 500 mL    06 May 2025 07:01  -  06 May 2025 15:21  --------------------------------------------------------  IN: 680 mL / OUT: 0 mL / NET: 680 mL        PHYSICAL EXAM:  Vital Signs Last 24 Hrs  T(C): 36.8 (06 May 2025 08:01), Max: 36.9 (05 May 2025 21:55)  T(F): 98.2 (06 May 2025 08:01), Max: 98.4 (05 May 2025 21:55)  HR: 69 (06 May 2025 13:15) (65 - 98)  BP: 156/71 (06 May 2025 13:15) (142/71 - 172/82)  BP(mean): 99 (06 May 2025 13:15) (99 - 99)  RR: 16 (06 May 2025 08:01) (16 - 19)  SpO2: 95% (06 May 2025 05:04) (92% - 95%)    Parameters below as of 06 May 2025 08:01  Patient On (Oxygen Delivery Method): room air        CONSTITUTIONAL: NAD, Not in acute distress  EYES:  unable to examine eye  ENMT: , neck supple , non-tender  RESPIRATORY: Normal respiratory effort; lungs are clear to auscultation bilaterally  CARDIOVASCULAR: S1S2 present  ABDOMEN: soft. bowel sound present  MUSCLOSKELETAL: ; no clubbing or cyanosis of digits;   PSYCH: inappropriate behavior  NEUROLOGY: CN, motor and sensory intact   SKIN: No rashes; no palpable lesions  Extremity: No bilateral edema      LABS:                        10.3   9.16  )-----------( 289      ( 05 May 2025 05:30 )             31.0     05-06    140  |  107  |  57.7[H]  ----------------------------<  230[H]  5.1   |  20.0[L]  |  5.25[H]    Ca    8.3[L]      06 May 2025 06:40  Phos  4.3     05-06  Mg     2.2     05-06    TPro  6.7  /  Alb  3.1[L]  /  TBili  <0.2[L]  /  DBili  x   /  AST  11  /  ALT  14  /  AlkPhos  59  05-05          Urinalysis Basic - ( 06 May 2025 06:40 )    Color: x / Appearance: x / SG: x / pH: x  Gluc: 230 mg/dL / Ketone: x  / Bili: x / Urobili: x   Blood: x / Protein: x / Nitrite: x   Leuk Esterase: x / RBC: x / WBC x   Sq Epi: x / Non Sq Epi: x / Bacteria: x        CAPILLARY BLOOD GLUCOSE      POCT Blood Glucose.: 263 mg/dL (06 May 2025 11:57)  POCT Blood Glucose.: 191 mg/dL (06 May 2025 08:30)  POCT Blood Glucose.: 284 mg/dL (05 May 2025 21:22)  POCT Blood Glucose.: 169 mg/dL (05 May 2025 17:07)      Labs reviewed:    RADIOLOGY & ADDITIONAL TESTS:  Imaging Personally Reviewed:  Electrocardiogram Personally Reviewed:
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
## 2025-05-06 NOTE — PROGRESS NOTE ADULT - SUBJECTIVE AND OBJECTIVE BOX
Reason for visit: Advanced CKD   Subjective: Patient reports he feels fine, denies any urinary complaints.  No dysuria, gross hematuria, shortness of breath, chest pain.  Review of systems: All systems were reviewed in detail, pertinent positive and negative have been mentioned above, otherwise negative.    Physical Exam:  Gen: no acute distress  MS: alert, conversing normally  HENT: NCAT, MMM  CV: S1-S2 normal, no LE edema  Chest: CTAB, no w/r/r,  Abd: soft, NT, ND  Skin: dry, warm, no rash or jaundice    Vital Signs Last 24 Hrs  T(C): 36.8 (06 May 2025 08:01), Max: 36.9 (05 May 2025 21:55)  T(F): 98.2 (06 May 2025 08:01), Max: 98.4 (05 May 2025 21:55)  HR: 69 (06 May 2025 13:15) (65 - 98)  BP: 156/71 (06 May 2025 13:15) (142/71 - 172/82)  BP(mean): 99 (06 May 2025 13:15) (99 - 99)  RR: 16 (06 May 2025 08:01) (16 - 19)  SpO2: 95% (06 May 2025 05:04) (92% - 95%)    Parameters below as of 06 May 2025 08:01  Patient On (Oxygen Delivery Method): room air      I&O's Summary    05 May 2025 07:01  -  06 May 2025 07:00  --------------------------------------------------------  IN: 500 mL / OUT: 0 mL / NET: 500 mL    06 May 2025 07:01  -  06 May 2025 14:46  --------------------------------------------------------  IN: 680 mL / OUT: 0 mL / NET: 680 mL      Current Antibiotics:  dolutegravir 50 milliGRAM(s) Oral daily  lamiVUDine- milliGRAM(s) Oral daily    Other medications:  acetaminophen     Tablet .. 975 milliGRAM(s) Oral every 8 hours  amLODIPine   Tablet 10 milliGRAM(s) Oral daily  artificial tears (preservative free) Ophthalmic Solution 2 Drop(s) Both EYES every 8 hours  atorvastatin 40 milliGRAM(s) Oral at bedtime  bisacodyl Suppository 10 milliGRAM(s) Rectal once  brimonidine 0.2% Ophthalmic Solution 1 Drop(s) Both EYES two times a day  chlorhexidine 2% Cloths 1 Application(s) Topical <User Schedule>  chlorhexidine 4% Liquid 1 Application(s) Topical <User Schedule>  dextrose 5%. 1000 milliLiter(s) IV Continuous <Continuous>  dextrose 5%. 1000 milliLiter(s) IV Continuous <Continuous>  dextrose 50% Injectable 25 Gram(s) IV Push once  dextrose 50% Injectable 12.5 Gram(s) IV Push once  dextrose 50% Injectable 25 Gram(s) IV Push once  dorzolamide 2% Ophthalmic Solution 1 Drop(s) Both EYES <User Schedule>  epoetin landry-epbx (RETACRIT) Injectable 4000 Unit(s) IV Push <User Schedule>  gabapentin 100 milliGRAM(s) Oral three times a day  glucagon  Injectable 1 milliGRAM(s) IntraMuscular once  insulin glargine Injectable (LANTUS) 10 Unit(s) SubCutaneous at bedtime  insulin lispro (ADMELOG) corrective regimen sliding scale   SubCutaneous three times a day before meals  latanoprost 0.005% Ophthalmic Solution 1 Drop(s) Both EYES at bedtime  lidocaine   4% Patch 1 Patch Transdermal every 24 hours  metoprolol tartrate 50 milliGRAM(s) Oral two times a day  polyethylene glycol 3350 17 Gram(s) Oral two times a day  senna 2 Tablet(s) Oral at bedtime  timolol 0.5% Solution 1 Drop(s) Both EYES two times a day  trimethoprim/polymyxin Solution 1 Drop(s) Both EYES daily    05-06    140  |  107  |  57.7[H]  ----------------------------<  230[H]  5.1   |  20.0[L]  |  5.25[H]    Ca    8.3[L]      06 May 2025 06:40  Phos  4.3     05-06  Mg     2.2     05-06    TPro  6.7  /  Alb  3.1[L]  /  TBili  <0.2[L]  /  DBili  x   /  AST  11  /  ALT  14  /  AlkPhos  59  05-05    Creatinine: 5.25 mg/dL (05-06-25 @ 06:40)  Creatinine: 5.17 mg/dL (05-05-25 @ 05:30)  Creatinine: 4.95 mg/dL (05-03-25 @ 05:32)  Creatinine: 5.38 mg/dL (05-02-25 @ 05:11)                          10.3   9.16  )-----------( 289      ( 05 May 2025 05:30 )             31.0

## 2025-05-06 NOTE — PROGRESS NOTE ADULT - SUBJECTIVE AND OBJECTIVE BOX
Ethics continues to follow. See consult on 4/25/25 and prior notes.    Case discussed in detail with STACY Ramirez NP (Medicine NP), JEAN PAUL Lee MD (Ethics Attending), and PITER NAVA RN, MBE (Ethics Fellow). Ethics informed that patient continues to decline hemodialysis and other recommended medical interventions. He is also declining to include his spouse/HCP, from whom he is reportedly , in his medical care and decision making. During the hospitalization thus far, goals of care conversations with the patient have not been possible due to his fluctuating capacity, though he continues to maintain his desire not to further pursue dialysis. At this time, Nephrology has noted that if the patient continues to decline dialysis, it may be possible to monitor him off of HD, provided there is close outpatient follow up with a nephrologist; however, adherence to this plan remains a concern. Discussed with STACY Ramirez NP who is to speak with the patient in an attempt to assess his goals for his care, or if he would be willing to involve other support persons to assist him with pursuing outpatient follow up. STACY Ramirez NP to update Ethics pending the result of the discussion.

## 2025-05-07 LAB
ANION GAP SERPL CALC-SCNC: 14 MMOL/L — SIGNIFICANT CHANGE UP (ref 5–17)
BUN SERPL-MCNC: 54.9 MG/DL — HIGH (ref 8–20)
CALCIUM SERPL-MCNC: 8.7 MG/DL — SIGNIFICANT CHANGE UP (ref 8.4–10.5)
CHLORIDE SERPL-SCNC: 105 MMOL/L — SIGNIFICANT CHANGE UP (ref 96–108)
CO2 SERPL-SCNC: 19 MMOL/L — LOW (ref 22–29)
CREAT SERPL-MCNC: 4.84 MG/DL — HIGH (ref 0.5–1.3)
EGFR: 13 ML/MIN/1.73M2 — LOW
EGFR: 13 ML/MIN/1.73M2 — LOW
GLUCOSE BLDC GLUCOMTR-MCNC: 179 MG/DL — HIGH (ref 70–99)
GLUCOSE BLDC GLUCOMTR-MCNC: 183 MG/DL — HIGH (ref 70–99)
GLUCOSE BLDC GLUCOMTR-MCNC: 200 MG/DL — HIGH (ref 70–99)
GLUCOSE BLDC GLUCOMTR-MCNC: 233 MG/DL — HIGH (ref 70–99)
GLUCOSE BLDC GLUCOMTR-MCNC: 423 MG/DL — HIGH (ref 70–99)
GLUCOSE BLDC GLUCOMTR-MCNC: 437 MG/DL — HIGH (ref 70–99)
GLUCOSE SERPL-MCNC: 162 MG/DL — HIGH (ref 70–99)
MAGNESIUM SERPL-MCNC: 2 MG/DL — SIGNIFICANT CHANGE UP (ref 1.8–2.6)
PHOSPHATE SERPL-MCNC: 4.4 MG/DL — SIGNIFICANT CHANGE UP (ref 2.4–4.7)
POTASSIUM SERPL-MCNC: 5.1 MMOL/L — SIGNIFICANT CHANGE UP (ref 3.5–5.3)
POTASSIUM SERPL-SCNC: 5.1 MMOL/L — SIGNIFICANT CHANGE UP (ref 3.5–5.3)
SODIUM SERPL-SCNC: 138 MMOL/L — SIGNIFICANT CHANGE UP (ref 135–145)

## 2025-05-07 PROCEDURE — 99233 SBSQ HOSP IP/OBS HIGH 50: CPT

## 2025-05-07 PROCEDURE — 99232 SBSQ HOSP IP/OBS MODERATE 35: CPT

## 2025-05-07 RX ORDER — INSULIN LISPRO 100 U/ML
8 INJECTION, SOLUTION INTRAVENOUS; SUBCUTANEOUS ONCE
Refills: 0 | Status: COMPLETED | OUTPATIENT
Start: 2025-05-07 | End: 2025-05-07

## 2025-05-07 RX ORDER — OXYCODONE HYDROCHLORIDE 30 MG/1
5 TABLET ORAL ONCE
Refills: 0 | Status: DISCONTINUED | OUTPATIENT
Start: 2025-05-07 | End: 2025-05-07

## 2025-05-07 RX ORDER — INSULIN LISPRO 100 U/ML
INJECTION, SOLUTION INTRAVENOUS; SUBCUTANEOUS
Refills: 0 | Status: DISCONTINUED | OUTPATIENT
Start: 2025-05-07 | End: 2025-05-10

## 2025-05-07 RX ADMIN — Medication 975 MILLIGRAM(S): at 14:09

## 2025-05-07 RX ADMIN — OXYCODONE HYDROCHLORIDE 5 MILLIGRAM(S): 30 TABLET ORAL at 04:50

## 2025-05-07 RX ADMIN — Medication 2 DROP(S): at 14:14

## 2025-05-07 RX ADMIN — Medication 2 TABLET(S): at 23:07

## 2025-05-07 RX ADMIN — Medication 1 APPLICATION(S): at 05:55

## 2025-05-07 RX ADMIN — METOPROLOL SUCCINATE 50 MILLIGRAM(S): 50 TABLET, EXTENDED RELEASE ORAL at 17:47

## 2025-05-07 RX ADMIN — LATANOPROST PF 1 DROP(S): 0.05 SOLUTION/ DROPS OPHTHALMIC at 23:08

## 2025-05-07 RX ADMIN — DORZOLAMIDE 1 DROP(S): 20 SOLUTION/ DROPS OPHTHALMIC at 23:08

## 2025-05-07 RX ADMIN — Medication 975 MILLIGRAM(S): at 06:55

## 2025-05-07 RX ADMIN — DOLUTEGRAVIR SODIUM 50 MILLIGRAM(S): 5 TABLET, FOR SUSPENSION ORAL at 11:07

## 2025-05-07 RX ADMIN — POLYMYXIN B SULFATE AND TRIMETHOPRIM SULFATE 1 DROP(S): 10000; 1 SOLUTION/ DROPS OPHTHALMIC at 12:53

## 2025-05-07 RX ADMIN — ATORVASTATIN CALCIUM 40 MILLIGRAM(S): 80 TABLET, FILM COATED ORAL at 23:08

## 2025-05-07 RX ADMIN — GABAPENTIN 100 MILLIGRAM(S): 400 CAPSULE ORAL at 23:07

## 2025-05-07 RX ADMIN — LAMIVUDINE 100 MILLIGRAM(S): 10 SOLUTION ORAL at 11:14

## 2025-05-07 RX ADMIN — Medication 975 MILLIGRAM(S): at 23:07

## 2025-05-07 RX ADMIN — OXYCODONE HYDROCHLORIDE 5 MILLIGRAM(S): 30 TABLET ORAL at 05:50

## 2025-05-07 RX ADMIN — Medication 2 DROP(S): at 23:08

## 2025-05-07 RX ADMIN — METOPROLOL SUCCINATE 50 MILLIGRAM(S): 50 TABLET, EXTENDED RELEASE ORAL at 05:55

## 2025-05-07 RX ADMIN — INSULIN LISPRO 12: 100 INJECTION, SOLUTION INTRAVENOUS; SUBCUTANEOUS at 17:48

## 2025-05-07 RX ADMIN — GABAPENTIN 100 MILLIGRAM(S): 400 CAPSULE ORAL at 14:13

## 2025-05-07 RX ADMIN — Medication 1 SPRAY(S): at 05:55

## 2025-05-07 RX ADMIN — INSULIN LISPRO 1: 100 INJECTION, SOLUTION INTRAVENOUS; SUBCUTANEOUS at 08:49

## 2025-05-07 RX ADMIN — GABAPENTIN 100 MILLIGRAM(S): 400 CAPSULE ORAL at 05:55

## 2025-05-07 RX ADMIN — INSULIN GLARGINE-YFGN 10 UNIT(S): 100 INJECTION, SOLUTION SUBCUTANEOUS at 23:13

## 2025-05-07 RX ADMIN — AMLODIPINE BESYLATE 10 MILLIGRAM(S): 10 TABLET ORAL at 05:55

## 2025-05-07 RX ADMIN — INSULIN LISPRO 8 UNIT(S): 100 INJECTION, SOLUTION INTRAVENOUS; SUBCUTANEOUS at 17:47

## 2025-05-07 RX ADMIN — Medication 975 MILLIGRAM(S): at 05:55

## 2025-05-07 RX ADMIN — INSULIN LISPRO 1: 100 INJECTION, SOLUTION INTRAVENOUS; SUBCUTANEOUS at 12:50

## 2025-05-07 NOTE — PROGRESS NOTE ADULT - ASSESSMENT
Discharge planning to continue, given patient is in agreement with proposed plan. It may be appropriate to involve the patient's wife/HCP, Nelli, in discharge planning if the patient remains amenable.    Will continue to follow and be available for assistance as needed. Please do not hesitate to contact us.    Liberty NAVA RN, MBE  Ethics Fellow  [phone]

## 2025-05-07 NOTE — PROGRESS NOTE ADULT - SUBJECTIVE AND OBJECTIVE BOX
[facility] Division of Hospital Medicine  Rolf Boyce MD   I'm reachable on [employer]      Patient is a 64y old  Male who presents with a chief complaint of Geoff (01 May 2025 16:32)      SUBJECTIVE / OVERNIGHT EVENTS:   Patient was seen and examined during rounds    Reports feeling better. Denied any pain.   Discussed with patient's wife Alek after getting patient's permission . Explained current situation in detail. Awaiting placement  Discussed with Dr. Lind. Plan to monitor off HD.     12 points ROS negative except above    MEDICATIONS  (STANDING):  acetaminophen     Tablet .. 975 milliGRAM(s) Oral every 8 hours  amLODIPine   Tablet 10 milliGRAM(s) Oral daily  artificial tears (preservative free) Ophthalmic Solution 2 Drop(s) Both EYES every 8 hours  atorvastatin 40 milliGRAM(s) Oral at bedtime  bisacodyl Suppository 10 milliGRAM(s) Rectal once  brimonidine 0.2% Ophthalmic Solution 1 Drop(s) Both EYES two times a day  chlorhexidine 2% Cloths 1 Application(s) Topical <User Schedule>  chlorhexidine 4% Liquid 1 Application(s) Topical <User Schedule>  dextrose 5%. 1000 milliLiter(s) (50 mL/Hr) IV Continuous <Continuous>  dextrose 5%. 1000 milliLiter(s) (100 mL/Hr) IV Continuous <Continuous>  dextrose 50% Injectable 25 Gram(s) IV Push once  dextrose 50% Injectable 12.5 Gram(s) IV Push once  dextrose 50% Injectable 25 Gram(s) IV Push once  dolutegravir 50 milliGRAM(s) Oral daily  dorzolamide 2% Ophthalmic Solution 1 Drop(s) Both EYES <User Schedule>  epoetin landry-epbx (RETACRIT) Injectable 4000 Unit(s) IV Push <User Schedule>  gabapentin 100 milliGRAM(s) Oral three times a day  glucagon  Injectable 1 milliGRAM(s) IntraMuscular once  insulin glargine Injectable (LANTUS) 10 Unit(s) SubCutaneous at bedtime  insulin lispro (ADMELOG) corrective regimen sliding scale   SubCutaneous three times a day before meals  lamiVUDine- milliGRAM(s) Oral daily  latanoprost 0.005% Ophthalmic Solution 1 Drop(s) Both EYES at bedtime  lidocaine   4% Patch 1 Patch Transdermal every 24 hours  metoprolol tartrate 50 milliGRAM(s) Oral two times a day  polyethylene glycol 3350 17 Gram(s) Oral two times a day  senna 2 Tablet(s) Oral at bedtime  timolol 0.5% Solution 1 Drop(s) Both EYES two times a day  trimethoprim/polymyxin Solution 1 Drop(s) Both EYES daily    MEDICATIONS  (PRN):  aluminum hydroxide/magnesium hydroxide/simethicone Suspension 30 milliLiter(s) Oral every 4 hours PRN Dyspepsia  dextrose Oral Gel 15 Gram(s) Oral once PRN Blood Glucose LESS THAN 70 milliGRAM(s)/deciliter  hydrALAZINE Injectable 10 milliGRAM(s) IV Push every 6 hours PRN [id number]  melatonin 3 milliGRAM(s) Oral at bedtime PRN Insomnia  ondansetron Injectable 4 milliGRAM(s) IV Push every 8 hours PRN Nausea and/or Vomiting  sodium chloride 0.65% Nasal 1 Spray(s) Both Nostrils five times a day PRN Nasal Congestion  sodium chloride 0.9% lock flush 10 milliLiter(s) IV Push every 1 hour PRN Pre/post blood products, medications, blood draw, and to maintain line patency        I&O's Summary    06 May 2025 07:01  -  07 May 2025 07:00  --------------------------------------------------------  IN: 1120 mL / OUT: 0 mL / NET: 1120 mL        PHYSICAL EXAM:  Vital Signs Last 24 Hrs  T(C): 36.7 (07 May 2025 13:18), Max: 36.7 (06 May 2025 16:20)  T(F): 98 (07 May 2025 13:18), Max: 98.1 (07 May 2025 08:01)  HR: 86 (07 May 2025 13:18) (69 - 86)  BP: 148/75 (07 May 2025 13:18) (116/69 - 164/65)  BP(mean): 99 (06 May 2025 18:00) (99 - 99)  RR: 18 (07 May 2025 13:18) (16 - 18)  SpO2: 98% (07 May 2025 13:18) (94% - 98%)    Parameters below as of 07 May 2025 13:18  Patient On (Oxygen Delivery Method): room air        CONSTITUTIONAL: NAD, Not in acute distress  EYES:  EOMI, conjunctiva and sclera clear  ENMT: , neck supple , non-tender  RESPIRATORY: Normal respiratory effort; lungs are clear to auscultation bilaterally  CARDIOVASCULAR: S1S2 present  ABDOMEN: soft. bowel sound present  MUSCLOSKELETAL: ; no clubbing or cyanosis of digits;   PSYCH: A+O to person, place, and time; affect appropriate  NEUROLOGY: CN, motor and sensory intact   SKIN: No rashes; no palpable lesions  Extremity: No bilateral edema      LABS:    05-07    138  |  105  |  54.9[H]  ----------------------------<  162[H]  5.1   |  19.0[L]  |  4.84[H]    Ca    8.7      07 May 2025 05:11  Phos  4.4     05-07  Mg     2.0     05-07            Urinalysis Basic - ( 07 May 2025 05:11 )    Color: x / Appearance: x / SG: x / pH: x  Gluc: 162 mg/dL / Ketone: x  / Bili: x / Urobili: x   Blood: x / Protein: x / Nitrite: x   Leuk Esterase: x / RBC: x / WBC x   Sq Epi: x / Non Sq Epi: x / Bacteria: x        CAPILLARY BLOOD GLUCOSE      POCT Blood Glucose.: 179 mg/dL (07 May 2025 12:13)  POCT Blood Glucose.: 200 mg/dL (07 May 2025 08:15)  POCT Blood Glucose.: 219 mg/dL (06 May 2025 21:26)  POCT Blood Glucose.: 208 mg/dL (06 May 2025 16:58)      Labs reviewed:    RADIOLOGY & ADDITIONAL TESTS:  Imaging Personally Reviewed:  Electrocardiogram Personally Reviewed:

## 2025-05-07 NOTE — PROGRESS NOTE ADULT - SUBJECTIVE AND OBJECTIVE BOX
Reason for visit: Advanced CKD   Subjective: Patient reports he feels fine, denies any urinary complaints.  No dysuria, gross hematuria, shortness of breath, chest pain.  Review of systems: All systems were reviewed in detail, pertinent positive and negative have been mentioned above, otherwise negative.    Physical Exam:  Gen: no acute distress  MS: alert, conversing normally  HENT: NCAT, MMM  CV: S1-S2 normal, no LE edema  Chest: CTAB, no w/r/r,  Abd: soft, NT, ND  Skin: dry, warm, no rash or jaundice    Vital Signs Last 24 Hrs  T(C): 36.7 (07 May 2025 13:18), Max: 36.7 (06 May 2025 16:20)  T(F): 98 (07 May 2025 13:18), Max: 98.1 (07 May 2025 08:01)  HR: 86 (07 May 2025 13:18) (69 - 86)  BP: 148/75 (07 May 2025 13:18) (116/69 - 164/65)  BP(mean): 99 (06 May 2025 18:00) (99 - 99)  RR: 18 (07 May 2025 13:18) (16 - 18)  SpO2: 98% (07 May 2025 13:18) (94% - 98%)    Parameters below as of 07 May 2025 13:18  Patient On (Oxygen Delivery Method): room air      I&O's Summary    06 May 2025 07:01  -  07 May 2025 07:00  --------------------------------------------------------  IN: 1120 mL / OUT: 0 mL / NET: 1120 mL      Current Antibiotics:  dolutegravir 50 milliGRAM(s) Oral daily  lamiVUDine- milliGRAM(s) Oral daily    Other medications:  acetaminophen     Tablet .. 975 milliGRAM(s) Oral every 8 hours  amLODIPine   Tablet 10 milliGRAM(s) Oral daily  artificial tears (preservative free) Ophthalmic Solution 2 Drop(s) Both EYES every 8 hours  atorvastatin 40 milliGRAM(s) Oral at bedtime  bisacodyl Suppository 10 milliGRAM(s) Rectal once  brimonidine 0.2% Ophthalmic Solution 1 Drop(s) Both EYES two times a day  chlorhexidine 2% Cloths 1 Application(s) Topical <User Schedule>  chlorhexidine 4% Liquid 1 Application(s) Topical <User Schedule>  dextrose 5%. 1000 milliLiter(s) IV Continuous <Continuous>  dextrose 5%. 1000 milliLiter(s) IV Continuous <Continuous>  dextrose 50% Injectable 25 Gram(s) IV Push once  dextrose 50% Injectable 12.5 Gram(s) IV Push once  dextrose 50% Injectable 25 Gram(s) IV Push once  dorzolamide 2% Ophthalmic Solution 1 Drop(s) Both EYES <User Schedule>  epoetin landry-epbx (RETACRIT) Injectable 4000 Unit(s) IV Push <User Schedule>  gabapentin 100 milliGRAM(s) Oral three times a day  glucagon  Injectable 1 milliGRAM(s) IntraMuscular once  insulin glargine Injectable (LANTUS) 10 Unit(s) SubCutaneous at bedtime  insulin lispro (ADMELOG) corrective regimen sliding scale   SubCutaneous three times a day before meals  latanoprost 0.005% Ophthalmic Solution 1 Drop(s) Both EYES at bedtime  lidocaine   4% Patch 1 Patch Transdermal every 24 hours  metoprolol tartrate 50 milliGRAM(s) Oral two times a day  polyethylene glycol 3350 17 Gram(s) Oral two times a day  senna 2 Tablet(s) Oral at bedtime  timolol 0.5% Solution 1 Drop(s) Both EYES two times a day  trimethoprim/polymyxin Solution 1 Drop(s) Both EYES daily    05-07    138  |  105  |  54.9[H]  ----------------------------<  162[H]  5.1   |  19.0[L]  |  4.84[H]    Ca    8.7      07 May 2025 05:11  Phos  4.4     05-07  Mg     2.0     05-07      Creatinine: 4.84 mg/dL (05-07-25 @ 05:11)  Creatinine: 5.25 mg/dL (05-06-25 @ 06:40)  Creatinine: 5.17 mg/dL (05-05-25 @ 05:30)  Creatinine: 4.95 mg/dL (05-03-25 @ 05:32)

## 2025-05-07 NOTE — PROGRESS NOTE ADULT - SUBJECTIVE AND OBJECTIVE BOX
Ethics continues to follow. See consult on 4/25/25 and prior notes.    Case discussed in detail with FERNANDO Boyce MD (Medicine Attending). Per Dr. Boyce, patient may be able to discharge without plan for continued hemodialysis, given patient follows up with an outpatient neurologist and labs are monitored closely. Awaiting final word from Nephrology prior to determining if this is a possible and safe discharge plan for the patient.    Discussion with JAYDEN Malik LCSW (Complex ). Per Dr. Boyce, Nephrology has determined patient can discharge to [facility] without dialysis given he follows up outpatient with a nephrologist. Discussion with  and the patient determined the patient is in agreement to discharge to [facility], and he expressed preference for facility close to [place].    Review of notes from Hospitalist and Nephrology confirm plan for outpatient follow up with nephrologist. Per note from Dr. Boyce, the patient also gave permission for the Hospitalist to speak with his wife, Nelli.

## 2025-05-07 NOTE — PROGRESS NOTE ADULT - ASSESSMENT
63 YOM DM, HIV on HAART admitted after fall.  Nephrology consulted for progressive worsening CKD.    - Advanced progressive CKD stage V from biopsy proven diabetic nephropathy and podopathy, initiated on HD during this hospitalization   sp renal biopsy with Diabetic nephropathy+ Complete foot process effacement, 80% IFTA  HIV- VL > 300 k  Anemia: CKD and RITESH.  Placed on IV venofer. epo  HTN: BP better controlled at this time; continue amlodipine, metoprolol, Hydralazine  HIV on Bikatrvy- meds changed as per ID  DM: discontinued metformin (low GFR), placed on Lantus + SSI ACHS    Pt has been refusing HD intermittently  Last HD treatment was on 04/29  SCr ~ 5.25; GFr <15 ml/min   pt has good uop  Maintaining potassium  Offered dialysis again today, patient refused.  No emergent indication today.  Behavioral health on board. Will continue to follow there recommendations but in current condition it would not be possible to manage him at an outpatient hemodialysis unit.  Patient can be considered for discharge to [facility] with permacath and follow-up labs closely for need of dialysis, follow-up with nephrology outpatient    Discussed with Dr. Boyce

## 2025-05-07 NOTE — PROVIDER CONTACT NOTE (CHANGE IN STATUS NOTIFICATION) - BACKGROUND
Patient non compliant with diet. Patient friend brought in soda, cake and cookies etc earlier in the day. RN educated and visitor that he is on carbohydrates diet, and cannot have these items. Patient says he will not eat items brought in by visitor and will return them. MICHELLE Evans made aware.
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
## 2025-05-08 LAB
GLUCOSE BLDC GLUCOMTR-MCNC: 159 MG/DL — HIGH (ref 70–99)
GLUCOSE BLDC GLUCOMTR-MCNC: 168 MG/DL — HIGH (ref 70–99)
GLUCOSE BLDC GLUCOMTR-MCNC: 242 MG/DL — HIGH (ref 70–99)
GLUCOSE BLDC GLUCOMTR-MCNC: 293 MG/DL — HIGH (ref 70–99)
MRSA PCR RESULT.: SIGNIFICANT CHANGE UP
S AUREUS DNA NOSE QL NAA+PROBE: SIGNIFICANT CHANGE UP

## 2025-05-08 PROCEDURE — 99233 SBSQ HOSP IP/OBS HIGH 50: CPT

## 2025-05-08 PROCEDURE — 99232 SBSQ HOSP IP/OBS MODERATE 35: CPT

## 2025-05-08 RX ADMIN — Medication 1 APPLICATION(S): at 06:01

## 2025-05-08 RX ADMIN — Medication 2 DROP(S): at 13:20

## 2025-05-08 RX ADMIN — Medication 975 MILLIGRAM(S): at 07:02

## 2025-05-08 RX ADMIN — LATANOPROST PF 1 DROP(S): 0.05 SOLUTION/ DROPS OPHTHALMIC at 21:49

## 2025-05-08 RX ADMIN — METOPROLOL SUCCINATE 50 MILLIGRAM(S): 50 TABLET, EXTENDED RELEASE ORAL at 17:40

## 2025-05-08 RX ADMIN — Medication 2 TABLET(S): at 21:48

## 2025-05-08 RX ADMIN — ATORVASTATIN CALCIUM 40 MILLIGRAM(S): 80 TABLET, FILM COATED ORAL at 21:48

## 2025-05-08 RX ADMIN — Medication 1 APPLICATION(S): at 06:03

## 2025-05-08 RX ADMIN — INSULIN LISPRO 2: 100 INJECTION, SOLUTION INTRAVENOUS; SUBCUTANEOUS at 17:39

## 2025-05-08 RX ADMIN — INSULIN GLARGINE-YFGN 10 UNIT(S): 100 INJECTION, SOLUTION SUBCUTANEOUS at 21:48

## 2025-05-08 RX ADMIN — Medication 975 MILLIGRAM(S): at 13:20

## 2025-05-08 RX ADMIN — DOLUTEGRAVIR SODIUM 50 MILLIGRAM(S): 5 TABLET, FOR SUSPENSION ORAL at 13:20

## 2025-05-08 RX ADMIN — INSULIN LISPRO 6: 100 INJECTION, SOLUTION INTRAVENOUS; SUBCUTANEOUS at 12:31

## 2025-05-08 RX ADMIN — POLYETHYLENE GLYCOL 3350 17 GRAM(S): 17 POWDER, FOR SOLUTION ORAL at 06:04

## 2025-05-08 RX ADMIN — GABAPENTIN 100 MILLIGRAM(S): 400 CAPSULE ORAL at 06:02

## 2025-05-08 RX ADMIN — GABAPENTIN 100 MILLIGRAM(S): 400 CAPSULE ORAL at 21:48

## 2025-05-08 RX ADMIN — DORZOLAMIDE 1 DROP(S): 20 SOLUTION/ DROPS OPHTHALMIC at 12:11

## 2025-05-08 RX ADMIN — DORZOLAMIDE 1 DROP(S): 20 SOLUTION/ DROPS OPHTHALMIC at 21:50

## 2025-05-08 RX ADMIN — Medication 975 MILLIGRAM(S): at 13:50

## 2025-05-08 RX ADMIN — INSULIN LISPRO 2: 100 INJECTION, SOLUTION INTRAVENOUS; SUBCUTANEOUS at 08:40

## 2025-05-08 RX ADMIN — POLYMYXIN B SULFATE AND TRIMETHOPRIM SULFATE 1 DROP(S): 10000; 1 SOLUTION/ DROPS OPHTHALMIC at 12:12

## 2025-05-08 RX ADMIN — Medication 975 MILLIGRAM(S): at 06:02

## 2025-05-08 RX ADMIN — GABAPENTIN 100 MILLIGRAM(S): 400 CAPSULE ORAL at 13:20

## 2025-05-08 RX ADMIN — LAMIVUDINE 100 MILLIGRAM(S): 10 SOLUTION ORAL at 12:11

## 2025-05-08 RX ADMIN — AMLODIPINE BESYLATE 10 MILLIGRAM(S): 10 TABLET ORAL at 06:02

## 2025-05-08 RX ADMIN — METOPROLOL SUCCINATE 50 MILLIGRAM(S): 50 TABLET, EXTENDED RELEASE ORAL at 06:02

## 2025-05-08 RX ADMIN — Medication 975 MILLIGRAM(S): at 00:07

## 2025-05-08 RX ADMIN — Medication 975 MILLIGRAM(S): at 21:48

## 2025-05-08 NOTE — OCCUPATIONAL THERAPY INITIAL EVALUATION ADULT - LEVEL OF CONSCIOUSNESS, OT EVAL
[Verbal consent obtained from patient] : the patient, [unfilled] [Initial Evaluation, This Episode] : an initial evaluation of this episode of [Dry Cough] : dry cough [Gradual] : gradual [___ Day(s) Ago] : [unfilled] day(s) ago [Frequent] : frequently [Daily] : occur daily [Moderate] : moderate in severity alert [Worsening] : worsening [Fever] : fever [Chills] : chills [Headache] : headache

## 2025-05-08 NOTE — PROGRESS NOTE ADULT - ASSESSMENT
63 YOM DM, HIV on HAART admitted after fall.  Nephrology consulted for progressive worsening CKD.    - Advanced progressive CKD stage V from biopsy proven diabetic nephropathy and podopathy, initiated on HD during this hospitalization   sp renal biopsy with Diabetic nephropathy+ Complete foot process effacement, 80% IFTA  HIV- VL > 300 k  Anemia: CKD and RITESH.  Placed on IV venofer. epo  HTN: BP better controlled at this time; continue amlodipine, metoprolol, Hydralazine  HIV on Bikatrvy- meds changed as per ID  DM: discontinued metformin (low GFR), placed on Lantus + SSI ACHS    ·	Pt has been refusing HD intermittently  ·	Last HD treatment was on 04/29  ·	SCr ~ 5; GFr <15 ml/min   ·	pt has good uop  ·	Maintaining potassium  ·	Offered dialysis again today, patient refused.    ·	Behavioral health on board. Will continue to follow there recommendations but in current condition it would not be possible to manage him at an outpatient hemodialysis unit.  ·	Patient has been doing fine without dialysis for more than a week now but he remains at risk of hyperkalemia, metabolic acidosis and adverse renal outcome including death given his advanced CKD.    ·	Whether he will be compliant with his outpatient follow-up also remains doubtful. Given all this we will follow ethics team recommendation regarding further goals of care/management.    Discussed with Dr. Boyce             63 YOM DM, HIV on HAART admitted after fall.  Nephrology consulted for progressive worsening CKD.    - Advanced progressive CKD stage V from biopsy proven diabetic nephropathy and podopathy, initiated on HD during this hospitalization   sp renal biopsy with Diabetic nephropathy+ Complete foot process effacement, 80% IFTA  HIV- VL > 300 k  Anemia: CKD and RITESH.  Placed on IV venofer. epo  HTN: BP better controlled at this time; continue amlodipine, metoprolol, Hydralazine  HIV on Bikatrvy- meds changed as per ID  DM: discontinued metformin (low GFR), placed on Lantus + SSI ACHS    ·	Pt has been refusing HD intermittently  ·	Last HD treatment was on 04/29  ·	SCr ~ 5; GFr <15 ml/min   ·	pt has good uop  ·	Maintaining potassium  ·	Offered dialysis again today, patient refused.    ·	Behavioral health on board. Will continue to follow there recommendations but in current condition it would not be possible to manage him at an outpatient hemodialysis unit.  ·	Patient has been doing fine without dialysis for more than a week now but he remains at risk of hyperkalemia, metabolic acidosis and adverse renal outcome including death given his advanced CKD.    ·	Whether he will be compliant with his outpatient follow-up also remains doubtful. Per primary team, patient no longer will be requiring [facility] & is being considered for a discharge home. Given all this we will follow ethics team recommendation regarding further goals of care/management.    Discussed with Dr. Boyce
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
## 2025-05-08 NOTE — PROGRESS NOTE ADULT - SUBJECTIVE AND OBJECTIVE BOX
Ethics continues to follow. See consult on 4/25/25 and prior notes.    Case discussed in detail with JAYDEN Malik LCSW (Complex SW). After evaluation by PT, patient was determined not to need admission to [facility]. Plan was for patient to return home with close outpatient follow up; however, Nephrology indicated this may not be a safe discharge plan given the patient's adherence to outpatient follow up is questionable.    Case discussed in detail with FERNANDO Boyce MD (Medicine Attending). Discussed barriers to discharge. Plan is now to evaluate the patient for long-term placement. Ethics encouraged Dr. Boyce to involve the patient's wife/HCP in discharge planning if she is available to offer additional support; however, per Dr. Boyce's conversation with the patient's wife, she is willing to participate in medical decision making but not in assisting with outpatient follow up. Ethics to follow up with Dr. Boyce and the rest of the interdisciplinary team to further discuss safe discharge options.

## 2025-05-08 NOTE — PROGRESS NOTE ADULT - SUBJECTIVE AND OBJECTIVE BOX
Reason for visit: Advanced CKD   Subjective: Patient reports he feels fine, denies any urinary complaints.  No dysuria, gross hematuria, shortness of breath, chest pain. Again, he refused dialysis already labs today.  Review of systems: All systems were reviewed in detail, pertinent positive and negative have been mentioned above, otherwise negative.    Physical Exam:  Gen: no acute distress  MS: alert, conversing normally  HENT: NCAT, MMM  CV: S1-S2 normal, no LE edema  Chest: CTAB, no w/r/r,  Abd: soft, NT, ND  Skin: dry, warm, no rash or jaundice  Access: Right IJ tunneled hemodialysis catheter    Vital Signs Last 24 Hrs  T(C): 36.8 (08 May 2025 08:01), Max: 36.8 (08 May 2025 08:01)  T(F): 98.2 (08 May 2025 08:01), Max: 98.2 (08 May 2025 08:01)  HR: 64 (08 May 2025 08:01) (64 - 87)  BP: 169/73 (08 May 2025 08:01) (123/60 - 169/73)  BP(mean): --  RR: 17 (08 May 2025 08:01) (17 - 19)  SpO2: 96% (08 May 2025 08:01) (96% - 98%)    Parameters below as of 08 May 2025 08:01  Patient On (Oxygen Delivery Method): room air      I&O's Summary    07 May 2025 07:01  -  08 May 2025 07:00  --------------------------------------------------------  IN: 440 mL / OUT: 0 mL / NET: 440 mL      Current Antibiotics:  dolutegravir 50 milliGRAM(s) Oral daily  lamiVUDine- milliGRAM(s) Oral daily    Other medications:  acetaminophen     Tablet .. 975 milliGRAM(s) Oral every 8 hours  amLODIPine   Tablet 10 milliGRAM(s) Oral daily  artificial tears (preservative free) Ophthalmic Solution 2 Drop(s) Both EYES every 8 hours  atorvastatin 40 milliGRAM(s) Oral at bedtime  bisacodyl Suppository 10 milliGRAM(s) Rectal once  brimonidine 0.2% Ophthalmic Solution 1 Drop(s) Both EYES two times a day  chlorhexidine 2% Cloths 1 Application(s) Topical <User Schedule>  chlorhexidine 4% Liquid 1 Application(s) Topical <User Schedule>  dextrose 5%. 1000 milliLiter(s) IV Continuous <Continuous>  dextrose 5%. 1000 milliLiter(s) IV Continuous <Continuous>  dextrose 50% Injectable 25 Gram(s) IV Push once  dextrose 50% Injectable 12.5 Gram(s) IV Push once  dextrose 50% Injectable 25 Gram(s) IV Push once  dorzolamide 2% Ophthalmic Solution 1 Drop(s) Both EYES <User Schedule>  epoetin landry-epbx (RETACRIT) Injectable 4000 Unit(s) IV Push <User Schedule>  gabapentin 100 milliGRAM(s) Oral three times a day  glucagon  Injectable 1 milliGRAM(s) IntraMuscular once  insulin glargine Injectable (LANTUS) 10 Unit(s) SubCutaneous at bedtime  insulin lispro (ADMELOG) corrective regimen sliding scale   SubCutaneous three times a day before meals  latanoprost 0.005% Ophthalmic Solution 1 Drop(s) Both EYES at bedtime  lidocaine   4% Patch 1 Patch Transdermal every 24 hours  metoprolol tartrate 50 milliGRAM(s) Oral two times a day  polyethylene glycol 3350 17 Gram(s) Oral two times a day  senna 2 Tablet(s) Oral at bedtime  timolol 0.5% Solution 1 Drop(s) Both EYES two times a day  trimethoprim/polymyxin Solution 1 Drop(s) Both EYES daily    05-07    138  |  105  |  54.9[H]  ----------------------------<  162[H]  5.1   |  19.0[L]  |  4.84[H]    Ca    8.7      07 May 2025 05:11  Phos  4.4     05-07  Mg     2.0     05-07      Creatinine: 4.84 mg/dL (05-07-25 @ 05:11)  Creatinine: 5.25 mg/dL (05-06-25 @ 06:40)  Creatinine: 5.17 mg/dL (05-05-25 @ 05:30)

## 2025-05-08 NOTE — OCCUPATIONAL THERAPY INITIAL EVALUATION ADULT - LIVES WITH, PROFILE
in a third floor apartment with elevator access, no steps to enter; pt's friend was present during session and reports that he can assist with grocery shopping and meal preparation/alone

## 2025-05-08 NOTE — PROGRESS NOTE ADULT - ASSESSMENT
Ethics to follow up with the interdisciplinary team tomorrow, 5/9/25, to further discuss safe discharge options.    Liberty NAVA RN, MBE  Ethics Fellow  [phone]
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
## 2025-05-08 NOTE — PROGRESS NOTE ADULT - SUBJECTIVE AND OBJECTIVE BOX
[facility] Medicine  Rolf Boyce MD   I'm reachable on [email or web address] Teams      Patient is a 64y old  Male who presents with a chief complaint of Geoff (01 May 2025 16:32)      SUBJECTIVE / OVERNIGHT EVENTS:   Patient was seen and examined during rounds    REoprts feeling better. Reports eye pain unchanged from before.   DIscussed with Dr. Lind as case management recommended home discharge. As per nephrology not ready to discharge home    12 points ROS negative except above    MEDICATIONS  (STANDING):  acetaminophen     Tablet .. 975 milliGRAM(s) Oral every 8 hours  amLODIPine   Tablet 10 milliGRAM(s) Oral daily  artificial tears (preservative free) Ophthalmic Solution 2 Drop(s) Both EYES every 8 hours  atorvastatin 40 milliGRAM(s) Oral at bedtime  bisacodyl Suppository 10 milliGRAM(s) Rectal once  brimonidine 0.2% Ophthalmic Solution 1 Drop(s) Both EYES two times a day  chlorhexidine 2% Cloths 1 Application(s) Topical <User Schedule>  chlorhexidine 4% Liquid 1 Application(s) Topical <User Schedule>  dextrose 5%. 1000 milliLiter(s) (50 mL/Hr) IV Continuous <Continuous>  dextrose 5%. 1000 milliLiter(s) (100 mL/Hr) IV Continuous <Continuous>  dextrose 50% Injectable 25 Gram(s) IV Push once  dextrose 50% Injectable 12.5 Gram(s) IV Push once  dextrose 50% Injectable 25 Gram(s) IV Push once  dolutegravir 50 milliGRAM(s) Oral daily  dorzolamide 2% Ophthalmic Solution 1 Drop(s) Both EYES <User Schedule>  epoetin landry-epbx (RETACRIT) Injectable 4000 Unit(s) IV Push <User Schedule>  gabapentin 100 milliGRAM(s) Oral three times a day  glucagon  Injectable 1 milliGRAM(s) IntraMuscular once  insulin glargine Injectable (LANTUS) 10 Unit(s) SubCutaneous at bedtime  insulin lispro (ADMELOG) corrective regimen sliding scale   SubCutaneous three times a day before meals  lamiVUDine- milliGRAM(s) Oral daily  latanoprost 0.005% Ophthalmic Solution 1 Drop(s) Both EYES at bedtime  lidocaine   4% Patch 1 Patch Transdermal every 24 hours  metoprolol tartrate 50 milliGRAM(s) Oral two times a day  polyethylene glycol 3350 17 Gram(s) Oral two times a day  senna 2 Tablet(s) Oral at bedtime  timolol 0.5% Solution 1 Drop(s) Both EYES two times a day  trimethoprim/polymyxin Solution 1 Drop(s) Both EYES daily    MEDICATIONS  (PRN):  aluminum hydroxide/magnesium hydroxide/simethicone Suspension 30 milliLiter(s) Oral every 4 hours PRN Dyspepsia  dextrose Oral Gel 15 Gram(s) Oral once PRN Blood Glucose LESS THAN 70 milliGRAM(s)/deciliter  hydrALAZINE Injectable 10 milliGRAM(s) IV Push every 6 hours PRN sbp>159  melatonin 3 milliGRAM(s) Oral at bedtime PRN Insomnia  ondansetron Injectable 4 milliGRAM(s) IV Push every 8 hours PRN Nausea and/or Vomiting  sodium chloride 0.65% Nasal 1 Spray(s) Both Nostrils five times a day PRN Nasal Congestion  sodium chloride 0.9% lock flush 10 milliLiter(s) IV Push every 1 hour PRN Pre/post blood products, medications, blood draw, and to maintain line patency        I&O's Summary    07 May 2025 07:01  -  08 May 2025 07:00  --------------------------------------------------------  IN: 440 mL / OUT: 0 mL / NET: 440 mL    08 May 2025 07:01  -  08 May 2025 16:07  --------------------------------------------------------  IN: 480 mL / OUT: 0 mL / NET: 480 mL        PHYSICAL EXAM:  Vital Signs Last 24 Hrs  T(C): 36.7 (08 May 2025 12:39), Max: 36.8 (08 May 2025 08:01)  T(F): 98.1 (08 May 2025 12:39), Max: 98.2 (08 May 2025 08:01)  HR: 72 (08 May 2025 12:39) (64 - 87)  BP: 159/77 (08 May 2025 12:39) (123/60 - 169/73)  BP(mean): --  RR: 18 (08 May 2025 12:39) (17 - 19)  SpO2: 98% (08 May 2025 12:39) (96% - 98%)    Parameters below as of 08 May 2025 12:39  Patient On (Oxygen Delivery Method): room air        CONSTITUTIONAL: NAD, Not in acute distress  EYES: eyes closed.   ENMT: , neck supple , non-tender  RESPIRATORY: permacath on right chest  CARDIOVASCULAR: S1S2 present  ABDOMEN: soft. bowel sound present  MUSCLOSKELETAL: ; no clubbing or cyanosis of digits;   PSYCH: A+O to person, place, and time; affect appropriate  NEUROLOGY: CN, motor and sensory intact   SKIN: No rashes; no palpable lesions  Extremity: No bilateral edema      LABS:    05-07    138  |  105  |  54.9[H]  ----------------------------<  162[H]  5.1   |  19.0[L]  |  4.84[H]    Ca    8.7      07 May 2025 05:11  Phos  4.4     05-07  Mg     2.0     05-07            Urinalysis Basic - ( 07 May 2025 05:11 )    Color: x / Appearance: x / SG: x / pH: x  Gluc: 162 mg/dL / Ketone: x  / Bili: x / Urobili: x   Blood: x / Protein: x / Nitrite: x   Leuk Esterase: x / RBC: x / WBC x   Sq Epi: x / Non Sq Epi: x / Bacteria: x        CAPILLARY BLOOD GLUCOSE      POCT Blood Glucose.: 293 mg/dL (08 May 2025 12:16)  POCT Blood Glucose.: 168 mg/dL (08 May 2025 08:21)  POCT Blood Glucose.: 233 mg/dL (07 May 2025 23:13)  POCT Blood Glucose.: 183 mg/dL (07 May 2025 21:11)  POCT Blood Glucose.: 423 mg/dL (07 May 2025 18:50)  POCT Blood Glucose.: 437 mg/dL (07 May 2025 17:21)      Labs reviewed:    RADIOLOGY & ADDITIONAL TESTS:  Imaging Personally Reviewed:  Electrocardiogram Personally Reviewed:

## 2025-05-09 LAB
ALBUMIN SERPL ELPH-MCNC: 2.8 G/DL — LOW (ref 3.3–5.2)
ALP SERPL-CCNC: 48 U/L — SIGNIFICANT CHANGE UP (ref 40–120)
ALT FLD-CCNC: 13 U/L — SIGNIFICANT CHANGE UP
ANION GAP SERPL CALC-SCNC: 15 MMOL/L — SIGNIFICANT CHANGE UP (ref 5–17)
AST SERPL-CCNC: 10 U/L — SIGNIFICANT CHANGE UP
BILIRUB SERPL-MCNC: <0.2 MG/DL — LOW (ref 0.4–2)
BUN SERPL-MCNC: 54.2 MG/DL — HIGH (ref 8–20)
CALCIUM SERPL-MCNC: 8.2 MG/DL — LOW (ref 8.4–10.5)
CHLORIDE SERPL-SCNC: 104 MMOL/L — SIGNIFICANT CHANGE UP (ref 96–108)
CO2 SERPL-SCNC: 18 MMOL/L — LOW (ref 22–29)
CREAT SERPL-MCNC: 4.97 MG/DL — HIGH (ref 0.5–1.3)
EGFR: 12 ML/MIN/1.73M2 — LOW
EGFR: 12 ML/MIN/1.73M2 — LOW
GLUCOSE BLDC GLUCOMTR-MCNC: 158 MG/DL — HIGH (ref 70–99)
GLUCOSE BLDC GLUCOMTR-MCNC: 253 MG/DL — HIGH (ref 70–99)
GLUCOSE BLDC GLUCOMTR-MCNC: 266 MG/DL — HIGH (ref 70–99)
GLUCOSE BLDC GLUCOMTR-MCNC: 318 MG/DL — HIGH (ref 70–99)
GLUCOSE BLDC GLUCOMTR-MCNC: 326 MG/DL — HIGH (ref 70–99)
GLUCOSE SERPL-MCNC: 267 MG/DL — HIGH (ref 70–99)
HCT VFR BLD CALC: 27.8 % — LOW (ref 39–50)
HGB BLD-MCNC: 9 G/DL — LOW (ref 13–17)
MAGNESIUM SERPL-MCNC: 1.9 MG/DL — SIGNIFICANT CHANGE UP (ref 1.8–2.6)
MCHC RBC-ENTMCNC: 28.8 PG — SIGNIFICANT CHANGE UP (ref 27–34)
MCHC RBC-ENTMCNC: 32.4 G/DL — SIGNIFICANT CHANGE UP (ref 32–36)
MCV RBC AUTO: 89.1 FL — SIGNIFICANT CHANGE UP (ref 80–100)
NRBC # BLD AUTO: 0 K/UL — SIGNIFICANT CHANGE UP (ref 0–0)
NRBC # FLD: 0 K/UL — SIGNIFICANT CHANGE UP (ref 0–0)
NRBC BLD AUTO-RTO: 0 /100 WBCS — SIGNIFICANT CHANGE UP (ref 0–0)
PHOSPHATE SERPL-MCNC: 4.2 MG/DL — SIGNIFICANT CHANGE UP (ref 2.4–4.7)
PLATELET # BLD AUTO: 231 K/UL — SIGNIFICANT CHANGE UP (ref 150–400)
PMV BLD: 9.2 FL — SIGNIFICANT CHANGE UP (ref 7–13)
POTASSIUM SERPL-MCNC: 5.1 MMOL/L — SIGNIFICANT CHANGE UP (ref 3.5–5.3)
POTASSIUM SERPL-SCNC: 5.1 MMOL/L — SIGNIFICANT CHANGE UP (ref 3.5–5.3)
PROT SERPL-MCNC: 6 G/DL — LOW (ref 6.6–8.7)
RBC # BLD: 3.12 M/UL — LOW (ref 4.2–5.8)
RBC # FLD: 14.7 % — HIGH (ref 10.3–14.5)
SODIUM SERPL-SCNC: 137 MMOL/L — SIGNIFICANT CHANGE UP (ref 135–145)
WBC # BLD: 6.98 K/UL — SIGNIFICANT CHANGE UP (ref 3.8–10.5)
WBC # FLD AUTO: 6.98 K/UL — SIGNIFICANT CHANGE UP (ref 3.8–10.5)

## 2025-05-09 PROCEDURE — 99232 SBSQ HOSP IP/OBS MODERATE 35: CPT

## 2025-05-09 PROCEDURE — 99233 SBSQ HOSP IP/OBS HIGH 50: CPT

## 2025-05-09 RX ORDER — HYDROMORPHONE/SOD CHLOR,ISO/PF 2 MG/10 ML
0.5 SYRINGE (ML) INJECTION ONCE
Refills: 0 | Status: DISCONTINUED | OUTPATIENT
Start: 2025-05-09 | End: 2025-05-09

## 2025-05-09 RX ORDER — OXYCODONE HYDROCHLORIDE 30 MG/1
5 TABLET ORAL ONCE
Refills: 0 | Status: DISCONTINUED | OUTPATIENT
Start: 2025-05-09 | End: 2025-05-09

## 2025-05-09 RX ORDER — INSULIN GLARGINE-YFGN 100 [IU]/ML
15 INJECTION, SOLUTION SUBCUTANEOUS AT BEDTIME
Refills: 0 | Status: DISCONTINUED | OUTPATIENT
Start: 2025-05-09 | End: 2025-05-13

## 2025-05-09 RX ORDER — INSULIN LISPRO 100 U/ML
4 INJECTION, SOLUTION INTRAVENOUS; SUBCUTANEOUS ONCE
Refills: 0 | Status: COMPLETED | OUTPATIENT
Start: 2025-05-09 | End: 2025-05-09

## 2025-05-09 RX ADMIN — INSULIN GLARGINE-YFGN 15 UNIT(S): 100 INJECTION, SOLUTION SUBCUTANEOUS at 21:11

## 2025-05-09 RX ADMIN — Medication 1 APPLICATION(S): at 05:26

## 2025-05-09 RX ADMIN — DOLUTEGRAVIR SODIUM 50 MILLIGRAM(S): 5 TABLET, FOR SUSPENSION ORAL at 12:48

## 2025-05-09 RX ADMIN — GABAPENTIN 100 MILLIGRAM(S): 400 CAPSULE ORAL at 21:11

## 2025-05-09 RX ADMIN — Medication 2 DROP(S): at 05:25

## 2025-05-09 RX ADMIN — Medication 975 MILLIGRAM(S): at 13:48

## 2025-05-09 RX ADMIN — Medication 0.5 MILLIGRAM(S): at 03:00

## 2025-05-09 RX ADMIN — OXYCODONE HYDROCHLORIDE 5 MILLIGRAM(S): 30 TABLET ORAL at 01:59

## 2025-05-09 RX ADMIN — DORZOLAMIDE 1 DROP(S): 20 SOLUTION/ DROPS OPHTHALMIC at 05:27

## 2025-05-09 RX ADMIN — Medication 10 MILLIGRAM(S): at 00:10

## 2025-05-09 RX ADMIN — METOPROLOL SUCCINATE 50 MILLIGRAM(S): 50 TABLET, EXTENDED RELEASE ORAL at 05:25

## 2025-05-09 RX ADMIN — Medication 2 DROP(S): at 21:10

## 2025-05-09 RX ADMIN — GABAPENTIN 100 MILLIGRAM(S): 400 CAPSULE ORAL at 12:49

## 2025-05-09 RX ADMIN — LATANOPROST PF 1 DROP(S): 0.05 SOLUTION/ DROPS OPHTHALMIC at 21:10

## 2025-05-09 RX ADMIN — Medication 975 MILLIGRAM(S): at 12:48

## 2025-05-09 RX ADMIN — OXYCODONE HYDROCHLORIDE 5 MILLIGRAM(S): 30 TABLET ORAL at 01:07

## 2025-05-09 RX ADMIN — LAMIVUDINE 100 MILLIGRAM(S): 10 SOLUTION ORAL at 12:48

## 2025-05-09 RX ADMIN — Medication 975 MILLIGRAM(S): at 05:25

## 2025-05-09 RX ADMIN — METOPROLOL SUCCINATE 50 MILLIGRAM(S): 50 TABLET, EXTENDED RELEASE ORAL at 17:21

## 2025-05-09 RX ADMIN — INSULIN LISPRO 6: 100 INJECTION, SOLUTION INTRAVENOUS; SUBCUTANEOUS at 17:40

## 2025-05-09 RX ADMIN — GABAPENTIN 100 MILLIGRAM(S): 400 CAPSULE ORAL at 05:25

## 2025-05-09 RX ADMIN — Medication 25 MILLIGRAM(S): at 21:11

## 2025-05-09 RX ADMIN — BRIMONIDINE TARTRATE 1 DROP(S): 1.5 SOLUTION/ DROPS OPHTHALMIC at 17:24

## 2025-05-09 RX ADMIN — Medication 25 MILLIGRAM(S): at 13:48

## 2025-05-09 RX ADMIN — INSULIN LISPRO 2: 100 INJECTION, SOLUTION INTRAVENOUS; SUBCUTANEOUS at 12:49

## 2025-05-09 RX ADMIN — DORZOLAMIDE 1 DROP(S): 20 SOLUTION/ DROPS OPHTHALMIC at 21:10

## 2025-05-09 RX ADMIN — INSULIN LISPRO 6: 100 INJECTION, SOLUTION INTRAVENOUS; SUBCUTANEOUS at 09:16

## 2025-05-09 RX ADMIN — Medication 2 DROP(S): at 12:49

## 2025-05-09 RX ADMIN — ATORVASTATIN CALCIUM 40 MILLIGRAM(S): 80 TABLET, FILM COATED ORAL at 21:11

## 2025-05-09 RX ADMIN — Medication 3 MILLIGRAM(S): at 01:07

## 2025-05-09 RX ADMIN — Medication 975 MILLIGRAM(S): at 22:10

## 2025-05-09 RX ADMIN — POLYMYXIN B SULFATE AND TRIMETHOPRIM SULFATE 1 DROP(S): 10000; 1 SOLUTION/ DROPS OPHTHALMIC at 13:49

## 2025-05-09 RX ADMIN — TIMOLOL MALEATE 1 DROP(S): 6.8 SOLUTION OPHTHALMIC at 17:23

## 2025-05-09 RX ADMIN — INSULIN LISPRO 4 UNIT(S): 100 INJECTION, SOLUTION INTRAVENOUS; SUBCUTANEOUS at 22:38

## 2025-05-09 RX ADMIN — BRIMONIDINE TARTRATE 1 DROP(S): 1.5 SOLUTION/ DROPS OPHTHALMIC at 05:26

## 2025-05-09 RX ADMIN — TIMOLOL MALEATE 1 DROP(S): 6.8 SOLUTION OPHTHALMIC at 05:24

## 2025-05-09 RX ADMIN — EPOETIN ALFA 4000 UNIT(S): 10000 SOLUTION INTRAVENOUS; SUBCUTANEOUS at 17:22

## 2025-05-09 RX ADMIN — AMLODIPINE BESYLATE 10 MILLIGRAM(S): 10 TABLET ORAL at 05:26

## 2025-05-09 RX ADMIN — Medication 0.5 MILLIGRAM(S): at 02:00

## 2025-05-09 RX ADMIN — Medication 975 MILLIGRAM(S): at 21:10

## 2025-05-09 RX ADMIN — Medication 2 TABLET(S): at 21:10

## 2025-05-09 NOTE — PROGRESS NOTE ADULT - SUBJECTIVE AND OBJECTIVE BOX
Reason for visit: Advanced CKD   Subjective: Patient reports he feels fine, denies any urinary complaints.  No dysuria, gross hematuria, shortness of breath, chest pain. Again, he refused dialysis.   Review of systems: All systems were reviewed in detail, pertinent positive and negative have been mentioned above, otherwise negative.    Physical Exam:  Gen: no acute distress  MS: alert, conversing normally  HENT: NCAT, MMM  CV: S1-S2 normal, no LE edema  Chest: CTAB, no w/r/r,  Abd: soft, NT, ND  Skin: dry, warm, no rash or jaundice  Access: Right IJ tunneled hemodialysis catheter    Vital Signs Last 24 Hrs  T(C): 37.1 (09 May 2025 16:41), Max: 37.1 (09 May 2025 16:41)  T(F): 98.8 (09 May 2025 16:41), Max: 98.8 (09 May 2025 16:41)  HR: 72 (09 May 2025 16:41) (67 - 73)  BP: 118/70 (09 May 2025 16:41) (118/68 - 172/80)  BP(mean): 85 (09 May 2025 13:00) (85 - 85)  RR: 18 (09 May 2025 16:41) (16 - 18)  SpO2: 99% (09 May 2025 16:41) (96% - 99%)    Parameters below as of 09 May 2025 16:41  Patient On (Oxygen Delivery Method): room air      I&O's Summary    08 May 2025 07:01  -  09 May 2025 07:00  --------------------------------------------------------  IN: 680 mL / OUT: 0 mL / NET: 680 mL    09 May 2025 07:01  -  09 May 2025 20:19  --------------------------------------------------------  IN: 680 mL / OUT: 400 mL / NET: 280 mL      Current Antibiotics:  dolutegravir 50 milliGRAM(s) Oral daily  lamiVUDine- milliGRAM(s) Oral daily    Other medications:  acetaminophen     Tablet .. 975 milliGRAM(s) Oral every 8 hours  amLODIPine   Tablet 10 milliGRAM(s) Oral daily  artificial tears (preservative free) Ophthalmic Solution 2 Drop(s) Both EYES every 8 hours  atorvastatin 40 milliGRAM(s) Oral at bedtime  bisacodyl Suppository 10 milliGRAM(s) Rectal once  brimonidine 0.2% Ophthalmic Solution 1 Drop(s) Both EYES two times a day  chlorhexidine 2% Cloths 1 Application(s) Topical <User Schedule>  chlorhexidine 4% Liquid 1 Application(s) Topical <User Schedule>  dextrose 5%. 1000 milliLiter(s) IV Continuous <Continuous>  dextrose 5%. 1000 milliLiter(s) IV Continuous <Continuous>  dextrose 50% Injectable 25 Gram(s) IV Push once  dextrose 50% Injectable 12.5 Gram(s) IV Push once  dextrose 50% Injectable 25 Gram(s) IV Push once  dorzolamide 2% Ophthalmic Solution 1 Drop(s) Both EYES <User Schedule>  epoetin landry-epbx (RETACRIT) Injectable 4000 Unit(s) IV Push <User Schedule>  gabapentin 100 milliGRAM(s) Oral three times a day  glucagon  Injectable 1 milliGRAM(s) IntraMuscular once  hydrALAZINE 25 milliGRAM(s) Oral three times a day  insulin glargine Injectable (LANTUS) 15 Unit(s) SubCutaneous at bedtime  insulin lispro (ADMELOG) corrective regimen sliding scale   SubCutaneous three times a day before meals  latanoprost 0.005% Ophthalmic Solution 1 Drop(s) Both EYES at bedtime  lidocaine   4% Patch 1 Patch Transdermal every 24 hours  metoprolol tartrate 50 milliGRAM(s) Oral two times a day  polyethylene glycol 3350 17 Gram(s) Oral two times a day  senna 2 Tablet(s) Oral at bedtime  timolol 0.5% Solution 1 Drop(s) Both EYES two times a day  trimethoprim/polymyxin Solution 1 Drop(s) Both EYES daily    05-09    137  |  104  |  54.2[H]  ----------------------------<  267[H]  5.1   |  18.0[L]  |  4.97[H]    Ca    8.2[L]      09 May 2025 05:40  Phos  4.2     05-09  Mg     1.9     05-09    TPro  6.0[L]  /  Alb  2.8[L]  /  TBili  <0.2[L]  /  DBili  x   /  AST  10  /  ALT  13  /  AlkPhos  48  05-09    Creatinine: 4.97 mg/dL (05-09-25 @ 05:40)  Creatinine: 4.84 mg/dL (05-07-25 @ 05:11)  Creatinine: 5.25 mg/dL (05-06-25 @ 06:40)  Creatinine: 5.17 mg/dL (05-05-25 @ 05:30)                          9.0    6.98  )-----------( 231      ( 09 May 2025 05:40 )             27.8

## 2025-05-09 NOTE — PROGRESS NOTE ADULT - ASSESSMENT
63 YOM DM, HIV on HAART admitted after fall.  Nephrology consulted for progressive worsening CKD.    - Advanced progressive CKD stage V from biopsy proven diabetic nephropathy and podopathy, initiated on HD during this hospitalization   sp renal biopsy with Diabetic nephropathy+ Complete foot process effacement, 80% IFTA  HIV- VL > 300 k  Anemia: CKD and RITESH.  Placed on IV venofer. epo  HTN: BP better controlled at this time; continue amlodipine, metoprolol, Hydralazine  HIV on Bikatrvy- meds changed as per ID  DM: discontinued metformin (low GFR), placed on Lantus + SSI ACHS    ·	Pt has been refusing HD intermittently  ·	Last HD treatment was on 04/29  ·	SCr ~ 5; GFr <15 ml/min   ·	pt has good uop  ·	Maintaining potassium  ·	Behavioral health on board.   ·	Patient has been doing fine without dialysis for almost 10 days now and he is refusing HD anyways.   ·	Post discussion w/ Primary team (Dr Martinez) and wife being on board with the plan it was suggested that patient can be discharged after discontinuation of Permcath w/ a follow up with nephrology 7-10 days post discharge with a community CM who will help making and arranging for these appointments.     ·	Further discuss w/ ethics and psych team for discharge planning    Discussed with Dr. Martinez
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
## 2025-05-09 NOTE — CHART NOTE - NSCHARTNOTEFT_GEN_A_CORE
Ethics continues to follow. See consult on 4/25/25 and prior notes.    Case discussed in detail with JAKE Martinez MD (Medicine Attending) and STACY Ramirez NP (Medicine). Safe dc planning discussed. Referred team to JAYDEN Malik LCSW (Complex SW) regarding safe dc plan.     Case discussed in detail with JAYDEN Malik LCSW (Complex SW). Working on shelter placement.     Chayito Lee MD  Ethics Attending  [phone]
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
## 2025-05-09 NOTE — PROVIDER CONTACT NOTE (OTHER) - ACTION/TREATMENT ORDERED:
As per Med PA, have dayshift RN attempt am labs at 8am
LORENZO Felix notified via telephone. Pa states they will order nasal spray and look into pain medication for pt.
NP at bedside, pt seen & examined, no pain medicine at this time per MD recommendations d/t pending ophtho evaluation first.  PRN hydralazine placed for SBP > 180
Medicine LORENZO Kauffman made aware, will continue to monitor pt and see if he will allow vitals at 12am
Provider aware.
CBC to be re ordered and drawn
MD made aware
PA said he would put in a one time dose of insulin. Will continue to monitor pt.
LORENZO Kauffman aware and OK to leave SCDs off.

## 2025-05-10 LAB
GLUCOSE BLDC GLUCOMTR-MCNC: 109 MG/DL — HIGH (ref 70–99)
GLUCOSE BLDC GLUCOMTR-MCNC: 172 MG/DL — HIGH (ref 70–99)
GLUCOSE BLDC GLUCOMTR-MCNC: 210 MG/DL — HIGH (ref 70–99)
GLUCOSE BLDC GLUCOMTR-MCNC: 262 MG/DL — HIGH (ref 70–99)
GLUCOSE BLDC GLUCOMTR-MCNC: 295 MG/DL — HIGH (ref 70–99)

## 2025-05-10 PROCEDURE — 99232 SBSQ HOSP IP/OBS MODERATE 35: CPT

## 2025-05-10 RX ORDER — TRAMADOL HYDROCHLORIDE 50 MG/1
50 TABLET, FILM COATED ORAL EVERY 6 HOURS
Refills: 0 | Status: DISCONTINUED | OUTPATIENT
Start: 2025-05-10 | End: 2025-05-13

## 2025-05-10 RX ORDER — MECLIZINE HCL 12.5 MG
25 TABLET ORAL THREE TIMES A DAY
Refills: 0 | Status: DISCONTINUED | OUTPATIENT
Start: 2025-05-10 | End: 2025-05-13

## 2025-05-10 RX ORDER — INSULIN LISPRO 100 U/ML
INJECTION, SOLUTION INTRAVENOUS; SUBCUTANEOUS
Refills: 0 | Status: DISCONTINUED | OUTPATIENT
Start: 2025-05-10 | End: 2025-05-13

## 2025-05-10 RX ORDER — TRAMADOL HYDROCHLORIDE 50 MG/1
25 TABLET, FILM COATED ORAL EVERY 6 HOURS
Refills: 0 | Status: DISCONTINUED | OUTPATIENT
Start: 2025-05-10 | End: 2025-05-13

## 2025-05-10 RX ORDER — ACETAMINOPHEN 500 MG/5ML
325 LIQUID (ML) ORAL ONCE
Refills: 0 | Status: COMPLETED | OUTPATIENT
Start: 2025-05-10 | End: 2025-05-10

## 2025-05-10 RX ADMIN — DORZOLAMIDE 1 DROP(S): 20 SOLUTION/ DROPS OPHTHALMIC at 09:32

## 2025-05-10 RX ADMIN — DOLUTEGRAVIR SODIUM 50 MILLIGRAM(S): 5 TABLET, FOR SUSPENSION ORAL at 12:20

## 2025-05-10 RX ADMIN — Medication 325 MILLIGRAM(S): at 00:43

## 2025-05-10 RX ADMIN — BRIMONIDINE TARTRATE 1 DROP(S): 1.5 SOLUTION/ DROPS OPHTHALMIC at 05:53

## 2025-05-10 RX ADMIN — Medication 25 MILLIGRAM(S): at 14:47

## 2025-05-10 RX ADMIN — LATANOPROST PF 1 DROP(S): 0.05 SOLUTION/ DROPS OPHTHALMIC at 21:35

## 2025-05-10 RX ADMIN — Medication 975 MILLIGRAM(S): at 21:34

## 2025-05-10 RX ADMIN — BRIMONIDINE TARTRATE 1 DROP(S): 1.5 SOLUTION/ DROPS OPHTHALMIC at 17:35

## 2025-05-10 RX ADMIN — POLYMYXIN B SULFATE AND TRIMETHOPRIM SULFATE 1 DROP(S): 10000; 1 SOLUTION/ DROPS OPHTHALMIC at 12:25

## 2025-05-10 RX ADMIN — DORZOLAMIDE 1 DROP(S): 20 SOLUTION/ DROPS OPHTHALMIC at 21:33

## 2025-05-10 RX ADMIN — GABAPENTIN 100 MILLIGRAM(S): 400 CAPSULE ORAL at 05:54

## 2025-05-10 RX ADMIN — Medication 975 MILLIGRAM(S): at 15:40

## 2025-05-10 RX ADMIN — Medication 975 MILLIGRAM(S): at 21:39

## 2025-05-10 RX ADMIN — TRAMADOL HYDROCHLORIDE 50 MILLIGRAM(S): 50 TABLET, FILM COATED ORAL at 10:55

## 2025-05-10 RX ADMIN — INSULIN LISPRO 2: 100 INJECTION, SOLUTION INTRAVENOUS; SUBCUTANEOUS at 17:33

## 2025-05-10 RX ADMIN — METOPROLOL SUCCINATE 50 MILLIGRAM(S): 50 TABLET, EXTENDED RELEASE ORAL at 17:32

## 2025-05-10 RX ADMIN — GABAPENTIN 100 MILLIGRAM(S): 400 CAPSULE ORAL at 21:34

## 2025-05-10 RX ADMIN — METOPROLOL SUCCINATE 50 MILLIGRAM(S): 50 TABLET, EXTENDED RELEASE ORAL at 05:54

## 2025-05-10 RX ADMIN — INSULIN LISPRO 6: 100 INJECTION, SOLUTION INTRAVENOUS; SUBCUTANEOUS at 21:32

## 2025-05-10 RX ADMIN — TRAMADOL HYDROCHLORIDE 50 MILLIGRAM(S): 50 TABLET, FILM COATED ORAL at 17:32

## 2025-05-10 RX ADMIN — INSULIN GLARGINE-YFGN 15 UNIT(S): 100 INJECTION, SOLUTION SUBCUTANEOUS at 21:34

## 2025-05-10 RX ADMIN — Medication 2 TABLET(S): at 21:34

## 2025-05-10 RX ADMIN — ATORVASTATIN CALCIUM 40 MILLIGRAM(S): 80 TABLET, FILM COATED ORAL at 21:34

## 2025-05-10 RX ADMIN — GABAPENTIN 100 MILLIGRAM(S): 400 CAPSULE ORAL at 14:47

## 2025-05-10 RX ADMIN — Medication 25 MILLIGRAM(S): at 05:54

## 2025-05-10 RX ADMIN — Medication 1 APPLICATION(S): at 05:56

## 2025-05-10 RX ADMIN — INSULIN LISPRO 4: 100 INJECTION, SOLUTION INTRAVENOUS; SUBCUTANEOUS at 12:24

## 2025-05-10 RX ADMIN — Medication 25 MILLIGRAM(S): at 21:35

## 2025-05-10 RX ADMIN — Medication 2 DROP(S): at 05:54

## 2025-05-10 RX ADMIN — Medication 975 MILLIGRAM(S): at 14:47

## 2025-05-10 RX ADMIN — Medication 2 DROP(S): at 21:34

## 2025-05-10 RX ADMIN — Medication 325 MILLIGRAM(S): at 01:43

## 2025-05-10 RX ADMIN — TIMOLOL MALEATE 1 DROP(S): 6.8 SOLUTION OPHTHALMIC at 05:53

## 2025-05-10 RX ADMIN — LAMIVUDINE 100 MILLIGRAM(S): 10 SOLUTION ORAL at 12:21

## 2025-05-10 RX ADMIN — TRAMADOL HYDROCHLORIDE 25 MILLIGRAM(S): 50 TABLET, FILM COATED ORAL at 20:24

## 2025-05-10 RX ADMIN — AMLODIPINE BESYLATE 10 MILLIGRAM(S): 10 TABLET ORAL at 05:54

## 2025-05-10 RX ADMIN — Medication 975 MILLIGRAM(S): at 05:53

## 2025-05-10 RX ADMIN — Medication 975 MILLIGRAM(S): at 06:53

## 2025-05-10 RX ADMIN — TRAMADOL HYDROCHLORIDE 25 MILLIGRAM(S): 50 TABLET, FILM COATED ORAL at 20:20

## 2025-05-10 RX ADMIN — TIMOLOL MALEATE 1 DROP(S): 6.8 SOLUTION OPHTHALMIC at 17:35

## 2025-05-10 RX ADMIN — Medication 2 DROP(S): at 14:47

## 2025-05-10 RX ADMIN — TRAMADOL HYDROCHLORIDE 50 MILLIGRAM(S): 50 TABLET, FILM COATED ORAL at 09:59
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
## 2025-05-11 LAB
ANION GAP SERPL CALC-SCNC: 13 MMOL/L — SIGNIFICANT CHANGE UP (ref 5–17)
BUN SERPL-MCNC: 55.9 MG/DL — HIGH (ref 8–20)
CALCIUM SERPL-MCNC: 8.7 MG/DL — SIGNIFICANT CHANGE UP (ref 8.4–10.5)
CHLORIDE SERPL-SCNC: 108 MMOL/L — SIGNIFICANT CHANGE UP (ref 96–108)
CO2 SERPL-SCNC: 17 MMOL/L — LOW (ref 22–29)
CREAT SERPL-MCNC: 5.01 MG/DL — HIGH (ref 0.5–1.3)
EGFR: 12 ML/MIN/1.73M2 — LOW
EGFR: 12 ML/MIN/1.73M2 — LOW
GLUCOSE BLDC GLUCOMTR-MCNC: 101 MG/DL — HIGH (ref 70–99)
GLUCOSE BLDC GLUCOMTR-MCNC: 188 MG/DL — HIGH (ref 70–99)
GLUCOSE BLDC GLUCOMTR-MCNC: 193 MG/DL — HIGH (ref 70–99)
GLUCOSE SERPL-MCNC: 100 MG/DL — HIGH (ref 70–99)
POTASSIUM SERPL-MCNC: 4.5 MMOL/L — SIGNIFICANT CHANGE UP (ref 3.5–5.3)
POTASSIUM SERPL-SCNC: 4.5 MMOL/L — SIGNIFICANT CHANGE UP (ref 3.5–5.3)
SODIUM SERPL-SCNC: 138 MMOL/L — SIGNIFICANT CHANGE UP (ref 135–145)

## 2025-05-11 PROCEDURE — 99233 SBSQ HOSP IP/OBS HIGH 50: CPT

## 2025-05-11 RX ADMIN — METOPROLOL SUCCINATE 50 MILLIGRAM(S): 50 TABLET, EXTENDED RELEASE ORAL at 05:24

## 2025-05-11 RX ADMIN — POLYETHYLENE GLYCOL 3350 17 GRAM(S): 17 POWDER, FOR SOLUTION ORAL at 17:18

## 2025-05-11 RX ADMIN — POLYETHYLENE GLYCOL 3350 17 GRAM(S): 17 POWDER, FOR SOLUTION ORAL at 05:24

## 2025-05-11 RX ADMIN — Medication 2 DROP(S): at 21:35

## 2025-05-11 RX ADMIN — TIMOLOL MALEATE 1 DROP(S): 6.8 SOLUTION OPHTHALMIC at 17:19

## 2025-05-11 RX ADMIN — DORZOLAMIDE 1 DROP(S): 20 SOLUTION/ DROPS OPHTHALMIC at 09:21

## 2025-05-11 RX ADMIN — TIMOLOL MALEATE 1 DROP(S): 6.8 SOLUTION OPHTHALMIC at 05:25

## 2025-05-11 RX ADMIN — ATORVASTATIN CALCIUM 40 MILLIGRAM(S): 80 TABLET, FILM COATED ORAL at 21:33

## 2025-05-11 RX ADMIN — Medication 25 MILLIGRAM(S): at 05:24

## 2025-05-11 RX ADMIN — INSULIN LISPRO 2: 100 INJECTION, SOLUTION INTRAVENOUS; SUBCUTANEOUS at 21:32

## 2025-05-11 RX ADMIN — Medication 25 MILLIGRAM(S): at 12:21

## 2025-05-11 RX ADMIN — GABAPENTIN 100 MILLIGRAM(S): 400 CAPSULE ORAL at 21:34

## 2025-05-11 RX ADMIN — DORZOLAMIDE 1 DROP(S): 20 SOLUTION/ DROPS OPHTHALMIC at 21:34

## 2025-05-11 RX ADMIN — Medication 1 APPLICATION(S): at 05:26

## 2025-05-11 RX ADMIN — BRIMONIDINE TARTRATE 1 DROP(S): 1.5 SOLUTION/ DROPS OPHTHALMIC at 17:21

## 2025-05-11 RX ADMIN — LAMIVUDINE 100 MILLIGRAM(S): 10 SOLUTION ORAL at 12:22

## 2025-05-11 RX ADMIN — Medication 2 DROP(S): at 05:25

## 2025-05-11 RX ADMIN — TRAMADOL HYDROCHLORIDE 50 MILLIGRAM(S): 50 TABLET, FILM COATED ORAL at 17:23

## 2025-05-11 RX ADMIN — Medication 2 TABLET(S): at 21:33

## 2025-05-11 RX ADMIN — Medication 975 MILLIGRAM(S): at 12:21

## 2025-05-11 RX ADMIN — DOLUTEGRAVIR SODIUM 50 MILLIGRAM(S): 5 TABLET, FOR SUSPENSION ORAL at 12:22

## 2025-05-11 RX ADMIN — POLYMYXIN B SULFATE AND TRIMETHOPRIM SULFATE 1 DROP(S): 10000; 1 SOLUTION/ DROPS OPHTHALMIC at 12:21

## 2025-05-11 RX ADMIN — BRIMONIDINE TARTRATE 1 DROP(S): 1.5 SOLUTION/ DROPS OPHTHALMIC at 05:26

## 2025-05-11 RX ADMIN — Medication 25 MILLIGRAM(S): at 21:35

## 2025-05-11 RX ADMIN — Medication 975 MILLIGRAM(S): at 21:33

## 2025-05-11 RX ADMIN — LATANOPROST PF 1 DROP(S): 0.05 SOLUTION/ DROPS OPHTHALMIC at 21:34

## 2025-05-11 RX ADMIN — INSULIN GLARGINE-YFGN 15 UNIT(S): 100 INJECTION, SOLUTION SUBCUTANEOUS at 21:32

## 2025-05-11 RX ADMIN — AMLODIPINE BESYLATE 10 MILLIGRAM(S): 10 TABLET ORAL at 05:24

## 2025-05-11 RX ADMIN — Medication 975 MILLIGRAM(S): at 05:26

## 2025-05-11 RX ADMIN — METOPROLOL SUCCINATE 50 MILLIGRAM(S): 50 TABLET, EXTENDED RELEASE ORAL at 17:18

## 2025-05-11 RX ADMIN — Medication 2 DROP(S): at 12:21

## 2025-05-11 RX ADMIN — INSULIN LISPRO 2: 100 INJECTION, SOLUTION INTRAVENOUS; SUBCUTANEOUS at 12:25

## 2025-05-11 RX ADMIN — Medication 975 MILLIGRAM(S): at 13:15

## 2025-05-11 RX ADMIN — GABAPENTIN 100 MILLIGRAM(S): 400 CAPSULE ORAL at 12:21

## 2025-05-11 RX ADMIN — TRAMADOL HYDROCHLORIDE 50 MILLIGRAM(S): 50 TABLET, FILM COATED ORAL at 08:17

## 2025-05-11 RX ADMIN — GABAPENTIN 100 MILLIGRAM(S): 400 CAPSULE ORAL at 05:24

## 2025-05-11 RX ADMIN — Medication 975 MILLIGRAM(S): at 05:24
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
## 2025-05-11 NOTE — PROGRESS NOTE ADULT - TIME BILLING
Time spent reviewing patient's chart, labwork, orders, documenting care, coordinating care with consultants, and discussing patient's care with family members.
Time spent reviewing the chart documentation, reviewing labs and  evaluating the patient, discussing the plan of care with the consultants & medical team, and documenting.
Time spent reviewing the chart documentation, reviewing labs and imaging studies, evaluating the patient, clinical exam, discussing the plan of care with the medical team and/or all necessary consultants, and documenting.
Time spent reviewing the chart documentation, reviewing labs and imaging studies, evaluating the patient, clinical exam, discussing the plan of care with the medical team and/or all necessary consultants, and documenting.
reviewing the chart documentation, reviewing labs and imaging studies, evaluating the patient, discussing the plan of care with the consultants & medical team, and documenting.
Time spent reviewing the chart documentation, reviewing labs and imaging studies, evaluating the patient, clinical exam, discussing the plan of care with the medical team and/or all necessary consultants, and documenting.
Time spent reviewing the chart documentation, reviewing labs and imaging studies, evaluating the patient, clinical exam, discussing the plan of care with the medical team and/or all necessary consultants, and documenting.
reviewing the chart documentation, reviewing labs and imaging studies, evaluating the patient, discussing the plan of care with the consultants & medical team, and documenting.
reviewing the chart documentation, reviewing labs and imaging studies, evaluating the patient, discussing the plan of care with the consultants & medical team, and documenting.
Chart, labs, orders, vitals, and imaging reviewed and plan of care discussed with consultants and IDR team in detail. Plan of care discussed with patient at bedside.
For chart review, face to face evaluation, counselling and care coordination
For chart review, face to face evaluation, counselling and care coordination
Time spent reviewing the chart documentation, reviewing labs and imaging studies, evaluating the patient, discussing the plan of care with the medical team and/or all necessary consultants, and documenting.
reviewing the chart documentation, reviewing labs and imaging studies, evaluating the patient, discussing the plan of care with the consultants & medical team, and documenting.
For chart review, face to face evaluation, counselling and care coordination
Time spent reviewing the chart documentation, reviewing labs and imaging studies, evaluating the patient, clinical exam, discussing the plan of care with the medical team and/or all necessary consultants, and documenting.
Time spent reviewing the chart documentation, reviewing labs and imaging studies, evaluating the patient, clinical exam, discussing the plan of care with the medical team and/or all necessary consultants, and documenting.
reviewing the chart documentation, reviewing labs and imaging studies, evaluating the patient, discussing the plan of care with the consultants & medical team, and documenting.
Reviewing charts, coordinating care and discussing with Nephro, Ethics, Complex SW, Patient and RN.
Time spent reviewing the chart documentation, reviewing labs and imaging studies, evaluating the patient, clinical exam, discussing the plan of care with the medical team and/or all necessary consultants, and documenting.
Time spent reviewing the chart documentation, reviewing labs and imaging studies, evaluating the patient, discussing the plan of care with the medical team, and documenting.
For chart review, face to face evaluation, counselling and care coordination
For chart review, face to face evaluation, counselling and care coordination
Time spent reviewing the chart documentation, reviewing labs and imaging studies, evaluating the patient, clinical exam, discussing the plan of care with the medical team and/or all necessary consultants, and documenting.
Time spent reviewing the chart documentation, reviewing labs and imaging studies, evaluating the patient, clinical exam, discussing the plan of care with the medical team and/or all necessary consultants, and documenting.
Time spent reviewing the chart documentation, reviewing labs and imaging studies, evaluating the patient, discussing the plan of care with the consultants & medical team, and documenting.
Time spent reviewing the chart documentation, reviewing labs and imaging studies, evaluating the patient, discussing the plan of care with the medical team, and documenting.
Elevated troponin, Preop. cardiac risk eval.
Syncope
Fall vs. syncope, Troponin elevation, CKD

## 2025-05-11 NOTE — PROGRESS NOTE ADULT - ASSESSMENT
63y/oM PMH CKD, IDDM, HIV on HAART, HFrEF, recent bilateral cataract surgery at [facility] presenting after fall from vehicle. Patient without evidence of acute traumatic injury to head or c-spine. Patient found to have elevated troponin, chest pain seems to be more so from fall rather than cardiac in nature. seen by cardiology s/p negative NST, Cardiac CT with calcium score of 13. On 04/07 renal biopsy showed diabetic nephropathy, s/p IR permacath and started on HD. outpatient vasc eval for AVF vs graft. ct with chronic lens dislocation vs vitreous hemorrhage .optho contacted by ED - as per optho: IOP will be chronically elevated in vitreous hemorrhage. Seen by ophthalmologist, started eye drops per recs. Patient will need to follow up with his own ophthalmologist. Pt also seen by psych for hallucinations, ams now improved.  He is refusing HD now despite multiple attempt. Ethics has been involved. Patient does not have capacity but cannot perform any HD until life threatening.     #BRIDGETTE on CKD3B  #Hyperkalemia  -Last HD 04/29. Refusing HD since then  -s/p IR perma cath  -s/p renal biopsy 4/7; showing diabetic nephropathy   -outpatient vasc eval for AVF vs graft if agrees   -pt now refusing HD   -As per Nephrology "Patient has been doing fine without dialysis for more than a week now but he remains at risk of hyperkalemia, metabolic acidosis and adverse renal outcome including death given his advanced CKD.      #Recent bilateral cataract surgery  # Vision Loss  -Ct with chronic lens dislocation vs vitreous hemorrhage   -Optho contacted by ED - as per optho: IOP will be chronically elevated in vitreous hemorrhage  -Given pilocarpine, dorzolamide, brimonidine, timolol in ED -- as per optho, not to continue as IOP chronically elevated  -completed course polytrim drops  -cont artifical tears  -had scheduled procedure with his outside optho on 04/01, will need new appt  -pt continues to report bad eye pain especially on the right, ophth was reached out regarding recs: Tanisha MCCLURE: patient to follow up with own surgeon.   -Tanisha MCCLURE rec (04/22)reduce prednisolone once a day and dc ketorolac 4 times a day. started on erythromycin , now completed  -called [place] opho office for Dr. Richy Rasmussen (ophthalmologist) office [place] [phone]. D/w Dr. Lockhart on 4/23, pt had procedure 01/16 with Dr. Magaña, never f/u rec ophthal eval   -seen by Dr Das on 04/23, rec combigan gtt bid, latanoprost gtt qhs, Dorzolamide bid. rec f/u out pt ophthalmology.     HTN  -partly due to med noncompliance  -cont Amlodipine 10mg QD, metoprolol 50mg bid and hydralazine    DM 2  -A1c 8.2  -Accu checks and ISS  -Increased Lantus to 15 units     s/p Fall  -CT head and cspine without evidence of traumatic injury  -pain control    Elevated troponin  -TTE Reviewed  -CT coronary done 4/1 noted with calcium score of 13  -NST w/o ischemia/infarct.  -Cardio follow up noted      Chronic HFpEF  -TTE: EF 50-55%  -c/w metoprolol and hydralazine   -not on lasix at home    HIV  -ID Consulted, recs noted  -Dolutegrair and epivir  -continue until follows up outpatient with ID   -VL >300k    Back pain  -Lidoderm patch, pain control    Acute metabolic encephalopathy  Delirium  -psych recs appreciated   -prn zyprexa for agitation.   -cth no acute change     DVT Prophylaxis -- Venodyne     Dispo: Shelter placement likely on Tuesday. Community CM will arrange Nephrology and other required follow ups. Ex wife is willing to participate in decision making while hospitalized.   Ethics and Complex SW following.

## 2025-05-12 LAB
GLUCOSE BLDC GLUCOMTR-MCNC: 179 MG/DL — HIGH (ref 70–99)
GLUCOSE BLDC GLUCOMTR-MCNC: 209 MG/DL — HIGH (ref 70–99)
GLUCOSE BLDC GLUCOMTR-MCNC: 252 MG/DL — HIGH (ref 70–99)
GLUCOSE BLDC GLUCOMTR-MCNC: 314 MG/DL — HIGH (ref 70–99)

## 2025-05-12 PROCEDURE — 99232 SBSQ HOSP IP/OBS MODERATE 35: CPT

## 2025-05-12 RX ADMIN — Medication 25 MILLIGRAM(S): at 05:03

## 2025-05-12 RX ADMIN — Medication 2 TABLET(S): at 21:04

## 2025-05-12 RX ADMIN — LAMIVUDINE 100 MILLIGRAM(S): 10 SOLUTION ORAL at 10:47

## 2025-05-12 RX ADMIN — METOPROLOL SUCCINATE 50 MILLIGRAM(S): 50 TABLET, EXTENDED RELEASE ORAL at 17:45

## 2025-05-12 RX ADMIN — TIMOLOL MALEATE 1 DROP(S): 6.8 SOLUTION OPHTHALMIC at 05:02

## 2025-05-12 RX ADMIN — BRIMONIDINE TARTRATE 1 DROP(S): 1.5 SOLUTION/ DROPS OPHTHALMIC at 05:02

## 2025-05-12 RX ADMIN — ATORVASTATIN CALCIUM 40 MILLIGRAM(S): 80 TABLET, FILM COATED ORAL at 21:04

## 2025-05-12 RX ADMIN — Medication 975 MILLIGRAM(S): at 14:28

## 2025-05-12 RX ADMIN — INSULIN LISPRO 4: 100 INJECTION, SOLUTION INTRAVENOUS; SUBCUTANEOUS at 17:45

## 2025-05-12 RX ADMIN — TRAMADOL HYDROCHLORIDE 25 MILLIGRAM(S): 50 TABLET, FILM COATED ORAL at 04:22

## 2025-05-12 RX ADMIN — Medication 975 MILLIGRAM(S): at 21:04

## 2025-05-12 RX ADMIN — GABAPENTIN 100 MILLIGRAM(S): 400 CAPSULE ORAL at 05:03

## 2025-05-12 RX ADMIN — Medication 25 MILLIGRAM(S): at 21:04

## 2025-05-12 RX ADMIN — TRAMADOL HYDROCHLORIDE 25 MILLIGRAM(S): 50 TABLET, FILM COATED ORAL at 16:07

## 2025-05-12 RX ADMIN — Medication 1 APPLICATION(S): at 05:01

## 2025-05-12 RX ADMIN — Medication 975 MILLIGRAM(S): at 05:03

## 2025-05-12 RX ADMIN — METOPROLOL SUCCINATE 50 MILLIGRAM(S): 50 TABLET, EXTENDED RELEASE ORAL at 05:04

## 2025-05-12 RX ADMIN — TRAMADOL HYDROCHLORIDE 25 MILLIGRAM(S): 50 TABLET, FILM COATED ORAL at 17:07

## 2025-05-12 RX ADMIN — AMLODIPINE BESYLATE 10 MILLIGRAM(S): 10 TABLET ORAL at 05:03

## 2025-05-12 RX ADMIN — Medication 975 MILLIGRAM(S): at 15:28

## 2025-05-12 RX ADMIN — POLYMYXIN B SULFATE AND TRIMETHOPRIM SULFATE 1 DROP(S): 10000; 1 SOLUTION/ DROPS OPHTHALMIC at 10:49

## 2025-05-12 RX ADMIN — Medication 2 DROP(S): at 14:28

## 2025-05-12 RX ADMIN — INSULIN LISPRO 8: 100 INJECTION, SOLUTION INTRAVENOUS; SUBCUTANEOUS at 10:47

## 2025-05-12 RX ADMIN — DOLUTEGRAVIR SODIUM 50 MILLIGRAM(S): 5 TABLET, FOR SUSPENSION ORAL at 10:47

## 2025-05-12 RX ADMIN — BRIMONIDINE TARTRATE 1 DROP(S): 1.5 SOLUTION/ DROPS OPHTHALMIC at 17:46

## 2025-05-12 RX ADMIN — GABAPENTIN 100 MILLIGRAM(S): 400 CAPSULE ORAL at 21:04

## 2025-05-12 RX ADMIN — GABAPENTIN 100 MILLIGRAM(S): 400 CAPSULE ORAL at 14:27

## 2025-05-12 RX ADMIN — DORZOLAMIDE 1 DROP(S): 20 SOLUTION/ DROPS OPHTHALMIC at 10:48

## 2025-05-12 RX ADMIN — Medication 2 DROP(S): at 05:02

## 2025-05-12 RX ADMIN — TIMOLOL MALEATE 1 DROP(S): 6.8 SOLUTION OPHTHALMIC at 17:46

## 2025-05-12 RX ADMIN — Medication 3 MILLIGRAM(S): at 21:04

## 2025-05-12 RX ADMIN — Medication 25 MILLIGRAM(S): at 14:28

## 2025-05-12 RX ADMIN — POLYETHYLENE GLYCOL 3350 17 GRAM(S): 17 POWDER, FOR SOLUTION ORAL at 05:01

## 2025-05-12 RX ADMIN — Medication 1 APPLICATION(S): at 05:00

## 2025-05-12 NOTE — PROGRESS NOTE ADULT - ASSESSMENT
63y/oM PMH CKD, IDDM, HIV on HAART, HFrEF, recent bilateral cataract surgery at [facility] presenting after fall from vehicle. Patient without evidence of acute traumatic injury to head or c-spine. Patient found to have elevated troponin, chest pain seems to be more so from fall rather than cardiac in nature. seen by cardiology s/p negative NST, Cardiac CT with calcium score of 13. On 04/07 renal biopsy showed diabetic nephropathy, s/p IR permacath and started on HD. outpatient vasc eval for AVF vs graft. ct with chronic lens dislocation vs vitreous hemorrhage .optho contacted by ED - as per optho: IOP will be chronically elevated in vitreous hemorrhage. Seen by ophthalmologist, started eye drops per recs. Patient will need to follow up with his own ophthalmologist. Pt also seen by psych for hallucinations, ams now improved.  He is refusing HD now despite multiple attempt. Ethics has been involved. Patient does not have capacity but cannot perform any HD until life threatening.     #BRIDGETTE on CKD3B  #Hyperkalemia  -Last HD 04/29. Refusing HD since then  -s/p IR perma cath  -s/p renal biopsy 4/7; showing diabetic nephropathy   -outpatient vasc eval for AVF vs graft if agrees   -pt now refusing HD   -As per Nephrology "Patient has been doing fine without dialysis for more than a week now but he remains at risk of hyperkalemia, metabolic acidosis and adverse renal outcome including death given his advanced CKD.      #Recent bilateral cataract surgery  # Vision Loss  -Ct with chronic lens dislocation vs vitreous hemorrhage   -Optho contacted by ED - as per optho: IOP will be chronically elevated in vitreous hemorrhage  -Given pilocarpine, dorzolamide, brimonidine, timolol in ED -- as per optho, not to continue as IOP chronically elevated  -completed course polytrim drops  -cont artifical tears  -had scheduled procedure with his outside optho on 04/01, will need new appt  -pt continues to report bad eye pain especially on the right, ophth was reached out regarding recs: Tanisha MCCLURE: patient to follow up with own surgeon.   -Tanisha MCCLURE rec (04/22)reduce prednisolone once a day and dc ketorolac 4 times a day. started on erythromycin , now completed  -called [place] opho office for Dr. Richy Rasmussen (ophthalmologist) office [place] [phone]. D/w Dr. Lockhart on 4/23, pt had procedure 01/16 with Dr. Magaña, never f/u rec ophthal eval   -seen by Dr Das on 04/23, rec combigan gtt bid, latanoprost gtt qhs, Dorzolamide bid. rec f/u out pt ophthalmology.     HTN  -partly due to med noncompliance  -cont Amlodipine 10mg QD, metoprolol 50mg bid and hydralazine    DM 2  -A1c 8.2  -Accu checks and ISS  -Increased Lantus to 15 units     s/p Fall  -CT head and cspine without evidence of traumatic injury  -pain control    Elevated troponin  -TTE Reviewed  -CT coronary done 4/1 noted with calcium score of 13  -NST w/o ischemia/infarct.  -Cardio follow up noted      Chronic HFpEF  -TTE: EF 50-55%  -c/w metoprolol and hydralazine   -not on lasix at home    HIV  -ID Consulted, recs noted  -Dolutegrair and epivir  -continue until follows up outpatient with ID   -VL >300k    Back pain  -Lidoderm patch, pain control    Acute metabolic encephalopathy  Delirium  -psych recs appreciated   -prn zyprexa for agitation.   -cth no acute change     DVT Prophylaxis -- Venodyne     Dispo: Shelter placement likely on Tuesday. Community CM will arrange Ne 63y/oM PMH CKD, IDDM, HIV on HAART, HFrEF, recent bilateral cataract surgery at [facility] presenting after fall from vehicle. Patient without evidence of acute traumatic injury to head or c-spine. Patient found to have elevated troponin, chest pain seems to be more so from fall rather than cardiac in nature. seen by cardiology s/p negative NST, Cardiac CT with calcium score of 13. On 04/07 renal biopsy showed diabetic nephropathy, s/p IR permacath and started on HD. outpatient vasc eval for AVF vs graft. ct with chronic lens dislocation vs vitreous hemorrhage .optho contacted by ED - as per optho: IOP will be chronically elevated in vitreous hemorrhage. Seen by ophthalmologist, started eye drops per recs. Patient will need to follow up with his own ophthalmologist. Pt also seen by psych for hallucinations, ams now improved.  He is refusing HD now despite multiple attempt. Ethics has been involved. Patient does not have capacity but cannot perform any HD until life threatening.     1-BRIDGETTE on CKD 3B  2-Hyperkalemia  -Last HD 04/29. Refusing HD since then  -s/p IR perma cath  -s/p renal biopsy 4/7; showing diabetic nephropathy   -pt cont to refuse HD     3-Recent bilateral cataract surgery  # Vision Loss  -Ct with chronic lens dislocation vs vitreous hemorrhage   -Optho contacted by ED - as per optho: IOP will be chronically elevated in vitreous hemorrhage  -Given pilocarpine, dorzolamide, brimonidine, timolol in ED -- as per optho, not to continue as IOP chronically elevated  -completed course polytrim drops  -cont artifical tears  -had scheduled procedure with his outside optho on 04/01, he needs new appointment   -pt continues to report bad eye pain especially on the right, ophth was reached out regarding recs: Sight MD: patient to follow up with own surgeon.   -Tanisha MCCLURE rec (04/22)reduce prednisolone once a day and dc ketorolac 4 times a day. started on erythromycin , now completed  -called [place] ophtho office for Dr. Richy Rasmussen (ophthalmologist) office [place] [phone]. D/w Dr. Lockhart on 4/23, pt had procedure 01/16 with Dr. Magaña, never f/u rec ophthal eval   -seen by Dr Das on 04/23, rec combigan gtt bid, latanoprost gtt qhs, Dorzolamide bid. rec f/u out pt ophthalmology.     4-HTN  -cont Amlodipine 10mg QD, metoprolol 50mg bid and hydralazine    5-DM 2  -A1c 8.2  -Accu checks and ISS  - Lantus to 15 units     6-s/p Fall  -CT head and cspine without evidence of traumatic injury  -pain control    7-evated troponin  -TTE Reviewed  -CT coronary done 4/1 noted with calcium score of 13  -NST w/o ischemia/infarct.  -Cardio consulted     8-HIV   -ID Consulted, recs noted  -Dolutegrair and epivir  -continue until follows up outpatient with ID   -VL >300k    9-Back pain  -Lidoderm patch, pain control    10-Acute metabolic encephalopathy  Delirium  -psych recs appreciated   -prn zyprexa for agitation.   -cth no acute change   stable     DVT Prophylaxis -- Venodyne     Dispo: Shelter placement pending

## 2025-05-12 NOTE — PROGRESS NOTE ADULT - ASSESSMENT
Will continue to follow and be available for assistance as needed. Please do not hesitate to contact us.    Liberty NAVA RN, MBE  Ethics Fellow  [phone]

## 2025-05-12 NOTE — PROVIDER CONTACT NOTE (OTHER) - REASON
Pt refusing AM labs
Pt refusing night time medications and vitals
SCDs not in use
Pt complains of pain after pt was medicated with Tylenol and Neurontin
Pt refusing SCDs. Educated on the importance of wearing them.
refusing SCDs
 after giving Lantus
HTN, c/o R eye pain & refusing AM blood glucose check
Pt refused AM labs
Refusing blood sugar check
refusing HD
CBC clotted as per hematology
Refusing tele
Refusing

## 2025-05-12 NOTE — PROVIDER CONTACT NOTE (OTHER) - ASSESSMENT
A&Ox4, elevated BP likely d/t pain, already received AM dose of Amlodipine when scheduled, pt has no other BP meds or PRNs ordered.  R eye red and inflammed, pt received Oxy overnight with partial effect
PT A&Ox4, in no acute distress at this time
pt reports pain & states their nose is burning for x's 1 week.
Pt resting in bed
VSS. AMAN&Ox4.

## 2025-05-12 NOTE — PROVIDER CONTACT NOTE (OTHER) - SITUATION
CBC clotted as per hematology - disregard first set of lab results from this morning
PT refusing blood sugar check for lunch, pt educated on importance of monitoring blood sugar, however still refused
Pt refusing labs, blood glucose checking, and telemonitor.
Pt has been refusing tele consistently. Remains refusing.
pt refusing to go to HD. stating he will not stay still for 3 hours. pain medication given as ordered. HD and medicine team aware
Patient is A&Ox4 but has a hx of sundowning overnight. RN just found patient attempting to get into the elevator to "go to the store." SCDs are not currently plugged in due to fall risk.
/94, c/o R eye pain requesting pain medicine.  Pt also refusing for blood glucose check, refusing tele monitor and refusing to use urinal for accurate output measurement
Made medicine PA aware pt refused AM labs today.
Pt refused all nighttime medications and would not allow staff to take his vitals. He stated "No no, Im good" and wanted to be left alone before trying to go back to sleep.

## 2025-05-12 NOTE — PROGRESS NOTE ADULT - SUBJECTIVE AND OBJECTIVE BOX
Ethics continues to follow. See consult on 4/25/25 and prior notes.    Discussion with PITER NAVA RN, MBE (Ethics Fellow), Estrella (patient's [place] care manager, [phone]), and Basia Ansari (patient's Health Home care manager). Estrella called the fellow with Basia on the line. They inquired about the patient's discharge plan, specifically why he would be discharging to a shelter without planned outpatient dialysis. The fellow gave a brief overview of the discharge plan, specifically that the patient would be discharging with the plan to have close follow-up with a nephrologist in the outpatient setting, given his consistent refusal of dialysis. The fellow informed Estrella and Basia that she would inform the SW on the patient's case to reach out to further discuss the discharge plan.    Ethics informed STACY Carpenter LCSW (Complex SW) that the patient's community care managers were looking to discuss the discharge plan in further detail. SW to reach out to Estrella to discuss.

## 2025-05-12 NOTE — PROGRESS NOTE ADULT - SUBJECTIVE AND OBJECTIVE BOX
Patient is a 64y old  Male who presents with a chief complaint of Geoff (01 May 2025 16:32)        ALLERGIES:  No Known Allergies    MEDICATIONS  (STANDING):  acetaminophen     Tablet .. 975 milliGRAM(s) Oral every 8 hours  amLODIPine   Tablet 10 milliGRAM(s) Oral daily  artificial tears (preservative free) Ophthalmic Solution 2 Drop(s) Both EYES every 8 hours  atorvastatin 40 milliGRAM(s) Oral at bedtime  bisacodyl Suppository 10 milliGRAM(s) Rectal once  brimonidine 0.2% Ophthalmic Solution 1 Drop(s) Both EYES two times a day  chlorhexidine 2% Cloths 1 Application(s) Topical <User Schedule>  chlorhexidine 4% Liquid 1 Application(s) Topical <User Schedule>  dextrose 5%. 1000 milliLiter(s) (100 mL/Hr) IV Continuous <Continuous>  dextrose 5%. 1000 milliLiter(s) (50 mL/Hr) IV Continuous <Continuous>  dextrose 50% Injectable 25 Gram(s) IV Push once  dextrose 50% Injectable 12.5 Gram(s) IV Push once  dextrose 50% Injectable 25 Gram(s) IV Push once  dolutegravir 50 milliGRAM(s) Oral daily  dorzolamide 2% Ophthalmic Solution 1 Drop(s) Both EYES <User Schedule>  epoetin landry-epbx (RETACRIT) Injectable 4000 Unit(s) IV Push <User Schedule>  gabapentin 100 milliGRAM(s) Oral three times a day  glucagon  Injectable 1 milliGRAM(s) IntraMuscular once  hydrALAZINE 25 milliGRAM(s) Oral three times a day  insulin glargine Injectable (LANTUS) 15 Unit(s) SubCutaneous at bedtime  insulin lispro (ADMELOG) corrective regimen sliding scale   SubCutaneous Before meals and at bedtime  lamiVUDine- milliGRAM(s) Oral daily  latanoprost 0.005% Ophthalmic Solution 1 Drop(s) Both EYES at bedtime  lidocaine   4% Patch 1 Patch Transdermal every 24 hours  metoprolol tartrate 50 milliGRAM(s) Oral two times a day  polyethylene glycol 3350 17 Gram(s) Oral two times a day  senna 2 Tablet(s) Oral at bedtime  timolol 0.5% Solution 1 Drop(s) Both EYES two times a day  trimethoprim/polymyxin Solution 1 Drop(s) Both EYES daily    MEDICATIONS  (PRN):  aluminum hydroxide/magnesium hydroxide/simethicone Suspension 30 milliLiter(s) Oral every 4 hours PRN Dyspepsia  dextrose Oral Gel 15 Gram(s) Oral once PRN Blood Glucose LESS THAN 70 milliGRAM(s)/deciliter  hydrALAZINE Injectable 10 milliGRAM(s) IV Push every 6 hours PRN sbp>159  meclizine 25 milliGRAM(s) Oral three times a day PRN Dizziness  melatonin 3 milliGRAM(s) Oral at bedtime PRN Insomnia  ondansetron Injectable 4 milliGRAM(s) IV Push every 8 hours PRN Nausea and/or Vomiting  sodium chloride 0.65% Nasal 1 Spray(s) Both Nostrils five times a day PRN Nasal Congestion  sodium chloride 0.9% lock flush 10 milliLiter(s) IV Push every 1 hour PRN Pre/post blood products, medications, blood draw, and to maintain line patency  traMADol 25 milliGRAM(s) Oral every 6 hours PRN Moderate Pain (4 - 6)  traMADol 50 milliGRAM(s) Oral every 6 hours PRN Severe Pain (7 - 10)    Vital Signs Last 24 Hrs  T(F): 97.4 (12 May 2025 04:20), Max: 98.1 (12 May 2025 00:39)  HR: 85 (12 May 2025 04:20) (71 - 85)  BP: 164/78 (12 May 2025 04:20) (124/72 - 170/75)  RR: 18 (12 May 2025 04:20) (18 - 19)  SpO2: 94% (12 May 2025 00:39) (94% - 98%)  I&O's Summary    11 May 2025 07:01  -  12 May 2025 07:00  --------------------------------------------------------  IN: 1040 mL / OUT: 1800 mL / NET: -760 mL        PHYSICAL EXAM:  General:   ENT:   Neck:   Lungs:   Cardio: RRR, S1/S2, No murmur  Abdomen: Soft, Nontender, Nondistended; Bowel sounds present  Extremities: No calf tenderness, No pitting edema    LABS:    05-11    138  |  108  |  55.9  ----------------------------<  100  4.5   |  17.0  |  5.01    Ca    8.7      11 May 2025 06:15                                    POCT Blood Glucose.: 193 mg/dL (11 May 2025 21:23)  POCT Blood Glucose.: 188 mg/dL (11 May 2025 12:10)  POCT Blood Glucose.: 101 mg/dL (11 May 2025 08:54)      Urinalysis Basic - ( 11 May 2025 06:15 )    Color: x / Appearance: x / SG: x / pH: x  Gluc: 100 mg/dL / Ketone: x  / Bili: x / Urobili: x   Blood: x / Protein: x / Nitrite: x   Leuk Esterase: x / RBC: x / WBC x   Sq Epi: x / Non Sq Epi: x / Bacteria: x          RADIOLOGY & ADDITIONAL TESTS:       Patient is a 64y old  Male who presents with a chief complaint of Geoff (01 May 2025 16:32)      pt is seen in am , no complaints offered   lying in the bed   ALLERGIES:  No Known Allergies    MEDICATIONS  (STANDING):  acetaminophen     Tablet .. 975 milliGRAM(s) Oral every 8 hours  amLODIPine   Tablet 10 milliGRAM(s) Oral daily  artificial tears (preservative free) Ophthalmic Solution 2 Drop(s) Both EYES every 8 hours  atorvastatin 40 milliGRAM(s) Oral at bedtime  bisacodyl Suppository 10 milliGRAM(s) Rectal once  brimonidine 0.2% Ophthalmic Solution 1 Drop(s) Both EYES two times a day  chlorhexidine 2% Cloths 1 Application(s) Topical <User Schedule>  chlorhexidine 4% Liquid 1 Application(s) Topical <User Schedule>  dextrose 5%. 1000 milliLiter(s) (100 mL/Hr) IV Continuous <Continuous>  dextrose 5%. 1000 milliLiter(s) (50 mL/Hr) IV Continuous <Continuous>  dextrose 50% Injectable 25 Gram(s) IV Push once  dextrose 50% Injectable 12.5 Gram(s) IV Push once  dextrose 50% Injectable 25 Gram(s) IV Push once  dolutegravir 50 milliGRAM(s) Oral daily  dorzolamide 2% Ophthalmic Solution 1 Drop(s) Both EYES <User Schedule>  epoetin landry-epbx (RETACRIT) Injectable 4000 Unit(s) IV Push <User Schedule>  gabapentin 100 milliGRAM(s) Oral three times a day  glucagon  Injectable 1 milliGRAM(s) IntraMuscular once  hydrALAZINE 25 milliGRAM(s) Oral three times a day  insulin glargine Injectable (LANTUS) 15 Unit(s) SubCutaneous at bedtime  insulin lispro (ADMELOG) corrective regimen sliding scale   SubCutaneous Before meals and at bedtime  lamiVUDine- milliGRAM(s) Oral daily  latanoprost 0.005% Ophthalmic Solution 1 Drop(s) Both EYES at bedtime  lidocaine   4% Patch 1 Patch Transdermal every 24 hours  metoprolol tartrate 50 milliGRAM(s) Oral two times a day  polyethylene glycol 3350 17 Gram(s) Oral two times a day  senna 2 Tablet(s) Oral at bedtime  timolol 0.5% Solution 1 Drop(s) Both EYES two times a day  trimethoprim/polymyxin Solution 1 Drop(s) Both EYES daily    MEDICATIONS  (PRN):  aluminum hydroxide/magnesium hydroxide/simethicone Suspension 30 milliLiter(s) Oral every 4 hours PRN Dyspepsia  dextrose Oral Gel 15 Gram(s) Oral once PRN Blood Glucose LESS THAN 70 milliGRAM(s)/deciliter  hydrALAZINE Injectable 10 milliGRAM(s) IV Push every 6 hours PRN sbp>159  meclizine 25 milliGRAM(s) Oral three times a day PRN Dizziness  melatonin 3 milliGRAM(s) Oral at bedtime PRN Insomnia  ondansetron Injectable 4 milliGRAM(s) IV Push every 8 hours PRN Nausea and/or Vomiting  sodium chloride 0.65% Nasal 1 Spray(s) Both Nostrils five times a day PRN Nasal Congestion  sodium chloride 0.9% lock flush 10 milliLiter(s) IV Push every 1 hour PRN Pre/post blood products, medications, blood draw, and to maintain line patency  traMADol 25 milliGRAM(s) Oral every 6 hours PRN Moderate Pain (4 - 6)  traMADol 50 milliGRAM(s) Oral every 6 hours PRN Severe Pain (7 - 10)    Vital Signs Last 24 Hrs  T(F): 97.4 (12 May 2025 04:20), Max: 98.1 (12 May 2025 00:39)  HR: 85 (12 May 2025 04:20) (71 - 85)  BP: 164/78 (12 May 2025 04:20) (124/72 - 170/75)  RR: 18 (12 May 2025 04:20) (18 - 19)  SpO2: 94% (12 May 2025 00:39) (94% - 98%)  I&O's Summary    11 May 2025 07:01  -  12 May 2025 07:00  --------------------------------------------------------  IN: 1040 mL / OUT: 1800 mL / NET: -760 mL        PHYSICAL EXAM:  General: awake   Neck: supple  Lungs: CTA   Cardio: RRR, S1/S2, No murmur  Abdomen: Soft, Nontender, Nondistended; Bowel sounds present  Extremities: No calf tenderness, No pitting edema    LABS:    05-11    138  |  108  |  55.9  ----------------------------<  100  4.5   |  17.0  |  5.01    Ca    8.7      11 May 2025 06:15                                    POCT Blood Glucose.: 193 mg/dL (11 May 2025 21:23)  POCT Blood Glucose.: 188 mg/dL (11 May 2025 12:10)  POCT Blood Glucose.: 101 mg/dL (11 May 2025 08:54)      Urinalysis Basic - ( 11 May 2025 06:15 )    Color: x / Appearance: x / SG: x / pH: x  Gluc: 100 mg/dL / Ketone: x  / Bili: x / Urobili: x   Blood: x / Protein: x / Nitrite: x   Leuk Esterase: x / RBC: x / WBC x   Sq Epi: x / Non Sq Epi: x / Bacteria: x          RADIOLOGY & ADDITIONAL TESTS:

## 2025-05-12 NOTE — PROVIDER CONTACT NOTE (OTHER) - DATE AND TIME:
09-May-2025 22:30
10-May-2025 06:57
03-Apr-2025 17:34
07-Apr-2025 21:58
08-May-2025 06:30
16-Apr-2025 06:20
05-Apr-2025 22:18
18-Apr-2025 11:22
07-Apr-2025 20:16
12-May-2025 12:30
23-Apr-2025 05:57
27-Apr-2025 22:00
06-May-2025 22:21
04-Apr-2025 08:00

## 2025-05-12 NOTE — PROVIDER CONTACT NOTE (OTHER) - NAME OF MD/NP/PA/DO NOTIFIED:
LORENZO Kauffman
MERY Lane
Medicine LORENZO Kauffman made aware
Medicine LORENZO Peralta
Medicine PA made aware
Carlotta  Narendra NP
[place], Medicine PA
Medicine LORENZO Felix made aware
Medicine PA made aware
LORENZO Kauffman
Med LORENZO Felix
MD Faith
MICHELLE Evans
Nathan MARTINEZ

## 2025-05-13 VITALS
TEMPERATURE: 98 F | HEART RATE: 82 BPM | RESPIRATION RATE: 19 BRPM | OXYGEN SATURATION: 97 % | SYSTOLIC BLOOD PRESSURE: 161 MMHG | DIASTOLIC BLOOD PRESSURE: 84 MMHG

## 2025-05-13 LAB
ANION GAP SERPL CALC-SCNC: 14 MMOL/L — SIGNIFICANT CHANGE UP (ref 5–17)
BUN SERPL-MCNC: 52.1 MG/DL — HIGH (ref 8–20)
CALCIUM SERPL-MCNC: 8.6 MG/DL — SIGNIFICANT CHANGE UP (ref 8.4–10.5)
CHLORIDE SERPL-SCNC: 110 MMOL/L — HIGH (ref 96–108)
CO2 SERPL-SCNC: 18 MMOL/L — LOW (ref 22–29)
CREAT SERPL-MCNC: 4.92 MG/DL — HIGH (ref 0.5–1.3)
EGFR: 12 ML/MIN/1.73M2 — LOW
EGFR: 12 ML/MIN/1.73M2 — LOW
GLUCOSE BLDC GLUCOMTR-MCNC: 182 MG/DL — HIGH (ref 70–99)
GLUCOSE BLDC GLUCOMTR-MCNC: 230 MG/DL — HIGH (ref 70–99)
GLUCOSE SERPL-MCNC: 193 MG/DL — HIGH (ref 70–99)
HCT VFR BLD CALC: 30.4 % — LOW (ref 39–50)
HGB BLD-MCNC: 9.7 G/DL — LOW (ref 13–17)
MCHC RBC-ENTMCNC: 28.7 PG — SIGNIFICANT CHANGE UP (ref 27–34)
MCHC RBC-ENTMCNC: 31.9 G/DL — LOW (ref 32–36)
MCV RBC AUTO: 89.9 FL — SIGNIFICANT CHANGE UP (ref 80–100)
NRBC # BLD AUTO: 0 K/UL — SIGNIFICANT CHANGE UP (ref 0–0)
NRBC # FLD: 0 K/UL — SIGNIFICANT CHANGE UP (ref 0–0)
NRBC BLD AUTO-RTO: 0 /100 WBCS — SIGNIFICANT CHANGE UP (ref 0–0)
PLATELET # BLD AUTO: 267 K/UL — SIGNIFICANT CHANGE UP (ref 150–400)
PMV BLD: 8.8 FL — SIGNIFICANT CHANGE UP (ref 7–13)
POTASSIUM SERPL-MCNC: 4.6 MMOL/L — SIGNIFICANT CHANGE UP (ref 3.5–5.3)
POTASSIUM SERPL-SCNC: 4.6 MMOL/L — SIGNIFICANT CHANGE UP (ref 3.5–5.3)
RBC # BLD: 3.38 M/UL — LOW (ref 4.2–5.8)
RBC # FLD: 14.8 % — HIGH (ref 10.3–14.5)
SODIUM SERPL-SCNC: 141 MMOL/L — SIGNIFICANT CHANGE UP (ref 135–145)
TSH SERPL-MCNC: 1.88 UIU/ML — SIGNIFICANT CHANGE UP (ref 0.27–4.2)
WBC # BLD: 7.38 K/UL — SIGNIFICANT CHANGE UP (ref 3.8–10.5)
WBC # FLD AUTO: 7.38 K/UL — SIGNIFICANT CHANGE UP (ref 3.8–10.5)

## 2025-05-13 PROCEDURE — 83521 IG LIGHT CHAINS FREE EACH: CPT

## 2025-05-13 PROCEDURE — 87641 MR-STAPH DNA AMP PROBE: CPT

## 2025-05-13 PROCEDURE — 82728 ASSAY OF FERRITIN: CPT

## 2025-05-13 PROCEDURE — 73502 X-RAY EXAM HIP UNI 2-3 VIEWS: CPT

## 2025-05-13 PROCEDURE — 88346 IMFLUOR 1ST 1ANTB STAIN PX: CPT

## 2025-05-13 PROCEDURE — 86706 HEP B SURFACE ANTIBODY: CPT

## 2025-05-13 PROCEDURE — 83036 HEMOGLOBIN GLYCOSYLATED A1C: CPT

## 2025-05-13 PROCEDURE — 83935 ASSAY OF URINE OSMOLALITY: CPT

## 2025-05-13 PROCEDURE — 82962 GLUCOSE BLOOD TEST: CPT

## 2025-05-13 PROCEDURE — 71045 X-RAY EXAM CHEST 1 VIEW: CPT

## 2025-05-13 PROCEDURE — 88350 IMFLUOR EA ADDL 1ANTB STN PX: CPT

## 2025-05-13 PROCEDURE — 97530 THERAPEUTIC ACTIVITIES: CPT

## 2025-05-13 PROCEDURE — 77012 CT SCAN FOR NEEDLE BIOPSY: CPT

## 2025-05-13 PROCEDURE — 86235 NUCLEAR ANTIGEN ANTIBODY: CPT

## 2025-05-13 PROCEDURE — 82570 ASSAY OF URINE CREATININE: CPT

## 2025-05-13 PROCEDURE — 83516 IMMUNOASSAY NONANTIBODY: CPT

## 2025-05-13 PROCEDURE — 88348 ELECTRON MICROSCOPY DX: CPT

## 2025-05-13 PROCEDURE — 84484 ASSAY OF TROPONIN QUANT: CPT

## 2025-05-13 PROCEDURE — 85730 THROMBOPLASTIN TIME PARTIAL: CPT

## 2025-05-13 PROCEDURE — 86160 COMPLEMENT ANTIGEN: CPT

## 2025-05-13 PROCEDURE — 96374 THER/PROPH/DIAG INJ IV PUSH: CPT

## 2025-05-13 PROCEDURE — 83880 ASSAY OF NATRIURETIC PEPTIDE: CPT

## 2025-05-13 PROCEDURE — 99285 EMERGENCY DEPT VISIT HI MDM: CPT

## 2025-05-13 PROCEDURE — 77001 FLUOROGUIDE FOR VEIN DEVICE: CPT

## 2025-05-13 PROCEDURE — 86705 HEP B CORE ANTIBODY IGM: CPT

## 2025-05-13 PROCEDURE — 78452 HT MUSCLE IMAGE SPECT MULT: CPT | Mod: MC

## 2025-05-13 PROCEDURE — 99232 SBSQ HOSP IP/OBS MODERATE 35: CPT

## 2025-05-13 PROCEDURE — 81001 URINALYSIS AUTO W/SCOPE: CPT

## 2025-05-13 PROCEDURE — 84100 ASSAY OF PHOSPHORUS: CPT

## 2025-05-13 PROCEDURE — 84165 PROTEIN E-PHORESIS SERUM: CPT

## 2025-05-13 PROCEDURE — 96375 TX/PRO/DX INJ NEW DRUG ADDON: CPT

## 2025-05-13 PROCEDURE — 88313 SPECIAL STAINS GROUP 2: CPT

## 2025-05-13 PROCEDURE — 93306 TTE W/DOPPLER COMPLETE: CPT

## 2025-05-13 PROCEDURE — A9500: CPT

## 2025-05-13 PROCEDURE — 86360 T CELL ABSOLUTE COUNT/RATIO: CPT

## 2025-05-13 PROCEDURE — 87640 STAPH A DNA AMP PROBE: CPT

## 2025-05-13 PROCEDURE — 84300 ASSAY OF URINE SODIUM: CPT

## 2025-05-13 PROCEDURE — 85025 COMPLETE CBC W/AUTO DIFF WBC: CPT

## 2025-05-13 PROCEDURE — 93017 CV STRESS TEST TRACING ONLY: CPT

## 2025-05-13 PROCEDURE — 86359 T CELLS TOTAL COUNT: CPT

## 2025-05-13 PROCEDURE — 84133 ASSAY OF URINE POTASSIUM: CPT

## 2025-05-13 PROCEDURE — 75571 CT HRT W/O DYE W/CA TEST: CPT | Mod: MC

## 2025-05-13 PROCEDURE — C1750: CPT

## 2025-05-13 PROCEDURE — 71045 X-RAY EXAM CHEST 1 VIEW: CPT | Mod: 26

## 2025-05-13 PROCEDURE — 88305 TISSUE EXAM BY PATHOLOGIST: CPT

## 2025-05-13 PROCEDURE — 93970 EXTREMITY STUDY: CPT

## 2025-05-13 PROCEDURE — 86704 HEP B CORE ANTIBODY TOTAL: CPT

## 2025-05-13 PROCEDURE — 86038 ANTINUCLEAR ANTIBODIES: CPT

## 2025-05-13 PROCEDURE — 86803 HEPATITIS C AB TEST: CPT

## 2025-05-13 PROCEDURE — 73590 X-RAY EXAM OF LOWER LEG: CPT

## 2025-05-13 PROCEDURE — 72125 CT NECK SPINE W/O DYE: CPT | Mod: MC

## 2025-05-13 PROCEDURE — 84156 ASSAY OF PROTEIN URINE: CPT

## 2025-05-13 PROCEDURE — 86357 NK CELLS TOTAL COUNT: CPT

## 2025-05-13 PROCEDURE — 87340 HEPATITIS B SURFACE AG IA: CPT

## 2025-05-13 PROCEDURE — 86355 B CELLS TOTAL COUNT: CPT

## 2025-05-13 PROCEDURE — 85610 PROTHROMBIN TIME: CPT

## 2025-05-13 PROCEDURE — 86036 ANCA SCREEN EACH ANTIBODY: CPT

## 2025-05-13 PROCEDURE — 85027 COMPLETE CBC AUTOMATED: CPT

## 2025-05-13 PROCEDURE — 84132 ASSAY OF SERUM POTASSIUM: CPT

## 2025-05-13 PROCEDURE — 80048 BASIC METABOLIC PNL TOTAL CA: CPT

## 2025-05-13 PROCEDURE — 87536 HIV-1 QUANT&REVRSE TRNSCRPJ: CPT

## 2025-05-13 PROCEDURE — 83540 ASSAY OF IRON: CPT

## 2025-05-13 PROCEDURE — 36415 COLL VENOUS BLD VENIPUNCTURE: CPT

## 2025-05-13 PROCEDURE — 99261: CPT

## 2025-05-13 PROCEDURE — 97116 GAIT TRAINING THERAPY: CPT

## 2025-05-13 PROCEDURE — 80053 COMPREHEN METABOLIC PANEL: CPT

## 2025-05-13 PROCEDURE — 99231 SBSQ HOSP IP/OBS SF/LOW 25: CPT

## 2025-05-13 PROCEDURE — 86334 IMMUNOFIX E-PHORESIS SERUM: CPT

## 2025-05-13 PROCEDURE — 93005 ELECTROCARDIOGRAM TRACING: CPT

## 2025-05-13 PROCEDURE — 76775 US EXAM ABDO BACK WALL LIM: CPT

## 2025-05-13 PROCEDURE — 84155 ASSAY OF PROTEIN SERUM: CPT

## 2025-05-13 PROCEDURE — 70450 CT HEAD/BRAIN W/O DYE: CPT | Mod: MC

## 2025-05-13 PROCEDURE — 84443 ASSAY THYROID STIM HORMONE: CPT

## 2025-05-13 PROCEDURE — 73562 X-RAY EXAM OF KNEE 3: CPT

## 2025-05-13 PROCEDURE — 83735 ASSAY OF MAGNESIUM: CPT

## 2025-05-13 PROCEDURE — 82595 ASSAY OF CRYOGLOBULIN: CPT

## 2025-05-13 PROCEDURE — 76942 ECHO GUIDE FOR BIOPSY: CPT

## 2025-05-13 PROCEDURE — 99239 HOSP IP/OBS DSCHRG MGMT >30: CPT

## 2025-05-13 PROCEDURE — 84540 ASSAY OF URINE/UREA-N: CPT

## 2025-05-13 PROCEDURE — 86225 DNA ANTIBODY NATIVE: CPT

## 2025-05-13 RX ORDER — TIMOLOL MALEATE 6.8 MG/ML
1 SOLUTION OPHTHALMIC
Qty: 1 | Refills: 0
Start: 2025-05-13

## 2025-05-13 RX ORDER — SENNA 187 MG
2 TABLET ORAL
Qty: 0 | Refills: 0 | DISCHARGE
Start: 2025-05-13

## 2025-05-13 RX ORDER — LATANOPROST PF 0.05 MG/ML
1 SOLUTION/ DROPS OPHTHALMIC
Qty: 1 | Refills: 0
Start: 2025-05-13

## 2025-05-13 RX ORDER — METOPROLOL SUCCINATE 50 MG/1
1 TABLET, EXTENDED RELEASE ORAL
Qty: 60 | Refills: 0
Start: 2025-05-13 | End: 2025-06-11

## 2025-05-13 RX ORDER — BICTEGRAVIR SODIUM, EMTRICITABINE, AND TENOFOVIR ALAFENAMIDE FUMARATE 50; 200; 25 MG/1; MG/1; MG/1
1 TABLET ORAL
Qty: 30 | Refills: 0
Start: 2025-05-13 | End: 2025-06-11

## 2025-05-13 RX ORDER — LIDOCAINE HYDROCHLORIDE 20 MG/ML
1 JELLY TOPICAL
Qty: 10 | Refills: 0
Start: 2025-05-13 | End: 2025-05-22

## 2025-05-13 RX ORDER — EMPAGLIFLOZIN 25 MG/1
1 TABLET, FILM COATED ORAL
Qty: 30 | Refills: 0
Start: 2025-05-13 | End: 2025-06-11

## 2025-05-13 RX ORDER — BRIMONIDINE TARTRATE 1.5 MG/ML
1 SOLUTION/ DROPS OPHTHALMIC
Qty: 1 | Refills: 0
Start: 2025-05-13 | End: 2025-06-11

## 2025-05-13 RX ORDER — DORZOLAMIDE 20 MG/ML
1 SOLUTION/ DROPS OPHTHALMIC
Qty: 1 | Refills: 0
Start: 2025-05-13 | End: 2025-06-11

## 2025-05-13 RX ORDER — OXYCODONE HYDROCHLORIDE 30 MG/1
2.5 TABLET ORAL ONCE
Refills: 0 | Status: DISCONTINUED | OUTPATIENT
Start: 2025-05-13 | End: 2025-05-13

## 2025-05-13 RX ORDER — POLYETHYLENE GLYCOL 3350 17 G/17G
17 POWDER, FOR SOLUTION ORAL
Qty: 0 | Refills: 0 | DISCHARGE
Start: 2025-05-13

## 2025-05-13 RX ORDER — AMLODIPINE BESYLATE 10 MG/1
1 TABLET ORAL
Qty: 30 | Refills: 0
Start: 2025-05-13 | End: 2025-06-11

## 2025-05-13 RX ADMIN — Medication 1 SPRAY(S): at 06:25

## 2025-05-13 RX ADMIN — Medication 25 MILLIGRAM(S): at 12:29

## 2025-05-13 RX ADMIN — INSULIN LISPRO 4: 100 INJECTION, SOLUTION INTRAVENOUS; SUBCUTANEOUS at 12:30

## 2025-05-13 RX ADMIN — OXYCODONE HYDROCHLORIDE 2.5 MILLIGRAM(S): 30 TABLET ORAL at 01:11

## 2025-05-13 RX ADMIN — Medication 1 APPLICATION(S): at 06:25

## 2025-05-13 RX ADMIN — LAMIVUDINE 100 MILLIGRAM(S): 10 SOLUTION ORAL at 09:15

## 2025-05-13 RX ADMIN — DORZOLAMIDE 1 DROP(S): 20 SOLUTION/ DROPS OPHTHALMIC at 09:16

## 2025-05-13 RX ADMIN — METOPROLOL SUCCINATE 50 MILLIGRAM(S): 50 TABLET, EXTENDED RELEASE ORAL at 06:22

## 2025-05-13 RX ADMIN — POLYMYXIN B SULFATE AND TRIMETHOPRIM SULFATE 1 DROP(S): 10000; 1 SOLUTION/ DROPS OPHTHALMIC at 09:17

## 2025-05-13 RX ADMIN — TRAMADOL HYDROCHLORIDE 50 MILLIGRAM(S): 50 TABLET, FILM COATED ORAL at 01:10

## 2025-05-13 RX ADMIN — Medication 975 MILLIGRAM(S): at 06:22

## 2025-05-13 RX ADMIN — INSULIN LISPRO 2: 100 INJECTION, SOLUTION INTRAVENOUS; SUBCUTANEOUS at 09:14

## 2025-05-13 RX ADMIN — DOLUTEGRAVIR SODIUM 50 MILLIGRAM(S): 5 TABLET, FOR SUSPENSION ORAL at 09:15

## 2025-05-13 RX ADMIN — GABAPENTIN 100 MILLIGRAM(S): 400 CAPSULE ORAL at 12:29

## 2025-05-13 RX ADMIN — Medication 25 MILLIGRAM(S): at 09:15

## 2025-05-13 RX ADMIN — GABAPENTIN 100 MILLIGRAM(S): 400 CAPSULE ORAL at 06:22

## 2025-05-13 RX ADMIN — Medication 975 MILLIGRAM(S): at 12:30

## 2025-05-13 RX ADMIN — Medication 10 MILLIGRAM(S): at 00:26

## 2025-05-13 RX ADMIN — TIMOLOL MALEATE 1 DROP(S): 6.8 SOLUTION OPHTHALMIC at 06:26

## 2025-05-13 RX ADMIN — TRAMADOL HYDROCHLORIDE 50 MILLIGRAM(S): 50 TABLET, FILM COATED ORAL at 00:25

## 2025-05-13 RX ADMIN — OXYCODONE HYDROCHLORIDE 2.5 MILLIGRAM(S): 30 TABLET ORAL at 02:11

## 2025-05-13 RX ADMIN — BRIMONIDINE TARTRATE 1 DROP(S): 1.5 SOLUTION/ DROPS OPHTHALMIC at 06:26

## 2025-05-13 RX ADMIN — AMLODIPINE BESYLATE 10 MILLIGRAM(S): 10 TABLET ORAL at 06:22

## 2025-05-13 RX ADMIN — Medication 2 DROP(S): at 12:31

## 2025-05-13 RX ADMIN — Medication 2 DROP(S): at 06:28

## 2025-05-13 RX ADMIN — Medication 25 MILLIGRAM(S): at 06:22

## 2025-05-13 NOTE — PROGRESS NOTE ADULT - ASSESSMENT
63 YOM DM, HIV on HAART admitted after fall.  Nephrology consulted for progressive worsening CKD.    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  Placed on IV venofer. epo.  HTN: BP controlled- continue amlodipine, metoprolol, Hydralazine  HIV on Bikatrvy- meds changed as per ID  DM: discontinued metformin (low GFR), placed on Lantus + SSI ACHS    Plan   Patient has been refusing hemodialysis   Start on sodium bicarbonate tablets   Remove permacath   Patient will need close Nephrology follow up   Can be discharged after permacath removal   Discussed with Dr El   63 YOM DM, HIV on HAART admitted after fall.  Nephrology consulted for progressive worsening CKD.    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  Placed on IV venofer. epo.  HTN: BP controlled- continue amlodipine, metoprolol, Hydralazine  HIV on Bikatrvy- meds changed as per ID  DM: discontinued metformin (low GFR), placed on Lantus + SSI ACHS    Plan   Patient has been refusing hemodialysis   Start on sodium bicarbonate tablets 1300 mg bid  Remove permacath   Patient will need close Nephrology follow up   Can be discharged after permacath removal   Discussed with Dr El

## 2025-05-13 NOTE — CHART NOTE - NSCHARTNOTEFT_GEN_A_CORE
Source: Patient and chart review    Current Diet: Consistent Carbohydrate Renal/No Snacks:   DASH/TLC {Sodium & Cholesterol Restricted} (DASH)  Supplement Feeding Modality:  Oral  Nepro Cans or Servings Per Day:  1         Frequency:  Two Times a day (05-09-25)    Patient visited, reports decreased PO intake in the past three days. Breakfast tray at bedside with 25% food waste. Pt reports drinking oral nutritional supplement in replace of meals when appetite is poor. Reports UBW of 190 lbs and unsure of any recent weight changes. Denies any swallowing or chewing problems at this time.     PO intake:  Variable (25-75%)    Source for PO intake: Patient and flowsheets     Current Weight:   (4/4)   174.6 lbs  (4/21) 161.3 lbs  (4/26) 176.5 lbs  (4/30) 155.6 lbs  (5/1)   177 lbs - most recent     Pertinent Medications:   MEDICATIONS  (STANDING):  atorvastatin 40 milliGRAM(s) Oral at bedtime  gabapentin 100 milliGRAM(s) Oral three times a day  glucagon  Injectable 1 milliGRAM(s) IntraMuscular once  insulin glargine Injectable (LANTUS) 15 Unit(s) SubCutaneous at bedtime  insulin lispro (ADMELOG) corrective regimen sliding scale   SubCutaneous Before meals and at bedtime  metoprolol tartrate 50 milliGRAM(s) Oral two times a day  senna 2 Tablet(s) Oral at bedtime    MEDICATIONS  (PRN):  aluminum hydroxide/magnesium hydroxide/simethicone Suspension 30 milliLiter(s) Oral every 4 hours PRN Dyspepsia  dextrose Oral Gel 15 Gram(s) Oral once PRN Blood Glucose LESS THAN 70 milliGRAM(s)/deciliter  ondansetron Injectable 4 milliGRAM(s) IV Push every 8 hours PRN Nausea and/or Vomiting    Pertinent Labs:  WBC Count : 7.38 K/uL  RBC Count : 3.38 M/uL  Hemoglobin : 9.7 g/dL  Hematocrit : 30.4 %    Skin: no pressure injury noted     Estimated Needs:   [X] no change since previous assessment  ~1968-6808 kcal (25-30 kcal/kg 81.6kg)  ~82-98g protein (1-1.2g/kg 81.6kg)    65 yo Male admitted for BRIDGETTE on CKD 3B, recent bilateral cataract surgery, HTN, DM2, s/p fall, elevated troponin, HIV, back pain, acute metabolic encephalopathy. Per chart  "Pt refusing HD now despite multiple attempt. Ethics has been involved. Patient does not have capacity but cannot perform any HD until life threatening".     Current Nutrition Diagnosis: Altered nutrition related lab values related to renal and endocrine dysfunction as evidenced by HgA1c 8.2%, elevated glucose, Cr, BUN, and decreased GFR.    Recommendations:   1) Continue diet as tolerated.   2) Pt with decreased PO intake, continue Nepro oral nutritional supplement to promote PO intake.   3) Monitor BG levels, correct prn   4) Recommend Nephrovite daily.   5) Monitor diet tolerance & PO intake, weight/hydration status, nutrition-related labs, and skin.     Monitoring and Evaluation:   [x] PO intake [x] Tolerance to diet prescription [X] Weights  [X] Follow up per protocol [X] Labs: chem 8, POCT glucose, renal labs

## 2025-05-13 NOTE — PROGRESS NOTE ADULT - SUBJECTIVE AND OBJECTIVE BOX
[facility] DIVISION OF KIDNEY DISEASES AND HYPERTENSION -- PROGRESS NOTE    24 hour events/subjective:  Seen today   He continues to refuse hemodialysis   Planned to be discharged to a shelter today with outpatient Nephrology follow up    MEDICATIONS    Standing Inpatient Medications  acetaminophen     Tablet .. 975 milliGRAM(s) Oral every 8 hours  amLODIPine   Tablet 10 milliGRAM(s) Oral daily  artificial tears (preservative free) Ophthalmic Solution 2 Drop(s) Both EYES every 8 hours  atorvastatin 40 milliGRAM(s) Oral at bedtime  bisacodyl Suppository 10 milliGRAM(s) Rectal once  brimonidine 0.2% Ophthalmic Solution 1 Drop(s) Both EYES two times a day  chlorhexidine 2% Cloths 1 Application(s) Topical <User Schedule>  chlorhexidine 4% Liquid 1 Application(s) Topical <User Schedule>  dextrose 5%. 1000 milliLiter(s) IV Continuous <Continuous>  dextrose 5%. 1000 milliLiter(s) IV Continuous <Continuous>  dextrose 50% Injectable 25 Gram(s) IV Push once  dextrose 50% Injectable 12.5 Gram(s) IV Push once  dextrose 50% Injectable 25 Gram(s) IV Push once  dolutegravir 50 milliGRAM(s) Oral daily  dorzolamide 2% Ophthalmic Solution 1 Drop(s) Both EYES <User Schedule>  epoetin landry-epbx (RETACRIT) Injectable 4000 Unit(s) IV Push <User Schedule>  gabapentin 100 milliGRAM(s) Oral three times a day  glucagon  Injectable 1 milliGRAM(s) IntraMuscular once  hydrALAZINE 25 milliGRAM(s) Oral three times a day  insulin glargine Injectable (LANTUS) 15 Unit(s) SubCutaneous at bedtime  insulin lispro (ADMELOG) corrective regimen sliding scale   SubCutaneous Before meals and at bedtime  lamiVUDine- milliGRAM(s) Oral daily  latanoprost 0.005% Ophthalmic Solution 1 Drop(s) Both EYES at bedtime  lidocaine   4% Patch 1 Patch Transdermal every 24 hours  metoprolol tartrate 50 milliGRAM(s) Oral two times a day  polyethylene glycol 3350 17 Gram(s) Oral two times a day  senna 2 Tablet(s) Oral at bedtime  timolol 0.5% Solution 1 Drop(s) Both EYES two times a day  trimethoprim/polymyxin Solution 1 Drop(s) Both EYES daily    PRN Inpatient Medications  aluminum hydroxide/magnesium hydroxide/simethicone Suspension 30 milliLiter(s) Oral every 4 hours PRN  dextrose Oral Gel 15 Gram(s) Oral once PRN  hydrALAZINE Injectable 10 milliGRAM(s) IV Push every 6 hours PRN  meclizine 25 milliGRAM(s) Oral three times a day PRN  melatonin 3 milliGRAM(s) Oral at bedtime PRN  ondansetron Injectable 4 milliGRAM(s) IV Push every 8 hours PRN  sodium chloride 0.65% Nasal 1 Spray(s) Both Nostrils five times a day PRN  sodium chloride 0.9% lock flush 10 milliLiter(s) IV Push every 1 hour PRN  traMADol 25 milliGRAM(s) Oral every 6 hours PRN  traMADol 50 milliGRAM(s) Oral every 6 hours PRN      VITALS/PHYSICAL EXAM  --------------------------------------------------------------------------------  T(C): 36.6 (05-13-25 @ 08:01), Max: 36.8 (05-13-25 @ 05:18)  HR: 69 (05-13-25 @ 08:01) (69 - 82)  BP: 140/78 (05-13-25 @ 08:01) (140/78 - 174/77)  RR: 17 (05-13-25 @ 08:01) (16 - 18)  SpO2: 95% (05-13-25 @ 08:01) (95% - 98%)  Wt(kg): --        05-12-25 @ 07:01  -  05-13-25 @ 07:00  --------------------------------------------------------  IN: 648 mL / OUT: 0 mL / NET: 648 mL      Physical Exam:  Gen: NAD, well-appearing  HEENT: normal  Pulm: CTA B/L  CV: normal S1S2; no rub, no edema  Abd: soft, non-tender  MSK no deformities   Neuro: Alert, answering questions     LABS/STUDIES  --------------------------------------------------------------------------------  141  |  110  |  52.1  ----------------------------<  193      [05-13-25 @ 07:05]  4.6   |  18.0  |  4.92        Ca     8.6     [05-13-25 @ 07:05]            Creatinine Trend:  SCr 4.92 [05-13 @ 07:05]  SCr 5.01 [05-11 @ 06:15]  SCr 4.97 [05-09 @ 05:40]  SCr 4.84 [05-07 @ 05:11]  SCr 5.25 [05-06 @ 06:40]

## 2025-05-13 NOTE — PROGRESS NOTE ADULT - PROVIDER SPECIALTY LIST ADULT
Cardiology
Ethics
Hospitalist
Intervent Radiology
Intervent Radiology
Nephrology
Ethics
Hospitalist
Internal Medicine
Internal Medicine
Nephrology
Ethics
Ethics
Hospitalist
Internal Medicine
Intervent Radiology
Nephrology
Hospitalist
Nephrology
Nephrology
Hospitalist
Nephrology
Cardiology
Hospitalist
Cardiology

## 2025-05-13 NOTE — PROGRESS NOTE ADULT - SUBJECTIVE AND OBJECTIVE BOX
Interventional Radiology Follow-Up Note    This is a 64y Male s/p renal biopsy on 4/7 and Tunnelled Dialysis Catheter placement on 4/11.     IR reconsulted for removal of TDC.     Medication:  MEDICATIONS  (STANDING):  acetaminophen     Tablet .. 975 milliGRAM(s) Oral every 8 hours  amLODIPine   Tablet 10 milliGRAM(s) Oral daily  artificial tears (preservative free) Ophthalmic Solution 2 Drop(s) Both EYES every 8 hours  atorvastatin 40 milliGRAM(s) Oral at bedtime  bisacodyl Suppository 10 milliGRAM(s) Rectal once  brimonidine 0.2% Ophthalmic Solution 1 Drop(s) Both EYES two times a day  chlorhexidine 2% Cloths 1 Application(s) Topical <User Schedule>  chlorhexidine 4% Liquid 1 Application(s) Topical <User Schedule>  dextrose 5%. 1000 milliLiter(s) (100 mL/Hr) IV Continuous <Continuous>  dextrose 5%. 1000 milliLiter(s) (50 mL/Hr) IV Continuous <Continuous>  dextrose 50% Injectable 25 Gram(s) IV Push once  dextrose 50% Injectable 12.5 Gram(s) IV Push once  dextrose 50% Injectable 25 Gram(s) IV Push once  dolutegravir 50 milliGRAM(s) Oral daily  dorzolamide 2% Ophthalmic Solution 1 Drop(s) Both EYES <User Schedule>  epoetin landry-epbx (RETACRIT) Injectable 4000 Unit(s) IV Push <User Schedule>  gabapentin 100 milliGRAM(s) Oral three times a day  glucagon  Injectable 1 milliGRAM(s) IntraMuscular once  hydrALAZINE 25 milliGRAM(s) Oral three times a day  insulin glargine Injectable (LANTUS) 15 Unit(s) SubCutaneous at bedtime  insulin lispro (ADMELOG) corrective regimen sliding scale   SubCutaneous Before meals and at bedtime  lamiVUDine- milliGRAM(s) Oral daily  latanoprost 0.005% Ophthalmic Solution 1 Drop(s) Both EYES at bedtime  lidocaine   4% Patch 1 Patch Transdermal every 24 hours  metoprolol tartrate 50 milliGRAM(s) Oral two times a day  polyethylene glycol 3350 17 Gram(s) Oral two times a day  senna 2 Tablet(s) Oral at bedtime  timolol 0.5% Solution 1 Drop(s) Both EYES two times a day  trimethoprim/polymyxin Solution 1 Drop(s) Both EYES daily    MEDICATIONS  (PRN):  aluminum hydroxide/magnesium hydroxide/simethicone Suspension 30 milliLiter(s) Oral every 4 hours PRN Dyspepsia  dextrose Oral Gel 15 Gram(s) Oral once PRN Blood Glucose LESS THAN 70 milliGRAM(s)/deciliter  hydrALAZINE Injectable 10 milliGRAM(s) IV Push every 6 hours PRN sbp>159  meclizine 25 milliGRAM(s) Oral three times a day PRN Dizziness  melatonin 3 milliGRAM(s) Oral at bedtime PRN Insomnia  ondansetron Injectable 4 milliGRAM(s) IV Push every 8 hours PRN Nausea and/or Vomiting  sodium chloride 0.65% Nasal 1 Spray(s) Both Nostrils five times a day PRN Nasal Congestion  sodium chloride 0.9% lock flush 10 milliLiter(s) IV Push every 1 hour PRN Pre/post blood products, medications, blood draw, and to maintain line patency  traMADol 25 milliGRAM(s) Oral every 6 hours PRN Moderate Pain (4 - 6)  traMADol 50 milliGRAM(s) Oral every 6 hours PRN Severe Pain (7 - 10)      Vitals:  ICU Vital Signs Last 24 Hrs  T(C): 36.6 (13 May 2025 08:01), Max: 36.8 (13 May 2025 05:18)  T(F): 97.9 (13 May 2025 08:01), Max: 98.2 (13 May 2025 05:18)  HR: 69 (13 May 2025 08:01) (69 - 82)  BP: 140/78 (13 May 2025 08:01) (140/78 - 174/77)  BP(mean): --  ABP: --  ABP(mean): --  RR: 17 (13 May 2025 08:01) (16 - 18)  SpO2: 95% (13 May 2025 08:01) (95% - 98%)    O2 Parameters below as of 13 May 2025 08:01  Patient On (Oxygen Delivery Method): room air    I&O's Detail    12 May 2025 07:01  -  13 May 2025 07:00  --------------------------------------------------------  IN:    Oral Fluid: 648 mL  Total IN: 648 mL    OUT:  Total OUT: 0 mL    Total NET: 648 mL          LABS:                        9.7    7.38  )-----------( 267      ( 13 May 2025 07:05 )             30.4     05-13    141  |  110[H]  |  52.1[H]  ----------------------------<  193[H]  4.6   |  18.0[L]  |  4.92[H]    Ca    8.6      13 May 2025 07:05        Urinalysis Basic - ( 13 May 2025 07:05 )    Color: x / Appearance: x / SG: x / pH: x  Gluc: 193 mg/dL / Ketone: x  / Bili: x / Urobili: x   Blood: x / Protein: x / Nitrite: x   Leuk Esterase: x / RBC: x / WBC x   Sq Epi: x / Non Sq Epi: x / Bacteria: x

## 2025-05-13 NOTE — PROGRESS NOTE ADULT - ASSESSMENT
Patient is a 64 year-old male admitted for BRIDGETTE on CKD. IR consulted for TDC removal. Patient has been refusing HD (Last session 4/29). Chart reviewed patient initially was planned to be discharge to [facility] with permacat and outpatient nephrology follow up. Now patient is planned for discharge to [facility] and nephrology following in 7-10 days. Per nephrology, the patient remains at risk of hyperkalemia, metabolic acidosis and adverse renal outcome including death given his advanced CKD.  Discussed case with Dr. Muniz, would need nephrology to recommend removal of TDC given that the patient is at risk for adverse renal outcomes.       Please call extension [phone] with any questions, concerns or issues regarding above.

## 2025-05-13 NOTE — CHART NOTE - NSCHARTNOTEFT_GEN_A_CORE
IR called for removal of TDC per nephrology as patient is refusing dialysis. The right chest wall was prepped in normal sterile fashion. 5cc of lidocaine was then injected along the catheters tract. The catheter was removed in its entirety after blunt dissection. Pressure was held for >5 min, no obvious bleed or hematoma noted after removal. The insertion site was closed with Dermabond.

## 2025-05-19 RX ORDER — LATANOPROST PF 0.05 MG/ML
1 SOLUTION/ DROPS OPHTHALMIC
Qty: 1 | Refills: 0
Start: 2025-05-19

## 2025-05-19 RX ORDER — SITAGLIPTIN 100 MG/1
1 TABLET, FILM COATED ORAL
Qty: 30 | Refills: 0
Start: 2025-05-19 | End: 2025-06-17

## 2025-05-19 RX ORDER — DOLUTEGRAVIR SODIUM 5 MG/1
1 TABLET, FOR SUSPENSION ORAL
Qty: 30 | Refills: 0
Start: 2025-05-19 | End: 2025-06-17

## 2025-05-19 RX ORDER — SODIUM BICARBONATE 1 MEQ/ML
2 SYRINGE (ML) INTRAVENOUS
Qty: 120 | Refills: 0
Start: 2025-05-19 | End: 2025-06-17

## 2025-05-19 RX ORDER — LAMIVUDINE 10 MG/ML
1 SOLUTION ORAL
Qty: 30 | Refills: 0
Start: 2025-05-19 | End: 2025-06-17

## 2025-05-19 RX ORDER — DORZOLAMIDE 20 MG/ML
1 SOLUTION/ DROPS OPHTHALMIC
Qty: 1 | Refills: 0
Start: 2025-05-19 | End: 2025-06-17

## 2025-05-19 NOTE — CHART NOTE - NSCHARTNOTEFT_GEN_A_CORE
Post-Discharge Medication Review: Unable to be reached	  	  Intervention Note	  	  Patient with several potential discrepancies with discharge medications; was initially unable to reach patient on 5/16 using phone numbers in profile. Reached out to the inpatient team, who recommended reaching out to social work, who was able to provider phone number for patient's shelter ([phone]). Contacted patient on 5/16 to see what his preferred pharmacy is for any medication adjustments, pending recommendations by inpatient team.    Discussed the following with the inpatient team:  1. Patient was on Biktarvy from home, inpatient changed to dolutegravir 50 mg daily and lamivudine 100 mg daily per ID due to patient's renal function - however patient was discharged back on Biktarvy.  Inpatient team sent new prescriptions for dolutegravir and lamivudine to the patient's pharmacy.  2. For diabetes management, inpatient patient was on Lantus 15 units at bedtime, and was discharged only on Jardiance. Jardiance is not recommended in dialysis patients (although technically patient was refusing HD, not studied in GFR <20).  Inpatient team changed therapy to Januvia 25 mg daily, with prescription sent to patient's pharmacy.  3. Nephrology note on 5/13 recommended starting sodium bicarbonate 1300 mg twice a day but this was not started. Inpatient team sent new prescription to the patient's pharmacy.    Upon trying to reach the patient today, pharmacist was informed by the patient's shelter that he was taken to a [facility] hospital earlier in the day. Above changes and recommendations were not able to be discussed with the patient.    Patient's preferred pharmacy was updated in OMR: [facility] in [place]	  Medication reconciliation not completed.	  	  Deanne Isabel PharmD	  Clinical Pharmacy Specialist, Pharmacy Telehealth Team	  Can be reached via MS Teams or [phone] Post-Discharge Medication Review: Unable to be reached	  	  Intervention Note	  	  Patient with several potential discrepancies with discharge medications; was initially unable to reach patient on 5/16 for scheduled post-discharge counseling visit using phone numbers in profile. Reached out to the inpatient team, who recommended reaching out to social work, who was able to provider phone number for patient's shelter ([phone]). Contacted patient on 5/16 to see what his preferred pharmacy is for any medication adjustments, pending recommendations by inpatient team.    Discussed the following with the inpatient team:  1. Patient was on Biktarvy from home, inpatient changed to dolutegravir 50 mg daily and lamivudine 100 mg daily per ID due to patient's renal function - however patient was discharged back on Biktarvy.  Inpatient team sent new prescriptions for dolutegravir and lamivudine to the patient's pharmacy.  2. For diabetes management, inpatient patient was on Lantus 15 units at bedtime, and was discharged only on Jardiance. Jardiance is not recommended in dialysis patients (although technically patient was refusing HD, not studied in GFR <20).  Inpatient team changed therapy to Januvia 25 mg daily, with prescription sent to patient's pharmacy.  3. Nephrology note on 5/13 recommended starting sodium bicarbonate 1300 mg twice a day but this was not started. Inpatient team sent new prescription to the patient's pharmacy.    Upon trying to reach the patient today, pharmacist was informed by the patient's shelter that he was taken to a [facility] earlier in the day. Above changes and recommendations were not able to be discussed with the patient.    Patient's preferred pharmacy was [facility] in OMR: [facility] [place]	  Medication reconciliation not completed.	  	  Deanne Isabel, Jamir	  Clinical Pharmacy Specialist, Pharmacy Telehealth Team	  Can be reached via MS Teams or [phone]

## 2025-05-19 NOTE — CHART NOTE - NSCHARTNOTESELECT_GEN_ALL_CORE
DME/Event Note
ETHICS/Event Note
Event Note
IR/Event Note
Nutrition Services
Nutrition Services
Post-Discharge Note
Event Note
Nutrition Services
refusal of NST/Event Note

## 2025-06-06 NOTE — ED PROVIDER NOTE - NSICDXFAMILYHX_GEN_ALL_CORE_FT
FAMILY HISTORY:  Father  Still living? Unknown  FH: alcoholism, Age at diagnosis: Age Unknown    Mother  Still living? Unknown  Family history of coronary artery disease in mother, Age at diagnosis: Age Unknown  Family history of diabetes mellitus (DM), Age at diagnosis: Age Unknown    
Kcal/protein

## 2025-06-11 NOTE — PROGRESS NOTE ADULT - SUBJECTIVE AND OBJECTIVE BOX
[Normal Development] : Normal Development RAFAEL BEEBEROEGRIO    [id number]    64y      Male    CC: ESRD    INTERVAL HPI/OVERNIGHT EVENTS: Pt seen and examined. No acute events reported o/n     REVIEW OF SYSTEMS:    CONSTITUTIONAL: No fever, weight loss  RESPIRATORY: No cough, wheezing, hemoptysis  CARDIOVASCULAR: No chest pain, palpitations  GASTROINTESTINAL: No abdominal or epigastric pain. No nausea, vomiting    Vital Signs Last 24 Hrs  T(C): 36.8 (29 Apr 2025 08:40), Max: 36.8 (28 Apr 2025 21:50)  T(F): 98.2 (29 Apr 2025 08:40), Max: 98.3 (28 Apr 2025 21:50)  HR: 62 (29 Apr 2025 08:40) (62 - 65)  BP: 158/81 (29 Apr 2025 08:40) (135/67 - 162/79)  BP(mean): --  RR: 18 (29 Apr 2025 08:40) (15 - 18)  SpO2: 98% (29 Apr 2025 08:40) (95% - 98%)    Parameters below as of 29 Apr 2025 08:40  Patient On (Oxygen Delivery Method): room air        PHYSICAL EXAM:    GENERAL: NAD  CHEST/LUNG: Clear to auscultation bilaterally  HEART: S1S2+, Regular rate and rhythm  ABDOMEN: Soft, Nontender, Nondistended  SKIN: warm, dry   NEURO: Awake, alert     LABS:                        9.8    8.41  )-----------( 280      ( 29 Apr 2025 04:15 )             30.1     04-29    138  |  102  |  52.6[H]  ----------------------------<  290[H]  4.9   |  23.0  |  5.74[H]    Ca    8.1[L]      29 Apr 2025 04:15  Phos  4.7     04-29  Mg     2.1     04-29        Urinalysis Basic - ( 29 Apr 2025 04:15 )    Color: x / Appearance: x / SG: x / pH: x  Gluc: 290 mg/dL / Ketone: x  / Bili: x / Urobili: x   Blood: x / Protein: x / Nitrite: x   Leuk Esterase: x / RBC: x / WBC x   Sq Epi: x / Non Sq Epi: x / Bacteria: x          MEDICATIONS  (STANDING):  acetaminophen     Tablet .. 975 milliGRAM(s) Oral every 8 hours  amLODIPine   Tablet 10 milliGRAM(s) Oral daily  artificial tears (preservative free) Ophthalmic Solution 2 Drop(s) Both EYES every 8 hours  atorvastatin 40 milliGRAM(s) Oral at bedtime  brimonidine 0.2% Ophthalmic Solution 1 Drop(s) Both EYES two times a day  chlorhexidine 2% Cloths 1 Application(s) Topical <User Schedule>  chlorhexidine 4% Liquid 1 Application(s) Topical <User Schedule>  dextrose 5%. 1000 milliLiter(s) (50 mL/Hr) IV Continuous <Continuous>  dextrose 5%. 1000 milliLiter(s) (100 mL/Hr) IV Continuous <Continuous>  dextrose 50% Injectable 25 Gram(s) IV Push once  dextrose 50% Injectable 12.5 Gram(s) IV Push once  dextrose 50% Injectable 25 Gram(s) IV Push once  dolutegravir 50 milliGRAM(s) Oral daily  dorzolamide 2% Ophthalmic Solution 1 Drop(s) Both EYES <User Schedule>  epoetin landry-epbx (RETACRIT) Injectable 4000 Unit(s) IV Push <User Schedule>  gabapentin 100 milliGRAM(s) Oral three times a day  glucagon  Injectable 1 milliGRAM(s) IntraMuscular once  insulin glargine Injectable (LANTUS) 10 Unit(s) SubCutaneous at bedtime  insulin lispro (ADMELOG) corrective regimen sliding scale   SubCutaneous three times a day before meals  lamiVUDine- milliGRAM(s) Oral daily  latanoprost 0.005% Ophthalmic Solution 1 Drop(s) Both EYES at bedtime  lidocaine   4% Patch 1 Patch Transdermal every 24 hours  metoprolol tartrate 50 milliGRAM(s) Oral two times a day  timolol 0.5% Solution 1 Drop(s) Both EYES two times a day  trimethoprim/polymyxin Solution 1 Drop(s) Both EYES daily    MEDICATIONS  (PRN):  aluminum hydroxide/magnesium hydroxide/simethicone Suspension 30 milliLiter(s) Oral every 4 hours PRN Dyspepsia  dextrose Oral Gel 15 Gram(s) Oral once PRN Blood Glucose LESS THAN 70 milliGRAM(s)/deciliter  hydrALAZINE Injectable 10 milliGRAM(s) IV Push every 6 hours PRN sbp>159  melatonin 3 milliGRAM(s) Oral at bedtime PRN Insomnia  OLANZapine Injectable 5 milliGRAM(s) IntraMuscular every 6 hours PRN Agitation  ondansetron Injectable 4 milliGRAM(s) IV Push every 8 hours PRN Nausea and/or Vomiting  oxyCODONE    IR 10 milliGRAM(s) Oral every 6 hours PRN Severe Pain (7 - 10)  oxyCODONE    IR 5 milliGRAM(s) Oral every 6 hours PRN Moderate Pain (4 - 6)  sodium chloride 0.9% lock flush 10 milliLiter(s) IV Push every 1 hour PRN Pre/post blood products, medications, blood draw, and to maintain line patency      RADIOLOGY & ADDITIONAL TESTS:   [Passed] : passed [FreeTextEntry1] : States she has no depressive symptoms, enjoying time with baby

## 2025-06-19 ENCOUNTER — EMERGENCY (EMERGENCY)
Facility: HOSPITAL | Age: 65
LOS: 0 days | Discharge: HOME | End: 2025-06-20
Attending: STUDENT IN AN ORGANIZED HEALTH CARE EDUCATION/TRAINING PROGRAM
Payer: MEDICAID

## 2025-06-19 VITALS
HEIGHT: 69 IN | RESPIRATION RATE: 17 BRPM | HEART RATE: 80 BPM | DIASTOLIC BLOOD PRESSURE: 92 MMHG | OXYGEN SATURATION: 96 % | TEMPERATURE: 98 F | WEIGHT: 190.04 LBS | SYSTOLIC BLOOD PRESSURE: 189 MMHG

## 2025-06-19 DIAGNOSIS — Z59.00 HOMELESSNESS UNSPECIFIED: ICD-10-CM

## 2025-06-19 DIAGNOSIS — Z98.42 CATARACT EXTRACTION STATUS, LEFT EYE: ICD-10-CM

## 2025-06-19 DIAGNOSIS — I50.22 CHRONIC SYSTOLIC (CONGESTIVE) HEART FAILURE: ICD-10-CM

## 2025-06-19 DIAGNOSIS — Z98.1 ARTHRODESIS STATUS: Chronic | ICD-10-CM

## 2025-06-19 DIAGNOSIS — G89.29 OTHER CHRONIC PAIN: ICD-10-CM

## 2025-06-19 DIAGNOSIS — H53.8 OTHER VISUAL DISTURBANCES: ICD-10-CM

## 2025-06-19 DIAGNOSIS — E11.22 TYPE 2 DIABETES MELLITUS WITH DIABETIC CHRONIC KIDNEY DISEASE: ICD-10-CM

## 2025-06-19 DIAGNOSIS — Z21 ASYMPTOMATIC HUMAN IMMUNODEFICIENCY VIRUS [HIV] INFECTION STATUS: ICD-10-CM

## 2025-06-19 DIAGNOSIS — M25.561 PAIN IN RIGHT KNEE: ICD-10-CM

## 2025-06-19 DIAGNOSIS — N18.9 CHRONIC KIDNEY DISEASE, UNSPECIFIED: ICD-10-CM

## 2025-06-19 DIAGNOSIS — Z98.41 CATARACT EXTRACTION STATUS, RIGHT EYE: ICD-10-CM

## 2025-06-19 LAB
ALBUMIN SERPL ELPH-MCNC: 2.1 G/DL — LOW (ref 3.3–5)
ALP SERPL-CCNC: 80 U/L — SIGNIFICANT CHANGE UP (ref 40–120)
ALT FLD-CCNC: 31 U/L — SIGNIFICANT CHANGE UP (ref 12–78)
ANION GAP SERPL CALC-SCNC: 6 MMOL/L — SIGNIFICANT CHANGE UP (ref 5–17)
AST SERPL-CCNC: 8 U/L — LOW (ref 15–37)
BASOPHILS # BLD AUTO: 0.04 K/UL — SIGNIFICANT CHANGE UP (ref 0–0.2)
BASOPHILS # BLD AUTO: 0.04 K/UL — SIGNIFICANT CHANGE UP (ref 0–0.2)
BASOPHILS NFR BLD AUTO: 0.5 % — SIGNIFICANT CHANGE UP (ref 0–2)
BASOPHILS NFR BLD AUTO: 0.5 % — SIGNIFICANT CHANGE UP (ref 0–2)
BILIRUB SERPL-MCNC: 0.2 MG/DL — SIGNIFICANT CHANGE UP (ref 0.2–1.2)
BUN SERPL-MCNC: 63 MG/DL — HIGH (ref 7–23)
CALCIUM SERPL-MCNC: 8.5 MG/DL — SIGNIFICANT CHANGE UP (ref 8.5–10.1)
CHLORIDE SERPL-SCNC: 120 MMOL/L — HIGH (ref 96–108)
CO2 SERPL-SCNC: 17 MMOL/L — LOW (ref 22–31)
CREAT SERPL-MCNC: 4.91 MG/DL — HIGH (ref 0.5–1.3)
EGFR: 12 ML/MIN/1.73M2 — LOW
EGFR: 12 ML/MIN/1.73M2 — LOW
EOSINOPHIL # BLD AUTO: 0.16 K/UL — SIGNIFICANT CHANGE UP (ref 0–0.5)
EOSINOPHIL # BLD AUTO: 0.16 K/UL — SIGNIFICANT CHANGE UP (ref 0–0.5)
EOSINOPHIL NFR BLD AUTO: 2 % — SIGNIFICANT CHANGE UP (ref 0–6)
EOSINOPHIL NFR BLD AUTO: 2.1 % — SIGNIFICANT CHANGE UP (ref 0–6)
GLUCOSE SERPL-MCNC: 200 MG/DL — HIGH (ref 70–99)
HCT VFR BLD CALC: 25.5 % — LOW (ref 39–50)
HCT VFR BLD CALC: 27.2 % — LOW (ref 39–50)
HCT VFR BLD CALC: 29.7 % — LOW (ref 39–50)
HGB BLD-MCNC: 8.1 G/DL — LOW (ref 13–17)
HGB BLD-MCNC: 8.6 G/DL — LOW (ref 13–17)
HGB BLD-MCNC: 9.1 G/DL — LOW (ref 13–17)
IMM GRANULOCYTES # BLD AUTO: 0.09 K/UL — HIGH (ref 0–0.07)
IMM GRANULOCYTES # BLD AUTO: 0.13 K/UL — HIGH (ref 0–0.07)
IMM GRANULOCYTES NFR BLD AUTO: 1.2 % — HIGH (ref 0–0.9)
IMM GRANULOCYTES NFR BLD AUTO: 1.6 % — HIGH (ref 0–0.9)
LYMPHOCYTES # BLD AUTO: 1.71 K/UL — SIGNIFICANT CHANGE UP (ref 1–3.3)
LYMPHOCYTES # BLD AUTO: 1.73 K/UL — SIGNIFICANT CHANGE UP (ref 1–3.3)
LYMPHOCYTES NFR BLD AUTO: 21.7 % — SIGNIFICANT CHANGE UP (ref 13–44)
LYMPHOCYTES NFR BLD AUTO: 22.4 % — SIGNIFICANT CHANGE UP (ref 13–44)
MCHC RBC-ENTMCNC: 29.5 PG — SIGNIFICANT CHANGE UP (ref 27–34)
MCHC RBC-ENTMCNC: 29.5 PG — SIGNIFICANT CHANGE UP (ref 27–34)
MCHC RBC-ENTMCNC: 30.1 PG — SIGNIFICANT CHANGE UP (ref 27–34)
MCHC RBC-ENTMCNC: 30.6 G/DL — LOW (ref 32–36)
MCHC RBC-ENTMCNC: 31.6 G/DL — LOW (ref 32–36)
MCHC RBC-ENTMCNC: 31.8 G/DL — LOW (ref 32–36)
MCV RBC AUTO: 92.7 FL — SIGNIFICANT CHANGE UP (ref 80–100)
MCV RBC AUTO: 93.2 FL — SIGNIFICANT CHANGE UP (ref 80–100)
MCV RBC AUTO: 98.3 FL — SIGNIFICANT CHANGE UP (ref 80–100)
MONOCYTES # BLD AUTO: 0.71 K/UL — SIGNIFICANT CHANGE UP (ref 0–0.9)
MONOCYTES # BLD AUTO: 0.84 K/UL — SIGNIFICANT CHANGE UP (ref 0–0.9)
MONOCYTES NFR BLD AUTO: 10.9 % — SIGNIFICANT CHANGE UP (ref 2–14)
MONOCYTES NFR BLD AUTO: 9 % — SIGNIFICANT CHANGE UP (ref 2–14)
NEUTROPHILS # BLD AUTO: 4.86 K/UL — SIGNIFICANT CHANGE UP (ref 1.8–7.4)
NEUTROPHILS # BLD AUTO: 5.13 K/UL — SIGNIFICANT CHANGE UP (ref 1.8–7.4)
NEUTROPHILS NFR BLD AUTO: 62.9 % — SIGNIFICANT CHANGE UP (ref 43–77)
NEUTROPHILS NFR BLD AUTO: 65.2 % — SIGNIFICANT CHANGE UP (ref 43–77)
NRBC # BLD AUTO: 0 K/UL — SIGNIFICANT CHANGE UP (ref 0–0)
NRBC # FLD: 0 K/UL — SIGNIFICANT CHANGE UP (ref 0–0)
NRBC BLD AUTO-RTO: 0 /100 WBCS — SIGNIFICANT CHANGE UP (ref 0–0)
PLATELET # BLD AUTO: 245 K/UL — SIGNIFICANT CHANGE UP (ref 150–400)
PLATELET # BLD AUTO: 249 K/UL — SIGNIFICANT CHANGE UP (ref 150–400)
PLATELET # BLD AUTO: 251 K/UL — SIGNIFICANT CHANGE UP (ref 150–400)
PMV BLD: 8.4 FL — SIGNIFICANT CHANGE UP (ref 7–13)
PMV BLD: 8.7 FL — SIGNIFICANT CHANGE UP (ref 7–13)
PMV BLD: 9.1 FL — SIGNIFICANT CHANGE UP (ref 7–13)
POTASSIUM SERPL-MCNC: 4.7 MMOL/L — SIGNIFICANT CHANGE UP (ref 3.5–5.3)
POTASSIUM SERPL-SCNC: 4.7 MMOL/L — SIGNIFICANT CHANGE UP (ref 3.5–5.3)
PROT SERPL-MCNC: 6.1 GM/DL — SIGNIFICANT CHANGE UP (ref 6–8.3)
RBC # BLD: 2.75 M/UL — LOW (ref 4.2–5.8)
RBC # BLD: 2.92 M/UL — LOW (ref 4.2–5.8)
RBC # BLD: 3.02 M/UL — LOW (ref 4.2–5.8)
RBC # FLD: 15.6 % — HIGH (ref 10.3–14.5)
RBC # FLD: 15.7 % — HIGH (ref 10.3–14.5)
RBC # FLD: 15.8 % — HIGH (ref 10.3–14.5)
SODIUM SERPL-SCNC: 143 MMOL/L — SIGNIFICANT CHANGE UP (ref 135–145)
WBC # BLD: 7.55 K/UL — SIGNIFICANT CHANGE UP (ref 3.8–10.5)
WBC # BLD: 7.72 K/UL — SIGNIFICANT CHANGE UP (ref 3.8–10.5)
WBC # BLD: 7.88 K/UL — SIGNIFICANT CHANGE UP (ref 3.8–10.5)
WBC # FLD AUTO: 7.55 K/UL — SIGNIFICANT CHANGE UP (ref 3.8–10.5)
WBC # FLD AUTO: 7.72 K/UL — SIGNIFICANT CHANGE UP (ref 3.8–10.5)
WBC # FLD AUTO: 7.88 K/UL — SIGNIFICANT CHANGE UP (ref 3.8–10.5)

## 2025-06-19 PROCEDURE — 85025 COMPLETE CBC W/AUTO DIFF WBC: CPT

## 2025-06-19 PROCEDURE — 93005 ELECTROCARDIOGRAM TRACING: CPT

## 2025-06-19 PROCEDURE — 70450 CT HEAD/BRAIN W/O DYE: CPT | Mod: 26

## 2025-06-19 PROCEDURE — 85027 COMPLETE CBC AUTOMATED: CPT

## 2025-06-19 PROCEDURE — 99285 EMERGENCY DEPT VISIT HI MDM: CPT | Mod: 25

## 2025-06-19 PROCEDURE — 99285 EMERGENCY DEPT VISIT HI MDM: CPT

## 2025-06-19 PROCEDURE — 36415 COLL VENOUS BLD VENIPUNCTURE: CPT

## 2025-06-19 PROCEDURE — 96374 THER/PROPH/DIAG INJ IV PUSH: CPT

## 2025-06-19 PROCEDURE — 80053 COMPREHEN METABOLIC PANEL: CPT

## 2025-06-19 PROCEDURE — 70450 CT HEAD/BRAIN W/O DYE: CPT

## 2025-06-19 PROCEDURE — 93010 ELECTROCARDIOGRAM REPORT: CPT

## 2025-06-19 RX ORDER — KETOROLAC TROMETHAMINE 30 MG/ML
15 INJECTION, SOLUTION INTRAMUSCULAR; INTRAVENOUS ONCE
Refills: 0 | Status: DISCONTINUED | OUTPATIENT
Start: 2025-06-19 | End: 2025-06-19

## 2025-06-19 RX ORDER — ACETAMINOPHEN 500 MG/5ML
975 LIQUID (ML) ORAL ONCE
Refills: 0 | Status: COMPLETED | OUTPATIENT
Start: 2025-06-19 | End: 2025-06-19

## 2025-06-19 RX ORDER — AMLODIPINE BESYLATE 10 MG/1
10 TABLET ORAL ONCE
Refills: 0 | Status: COMPLETED | OUTPATIENT
Start: 2025-06-19 | End: 2025-06-19

## 2025-06-19 RX ORDER — METOPROLOL SUCCINATE 50 MG/1
50 TABLET, EXTENDED RELEASE ORAL ONCE
Refills: 0 | Status: COMPLETED | OUTPATIENT
Start: 2025-06-19 | End: 2025-06-19

## 2025-06-19 RX ADMIN — METOPROLOL SUCCINATE 50 MILLIGRAM(S): 50 TABLET, EXTENDED RELEASE ORAL at 15:22

## 2025-06-19 RX ADMIN — Medication 25 MILLIGRAM(S): at 15:22

## 2025-06-19 RX ADMIN — AMLODIPINE BESYLATE 10 MILLIGRAM(S): 10 TABLET ORAL at 15:21

## 2025-06-19 RX ADMIN — Medication 975 MILLIGRAM(S): at 12:21

## 2025-06-19 RX ADMIN — Medication 1000 MILLILITER(S): at 12:21

## 2025-06-19 RX ADMIN — KETOROLAC TROMETHAMINE 15 MILLIGRAM(S): 30 INJECTION, SOLUTION INTRAMUSCULAR; INTRAVENOUS at 12:24

## 2025-06-19 SDOH — ECONOMIC STABILITY - HOUSING INSECURITY: HOMELESSNESS UNSPECIFIED: Z59.00

## 2025-06-19 NOTE — ED PROVIDER NOTE - OBJECTIVE STATEMENT
65 year old male PMHx of DM, HTN Bilateral cataracts, HIV infection, chronic back pain, anxiety, insomnia, and head trauma brought by ambulance to the ED c/o right vision loss secondary to cataracts x1-1.5 years.  Pt been worked up in multiple hospitals for the same complain in the past. Today pt is endorsing headache, back pain and leg pain that has been worsening over the last 4-5 months but has been present for over one year. Pt is also complaining of difficulty opening eyes since this morning. Pt took his blood pressure medication this morning PTA.  #[id number]. 65 year old male PMHx of DM, HTN Bilateral cataracts, HIV infection, chronic back pain, anxiety, insomnia, and head trauma brought by ambulance to the ED c/o right vision loss secondary to cataracts x1-1.5 years.  Pt been worked up in multiple hospitals for the same complain in the past. Today pt is endorsing headache, back pain and leg pain that has been worsening over the last 4-5 months but has been present for over one year. Pt is also complaining of difficulty opening eyes since this morning. Pt sates that he has been having difficulty ambulating due to the pain in his legs and as a result has not followed up outpatient regarding his symptoms. Pt took his blood pressure medication this morning PTA.  #[id number]. 65 year old male PMHx of DM, HTN, bilateral cataracts, HIV infection, chronic back pain, anxiety, insomnia brought by ambulance to the ED c/o right vision loss secondary to cataracts x1-1.5 years.  Pt been worked up in multiple hospitals for the same complain in the past. Today pt is endorsing headache, back pain and leg pain that has been worsening over the last 4-5 months but has been present for over one year. Pt is also complaining of difficulty opening eyes since this morning. Pt took his blood pressure medication this morning PTA.  #[id number]. Patient is a 65 year-old-male with history of CKD, IDDM, HIV on HAART, HFrEF, recent bilateral cataract surgery and recent admission at [facility] presents with complaint of R vision loss and persistent headache/back pain. Reports that he has been having vision issues for the last 1.5 years and nothing has changed. Also c/o pain in his head and back which has been present for more than 1 year. Report that his pain worsen in the last 5 months but has not really changed. Reports that he has been to the hospital a lot for the same complaint. Reports that he felt that he was having difficulty opening his eyes today and thus presented here; currently denies any eye pain. Reports that he is currently homeless and would really want housing.   #[id number].

## 2025-06-19 NOTE — ED PROVIDER NOTE - IV ALTEPLASE EXCL REL HIDDEN
Patient seen and examined. He is doing relatively well. Surprisingly had significant abdominal pain after the surgery but it is getting better. His vital signs are normal with no fever or tachycardia. His abdomen is soft and nontender and his ostomy is already functioning and it is viable. The patient has stated that he has a lot of concern going home today and I spoke with his mom and she had the same concern and she said that he is already in some pain and they would like him to stay if possible. Also the patient has not been seen by the wound care team yet so I am going to ask the medicine team if they will be willing to transfer the patient to their service so he can be managed for medical issues.   Meanwhile will await for the wound care team as well and I asked the nursing team to change the dressing for his sacral decubitus ulcer show

## 2025-06-19 NOTE — ED PROVIDER NOTE - NSFOLLOWUPINSTRUCTIONS_ED_ALL_ED_FT
Rest, drink plenty of fluids.  Advance activity as tolerated.  Continue all previously prescribed medications as directed.  Follow up with your primary care physician in 48-72 hours- bring copies of your results.  Return to the ER for worsening or persistent symptoms, and/or ANY NEW OR CONCERNING SYMPTOMS. If you have issues obtaining follow up, please call: [phone] to obtain a doctor or specialist who takes your insurance in your area.

## 2025-06-19 NOTE — ED PROVIDER NOTE - CLINICAL SUMMARY MEDICAL DECISION MAKING FREE TEXT BOX
Patient is a 65 year-old-male with history of CKD, IDDM, HIV on HAART, HFrEF, recent bilateral cataract surgery and recent admission at [facility] presents with complaint of R vision loss and persistent headache/back pain for the last 1.5 years. On exam patient is non toxic appearing. Patient does not appear to have an acute complaint at this time. Low suspicion for orbital cellulitis or acute eye complaints. Reviewed prior inpatient records at [facility] and ophthalmology was consulted and no acute intervention was recommended. Plan to check labs and CT head. Will obtain  assistance in address housing issue.

## 2025-06-19 NOTE — ED PROVIDER NOTE - PHYSICAL EXAMINATION
General: non-toxic appearing, in no respiratory distress  HEENT: atraumatic, normocephalic; pupils are equal, round and react to light, extraocular movements intact bilaterally without deficits, no conjunctival pallor, mucous membranes moist  Neck: no jugular venous distension, full range of motion  Chest/Lung: clear to auscultation bilaterally, no wheezes/rhonchi/rales  Heart: regular rate and rhythm, no murmur/gallops/rubs  Abdomen: normal bowel sounds, soft, non-tender, non-distended  Extremities: no lower extremity edema, +2 radial pulses bilaterally, +2 dorsalis pedis pulses bilaterally  Musculoskeletal: full range of motion of all 4 extremities  Nervous System: alert and oriented, no motor deficits or sensory deficits; CNII-XII grossly intact; no focal neurologic deficits  Skin: abrasions to bilateral knees Vitals: I have reviewed the patients vital signs  General: Non-toxic appearing  HEENT: Atraumatic, normocephalic, airway patent  Eyes: extraocular movement intact,  Neck: no tracheal deviation  Chest/Lungs: symmetric chest rise, speaking in complete sentences, no WOB  Heart: skin and extremities well perfused, regular rate and rhythm  Abdomen: soft, nontender and nondistended   Neuro: A+Ox3, ambulating without difficulty, CN grossly intact  MSK: strength at baseline in all extremities, no muscle wasting or atrophy  Skin: no new emergent lesions Vitals: I have reviewed the patients vital signs  General: Non-toxic appearing  HEENT: Atraumatic, normocephalic, airway patent  Eyes: extraocular movement intact, no eye discharge noted, no surrounding erythema/tenderness   Neck: no tracheal deviation  Chest/Lungs: symmetric chest rise, speaking in complete sentences, no WOB  Heart: skin and extremities well perfused, regular rate and rhythm  Abdomen: soft, nontender and nondistended   Neuro: A+Ox3, CN grossly intact  MSK: strength at baseline in all extremities, no muscle wasting or atrophy  Skin: no new emergent lesions

## 2025-06-19 NOTE — ED ADULT NURSE NOTE - NSFALLHARMRISKINTERV_ED_ALL_ED

## 2025-06-19 NOTE — ED ADULT NURSE REASSESSMENT NOTE - NS ED NURSE REASSESS COMMENT FT1
Patient refusing to ambulate with cane states "I've been falling more and my knees always hurt so if I walk with a cane i'll fall". Pt normally uses a cane

## 2025-06-19 NOTE — ED PROVIDER NOTE - CARE PLAN
1 Principal Discharge DX:	Vision changes   Principal Discharge DX:	Vision disturbance  Secondary Diagnosis:	Chronic back pain  Secondary Diagnosis:	Chronic pain of right knee

## 2025-06-19 NOTE — ED ADULT NURSE REASSESSMENT NOTE - NS ED NURSE REASSESS COMMENT FT1
Wife called and spoke about her concerns, RN made MD Green aware. RN told patient's ex wife to call back in an hour to speak with MD Green.

## 2025-06-19 NOTE — ED ADULT NURSE REASSESSMENT NOTE - NS ED NURSE REASSESS COMMENT FT1
MD Gaines aware of vitals and aware patient reports sometimes having blood in his stool. Awaiting orders from MD.

## 2025-06-19 NOTE — ED PROVIDER NOTE - BIRTH SEX
EMERGENCY DEPARTMENT ENCOUNTER    Room Number:  15/15  Date of encounter:  9/14/2022  PCP: Bosler, James William III, MD  Historian: Patient      HPI:  Chief Complaint: Hyperkalemia  A complete HPI/ROS/PMH/PSH/SH/FH are unobtainable due to: None    Context: Natalie Rosen is a 76 y.o. female who presents to the ED states she had her labs drawn today with her cardiologist.  Was called this evening so she did come to the ER because her potassium was 6.1.  Patient reports that she has been feeling all right reports some mild shortness of breath today.  No cough no congestion no fever or chills.  States she does not feel like she has some trouble catching her breath.  Patient has history of CHF.  Patient states she can take her medicines without problem.  Denies chest pain.      PAST MEDICAL HISTORY  Active Ambulatory Problems     Diagnosis Date Noted   • Abnormal weight gain 02/08/2016   • Benign essential hypertension 02/08/2016   • Cardiomyopathy (HCC) 02/08/2016   • Stenosis of carotid artery 02/08/2016   • Fatigue 02/08/2016   • Hyperlipidemia 02/08/2016   • Hyperthyroidism 02/08/2016   • Non-toxic multinodular goiter 02/08/2016   • Carotid bruit 02/24/2016   • Mitral valve insufficiency 02/24/2016   • Atrophic vaginitis 09/27/2016   • Osteoarthritis, knee 01/12/2017   • DJD (degenerative joint disease) of knee 01/12/2017   • Neck pain on left side 09/08/2020   • Chronic bilateral low back pain without sciatica 09/08/2020   • Lumbar facet arthropathy 11/23/2020   • Muscle spasm 11/23/2020   • COVID-19 09/02/2022   • Acute on chronic combined systolic and diastolic CHF (congestive heart failure) (HCC) 09/02/2022   • Acute respiratory failure with hypoxia ([facility]) 09/02/2022   • Asthma 09/02/2022   • Hyponatremia 09/02/2022   • Cytokine release syndrome, grade 1 09/04/2022     Resolved Ambulatory Problems     Diagnosis Date Noted   • No Resolved Ambulatory Problems     Past Medical History:   Diagnosis Date   •  Arthritis    • Cancer (HCC)    • CHF (congestive heart failure) (HCC)    • Goiter    • PONV (postoperative nausea and vomiting)    • Seasonal allergies    • Urinary frequency          PAST SURGICAL HISTORY  Past Surgical History:   Procedure Laterality Date   • CATARACT EXTRACTION     • COLONOSCOPY     • HYSTERECTOMY     • OTHER SURGICAL HISTORY      PT HAS HAD SKIN CA REMOVED FROM BACK AND FACE (UPPER LIP)    • CA TOTAL KNEE ARTHROPLASTY Right 2017    Procedure: RT TOTAL KNEE ARTHROPLASTY;  Surgeon: Doc Lance MD;  Location: [facility] OR;  Service: Orthopedics   • CA TOTAL KNEE ARTHROPLASTY Left 2017    Procedure: LT TOTAL KNEE ARTHROPLASTY;  Surgeon: Doc Lance MD;  Location: [facility] OR;  Service: Orthopedics         FAMILY HISTORY  Family History   Problem Relation Age of Onset   • Breast cancer Other    • Diabetes Other    • Cancer Mother    • Cancer Father    • Malig Hyperthermia Neg Hx          SOCIAL HISTORY  Social History     Socioeconomic History   • Marital status:    Tobacco Use   • Smoking status: Former Smoker     Packs/day: 1.00     Years: 25.00     Pack years: 25.00     Types: Cigarettes     Quit date: 1985     Years since quittin.7   • Smokeless tobacco: Never Used   Substance and Sexual Activity   • Alcohol use: Yes     Comment: DAILY COCKTAIL   • Drug use: No   • Sexual activity: Defer         ALLERGIES  Patient has no known allergies.        REVIEW OF SYSTEMS  Review of Systems     All systems reviewed and negative except for those discussed in HPI.       PHYSICAL EXAM    I have reviewed the triage vital signs and nursing notes.    ED Triage Vitals   Temp Heart Rate Resp BP SpO2   22   98.7 °F (37.1 °C) 76 14 132/78 98 %      Temp src Heart Rate Source Patient Position BP Location FiO2 (%)   22 -- -- -- --   Tympanic           Physical Exam  GENERAL: not distressed  HENT:  nares patent  EYES: no scleral icterus  CV: regular rhythm, regular rate  RESPIRATORY: normal effort, clear auscultation laterally  ABDOMEN: soft  MUSCULOSKELETAL: no deformity  NEURO: alert, moves all extremities, follows commands  SKIN: warm, dry        LAB RESULTS  Recent Results (from the past 24 hour(s))   Basic Metabolic Panel    Collection Time: 09/13/22 10:00 AM    Specimen: Blood   Result Value Ref Range    Glucose 113 (H) 65 - 99 mg/dL    BUN 51 (H) 8 - 23 mg/dL    Creatinine 1.17 (H) 0.57 - 1.00 mg/dL    Sodium 127 (L) 136 - 145 mmol/L    Potassium 6.1 (C) 3.5 - 5.2 mmol/L    Chloride 96 (L) 98 - 107 mmol/L    CO2 22.9 22.0 - 29.0 mmol/L    Calcium 10.2 8.6 - 10.5 mg/dL    BUN/Creatinine Ratio 43.6 (H) 7.0 - 25.0    Anion Gap 8.1 5.0 - 15.0 mmol/L    eGFR 48.8 (L) >60.0 mL/min/1.73   ECG 12 Lead    Collection Time: 09/13/22 11:15 PM   Result Value Ref Range    QT Interval 432 ms   Comprehensive Metabolic Panel    Collection Time: 09/13/22 11:28 PM    Specimen: Blood   Result Value Ref Range    Glucose 99 65 - 99 mg/dL    BUN 65 (H) 8 - 23 mg/dL    Creatinine 1.15 (H) 0.57 - 1.00 mg/dL    Sodium 127 (L) 136 - 145 mmol/L    Potassium 4.9 3.5 - 5.2 mmol/L    Chloride 92 (L) 98 - 107 mmol/L    CO2 21.4 (L) 22.0 - 29.0 mmol/L    Calcium 9.9 8.6 - 10.5 mg/dL    Total Protein 6.5 6.0 - 8.5 g/dL    Albumin 3.90 3.50 - 5.20 g/dL    ALT (SGPT) 22 1 - 33 U/L    AST (SGOT) 15 1 - 32 U/L    Alkaline Phosphatase 56 39 - 117 U/L    Total Bilirubin 0.4 0.0 - 1.2 mg/dL    Globulin 2.6 gm/dL    A/G Ratio 1.5 g/dL    BUN/Creatinine Ratio 56.5 (H) 7.0 - 25.0    Anion Gap 13.6 5.0 - 15.0 mmol/L    eGFR 49.8 (L) >60.0 mL/min/1.73   Troponin    Collection Time: 09/13/22 11:28 PM    Specimen: Blood   Result Value Ref Range    Troponin T <0.010 0.000 - 0.030 ng/mL   BNP    Collection Time: 09/13/22 11:28 PM    Specimen: Blood   Result Value Ref Range    proBNP 1,016.0 0.0 - 1,800.0 pg/mL   CBC Auto Differential    Collection  Time: 09/13/22 11:28 PM    Specimen: Blood   Result Value Ref Range    WBC 9.29 3.40 - 10.80 10*3/mm3    RBC 4.31 3.77 - 5.28 10*6/mm3    Hemoglobin 13.0 12.0 - 15.9 g/dL    Hematocrit 39.7 34.0 - 46.6 %    MCV 92.1 79.0 - 97.0 fL    MCH 30.2 26.6 - 33.0 pg    MCHC 32.7 31.5 - 35.7 g/dL    RDW 12.6 12.3 - 15.4 %    RDW-SD 43.1 37.0 - 54.0 fl    MPV 9.6 6.0 - 12.0 fL    Platelets 265 140 - 450 10*3/mm3    Neutrophil % 59.3 42.7 - 76.0 %    Lymphocyte % 25.9 19.6 - 45.3 %    Monocyte % 11.5 5.0 - 12.0 %    Eosinophil % 1.9 0.3 - 6.2 %    Basophil % 0.9 0.0 - 1.5 %    Immature Grans % 0.5 0.0 - 0.5 %    Neutrophils, Absolute 5.50 1.70 - 7.00 10*3/mm3    Lymphocytes, Absolute 2.41 0.70 - 3.10 10*3/mm3    Monocytes, Absolute 1.07 (H) 0.10 - 0.90 10*3/mm3    Eosinophils, Absolute 0.18 0.00 - 0.40 10*3/mm3    Basophils, Absolute 0.08 0.00 - 0.20 10*3/mm3    Immature Grans, Absolute 0.05 0.00 - 0.05 10*3/mm3    nRBC 0.0 0.0 - 0.2 /100 WBC       Ordered the above labs and independently reviewed the results.        RADIOLOGY  XR Chest 1 View    Result Date: 9/14/2022  SINGLE VIEW OF THE CHEST  HISTORY: Shortness of air  COMPARISON: 09/01/2022  FINDINGS: Heart size is within normal limits. No pneumothorax, pleural effusion, or acute injury seen. There is some calcification of the aorta. Cardiac loop recorder is noted.      No acute findings.  This report was finalized on 9/14/2022 12:19 AM by Dr. Ana Panchal M.D.        I ordered the above noted radiological studies. Reviewed by me and discussed with radiologist.  See dictation for official radiology interpretation.      PROCEDURES    Procedures      MEDICATIONS GIVEN IN ER    Medications - No data to display      PROGRESS, DATA ANALYSIS, CONSULTS, AND MEDICAL DECISION MAKING    All labs have been independently reviewed by me.  All radiology studies have been reviewed by me and discussed with radiologist dictating the report.   EKG's independently viewed and interpreted  by me.  Discussion below represents my analysis of pertinent findings related to patient's condition, differential diagnosis, treatment plan and final disposition.        ED Course as of 09/14/22 0629   Tue Sep 13, 2022   2324 EKG          EKG time: 2315  Rhythm/Rate: Sinus rhythm rate 70  P waves and TN: Normal  QRS, axis: LVH with IVCD  ST and T waves: Nonspecific    Interpreted Contemporaneously by me, independently viewed  No significant changed compared to prior EKG   [TJ]   Wed Sep 14, 2022   0058 I have reviewed patient's prior labs.  There is note that sample was hemolyzed.  I suspect this accounts for elevated potassium. [TJ]   0058 BUN represents diuresis.  Patient was recently started on diuretic medicine. [TJ]   0100 Patient is feeling well no complaints.  I have made her aware lab findings and imaging results.  Will discharge patient home. [TJ]   0110 BUN(!): 65 [TJ]   0110 Creatinine(!): 1.15 [TJ]   0110 Potassium: 4.9 [TJ]   0110 Sodium(!): 127 [TJ]   0110 proBNP: 1,016.0 [TJ]   0110 Troponin T: <0.010 [TJ]      ED Course User Index  [TJ] Edu Garcia MD           PPE: The patient wore a surgical mask throughout the entire patient encounter. I wore an N95.    AS OF 06:29 EDT VITALS:    BP - 132/70  HR - 72  TEMP - 98.7 °F (37.1 °C) (Tympanic)  O2 SATS - 98%        DIAGNOSIS  Final diagnoses:   Congestive heart failure, unspecified HF chronicity, unspecified heart failure type (HCC)         DISPOSITION  Discharge           Edu Garcia MD  09/14/22 0629     Male

## 2025-06-19 NOTE — ED ADULT TRIAGE NOTE - CHIEF COMPLAINT QUOTE
pt brought it by EMS from home c/o vision loss x1 year. endorsing headache and eye pain. reports he has been to multiple hospitals for it. hx DM and HTN.

## 2025-06-19 NOTE — ED PROVIDER NOTE - PATIENT PORTAL LINK FT
You can access the FollowMyHealth Patient Portal offered by [facility] by registering at the following website: [email or web address]. By joining [employer]’s FollowMyHealth portal, you will also be able to view your health information using other applications (apps) compatible with our system.

## 2025-06-19 NOTE — ED PROVIDER NOTE - PROGRESS NOTE DETAILS
Eder Attending Physician   CBC was repeated and noted slight drop in H/H, however second repeat H/H improved; patient likely has anemia of chronic disease from CKD. Patient reassessed and reports feeling the same. He denies bloody or black stool, hematemesis or other source of bleeding. He reports that he just wants housing and somewhere to go back to. SW at bedside.    #[id number] Jeyson Burnham Peter DO: Patient signed out to me by Dr. Gaines. Patient is here with chronic visual changes. Plan of care is to ambulate and follow-up social work recommendations. Disposition is discharge.  Patient seen by social work, has apartment that he can return to.  Transportation set up.  Patient ambulating with steady gait in the ED.  Stable for discharge. Eder Attending Physician   Second CBC noted slight drop in H/H, however second repeat H/H improved; patient likely has anemia of chronic disease from CKD. Patient reassessed and reports feeling the same. He denies bloody or black stool, hematemesis or other source of bleeding. He reports that he just wants housing and somewhere to go back to. SW at bedside.    #[id number] Jeyson Burnham

## 2025-06-19 NOTE — CHART NOTE - NSCHARTNOTEFT_GEN_A_CORE
Pt is a 66 y/o male with a past medical hx of DM, HTN, B/L cataracts, HIV infection, chronic back pain, anxiety, insomnia, head trauma, chronic visual changes, CKD stage V, refusing hemodialysis. Pt presented to the ER for visual disturbance and pain in leg. Pt recently admitted to [facility] and d/c 5/13/25. Pt has not followed up on any out pt appointments. Pt also is homeless and requesting SW for housing. Pt was evaluated and cleared for d/c.      POLA met with pt at bedside to introduce myself and discuss role as well as community resources. Pt reports he has been staying on the streets.  From past notes when pt was d/c from [facility]. He was placed at [address] [place] [address].  Pt reports he was only at this shelter for a few days and then went to [facility]. Pt stated he has been on the streets since that time.  Pola contacted [facility] and spoke to Morteza Walter [phone].  He reported pt is in arrears with his rent from where he lived at [address] [place] [address] [facility] [phone]. Mroteza stated pt is able to return to this apartment until he is given an eviction notice. Pt reports he does not have keys to get into his apartment.  Pola contacted the [facility] and spoke to Tamra who stated the complex opens at 8:30 and pt will require 3 keys which will cost $75.00 dollars. A check or money order can be made to [facility] for that amount. Pola spoke to Shavon Tate, Director of  and she stated we can get a check from Sahil MARTIN possibly tomorrow. POLA spoke to Dr. Green regarding providing pt medication to be d/c with. Pt will also require [facility] referral. Pt from past notes is known to [facility]. Pola will inquire when pt was last seen. Pt will also need to follow up with [facility].  POLA sent an email to  assistant requesting them to make an appointment. Pt will also need transportation to the address above. Pt will be kept in the ER overnight so all the above can be coordinated. Case discussed with RN/MD. Pt is a 64 y/o male with a past medical hx of DM, HTN, B/L cataracts, HIV infection, chronic back pain, anxiety, insomnia, head trauma, chronic visual changes, CKD stage V, refusing hemodialysis. Pt presented to the ER for visual disturbance and pain in leg. Pt recently admitted to [facility] and d/c 5/13/25. Pt has not followed up on any out pt appointments. Pt also is homeless and requesting SW for housing. Pt was evaluated and cleared for d/c.      POLA met with pt at bedside to introduce myself and discuss role as well as community resources. Pt reports he has been staying on the streets.  From past notes when pt was d/c from [facility]. He was placed at [address] [place] [address].  Pt reports he was only at this shelter for a few days and then went to [facility]. Pt stated he has been on the streets since that time.  Pola contacted [facility] and spoke to Morteza Walter [phone].  He reported pt is in arrears with his rent from where he lived at [address] [place] [address] [facility] [phone]. Morteza stated pt is able to return to this apartment until he is given an eviction notice. Pt reports he does not have keys to get into his apartment.  Pola contacted the [facility] and spoke to Tamra who stated the complex opens at 8:30 and pt will require 3 keys which will cost $75.00 dollars. A check or money order can be made to [facility] for that amount. Pola spoke to Shavon Tate, Director of  and she stated we can get a check from Sahil MARTIN possibly tomorrow. POLA spoke to Dr. Green regarding providing pt medication to be d/c with. Pts pharmacy is [facility] in [place], [place]. Pt will also require [facility] referral. [facility] Pola will inquire when pt was last seen. POLA will inquire if [facility] can deliver pts meds/food.  Pt will also need to follow up with [facility].  POLA sent an email to  assistant requesting them to make an appointment. Pt will also need transportation to the address above. Pt will be kept in the ER overnight so all the above can be coordinated. Case discussed with RN/MD.

## 2025-06-19 NOTE — ED ADULT NURSE NOTE - OBJECTIVE STATEMENT
Patient coming in for right sided visual loss in right eye for 1 year. Pt endorses a headache. Pt is A&OX3. GCS 15. HX of HTN, HIV. EKG completed. Patient noted to have elevated BP in room with MD Gaines, patient took BP medication this morning. Denies CP or SOB. Patient on cardiac monitor. Side rails up. Call bell light within reach.

## 2025-06-19 NOTE — ED ADULT NURSE REASSESSMENT NOTE - NS ED NURSE REASSESS COMMENT FT1
Patient has no complaints. Patient to stay overnight for social work hold in am. Patient ordered dinner.

## 2025-06-20 VITALS
SYSTOLIC BLOOD PRESSURE: 158 MMHG | OXYGEN SATURATION: 98 % | TEMPERATURE: 98 F | RESPIRATION RATE: 20 BRPM | DIASTOLIC BLOOD PRESSURE: 90 MMHG | HEART RATE: 75 BPM

## 2025-06-20 RX ORDER — ACETAMINOPHEN 500 MG/5ML
650 LIQUID (ML) ORAL ONCE
Refills: 0 | Status: COMPLETED | OUTPATIENT
Start: 2025-06-20 | End: 2025-06-20

## 2025-06-20 RX ADMIN — Medication 650 MILLIGRAM(S): at 01:34

## 2025-06-20 NOTE — CHART NOTE - NSCHARTNOTEFT_GEN_A_CORE
Dietitian Brief Note:    Dietitian consulted for [facility] program. Verbal and written diet ed provided on healthy food shopping on a budget including recipes and resource guides for food programs. Pt verbalized understanding.  Given recipes, healthy eating hand-outs, and resources for food pantries in their area - pt is receptive. All questions/ concerns addressed.     Kelley Garcia, MS, RDN, CDN, [facility] [phone]  Certified Nutrition

## 2025-06-20 NOTE — ED ADULT NURSE REASSESSMENT NOTE - NS ED NURSE REASSESS COMMENT FT1
Received bedside report from previous RN FERNANDO Dawkins. Patient is lying on stretcher on semi-fowlers position. No signs of distress noted, Vital signs stable. Patient has no complaints at this time. Call bell within reach, bed in lowest position, safety and comfort maintained. Pt is awaiting social work.

## 2025-06-25 ENCOUNTER — EMERGENCY (EMERGENCY)
Facility: HOSPITAL | Age: 65
LOS: 1 days | End: 2025-06-25
Attending: EMERGENCY MEDICINE

## 2025-06-25 VITALS
HEIGHT: 69 IN | HEART RATE: 88 BPM | TEMPERATURE: 99 F | WEIGHT: 182.1 LBS | DIASTOLIC BLOOD PRESSURE: 78 MMHG | RESPIRATION RATE: 20 BRPM | OXYGEN SATURATION: 98 % | SYSTOLIC BLOOD PRESSURE: 147 MMHG

## 2025-06-25 DIAGNOSIS — Z98.1 ARTHRODESIS STATUS: Chronic | ICD-10-CM

## 2025-06-25 PROCEDURE — 99284 EMERGENCY DEPT VISIT MOD MDM: CPT

## 2025-06-25 PROCEDURE — 99283 EMERGENCY DEPT VISIT LOW MDM: CPT

## 2025-06-25 RX ORDER — DORZOLAMIDE HYDROCHLORIDE AND TIMOLOL MALEATE 20; 5 MG/ML; MG/ML
1 SOLUTION/ DROPS OPHTHALMIC
Refills: 0 | Status: DISCONTINUED | OUTPATIENT
Start: 2025-06-25 | End: 2025-07-02

## 2025-06-25 RX ORDER — TETRACAINE HYDROCHLORIDE 5 MG/ML
1 SOLUTION OPHTHALMIC ONCE
Refills: 0 | Status: COMPLETED | OUTPATIENT
Start: 2025-06-25 | End: 2025-06-25

## 2025-06-25 RX ORDER — OXYCODONE HYDROCHLORIDE AND ACETAMINOPHEN 10; 325 MG/1; MG/1
1 TABLET ORAL ONCE
Refills: 0 | Status: DISCONTINUED | OUTPATIENT
Start: 2025-06-25 | End: 2025-06-25

## 2025-06-25 RX ADMIN — TETRACAINE HYDROCHLORIDE 1 DROP(S): 5 SOLUTION OPHTHALMIC at 12:45

## 2025-06-25 RX ADMIN — Medication 1 DROP(S): at 13:51

## 2025-06-25 RX ADMIN — OXYCODONE HYDROCHLORIDE AND ACETAMINOPHEN 1 TABLET(S): 10; 325 TABLET ORAL at 13:21

## 2025-06-25 RX ADMIN — DORZOLAMIDE HYDROCHLORIDE AND TIMOLOL MALEATE 1 DROP(S): 20; 5 SOLUTION/ DROPS OPHTHALMIC at 13:51

## 2025-06-25 NOTE — ED PROVIDER NOTE - NSFOLLOWUPINSTRUCTIONS_ED_ALL_ED_FT
take the following drops  prednisolone (pink bottle) 1 drop in right eye 4 times per day  dorzolamide/timolol (blue bottle) 1 drop in both eyes twice a day take the following drops  prednisolone (pink bottle) 1 drop in right eye 4 times per day  dorzolamide/timolol (blue bottle) 1 drop in both eyes twice a day  follow up with ophthalmologist this week

## 2025-06-25 NOTE — ED ADULT NURSE NOTE - NSFALLUNIVINTERV_ED_ALL_ED
Assistance OOB with selected safe patient handling equipment if applicable/Bed/Stretcher in lowest position, wheels locked, appropriate side rails in place/Call bell, personal items and telephone in reach/Instruct patient to call for assistance before getting out of bed/chair/stretcher/Non-slip footwear applied when patient is off stretcher/[place] to call system/Physically safe environment - no spills, clutter or unnecessary equipment/Purposeful proactive rounding/Room/bathroom lighting operational, light cord in reach

## 2025-06-25 NOTE — ED ADULT NURSE NOTE - CHIEF COMPLAINT QUOTE
Pt BIBA c/o right eye pain.  Reports throbbing pain.  States it has been ongoing for approx 4 months. Pt was most recently treated at [facility] on Monday.  States he has been told he needs cataract surgery.

## 2025-06-25 NOTE — ED PROVIDER NOTE - OBJECTIVE STATEMENT
66 y/o M c/o pain in right eye x 4 months.  Patient saw his opthalmologist yesterday who prescribed prednisolone drops and timolol/dorzolamide drops.  Patient is unable to  the drops until next Tuesday.

## 2025-06-25 NOTE — ED ADULT NURSE NOTE - OBJECTIVE STATEMENT
A&Ox3, Pt BIBA c/o right eye pain.  Reports throbbing pain.  States it has been ongoing for approx 4 months. Pt was most recently treated at [facility] on Monday.  States he has been told he needs cataract surgery.

## 2025-06-25 NOTE — ED PROVIDER NOTE - PATIENT PORTAL LINK FT
You can access the FollowMyHealth Patient Portal offered by [facility] by registering at the following website: [email or web address]. By joining [employer]’s FollowMyHealth portal, you will also be able to view your health information using other applications (apps) compatible with our system.

## 2025-06-25 NOTE — ED ADULT TRIAGE NOTE - CHIEF COMPLAINT QUOTE
Pt BIBA c/o right eye pain.  Reports throbbing pain.  States it has been ongoing for approx 4 months. Pt was most recently treated at [facility] on Monday.  States he has been told he needs cataract surgery.

## 2025-07-20 ENCOUNTER — EMERGENCY (EMERGENCY)
Facility: HOSPITAL | Age: 65
LOS: 1 days | End: 2025-07-20
Attending: EMERGENCY MEDICINE
Payer: MEDICAID

## 2025-07-20 VITALS
OXYGEN SATURATION: 98 % | TEMPERATURE: 99 F | DIASTOLIC BLOOD PRESSURE: 81 MMHG | HEART RATE: 78 BPM | RESPIRATION RATE: 18 BRPM | SYSTOLIC BLOOD PRESSURE: 176 MMHG | WEIGHT: 156.97 LBS | HEIGHT: 69 IN

## 2025-07-20 DIAGNOSIS — Z98.1 ARTHRODESIS STATUS: Chronic | ICD-10-CM

## 2025-07-20 LAB
ALBUMIN SERPL ELPH-MCNC: 3 G/DL — LOW (ref 3.3–5.2)
ALP SERPL-CCNC: 57 U/L — SIGNIFICANT CHANGE UP (ref 40–120)
ALT FLD-CCNC: 10 U/L — SIGNIFICANT CHANGE UP
ANION GAP SERPL CALC-SCNC: 9 MMOL/L — SIGNIFICANT CHANGE UP (ref 5–17)
AST SERPL-CCNC: 11 U/L — SIGNIFICANT CHANGE UP
BASOPHILS # BLD AUTO: 0.04 K/UL — SIGNIFICANT CHANGE UP (ref 0–0.2)
BASOPHILS NFR BLD AUTO: 0.5 % — SIGNIFICANT CHANGE UP (ref 0–2)
BILIRUB SERPL-MCNC: 0.2 MG/DL — LOW (ref 0.4–2)
BUN SERPL-MCNC: 41.8 MG/DL — HIGH (ref 8–20)
CALCIUM SERPL-MCNC: 8.6 MG/DL — SIGNIFICANT CHANGE UP (ref 8.4–10.5)
CHLORIDE SERPL-SCNC: 112 MMOL/L — HIGH (ref 96–108)
CO2 SERPL-SCNC: 19 MMOL/L — LOW (ref 22–29)
CREAT SERPL-MCNC: 4.68 MG/DL — HIGH (ref 0.5–1.3)
EGFR: 13 ML/MIN/1.73M2 — LOW
EGFR: 13 ML/MIN/1.73M2 — LOW
EOSINOPHIL # BLD AUTO: 0.12 K/UL — SIGNIFICANT CHANGE UP (ref 0–0.5)
EOSINOPHIL NFR BLD AUTO: 1.6 % — SIGNIFICANT CHANGE UP (ref 0–6)
GLUCOSE SERPL-MCNC: 117 MG/DL — HIGH (ref 70–99)
HCT VFR BLD CALC: 29.7 % — LOW (ref 39–50)
HGB BLD-MCNC: 9.5 G/DL — LOW (ref 13–17)
IMM GRANULOCYTES # BLD AUTO: 0.04 K/UL — SIGNIFICANT CHANGE UP (ref 0–0.07)
IMM GRANULOCYTES NFR BLD AUTO: 0.5 % — SIGNIFICANT CHANGE UP (ref 0–0.9)
LYMPHOCYTES # BLD AUTO: 1.67 K/UL — SIGNIFICANT CHANGE UP (ref 1–3.3)
LYMPHOCYTES NFR BLD AUTO: 22.1 % — SIGNIFICANT CHANGE UP (ref 13–44)
MCHC RBC-ENTMCNC: 29.6 PG — SIGNIFICANT CHANGE UP (ref 27–34)
MCHC RBC-ENTMCNC: 32 G/DL — SIGNIFICANT CHANGE UP (ref 32–36)
MCV RBC AUTO: 92.5 FL — SIGNIFICANT CHANGE UP (ref 80–100)
MONOCYTES # BLD AUTO: 0.58 K/UL — SIGNIFICANT CHANGE UP (ref 0–0.9)
MONOCYTES NFR BLD AUTO: 7.7 % — SIGNIFICANT CHANGE UP (ref 2–14)
NEUTROPHILS # BLD AUTO: 5.09 K/UL — SIGNIFICANT CHANGE UP (ref 1.8–7.4)
NEUTROPHILS NFR BLD AUTO: 67.6 % — SIGNIFICANT CHANGE UP (ref 43–77)
NRBC # BLD AUTO: 0 K/UL — SIGNIFICANT CHANGE UP (ref 0–0)
NRBC # FLD: 0 K/UL — SIGNIFICANT CHANGE UP (ref 0–0)
NRBC BLD AUTO-RTO: 0 /100 WBCS — SIGNIFICANT CHANGE UP (ref 0–0)
PLATELET # BLD AUTO: 260 K/UL — SIGNIFICANT CHANGE UP (ref 150–400)
PMV BLD: 9.1 FL — SIGNIFICANT CHANGE UP (ref 7–13)
POTASSIUM SERPL-MCNC: 5.1 MMOL/L — SIGNIFICANT CHANGE UP (ref 3.5–5.3)
POTASSIUM SERPL-SCNC: 5.1 MMOL/L — SIGNIFICANT CHANGE UP (ref 3.5–5.3)
PROT SERPL-MCNC: 6.6 G/DL — SIGNIFICANT CHANGE UP (ref 6.6–8.7)
RBC # BLD: 3.21 M/UL — LOW (ref 4.2–5.8)
RBC # FLD: 14.6 % — HIGH (ref 10.3–14.5)
SODIUM SERPL-SCNC: 140 MMOL/L — SIGNIFICANT CHANGE UP (ref 135–145)
WBC # BLD: 7.54 K/UL — SIGNIFICANT CHANGE UP (ref 3.8–10.5)
WBC # FLD AUTO: 7.54 K/UL — SIGNIFICANT CHANGE UP (ref 3.8–10.5)

## 2025-07-20 PROCEDURE — 82962 GLUCOSE BLOOD TEST: CPT

## 2025-07-20 PROCEDURE — 36415 COLL VENOUS BLD VENIPUNCTURE: CPT

## 2025-07-20 PROCEDURE — 85025 COMPLETE CBC W/AUTO DIFF WBC: CPT

## 2025-07-20 PROCEDURE — 99284 EMERGENCY DEPT VISIT MOD MDM: CPT

## 2025-07-20 PROCEDURE — 70450 CT HEAD/BRAIN W/O DYE: CPT

## 2025-07-20 PROCEDURE — 70450 CT HEAD/BRAIN W/O DYE: CPT | Mod: 26

## 2025-07-20 PROCEDURE — 80053 COMPREHEN METABOLIC PANEL: CPT

## 2025-07-20 RX ORDER — ACETAMINOPHEN 500 MG/5ML
975 LIQUID (ML) ORAL ONCE
Refills: 0 | Status: COMPLETED | OUTPATIENT
Start: 2025-07-20 | End: 2025-07-20

## 2025-07-20 RX ORDER — DORZOLAMIDE HYDROCHLORIDE AND TIMOLOL MALEATE 20; 5 MG/ML; MG/ML
1 SOLUTION/ DROPS OPHTHALMIC ONCE
Refills: 0 | Status: COMPLETED | OUTPATIENT
Start: 2025-07-20 | End: 2025-07-20

## 2025-07-20 RX ORDER — TETRACAINE HYDROCHLORIDE 5 MG/ML
1 SOLUTION OPHTHALMIC ONCE
Refills: 0 | Status: COMPLETED | OUTPATIENT
Start: 2025-07-20 | End: 2025-07-20

## 2025-07-20 RX ORDER — PILOCARPINE HYDROCHLORIDE OPHTHALMIC SOLUTION 20 MG/ML
1 SOLUTION/ DROPS OPHTHALMIC ONCE
Refills: 0 | Status: COMPLETED | OUTPATIENT
Start: 2025-07-20 | End: 2025-07-20

## 2025-07-20 RX ORDER — IBUPROFEN 200 MG
600 TABLET ORAL ONCE
Refills: 0 | Status: COMPLETED | OUTPATIENT
Start: 2025-07-20 | End: 2025-07-20

## 2025-07-20 RX ORDER — KETOROLAC TROMETHAMINE 5 MG/ML
2 SOLUTION/ DROPS OPHTHALMIC ONCE
Refills: 0 | Status: COMPLETED | OUTPATIENT
Start: 2025-07-20 | End: 2025-07-20

## 2025-07-20 RX ORDER — ACETAZOLAMIDE 250 MG/1
500 TABLET ORAL ONCE
Refills: 0 | Status: COMPLETED | OUTPATIENT
Start: 2025-07-20 | End: 2025-07-20

## 2025-07-20 RX ADMIN — TETRACAINE HYDROCHLORIDE 1 DROP(S): 5 SOLUTION OPHTHALMIC at 19:19

## 2025-07-20 RX ADMIN — Medication 975 MILLIGRAM(S): at 15:13

## 2025-07-20 RX ADMIN — TETRACAINE HYDROCHLORIDE 1 DROP(S): 5 SOLUTION OPHTHALMIC at 15:13

## 2025-07-20 RX ADMIN — PILOCARPINE HYDROCHLORIDE OPHTHALMIC SOLUTION 1 DROP(S): 20 SOLUTION/ DROPS OPHTHALMIC at 22:08

## 2025-07-20 RX ADMIN — DORZOLAMIDE HYDROCHLORIDE AND TIMOLOL MALEATE 1 DROP(S): 20; 5 SOLUTION/ DROPS OPHTHALMIC at 22:09

## 2025-07-20 RX ADMIN — Medication 2 DROP(S): at 19:18

## 2025-07-20 RX ADMIN — Medication 975 MILLIGRAM(S): at 17:16

## 2025-07-20 RX ADMIN — Medication 600 MILLIGRAM(S): at 22:06

## 2025-07-20 RX ADMIN — ACETAZOLAMIDE 110 MILLIGRAM(S): 250 TABLET ORAL at 22:06

## 2025-07-20 RX ADMIN — KETOROLAC TROMETHAMINE 2 DROP(S): 5 SOLUTION/ DROPS OPHTHALMIC at 19:19

## 2025-07-20 NOTE — ED PROVIDER NOTE - NSFOLLOWUPINSTRUCTIONS_ED_ALL_ED_FT
1. Return to ED for worsening, progressive or any other concerning symptoms   2. Follow up with your primary care doctor in 2-3days  3. Use ketorolac eye drops given to you in ED, 1 drop to R eye every 6 hours for pain  4. Continue using your home eye drops   5. May take tylenol and ibuprofen for pain. Take tylenol 1000mg every 6 hours, do not exceed 4000 mg in 24 hours. Take ibuprofen 400mg every 6 hours, do not exceed 3200 mg in 24 hours. May alternate between tylenol and ibuprofen every 3 hours.  6. See your ophthalmologist tomorrow for follow up

## 2025-07-20 NOTE — ED PROVIDER NOTE - CLINICAL SUMMARY MEDICAL DECISION MAKING FREE TEXT BOX
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 No changes to baseline vision, has tearing to R eye without any purulent discharge. +blindness to R eye all visual fields; no visual field loss in L eye; L eye IOP 18; R eye IOP 46; +R injected conjunctiva, b/l eyes nonreactive to light; no corneal abrasions to b/l eyes seen when using fluorescein dye and Woods lamp. Follows with ophthalmologist at [place], on drozolamide-timolol drops. CTH noncon ordered and negative. Pain control, IV acetazolamide, home med drozolamide-timolol and pilocarpine drops all to reduce IOP. Will reassess IOP after medications and likely discharge with ophthalmology and PCP follow up.

## 2025-07-20 NOTE — ED PROVIDER NOTE - PHYSICAL EXAMINATION
Gen: NAD, AOx3  Head: NCAT  HEENT: EOMI, +blindness to R eye all visual fields; no visual field loss in L eye; L eye IOP 18; R eye IOP 46; +R injected conjunctiva, b/l eyes nonreactive to light; no corneal abrasions to b/l eyes seen when using fluorescein dye and [place] lamp. moist oral mucosa; neck supple, no sinus TTP  Lung: CTAB, no respiratory distress  CV: hypertensive, rrr, no murmur, Normal perfusion  Abd: soft, ND, nontender. No guarding, no rigidity  MSK: No edema, no visible deformities. All motor/sensory intact  Neuro: No focal neurologic deficits. Normal gait  Skin: No rash   Psych: normal affect

## 2025-07-20 NOTE — ED PROVIDER NOTE - PROGRESS NOTE DETAILS
Case s/o to by Dr Harrington to check the IOP and high and treated for glaucoma and IOP on the right eye is now 20.  The patient is already blind in the right eye and IOP corrected and has primary eye physician and will follow up tomorrow

## 2025-07-20 NOTE — ED ADULT NURSE NOTE - OBJECTIVE STATEMENT
Assumed pt care at 1420.  Pt reports vision changes have been going on for "years."  He came to the hospital because of the pain on the right temple and behind his right eye that started last night.

## 2025-07-20 NOTE — ED PROVIDER NOTE - PATIENT PORTAL LINK FT
You can access the FollowMyHealth Patient Portal offered by [facility] by registering at the following website: [email or web address]. By joining [employer]’s FollowMyHealth portal, you will also be able to view your health information using other applications (apps) compatible with our system.

## 2025-07-20 NOTE — ED ADULT TRIAGE NOTE - CHIEF COMPLAINT QUOTE
pt BIBA c/o bilateral eyes pain seeing white patches hx glaucoma, dm pt BIBA c/o bilateral eyes pain seeing white patches, tearing eyes  hx glaucoma, dm

## 2025-07-21 VITALS
RESPIRATION RATE: 18 BRPM | SYSTOLIC BLOOD PRESSURE: 170 MMHG | TEMPERATURE: 98 F | DIASTOLIC BLOOD PRESSURE: 90 MMHG | HEART RATE: 68 BPM | OXYGEN SATURATION: 98 %

## 2025-07-21 PROCEDURE — 85025 COMPLETE CBC W/AUTO DIFF WBC: CPT

## 2025-07-21 PROCEDURE — 96374 THER/PROPH/DIAG INJ IV PUSH: CPT

## 2025-07-21 PROCEDURE — 70450 CT HEAD/BRAIN W/O DYE: CPT

## 2025-07-21 PROCEDURE — 80053 COMPREHEN METABOLIC PANEL: CPT

## 2025-07-21 PROCEDURE — 82962 GLUCOSE BLOOD TEST: CPT

## 2025-07-21 PROCEDURE — 36415 COLL VENOUS BLD VENIPUNCTURE: CPT

## 2025-07-21 PROCEDURE — T1013: CPT

## 2025-07-21 PROCEDURE — 99284 EMERGENCY DEPT VISIT MOD MDM: CPT | Mod: 25

## 2025-07-21 RX ORDER — AMLODIPINE BESYLATE 10 MG/1
5 TABLET ORAL ONCE
Refills: 0 | Status: COMPLETED | OUTPATIENT
Start: 2025-07-21 | End: 2025-07-21

## 2025-07-21 RX ORDER — TETRACAINE HYDROCHLORIDE 5 MG/ML
1 SOLUTION OPHTHALMIC ONCE
Refills: 0 | Status: COMPLETED | OUTPATIENT
Start: 2025-07-21 | End: 2025-07-21

## 2025-07-21 RX ADMIN — TETRACAINE HYDROCHLORIDE 1 DROP(S): 5 SOLUTION OPHTHALMIC at 01:32

## 2025-07-21 RX ADMIN — AMLODIPINE BESYLATE 5 MILLIGRAM(S): 10 TABLET ORAL at 01:15
